# Patient Record
Sex: MALE | Race: WHITE | NOT HISPANIC OR LATINO | Employment: FULL TIME | URBAN - METROPOLITAN AREA
[De-identification: names, ages, dates, MRNs, and addresses within clinical notes are randomized per-mention and may not be internally consistent; named-entity substitution may affect disease eponyms.]

---

## 2017-01-03 ENCOUNTER — TELEPHONE (OUTPATIENT)
Dept: OPHTHALMOLOGY | Facility: CLINIC | Age: 62
End: 2017-01-03

## 2017-01-03 NOTE — TELEPHONE ENCOUNTER
----- Message from Debora Garcia sent at 1/3/2017 12:24 PM CST -----  Contact: self/[phone]  Pt called in regards to getting a Rx refill for doxazosin (CARDURA) 4 MG tablet. He has sent in several request. Pt is out of Rx.      [facility]  Please advise

## 2017-01-06 ENCOUNTER — PATIENT MESSAGE (OUTPATIENT)
Dept: INTERNAL MEDICINE | Facility: CLINIC | Age: 62
End: 2017-01-06

## 2017-01-06 RX ORDER — DOXAZOSIN 4 MG/1
TABLET ORAL
Qty: 30 TABLET | Refills: 6 | Status: SHIPPED | OUTPATIENT
Start: 2017-01-06 | End: 2017-08-04 | Stop reason: SDUPTHER

## 2017-01-26 RX ORDER — DIPHENHYDRAMINE HCL 1 %
CREAM (GRAM) TOPICAL
Qty: 360 TABLET | Refills: 0 | Status: SHIPPED | OUTPATIENT
Start: 2017-01-26 | End: 2017-07-24 | Stop reason: SDUPTHER

## 2017-04-03 DIAGNOSIS — G47.00 PERSISTENT INSOMNIA: ICD-10-CM

## 2017-04-03 RX ORDER — TRAZODONE HYDROCHLORIDE 50 MG/1
TABLET ORAL
Qty: 90 TABLET | Refills: 3 | Status: SHIPPED | OUTPATIENT
Start: 2017-04-03 | End: 2017-09-13 | Stop reason: SDUPTHER

## 2017-04-03 RX ORDER — OMEPRAZOLE 40 MG/1
40 CAPSULE, DELAYED RELEASE ORAL DAILY
Qty: 90 CAPSULE | Refills: 2 | Status: SHIPPED | OUTPATIENT
Start: 2017-04-03 | End: 2018-01-12 | Stop reason: SDUPTHER

## 2017-04-19 RX ORDER — HYDROXYCHLOROQUINE SULFATE 200 MG/1
TABLET, FILM COATED ORAL
Qty: 90 TABLET | Refills: 3 | Status: SHIPPED | OUTPATIENT
Start: 2017-04-19 | End: 2018-01-17 | Stop reason: SDUPTHER

## 2017-04-20 ENCOUNTER — OFFICE VISIT (OUTPATIENT)
Dept: OPTOMETRY | Facility: CLINIC | Age: 62
End: 2017-04-20
Payer: COMMERCIAL

## 2017-04-20 ENCOUNTER — CLINICAL SUPPORT (OUTPATIENT)
Dept: OPHTHALMOLOGY | Facility: CLINIC | Age: 62
End: 2017-04-20
Payer: COMMERCIAL

## 2017-04-20 DIAGNOSIS — M05.79 RHEUMATOID ARTHRITIS INVOLVING MULTIPLE SITES WITH POSITIVE RHEUMATOID FACTOR: Primary | ICD-10-CM

## 2017-04-20 DIAGNOSIS — H25.13 NUCLEAR SCLEROSIS, BILATERAL: ICD-10-CM

## 2017-04-20 DIAGNOSIS — Z79.899 HISTORY OF LONG-TERM TREATMENT WITH HIGH-RISK MEDICATION: ICD-10-CM

## 2017-04-20 PROCEDURE — 92134 CPTRZ OPH DX IMG PST SGM RTA: CPT | Mod: S$GLB,,, | Performed by: OPTOMETRIST

## 2017-04-20 PROCEDURE — 92083 EXTENDED VISUAL FIELD XM: CPT | Mod: S$GLB,,, | Performed by: OPTOMETRIST

## 2017-04-20 PROCEDURE — 92004 COMPRE OPH EXAM NEW PT 1/>: CPT | Mod: S$GLB,,, | Performed by: OPTOMETRIST

## 2017-04-20 PROCEDURE — 99999 PR PBB SHADOW E&M-EST. PATIENT-LVL II: CPT | Mod: PBBFAC,,, | Performed by: OPTOMETRIST

## 2017-04-20 NOTE — MR AVS SNAPSHOT
[facility]  [address]  [facility] [address]  Phone: [phone]  Fax: [phone]                  Andrea Gant   2017 1:30 PM   Office Visit    Description:  Male : 1955   Provider:  Milad Johnson OD   Department:  [facility]           Reason for Visit     Concerns About Ocular Health           Diagnoses this Visit        Comments    Rheumatoid arthritis involving multiple sites with positive rheumatoid factor    -  Primary     History of long-term treatment with high-risk medication         Nuclear sclerosis, bilateral                To Do List           Goals (5 Years of Data)     None      Follow-Up and Disposition     Return in about 6 months (around 10/20/2017).      [facility]     [facility] On Call Nurse Care Line -  Assistance  Unless otherwise directed by your provider, please contact [facility], our nurse care line that is available for  assistance.     Registered nurses in the [facility] On Call Center provide: appointment scheduling, clinical advisement, health education, and other advisory services.  Call: [phone] (toll free)               Medications           Message regarding Medications     Verify the changes and/or additions to your medication regime listed below are the same as discussed with your clinician today.  If any of these changes or additions are incorrect, please notify your healthcare provider.             Verify that the below list of medications is an accurate representation of the medications you are currently taking.  If none reported, the list may be blank. If incorrect, please contact your healthcare provider. Carry this list with you in case of emergency.           Current Medications     adalimumab (HUMIRA PEN) PnKt injection INJECT 1 PEN SUBCUTANEOUSLY EVERY OTHER WEEK -REFRIGERATE DO NOT FREEZE    aspirin 81 MG Chew TAKE 1 TABLET BY MOUTH EVERY DAY    CHILDREN'S ASPIRIN 81 mg Chew TAKE 1 TABLET BY  MOUTH EVERY DAY    cilostazol (PLETAL) 50 MG Tab TAKE 1 TABLET BY MOUTH TWICE A DAY    cilostazol (PLETAL) 50 MG Tab TAKE 1 TABLET BY MOUTH TWICE A DAY    doxazosin (CARDURA) 4 MG tablet TAKE 1 TABLET (4 MG TOTAL) BY MOUTH EVERY EVENING.    hydroxychloroquine (PLAQUENIL) 200 mg tablet TAKE 3 TABLETS (600 MG TOTAL) BY MOUTH ONCE DAILY. FURTHER REFILLS FROM DR. CROWLEY    omeprazole (PRILOSEC) 40 MG capsule Take 1 capsule (40 mg total) by mouth once daily.    predniSONE (DELTASONE) 2.5 MG tablet Take 3 tablets (7.5 mg total) by mouth once daily.    simvastatin (ZOCOR) 10 MG tablet TAKE 1 TABLET BY MOUTH EVERY EVENING    trazodone (DESYREL) 50 MG tablet TAKE 1 TO 3 TABLETS BY MOUTH EVERY NIGHT AT BEDTIME           Clinical Reference Information           Allergies as of 4/20/2017     Enbrel [Etanercept]      Immunizations Administered on Date of Encounter - 4/20/2017     None      Orders Placed During Today's Visit     Future Labs/Procedures Expected by Expires    Bess Visual Field - OU - Extended - Both Eyes  As directed 4/20/2018    OCT- Retina  As directed 4/20/2018      Language Assistance Services     ATTENTION: Language assistance services are available, free of charge. Please call [phone].      ATENCIÓN: Si ray narciso, tiene a lazaro disposición servicios gratuitos de asistencia lingüística. Llame al [phone].     ANJALI Ý: N?u b?n nói Ti?ng Vi?t, có các d?ch v? h? tr? ngôn ng? mi?n phí dành cho b?n. G?i s? [phone].         [facility] complies with applicable Federal civil rights laws and does not discriminate on the basis of race, color, national origin, age, disability, or sex.

## 2017-04-20 NOTE — PROGRESS NOTES
"HPI     Concerns About Ocular Health    Additional comments: Overdue Plaquenil chk/hvf rev           Comments   Patient states his va seems to be doing well for the most part for   distance and near in OU(no glasses).   He does experience, from time to time, a "sticking" sensation in OS   lately.    Red Out eye gtts PRN OU       Last edited by Milad Johnson, OD on 4/20/2017  1:54 PM. (History)            Assessment /Plan     For exam results, see Encounter Report.    Rheumatoid arthritis involving multiple sites with positive rheumatoid factor  -     OCT- Retina; Future  -     Guerrero Visual Field - OU - Extended - Both Eyes; Future  History of long-term treatment with high-risk medication  -No retinopathy noted today. RTC 6mos for dilated fundus exam, color vision screening, guerrero visual field 10-2, OCT.    Nuclear sclerosis, bilateral  -Educated patient on presence of cataracts at today's exam, monitor at annual dilated fundus exam. 5+ years surgical estimate.      RTC 6 mo                 "

## 2017-05-22 RX ORDER — SIMVASTATIN 10 MG/1
TABLET, FILM COATED ORAL
Qty: 30 TABLET | Refills: 6 | Status: SHIPPED | OUTPATIENT
Start: 2017-05-22 | End: 2017-11-29 | Stop reason: SDUPTHER

## 2017-05-31 ENCOUNTER — TELEPHONE (OUTPATIENT)
Dept: VASCULAR SURGERY | Facility: CLINIC | Age: 62
End: 2017-05-31

## 2017-05-31 NOTE — TELEPHONE ENCOUNTER
----- Message from Maikel Aguila sent at 5/31/2017  1:14 PM CDT -----  Pt would like to schedule appt. Pt said he has been having some pain in his legs and losing hair on his legs. Pt would just like to get it checked out. Please call pt regarding this at [phone]

## 2017-07-24 ENCOUNTER — OFFICE VISIT (OUTPATIENT)
Dept: RHEUMATOLOGY | Facility: CLINIC | Age: 62
End: 2017-07-24
Payer: COMMERCIAL

## 2017-07-24 VITALS
HEIGHT: 69 IN | DIASTOLIC BLOOD PRESSURE: 82 MMHG | HEART RATE: 81 BPM | BODY MASS INDEX: 24.96 KG/M2 | WEIGHT: 168.5 LBS | SYSTOLIC BLOOD PRESSURE: 133 MMHG

## 2017-07-24 DIAGNOSIS — Z79.60 LONG-TERM USE OF IMMUNOSUPPRESSANT MEDICATION: ICD-10-CM

## 2017-07-24 DIAGNOSIS — M06.9 RHEUMATOID ARTHRITIS OF HAND, UNSPECIFIED LATERALITY, UNSPECIFIED RHEUMATOID FACTOR PRESENCE: ICD-10-CM

## 2017-07-24 DIAGNOSIS — M05.79 RHEUMATOID ARTHRITIS INVOLVING MULTIPLE SITES WITH POSITIVE RHEUMATOID FACTOR: Primary | ICD-10-CM

## 2017-07-24 DIAGNOSIS — Z79.899 LONG-TERM USE OF HYDROXYCHLOROQUINE: ICD-10-CM

## 2017-07-24 PROCEDURE — 99214 OFFICE O/P EST MOD 30 MIN: CPT | Mod: S$GLB,,, | Performed by: INTERNAL MEDICINE

## 2017-07-24 PROCEDURE — 99999 PR PBB SHADOW E&M-EST. PATIENT-LVL III: CPT | Mod: PBBFAC,,, | Performed by: INTERNAL MEDICINE

## 2017-07-24 ASSESSMENT — ROUTINE ASSESSMENT OF PATIENT INDEX DATA (RAPID3)
FATIGUE SCORE: 5
PAIN SCORE: 3.5
MDHAQ FUNCTION SCORE: .3
PATIENT GLOBAL ASSESSMENT SCORE: 7
AM STIFFNESS SCORE: 0, NO
PSYCHOLOGICAL DISTRESS SCORE: 3.3
TOTAL RAPID3 SCORE: 3.83

## 2017-07-24 NOTE — PROGRESS NOTES
Subjective:       Patient ID: Andrea Gant is a 62 y.o. male with sero+ccp+ RA, OA and Hepatitis C (cured with Harvoni)    Chief Complaint: No chief complaint on file.    Returns for follow-up. Last seen 12/13/16. Is off prednisone and has lost a few lbs as well & is now in the normal range. On humira 40 mg qow since 6/15 &  mg/d. Doing very well RA wise. No joint pain or swelling.  Denies AM stiffness, Raynaud's, dysphagia, tight skin, oral ulcers, pleurisy, pericarditis, dry eyes, photosensitivity, thromboses. Still with some dryness in his mouth. Not exercising.  Some fatigue and sleep disturbance. Helped a bit by 100 mg trazodone. 150 mg has him hung over in the AM and tires him out more.   Wife (our patient as well) was hospitalized this past weekend with hypercalcemia and renal insufficiency and the [facility] experience was not pleasant to him as her room had to be changed 3 x and there were other non medical problems as well. He is not happy about this.     Has some R leg and calf pain, unrelated to walking or exertion and at rest and is concerned about PAD worsening, however, dopplers have not worsened.             Associated symptoms include fatigue.         Current Outpatient Prescriptions   Medication Sig Dispense Refill    adalimumab (HUMIRA PEN) PnKt injection INJECT 1 PEN SUBCUTANEOUSLY EVERY OTHER WEEK -REFRIGERATE DO NOT FREEZE 6 each 2    aspirin 81 MG Chew TAKE 1 TABLET BY MOUTH EVERY  tablet 0    cilostazol (PLETAL) 50 MG Tab TAKE 1 TABLET BY MOUTH TWICE A DAY 60 tablet 10    doxazosin (CARDURA) 4 MG tablet TAKE 1 TABLET (4 MG TOTAL) BY MOUTH EVERY EVENING. 30 tablet 6    hydroxychloroquine (PLAQUENIL) 200 mg tablet TAKE 3 TABLETS (600 MG TOTAL) BY MOUTH ONCE DAILY. FURTHER REFILLS FROM DR. CROWLEY 90 tablet 3    omeprazole (PRILOSEC) 40 MG capsule Take 1 capsule (40 mg total) by mouth once daily. 90 capsule 2    predniSONE (DELTASONE) 2.5 MG tablet Take 3 tablets (7.5  mg total) by mouth once daily. 270 tablet 0    simvastatin (ZOCOR) 10 MG tablet TAKE 1 TABLET BY MOUTH EVERY EVENING 30 tablet 6    trazodone (DESYREL) 50 MG tablet TAKE 1 TO 3 TABLETS BY MOUTH EVERY NIGHT AT BEDTIME 90 tablet 3     No current facility-administered medications for this visit.          Probable Allergic to Enbrel (rash);     Past Medical History:   Diagnosis Date    History of hepatitis C, s/p successful treatment w/ SVR12 - 7/2015 10/14/2012    Kelsey 1a,  Liver biopsy 6/2014 - Stage 1 fibrosis;  Completed 8 weeks Harvoni w/ SVR12 - 7/2015      Hyperlipidemia     Lipoma of arm     Lipoma of lower extremity     Nuclear sclerosis - Both Eyes 10/22/2012    Other and unspecified hyperlipidemia 10/22/2013    PAD (peripheral artery disease)     Peripheral vascular disease, unspecified     Rheumatoid arthritis 10/14/2012       Past Surgical History:   Procedure Laterality Date    KNEE ARTHROPLASTY      TONSILLECTOMY         Review of Systems   Constitutional: Positive for fatigue.   HENT: Negative.  Negative for hearing loss, mouth sores, sore throat and tinnitus.         Dry mouth   Eyes: Negative.  Negative for redness and visual disturbance.   Respiratory: Positive for cough. Negative for choking, chest tightness and shortness of breath.    Cardiovascular: Negative.  Negative for chest pain, palpitations and leg swelling.   Gastrointestinal: Negative.  Negative for abdominal pain, blood in stool, constipation, diarrhea, nausea and vomiting.        Heartburn   Endocrine: Negative.    Genitourinary: Positive for frequency. Negative for hematuria and urgency.   Musculoskeletal: Negative.  Negative for back pain, neck pain and neck stiffness.   Skin: Negative.  Negative for rash.   Allergic/Immunologic: Negative.    Neurological: Negative.  Negative for dizziness, syncope and numbness.   Hematological: Negative.  Does not bruise/bleed easily.   Psychiatric/Behavioral: Positive for dysphoric mood  "and sleep disturbance. The patient is nervous/anxious.        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    Objective:   /82   Pulse 81   Ht 5' 9" (1.753 m)   Wt 76.4 kg (168 lb 8 oz)   BMI 24.88 kg/m²   Was 172 lbs 4.8 oz on 12/13/16;  Physical Exam   Vitals reviewed.  Constitutional: He is oriented to person, place, and time and well-developed, well-nourished, and in no distress. No distress.   HENT:   Head: Normocephalic and atraumatic.   Mouth/Throat: Oropharynx is clear and moist. No oropharyngeal exudate.   No facial rashes  Parotids not enlarged  No oral ulcers  Temporal aa ok;    Eyes: Conjunctivae and EOM are normal. Pupils are equal, round, and reactive to light. Right eye exhibits no discharge. Left eye exhibits no discharge. No scleral icterus.   Neck: Neck supple. No JVD present. No tracheal deviation present. No thyromegaly present.   Cardiovascular: Normal rate, regular rhythm, normal heart sounds and intact distal pulses.  Exam reveals no gallop and no friction rub.    No murmur heard.  Pulmonary/Chest: Effort normal and breath sounds normal. No respiratory distress. He has no wheezes. He has no rales. He exhibits no tenderness.   Abdominal: Soft. Bowel sounds are normal. He exhibits no distension and no mass. There is no splenomegaly or hepatomegaly. There is no tenderness. There is no rebound and no guarding.   Lymphadenopathy:     He has no cervical adenopathy.        Right: No inguinal adenopathy present.        Left: No inguinal adenopathy present.   Neurological: He is alert and oriented to person, place, and time. He has normal reflexes. No cranial nerve deficit. Gait normal.   Proximal and distal muscle strength 5/5.   Skin: Skin is warm and dry. No rash noted. He is not diaphoretic.     Bilateral superficial varicosities LE;   Psychiatric: Memory, affect and judgment normal.   Musculoskeletal: Normal range of motion. He exhibits no edema or tenderness. "   Cspine FROM no tenderness  Tspine FROM no tenderness  Lspine FROM no tenderness.  TMJ: unremarkable  Shoulders: FROM; no synovitis; non tender nodule distal to left olecranon;  Elbows: FROM;left elbow nodule, firm & non tender; right elbow with mild flexion contracture.  Wrists: FROM; no synovitis  MCPs: FROM; no synovitis; no metacarpalgia;  ok;  PIPs:FROM; +Bouchards; non tender; no synovitis;  Good fists.  DIPs: FROM; + Heberden's; no synovitis  HIPS: FROM  Knees: FROM; no synovitis; no instability; minimal PF crepitus;  Ankles: FROM: no synovitis   Toes: ok; no metatarsalgia;                LABS:   3/26/16: CBC ok; cnne 1.3  3/10/16: ESR 9; CRP 1.7; CBC, CMP ok;  9/15/15: ESR 20; CRP 0.9; CBC ok; CMP ok;  3/2/15: CMP BUN 28; cnne 1.1  1/12/15: Hg 13.9  10/21/14: ESR 8;   14: ESR 19; CRP 2.9; Hg 12.8; cnne 1.3; Vit D 18; ; CCP 44.3   Hepatitis B & HIV negative; HCV+;     IMAGIN16: US Dopplers: R:minimal peripheral arterial occlusive disease: L: moderate peripheral arterial occlusive disease; unchanged  4/2/15: Bilateral wrists: personal review: cystic lesion left lunate, possibly left ulnar notch. (not too different from previous)  4/2/15: R ankle personally reviewed: ok  14: Arthritis survey personally reviewed again: OA lower CS; min OA hands & feet;      Assessment:   Seropositive, CCP positive nodular (left elbow) rheumatoid arthritis with no erosions--doing well,    Enbrel effective but resulted in rash. MTX and Leflunomide contraindicated.   SSZ given ok by Dr. Moore, but never begun   On hydroxychloroquine & humira 40 mg qow since 6/15    Mild renal insufficiency on meloxicam (cnne up to 1.3 from 1.0)   Off it now, but last cnne 1.3.    Hepatitis C with normal liver function tests--genotype 1a   Completed Harvoni; cured    Osteoarthritis of hands.     Recurrent hypovitaminosis D -treated in past    Peripheral Arterial Disease.    Compression deformity of the lower lumbar  and thoracic vertebral bodies by x-ray.     Multiple lipomas of the arms and lumbar spine area.     Adhesive capsulitis of left shoulder--resolved.     Occupational injuries in the past.     Persistent insomnia.   Helped partly by trazodone.       Plan:   Stay on Humira 40 mg every other week--re-ordered.  Continue HCQ with eye exams  Continue Vitamin D3 1,000 IU daily.   Labs today.  Daily exercise.   Contact us for problems.   RTC 6 months as per patient's request.

## 2017-08-06 RX ORDER — DOXAZOSIN 4 MG/1
TABLET ORAL
Qty: 30 TABLET | Refills: 6 | Status: SHIPPED | OUTPATIENT
Start: 2017-08-06 | End: 2018-03-11 | Stop reason: SDUPTHER

## 2017-08-06 NOTE — TELEPHONE ENCOUNTER
According to our records this patient has not had a Primary Care appointment with me in over 1 year. I will refill the medication but patient will be contacted about making an appropriate appointment.

## 2017-08-15 DIAGNOSIS — I73.9 PVD (PERIPHERAL VASCULAR DISEASE): Primary | ICD-10-CM

## 2017-09-13 DIAGNOSIS — G47.00 PERSISTENT INSOMNIA: ICD-10-CM

## 2017-09-13 RX ORDER — TRAZODONE HYDROCHLORIDE 50 MG/1
TABLET ORAL
Qty: 90 TABLET | Refills: 3 | Status: SHIPPED | OUTPATIENT
Start: 2017-09-13 | End: 2018-04-14 | Stop reason: SDUPTHER

## 2017-09-28 ENCOUNTER — OFFICE VISIT (OUTPATIENT)
Dept: VASCULAR SURGERY | Facility: CLINIC | Age: 62
End: 2017-09-28
Payer: COMMERCIAL

## 2017-09-28 ENCOUNTER — HOSPITAL ENCOUNTER (OUTPATIENT)
Dept: VASCULAR SURGERY | Facility: CLINIC | Age: 62
Discharge: HOME | End: 2017-09-28
Attending: SURGERY
Payer: COMMERCIAL

## 2017-09-28 VITALS
TEMPERATURE: 98 F | HEIGHT: 69 IN | SYSTOLIC BLOOD PRESSURE: 151 MMHG | WEIGHT: 171.5 LBS | HEART RATE: 74 BPM | BODY MASS INDEX: 25.4 KG/M2 | DIASTOLIC BLOOD PRESSURE: 85 MMHG

## 2017-09-28 DIAGNOSIS — I73.9 PVD (PERIPHERAL VASCULAR DISEASE): Primary | ICD-10-CM

## 2017-09-28 DIAGNOSIS — I73.9 PVD (PERIPHERAL VASCULAR DISEASE): ICD-10-CM

## 2017-09-28 PROCEDURE — 3079F DIAST BP 80-89 MM HG: CPT | Mod: S$GLB,,, | Performed by: SURGERY

## 2017-09-28 PROCEDURE — 3008F BODY MASS INDEX DOCD: CPT | Mod: S$GLB,,, | Performed by: SURGERY

## 2017-09-28 PROCEDURE — 93923 UPR/LXTR ART STDY 3+ LVLS: CPT | Mod: S$GLB,,, | Performed by: SURGERY

## 2017-09-28 PROCEDURE — 93978 VASCULAR STUDY: CPT | Mod: S$GLB,,, | Performed by: SURGERY

## 2017-09-28 PROCEDURE — 99999 PR PBB SHADOW E&M-EST. PATIENT-LVL II: CPT | Mod: PBBFAC,,, | Performed by: SURGERY

## 2017-09-28 PROCEDURE — 3077F SYST BP >= 140 MM HG: CPT | Mod: S$GLB,,, | Performed by: SURGERY

## 2017-09-28 PROCEDURE — 99214 OFFICE O/P EST MOD 30 MIN: CPT | Mod: S$GLB,,, | Performed by: SURGERY

## 2017-09-28 RX ORDER — ASPIRIN 81 MG/1
81 TABLET ORAL DAILY
Refills: 3 | COMMUNITY
Start: 2017-09-28 | End: 2021-01-25

## 2017-09-28 NOTE — PROGRESS NOTES
Andrea Gant  09/28/2017    HPI:  Patient is a 61 y.o. male with ho former heavy tobacco use, HLD for f/u of L lower extremity claudication. The patient reports sustained marked improvement in cramping pain in L calf - now > 1.5 miles (previously 1 block).     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)    Significant family history of claudication/heart disease    PSxHx  None    PMD is Dr LUCINDA Wang    Additionally, he has a history of:  Past Medical History:   Diagnosis Date    History of hepatitis C, s/p successful treatment w/ SVR12 - 7/2015 10/14/2012    Kelsey 1a,  Liver biopsy 6/2014 - Stage 1 fibrosis;  Completed 8 weeks Harvoni w/ SVR12 - 7/2015      Hyperlipidemia     Lipoma of arm     Lipoma of lower extremity     Nuclear sclerosis - Both Eyes 10/22/2012    Other and unspecified hyperlipidemia 10/22/2013    PAD (peripheral artery disease)     Peripheral vascular disease, unspecified     Rheumatoid arthritis(714.0) 10/14/2012     Past Surgical History:   Procedure Laterality Date    KNEE ARTHROPLASTY      TONSILLECTOMY       Family History   Problem Relation Age of Onset    Liver disease Neg Hx     Amblyopia Neg Hx     Blindness Neg Hx     Cancer Neg Hx     Cataracts Neg Hx     Diabetes Neg Hx     Glaucoma Neg Hx     Hypertension Neg Hx     Macular degeneration Neg Hx     Retinal detachment Neg Hx     Strabismus Neg Hx     Stroke Neg Hx     Thyroid disease Neg Hx     Heart disease Paternal Uncle      Social History     Social History    Marital status:      Spouse name: N/A    Number of children: N/A    Years of education: N/A     Occupational History    Not on file.     Social History Main Topics    Smoking status: Former Smoker     Types: Cigarettes    Smokeless tobacco: Never Used    Alcohol use Yes      Comment: 3-4 drinks per week    Drug use:       Comment: marajosephine     Sexual activity: Not on file     Other Topics Concern    Not on file     Social History Narrative    Resides locally    [place] at [employer]     w/ 2 adult children     Current Outpatient Prescriptions   Medication Sig Dispense Refill    adalimumab (HUMIRA PEN) PnKt injection INJECT 1 PEN SUBCUTANEOUSLY EVERY OTHER WEEK -REFRIGERATE DO NOT FREEZE 6 each 2    aspirin 81 MG Chew TAKE 1 TABLET BY MOUTH EVERY  tablet 0    cilostazol (PLETAL) 50 MG Tab TAKE 1 TABLET BY MOUTH TWICE A DAY 60 tablet 10    doxazosin (CARDURA) 4 MG tablet TAKE 1 TABLET BY MOUTH ONCE EVERY EVENING 30 tablet 6    hydroxychloroquine (PLAQUENIL) 200 mg tablet TAKE 3 TABLETS (600 MG TOTAL) BY MOUTH ONCE DAILY. FURTHER REFILLS FROM DR. CROWLEY 90 tablet 3    omeprazole (PRILOSEC) 40 MG capsule Take 1 capsule (40 mg total) by mouth once daily. 90 capsule 2    predniSONE (DELTASONE) 2.5 MG tablet Take 3 tablets (7.5 mg total) by mouth once daily. 270 tablet 0    simvastatin (ZOCOR) 10 MG tablet TAKE 1 TABLET BY MOUTH EVERY EVENING 30 tablet 6    trazodone (DESYREL) 50 MG tablet TAKE 1 TO 3 TABLETS BY MOUTH EVERY NIGHT AT BEDTIME 90 tablet 3    aspirin (ECOTRIN) 81 MG EC tablet Take 1 tablet (81 mg total) by mouth once daily.  3     No current facility-administered medications for this visit.        REVIEW OF SYSTEMS:  General: negative  ENT: negative  Allergy and Immunology: negative  Hematological and Lymphatic: negative  Endocrine: negative  Respiratory: no cough, shortness of breath, or wheezing  Cardiovascular: no chest pain or dyspnea on exertion  Gastrointestinal: no abdominal pain, change in bowel habits, or bloody stools  Genito-Urinary: no dysuria, trouble voiding, or hematuria  Musculoskeletal: negative  Neurological: no TIA or stroke symptoms    PHYSICAL EXAM:  Vitals:    17 1325   BP: (!) 151/85   Pulse: 74   Temp: 98.2 °F (36.8 °C)     Left Arm BP - Sittin/90  General  appearance: Alert, well appearing, and in no distress  Neurological: Alert, oriented, normal speech, no focal findings noted  Mental status:  Oriented to person, place, and time  Neck: Supple, no significant adenopathy, carotids upstroke normal bilaterally  No carotid bruits can be auscultated  Chest: Clear to auscultation, no wheezes, rales or rhonchi, symmetric air entry  Cardiac: Normal rate and regular rhythm, S1 and S2 normal; PMI non-displaced  Abdomen: Soft, nontender, nondistended, no masses or organomegaly  no rebound tenderness noted; bowel sounds normal  + Palpable aortic  pulsatile mass  Extremities:  2+ femoral pulses bilaterally; Non-palpable pedal pulses on L leg  2+ R PT pulse  No tissue loss  No pedal edema    Lab Results   Component Value Date    K 4.0 07/24/2017    K 4.3 03/26/2016    K 4.4 03/10/2016    CREATININE 1.3 07/24/2017    CREATININE 1.3 03/26/2016    CREATININE 1.1 03/10/2016     Lab Results   Component Value Date    WBC 11.73 07/24/2017    WBC 11.88 03/26/2016    WBC 9.97 03/10/2016    HCT 43.7 07/24/2017    HCT 44.6 03/26/2016    HCT 45.6 03/10/2016     07/24/2017     03/26/2016     03/10/2016       IMAGING  ABIs  R 1.08 (previous 1.12)  L 0.68 (previous 0.61, 0.58)    Triphasic waveforms at R ankle  Biphasic waveforms at L ankle    Previous:  CTA runoff  +atheroma in Aorta  Ectatic R distal CHAD   Long segment L SFA  30 cm  Reconstitutes in mid-L popliteal  3 tibals runoff    No R leg occlusive disease  + calcium throughout    Carotid u/s:  No ICA disease  Normal antegrade vert flow    Previous:  ABIs  R 0.61  L 0.45    IMP/PLAN:  62 y.o. male with ho former heavy tobacco use, HLD with [place] chronic limb ischemia class II : L calf claudication but well managed  Still ambulating now > 1.5 miles (previously 1 block)  Cont ASA, statin; cilostazol  Continues to Improve walking distance with this regimen    Off tobacco since 2013    We discussed the possibility  of a L JWW-rl-pa-popliteal bypass / using GSV;  As he is stable with symptoms, would not advocate an enoluminal treatment for this long-segment SFA chronic occlusion in such a young patient.  Would only offer a L fem/popliteal (bk-pop) if he develops a tissue loss / rest pain or symptoms worsen  F/u 1 yr w PVRs.  When 63 yo, check AAA us    Feroz Jensen MD FACS  Vascular/Endovascular Surgery

## 2017-11-29 RX ORDER — SIMVASTATIN 10 MG/1
TABLET, FILM COATED ORAL
Qty: 30 TABLET | Refills: 4 | Status: SHIPPED | OUTPATIENT
Start: 2017-11-29 | End: 2018-05-04 | Stop reason: SDUPTHER

## 2017-12-21 RX ORDER — CILOSTAZOL 50 MG/1
TABLET ORAL
Qty: 60 TABLET | Refills: 10 | Status: SHIPPED | OUTPATIENT
Start: 2017-12-21 | End: 2018-01-17 | Stop reason: SDUPTHER

## 2018-01-12 RX ORDER — OMEPRAZOLE 40 MG/1
40 CAPSULE, DELAYED RELEASE ORAL DAILY
Qty: 90 CAPSULE | Refills: 2 | Status: SHIPPED | OUTPATIENT
Start: 2018-01-12 | End: 2018-09-05

## 2018-01-17 ENCOUNTER — OFFICE VISIT (OUTPATIENT)
Dept: RHEUMATOLOGY | Facility: CLINIC | Age: 63
End: 2018-01-17
Payer: COMMERCIAL

## 2018-01-17 VITALS
HEIGHT: 70 IN | WEIGHT: 178.63 LBS | BODY MASS INDEX: 25.57 KG/M2 | DIASTOLIC BLOOD PRESSURE: 85 MMHG | SYSTOLIC BLOOD PRESSURE: 152 MMHG | HEART RATE: 73 BPM

## 2018-01-17 DIAGNOSIS — Z79.60 LONG-TERM USE OF IMMUNOSUPPRESSANT MEDICATION: ICD-10-CM

## 2018-01-17 DIAGNOSIS — E66.3 OVERWEIGHT (BMI 25.0-29.9): ICD-10-CM

## 2018-01-17 DIAGNOSIS — R03.0 ELEVATED BLOOD PRESSURE READING: ICD-10-CM

## 2018-01-17 DIAGNOSIS — M05.79 RHEUMATOID ARTHRITIS INVOLVING MULTIPLE SITES WITH POSITIVE RHEUMATOID FACTOR: Primary | ICD-10-CM

## 2018-01-17 DIAGNOSIS — Z79.52 LONG TERM (CURRENT) USE OF SYSTEMIC STEROIDS: ICD-10-CM

## 2018-01-17 DIAGNOSIS — Z79.899 LONG-TERM USE OF HYDROXYCHLOROQUINE: ICD-10-CM

## 2018-01-17 PROCEDURE — 99999 PR PBB SHADOW E&M-EST. PATIENT-LVL III: CPT | Mod: PBBFAC,,, | Performed by: INTERNAL MEDICINE

## 2018-01-17 PROCEDURE — 99214 OFFICE O/P EST MOD 30 MIN: CPT | Mod: S$GLB,,, | Performed by: INTERNAL MEDICINE

## 2018-01-17 RX ORDER — HYDROXYCHLOROQUINE SULFATE 200 MG/1
TABLET, FILM COATED ORAL
Qty: 90 TABLET | Refills: 0 | Status: SHIPPED | OUTPATIENT
Start: 2018-01-17 | End: 2018-02-15 | Stop reason: SDUPTHER

## 2018-01-17 ASSESSMENT — ROUTINE ASSESSMENT OF PATIENT INDEX DATA (RAPID3)
AM STIFFNESS SCORE: 0, NO
MDHAQ FUNCTION SCORE: .6
FATIGUE SCORE: 5
PSYCHOLOGICAL DISTRESS SCORE: 3.3
PAIN SCORE: 4
PATIENT GLOBAL ASSESSMENT SCORE: 8
TOTAL RAPID3 SCORE: 4.67

## 2018-01-17 NOTE — PROGRESS NOTES
Subjective:       Patient ID: Andrea Gant is a 62 y.o. male with sero+ccp+ RA, OA and Hepatitis C (cured with Harvoni)    Chief Complaint: No chief complaint on file.    Returns for follow-up. Last seen 7/24/17. Her returns for f/u. He informs me that he is still taking prednisone 2.5 mg/d in addition to  mg/d and Humira 40 mg every other week (started 6/15/17).  He is pleased with his rx for his joints. Denies Joint pain/swelling except for his R shoulder which is painful with certain movements. No swelling.  Has very little AM stiffness. Denies Raynaud's, dysphagia, tight skin, oral ulcers, pleurisy, pericarditis, dry eyes, photosensitivity, thromboses. Still with some dryness in his mouth. Still with some fatigue and sleep disturbance helped by 100 mg trazodone.              Associated symptoms include fatigue.         Current Outpatient Prescriptions   Medication Sig Dispense Refill    adalimumab (HUMIRA PEN) PnKt injection INJECT 1 PEN SUBCUTANEOUSLY EVERY OTHER WEEK -REFRIGERATE DO NOT FREEZE 6 each 2    aspirin (ECOTRIN) 81 MG EC tablet Take 1 tablet (81 mg total) by mouth once daily.  3    cilostazol (PLETAL) 50 MG Tab TAKE 1 TABLET BY MOUTH TWICE A DAY 60 tablet 10    doxazosin (CARDURA) 4 MG tablet TAKE 1 TABLET BY MOUTH ONCE EVERY EVENING 30 tablet 6    hydroxychloroquine (PLAQUENIL) 200 mg tablet TAKE 3 TABLETS (600 MG TOTAL) BY MOUTH ONCE DAILY. FURTHER REFILLS FROM DR. CROWLEY 90 tablet 0    omeprazole (PRILOSEC) 40 MG capsule TAKE 1 CAPSULE (40 MG TOTAL) BY MOUTH ONCE DAILY. 90 capsule 2    predniSONE (DELTASONE) 2.5 MG tablet Take 3 tablets (7.5 mg total) by mouth once daily. 270 tablet 0    simvastatin (ZOCOR) 10 MG tablet TAKE 1 TABLET BY MOUTH EVERY EVENING 30 tablet 4    trazodone (DESYREL) 50 MG tablet TAKE 1 TO 3 TABLETS BY MOUTH EVERY NIGHT AT BEDTIME 90 tablet 3     No current facility-administered medications for this visit.          Probable Allergic to Enbrel  "(rash);     Past Medical History:   Diagnosis Date    History of hepatitis C, s/p successful treatment w/ SVR12 - 7/2015 10/14/2012    Kelsey 1a,  Liver biopsy 6/2014 - Stage 1 fibrosis;  Completed 8 weeks Harvoni w/ SVR12 - 7/2015      Hyperlipidemia     Lipoma of arm     Lipoma of lower extremity     Nuclear sclerosis - Both Eyes 10/22/2012    Other and unspecified hyperlipidemia 10/22/2013    PAD (peripheral artery disease)     Peripheral vascular disease, unspecified     Rheumatoid arthritis(714.0) 10/14/2012       Past Surgical History:   Procedure Laterality Date    KNEE ARTHROPLASTY      TONSILLECTOMY         Review of Systems   Constitutional: Positive for fatigue.   HENT: Negative.  Negative for hearing loss, mouth sores, sore throat and tinnitus.         Dry mouth   Eyes: Negative.  Negative for redness and visual disturbance.   Respiratory: Negative for cough, choking, chest tightness and shortness of breath.    Cardiovascular: Negative.  Negative for chest pain, palpitations and leg swelling.   Gastrointestinal: Negative.  Negative for abdominal pain, blood in stool, constipation, diarrhea, nausea and vomiting.        Heartburn   Endocrine: Negative.    Genitourinary: Positive for frequency. Negative for hematuria and urgency.   Musculoskeletal: Negative.  Negative for back pain, neck pain and neck stiffness.   Skin: Negative.  Negative for rash.   Allergic/Immunologic: Negative.    Neurological: Negative.  Negative for dizziness, syncope and numbness.   Hematological: Negative.  Does not bruise/bleed easily.   Psychiatric/Behavioral: Positive for dysphoric mood and sleep disturbance. The patient is nervous/anxious.          SH: still not smoking was former smoker.  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    Objective:   BP (!) 152/85   Pulse 73   Ht 5' 9.6" (1.768 m)   Wt 81 kg (178 lb 9.6 oz)   BMI 25.92 kg/m²   Was 172 lbs 4.8 oz on 12/13/16;  Physical Exam   Vitals reviewed.  Constitutional: He is " oriented to person, place, and time and well-developed, well-nourished, and in no distress. No distress.   HENT:   Head: Normocephalic and atraumatic.   Mouth/Throat: Oropharynx is clear and moist. No oropharyngeal exudate.   No facial rashes  Parotids not enlarged  No oral ulcers  Temporal aa ok;    Eyes: Conjunctivae and EOM are normal. Pupils are equal, round, and reactive to light. Right eye exhibits no discharge. Left eye exhibits no discharge. No scleral icterus.   Neck: Neck supple. No JVD present. No tracheal deviation present. No thyromegaly present.   Cardiovascular: Normal rate, regular rhythm, normal heart sounds and intact distal pulses.  Exam reveals no gallop and no friction rub.    No murmur heard.  Pulmonary/Chest: Effort normal and breath sounds normal. No respiratory distress. He has no wheezes. He has no rales. He exhibits no tenderness.   Abdominal: Soft. Bowel sounds are normal. He exhibits no distension and no mass. There is no splenomegaly or hepatomegaly. There is no tenderness. There is no rebound and no guarding.   Lymphadenopathy:     He has no cervical adenopathy.        Right: No inguinal adenopathy present.        Left: No inguinal adenopathy present.   Neurological: He is alert and oriented to person, place, and time. He has normal reflexes. No cranial nerve deficit. Gait normal.   Proximal and distal muscle strength 5/5.   Skin: Skin is warm and dry. No rash noted. He is not diaphoretic.     Has small mildly excoriated small pruritic skin lesion surrounded by mild erythema R upper back. Non tender. Bilateral superficial varicosities LE;   Psychiatric: Memory, affect and judgment normal.   Musculoskeletal: Normal range of motion. He exhibits no edema or tenderness.   Cspine FROM no tenderness  Tspine FROM no tenderness  Lspine FROM no tenderness.  TMJ: unremarkable  Shoulders: missing a few degrees of abduction on the L a few more on the R; no synovitis; no tenderness  Elbows:  FROM;left elbow nodule, firm & non tender; right elbow with mild flexion contracture.  Wrists: FROM; no synovitis  MCPs: FROM; no synovitis; no metacarpalgia;  ok;  PIPs:FROM; +Bouchards; non tender; no synovitis;  Good fists.  DIPs: FROM; + Heberden's; no synovitis  HIPS: FROM  Knees: FROM; no synovitis; no instability; minimal PF crepitus;  Ankles: FROM: no synovitis   Toes: ok; no metatarsalgia;                LABS:   17: ESR 23; CRP 4.2; CBC ok; CMP cnne 1.3; GFR 58.1;   3/26/16: CBC ok; cnne 1.3  3/10/16: ESR 9; CRP 1.7; CBC, CMP ok;  9/15/15: ESR 20; CRP 0.9; CBC ok; CMP ok;  3/2/15: CMP BUN 28; cnne 1.1  1/12/15: Hg 13.9  10/21/14: ESR 8;   14: ESR 19; CRP 2.9; Hg 12.8; cnne 1.3; Vit D 18; ; CCP 44.3   Hepatitis B & HIV negative; HCV+;     IMAGIN16: US Dopplers: R:minimal peripheral arterial occlusive disease: L: moderate peripheral arterial occlusive disease; unchanged  4/2/15: Bilateral wrists: personal review: cystic lesion left lunate, possibly left ulnar notch. (not too different from previous)  4/2/15: R ankle personally reviewed: ok  14: Arthritis survey personally reviewed again: OA lower CS; min OA hands & feet;      Assessment:   Seropositive, CCP positive nodular (left elbow) rheumatoid arthritis with no erosions--doing well,    Enbrel effective but resulted in rash. MTX and Leflunomide contraindicated.   SSZ given ok by Dr. Moore, but never begun   On hydroxychloroquine 400 mg/d   Prednisone 2.5 mg/d & humira 40 mg qow since 6/15    Bilateral adhesive capsulitis R>L    Mild renal insufficiency on meloxicam (cnne up to 1.3 from 1.0)   Off it now, but last cnne 1.3.    Hepatitis C with normal liver function tests--genotype 1a   Completed Harvoni; cured    Osteoarthritis of hands.     Recurrent hypovitaminosis D -treated in past    Peripheral Arterial Disease.    Compression deformity of the lower lumbar and thoracic vertebral bodies by x-ray.     Multiple lipomas  of the arms and lumbar spine area.     Adhesive capsulitis of left shoulder--resolved.     Occupational injuries in the past.     Persistent insomnia.   Helped partly by trazodone.       Plan:   Stay on Humira 40 mg every other week  Continue HCQ with eye exams  ROM exercises 3 types shown.  Labs today.  Dermatology if skin lesion expands or does not resolve  Daily exercise.   Contact us for problems.   RTC 6 months as per patient's request.

## 2018-01-17 NOTE — PATIENT INSTRUCTIONS
Try taking prednisone 2.5 mg every other day for 2-4 weeks and then if you feel the same on the day you are off it, stop it altogether.    Try using metatarsal inserts in your shoes.     Get eye exam for HCQ.

## 2018-01-18 ENCOUNTER — PATIENT MESSAGE (OUTPATIENT)
Dept: RHEUMATOLOGY | Facility: CLINIC | Age: 63
End: 2018-01-18

## 2018-02-15 RX ORDER — HYDROXYCHLOROQUINE SULFATE 200 MG/1
TABLET, FILM COATED ORAL
Qty: 90 TABLET | Refills: 0 | Status: SHIPPED | OUTPATIENT
Start: 2018-02-15 | End: 2018-04-19 | Stop reason: SDUPTHER

## 2018-02-22 DIAGNOSIS — M06.9 RHEUMATOID ARTHRITIS OF HAND, UNSPECIFIED LATERALITY, UNSPECIFIED RHEUMATOID FACTOR PRESENCE: ICD-10-CM

## 2018-02-23 ENCOUNTER — TELEPHONE (OUTPATIENT)
Dept: PHARMACY | Facility: HOSPITAL | Age: 63
End: 2018-02-23

## 2018-02-23 NOTE — TELEPHONE ENCOUNTER
Informed patient [facility] received a prescription for Humira and we will contact their insurance company to find out if the medication is covered. We will update patient of status as more information is received. feel free to give us a call with  any questions at [phone].

## 2018-03-05 ENCOUNTER — TELEPHONE (OUTPATIENT)
Dept: RHEUMATOLOGY | Facility: CLINIC | Age: 63
End: 2018-03-05

## 2018-03-09 ENCOUNTER — TELEPHONE (OUTPATIENT)
Dept: PHARMACY | Facility: CLINIC | Age: 63
End: 2018-03-09

## 2018-03-09 NOTE — TELEPHONE ENCOUNTER
Pt declined consultation, as he has been on Humira. Explained [facility] services and refill process.  Humira will be shipped on 3/12 for pt to receive 3/13.  Pt's next injection is 3/21.  Pt did not have any additional questions or concerns.    Zuly Guzmán, PharmD  [facility]  [phone]

## 2018-03-12 RX ORDER — DOXAZOSIN 4 MG/1
TABLET ORAL
Qty: 30 TABLET | Refills: 6 | Status: SHIPPED | OUTPATIENT
Start: 2018-03-12 | End: 2018-10-04 | Stop reason: SDUPTHER

## 2018-04-04 NOTE — TELEPHONE ENCOUNTER
Patient called to refill Humira.  Patient confirmed he has 1 dose left.  Ship 4/4/18 for 4/5/18 delivery.  Copay $5.00 in 004.  Patient confirmed no new meds, allergies, or health conditions.  Verified address.    Declined [facility] .

## 2018-04-14 DIAGNOSIS — G47.00 PERSISTENT INSOMNIA: ICD-10-CM

## 2018-04-14 RX ORDER — TRAZODONE HYDROCHLORIDE 50 MG/1
TABLET ORAL
Qty: 90 TABLET | Refills: 3 | Status: SHIPPED | OUTPATIENT
Start: 2018-04-14 | End: 2018-08-28 | Stop reason: SDUPTHER

## 2018-04-18 ENCOUNTER — PATIENT OUTREACH (OUTPATIENT)
Dept: ADMINISTRATIVE | Facility: HOSPITAL | Age: 63
End: 2018-04-18

## 2018-04-19 RX ORDER — HYDROXYCHLOROQUINE SULFATE 200 MG/1
TABLET, FILM COATED ORAL
Qty: 90 TABLET | Refills: 2 | Status: SHIPPED | OUTPATIENT
Start: 2018-04-19 | End: 2018-05-21 | Stop reason: SDUPTHER

## 2018-04-23 ENCOUNTER — OFFICE VISIT (OUTPATIENT)
Dept: OPTOMETRY | Facility: CLINIC | Age: 63
End: 2018-04-23
Payer: COMMERCIAL

## 2018-04-23 DIAGNOSIS — M05.79 RHEUMATOID ARTHRITIS INVOLVING MULTIPLE SITES WITH POSITIVE RHEUMATOID FACTOR: Primary | ICD-10-CM

## 2018-04-23 DIAGNOSIS — Z79.899 HISTORY OF LONG-TERM TREATMENT WITH HIGH-RISK MEDICATION: ICD-10-CM

## 2018-04-23 DIAGNOSIS — H25.13 NUCLEAR SCLEROSIS, BILATERAL: ICD-10-CM

## 2018-04-23 PROCEDURE — 92012 INTRM OPH EXAM EST PATIENT: CPT | Mod: S$GLB,,, | Performed by: OPTOMETRIST

## 2018-04-23 PROCEDURE — 99999 PR PBB SHADOW E&M-EST. PATIENT-LVL II: CPT | Mod: PBBFAC,,, | Performed by: OPTOMETRIST

## 2018-04-23 PROCEDURE — 92134 CPTRZ OPH DX IMG PST SGM RTA: CPT | Mod: S$GLB,,, | Performed by: OPTOMETRIST

## 2018-04-23 NOTE — PROGRESS NOTES
HPI     Here for annual exam. Distance vision seems a little fuzzy  Doesn't wear glasses  No flashes or floaters  Eyes itch no burning or tearing  Last month poked in right eye with a branch  Red reliever Gtts 2-3x/wk    Last edited by Milad Johnson, OD on 4/23/2018  1:32 PM. (History)            Assessment /Plan     For exam results, see Encounter Report.    Rheumatoid arthritis involving multiple sites with positive rheumatoid factor  History of long-term treatment with high-risk medication  -     OCT, Retina - OU - Both Eyes  -No retinopathy noted today. RTC 12 mos for dilated fundus exam, color vision screening, guerrero visual field 10-2, OCT.    Nuclear sclerosis, bilateral  -Educated patient on presence of cataracts at today's exam, monitor at annual dilated fundus exam. 5+ years surgical estimate.      RTC 1yr

## 2018-04-26 ENCOUNTER — TELEPHONE (OUTPATIENT)
Dept: PHARMACY | Facility: CLINIC | Age: 63
End: 2018-04-26

## 2018-05-05 RX ORDER — SIMVASTATIN 10 MG/1
TABLET, FILM COATED ORAL
Qty: 30 TABLET | Refills: 4 | Status: SHIPPED | OUTPATIENT
Start: 2018-05-05 | End: 2018-10-14 | Stop reason: SDUPTHER

## 2018-05-21 RX ORDER — HYDROXYCHLOROQUINE SULFATE 200 MG/1
TABLET, FILM COATED ORAL
Qty: 90 TABLET | Refills: 0 | Status: SHIPPED | OUTPATIENT
Start: 2018-05-21 | End: 2018-06-28 | Stop reason: SDUPTHER

## 2018-06-11 NOTE — TELEPHONE ENCOUNTER
Refill readiness for Humira confirmed with patient; name/ confirmed; no missed doses; no new medications; no side effects noted; address confirmed for  shipment and  delivery.

## 2018-06-20 RX ORDER — HYDROXYCHLOROQUINE SULFATE 200 MG/1
TABLET, FILM COATED ORAL
Qty: 90 TABLET | Refills: 0 | OUTPATIENT
Start: 2018-06-20

## 2018-06-28 RX ORDER — HYDROXYCHLOROQUINE SULFATE 200 MG/1
200 TABLET, FILM COATED ORAL 2 TIMES DAILY
Qty: 60 TABLET | Refills: 3 | Status: SHIPPED | OUTPATIENT
Start: 2018-06-28 | End: 2018-11-21 | Stop reason: SDUPTHER

## 2018-07-30 ENCOUNTER — TELEPHONE (OUTPATIENT)
Dept: PHARMACY | Facility: CLINIC | Age: 63
End: 2018-07-30

## 2018-08-08 ENCOUNTER — OFFICE VISIT (OUTPATIENT)
Dept: INTERNAL MEDICINE | Facility: CLINIC | Age: 63
End: 2018-08-08
Payer: COMMERCIAL

## 2018-08-08 VITALS
OXYGEN SATURATION: 97 % | HEART RATE: 86 BPM | SYSTOLIC BLOOD PRESSURE: 116 MMHG | WEIGHT: 168.19 LBS | BODY MASS INDEX: 24.91 KG/M2 | HEIGHT: 69 IN | DIASTOLIC BLOOD PRESSURE: 66 MMHG

## 2018-08-08 DIAGNOSIS — E78.49 OTHER HYPERLIPIDEMIA: ICD-10-CM

## 2018-08-08 DIAGNOSIS — Z00.00 ROUTINE PHYSICAL EXAMINATION: Primary | ICD-10-CM

## 2018-08-08 DIAGNOSIS — Z12.5 SCREENING FOR PROSTATE CANCER: ICD-10-CM

## 2018-08-08 DIAGNOSIS — M05.79 RHEUMATOID ARTHRITIS INVOLVING MULTIPLE SITES WITH POSITIVE RHEUMATOID FACTOR: ICD-10-CM

## 2018-08-08 DIAGNOSIS — K40.90 RIGHT INGUINAL HERNIA: ICD-10-CM

## 2018-08-08 DIAGNOSIS — Z86.19 HISTORY OF HEPATITIS C: ICD-10-CM

## 2018-08-08 DIAGNOSIS — I73.9 PAD (PERIPHERAL ARTERY DISEASE): ICD-10-CM

## 2018-08-08 DIAGNOSIS — G47.00 PERSISTENT INSOMNIA: ICD-10-CM

## 2018-08-08 DIAGNOSIS — I73.9 PVD (PERIPHERAL VASCULAR DISEASE): ICD-10-CM

## 2018-08-08 PROCEDURE — 99396 PREV VISIT EST AGE 40-64: CPT | Mod: S$GLB,,, | Performed by: INTERNAL MEDICINE

## 2018-08-08 PROCEDURE — 82270 OCCULT BLOOD FECES: CPT | Mod: S$GLB,,, | Performed by: INTERNAL MEDICINE

## 2018-08-08 PROCEDURE — 99999 PR PBB SHADOW E&M-EST. PATIENT-LVL IV: CPT | Mod: PBBFAC,,, | Performed by: INTERNAL MEDICINE

## 2018-08-08 PROCEDURE — 3078F DIAST BP <80 MM HG: CPT | Mod: CPTII,S$GLB,, | Performed by: INTERNAL MEDICINE

## 2018-08-08 PROCEDURE — 3074F SYST BP LT 130 MM HG: CPT | Mod: CPTII,S$GLB,, | Performed by: INTERNAL MEDICINE

## 2018-08-08 NOTE — PROGRESS NOTES
Subjective:       Patient ID: Andrea Gant is a 63 y.o. male.    Chief Complaint: Annual Exam    HPI:  Patient comes in for annual exam.  Continues to see Rheumatology for rheumatoid arthritis in remains on Humira.  He has been feeling well but noticed intermittent bulge in the right groin and worries it may be a hernia.  If he strains or coughs he feels it.  He denies any problems with constipation or urination.  He does continue to have some PVD symptoms but sees vascular on a regular basis.  He will be due for a colonoscopy next year.  We will check his prostate.  No significant skin changes.  He is not interested in seeing a dermatologist at this time  He does need some updated blood labs      Review of Systems   Constitutional: Negative for chills, fatigue, fever and unexpected weight change.   HENT: Negative for ear pain and sore throat.    Eyes: Negative for pain and visual disturbance.   Respiratory: Negative for cough, shortness of breath and wheezing.    Cardiovascular: Negative for chest pain and leg swelling.   Gastrointestinal: Positive for abdominal distention (right lower groin). Negative for abdominal pain, constipation, diarrhea, nausea and vomiting.   Genitourinary: Negative for difficulty urinating, frequency and hematuria.   Musculoskeletal: Positive for arthralgias. Negative for back pain, myalgias, neck pain and neck stiffness.   Skin: Negative for rash and wound.   Neurological: Negative for dizziness, numbness and headaches.   Hematological: Negative for adenopathy.   Psychiatric/Behavioral: Negative for dysphoric mood. The patient is not nervous/anxious.        Objective:      Physical Exam   Constitutional: He is oriented to person, place, and time. He appears well-developed and well-nourished. No distress.   HENT:   Head: Normocephalic and atraumatic.   Right Ear: External ear normal.   Left Ear: External ear normal.   Mouth/Throat: Oropharynx is clear and moist. No oropharyngeal  exudate.   TM's clear, pharynx clear   Eyes: Conjunctivae and EOM are normal. Pupils are equal, round, and reactive to light. No scleral icterus.   Neck: Normal range of motion. Neck supple. No thyromegaly present.   No supraclavicular nodes palpated   Cardiovascular: Normal rate, regular rhythm and normal heart sounds.    No murmur heard.  Pulmonary/Chest: Effort normal and breath sounds normal. He has no wheezes.   Abdominal: Soft. Bowel sounds are normal. He exhibits no mass. There is no tenderness. A hernia (right groin) is present.   Genitourinary: Rectal exam shows guaiac negative stool. Prostate is enlarged (no nodules). No penile tenderness.   Musculoskeletal: He exhibits no edema.   Lymphadenopathy:     He has no cervical adenopathy.   Neurological: He is alert and oriented to person, place, and time.   Skin: No rash noted. No erythema. No pallor.   Psychiatric: He has a normal mood and affect. His behavior is normal.       Assessment:       1. Routine physical examination    2. Rheumatoid arthritis involving multiple sites with positive rheumatoid factor    3. Persistent insomnia    4. PVD (peripheral vascular disease)    5. History of hepatitis C, s/p successful treatment w/ SVR12 - 7/2015    6. PAD (peripheral artery disease)    7. Other hyperlipidemia    8. Right inguinal hernia    9. Screening for prostate cancer        Plan:       Andrea was seen today for annual exam.    Diagnoses and all orders for this visit:    Routine physical examination    Rheumatoid arthritis involving multiple sites with positive rheumatoid factor    Persistent insomnia    PVD (peripheral vascular disease)    History of hepatitis C, s/p successful treatment w/ SVR12 - 7/2015    PAD (peripheral artery disease)    Other hyperlipidemia  -     Lipid panel; Future    Right inguinal hernia  -     Ambulatory referral to General Surgery    Screening for prostate cancer  -     PSA, Screening; Future        review studies and follow-up  annually

## 2018-08-15 ENCOUNTER — LAB VISIT (OUTPATIENT)
Dept: LAB | Facility: HOSPITAL | Age: 63
End: 2018-08-15
Attending: INTERNAL MEDICINE
Payer: COMMERCIAL

## 2018-08-15 DIAGNOSIS — E78.49 OTHER HYPERLIPIDEMIA: ICD-10-CM

## 2018-08-15 DIAGNOSIS — Z12.5 SCREENING FOR PROSTATE CANCER: ICD-10-CM

## 2018-08-15 LAB
CHOLEST SERPL-MCNC: 169 MG/DL
CHOLEST/HDLC SERPL: 3.4 {RATIO}
COMPLEXED PSA SERPL-MCNC: 0.42 NG/ML
HDLC SERPL-MCNC: 50 MG/DL
HDLC SERPL: 29.6 %
LDLC SERPL CALC-MCNC: 98.2 MG/DL
NONHDLC SERPL-MCNC: 119 MG/DL
TRIGL SERPL-MCNC: 104 MG/DL

## 2018-08-15 PROCEDURE — 80061 LIPID PANEL: CPT

## 2018-08-15 PROCEDURE — 36415 COLL VENOUS BLD VENIPUNCTURE: CPT

## 2018-08-15 PROCEDURE — 84153 ASSAY OF PSA TOTAL: CPT

## 2018-08-20 ENCOUNTER — OFFICE VISIT (OUTPATIENT)
Dept: SURGERY | Facility: CLINIC | Age: 63
End: 2018-08-20
Payer: COMMERCIAL

## 2018-08-20 VITALS
HEART RATE: 95 BPM | WEIGHT: 174 LBS | HEIGHT: 69 IN | TEMPERATURE: 99 F | BODY MASS INDEX: 25.77 KG/M2 | SYSTOLIC BLOOD PRESSURE: 140 MMHG | DIASTOLIC BLOOD PRESSURE: 94 MMHG

## 2018-08-20 DIAGNOSIS — M05.79 RHEUMATOID ARTHRITIS INVOLVING MULTIPLE SITES WITH POSITIVE RHEUMATOID FACTOR: ICD-10-CM

## 2018-08-20 DIAGNOSIS — K40.90 RIGHT INGUINAL HERNIA: Primary | ICD-10-CM

## 2018-08-20 PROCEDURE — 3080F DIAST BP >= 90 MM HG: CPT | Mod: CPTII,S$GLB,, | Performed by: SURGERY

## 2018-08-20 PROCEDURE — 3077F SYST BP >= 140 MM HG: CPT | Mod: CPTII,S$GLB,, | Performed by: SURGERY

## 2018-08-20 PROCEDURE — 3008F BODY MASS INDEX DOCD: CPT | Mod: CPTII,S$GLB,, | Performed by: SURGERY

## 2018-08-20 PROCEDURE — 99999 PR PBB SHADOW E&M-EST. PATIENT-LVL III: CPT | Mod: PBBFAC,,, | Performed by: SURGERY

## 2018-08-20 PROCEDURE — 99204 OFFICE O/P NEW MOD 45 MIN: CPT | Mod: S$GLB,,, | Performed by: SURGERY

## 2018-08-20 RX ORDER — HYDROXYCHLOROQUINE SULFATE 200 MG/1
200 TABLET, FILM COATED ORAL DAILY
COMMUNITY
Start: 2018-08-17 | End: 2019-01-24 | Stop reason: SDUPTHER

## 2018-08-20 RX ORDER — SODIUM CHLORIDE 9 MG/ML
INJECTION, SOLUTION INTRAVENOUS CONTINUOUS
Status: CANCELLED | OUTPATIENT
Start: 2018-08-20

## 2018-08-20 RX ORDER — LIDOCAINE HYDROCHLORIDE 10 MG/ML
1 INJECTION, SOLUTION EPIDURAL; INFILTRATION; INTRACAUDAL; PERINEURAL ONCE
Status: CANCELLED | OUTPATIENT
Start: 2018-08-20 | End: 2018-08-20

## 2018-08-20 NOTE — LETTER
August 20, 2018      Andrea Wang MD  [address]  [place] [address]           [facility] - General Surgery  [address]  [place] [address]  Phone: [phone]          Patient: Andrea Gant   MR Number: [id number]   YOB: 1955   Date of Visit: 8/20/2018       Dear Dr. Andrea Wang:    Thank you for referring Andrea Gant to me for evaluation. Attached you will find relevant portions of my assessment and plan of care.    If you have questions, please do not hesitate to call me. I look forward to following Andrea Gant along with you.    Sincerely,    Mingo De Guzman Jr., MD    Enclosure  CC:  No Recipients    If you would like to receive this communication electronically, please contact [email or web address] or [phone] to request more information on [facility] access.    For providers and/or their staff who would like to refer a patient to [facility], please contact us through our one-stop-shop provider referral line, [facility], at [phone].    If you feel you have received this communication in error or would no longer like to receive these types of communications, please e-mail [email or web address]

## 2018-08-20 NOTE — PROGRESS NOTES
History & Physical    SUBJECTIVE:     History of Present Illness:  Patient is a 63 y.o. male with RA on Humira who presents with right inguinal hernia. Reports noting right groin pain and slightly bulge several weeks ago. Pain is inconsistent and not associated with increased activity or heavy lifting. No nausea, vomiting, diarrhea, blood in stool. Hernia has never been unable to be reduced.  Patient is a current smoker, about 1/2 pack per day.  On aspirin 81mg. No other blood thinners.  Next Humira dose is 8/22.     No chief complaint on file.      Review of patient's allergies indicates:   Allergen Reactions    Enbrel [etanercept] Dermatitis     After 4 injections; possibility of drug reaction exists.       Current Outpatient Medications   Medication Sig Dispense Refill    adalimumab (HUMIRA PEN) PnKt injection INJECT 1 PEN SUBCUTANEOUSLY EVERY OTHER WEEK -REFRIGERATE DO NOT FREEZE 6 each 2    adalimumab (HUMIRA) PnKt injection Inject 0.8 mLs (40 mg total) into the skin every 14 (fourteen) days. 6 each 2    aspirin (ECOTRIN) 81 MG EC tablet Take 1 tablet (81 mg total) by mouth once daily.  3    cilostazol (PLETAL) 50 MG Tab TAKE 1 TABLET BY MOUTH TWICE A DAY 60 tablet 10    doxazosin (CARDURA) 4 MG tablet TAKE 1 TABLET BY MOUTH ONCE EVERY EVENING 30 tablet 6    hydroxychloroquine (PLAQUENIL) 200 mg tablet       omeprazole (PRILOSEC) 40 MG capsule TAKE 1 CAPSULE (40 MG TOTAL) BY MOUTH ONCE DAILY. 90 capsule 2    simvastatin (ZOCOR) 10 MG tablet TAKE 1 TABLET BY MOUTH EVERY EVENING 30 tablet 4    traZODone (DESYREL) 50 MG tablet TAKE 1 TO 3 TABLETS BY MOUTH EVERY NIGHT AT BEDTIME 90 tablet 3     No current facility-administered medications for this visit.        Past Medical History:   Diagnosis Date    Cataract     History of hepatitis C, s/p successful treatment w/ SVR12 - 7/2015 10/14/2012    Kelsey 1a,  Liver biopsy 6/2014 - Stage 1 fibrosis;  Completed 8 weeks Harvoni w/ SVR12 - 7/2015       "Hyperlipidemia     Lipoma of arm     Lipoma of lower extremity     Nuclear sclerosis - Both Eyes 10/22/2012    Other and unspecified hyperlipidemia 10/22/2013    PAD (peripheral artery disease)     Peripheral vascular disease, unspecified     Rheumatoid arthritis(714.0) 10/14/2012     Past Surgical History:   Procedure Laterality Date    KNEE ARTHROPLASTY      TONSILLECTOMY       Family History   Problem Relation Age of Onset    Heart disease Paternal Uncle     Dementia Mother     Heart disease Sister     Liver disease Neg Hx     Amblyopia Neg Hx     Blindness Neg Hx     Cancer Neg Hx     Cataracts Neg Hx     Diabetes Neg Hx     Glaucoma Neg Hx     Hypertension Neg Hx     Macular degeneration Neg Hx     Retinal detachment Neg Hx     Strabismus Neg Hx     Stroke Neg Hx     Thyroid disease Neg Hx      Social History     Tobacco Use    Smoking status: Former Smoker     Types: Cigarettes    Smokeless tobacco: Never Used   Substance Use Topics    Alcohol use: Yes     Comment: 3-4 drinks per week    Drug use: Yes     Comment: ann        Review of Systems:  Review of Systems   Constitutional: Negative for chills and fever.   HENT: Negative for congestion.    Respiratory: Negative for cough and shortness of breath.    Cardiovascular: Negative for chest pain and palpitations.   Gastrointestinal: Negative for abdominal distention.   Genitourinary: Negative for difficulty urinating and dysuria.   Musculoskeletal: Negative for arthralgias and myalgias.   Neurological: Negative for dizziness and headaches.   Psychiatric/Behavioral: Negative for agitation and confusion.       OBJECTIVE:     Vital Signs (Most Recent)  Temp: 98.6 °F (37 °C) (08/20/18 1356)  Pulse: 95 (08/20/18 1356)  BP: (!) 140/94 (08/20/18 1356)  5' 9" (1.753 m)  78.9 kg (174 lb)     Physical Exam:  Physical Exam   Constitutional: He is oriented to person, place, and time. He appears well-developed and well-nourished. No " distress.   Cardiovascular: Normal rate and regular rhythm.   Pulmonary/Chest: Effort normal.   Abdominal: Soft. He exhibits no distension. There is no tenderness. A hernia is present.   Palpable right inguinal hernia, possible left inguinal hernia. Right side is reducible.   Neurological: He is alert and oriented to person, place, and time.   Skin: Skin is warm and dry.   Psychiatric: He has a normal mood and affect. His behavior is normal.         ASSESSMENT/PLAN:     Andrea Gant is a 63 y.o. male with symptomatic right inguinal hernia    PLAN:  To OR for laparoscopic right inguinal hernia repair with mesh as well as left sided exploration.  Discussed risks and alternatives with patient including recurrence and chronic groin pain.    Rosalina Morales MD, PGY-3  General Surgery  [phone]    I have personally taken the history and examined this patient and agree with the resident's note as stated above.         Mingo De Guzman MD

## 2018-08-20 NOTE — H&P (VIEW-ONLY)
History & Physical    SUBJECTIVE:     History of Present Illness:  Patient is a 63 y.o. male with RA on Humira who presents with right inguinal hernia. Reports noting right groin pain and slightly bulge several weeks ago. Pain is inconsistent and not associated with increased activity or heavy lifting. No nausea, vomiting, diarrhea, blood in stool. Hernia has never been unable to be reduced.  Patient is a current smoker, about 1/2 pack per day.  On aspirin 81mg. No other blood thinners.  Next Humira dose is 8/22.     No chief complaint on file.      Review of patient's allergies indicates:   Allergen Reactions    Enbrel [etanercept] Dermatitis     After 4 injections; possibility of drug reaction exists.       Current Outpatient Medications   Medication Sig Dispense Refill    adalimumab (HUMIRA PEN) PnKt injection INJECT 1 PEN SUBCUTANEOUSLY EVERY OTHER WEEK -REFRIGERATE DO NOT FREEZE 6 each 2    adalimumab (HUMIRA) PnKt injection Inject 0.8 mLs (40 mg total) into the skin every 14 (fourteen) days. 6 each 2    aspirin (ECOTRIN) 81 MG EC tablet Take 1 tablet (81 mg total) by mouth once daily.  3    cilostazol (PLETAL) 50 MG Tab TAKE 1 TABLET BY MOUTH TWICE A DAY 60 tablet 10    doxazosin (CARDURA) 4 MG tablet TAKE 1 TABLET BY MOUTH ONCE EVERY EVENING 30 tablet 6    hydroxychloroquine (PLAQUENIL) 200 mg tablet       omeprazole (PRILOSEC) 40 MG capsule TAKE 1 CAPSULE (40 MG TOTAL) BY MOUTH ONCE DAILY. 90 capsule 2    simvastatin (ZOCOR) 10 MG tablet TAKE 1 TABLET BY MOUTH EVERY EVENING 30 tablet 4    traZODone (DESYREL) 50 MG tablet TAKE 1 TO 3 TABLETS BY MOUTH EVERY NIGHT AT BEDTIME 90 tablet 3     No current facility-administered medications for this visit.        Past Medical History:   Diagnosis Date    Cataract     History of hepatitis C, s/p successful treatment w/ SVR12 - 7/2015 10/14/2012    Kelsey 1a,  Liver biopsy 6/2014 - Stage 1 fibrosis;  Completed 8 weeks Harvoni w/ SVR12 - 7/2015       "Hyperlipidemia     Lipoma of arm     Lipoma of lower extremity     Nuclear sclerosis - Both Eyes 10/22/2012    Other and unspecified hyperlipidemia 10/22/2013    PAD (peripheral artery disease)     Peripheral vascular disease, unspecified     Rheumatoid arthritis(714.0) 10/14/2012     Past Surgical History:   Procedure Laterality Date    KNEE ARTHROPLASTY      TONSILLECTOMY       Family History   Problem Relation Age of Onset    Heart disease Paternal Uncle     Dementia Mother     Heart disease Sister     Liver disease Neg Hx     Amblyopia Neg Hx     Blindness Neg Hx     Cancer Neg Hx     Cataracts Neg Hx     Diabetes Neg Hx     Glaucoma Neg Hx     Hypertension Neg Hx     Macular degeneration Neg Hx     Retinal detachment Neg Hx     Strabismus Neg Hx     Stroke Neg Hx     Thyroid disease Neg Hx      Social History     Tobacco Use    Smoking status: Former Smoker     Types: Cigarettes    Smokeless tobacco: Never Used   Substance Use Topics    Alcohol use: Yes     Comment: 3-4 drinks per week    Drug use: Yes     Comment: ann        Review of Systems:  Review of Systems   Constitutional: Negative for chills and fever.   HENT: Negative for congestion.    Respiratory: Negative for cough and shortness of breath.    Cardiovascular: Negative for chest pain and palpitations.   Gastrointestinal: Negative for abdominal distention.   Genitourinary: Negative for difficulty urinating and dysuria.   Musculoskeletal: Negative for arthralgias and myalgias.   Neurological: Negative for dizziness and headaches.   Psychiatric/Behavioral: Negative for agitation and confusion.       OBJECTIVE:     Vital Signs (Most Recent)  Temp: 98.6 °F (37 °C) (08/20/18 1356)  Pulse: 95 (08/20/18 1356)  BP: (!) 140/94 (08/20/18 1356)  5' 9" (1.753 m)  78.9 kg (174 lb)     Physical Exam:  Physical Exam   Constitutional: He is oriented to person, place, and time. He appears well-developed and well-nourished. No " distress.   Cardiovascular: Normal rate and regular rhythm.   Pulmonary/Chest: Effort normal.   Abdominal: Soft. He exhibits no distension. There is no tenderness. A hernia is present.   Palpable right inguinal hernia, possible left inguinal hernia. Right side is reducible.   Neurological: He is alert and oriented to person, place, and time.   Skin: Skin is warm and dry.   Psychiatric: He has a normal mood and affect. His behavior is normal.         ASSESSMENT/PLAN:     Andrea Gant is a 63 y.o. male with symptomatic right inguinal hernia    PLAN:  To OR for laparoscopic right inguinal hernia repair with mesh as well as left sided exploration.  Discussed risks and alternatives with patient including recurrence and chronic groin pain.    Rosalina Morales MD, PGY-3  General Surgery  [phone]    I have personally taken the history and examined this patient and agree with the resident's note as stated above.         Mingo De Guzman MD

## 2018-08-21 ENCOUNTER — TELEPHONE (OUTPATIENT)
Dept: PHARMACY | Facility: CLINIC | Age: 63
End: 2018-08-21

## 2018-08-22 ENCOUNTER — TELEPHONE (OUTPATIENT)
Dept: SURGERY | Facility: CLINIC | Age: 63
End: 2018-08-22

## 2018-08-22 NOTE — TELEPHONE ENCOUNTER
Pt has decided to proceed with surgery on 9/6/18.  States he had his last injection of Humira today.    Message sent to rheumatologist in reference to his RA meds.

## 2018-08-22 NOTE — TELEPHONE ENCOUNTER
----- Message from Maikel Aguila sent at 8/22/2018 12:36 PM CDT -----  Melissa    Pt wants to schedule a surgery date. Please call pt at [phone]

## 2018-08-28 DIAGNOSIS — G47.00 PERSISTENT INSOMNIA: ICD-10-CM

## 2018-08-28 RX ORDER — TRAZODONE HYDROCHLORIDE 50 MG/1
TABLET ORAL
Qty: 90 TABLET | Refills: 3 | Status: SHIPPED | OUTPATIENT
Start: 2018-08-28 | End: 2018-11-20 | Stop reason: SDUPTHER

## 2018-09-05 ENCOUNTER — PATIENT MESSAGE (OUTPATIENT)
Dept: SURGERY | Facility: HOSPITAL | Age: 63
End: 2018-09-05

## 2018-09-05 ENCOUNTER — TELEPHONE (OUTPATIENT)
Dept: SURGERY | Facility: CLINIC | Age: 63
End: 2018-09-05

## 2018-09-05 ENCOUNTER — ANESTHESIA EVENT (OUTPATIENT)
Dept: SURGERY | Facility: HOSPITAL | Age: 63
End: 2018-09-05
Payer: COMMERCIAL

## 2018-09-05 NOTE — TELEPHONE ENCOUNTER
----- Message from Nohemy Vee sent at 9/5/2018 11:28 AM CDT -----  Contact: Pt  Pt states he spoke with the anesthesiology dept and was told his surgery may not be performed on tomorrow, he would like to confirm this information     Contact number [phone]  Thanks

## 2018-09-05 NOTE — TELEPHONE ENCOUNTER
Spoke with pt in reference to his surgery and that we would be proceeding. Informed pt to hold his humira, pt hesitant and would like to confirm with his rheumatologist.  Msg sent to staff to have them contact pt.

## 2018-09-05 NOTE — PRE-PROCEDURE INSTRUCTIONS
Preop instructions: NPO solids/ milk products  after midnight or clears up to 2 hours before arrival (clear liquids are: water, apple juice, Gatorade & Jello, black coffee/no milk, cream or creamer), shower instructions, directions, leave all valuables at home, medication instructions for PM prior & am of procedure explained. Patient stated an understanding.      Patient denies any side effects or issues with anesthesia or sedation.   Patient does not know arrival time. Explained that this information comes from the surgeons office and if they have not heard from them by 3pm,  to call office. Patient stated an understanding.    Patient remains on Pletal- told patient we would check with clinic for further instructions. Melissa with Dr De Guzman notified of patient remaining on Pletal. Melissa talked with Dr De Guzman, proceed with surgery. Patient notified, instructed to hold both PM and AM dose of Pletal.

## 2018-09-05 NOTE — ANESTHESIA PREPROCEDURE EVALUATION
"                                                                                                             09/05/2018  Andrea Gant is a 63 y.o., male.  Pre-operative evaluation for Procedure(s) (LRB):  REPAIR,HERNIA,INGUINAL,LAPAROSCOPIC, RIGHT w/ mesh, eval left w/ possible repair (Right)    Andrea Gant is a 63 y.o. male with PMHx of RA, HLD, PVD, AAA (3.6cm with intramural thrombus US 2017), Hep C s/p SVR12 2015, and right inguinal hernia, scheduled for above procedure.    Prev airway: None documented  Estimated body mass index is 25.7 kg/m² as calculated from the following:    Height as of 8/20/18: 5' 9" (1.753 m).    Weight as of 8/20/18: 78.9 kg (174 lb).      Patient Active Problem List   Diagnosis    Rheumatoid arthritis    History of hepatitis C, s/p successful treatment w/ SVR12 - 7/2015    Persistent insomnia    Nuclear sclerosis - Both Eyes    History of long-term treatment with high-risk medication    Plaquenil adverse reaction in therapeutic use    Other hyperlipidemia    PAD (peripheral artery disease)    Hypercholesteremia    Special screening for malignant neoplasms, colon    Intermittent claudication    PVD (peripheral vascular disease)       Review of patient's allergies indicates:   Allergen Reactions    Enbrel [etanercept] Dermatitis        No current facility-administered medications on file prior to encounter.      Current Outpatient Medications on File Prior to Encounter   Medication Sig Dispense Refill    aspirin (ECOTRIN) 81 MG EC tablet Take 1 tablet (81 mg total) by mouth once daily.  3    cilostazol (PLETAL) 50 MG Tab TAKE 1 TABLET BY MOUTH TWICE A DAY 60 tablet 10    doxazosin (CARDURA) 4 MG tablet TAKE 1 TABLET BY MOUTH ONCE EVERY EVENING 30 tablet 6    hydroxychloroquine (PLAQUENIL) 200 mg tablet Take 200 mg by mouth once daily.       simvastatin (ZOCOR) 10 MG tablet TAKE 1 TABLET BY MOUTH EVERY EVENING 30 tablet 4    adalimumab (HUMIRA) PnKt injection " Inject 0.8 mLs (40 mg total) into the skin every 14 (fourteen) days. 6 each 2       Past Surgical History:   Procedure Laterality Date    KNEE ARTHROPLASTY      TONSILLECTOMY         Social History     Socioeconomic History    Marital status:      Spouse name: Not on file    Number of children: Not on file    Years of education: Not on file    Highest education level: Not on file   Social Needs    Financial resource strain: Not on file    Food insecurity - worry: Not on file    Food insecurity - inability: Not on file    Transportation needs - medical: Not on file    Transportation needs - non-medical: Not on file   Occupational History    Not on file   Tobacco Use    Smoking status: Former Smoker     Types: Cigarettes    Smokeless tobacco: Never Used   Substance and Sexual Activity    Alcohol use: Yes     Comment: 3-4 drinks per week    Drug use: Yes     Comment: marajuana    Sexual activity: Not on file   Other Topics Concern    Not on file   Social History Narrative    Resides locally    [place] [employer] at [employer]     w/ 2 adult children         Vital Signs Range (Last 24H):         Lab Results   Component Value Date    WBC 9.80 01/17/2018    HGB 14.2 01/17/2018    HCT 43.0 01/17/2018    MCV 87 01/17/2018     01/17/2018     CMP  Sodium   Date Value Ref Range Status   01/17/2018 141 136 - 145 mmol/L Final     Potassium   Date Value Ref Range Status   01/17/2018 4.0 3.5 - 5.1 mmol/L Final     Chloride   Date Value Ref Range Status   01/17/2018 106 95 - 110 mmol/L Final     CO2   Date Value Ref Range Status   01/17/2018 28 23 - 29 mmol/L Final     Glucose   Date Value Ref Range Status   01/17/2018 83 70 - 110 mg/dL Final     BUN, Bld   Date Value Ref Range Status   01/17/2018 22 8 - 23 mg/dL Final     Creatinine   Date Value Ref Range Status   01/17/2018 1.1 0.5 - 1.4 mg/dL Final     Calcium   Date Value Ref Range Status   01/17/2018 9.5 8.7 - 10.5 mg/dL Final      Total Protein   Date Value Ref Range Status   01/17/2018 7.8 6.0 - 8.4 g/dL Final     Albumin   Date Value Ref Range Status   01/17/2018 3.6 3.5 - 5.2 g/dL Final     Total Bilirubin   Date Value Ref Range Status   01/17/2018 0.5 0.1 - 1.0 mg/dL Final     Comment:     For infants and newborns, interpretation of results should be based  on gestational age, weight and in agreement with clinical  observations.  Premature Infant recommended reference ranges:  Up to 24 hours.............<8.0 mg/dL  Up to 48 hours............<12.0 mg/dL  3-5 days..................<15.0 mg/dL  6-29 days.................<15.0 mg/dL       Alkaline Phosphatase   Date Value Ref Range Status   01/17/2018 94 55 - 135 U/L Final     AST   Date Value Ref Range Status   01/17/2018 21 10 - 40 U/L Final     ALT   Date Value Ref Range Status   01/17/2018 15 10 - 44 U/L Final     Anion Gap   Date Value Ref Range Status   01/17/2018 7 (L) 8 - 16 mmol/L Final     eGFR if    Date Value Ref Range Status   01/17/2018 >60.0 >60 mL/min/1.73 m^2 Final     eGFR if non    Date Value Ref Range Status   01/17/2018 >60.0 >60 mL/min/1.73 m^2 Final     Comment:     Calculation used to obtain the estimated glomerular filtration  rate (eGFR) is the CKD-EPI equation.        Lab Results   Component Value Date    INR 0.9 03/26/2016    INR 1.0 01/12/2015    INR 1.0 06/09/2014             Diagnostic Studies:  None relevant    EKG:  Vent. Rate : 091 BPM     Atrial Rate : 091 BPM     P-R Int : 132 ms          QRS Dur : 084 ms      QT Int : 374 ms       P-R-T Axes : 066 047 055 degrees     QTc Int : 460 ms    Normal sinus rhythm  Normal ECG  When compared with ECG of 02-MAY-2006 13:23,  No significant change was found  Confirmed by GINA FARFAN MD ([id number]) on 3/28/2016 12:17:57 PM    2D Echo:  CONCLUSIONS     1 - Normal left ventricular systolic function (EF 60-65%).     2 - Normal right ventricular systolic function .     3 - Grade I left  ventricular diastolic dysfunction.         This document has been electronically    SIGNED BY: Kem Herman MD On: 04/07/2016 14:36      Anesthesia Evaluation    I have reviewed the Patient Summary Reports.    I have reviewed the Nursing Notes.      Review of Systems  Anesthesia Hx:  No problems with previous Anesthesia  History of prior surgery of interest to airway management or planning: Previous anesthesia: General Denies Family Hx of Anesthesia complications.   Denies Personal Hx of Anesthesia complications.   Social:  Former Smoker Quit smoking 1 yr ago   Cardiovascular:   Exercise tolerance: good Denies CAD.    PVD AAA   Pulmonary:   Denies COPD.    Hepatic/GI:   Hepatitis, C R inguinal hernia  HCV- treated   Musculoskeletal:   Arthritis (RA)  RA   Neurological:   Denies CVA. Denies Seizures.    Endocrine:  Endocrine Normal        Physical Exam  General:  Well nourished    Airway/Jaw/Neck:  Airway Findings: Mouth Opening: Normal Tongue: Normal  General Airway Assessment: Adult  Mallampati: I  TM Distance: 4 - 6 cm  Jaw/Neck Findings:  Neck ROM: Normal ROM      Dental:  Dental Findings: In tact   Chest/Lungs:  Chest/Lungs Findings: Clear to auscultation, Normal Respiratory Rate     Heart/Vascular:  Heart Findings: Rate: Normal  Rhythm: Regular Rhythm        Mental Status:  Mental Status Findings:  Cooperative, Alert and Oriented         Anesthesia Plan  Type of Anesthesia, risks & benefits discussed:  Anesthesia Type:  general  Patient's Preference:   Intra-op Monitoring Plan: standard ASA monitors  Intra-op Monitoring Plan Comments:   Post Op Pain Control Plan: multimodal analgesia, IV/PO Opioids PRN and per primary service following discharge from PACU  Post Op Pain Control Plan Comments:   Induction:   IV  Beta Blocker:  Patient is not currently on a Beta-Blocker (No further documentation required).       Informed Consent: Patient understands risks and agrees with Anesthesia plan.  Questions answered.  Anesthesia consent signed with patient.  ASA Score: 2     Day of Surgery Review of History & Physical:    H&P update referred to the surgeon.         Ready For Surgery From Anesthesia Perspective.

## 2018-09-05 NOTE — TELEPHONE ENCOUNTER
----- Message from Davina Cottrell sent at 9/5/2018 10:06 AM CDT -----  Contact: Pt.Self   Pt. States he would like to speak with nurse  in reference to questions about surgery tomorrow please call Pt. Back @ [phone] Thank You

## 2018-09-06 ENCOUNTER — HOSPITAL ENCOUNTER (OUTPATIENT)
Facility: HOSPITAL | Age: 63
Discharge: HOME | End: 2018-09-06
Attending: SURGERY | Admitting: SURGERY
Payer: COMMERCIAL

## 2018-09-06 ENCOUNTER — ANESTHESIA (OUTPATIENT)
Dept: SURGERY | Facility: HOSPITAL | Age: 63
End: 2018-09-06
Payer: COMMERCIAL

## 2018-09-06 VITALS
DIASTOLIC BLOOD PRESSURE: 72 MMHG | BODY MASS INDEX: 25.92 KG/M2 | HEART RATE: 78 BPM | OXYGEN SATURATION: 99 % | TEMPERATURE: 98 F | SYSTOLIC BLOOD PRESSURE: 158 MMHG | WEIGHT: 175 LBS | RESPIRATION RATE: 16 BRPM | HEIGHT: 69 IN

## 2018-09-06 DIAGNOSIS — K40.90 RIGHT INGUINAL HERNIA: ICD-10-CM

## 2018-09-06 PROCEDURE — 25000003 PHARM REV CODE 250: Performed by: STUDENT IN AN ORGANIZED HEALTH CARE EDUCATION/TRAINING PROGRAM

## 2018-09-06 PROCEDURE — 63600175 PHARM REV CODE 636 W HCPCS: Performed by: STUDENT IN AN ORGANIZED HEALTH CARE EDUCATION/TRAINING PROGRAM

## 2018-09-06 PROCEDURE — C1727 CATH, BAL TIS DIS, NON-VAS: HCPCS | Performed by: SURGERY

## 2018-09-06 PROCEDURE — 71000039 HC RECOVERY, EACH ADD'L HOUR: Performed by: SURGERY

## 2018-09-06 PROCEDURE — 63600175 PHARM REV CODE 636 W HCPCS

## 2018-09-06 PROCEDURE — 49650 LAP ING HERNIA REPAIR INIT: CPT | Mod: RT,,, | Performed by: SURGERY

## 2018-09-06 PROCEDURE — 37000008 HC ANESTHESIA 1ST 15 MINUTES: Performed by: SURGERY

## 2018-09-06 PROCEDURE — 94761 N-INVAS EAR/PLS OXIMETRY MLT: CPT

## 2018-09-06 PROCEDURE — 36000709 HC OR TIME LEV III EA ADD 15 MIN: Performed by: SURGERY

## 2018-09-06 PROCEDURE — D9220A PRA ANESTHESIA: Mod: ,,, | Performed by: ANESTHESIOLOGY

## 2018-09-06 PROCEDURE — 36000708 HC OR TIME LEV III 1ST 15 MIN: Performed by: SURGERY

## 2018-09-06 PROCEDURE — C1781 MESH (IMPLANTABLE): HCPCS | Performed by: SURGERY

## 2018-09-06 PROCEDURE — 71000015 HC POSTOP RECOV 1ST HR: Performed by: SURGERY

## 2018-09-06 PROCEDURE — 71000033 HC RECOVERY, INTIAL HOUR: Performed by: SURGERY

## 2018-09-06 PROCEDURE — 27201423 OPTIME MED/SURG SUP & DEVICES STERILE SUPPLY: Performed by: SURGERY

## 2018-09-06 PROCEDURE — 25000003 PHARM REV CODE 250: Performed by: SURGERY

## 2018-09-06 PROCEDURE — 37000009 HC ANESTHESIA EA ADD 15 MINS: Performed by: SURGERY

## 2018-09-06 DEVICE — MESH 3DMAX LIGHT LARGE RIGHT: Type: IMPLANTABLE DEVICE | Site: INGUINAL | Status: FUNCTIONAL

## 2018-09-06 RX ORDER — FENTANYL CITRATE 50 UG/ML
INJECTION, SOLUTION INTRAMUSCULAR; INTRAVENOUS
Status: DISCONTINUED | OUTPATIENT
Start: 2018-09-06 | End: 2018-09-06

## 2018-09-06 RX ORDER — SODIUM CHLORIDE 9 MG/ML
INJECTION, SOLUTION INTRAVENOUS CONTINUOUS
Status: DISCONTINUED | OUTPATIENT
Start: 2018-09-06 | End: 2018-09-06 | Stop reason: HOSPADM

## 2018-09-06 RX ORDER — GLYCOPYRROLATE 0.2 MG/ML
INJECTION INTRAMUSCULAR; INTRAVENOUS
Status: DISCONTINUED | OUTPATIENT
Start: 2018-09-06 | End: 2018-09-06

## 2018-09-06 RX ORDER — BUPIVACAINE HYDROCHLORIDE 2.5 MG/ML
INJECTION, SOLUTION EPIDURAL; INFILTRATION; INTRACAUDAL
Status: DISCONTINUED | OUTPATIENT
Start: 2018-09-06 | End: 2018-09-06 | Stop reason: HOSPADM

## 2018-09-06 RX ORDER — BACITRACIN 50000 [IU]/1
INJECTION, POWDER, FOR SOLUTION INTRAMUSCULAR
Status: DISCONTINUED | OUTPATIENT
Start: 2018-09-06 | End: 2018-09-06 | Stop reason: HOSPADM

## 2018-09-06 RX ORDER — SODIUM CHLORIDE 0.9 % (FLUSH) 0.9 %
3 SYRINGE (ML) INJECTION
Status: DISCONTINUED | OUTPATIENT
Start: 2018-09-06 | End: 2018-09-06 | Stop reason: HOSPADM

## 2018-09-06 RX ORDER — NEOSTIGMINE METHYLSULFATE 1 MG/ML
INJECTION, SOLUTION INTRAVENOUS
Status: DISCONTINUED | OUTPATIENT
Start: 2018-09-06 | End: 2018-09-06

## 2018-09-06 RX ORDER — PROPOFOL 10 MG/ML
VIAL (ML) INTRAVENOUS
Status: DISCONTINUED | OUTPATIENT
Start: 2018-09-06 | End: 2018-09-06

## 2018-09-06 RX ORDER — ESMOLOL HYDROCHLORIDE 10 MG/ML
INJECTION INTRAVENOUS
Status: DISCONTINUED | OUTPATIENT
Start: 2018-09-06 | End: 2018-09-06

## 2018-09-06 RX ORDER — FENTANYL CITRATE 50 UG/ML
25 INJECTION, SOLUTION INTRAMUSCULAR; INTRAVENOUS EVERY 5 MIN PRN
Status: COMPLETED | OUTPATIENT
Start: 2018-09-06 | End: 2018-09-06

## 2018-09-06 RX ORDER — LIDOCAINE HYDROCHLORIDE AND EPINEPHRINE 10; 10 MG/ML; UG/ML
INJECTION, SOLUTION INFILTRATION; PERINEURAL
Status: DISCONTINUED | OUTPATIENT
Start: 2018-09-06 | End: 2018-09-06 | Stop reason: HOSPADM

## 2018-09-06 RX ORDER — ACETAMINOPHEN 10 MG/ML
INJECTION, SOLUTION INTRAVENOUS
Status: DISCONTINUED | OUTPATIENT
Start: 2018-09-06 | End: 2018-09-06

## 2018-09-06 RX ORDER — ROCURONIUM BROMIDE 10 MG/ML
INJECTION, SOLUTION INTRAVENOUS
Status: DISCONTINUED | OUTPATIENT
Start: 2018-09-06 | End: 2018-09-06

## 2018-09-06 RX ORDER — KETAMINE HCL IN 0.9 % NACL 50 MG/5 ML
SYRINGE (ML) INTRAVENOUS
Status: DISCONTINUED | OUTPATIENT
Start: 2018-09-06 | End: 2018-09-06

## 2018-09-06 RX ORDER — MIDAZOLAM HYDROCHLORIDE 1 MG/ML
INJECTION, SOLUTION INTRAMUSCULAR; INTRAVENOUS
Status: DISCONTINUED | OUTPATIENT
Start: 2018-09-06 | End: 2018-09-06

## 2018-09-06 RX ORDER — ONDANSETRON 2 MG/ML
INJECTION INTRAMUSCULAR; INTRAVENOUS
Status: DISCONTINUED | OUTPATIENT
Start: 2018-09-06 | End: 2018-09-06

## 2018-09-06 RX ORDER — CEFAZOLIN SODIUM 1 G/3ML
2 INJECTION, POWDER, FOR SOLUTION INTRAMUSCULAR; INTRAVENOUS
Status: COMPLETED | OUTPATIENT
Start: 2018-09-06 | End: 2018-09-06

## 2018-09-06 RX ORDER — FENTANYL CITRATE 50 UG/ML
INJECTION, SOLUTION INTRAMUSCULAR; INTRAVENOUS
Status: COMPLETED
Start: 2018-09-06 | End: 2018-09-06

## 2018-09-06 RX ORDER — OXYCODONE HYDROCHLORIDE 5 MG/1
5 TABLET ORAL ONCE
Status: COMPLETED | OUTPATIENT
Start: 2018-09-06 | End: 2018-09-06

## 2018-09-06 RX ORDER — LIDOCAINE HCL/PF 100 MG/5ML
SYRINGE (ML) INTRAVENOUS
Status: DISCONTINUED | OUTPATIENT
Start: 2018-09-06 | End: 2018-09-06

## 2018-09-06 RX ORDER — DEXAMETHASONE SODIUM PHOSPHATE 4 MG/ML
INJECTION, SOLUTION INTRA-ARTICULAR; INTRALESIONAL; INTRAMUSCULAR; INTRAVENOUS; SOFT TISSUE
Status: DISCONTINUED | OUTPATIENT
Start: 2018-09-06 | End: 2018-09-06

## 2018-09-06 RX ORDER — LIDOCAINE HYDROCHLORIDE 10 MG/ML
1 INJECTION, SOLUTION EPIDURAL; INFILTRATION; INTRACAUDAL; PERINEURAL ONCE
Status: COMPLETED | OUTPATIENT
Start: 2018-09-06 | End: 2018-09-06

## 2018-09-06 RX ORDER — ONDANSETRON 2 MG/ML
4 INJECTION INTRAMUSCULAR; INTRAVENOUS DAILY PRN
Status: DISCONTINUED | OUTPATIENT
Start: 2018-09-06 | End: 2018-09-06 | Stop reason: HOSPADM

## 2018-09-06 RX ORDER — OXYCODONE HYDROCHLORIDE 5 MG/1
5 TABLET ORAL EVERY 4 HOURS PRN
Qty: 30 TABLET | Refills: 0 | Status: SHIPPED | OUTPATIENT
Start: 2018-09-06 | End: 2020-06-26

## 2018-09-06 RX ADMIN — LIDOCAINE HYDROCHLORIDE 80 MG: 20 INJECTION, SOLUTION INTRAVENOUS at 10:09

## 2018-09-06 RX ADMIN — LIDOCAINE HYDROCHLORIDE 0.2 MG: 10 INJECTION, SOLUTION EPIDURAL; INFILTRATION; INTRACAUDAL; PERINEURAL at 08:09

## 2018-09-06 RX ADMIN — DEXAMETHASONE SODIUM PHOSPHATE 4 MG: 4 INJECTION, SOLUTION INTRAMUSCULAR; INTRAVENOUS at 10:09

## 2018-09-06 RX ADMIN — OXYCODONE HYDROCHLORIDE 5 MG: 5 TABLET ORAL at 01:09

## 2018-09-06 RX ADMIN — FENTANYL CITRATE 25 MCG: 50 INJECTION INTRAMUSCULAR; INTRAVENOUS at 12:09

## 2018-09-06 RX ADMIN — ONDANSETRON 4 MG: 2 INJECTION INTRAMUSCULAR; INTRAVENOUS at 11:09

## 2018-09-06 RX ADMIN — CEFAZOLIN 2 G: 330 INJECTION, POWDER, FOR SOLUTION INTRAMUSCULAR; INTRAVENOUS at 10:09

## 2018-09-06 RX ADMIN — Medication 20 MG: at 10:09

## 2018-09-06 RX ADMIN — Medication 10 MG: at 11:09

## 2018-09-06 RX ADMIN — ACETAMINOPHEN 1000 MG: 10 INJECTION, SOLUTION INTRAVENOUS at 10:09

## 2018-09-06 RX ADMIN — PROPOFOL 50 MG: 10 INJECTION, EMULSION INTRAVENOUS at 10:09

## 2018-09-06 RX ADMIN — ROCURONIUM BROMIDE 30 MG: 10 INJECTION, SOLUTION INTRAVENOUS at 10:09

## 2018-09-06 RX ADMIN — FENTANYL CITRATE 100 MCG: 50 INJECTION, SOLUTION INTRAMUSCULAR; INTRAVENOUS at 10:09

## 2018-09-06 RX ADMIN — GLYCOPYRROLATE 0.6 MG: 0.2 INJECTION, SOLUTION INTRAMUSCULAR; INTRAVENOUS at 11:09

## 2018-09-06 RX ADMIN — SODIUM CHLORIDE: 0.9 INJECTION, SOLUTION INTRAVENOUS at 08:09

## 2018-09-06 RX ADMIN — ROCURONIUM BROMIDE 20 MG: 10 INJECTION, SOLUTION INTRAVENOUS at 10:09

## 2018-09-06 RX ADMIN — ESMOLOL HYDROCHLORIDE 5 MG: 10 INJECTION INTRAVENOUS at 11:09

## 2018-09-06 RX ADMIN — FENTANYL CITRATE 50 MCG: 50 INJECTION, SOLUTION INTRAMUSCULAR; INTRAVENOUS at 10:09

## 2018-09-06 RX ADMIN — NEOSTIGMINE METHYLSULFATE 5 MG: 1 INJECTION INTRAVENOUS at 11:09

## 2018-09-06 RX ADMIN — PROPOFOL 150 MG: 10 INJECTION, EMULSION INTRAVENOUS at 10:09

## 2018-09-06 RX ADMIN — MIDAZOLAM HYDROCHLORIDE 2 MG: 1 INJECTION, SOLUTION INTRAMUSCULAR; INTRAVENOUS at 10:09

## 2018-09-06 NOTE — TRANSFER OF CARE
"Anesthesia Transfer of Care Note    Patient: Andrea Gant    Procedure(s) Performed: Procedure(s) (LRB):  REPAIR,HERNIA,INGUINAL,LAPAROSCOPIC, RIGHT w/ mesh, eval left w/ possible repair (Right)  EXPLORATION, INGUINAL REGION, LAPAROSCOPIC (Left)    Patient location: PACU    Anesthesia Type: general    Transport from OR: Transported from OR on 6-10 L/min O2 by face mask with adequate spontaneous ventilation    Post pain: adequate analgesia    Post assessment: tolerated procedure well and no apparent anesthetic complications    Post vital signs: stable    Level of consciousness: awake and alert    Nausea/Vomiting: no nausea/vomiting    Complications: none          Last vitals:   Visit Vitals  BP (!) 173/83 (BP Location: Right arm, Patient Position: Lying)   Pulse 75   Temp 36.7 °C (98.1 °F) (Temporal)   Resp (!) 22   Ht 5' 9" (1.753 m)   Wt 79.4 kg (175 lb)   SpO2 100%   BMI 25.84 kg/m²     "

## 2018-09-06 NOTE — BRIEF OP NOTE
[facility]  Brief Operative Note     SUMMARY     Surgery Date: 9/6/2018     Surgeon(s) and Role:     * Mingo De Guzman Jr., MD - Primary     * Elle Burgess MD - Resident - Assisting    Pre-op Diagnosis:  Right inguinal hernia [K40.90]    Post-op Diagnosis:  Post-Op Diagnosis Codes:     * Right inguinal hernia [K40.90]    Procedure(s) (LRB):  REPAIR,HERNIA,INGUINAL,LAPAROSCOPIC, RIGHT w/ mesh, eval left w/ possible repair (Right)  EXPLORATION, INGUINAL REGION, LAPAROSCOPIC (Left)    Anesthesia: General    Description of the findings of the procedure: right indirect inguinal hernia, left explored with no hernia found.    Findings/Key Components: See op note    Estimated Blood Loss: * No values recorded between 9/6/2018 10:48 AM and 9/6/2018 11:43 AM *         Specimens:   Specimen (12h ago, onward)    None          Discharge Note    SUMMARY     Admit Date: 9/6/2018    Discharge Date and Time:  09/06/2018 11:47 AM    Hospital Course (synopsis of major diagnoses, care, treatment, and services provided during the course of the hospital stay):     Patient underwent surgery and was discharged home in good condition     Final Diagnosis: Post-Op Diagnosis Codes:     * Right inguinal hernia [K40.90]    Disposition: Home or Self Care    Follow Up/Patient Instructions:     Medications:  Reconciled Home Medications:      Medication List      START taking these medications    oxyCODONE 5 MG immediate release tablet  Commonly known as:  ROXICODONE  Take 1 tablet (5 mg total) by mouth every 4 (four) hours as needed for Pain.        CONTINUE taking these medications    aspirin 81 MG EC tablet  Commonly known as:  ECOTRIN  Take 1 tablet (81 mg total) by mouth once daily.     cilostazol 50 MG Tab  Commonly known as:  PLETAL  TAKE 1 TABLET BY MOUTH TWICE A DAY     doxazosin 4 MG tablet  Commonly known as:  CARDURA  TAKE 1 TABLET BY MOUTH ONCE EVERY EVENING     MARIA DOLORES PEN Pnkt injection  Generic drug:   adalimumab  Inject 0.8 mLs (40 mg total) into the skin every 14 (fourteen) days.     hydroxychloroquine 200 mg tablet  Commonly known as:  PLAQUENIL  Take 200 mg by mouth once daily.     simvastatin 10 MG tablet  Commonly known as:  ZOCOR  TAKE 1 TABLET BY MOUTH EVERY EVENING     traZODone 50 MG tablet  Commonly known as:  DESYREL  TAKE 1 TO 3 TABLETS BY MOUTH EVERY NIGHT AT BEDTIME          Discharge Procedure Orders   Ice to affected area     Lifting restrictions   Order Comments: NO lifting more than 10 lb     Notify your health care provider if you experience any of the following:  temperature >100.4     Notify your health care provider if you experience any of the following:  persistent nausea and vomiting or diarrhea     Notify your health care provider if you experience any of the following:  severe uncontrolled pain     Notify your health care provider if you experience any of the following:  redness, tenderness, or signs of infection (pain, swelling, redness, odor or green/yellow discharge around incision site)     Notify your health care provider if you experience any of the following:  difficulty breathing or increased cough     Notify your health care provider if you experience any of the following:  severe persistent headache     Notify your health care provider if you experience any of the following:  worsening rash     Leave dressing on - Keep it clean, dry, and intact until clinic visit     Follow-up Information     Mingo De Guzman Jr, MD.    Specialties:  General Surgery, Bariatrics  Contact information:  [address]  [facility] [address]  [phone]

## 2018-09-06 NOTE — DISCHARGE INSTRUCTIONS
"DO NOT DRINK ALCOHOL, DRIVE A CAR, OPERATE HEAVY MACHINERY OR MAKE LEGAL/FINANCIAL DECISIONS FOR THE NEXT 24 HOURS    Instructions    Please take pain medication as needed, if taking narcotics please avoid driving.   Take Ibuprofen/Advil 600 mg three times a day as needed as well as Tylenol 625 mg four times a day as needed. Use Oxycodone as needed in between.   We recommend a high fiber diet and use of stool softeners while on narcotics as they might cause constipation.   You will find small pieces of tape over your incision called "steri strips" which will fall on their own, or you can remove them in 10 days. Do not shower until Friday morning.   Do not lift anything heavier than 10 lb for at least 6 weeks.   Avoid swimming pools, baths or hot tubs for at least 2 weeks.   Please call if fevers, chills, sob, increase drainage from your wound or bleeding or you may go to the ED.     Please call as well to make an appointment in 2 weeks with Dr. De Guzman for wound recheck.       When to call the healthcare provider   Call the healthcare provider right away if your child has any of the following:  · Signs of infection in the incision such as redness, fluid, warmth, pain  · Trouble urinating  · Fever of 100.4°F (38°C) or higher, or as directed by your healthcare provider  · Vomiting or nausea that doesnt stop  · In a boy, swelling of the scrotum that gets worse  · No bowel movement in 3 days  · Belly (abdominal) pain that doesnt get better, or gets worse   Date Last Reviewed: 10/1/2016  © 5668-4132 [email or web address]. [address] [place] [address], [place] [address]. All rights reserved. This information is not intended as a substitute for professional medical care. Always follow your healthcare professional's instructions.         "

## 2018-09-06 NOTE — PROGRESS NOTES
Paged Dr. Stern to obtain order for patient to receive pain pill. Patient requesting pain medication as pain level is starting to increase.     Dr. Stern gave VORB for patient to receive Oxycodone 5 mg, PO, once, now

## 2018-09-06 NOTE — INTERVAL H&P NOTE
The patient has been examined and the H&P has been reviewed:    I concur with the findings and no changes have occurred since H&P was written.    Anesthesia/Surgery risks, benefits and alternative options discussed and understood by patient/family.          Active Hospital Problems    Diagnosis  POA    Right inguinal hernia [K40.90]  Yes      Resolved Hospital Problems   No resolved problems to display.

## 2018-09-06 NOTE — OP NOTE
DATE OF PROCEDURE: 09/06/2018  SERVICE: General Surgery.   Surgeon(s) and Role:     * Mingo De Guzman Jr., MD - Primary     * Elle Burgess MD - Resident - Assisting  PREOPERATIVE DIAGNOSIS: Right inguinal hernia.   POSTOPERATIVE DIAGNOSIS: Right inguinal hernia.   PROCEDURE: Laparoscopic repair of right inguinal hernia with mesh and   evaluation of the left.   ANESTHESIA: General endotracheal and local.   DESCRIPTION OF PROCEDURE: The patient was taken to the Operating Room, placed   under general anesthesia, and prepped and draped in sterile fashion. At this   time, an infraumbilical incision was made after infiltrating with local   anesthetic. This was carried down to the linea alba with electrocautery and   blunt dissection. An incision was made in the anterior fascia, just to the left  of the linea alba. At this time, the rectus muscle was then swept laterally   and elevated, and a dissecting balloon trocar was placed in the retrorectus   space. At this time, under direct visualization, the balloon was insufflated,   creating the retrorectus space without difficulty. The trocar was then inserted   into this retrorectus space, and the balloon was insufflated and the dissecting   balloon was removed. Once this was complete, further ports were placed in the   midline, 5 mm in nature, one 1 fingerbreadth above the pubic symphysis and one   between the suprapubic port and the infraumbilical port. These were placed   under direct visualization, after infiltrating with local anesthetic. Once the   ports were placed, we turned our attention to the right side.  We swept the peritoneum   down laterally, beginning at the anterior superior iliac spine and continuing this dissection medially   towards the cord. The cord was elevated. There was noted to be a small sized   indirect hernia present. The sac was  from the cord structures and   reduced back to the level of the umbilicus. The cord itself was  skeletonized   and inspected, no signs of any other abnormalities.  At this time, we continued dissection   medially towards the direct space. There were no signs of an direct hernia   present, and we cleared Victor M's ligament medially as well. Once we had completed   dissection on the right side, we turned our attention to the left side,   evaluated this, and again we swept the peritoneum down laterally and inspected   the cord. The peritoneal reflection was at the base of the cord. There were no   signs of an indirect hernia. We inspected the direct space. Again, no signs   of a direct hernia. Once we had determined there was no hernia on the left side, we   proceeded with placement of mesh on the right. A piece of large 3DMAX mesh was   placed into the retrorectus space on the right side. Medially, it was brought   to midline and anteriorly to the first port. It was then tacked with two tacks   to Victor M's ligament medially, one to the rectus muscle anteriorly and then two   tacks laterally above the ASIS. At this time, the mesh was inspected, and it   was in very good position covering all defects. The sac had been dissected back   to the umbilicus and was well out of the way of the mesh repair. At this time   under direct visualization, the air was evacuated from the retrorectus space.   The ports were then removed. The retrorectus space was then infiltrated with   further 20 mL of local anesthetic and at this time, the infraumbilical port was   removed. The anterior fascia was closed with 0 Vicryl suture in figure-of-eight   fashion, closing the fascia of this incision; and the port sites were closed   with 4-0 Monocryl in subcuticular fashion. Mastisol and Steri-Strips were then   placed. The patient was allowed to awake from general anesthesia and   transferred to bed for transport to Recovery.   COMPLICATIONS: None.   SPONGE COUNT: Correct.   BLOOD LOSS: 15 mL.   FLUIDS: Per Anesthesia.   BLOOD GIVEN: None.    DRAINS: None.   SPECIMENS: None.   CONDITION OF PATIENT: Good.   I was present for the entire procedure.

## 2018-09-06 NOTE — PLAN OF CARE
Patient and spouse given discharge and follow up instructions. No questions or concerns expressed at this time. Patient reports pain is tolerable and denies nausea. Patient tolerating PO liquids well

## 2018-09-06 NOTE — PROGRESS NOTES
Patient urinated some in urinal and some on sheets. Large amount of urine on sheets noted. Dr. Stern notified and per Dr. Stern patient can be discharged. Patient educated on discharge instructions to call MD if he cannot urinate for several hours once he gets home

## 2018-09-11 NOTE — ANESTHESIA POSTPROCEDURE EVALUATION
"Anesthesia Post Evaluation    Patient: Andrea Gant    Procedure(s) Performed: Procedure(s) (LRB):  REPAIR,HERNIA,INGUINAL,LAPAROSCOPIC, RIGHT w/ mesh, eval left w/ possible repair (Right)  EXPLORATION, INGUINAL REGION, LAPAROSCOPIC (Left)    Final Anesthesia Type: general  Patient location during evaluation: PACU  Patient participation: Yes- Able to Participate  Level of consciousness: awake and alert  Post-procedure vital signs: reviewed and stable  Pain management: adequate  Airway patency: patent  PONV status at discharge: No PONV  Anesthetic complications: no      Cardiovascular status: hemodynamically stable  Respiratory status: unassisted, spontaneous ventilation and room air  Hydration status: euvolemic  Follow-up not needed.        Visit Vitals  BP (!) 158/72 (BP Location: Right arm, Patient Position: Lying)   Pulse 78   Temp 36.8 °C (98.3 °F) (Skin)   Resp 16   Ht 5' 9" (1.753 m)   Wt 79.4 kg (175 lb)   SpO2 99%   BMI 25.84 kg/m²       Pain/Nick Score: No Data Recorded      "

## 2018-09-11 NOTE — ANESTHESIA RELEASE NOTE
"Anesthesia Release from PACU Note    Patient: Andrea Gant    Procedure(s) Performed: Procedure(s) (LRB):  REPAIR,HERNIA,INGUINAL,LAPAROSCOPIC, RIGHT w/ mesh, eval left w/ possible repair (Right)  EXPLORATION, INGUINAL REGION, LAPAROSCOPIC (Left)    Anesthesia type: general    Post pain: Adequate analgesia    Post assessment: no apparent anesthetic complications and tolerated procedure well    Last Vitals:   Visit Vitals  BP (!) 158/72 (BP Location: Right arm, Patient Position: Lying)   Pulse 78   Temp 36.8 °C (98.3 °F) (Skin)   Resp 16   Ht 5' 9" (1.753 m)   Wt 79.4 kg (175 lb)   SpO2 99%   BMI 25.84 kg/m²       Post vital signs: stable    Level of consciousness: awake and alert     Nausea/Vomiting: no nausea/no vomiting    Complications: none    Airway Patency: patent    Respiratory: unassisted, spontaneous ventilation, room air    Cardiovascular: stable and blood pressure at baseline    Hydration: euvolemic  "

## 2018-09-17 ENCOUNTER — PATIENT MESSAGE (OUTPATIENT)
Dept: SURGERY | Facility: CLINIC | Age: 63
End: 2018-09-17

## 2018-09-24 ENCOUNTER — OFFICE VISIT (OUTPATIENT)
Dept: SURGERY | Facility: CLINIC | Age: 63
End: 2018-09-24
Payer: COMMERCIAL

## 2018-09-24 VITALS
DIASTOLIC BLOOD PRESSURE: 79 MMHG | HEART RATE: 78 BPM | BODY MASS INDEX: 25.27 KG/M2 | TEMPERATURE: 98 F | WEIGHT: 170.63 LBS | SYSTOLIC BLOOD PRESSURE: 147 MMHG | HEIGHT: 69 IN

## 2018-09-24 DIAGNOSIS — Z09 POSTOP CHECK: Primary | ICD-10-CM

## 2018-09-24 PROCEDURE — 99999 PR PBB SHADOW E&M-EST. PATIENT-LVL III: CPT | Mod: PBBFAC,,, | Performed by: SURGERY

## 2018-09-24 PROCEDURE — 99024 POSTOP FOLLOW-UP VISIT: CPT | Mod: S$GLB,,, | Performed by: SURGERY

## 2018-09-24 NOTE — PROGRESS NOTES
HPI:    The patient is status post-Laparoscopic Right inguinal hernia repair with mesh on 9/6/2018. He has 2/10 pain. He is eating well. The patient denies nausea, vomiting, problems with the bowels or difficulty urinating. The patient complains of  Mild soarness around the right inguinal area but otherwise feeling well.    Physical Exam     Abd: Port incisions well healed. No sings of recurrence.    ASSESSMENT/PLAN:       The patient is doing well after surgery.       Follow up PRN.  Activity: light duty for 4 weeks      Elle Mosley MD  General Surgery PGY V  Beeper: [phone]      I have personally taken the history and examined this patient and agree with the resident's note as stated above.         Mingo De Guzman MD

## 2018-09-27 ENCOUNTER — TELEPHONE (OUTPATIENT)
Dept: PHARMACY | Facility: CLINIC | Age: 63
End: 2018-09-27

## 2018-10-01 ENCOUNTER — TELEPHONE (OUTPATIENT)
Dept: RHEUMATOLOGY | Facility: CLINIC | Age: 63
End: 2018-10-01

## 2018-10-01 NOTE — TELEPHONE ENCOUNTER
Called to inform patient of the need to reschedule 10/11/18 appointment, due to a change in the provider's schedule.  Patient verbalizes understanding, appointment rescheduled to 10/10/18.

## 2018-10-04 RX ORDER — DOXAZOSIN 4 MG/1
TABLET ORAL
Qty: 30 TABLET | Refills: 6 | Status: SHIPPED | OUTPATIENT
Start: 2018-10-04 | End: 2019-03-06 | Stop reason: SDUPTHER

## 2018-10-09 NOTE — PROGRESS NOTES
Subjective:       Patient ID: Andrea Gant is a 63 y.o. male with sero+ccp+ RA, OA and Hepatitis C (cured with Harvoni)    Chief Complaint: No chief complaint on file.    Returns for follow-up. Last seen 1/17/18. Doing well joint wise. He is no longer on prednisone. Only on  mg/d and Humira 40 mg every other week (since 6/15/17). Was off it for nearly a month during his hernia surgery and he began to ache. Nevertheless, is concerned about turning 64 & Medicare. Is not certain how he will be able to afford it. He is worried that he will not be eligible for special programs as he may make too much money. Asking if there is a cheaper alternative.    He denies any joint pains/swelling. Still with minimal AM stiffness. Denies Raynaud's, dysphagia, tight skin, oral ulcers, pleurisy, pericarditis, dry eyes, photosensitivity, thromboses. Still with some dryness in his mouth. Still with some fatigue and sleep disturbance helped by 100 mg trazodone.                Associated symptoms include fatigue.         Current Outpatient Medications   Medication Sig Dispense Refill    adalimumab (HUMIRA) PnKt injection Inject 0.8 mLs (40 mg total) into the skin every 14 (fourteen) days. 6 each 2    aspirin (ECOTRIN) 81 MG EC tablet Take 1 tablet (81 mg total) by mouth once daily.  3    cilostazol (PLETAL) 50 MG Tab TAKE 1 TABLET BY MOUTH TWICE A DAY 60 tablet 10    doxazosin (CARDURA) 4 MG tablet TAKE 1 TABLET BY MOUTH ONCE EVERY EVENING 30 tablet 6    hydroxychloroquine (PLAQUENIL) 200 mg tablet Take 200 mg by mouth once daily.       oxyCODONE (ROXICODONE) 5 MG immediate release tablet Take 1 tablet (5 mg total) by mouth every 4 (four) hours as needed for Pain. 30 tablet 0    simvastatin (ZOCOR) 10 MG tablet TAKE 1 TABLET BY MOUTH EVERY EVENING 30 tablet 4    traZODone (DESYREL) 50 MG tablet TAKE 1 TO 3 TABLETS BY MOUTH EVERY NIGHT AT BEDTIME 90 tablet 3     No current facility-administered medications for this  visit.          Probable Allergic to Enbrel (rash);     Past Medical History:   Diagnosis Date    Cataract     History of hepatitis C, s/p successful treatment w/ SVR12 - 7/2015 10/14/2012    Kelsey 1a,  Liver biopsy 6/2014 - Stage 1 fibrosis;  Completed 8 weeks Harvoni w/ SVR12 - 7/2015      Hyperlipidemia     Lipoma of arm     Lipoma of lower extremity     Nuclear sclerosis - Both Eyes 10/22/2012    Other and unspecified hyperlipidemia 10/22/2013    PAD (peripheral artery disease)     Peripheral vascular disease, unspecified     Rheumatoid arthritis(714.0) 10/14/2012       Past Surgical History:   Procedure Laterality Date    BIOPSY LIVER AND ULTRASOUND N/A 6/9/2014    Performed by [facility] Provider at [facility] OR [address] [place] [address]    COLONOSCOPY N/A 2/21/2014    Performed by Cesar Cardoza MD at [facility] ENDO (4TH FLR)    EXPLORATION, INGUINAL REGION, LAPAROSCOPIC Left 9/6/2018    Performed by Mingo De Guzman Jr., MD at [facility] OR [address] [place] [address]    KNEE ARTHROPLASTY      LAPAROSCOPIC EXPLORATION OF GROIN Left 9/6/2018    Procedure: EXPLORATION, INGUINAL REGION, LAPAROSCOPIC;  Surgeon: Mingo De Guzman Jr., MD;  Location: [facility] OR [address] [place] [address];  Service: General;  Laterality: Left;    REPAIR,HERNIA,INGUINAL,LAPAROSCOPIC, RIGHT w/ mesh, eval left w/ possible repair Right 9/6/2018    Performed by Mingo De Guzman Jr., MD at [facility] OR [address] [place] [address]    TONSILLECTOMY         Review of Systems   Constitutional: Positive for fatigue.   HENT: Negative.  Negative for hearing loss, mouth sores, sore throat and tinnitus.         Dry mouth   Eyes: Negative.  Negative for redness and visual disturbance.   Respiratory: Negative for cough, choking, chest tightness and shortness of breath.    Cardiovascular: Negative.  Negative for chest pain, palpitations and leg swelling.   Gastrointestinal: Negative.  Negative for abdominal pain, blood in stool, constipation, diarrhea, nausea and vomiting.        Heartburn   Endocrine: Negative.   "  Genitourinary: Positive for frequency. Negative for hematuria and urgency.   Musculoskeletal: Negative.  Negative for back pain, neck pain and neck stiffness.   Skin: Negative.  Negative for rash.   Allergic/Immunologic: Negative.    Neurological: Negative.  Negative for dizziness, syncope and numbness.   Hematological: Negative.  Does not bruise/bleed easily.   Psychiatric/Behavioral: Positive for dysphoric mood and sleep disturbance. The patient is nervous/anxious.          SH: still not smoking was former smoker.  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    Objective:       BP (!) 165/92   Pulse 82   Ht 5' 9" (1.753 m)   Wt 75.8 kg (167 lb 1.7 oz)   BMI 24.68 kg/m²     Was 172 lbs 4.8 oz on 12/13/16;  Physical Exam   Vitals reviewed.  Constitutional: He is oriented to person, place, and time and well-developed, well-nourished, and in no distress. No distress.   HENT:   Head: Normocephalic and atraumatic.   Mouth/Throat: Oropharynx is clear and moist. No oropharyngeal exudate.   No facial rashes  Parotids not enlarged  No oral ulcers  Temporal aa ok;    Eyes: Conjunctivae and EOM are normal. Pupils are equal, round, and reactive to light. Right eye exhibits no discharge. Left eye exhibits no discharge. No scleral icterus.   Neck: Neck supple. No JVD present. No tracheal deviation present. No thyromegaly present.   Cardiovascular: Normal rate, regular rhythm, normal heart sounds and intact distal pulses.  Exam reveals no gallop and no friction rub.    No murmur heard.  Pulmonary/Chest: Effort normal and breath sounds normal. No respiratory distress. He has no wheezes. He has no rales. He exhibits no tenderness.   Abdominal: Soft. Bowel sounds are normal. He exhibits no distension and no mass. There is no splenomegaly or hepatomegaly. There is no tenderness. There is no rebound and no guarding.   Lymphadenopathy:     He has no cervical adenopathy.        Right: No inguinal adenopathy present.        Left: No inguinal " adenopathy present.   Neurological: He is alert and oriented to person, place, and time. He has normal reflexes. No cranial nerve deficit. Gait normal.   Proximal and distal muscle strength 5/5.   Skin: Skin is warm and dry. No rash noted. He is not diaphoretic.     Has small mildly excoriated small pruritic skin lesion surrounded by mild erythema R upper back. Non tender. Bilateral superficial varicosities LE;   Psychiatric: Memory, affect and judgment normal.   Musculoskeletal: Normal range of motion. He exhibits no edema or tenderness.   Cspine FROM no tenderness  Tspine FROM no tenderness  Lspine FROM no tenderness.  TMJ: unremarkable  Shoulders: missing a few degrees of abduction on the L a few more on the R; no synovitis; no tenderness  Elbows: FROM;left elbow nodule, firm & non tender; right elbow with mild flexion contracture.  Wrists: FROM; no synovitis  MCPs: FROM; no synovitis; no metacarpalgia;  ok;  PIPs:FROM; +Bouchards; non tender; no synovitis;  Good fists.  DIPs: FROM; + Heberden's; no synovitis  HIPS: FROM  Knees: FROM; no synovitis; no instability; minimal PF crepitus;  Ankles: FROM: no synovitis   Toes: ok; no metatarsalgia;                LABS:     18: ESR 29; CRP; CBC ok; CMP ok; ; CCP 44;   17: ESR 23; CRP 4.2; CBC ok; CMP cnne 1.3; GFR 58.1;   3/26/16: CBC ok; cnne 1.3  3/10/16: ESR 9; CRP 1.7; CBC, CMP ok;  9/15/15: ESR 20; CRP 0.9; CBC ok; CMP ok;  3/2/15: CMP BUN 28; cnne 1.1  1/12/15: Hg 13.9  10/21/14: ESR 8;   14: ESR 19; CRP 2.9; Hg 12.8; cnne 1.3; Vit D 18; ; CCP 44.3   Hepatitis B & HIV negative; HCV+;     IMAGIN16: US Dopplers: R:minimal peripheral arterial occlusive disease: L: moderate peripheral arterial occlusive disease; unchanged  4/2/15: Bilateral wrists: personal review: cystic lesion left lunate, possibly left ulnar notch. (not too different from previous)  4/2/15: R ankle personally reviewed: ok  14: Arthritis survey personally  reviewed again: OA lower CS; min OA hands & feet;      Assessment:   Seropositive (), CCP (44) positive nodular (left elbow) rheumatoid arthritis with no erosions--doing well,    Enbrel effective but resulted in rash. MTX and Leflunomide contraindicated.   SSZ given ok by Dr. Moore, but never begun   On hydroxychloroquine 400 mg/d   Humira 40 mg qow since 6/15    Bilateral adhesive capsulitis R>L--improbed.    Mild renal insufficiency on meloxicam (cnne up to 1.3 from 1.0)   Off it now, but last cnne 1.1.    Hepatitis C with normal liver function tests--genotype 1a   Completed Harvoni; cured    Osteoarthritis of hands.     Recurrent hypovitaminosis D -treated in past    Peripheral Arterial Disease.    Compression deformity of the lower lumbar and thoracic vertebral bodies by x-ray.     Multiple lipomas of the arms and lumbar spine area.     Adhesive capsulitis of left shoulder--resolved.     Occupational injuries in the past.     Persistent insomnia.   Helped partly by trazodone.       Plan:   Stay on Humira 40 mg every other week  Continue HCQ with eye exams  We will check with Hepatology (Dr. Moore) to see if we can use potentially use hepatotoxic meds like methotrexate or leflunomide.  May add MTX and withdraw humira if ok to take these other meds.  Labs today.  Needs yearly skin exams.   Needs PCP for elevated BP.  Contact us for problems.   Still needs imaging.  RTC 6 months as per patient's request.    Addendum:   MD Marline Jacobs MD             Yes. That should be fine.   ThanksJuan    Previous Messages     ----- Message -----   From: Marline Jimenez MD   Sent: 10/10/2018   4:29 PM   To: MD Juan Jacobs:   He's had HCV in past which you guys cured with Harvoni.   Can I now use potentially hepatotoxic meds like methotrexate or leflunomide?   If you need to see him again I will send him to you.     Xuan

## 2018-10-10 ENCOUNTER — OFFICE VISIT (OUTPATIENT)
Dept: RHEUMATOLOGY | Facility: CLINIC | Age: 63
End: 2018-10-10
Payer: COMMERCIAL

## 2018-10-10 ENCOUNTER — LAB VISIT (OUTPATIENT)
Dept: LAB | Facility: HOSPITAL | Age: 63
End: 2018-10-10
Attending: INTERNAL MEDICINE
Payer: COMMERCIAL

## 2018-10-10 VITALS
SYSTOLIC BLOOD PRESSURE: 165 MMHG | BODY MASS INDEX: 24.75 KG/M2 | WEIGHT: 167.13 LBS | HEART RATE: 82 BPM | HEIGHT: 69 IN | DIASTOLIC BLOOD PRESSURE: 92 MMHG

## 2018-10-10 DIAGNOSIS — Z79.899 LONG-TERM USE OF HYDROXYCHLOROQUINE: ICD-10-CM

## 2018-10-10 DIAGNOSIS — M05.79 RHEUMATOID ARTHRITIS INVOLVING MULTIPLE SITES WITH POSITIVE RHEUMATOID FACTOR: Primary | ICD-10-CM

## 2018-10-10 DIAGNOSIS — R03.0 ELEVATED BLOOD PRESSURE READING: ICD-10-CM

## 2018-10-10 DIAGNOSIS — Z79.60 LONG-TERM USE OF IMMUNOSUPPRESSANT MEDICATION: ICD-10-CM

## 2018-10-10 DIAGNOSIS — M05.79 RHEUMATOID ARTHRITIS INVOLVING MULTIPLE SITES WITH POSITIVE RHEUMATOID FACTOR: ICD-10-CM

## 2018-10-10 LAB
ALBUMIN SERPL BCP-MCNC: 3.9 G/DL
ALP SERPL-CCNC: 96 U/L
ALT SERPL W/O P-5'-P-CCNC: 15 U/L
ANION GAP SERPL CALC-SCNC: 9 MMOL/L
AST SERPL-CCNC: 24 U/L
BASOPHILS # BLD AUTO: 0.07 K/UL
BASOPHILS NFR BLD: 0.7 %
BILIRUB SERPL-MCNC: 0.6 MG/DL
BUN SERPL-MCNC: 21 MG/DL
CALCIUM SERPL-MCNC: 9.6 MG/DL
CHLORIDE SERPL-SCNC: 105 MMOL/L
CO2 SERPL-SCNC: 26 MMOL/L
CREAT SERPL-MCNC: 1.3 MG/DL
CRP SERPL-MCNC: 4.1 MG/L
DIFFERENTIAL METHOD: NORMAL
EOSINOPHIL # BLD AUTO: 0.2 K/UL
EOSINOPHIL NFR BLD: 2.1 %
ERYTHROCYTE [DISTWIDTH] IN BLOOD BY AUTOMATED COUNT: 13.1 %
ERYTHROCYTE [SEDIMENTATION RATE] IN BLOOD BY WESTERGREN METHOD: 7 MM/HR
EST. GFR  (AFRICAN AMERICAN): >60 ML/MIN/1.73 M^2
EST. GFR  (NON AFRICAN AMERICAN): 57.7 ML/MIN/1.73 M^2
GLUCOSE SERPL-MCNC: 81 MG/DL
HCT VFR BLD AUTO: 44.8 %
HGB BLD-MCNC: 14.6 G/DL
IMM GRANULOCYTES # BLD AUTO: 0.04 K/UL
IMM GRANULOCYTES NFR BLD AUTO: 0.4 %
LYMPHOCYTES # BLD AUTO: 1.9 K/UL
LYMPHOCYTES NFR BLD: 18.5 %
MCH RBC QN AUTO: 29.5 PG
MCHC RBC AUTO-ENTMCNC: 32.6 G/DL
MCV RBC AUTO: 91 FL
MONOCYTES # BLD AUTO: 0.7 K/UL
MONOCYTES NFR BLD: 6.8 %
NEUTROPHILS # BLD AUTO: 7.3 K/UL
NEUTROPHILS NFR BLD: 71.5 %
NRBC BLD-RTO: 0 /100 WBC
PLATELET # BLD AUTO: 214 K/UL
PMV BLD AUTO: 10.4 FL
POTASSIUM SERPL-SCNC: 4 MMOL/L
PROT SERPL-MCNC: 8 G/DL
RBC # BLD AUTO: 4.95 M/UL
SODIUM SERPL-SCNC: 140 MMOL/L
WBC # BLD AUTO: 10.23 K/UL

## 2018-10-10 PROCEDURE — 36415 COLL VENOUS BLD VENIPUNCTURE: CPT

## 2018-10-10 PROCEDURE — 86140 C-REACTIVE PROTEIN: CPT

## 2018-10-10 PROCEDURE — 80053 COMPREHEN METABOLIC PANEL: CPT

## 2018-10-10 PROCEDURE — 85025 COMPLETE CBC W/AUTO DIFF WBC: CPT

## 2018-10-10 PROCEDURE — 99999 PR PBB SHADOW E&M-EST. PATIENT-LVL IV: CPT | Mod: PBBFAC,,, | Performed by: INTERNAL MEDICINE

## 2018-10-10 PROCEDURE — 3077F SYST BP >= 140 MM HG: CPT | Mod: CPTII,S$GLB,, | Performed by: INTERNAL MEDICINE

## 2018-10-10 PROCEDURE — 3080F DIAST BP >= 90 MM HG: CPT | Mod: CPTII,S$GLB,, | Performed by: INTERNAL MEDICINE

## 2018-10-10 PROCEDURE — 99214 OFFICE O/P EST MOD 30 MIN: CPT | Mod: S$GLB,,, | Performed by: INTERNAL MEDICINE

## 2018-10-10 PROCEDURE — 3008F BODY MASS INDEX DOCD: CPT | Mod: CPTII,S$GLB,, | Performed by: INTERNAL MEDICINE

## 2018-10-10 PROCEDURE — 85652 RBC SED RATE AUTOMATED: CPT

## 2018-10-10 RX ORDER — OMEPRAZOLE 40 MG/1
40 CAPSULE, DELAYED RELEASE ORAL EVERY MORNING
COMMUNITY
End: 2021-01-25

## 2018-10-10 NOTE — Clinical Note
Juan:He's had HCV in past which you guys cured with Harvoni.Can I now use potentially hepatotoxic meds like methotrexate or leflunomide?If you need to see him again I will send him to you.Xuan

## 2018-10-14 RX ORDER — SIMVASTATIN 10 MG/1
TABLET, FILM COATED ORAL
Qty: 30 TABLET | Refills: 4 | Status: SHIPPED | OUTPATIENT
Start: 2018-10-14 | End: 2018-10-15 | Stop reason: SDUPTHER

## 2018-10-16 RX ORDER — SIMVASTATIN 10 MG/1
10 TABLET, FILM COATED ORAL NIGHTLY
Qty: 90 TABLET | Refills: 6 | Status: SHIPPED | OUTPATIENT
Start: 2018-10-16 | End: 2018-10-17 | Stop reason: SDUPTHER

## 2018-10-17 RX ORDER — SIMVASTATIN 10 MG/1
10 TABLET, FILM COATED ORAL NIGHTLY
Qty: 90 TABLET | Refills: 6 | Status: SHIPPED | OUTPATIENT
Start: 2018-10-17 | End: 2019-04-26 | Stop reason: SDUPTHER

## 2018-10-22 ENCOUNTER — PATIENT MESSAGE (OUTPATIENT)
Dept: RHEUMATOLOGY | Facility: CLINIC | Age: 63
End: 2018-10-22

## 2018-11-05 ENCOUNTER — TELEPHONE (OUTPATIENT)
Dept: PHARMACY | Facility: CLINIC | Age: 63
End: 2018-11-05

## 2018-11-20 DIAGNOSIS — G47.00 PERSISTENT INSOMNIA: ICD-10-CM

## 2018-11-20 RX ORDER — TRAZODONE HYDROCHLORIDE 50 MG/1
150 TABLET ORAL NIGHTLY PRN
Qty: 90 TABLET | Refills: 3 | Status: SHIPPED | OUTPATIENT
Start: 2018-11-20 | End: 2019-04-06 | Stop reason: SDUPTHER

## 2018-11-21 RX ORDER — HYDROXYCHLOROQUINE SULFATE 200 MG/1
TABLET, FILM COATED ORAL
Qty: 60 TABLET | Refills: 0 | Status: SHIPPED | OUTPATIENT
Start: 2018-11-21 | End: 2018-12-25 | Stop reason: SDUPTHER

## 2018-12-03 RX ORDER — CILOSTAZOL 50 MG/1
TABLET ORAL
Qty: 60 TABLET | Refills: 10 | Status: SHIPPED | OUTPATIENT
Start: 2018-12-03 | End: 2018-12-11 | Stop reason: SDUPTHER

## 2018-12-06 ENCOUNTER — TELEPHONE (OUTPATIENT)
Dept: PHARMACY | Facility: CLINIC | Age: 63
End: 2018-12-06

## 2018-12-11 RX ORDER — CILOSTAZOL 50 MG/1
50 TABLET ORAL 2 TIMES DAILY
Qty: 60 TABLET | Refills: 10 | Status: SHIPPED | OUTPATIENT
Start: 2018-12-11 | End: 2019-02-15 | Stop reason: SDUPTHER

## 2018-12-12 RX ORDER — CILOSTAZOL 50 MG/1
50 TABLET ORAL 2 TIMES DAILY
Qty: 60 TABLET | Refills: 10 | Status: CANCELLED | OUTPATIENT
Start: 2018-12-12

## 2018-12-17 RX ORDER — CILOSTAZOL 50 MG/1
50 TABLET ORAL 2 TIMES DAILY
Qty: 60 TABLET | Refills: 11 | Status: SHIPPED | OUTPATIENT
Start: 2018-12-17 | End: 2019-10-04 | Stop reason: SDUPTHER

## 2018-12-25 RX ORDER — HYDROXYCHLOROQUINE SULFATE 200 MG/1
TABLET, FILM COATED ORAL
Qty: 60 TABLET | Refills: 0 | Status: SHIPPED | OUTPATIENT
Start: 2018-12-25 | End: 2019-01-24 | Stop reason: SDUPTHER

## 2018-12-31 ENCOUNTER — TELEPHONE (OUTPATIENT)
Dept: PHARMACY | Facility: CLINIC | Age: 63
End: 2018-12-31

## 2019-01-09 ENCOUNTER — PATIENT MESSAGE (OUTPATIENT)
Dept: RHEUMATOLOGY | Facility: CLINIC | Age: 64
End: 2019-01-09

## 2019-01-14 ENCOUNTER — PATIENT MESSAGE (OUTPATIENT)
Dept: INTERNAL MEDICINE | Facility: CLINIC | Age: 64
End: 2019-01-14

## 2019-01-21 ENCOUNTER — CLINICAL SUPPORT (OUTPATIENT)
Dept: SMOKING CESSATION | Facility: CLINIC | Age: 64
End: 2019-01-21
Payer: COMMERCIAL

## 2019-01-21 DIAGNOSIS — F17.210 LIGHT CIGARETTE SMOKER (1-9 CIGS/DAY): Primary | ICD-10-CM

## 2019-01-21 PROCEDURE — 99404 PR PREVENT COUNSEL,INDIV,60 MIN: ICD-10-PCS | Mod: S$GLB,,,

## 2019-01-21 PROCEDURE — 99999 PR PBB SHADOW E&M-EST. PATIENT-LVL II: ICD-10-PCS | Mod: PBBFAC,,,

## 2019-01-21 PROCEDURE — 99999 PR PBB SHADOW E&M-EST. PATIENT-LVL II: CPT | Mod: PBBFAC,,,

## 2019-01-21 PROCEDURE — 99404 PREV MED CNSL INDIV APPRX 60: CPT | Mod: S$GLB,,,

## 2019-01-21 RX ORDER — IBUPROFEN 200 MG
1 TABLET ORAL DAILY
Qty: 14 PATCH | Refills: 0 | Status: SHIPPED | OUTPATIENT
Start: 2019-01-21 | End: 2019-02-06 | Stop reason: DRUGHIGH

## 2019-01-21 NOTE — Clinical Note
Patient will be participating in weekly tobacco cessation meetings and will begin the prescribed tobacco cessation medication regimen of 21 mg patches.FTND of 4 indicates a moderate dependence to tobacco. Patient used the vape when he considered his self tobacco free. KYLE-D of 0 is perceived as no mental distress or depression at this time.

## 2019-01-24 RX ORDER — HYDROXYCHLOROQUINE SULFATE 200 MG/1
TABLET, FILM COATED ORAL
Qty: 60 TABLET | Refills: 0 | Status: SHIPPED | OUTPATIENT
Start: 2019-01-24 | End: 2019-01-28 | Stop reason: SDUPTHER

## 2019-01-28 RX ORDER — HYDROXYCHLOROQUINE SULFATE 200 MG/1
TABLET, FILM COATED ORAL
Qty: 60 TABLET | Refills: 0 | Status: SHIPPED | OUTPATIENT
Start: 2019-01-28 | End: 2019-04-01 | Stop reason: SDUPTHER

## 2019-01-30 ENCOUNTER — CLINICAL SUPPORT (OUTPATIENT)
Dept: SMOKING CESSATION | Facility: CLINIC | Age: 64
End: 2019-01-30
Payer: COMMERCIAL

## 2019-01-30 DIAGNOSIS — F17.210 CIGARETTE NICOTINE DEPENDENCE, UNCOMPLICATED: Primary | ICD-10-CM

## 2019-01-30 PROCEDURE — 99999 PR PBB SHADOW E&M-EST. PATIENT-LVL II: CPT | Mod: PBBFAC,,,

## 2019-01-30 PROCEDURE — 99402 PREV MED CNSL INDIV APPRX 30: CPT | Mod: S$GLB,,,

## 2019-01-30 PROCEDURE — 99402 PR PREVENT COUNSEL,INDIV,30 MIN: ICD-10-PCS | Mod: S$GLB,,,

## 2019-01-30 PROCEDURE — 99999 PR PBB SHADOW E&M-EST. PATIENT-LVL II: ICD-10-PCS | Mod: PBBFAC,,,

## 2019-01-30 NOTE — Clinical Note
Just a note to advise how the patient is progressing in the tobacco cessation program. The patient is tobacco free as of 1/23/19 and the patient is not experiencing any negative side effects from the quit. Patient remains on prescribed tobacco cessation medication regimen of 21 mg patch without any negative side effects at this time.

## 2019-01-30 NOTE — PROGRESS NOTES
Individual Follow-Up Form    1/30/2019    Quit Date: 1/23/19    Clinical Status of Patient: Outpatient    Length of Service: 30 minutes    Continuing Medication: yes  Patches    Other Medications:      Target Symptoms: Withdrawal and medication side effects. The following were  rated moderate (3) to severe (4) on TCRS:  · Moderate (3): none  · Severe (4): none    Comments: completion of TCRS (Tobacco Cessation Rating Scale) reviewed strategies, cues, and triggers. Introduced the negative impact of tobacco on health, the health advantages of discontinuing the use of tobacco, time line improved health changes after a quit, withdrawal issues to expect from nicotine and habit, and ways to remain tobacco free. The patient is tobacco free as of 1/23/19 and the patient is not experiencing any negative side effects from the quit. Patient remains on prescribed tobacco cessation medication regimen of 21 mg patch without any negative side effects at this time. The patient denies any abnormal behavioral or mental changes at this time. The patient will continue with individual therapy sessions and medication monitoring by CTTS. Prescribed medication management will be by physician.     Diagnosis: F17.210    Next Visit: 1 week

## 2019-02-06 ENCOUNTER — CLINICAL SUPPORT (OUTPATIENT)
Dept: SMOKING CESSATION | Facility: CLINIC | Age: 64
End: 2019-02-06
Payer: COMMERCIAL

## 2019-02-06 DIAGNOSIS — F17.210 CIGARETTE NICOTINE DEPENDENCE, UNCOMPLICATED: Primary | ICD-10-CM

## 2019-02-06 PROCEDURE — 99402 PR PREVENT COUNSEL,INDIV,30 MIN: ICD-10-PCS | Mod: S$GLB,,,

## 2019-02-06 PROCEDURE — 99999 PR PBB SHADOW E&M-EST. PATIENT-LVL I: ICD-10-PCS | Mod: PBBFAC,,,

## 2019-02-06 PROCEDURE — 99402 PREV MED CNSL INDIV APPRX 30: CPT | Mod: S$GLB,,,

## 2019-02-06 PROCEDURE — 99999 PR PBB SHADOW E&M-EST. PATIENT-LVL I: CPT | Mod: PBBFAC,,,

## 2019-02-06 RX ORDER — IBUPROFEN 200 MG
1 TABLET ORAL DAILY
Qty: 14 PATCH | Refills: 0 | Status: SHIPPED | OUTPATIENT
Start: 2019-02-06 | End: 2019-02-13 | Stop reason: ALTCHOICE

## 2019-02-06 NOTE — PROGRESS NOTES
Individual Follow-Up Form    2/6/2019    Quit Date: 1/23/19    Clinical Status of Patient: Outpatient    Length of Service: 30 minutes    Continuing Medication: yes  Patches    Other Medications:      Target Symptoms: Withdrawal and medication side effects. The following were  rated moderate (3) to severe (4) on TCRS:  · Moderate (3): itching; discussed changing from the patch to another aid due to the itching but patient wants to continue with the patch and stated it is tolerable just annoying.   · Severe (4): none    Comments: completion of TCRS (Tobacco Cessation Rating Scale) reviewed strategies, controlling environment, cues, triggers, new goals set. Introduced high risk situations with preparation interventions, caffeine similarities with withdrawal issues of habit and nicotine, Alcohol, Understanding urges, cravings, stress and relaxation. The patient is tobacco free as of 1/23/19 and is experiencing the following negative side effect: itching; discussed changing from the patch to another aid due to the itching but patient wants to continue with the patch and stated it is tolerable just annoying. Patient also discussed feeling a little listless since he is tobacco free. Discussed with the patient that patients often morn the loss of their tobacco and introduced interventions to help the patient. Reducing the dosage of the nicotine patch from 21 mg to 14 mg and will reassess next week. The patient denies any abnormal behavioral or mental changes at this time. The patient will continue with group therapy sessions and medication monitoring by CTTS. Prescribed medication management will be by physician.     Diagnosis: F17.210    Next Visit: 1 week

## 2019-02-06 NOTE — Clinical Note
Just a note to advise how the patient is progressing in the tobacco cessation program. The patient is tobacco free as of 1/23/19 and is experiencing the following negative side effect: itching; discussed changing from the patch to another aid due to the itching but patient wants to continue with the patch and stated it is tolerable just annoying. Patient also discussed feeling a little listless since he is tobacco free. Discussed with the patient that patients often morn the loss of their tobacco and introduced interventions to help the patient. Reducing the dosage of the nicotine patch from 21 mg to 14 mg and will reassess next week.

## 2019-02-13 ENCOUNTER — CLINICAL SUPPORT (OUTPATIENT)
Dept: SMOKING CESSATION | Facility: CLINIC | Age: 64
End: 2019-02-13
Payer: COMMERCIAL

## 2019-02-13 DIAGNOSIS — F17.210 CIGARETTE NICOTINE DEPENDENCE, UNCOMPLICATED: Primary | ICD-10-CM

## 2019-02-13 PROCEDURE — 99402 PR PREVENT COUNSEL,INDIV,30 MIN: ICD-10-PCS | Mod: S$GLB,,,

## 2019-02-13 PROCEDURE — 99999 PR PBB SHADOW E&M-EST. PATIENT-LVL I: ICD-10-PCS | Mod: PBBFAC,,,

## 2019-02-13 PROCEDURE — 99402 PREV MED CNSL INDIV APPRX 30: CPT | Mod: S$GLB,,,

## 2019-02-13 PROCEDURE — 99999 PR PBB SHADOW E&M-EST. PATIENT-LVL I: CPT | Mod: PBBFAC,,,

## 2019-02-13 NOTE — Clinical Note
Just a note to advise how the patient is progressing in the tobacco cessation program. The patient remains tobacco free as of 1/23/19 and is no longer on any prescribed tobacco cessation medication regimens at this time. Patient stopped the patches due to intolerable itching and is not experiencing any negative side effects at this time.

## 2019-02-13 NOTE — PROGRESS NOTES
Individual Follow-Up Form    2/13/2019    Quit Date: 1/23/19    Clinical Status of Patient: Outpatient    Length of Service: 30 minutes    Continuing Medication: no    Other Medications:      Target Symptoms: Withdrawal and medication side effects. The following were  rated moderate (3) to severe (4) on TCRS:  · Moderate (3): none  · Severe (4): none    Comments: completion of TCRS (Tobacco Cessation Rating Scale) reviewed strategies, habitual behavior, high risks situations, understanding urges and cravings, stress and relaxation with open discussion and additional interventions, Introduced lapses, relapses, understanding them and analyzing the situation of a lapse, conflict issues that may be linked to a lapse. The patient remains tobacco free as of 1/23/19 and is no longer on any prescribed tobacco cessation medication regimens at this time. Patient stopped the patches due to intolerable itching and is not experiencing any negative side effects at this time. The patient denies any abnormal behavioral or mental changes at this time.     Diagnosis: F17.210    Next Visit: Finished program

## 2019-02-15 RX ORDER — CILOSTAZOL 50 MG/1
50 TABLET ORAL 2 TIMES DAILY
Qty: 60 TABLET | Refills: 10 | Status: SHIPPED | OUTPATIENT
Start: 2019-02-15 | End: 2020-11-13 | Stop reason: SDUPTHER

## 2019-02-28 ENCOUNTER — OFFICE VISIT (OUTPATIENT)
Dept: RHEUMATOLOGY | Facility: CLINIC | Age: 64
End: 2019-02-28
Payer: COMMERCIAL

## 2019-02-28 ENCOUNTER — LAB VISIT (OUTPATIENT)
Dept: LAB | Facility: HOSPITAL | Age: 64
End: 2019-02-28
Attending: INTERNAL MEDICINE
Payer: COMMERCIAL

## 2019-02-28 VITALS
HEART RATE: 87 BPM | HEIGHT: 69 IN | SYSTOLIC BLOOD PRESSURE: 159 MMHG | BODY MASS INDEX: 26.02 KG/M2 | DIASTOLIC BLOOD PRESSURE: 98 MMHG | WEIGHT: 175.69 LBS

## 2019-02-28 DIAGNOSIS — Z79.60 LONG-TERM USE OF IMMUNOSUPPRESSANT MEDICATION: ICD-10-CM

## 2019-02-28 DIAGNOSIS — M05.79 RHEUMATOID ARTHRITIS INVOLVING MULTIPLE SITES WITH POSITIVE RHEUMATOID FACTOR: Primary | ICD-10-CM

## 2019-02-28 DIAGNOSIS — Z79.899 LONG-TERM USE OF HYDROXYCHLOROQUINE: ICD-10-CM

## 2019-02-28 DIAGNOSIS — R03.0 ELEVATED BLOOD PRESSURE READING: ICD-10-CM

## 2019-02-28 DIAGNOSIS — M05.79 RHEUMATOID ARTHRITIS INVOLVING MULTIPLE SITES WITH POSITIVE RHEUMATOID FACTOR: ICD-10-CM

## 2019-02-28 LAB
ALBUMIN SERPL BCP-MCNC: 3.6 G/DL
ALP SERPL-CCNC: 95 U/L
ALT SERPL W/O P-5'-P-CCNC: 24 U/L
ANION GAP SERPL CALC-SCNC: 7 MMOL/L
AST SERPL-CCNC: 26 U/L
BASOPHILS # BLD AUTO: 0.05 K/UL
BASOPHILS NFR BLD: 0.4 %
BILIRUB SERPL-MCNC: 0.5 MG/DL
BUN SERPL-MCNC: 18 MG/DL
CALCIUM SERPL-MCNC: 9.7 MG/DL
CHLORIDE SERPL-SCNC: 103 MMOL/L
CO2 SERPL-SCNC: 28 MMOL/L
CREAT SERPL-MCNC: 1.2 MG/DL
CRP SERPL-MCNC: 4.4 MG/L
DIFFERENTIAL METHOD: ABNORMAL
EOSINOPHIL # BLD AUTO: 0.2 K/UL
EOSINOPHIL NFR BLD: 1.7 %
ERYTHROCYTE [DISTWIDTH] IN BLOOD BY AUTOMATED COUNT: 13.5 %
ERYTHROCYTE [SEDIMENTATION RATE] IN BLOOD BY WESTERGREN METHOD: 14 MM/HR
EST. GFR  (AFRICAN AMERICAN): >60 ML/MIN/1.73 M^2
EST. GFR  (NON AFRICAN AMERICAN): >60 ML/MIN/1.73 M^2
GLUCOSE SERPL-MCNC: 96 MG/DL
HCT VFR BLD AUTO: 44.3 %
HGB BLD-MCNC: 14.3 G/DL
IMM GRANULOCYTES # BLD AUTO: 0.04 K/UL
IMM GRANULOCYTES NFR BLD AUTO: 0.3 %
LYMPHOCYTES # BLD AUTO: 1.8 K/UL
LYMPHOCYTES NFR BLD: 15.3 %
MCH RBC QN AUTO: 28 PG
MCHC RBC AUTO-ENTMCNC: 32.3 G/DL
MCV RBC AUTO: 87 FL
MONOCYTES # BLD AUTO: 0.9 K/UL
MONOCYTES NFR BLD: 7.3 %
NEUTROPHILS # BLD AUTO: 9 K/UL
NEUTROPHILS NFR BLD: 75 %
NRBC BLD-RTO: 0 /100 WBC
PLATELET # BLD AUTO: 234 K/UL
PMV BLD AUTO: 9.5 FL
POTASSIUM SERPL-SCNC: 3.7 MMOL/L
PROT SERPL-MCNC: 8.2 G/DL
RBC # BLD AUTO: 5.1 M/UL
SODIUM SERPL-SCNC: 138 MMOL/L
WBC # BLD AUTO: 12 K/UL

## 2019-02-28 PROCEDURE — 1101F PR PT FALLS ASSESS DOC 0-1 FALLS W/OUT INJ PAST YR: ICD-10-PCS | Mod: CPTII,S$GLB,, | Performed by: INTERNAL MEDICINE

## 2019-02-28 PROCEDURE — 85652 RBC SED RATE AUTOMATED: CPT

## 2019-02-28 PROCEDURE — 99214 PR OFFICE/OUTPT VISIT, EST, LEVL IV, 30-39 MIN: ICD-10-PCS | Mod: S$GLB,,, | Performed by: INTERNAL MEDICINE

## 2019-02-28 PROCEDURE — 36415 COLL VENOUS BLD VENIPUNCTURE: CPT

## 2019-02-28 PROCEDURE — 80053 COMPREHEN METABOLIC PANEL: CPT

## 2019-02-28 PROCEDURE — 3008F PR BODY MASS INDEX (BMI) DOCUMENTED: ICD-10-PCS | Mod: CPTII,S$GLB,, | Performed by: INTERNAL MEDICINE

## 2019-02-28 PROCEDURE — 85025 COMPLETE CBC W/AUTO DIFF WBC: CPT

## 2019-02-28 PROCEDURE — 99214 OFFICE O/P EST MOD 30 MIN: CPT | Mod: S$GLB,,, | Performed by: INTERNAL MEDICINE

## 2019-02-28 PROCEDURE — 3080F DIAST BP >= 90 MM HG: CPT | Mod: CPTII,S$GLB,, | Performed by: INTERNAL MEDICINE

## 2019-02-28 PROCEDURE — 3077F SYST BP >= 140 MM HG: CPT | Mod: CPTII,S$GLB,, | Performed by: INTERNAL MEDICINE

## 2019-02-28 PROCEDURE — 3077F PR MOST RECENT SYSTOLIC BLOOD PRESSURE >= 140 MM HG: ICD-10-PCS | Mod: CPTII,S$GLB,, | Performed by: INTERNAL MEDICINE

## 2019-02-28 PROCEDURE — 3008F BODY MASS INDEX DOCD: CPT | Mod: CPTII,S$GLB,, | Performed by: INTERNAL MEDICINE

## 2019-02-28 PROCEDURE — 86140 C-REACTIVE PROTEIN: CPT

## 2019-02-28 PROCEDURE — 99999 PR PBB SHADOW E&M-EST. PATIENT-LVL III: CPT | Mod: PBBFAC,,, | Performed by: INTERNAL MEDICINE

## 2019-02-28 PROCEDURE — 99999 PR PBB SHADOW E&M-EST. PATIENT-LVL III: ICD-10-PCS | Mod: PBBFAC,,, | Performed by: INTERNAL MEDICINE

## 2019-02-28 PROCEDURE — 1101F PT FALLS ASSESS-DOCD LE1/YR: CPT | Mod: CPTII,S$GLB,, | Performed by: INTERNAL MEDICINE

## 2019-02-28 PROCEDURE — 3080F PR MOST RECENT DIASTOLIC BLOOD PRESSURE >= 90 MM HG: ICD-10-PCS | Mod: CPTII,S$GLB,, | Performed by: INTERNAL MEDICINE

## 2019-02-28 ASSESSMENT — ROUTINE ASSESSMENT OF PATIENT INDEX DATA (RAPID3)
TOTAL RAPID3 SCORE: 3.67
AM STIFFNESS SCORE: 1, YES
FATIGUE SCORE: 4
PAIN SCORE: 4
PATIENT GLOBAL ASSESSMENT SCORE: 5
MDHAQ FUNCTION SCORE: .6
PSYCHOLOGICAL DISTRESS SCORE: 0

## 2019-02-28 NOTE — PROGRESS NOTES
Subjective:       Patient ID: Andrea Gant is a 64 y.o. male with nodular sero+ccp+ RA, OA and Hepatitis C (cured with Harvoni)    Chief Complaint: No chief complaint on file.    Returns for follow-up. Last seen 10/10/18. Continues doing very well with joints. Is taking  mg/d and Humira 40 mg/every 2 weeks. He denies any joint pains/swelling. Still with AM stiffness x 30 minutes. Denies dysphagia, tight skin, oral ulcers, pleurisy, pericarditis, sicca symptoms, photosensitivity, thromboses. Gives a hx of discoloration of several fingers of R hand in cold. Sleeping well on trazodone.    Has elevated BP again today. Did not f/u with his PCP as discussed at last visit.                    Pertinent negatives include no fatigue.         Current Outpatient Medications   Medication Sig Dispense Refill    adalimumab (HUMIRA) PnKt injection Inject 0.8 mLs (40 mg total) into the skin every 14 (fourteen) days. 4 pen 12    cilostazol (PLETAL) 50 MG Tab TAKE 1 TABLET BY MOUTH TWICE A DAY 60 tablet 10    cilostazol (PLETAL) 50 MG Tab Take 1 tablet (50 mg total) by mouth 2 (two) times daily. 60 tablet 11    cilostazol (PLETAL) 50 MG Tab Take 1 tablet (50 mg total) by mouth 2 (two) times daily. 60 tablet 10    doxazosin (CARDURA) 4 MG tablet TAKE 1 TABLET BY MOUTH ONCE EVERY EVENING 30 tablet 6    hydroxychloroquine (PLAQUENIL) 200 mg tablet TAKE 1 TABLET BY MOUTH TWICE A DAY 60 tablet 0    omeprazole (PRILOSEC) 40 MG capsule Take 40 mg by mouth every morning.      simvastatin (ZOCOR) 10 MG tablet Take 1 tablet (10 mg total) by mouth every evening. 90 tablet 6    traZODone (DESYREL) 50 MG tablet Take 3 tablets (150 mg total) by mouth nightly as needed for Insomnia. 90 tablet 3    aspirin (ECOTRIN) 81 MG EC tablet Take 1 tablet (81 mg total) by mouth once daily.  3    oxyCODONE (ROXICODONE) 5 MG immediate release tablet Take 1 tablet (5 mg total) by mouth every 4 (four) hours as needed for Pain. 30 tablet 0      No current facility-administered medications for this visit.    No longer on oxycodone      Probable Allergic to Enbrel (rash);     Past Medical History:   Diagnosis Date    Cataract     History of hepatitis C, s/p successful treatment w/ SVR12 - 7/2015 10/14/2012    Kelsey 1a,  Liver biopsy 6/2014 - Stage 1 fibrosis;  Completed 8 weeks Harvoni w/ SVR12 - 7/2015      Hyperlipidemia     Lipoma of arm     Lipoma of lower extremity     Nuclear sclerosis - Both Eyes 10/22/2012    Other and unspecified hyperlipidemia 10/22/2013    PAD (peripheral artery disease)     Peripheral vascular disease, unspecified     Rheumatoid arthritis(714.0) 10/14/2012       Past Surgical History:   Procedure Laterality Date    BIOPSY LIVER AND ULTRASOUND N/A 6/9/2014    Performed by [facility] Diagnostic Provider at [facility] OR 2ND FLR    COLONOSCOPY N/A 2/21/2014    Performed by Cesar Cardoza MD at [facility] ENDO (4TH FLR)    EXPLORATION, INGUINAL REGION, LAPAROSCOPIC Left 9/6/2018    Performed by Mingo De Guzman Jr., MD at [facility] OR 2ND FLR    KNEE ARTHROPLASTY      REPAIR,HERNIA,INGUINAL,LAPAROSCOPIC, RIGHT w/ mesh, eval left w/ possible repair Right 9/6/2018    Performed by Mingo De Guzman Jr., MD at [facility] OR 2ND FLR    TONSILLECTOMY         Review of Systems   Constitutional: Negative.  Negative for fatigue.   HENT: Negative.  Negative for hearing loss, mouth sores, sore throat and tinnitus.    Eyes: Negative.  Negative for redness and visual disturbance.   Respiratory: Negative for cough, choking, chest tightness and shortness of breath.    Cardiovascular: Negative.  Negative for chest pain, palpitations and leg swelling.   Gastrointestinal: Negative.  Negative for abdominal pain, blood in stool, constipation, diarrhea, nausea and vomiting.        Heartburn   Endocrine: Negative.    Genitourinary: Positive for frequency. Negative for hematuria and urgency.   Musculoskeletal: Negative.  Negative for back pain, neck pain  "and neck stiffness.   Skin: Negative.  Negative for rash.   Allergic/Immunologic: Negative.    Neurological: Negative.  Negative for dizziness, syncope and numbness.   Hematological: Negative.  Does not bruise/bleed easily.   Psychiatric/Behavioral: Negative.  Negative for dysphoric mood and sleep disturbance. The patient is not nervous/anxious.          SH: still not smoking was former smoker.  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    Objective:       BP (!) 159/98   Pulse 87   Ht 5' 9" (1.753 m)   Wt 79.7 kg (175 lb 11.3 oz)   BMI 25.95 kg/m²     Was 172 lbs 4.8 oz on 12/13/16;  Physical Exam   Vitals reviewed.  Constitutional: He is oriented to person, place, and time and well-developed, well-nourished, and in no distress. No distress.   HENT:   Head: Normocephalic and atraumatic.   Mouth/Throat: Oropharynx is clear and moist. No oropharyngeal exudate.   No facial rashes  Parotids not enlarged  No oral ulcers  Temporal aa ok;    Eyes: Conjunctivae and EOM are normal. Pupils are equal, round, and reactive to light. Right eye exhibits no discharge. Left eye exhibits no discharge. No scleral icterus.   Neck: Neck supple. No JVD present. No tracheal deviation present. No thyromegaly present.   Cardiovascular: Normal rate, regular rhythm, normal heart sounds and intact distal pulses.  Exam reveals no gallop and no friction rub.    No murmur heard.  Pulmonary/Chest: Effort normal and breath sounds normal. No respiratory distress. He has no wheezes. He has no rales. He exhibits no tenderness.   Abdominal: Soft. Bowel sounds are normal. He exhibits no distension and no mass. There is no splenomegaly or hepatomegaly. There is no tenderness. There is no rebound and no guarding.   Lymphadenopathy:     He has no cervical adenopathy.        Right: No inguinal adenopathy present.        Left: No inguinal adenopathy present.   Neurological: He is alert and oriented to person, place, and time. He has normal reflexes. No cranial nerve " deficit. Gait normal.   Proximal and distal muscle strength 5/5.   Skin: Skin is warm and dry. No rash noted. He is not diaphoretic.     Bilateral superficial varicosities LE;   Psychiatric: Mood, memory, affect and judgment normal.   Musculoskeletal: Normal range of motion. He exhibits no edema or tenderness.   Cspine FROM no tenderness  Tspine FROM no tenderness  Lspine FROM no tenderness.  TMJ: unremarkable  Shoulders: missing a few degrees of abduction on the L a few more on the R; no synovitis; no tenderness  Elbows: FROM;left elbow nodule, firm & non tender; right elbow with mild flexion contracture & elbow nodule.  Wrists: FROM; no synovitis  MCPs: FROM; no synovitis; no metacarpalgia;  ok;  PIPs:FROM; +Bouchards--servando 4th R PIP; non tender; no synovitis;  Good fists.  DIPs: FROM; + Heberden's; no synovitis  HIPS: FROM  Knees: FROM; no synovitis; no instability; minimal PF crepitus;  Ankles: FROM: no synovitis   Toes: ok; no metatarsalgia;                LABS:   10/10/18; ESR 7; CRP 4.1; CBC ; CMP cnne 1.3; GFR 57.7;   18: ESR 29; CRP; CBC ok; CMP ok; ; CCP 44;   17: ESR 23; CRP 4.2; CBC ok; CMP cnne 1.3; GFR 58.1;   3/26/16: CBC ok; cnne 1.3  3/10/16: ESR 9; CRP 1.7; CBC, CMP ok;  9/15/15: ESR 20; CRP 0.9; CBC ok; CMP ok;  3/2/15: CMP BUN 28; cnne 1.1  1/12/15: Hg 13.9  10/21/14: ESR 8;   14: ESR 19; CRP 2.9; Hg 12.8; cnne 1.3; Vit D 18; ; CCP 44.3   Hepatitis B & HIV negative; HCV+;     IMAGIN16: US Dopplers: R:minimal peripheral arterial occlusive disease: L: moderate peripheral arterial occlusive disease; unchanged  4/2/15: Bilateral wrists: personal review: cystic lesion left lunate, possibly left ulnar notch. (not too different from previous)  4/2/15: R ankle personally reviewed: ok  14: Arthritis survey personally reviewed again: OA lower CS; min OA hands & feet;      Assessment:   Seropositive (), CCP (44) positive nodular (bilateral elbows) rheumatoid  arthritis with no erosions--doing well,    Enbrel effective but resulted in rash. MTX and Leflunomide were contraindicated at the time due to HCV.   SSZ given ok by Dr. Moore, but never begun   On hydroxychloroquine 400 mg/d   Humira 40 mg qow since 6/15   Ok for use of MTX or Leflunomide now as per Dr. Moore    Possible Raynaud's    Bilateral adhesive capsulitis R>L--resolved    Elevated blood pressure    Mild renal insufficiency on meloxicam (cnne up to 1.3 from 1.0)   Off it now, but last cnne 1.3.    Hepatitis C with normal liver function tests--genotype 1a   Completed Harvoni; cured    Osteoarthritis of hands.     Recurrent hypovitaminosis D -treated in past    Peripheral Arterial Disease.    Compression deformity of the lower lumbar and thoracic vertebral bodies by x-ray.     Multiple lipomas of the arms and lumbar spine area.     Occupational injuries in the past.     Persistent insomnia.   Helped partly by trazodone.       Plan:   Discussed switching to MTX instead of humira but patient doing ok now and would like to continue.  Nevertheless, info provided on MTX (concerned about cost mostly --no paying $5 for humira; MTX would be more for him).  Stay on Humira 40 mg but try every 3rd week  Continue HCQ with eye exams  Needs yearly skin exams.  Stressed again.   Needs to see Dr. Wang for elevated BP. Stressed again.   Still needs imaging. Ordered an arthritis survey.   Keep body temperature warm & send us photo next time it occurs.   Labs today including EMI pro  RTC 6 months as per patient's request.

## 2019-03-01 ENCOUNTER — TELEPHONE (OUTPATIENT)
Dept: PHARMACY | Facility: CLINIC | Age: 64
End: 2019-03-01

## 2019-03-04 DIAGNOSIS — Z13.6 SCREENING FOR AAA (ABDOMINAL AORTIC ANEURYSM): ICD-10-CM

## 2019-03-04 DIAGNOSIS — I73.9 PAD (PERIPHERAL ARTERY DISEASE): Primary | ICD-10-CM

## 2019-03-06 ENCOUNTER — PATIENT MESSAGE (OUTPATIENT)
Dept: VASCULAR SURGERY | Facility: CLINIC | Age: 64
End: 2019-03-06

## 2019-03-06 RX ORDER — DOXAZOSIN 4 MG/1
TABLET ORAL
Qty: 30 TABLET | Refills: 4 | Status: SHIPPED | OUTPATIENT
Start: 2019-03-06 | End: 2019-06-09 | Stop reason: SDUPTHER

## 2019-03-13 ENCOUNTER — PATIENT MESSAGE (OUTPATIENT)
Dept: INTERNAL MEDICINE | Facility: CLINIC | Age: 64
End: 2019-03-13

## 2019-03-13 ENCOUNTER — OFFICE VISIT (OUTPATIENT)
Dept: INTERNAL MEDICINE | Facility: CLINIC | Age: 64
End: 2019-03-13
Payer: COMMERCIAL

## 2019-03-13 ENCOUNTER — TELEPHONE (OUTPATIENT)
Dept: VASCULAR SURGERY | Facility: CLINIC | Age: 64
End: 2019-03-13

## 2019-03-13 VITALS
SYSTOLIC BLOOD PRESSURE: 142 MMHG | HEART RATE: 80 BPM | BODY MASS INDEX: 24.84 KG/M2 | DIASTOLIC BLOOD PRESSURE: 82 MMHG | HEIGHT: 70 IN | OXYGEN SATURATION: 97 % | WEIGHT: 173.5 LBS

## 2019-03-13 DIAGNOSIS — E78.49 OTHER HYPERLIPIDEMIA: ICD-10-CM

## 2019-03-13 DIAGNOSIS — I10 HYPERTENSION, UNSPECIFIED TYPE: Primary | ICD-10-CM

## 2019-03-13 DIAGNOSIS — I73.9 PVD (PERIPHERAL VASCULAR DISEASE): ICD-10-CM

## 2019-03-13 DIAGNOSIS — Z98.890 HISTORY OF HERNIA SURGERY: ICD-10-CM

## 2019-03-13 DIAGNOSIS — M05.79 RHEUMATOID ARTHRITIS INVOLVING MULTIPLE SITES WITH POSITIVE RHEUMATOID FACTOR: ICD-10-CM

## 2019-03-13 DIAGNOSIS — I73.9 PAD (PERIPHERAL ARTERY DISEASE): ICD-10-CM

## 2019-03-13 DIAGNOSIS — R10.31 RIGHT INGUINAL PAIN: ICD-10-CM

## 2019-03-13 DIAGNOSIS — Z87.19 HISTORY OF HERNIA SURGERY: ICD-10-CM

## 2019-03-13 PROCEDURE — 1101F PT FALLS ASSESS-DOCD LE1/YR: CPT | Mod: CPTII,S$GLB,, | Performed by: INTERNAL MEDICINE

## 2019-03-13 PROCEDURE — 99999 PR PBB SHADOW E&M-EST. PATIENT-LVL III: CPT | Mod: PBBFAC,,, | Performed by: INTERNAL MEDICINE

## 2019-03-13 PROCEDURE — 99999 PR PBB SHADOW E&M-EST. PATIENT-LVL III: ICD-10-PCS | Mod: PBBFAC,,, | Performed by: INTERNAL MEDICINE

## 2019-03-13 PROCEDURE — 3008F PR BODY MASS INDEX (BMI) DOCUMENTED: ICD-10-PCS | Mod: CPTII,S$GLB,, | Performed by: INTERNAL MEDICINE

## 2019-03-13 PROCEDURE — 3077F PR MOST RECENT SYSTOLIC BLOOD PRESSURE >= 140 MM HG: ICD-10-PCS | Mod: CPTII,S$GLB,, | Performed by: INTERNAL MEDICINE

## 2019-03-13 PROCEDURE — 99214 PR OFFICE/OUTPT VISIT, EST, LEVL IV, 30-39 MIN: ICD-10-PCS | Mod: S$GLB,,, | Performed by: INTERNAL MEDICINE

## 2019-03-13 PROCEDURE — 3079F PR MOST RECENT DIASTOLIC BLOOD PRESSURE 80-89 MM HG: ICD-10-PCS | Mod: CPTII,S$GLB,, | Performed by: INTERNAL MEDICINE

## 2019-03-13 PROCEDURE — 99214 OFFICE O/P EST MOD 30 MIN: CPT | Mod: S$GLB,,, | Performed by: INTERNAL MEDICINE

## 2019-03-13 PROCEDURE — 3077F SYST BP >= 140 MM HG: CPT | Mod: CPTII,S$GLB,, | Performed by: INTERNAL MEDICINE

## 2019-03-13 PROCEDURE — 1101F PR PT FALLS ASSESS DOC 0-1 FALLS W/OUT INJ PAST YR: ICD-10-PCS | Mod: CPTII,S$GLB,, | Performed by: INTERNAL MEDICINE

## 2019-03-13 PROCEDURE — 3008F BODY MASS INDEX DOCD: CPT | Mod: CPTII,S$GLB,, | Performed by: INTERNAL MEDICINE

## 2019-03-13 PROCEDURE — 3079F DIAST BP 80-89 MM HG: CPT | Mod: CPTII,S$GLB,, | Performed by: INTERNAL MEDICINE

## 2019-03-13 RX ORDER — AMLODIPINE BESYLATE 5 MG/1
5 TABLET ORAL DAILY
Qty: 30 TABLET | Refills: 12 | Status: SHIPPED | OUTPATIENT
Start: 2019-03-13 | End: 2020-03-25 | Stop reason: SDUPTHER

## 2019-03-13 NOTE — PROGRESS NOTES
Subjective:       Patient ID: Andrea Gant is a 64 y.o. male.    Chief Complaint: Blood Pressure Check (1 week)    HPI:  Patient with rheumatoid arthritis found to have persistent elevated blood pressure and given dyslipidemia and PA Christofer rheumatologist recommended he see me medicine.  We discussed this at length including benefits risks and side effects we decided to start a medicine.  I have asked him to check his readings periodically so we can document improvement.      Review of Systems   Constitutional: Negative for chills, fatigue, fever and unexpected weight change.   HENT: Negative for nosebleeds and trouble swallowing.    Eyes: Negative for pain and visual disturbance.   Respiratory: Negative for cough, shortness of breath and wheezing.    Cardiovascular: Negative for chest pain and palpitations.        Elevated pressure   Gastrointestinal: Negative for abdominal pain, constipation, diarrhea, nausea and vomiting.   Genitourinary: Negative for difficulty urinating and hematuria.   Musculoskeletal: Positive for arthralgias and joint swelling. Negative for neck pain.   Skin: Negative for rash.   Neurological: Negative for dizziness and headaches.   Hematological: Does not bruise/bleed easily.   Psychiatric/Behavioral: Negative for dysphoric mood, sleep disturbance and suicidal ideas.       Objective:      Physical Exam   Constitutional: He is oriented to person, place, and time. He appears well-developed and well-nourished. No distress.   HENT:   Head: Normocephalic and atraumatic.   Mouth/Throat: Oropharynx is clear and moist. No oropharyngeal exudate.   TM's clear, pharynx clear   Eyes: Conjunctivae and EOM are normal. Pupils are equal, round, and reactive to light. No scleral icterus.   Neck: Normal range of motion. Neck supple. No thyromegaly present.   No supraclavicular nodes palpated   Cardiovascular: Normal rate, regular rhythm and normal heart sounds.   No murmur heard.  Pulmonary/Chest: Effort  normal and breath sounds normal. He has no wheezes.   Abdominal: Soft. Bowel sounds are normal. He exhibits no mass. There is no tenderness.   Musculoskeletal: He exhibits deformity (arthritis). He exhibits no edema.   Lymphadenopathy:     He has no cervical adenopathy.   Neurological: He is alert and oriented to person, place, and time.   Skin: No rash noted. No erythema. No pallor.   Psychiatric: He has a normal mood and affect. His behavior is normal.       Assessment:       1. Hypertension, unspecified type    2. PAD (peripheral artery disease)    3. PVD (peripheral vascular disease)    4. Rheumatoid arthritis involving multiple sites with positive rheumatoid factor    5. Other hyperlipidemia    6. Right inguinal pain    7. History of hernia surgery        Plan:       Andrea was seen today for blood pressure check.    Diagnoses and all orders for this visit:    Hypertension, unspecified type    PAD (peripheral artery disease)    PVD (peripheral vascular disease)    Rheumatoid arthritis involving multiple sites with positive rheumatoid factor    Other hyperlipidemia    Right inguinal pain  -     Ambulatory referral to General Surgery    History of hernia surgery  -     Ambulatory referral to General Surgery    Other orders  -     amLODIPine (NORVASC) 5 MG tablet; Take 1 tablet (5 mg total) by mouth once daily.          Follow-up 3-6 weeks

## 2019-03-18 ENCOUNTER — OFFICE VISIT (OUTPATIENT)
Dept: SURGERY | Facility: CLINIC | Age: 64
End: 2019-03-18
Payer: COMMERCIAL

## 2019-03-18 VITALS
HEART RATE: 77 BPM | HEIGHT: 69 IN | TEMPERATURE: 98 F | WEIGHT: 174.94 LBS | BODY MASS INDEX: 25.91 KG/M2 | DIASTOLIC BLOOD PRESSURE: 70 MMHG | SYSTOLIC BLOOD PRESSURE: 146 MMHG

## 2019-03-18 DIAGNOSIS — R10.31 RIGHT GROIN PAIN: Primary | ICD-10-CM

## 2019-03-18 PROCEDURE — 1101F PT FALLS ASSESS-DOCD LE1/YR: CPT | Mod: CPTII,S$GLB,, | Performed by: SURGERY

## 2019-03-18 PROCEDURE — 3078F DIAST BP <80 MM HG: CPT | Mod: CPTII,S$GLB,, | Performed by: SURGERY

## 2019-03-18 PROCEDURE — 3077F SYST BP >= 140 MM HG: CPT | Mod: CPTII,S$GLB,, | Performed by: SURGERY

## 2019-03-18 PROCEDURE — 3008F PR BODY MASS INDEX (BMI) DOCUMENTED: ICD-10-PCS | Mod: CPTII,S$GLB,, | Performed by: SURGERY

## 2019-03-18 PROCEDURE — 99212 PR OFFICE/OUTPT VISIT, EST, LEVL II, 10-19 MIN: ICD-10-PCS | Mod: S$GLB,,, | Performed by: SURGERY

## 2019-03-18 PROCEDURE — 99999 PR PBB SHADOW E&M-EST. PATIENT-LVL III: ICD-10-PCS | Mod: PBBFAC,,, | Performed by: SURGERY

## 2019-03-18 PROCEDURE — 3078F PR MOST RECENT DIASTOLIC BLOOD PRESSURE < 80 MM HG: ICD-10-PCS | Mod: CPTII,S$GLB,, | Performed by: SURGERY

## 2019-03-18 PROCEDURE — 99999 PR PBB SHADOW E&M-EST. PATIENT-LVL III: CPT | Mod: PBBFAC,,, | Performed by: SURGERY

## 2019-03-18 PROCEDURE — 3077F PR MOST RECENT SYSTOLIC BLOOD PRESSURE >= 140 MM HG: ICD-10-PCS | Mod: CPTII,S$GLB,, | Performed by: SURGERY

## 2019-03-18 PROCEDURE — 1101F PR PT FALLS ASSESS DOC 0-1 FALLS W/OUT INJ PAST YR: ICD-10-PCS | Mod: CPTII,S$GLB,, | Performed by: SURGERY

## 2019-03-18 PROCEDURE — 3008F BODY MASS INDEX DOCD: CPT | Mod: CPTII,S$GLB,, | Performed by: SURGERY

## 2019-03-18 PROCEDURE — 99212 OFFICE O/P EST SF 10 MIN: CPT | Mod: S$GLB,,, | Performed by: SURGERY

## 2019-03-18 NOTE — LETTER
March 18, 2019      Andrea Wang MD  [address]  [place] [address]           [facility] - General Surgery  [address]  [facility] [address]  Phone: [phone]          Patient: Andrea Gant   MR Number: [id number]   YOB: 1955   Date of Visit: 3/18/2019       Dear Dr. Andrea Wang:    Thank you for referring Andrea Gant to me for evaluation. Attached you will find relevant portions of my assessment and plan of care.    If you have questions, please do not hesitate to call me. I look forward to following Andrea Gant along with you.    Sincerely,    Mingo De Guzman Jr., MD    Enclosure  CC:  No Recipients    If you would like to receive this communication electronically, please contact [email or web address] or [phone] to request more information on [employer] Link access.    For providers and/or their staff who would like to refer a patient to [facility], please contact us through our one-stop-shop provider referral line, [facility], at [phone].    If you feel you have received this communication in error or would no longer like to receive these types of communications, please e-mail [email or web address]

## 2019-03-18 NOTE — PROGRESS NOTES
"History & Physical  Surgery      SUBJECTIVE:     Chief Complaint/Reason for Admission: Discomfort in right groin    History of Present Illness: Andrea Gant is a 64 y.o. male with HLD, PAD s/p lap right inguinal hernia repair with mesh 09/06/18 who presents to clinic today after having undergone his yearly physical with his PCP. He noted that in his right groin and testicle he exerpiences pressure and discomfort with palpation. He has noted any recurrent bulge like prior to surgery. Yesterday, he felt a "twinge" in his right groin/testicle upon lifting a 30-40 lbs object. Denies shooting pains, constipation, diarrhea, abdominal pain, decreased urinary frequency, nausea, vomiting, and skin changes. He mentioned this sensation to his PCP who referred him for evaluation.      Current Outpatient Medications on File Prior to Visit   Medication Sig    adalimumab (HUMIRA) PnKt injection Inject 0.8 mLs (40 mg total) into the skin every 14 (fourteen) days.    amLODIPine (NORVASC) 5 MG tablet Take 1 tablet (5 mg total) by mouth once daily.    cilostazol (PLETAL) 50 MG Tab TAKE 1 TABLET BY MOUTH TWICE A DAY    cilostazol (PLETAL) 50 MG Tab Take 1 tablet (50 mg total) by mouth 2 (two) times daily.    cilostazol (PLETAL) 50 MG Tab Take 1 tablet (50 mg total) by mouth 2 (two) times daily.    doxazosin (CARDURA) 4 MG tablet TAKE 1 TABLET BY MOUTH ONCE EVERY EVENING    hydroxychloroquine (PLAQUENIL) 200 mg tablet TAKE 1 TABLET BY MOUTH TWICE A DAY    omeprazole (PRILOSEC) 40 MG capsule Take 40 mg by mouth every morning.    simvastatin (ZOCOR) 10 MG tablet Take 1 tablet (10 mg total) by mouth every evening.    traZODone (DESYREL) 50 MG tablet Take 3 tablets (150 mg total) by mouth nightly as needed for Insomnia.    aspirin (ECOTRIN) 81 MG EC tablet Take 1 tablet (81 mg total) by mouth once daily.    oxyCODONE (ROXICODONE) 5 MG immediate release tablet Take 1 tablet (5 mg total) by mouth every 4 (four) hours as " needed for Pain.     No current facility-administered medications on file prior to visit.        Review of patient's allergies indicates:  No Known Allergies    Past Medical History:   Diagnosis Date    Cataract     History of hepatitis C, s/p successful treatment w/ SVR - 2015 10/14/2012    Kelsey 1a,  Liver biopsy 2014 - Stage 1 fibrosis;  Completed 8 weeks Harvoni w/ SVR - 2015      Hyperlipidemia     Lipoma of arm     Lipoma of lower extremity     Nuclear sclerosis - Both Eyes 10/22/2012    Other and unspecified hyperlipidemia 10/22/2013    PAD (peripheral artery disease)     Peripheral vascular disease, unspecified     Rheumatoid arthritis(714.0) 10/14/2012     Past Surgical History:   Procedure Laterality Date    BIOPSY LIVER AND ULTRASOUND N/A 2014    Performed by [facility] Provider at [facility] OR 2ND FLR    COLONOSCOPY N/A 2014    Performed by Cesar Cardoza MD at [facility] ENDO (4TH FLR)    EXPLORATION, INGUINAL REGION, LAPAROSCOPIC Left 2018    Performed by Mingo De Guzman Jr., MD at [facility] OR 2ND FLR    KNEE ARTHROPLASTY      REPAIR,HERNIA,INGUINAL,LAPAROSCOPIC, RIGHT w/ mesh, eval left w/ possible repair Right 2018    Performed by Mingo De Guzman Jr., MD at [facility] OR 2ND FLR    TONSILLECTOMY       Family History   Problem Relation Age of Onset    Heart disease Paternal Uncle     Dementia Mother     Heart disease Sister     Liver disease Neg Hx     Amblyopia Neg Hx     Blindness Neg Hx     Cancer Neg Hx     Cataracts Neg Hx     Diabetes Neg Hx     Glaucoma Neg Hx     Hypertension Neg Hx     Macular degeneration Neg Hx     Retinal detachment Neg Hx     Strabismus Neg Hx     Stroke Neg Hx     Thyroid disease Neg Hx      Social History     Tobacco Use    Smoking status: Former Smoker     Types: Cigarettes     Last attempt to quit: 2019     Years since quittin.1    Smokeless tobacco: Never Used   Substance Use Topics    Alcohol use:  Yes     Comment: 3-4 drinks per week    Drug use: Yes     Types: Marijuana     Comment: ann        Review of Systems   Constitutional: Negative for chills, diaphoresis and fever.   HENT: Negative for congestion and sore throat.    Respiratory: Negative for shortness of breath and wheezing.    Cardiovascular: Negative for chest pain and palpitations.   Gastrointestinal: Negative for abdominal distention, abdominal pain, constipation, nausea and vomiting.   Genitourinary: Negative for difficulty urinating and frequency.   Musculoskeletal: Negative for arthralgias and myalgias.   Skin: Negative for color change and pallor.   Neurological: Negative for dizziness and light-headedness.   Psychiatric/Behavioral: Negative for confusion and hallucinations.     OBJECTIVE:     Vital Signs (Most Recent)  Temp: 98.1 °F (36.7 °C) (03/18/19 1415)  Pulse: 77 (03/18/19 1415)  BP: (!) 146/70 (03/18/19 1415)    Physical Exam   Constitutional: He is oriented to person, place, and time. He appears well-developed and well-nourished. No distress.   HENT:   Head: Normocephalic and atraumatic.   Eyes: Conjunctivae and EOM are normal.   Neck: Normal range of motion. Neck supple.   Cardiovascular: Normal rate and intact distal pulses.   Pulmonary/Chest: Effort normal. No respiratory distress.   Abdominal: Soft. He exhibits no distension. There is no tenderness.   Well healed laparoscopic incisions   Genitourinary: Penis normal.   Genitourinary Comments: No bulge palpated on R inguinal exam supine or standing   Musculoskeletal: Normal range of motion. He exhibits no edema.   Neurological: He is alert and oriented to person, place, and time.   Skin: Skin is warm and dry.   No right inguinal skin changes   Psychiatric: He has a normal mood and affect. Thought content normal.   Vitals reviewed.    Laboratory  none    Diagnostic Results:  none    ASSESSMENT/PLAN:     A/P:  64 y.o. male with HLD, PAD s/p lap right inguinal hernia repair with  mesh 09/06/18 who presents to clinic for evaluation of a pressure like discomfort in his right groin.    - no intervention at this time  - symptoms likely secondary to expected post operative changes  - call with questions or concerns

## 2019-03-28 ENCOUNTER — OFFICE VISIT (OUTPATIENT)
Dept: VASCULAR SURGERY | Facility: CLINIC | Age: 64
End: 2019-03-28
Payer: COMMERCIAL

## 2019-03-28 ENCOUNTER — HOSPITAL ENCOUNTER (OUTPATIENT)
Dept: VASCULAR SURGERY | Facility: CLINIC | Age: 64
Discharge: HOME | End: 2019-03-28
Attending: SURGERY
Payer: COMMERCIAL

## 2019-03-28 VITALS
HEART RATE: 74 BPM | BODY MASS INDEX: 25.14 KG/M2 | HEIGHT: 69 IN | SYSTOLIC BLOOD PRESSURE: 145 MMHG | DIASTOLIC BLOOD PRESSURE: 95 MMHG | TEMPERATURE: 99 F | WEIGHT: 169.75 LBS

## 2019-03-28 DIAGNOSIS — I73.9 PAD (PERIPHERAL ARTERY DISEASE): ICD-10-CM

## 2019-03-28 DIAGNOSIS — I73.9 PERIPHERAL VASCULAR DISEASE: Primary | ICD-10-CM

## 2019-03-28 DIAGNOSIS — Z13.6 SCREENING FOR AAA (ABDOMINAL AORTIC ANEURYSM): ICD-10-CM

## 2019-03-28 DIAGNOSIS — I71.40 ABDOMINAL AORTIC ANEURYSM WITHOUT RUPTURE: ICD-10-CM

## 2019-03-28 PROCEDURE — 1101F PT FALLS ASSESS-DOCD LE1/YR: CPT | Mod: CPTII,S$GLB,, | Performed by: SURGERY

## 2019-03-28 PROCEDURE — 93978 PR DUPLEX LARGE VESSEL(S),COMPLETE: ICD-10-PCS | Mod: S$GLB,,, | Performed by: SURGERY

## 2019-03-28 PROCEDURE — 3080F PR MOST RECENT DIASTOLIC BLOOD PRESSURE >= 90 MM HG: ICD-10-PCS | Mod: CPTII,S$GLB,, | Performed by: SURGERY

## 2019-03-28 PROCEDURE — 3077F SYST BP >= 140 MM HG: CPT | Mod: CPTII,S$GLB,, | Performed by: SURGERY

## 2019-03-28 PROCEDURE — 99999 PR PBB SHADOW E&M-EST. PATIENT-LVL III: CPT | Mod: PBBFAC,,, | Performed by: SURGERY

## 2019-03-28 PROCEDURE — 93923 PR NON-INVASIVE PHYSIOLOGIC STUDY EXTREMITY 3 LEVELS: ICD-10-PCS | Mod: S$GLB,,, | Performed by: SURGERY

## 2019-03-28 PROCEDURE — 1101F PR PT FALLS ASSESS DOC 0-1 FALLS W/OUT INJ PAST YR: ICD-10-PCS | Mod: CPTII,S$GLB,, | Performed by: SURGERY

## 2019-03-28 PROCEDURE — 93978 VASCULAR STUDY: CPT | Mod: S$GLB,,, | Performed by: SURGERY

## 2019-03-28 PROCEDURE — 3080F DIAST BP >= 90 MM HG: CPT | Mod: CPTII,S$GLB,, | Performed by: SURGERY

## 2019-03-28 PROCEDURE — 93923 UPR/LXTR ART STDY 3+ LVLS: CPT | Mod: S$GLB,,, | Performed by: SURGERY

## 2019-03-28 PROCEDURE — 99999 PR PBB SHADOW E&M-EST. PATIENT-LVL III: ICD-10-PCS | Mod: PBBFAC,,, | Performed by: SURGERY

## 2019-03-28 PROCEDURE — 3077F PR MOST RECENT SYSTOLIC BLOOD PRESSURE >= 140 MM HG: ICD-10-PCS | Mod: CPTII,S$GLB,, | Performed by: SURGERY

## 2019-03-28 PROCEDURE — 3008F BODY MASS INDEX DOCD: CPT | Mod: CPTII,S$GLB,, | Performed by: SURGERY

## 2019-03-28 PROCEDURE — 99214 OFFICE O/P EST MOD 30 MIN: CPT | Mod: S$GLB,,, | Performed by: SURGERY

## 2019-03-28 PROCEDURE — 99214 PR OFFICE/OUTPT VISIT, EST, LEVL IV, 30-39 MIN: ICD-10-PCS | Mod: S$GLB,,, | Performed by: SURGERY

## 2019-03-28 PROCEDURE — 3008F PR BODY MASS INDEX (BMI) DOCUMENTED: ICD-10-PCS | Mod: CPTII,S$GLB,, | Performed by: SURGERY

## 2019-03-28 NOTE — PATIENT INSTRUCTIONS
Abdominal Aortic Aneurysm (Stable)    The aorta is the bodys main artery that carries oxygen-rich blood from the heart. It travels from the heart down to the lower abdomen, where it divides into smaller blood vessels. An aneurysm is a bulge or dilatation in the wall of an artery, in this example, the aorta. This happens because there is a weakened spot in the artery wall causing that area to begin to deteriorate. This allows the artery to bulge or balloon out creating the aneurysm. It may remain stable and cause no problems, or it can expand and lengthen. If this happens, it can affect the blood flow to different organs. It may also leak or rupture (break open) and cause internal bleeding and even death.    A number of things can cause aortic aneurysms including:  · Atherosclerosis  · Hypertension  · Injury  · Infection  · Marfan syndrome (An inherited condition that most commonly affects the heart, eyes, blood vessels, and skeleton.)  Risk factors that have been associated with aortic aneurysms include:  · Atherosclerosis  · Hypertension  · Older age  · Family history  · Elevated cholesterol  · Obesity  · Tobacco use  · Men more than women  Most aortic aneurysms do not cause any symptoms until they begin to expand rapidly or rupture. Therefore, most aneurysms are discovered on exams or tests done for other reasons (like an X-ray, ultrasound or CT scan). When there are symptoms, they may be vague, or they can include:  · Deep, steady pain in the abdomen and back  · Pulsating feeling in your abdomen  · Weakness  · Dizziness, fainting  · Low blood pressure  Once there are symptoms, it is important that it be taken care of. An expanding aneurysm causes symptoms of abdomen, back, flank or groin pain, which may come and go at first, or become constant. When an aneurysm ruptures, there can be sudden abdominal, back or groin pain, followed by weakness, dizziness and loss of consciousness as blood pressure drops and a  shock state occurs. This is a fatal condition unless immediate surgery is performed.  Small aneurysms rarely rupture and can often be treated with medicines to lower blood pressure and reduce stress on the aortic wall. Routine ultrasound or CT scans can determine if the aneurysm is growing. Larger or expanding aneurysms will require surgery. Surgical treatment involves removing the section of aorta where the aneurysm is and replacing it with an aortic graft (artificial blood vessel). A newer alternative to surgery, which can be used in certain cases, involves the placement of a stent (tubular wire mesh) inside the aorta to support the wall and reduce stress on the aneurysm. Rarely, a blood clot can form inside of an aortic aneurysm with no symptoms. A piece of the clot can break off and pass to smaller blood vessels in the intestines or legs and cause pain and loss of blood flow to that part.  If a small aneurysm has been identified, which does not require surgery, you should still change any lifestyle factors that may improve your overall cardiovascular health. This includes such things as following a healthy diet, losing weight, stopping smoking, and lowering your cholesterol.  Home care  · Your aneurysm is small and does not require surgery; you will be followed along as an outpatient with routine ultrasound screening exams to measure the size of the aneurysm every 6 months.  · You may return to your usual level of activity.  · Follow these guidelines to improve your cardiac health:  ¨ If you are overweight, begin a weight loss program.  ¨ If you have hypertension, reduce your salt intake. Avoid high salt foods and dont add salt when cooking.  ¨ Begin an exercise program. Discuss with your doctor what type of exercise program would be best for you. It doesn't have to be difficult. Even brisk walking for 20 minutes three times a week is a good form of exercise.  ¨ Avoid medicines containing stimulants. This  includes many cold and sinus decongestant pills and sprays as well as diet pills. Check the warnings about hypertension on the label. Stimulants such as amphetamine or cocaine could be lethal for someone with hypertension. Never take these.  ¨ Limit your caffeine intake or switch to caffeine-free products.  ¨ Stop smoking. No matter how long you've smoked, quitting can be hard. Enroll in a stop-smoking program to improve your chance of success.  · Learning how to handle stress better is an important part of any program to lower blood pressure. Learn about relaxation methods such as meditation, yoga, or biofeedback.  · If medicines were prescribed for hypertension, take them exactly as directed. Missing doses may cause your blood pressure get out of control.  · Consider buying an automatic blood pressure machine (available at most pharmacies). Use this to monitor your blood pressure at home and report the results to your doctor.  Follow up care  Regular visits to your healthcare provider for blood pressure checks and periodic ultrasounds of the aorta are an important part of your care. Make a follow-up appointment with your doctor, or as directed by our staff.  When to seek medical advice   Call your healthcare provider if any of the following occur:  · Sudden severe abdominal, back, flank or groin pain  · Blood in your stools  · Weakness or dizziness  · Weakness, numbness, pain or coolness of one leg  Call 911  The symptoms of a heart attack or stroke can be life threatening. If you see or have any of the following symptoms, call 911 right away:   · Trouble breathing  · Confused or difficulty arousing  · Fainting or loss of consciousness  · Rapid heart rate  · New chest, arm, shoulder, neck or upper back pain  · Difficulty with speech or vision, weakness of an arm or leg  · Difficulty walking or talking, loss of balance, numbness or weakness in one side of you body, facial droop  Date Last Reviewed: 9/25/2015  ©  1938-5990 [employer]. [address] [place] [address], [place] [address]. All rights reserved. This information is not intended as a substitute for professional medical care. Always follow your healthcare professional's instructions.          After Endovascular Abdominal Aortic Aneurysm Repair  You have had a procedure to repair an abdominal aortic aneurysm (AAA), which happened when a weakened part of a blood vessel in your abdominal area expanded like a balloon. During an endovascular repair, your healthcare provider created two small incisions near your groin. A thin, flexible tube (catheter) was threaded into the artery at the incision. A graft was placed inside the catheter and guided toward the damaged part of your aorta to prevent more problems.  Home care  Recommendations for taking care of yourself at home include:   · Avoid strenuous activity for 7 to 10 days after your surgery.  · Ask your healthcare provider when you can expect to return to work.  · Gradually increase your activity. It may take some time for you to return to your normal activities.  · Dont drive for 2 weeks after surgery, especially if you are still taking opioid pain medicines. Ask someone to take you to any appointments.  · Check your incision every day for signs of infection. These include swelling, redness, drainage, or warmth.  · Keep your incision clean. Wash it gently with soap and water while you shower.  · Dont swim or use a hot tub until your healthcare provider says it is OK.  · Dont lift anything heavier than 5 pounds for 4 weeks after surgery.  · Avoid sitting or standing for long periods without moving your legs and feet.  · Keep your feet up when you sit in a chair.  · Take your medicines exactly as directed. Dont skip doses.  When to call your healthcare provider  Call your healthcare provider right away if you have any of the following:  · Redness, pain, swelling, or drainage from your incision  · Fever of  100.4°F (38°C) or higher, or as directed by your healthcare provider  · Sudden coldness, pain, or paleness in your leg  · Loss of feeling in your legs  · Severe or sudden stomach pain  · Nausea or vomiting  · Trouble breathing  · Pain or heaviness in your chest or arms  · Any unusual bleeding  · Unable to urinate  · Bloody bowel movements or bloody diarrhea  Follow-up  · Make a follow-up appointment to have your incisions checked and staples removed within 7 to 10 days.  · Make follow-up appointments as directed.  Date Last Reviewed: 5/1/2016  © 8254-6705 [employer]. [address] [place] [address], [place] [address]. All rights reserved. This information is not intended as a substitute for professional medical care. Always follow your healthcare professional's instructions.          Taking Aspirin for Atherosclerosis       Aspirin is a medicine often prescribed to treat atherosclerosis. This condition affects your arteries. These are the blood vessels that carry blood away from your heart. Having atherosclerosis means youre at greater risk of a heart attack or stroke. Aspirin can help prevent these from happening.  How atherosclerosis affects your arteries   A fatty material (plaque) can build up in your arteries. This makes it harder for blood to flow through them. A blood clot can then form on the plaque. This may block the artery, cutting off blood flow. This can cause conditions such as coronary artery disease (CAD) and peripheral arterial disease (PAD):  · CAD occurs when plaque builds up in the coronary artery. This artery supplies the heart with oxygen-rich blood.  · PAD occurs when plaque forms in leg arteries.  The same things that cause CAD and PAD can also cause plaque to form in other arteries in your body, such as those in the brain. When plaque occurs in any of these arteries, it raises your risk of heart attack or stroke.  What aspirin does  Aspirin is a blood-thinner (antiplatelet medicine).  It helps keep blood clots from forming. This reduces the risk of blockage. Aspirin can be taken daily if you are at high risk of or have already had a heart attack or stroke. It is also used after a procedure called a stent placement. This is when a tiny wire mesh tube, or stent, is placed in an artery to keep it open. Aspirin helps prevent blood clots from forming on the stent.  Taking aspirin safely  Tell your healthcare provider about any medicines you are taking. This includes:  · All prescription medicines  · Over-the-counter medicines  · Herbs, vitamins, and other supplements  Also mention if you have a history of ulcers or bleeding problems. Ask whether you will need to stop taking aspirin before having surgery or dental work. Always take medicines as directed.  Tips for taking aspirin  · Have a routine. For example, take aspirin with the same meal each day.  · Dont take more than prescribed. A low dose gives the same benefit as a higher one, with a lower risk of side effects.  · Dont skip doses. Aspirin needs to be taken each day to work well.  · Keep track of what you take. A pillbox with days of the week can help, especially if you take several medicines. Or use a list or chart to keep track.  When to call your healthcare provider  Side effects of aspirin are not usually serious. If you do have problems, changing your dose may help. Call your healthcare provider if you have any of the following:  · Excessive bruising (some bruising is normal)  · Nosebleeds, bleeding gums, or other excessive bleeding  · An upset stomach or stomach pain   Date Last Reviewed: 6/1/2016  © 6245-1641 [employer]. [address] [place] [address], [place] [address]. All rights reserved. This information is not intended as a substitute for professional medical care. Always follow your healthcare professional's instructions.          A Walking Program for Peripheral Arterial Disease (PAD)  Peripheral arterial disease (PAD) is  a condition where the arteries in the legs are severely damaged. When you have PAD, walking can be painful. So you may start to walk less. Walking less makes your leg muscles weaker. It then becomes harder and more painful to walk. A walking program can break this cycle. This sheet gives you tips on how to get started.     Walking farther without pain  Exercise strengthens leg muscles that are out of shape. It also trains these muscles to work with less oxygen. This helps your leg muscles work better even with reduced blood flow to your legs. When you have PAD, a walking program can be helpful. Your program can:  · Let you walk longer and farther without claudication. This is an ache in your legs during exercise that goes away with rest.  · Let you do more and be more active  · Add to your overall health and well-being  · Help you control your blood sugar and blood pressure  · Help you become healthier with no risk and at little or no cost  Getting started  Your local hospital, vascular center, or cardiac rehab center may have a special walking program for people with PAD. If so, this is your best option. But if you cant find a program, or its not covered by insurance, you can still walk on your own. Follow these steps at each session:  · Step 1. Start at a pace that lets you walk for 5 to 10 minutes before you start to feel claudication. This feeling is unpleasant, but it doesnt hurt you. Keep going until the pain makes you feel that you need to stop.  · Step 2. Stop and rest for 3 to 5 minutes, just long enough for the pain to go away. You can rest standing or sitting. (Some people like to bring along a cane or a lightweight folding chair.)  · Step 3. Again walk at a pace that lets you walk for 5 to 10 minutes more before you feel pain. This may be slower than your starting pace in step 1. Then rest again.  · Step 4. Repeat this process until youve walked for 45 minutes. This should be about 60 to 80 minutes  total, including resting time. You may not be able to do a full 45 minutes at first. Do as much as you can, and increase your time as you improve.  Making the most of your program  · Walk at least 3 times a week. Take no more than 2 days off between sessions. If you stop walking, even for a week or two, you can lose the health benefits of your program.  · Find a good place to walk. A treadmill or a track may be better at first. That way, you wont run the risk of going too far and finding that you cant walk back. Be sure to have a place to walk indoors in bad weather, such as a gym or a mall.  · Wear shoes with sturdy, flexible soles. The heel should fit without slipping. You should have room to wiggle your toes.  · Keep track of how long you walk. A pedometer will show your daily progress. It can also show how much farther you can walk as time goes by.  · Ask a friend to keep you company. You may enjoy walking with someone else. Or you may want to make your walking sessions a time to relax by yourself.  · Make it fun. Listen to music while you walk and rest. Walk in a favorite park. Get to know the people at the gym, or the folks that you pass on your route. While you rest, you can window-shop, read, or feed the birds. Do whatever will help you enjoy your exercise sessions.  Date Last Reviewed: 6/1/2016  © 8330-4769 [employer]. [address] [place] [address], [place] [address]. All rights reserved. This information is not intended as a substitute for professional medical care. Always follow your healthcare professional's instructions.

## 2019-03-28 NOTE — PROGRESS NOTES
Andrea CHAN Alfreda  03/28/2019    HPI:  Patient is a 64 y.o. male with ho former heavy tobacco use, HLD for f/u of L lower extremity claudication. The patient reports sustained marked improvement in cramping pain in L calf - now > 1.5 miles (as in 2018); when we met Oct 2013 it was 1 block).     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)    Significant family history of claudication/heart disease    PMD is Dr LUCINDA Wang    Additionally, he has a history of:  Past Medical History:   Diagnosis Date    Cataract     History of hepatitis C, s/p successful treatment w/ SVR12 - 7/2015 10/14/2012    Kelsey 1a,  Liver biopsy 6/2014 - Stage 1 fibrosis;  Completed 8 weeks Harvoni w/ SVR12 - 7/2015      Hyperlipidemia     Lipoma of arm     Lipoma of lower extremity     Nuclear sclerosis - Both Eyes 10/22/2012    Other and unspecified hyperlipidemia 10/22/2013    PAD (peripheral artery disease)     Peripheral vascular disease, unspecified     Rheumatoid arthritis(714.0) 10/14/2012     Past Surgical History:   Procedure Laterality Date    BIOPSY LIVER AND ULTRASOUND N/A 6/9/2014    Performed by [facility] Provider at [facility] OR 2ND FLR    COLONOSCOPY N/A 2/21/2014    Performed by Cesar Cardoza MD at [facility] ENDO (4TH FLR)    EXPLORATION, INGUINAL REGION, LAPAROSCOPIC Left 9/6/2018    Performed by Mingo De Guzman Jr., MD at [facility] OR 2ND FLR    KNEE ARTHROPLASTY      REPAIR,HERNIA,INGUINAL,LAPAROSCOPIC, RIGHT w/ mesh, eval left w/ possible repair Right 9/6/2018    Performed by Mingo De Guzman Jr., MD at [facility] OR 2ND FLR    TONSILLECTOMY       Family History   Problem Relation Age of Onset    Heart disease Paternal Uncle     Dementia Mother     Heart disease Sister     Liver disease Neg Hx     Amblyopia Neg Hx     Blindness Neg Hx     Cancer Neg Hx     Cataracts Neg Hx     Diabetes Neg Hx      Glaucoma Neg Hx     Hypertension Neg Hx     Macular degeneration Neg Hx     Retinal detachment Neg Hx     Strabismus Neg Hx     Stroke Neg Hx     Thyroid disease Neg Hx      Social History     Socioeconomic History    Marital status:      Spouse name: Not on file    Number of children: Not on file    Years of education: Not on file    Highest education level: Not on file   Occupational History    Not on file   Social Needs    Financial resource strain: Not on file    Food insecurity:     Worry: Not on file     Inability: Not on file    Transportation needs:     Medical: Not on file     Non-medical: Not on file   Tobacco Use    Smoking status: Former Smoker     Types: Cigarettes     Last attempt to quit: 2019     Years since quittin.1    Smokeless tobacco: Never Used   Substance and Sexual Activity    Alcohol use: Yes     Comment: 3-4 drinks per week    Drug use: Yes     Types: Marijuana     Comment: marajuana    Sexual activity: Not on file   Lifestyle    Physical activity:     Days per week: Not on file     Minutes per session: Not on file    Stress: Not on file   Relationships    Social connections:     Talks on phone: Not on file     Gets together: Not on file     Attends Alevism service: Not on file     Active member of club or organization: Not on file     Attends meetings of clubs or organizations: Not on file     Relationship status: Not on file    Intimate partner violence:     Fear of current or ex partner: Not on file     Emotionally abused: Not on file     Physically abused: Not on file     Forced sexual activity: Not on file   Other Topics Concern    Not on file   Social History Narrative    Resides locally    [place] [employer]     w/ 2 adult children     Current Outpatient Medications   Medication Sig Dispense Refill    adalimumab (HUMIRA) PnKt injection Inject 0.8 mLs (40 mg total) into the skin every 14 (fourteen) days. 4 pen  12    amLODIPine (NORVASC) 5 MG tablet Take 1 tablet (5 mg total) by mouth once daily. 30 tablet 12    aspirin (ECOTRIN) 81 MG EC tablet Take 1 tablet (81 mg total) by mouth once daily.  3    cilostazol (PLETAL) 50 MG Tab TAKE 1 TABLET BY MOUTH TWICE A DAY 60 tablet 10    cilostazol (PLETAL) 50 MG Tab Take 1 tablet (50 mg total) by mouth 2 (two) times daily. 60 tablet 11    cilostazol (PLETAL) 50 MG Tab Take 1 tablet (50 mg total) by mouth 2 (two) times daily. 60 tablet 10    doxazosin (CARDURA) 4 MG tablet TAKE 1 TABLET BY MOUTH ONCE EVERY EVENING 30 tablet 4    hydroxychloroquine (PLAQUENIL) 200 mg tablet TAKE 1 TABLET BY MOUTH TWICE A DAY 60 tablet 0    omeprazole (PRILOSEC) 40 MG capsule Take 40 mg by mouth every morning.      oxyCODONE (ROXICODONE) 5 MG immediate release tablet Take 1 tablet (5 mg total) by mouth every 4 (four) hours as needed for Pain. 30 tablet 0    simvastatin (ZOCOR) 10 MG tablet Take 1 tablet (10 mg total) by mouth every evening. 90 tablet 6    traZODone (DESYREL) 50 MG tablet Take 3 tablets (150 mg total) by mouth nightly as needed for Insomnia. 90 tablet 3     No current facility-administered medications for this visit.        REVIEW OF SYSTEMS:  General: negative  ENT: negative  Allergy and Immunology: negative  Hematological and Lymphatic: negative  Endocrine: negative  Respiratory: no cough, shortness of breath, or wheezing  Cardiovascular: no chest pain or dyspnea on exertion  Gastrointestinal: no abdominal pain, change in bowel habits, or bloody stools  Genito-Urinary: no dysuria, trouble voiding, or hematuria  Musculoskeletal: negative  Neurological: no TIA or stroke symptoms    PHYSICAL EXAM:  Vitals:    19 1330   BP: (!) 145/95   Pulse: 74   Temp: 98.6 °F (37 °C)     Right Arm BP - Sittin/89 (19 1331)  Left Arm BP - Sittin/95 (19 1331)      General appearance: Alert, well appearing, and in no distress  Neurological: Alert, oriented, normal  speech, no focal findings noted  Mental status:  Oriented to person, place, and time  Neck: Supple, no significant adenopathy, carotids upstroke normal bilaterally  No carotid bruits can be auscultated  Chest: Clear to auscultation, no wheezes, rales or rhonchi, symmetric air entry  Cardiac: Normal rate and regular rhythm, S1 and S2 normal; PMI non-displaced  Abdomen: Soft, nontender, nondistended, no masses or organomegaly  no rebound tenderness noted; bowel sounds normal  + Palpable aortic  pulsatile mass  Extremities:  2+ femoral pulses bilaterally; Non-palpable pedal pulses on L leg  2+ R PT pulse  No tissue loss  No pedal edema    Lab Results   Component Value Date    K 3.7 02/28/2019    K 4.0 10/10/2018    K 4.0 01/17/2018    CREATININE 1.2 02/28/2019    CREATININE 1.3 10/10/2018    CREATININE 1.1 01/17/2018     Lab Results   Component Value Date    WBC 12.00 02/28/2019    WBC 10.23 10/10/2018    WBC 9.80 01/17/2018    HCT 44.3 02/28/2019    HCT 44.8 10/10/2018    HCT 43.0 01/17/2018     02/28/2019     10/10/2018     01/17/2018       IMAGING  ABIs  R 1.02 (previous 1.08, 1.12)  L 0.76 (previous 0.68, 0.61, 0.58)    Triphasic waveforms at R ankle  Biphasic waveforms at L ankle    Aortic u/s: 3.6cm  R CHAD 1.6cm  L CHAD 1.4cm    Previous:  CTA runoff  +atheroma in Aorta  Ectatic R distal CHAD   Long segment L SFA  30 cm  Reconstitutes in mid-L popliteal  3 tibals runoff    No R leg occlusive disease  + calcium throughout    Carotid u/s:  No ICA disease  Normal antegrade vert flow    Previous:  ABIs  R 0.61  L 0.45    IMP/PLAN:  64 y.o. male with ho former heavy tobacco use, HLD with Sirisha chronic limb ischemia class II : L calf claudication but well managed  Still ambulating now > 1.5 miles (previously 1 block)  Cont ASA, statin; cilostazol  Continues to Improve walking distance with this regimen    Off tobacco since 2013    We discussed the possibility of a L GIU-ma-mo-popliteal bypass  / using GSV;  As he is stable with symptoms, would not advocate an enoluminal treatment for this long-segment SFA chronic occlusion in such a young patient.  Would only offer a L fem/popliteal (bk-pop) if he develops a tissue loss / rest pain or symptoms worsen    F/u 1 yr w PVRs, AAA u/s, popliteal u/s and R leg evlt u/s    Feroz Jensen MD FACS  Vascular/Endovascular Surgery

## 2019-04-01 RX ORDER — HYDROXYCHLOROQUINE SULFATE 200 MG/1
TABLET, FILM COATED ORAL
Qty: 60 TABLET | Refills: 0 | Status: SHIPPED | OUTPATIENT
Start: 2019-04-01 | End: 2019-04-23 | Stop reason: SDUPTHER

## 2019-04-06 DIAGNOSIS — G47.00 PERSISTENT INSOMNIA: ICD-10-CM

## 2019-04-06 RX ORDER — TRAZODONE HYDROCHLORIDE 50 MG/1
150 TABLET ORAL NIGHTLY PRN
Qty: 90 TABLET | Refills: 3 | Status: SHIPPED | OUTPATIENT
Start: 2019-04-06 | End: 2019-04-23 | Stop reason: SDUPTHER

## 2019-04-23 DIAGNOSIS — G47.00 PERSISTENT INSOMNIA: ICD-10-CM

## 2019-04-23 RX ORDER — TRAZODONE HYDROCHLORIDE 50 MG/1
150 TABLET ORAL NIGHTLY PRN
Qty: 90 TABLET | Refills: 3 | Status: SHIPPED | OUTPATIENT
Start: 2019-04-23 | End: 2019-07-18 | Stop reason: SDUPTHER

## 2019-04-23 RX ORDER — HYDROXYCHLOROQUINE SULFATE 200 MG/1
200 TABLET, FILM COATED ORAL 2 TIMES DAILY
Qty: 180 TABLET | Refills: 2 | Status: SHIPPED | OUTPATIENT
Start: 2019-04-23 | End: 2019-07-18 | Stop reason: SDUPTHER

## 2019-04-26 RX ORDER — SIMVASTATIN 10 MG/1
10 TABLET, FILM COATED ORAL NIGHTLY
Qty: 90 TABLET | Refills: 6 | Status: SHIPPED | OUTPATIENT
Start: 2019-04-26 | End: 2020-11-10 | Stop reason: SDUPTHER

## 2019-05-22 ENCOUNTER — TELEPHONE (OUTPATIENT)
Dept: PHARMACY | Facility: CLINIC | Age: 64
End: 2019-05-22

## 2019-06-09 RX ORDER — DOXAZOSIN 4 MG/1
4 TABLET ORAL NIGHTLY
Qty: 30 TABLET | Refills: 4 | Status: SHIPPED | OUTPATIENT
Start: 2019-06-09 | End: 2019-10-04 | Stop reason: SDUPTHER

## 2019-06-19 ENCOUNTER — TELEPHONE (OUTPATIENT)
Dept: PHARMACY | Facility: CLINIC | Age: 64
End: 2019-06-19

## 2019-07-16 ENCOUNTER — TELEPHONE (OUTPATIENT)
Dept: PHARMACY | Facility: CLINIC | Age: 64
End: 2019-07-16

## 2019-07-17 ENCOUNTER — OFFICE VISIT (OUTPATIENT)
Dept: INTERNAL MEDICINE | Facility: CLINIC | Age: 64
End: 2019-07-17
Payer: COMMERCIAL

## 2019-07-17 ENCOUNTER — PATIENT MESSAGE (OUTPATIENT)
Dept: RHEUMATOLOGY | Facility: CLINIC | Age: 64
End: 2019-07-17

## 2019-07-17 VITALS
HEART RATE: 71 BPM | BODY MASS INDEX: 24.74 KG/M2 | SYSTOLIC BLOOD PRESSURE: 122 MMHG | DIASTOLIC BLOOD PRESSURE: 78 MMHG | WEIGHT: 167.56 LBS | OXYGEN SATURATION: 98 %

## 2019-07-17 DIAGNOSIS — Z12.11 COLON CANCER SCREENING: ICD-10-CM

## 2019-07-17 DIAGNOSIS — Z00.00 ROUTINE PHYSICAL EXAMINATION: Primary | ICD-10-CM

## 2019-07-17 DIAGNOSIS — E78.49 OTHER HYPERLIPIDEMIA: ICD-10-CM

## 2019-07-17 DIAGNOSIS — Z79.899 HISTORY OF LONG-TERM TREATMENT WITH HIGH-RISK MEDICATION: ICD-10-CM

## 2019-07-17 DIAGNOSIS — I71.40 ABDOMINAL AORTIC ANEURYSM WITHOUT RUPTURE: ICD-10-CM

## 2019-07-17 DIAGNOSIS — Z12.5 SCREENING FOR PROSTATE CANCER: ICD-10-CM

## 2019-07-17 DIAGNOSIS — G47.00 PERSISTENT INSOMNIA: ICD-10-CM

## 2019-07-17 DIAGNOSIS — I73.9 PVD (PERIPHERAL VASCULAR DISEASE): ICD-10-CM

## 2019-07-17 DIAGNOSIS — M05.79 RHEUMATOID ARTHRITIS INVOLVING MULTIPLE SITES WITH POSITIVE RHEUMATOID FACTOR: ICD-10-CM

## 2019-07-17 DIAGNOSIS — Z86.19 HISTORY OF HEPATITIS C: ICD-10-CM

## 2019-07-17 PROCEDURE — 99999 PR PBB SHADOW E&M-EST. PATIENT-LVL III: ICD-10-PCS | Mod: PBBFAC,,, | Performed by: INTERNAL MEDICINE

## 2019-07-17 PROCEDURE — 3078F DIAST BP <80 MM HG: CPT | Mod: CPTII,S$GLB,, | Performed by: INTERNAL MEDICINE

## 2019-07-17 PROCEDURE — 99999 PR PBB SHADOW E&M-EST. PATIENT-LVL III: CPT | Mod: PBBFAC,,, | Performed by: INTERNAL MEDICINE

## 2019-07-17 PROCEDURE — 99397 PR PREVENTIVE VISIT,EST,65 & OVER: ICD-10-PCS | Mod: S$GLB,,, | Performed by: INTERNAL MEDICINE

## 2019-07-17 PROCEDURE — 3078F PR MOST RECENT DIASTOLIC BLOOD PRESSURE < 80 MM HG: ICD-10-PCS | Mod: CPTII,S$GLB,, | Performed by: INTERNAL MEDICINE

## 2019-07-17 PROCEDURE — 3074F PR MOST RECENT SYSTOLIC BLOOD PRESSURE < 130 MM HG: ICD-10-PCS | Mod: CPTII,S$GLB,, | Performed by: INTERNAL MEDICINE

## 2019-07-17 PROCEDURE — 99397 PER PM REEVAL EST PAT 65+ YR: CPT | Mod: S$GLB,,, | Performed by: INTERNAL MEDICINE

## 2019-07-17 PROCEDURE — 3074F SYST BP LT 130 MM HG: CPT | Mod: CPTII,S$GLB,, | Performed by: INTERNAL MEDICINE

## 2019-07-17 NOTE — PROGRESS NOTES
Subjective:       Patient ID: Andrea Gant is a 64 y.o. male.    Chief Complaint: Annual Exam    HPI:  Patient comes in for annual follow-up of medical problems and interval follow-up of hypertension.  We started amlodipine earlier this year and his blood pressure has improved significantly he sees vascular surgery for PA D and dyslipidemia.  He is due for labs.  He is doing well on Humira for rheumatoid arthritis.  Labs have been stable but we will repeat those.  He is due for his colonoscopy as well    Review of Systems   Constitutional: Negative for chills, fatigue, fever and unexpected weight change.   HENT: Negative for nosebleeds and trouble swallowing.    Eyes: Negative for pain and visual disturbance.   Respiratory: Negative for cough, shortness of breath and wheezing.    Cardiovascular: Negative for chest pain and palpitations.   Gastrointestinal: Negative for abdominal pain, constipation, diarrhea, nausea and vomiting.   Genitourinary: Negative for difficulty urinating and hematuria.   Musculoskeletal: Positive for arthralgias. Negative for neck pain.   Skin: Negative for rash.   Neurological: Negative for dizziness and headaches.   Hematological: Does not bruise/bleed easily.   Psychiatric/Behavioral: Negative for dysphoric mood, sleep disturbance and suicidal ideas.       Objective:      Physical Exam   Constitutional: He is oriented to person, place, and time. He appears well-developed and well-nourished. No distress.   HENT:   Head: Normocephalic and atraumatic.   Right Ear: External ear normal.   Left Ear: External ear normal.   Mouth/Throat: Oropharynx is clear and moist. No oropharyngeal exudate.   TM's clear, pharynx clear   Eyes: Pupils are equal, round, and reactive to light. Conjunctivae and EOM are normal. No scleral icterus.   Neck: Normal range of motion. Neck supple. No thyromegaly present.   No supraclavicular nodes palpated   Cardiovascular: Normal rate, regular rhythm, normal heart  sounds and intact distal pulses.   No murmur heard.  Pulmonary/Chest: Effort normal and breath sounds normal. He has no wheezes.   Abdominal: Soft. Bowel sounds are normal. He exhibits no mass. There is no tenderness.   Musculoskeletal: He exhibits no edema.   Lymphadenopathy:     He has no cervical adenopathy.   Neurological: He is alert and oriented to person, place, and time.   Skin: No rash noted. No erythema. No pallor.   Psychiatric: He has a normal mood and affect. His behavior is normal.       Assessment:       1. Routine physical examination    2. Rheumatoid arthritis involving multiple sites with positive rheumatoid factor    3. Persistent insomnia    4. History of long-term treatment with high-risk medication    5. History of hepatitis C, s/p successful treatment w/ SVR12 - 7/2015    6. PVD (peripheral vascular disease)    7. Other hyperlipidemia    8. Abdominal aortic aneurysm without rupture    9. Screening for prostate cancer    10. Colon cancer screening        Plan:       Andrea was seen today for annual exam.    Diagnoses and all orders for this visit:    Routine physical examination    Rheumatoid arthritis involving multiple sites with positive rheumatoid factor    Persistent insomnia    History of long-term treatment with high-risk medication    History of hepatitis C, s/p successful treatment w/ SVR12 - 7/2015    PVD (peripheral vascular disease)  -     Lipid panel; Future    Other hyperlipidemia  -     Lipid panel; Future    Abdominal aortic aneurysm without rupture  Comments:  Followed by Vascular Surgery    Screening for prostate cancer  -     PSA, Screening; Future    Colon cancer screening  -     Case request GI: COLONOSCOPY

## 2019-07-18 ENCOUNTER — TELEPHONE (OUTPATIENT)
Dept: RHEUMATOLOGY | Facility: CLINIC | Age: 64
End: 2019-07-18

## 2019-07-18 DIAGNOSIS — G47.00 PERSISTENT INSOMNIA: ICD-10-CM

## 2019-07-18 RX ORDER — TRAZODONE HYDROCHLORIDE 50 MG/1
150 TABLET ORAL NIGHTLY PRN
Qty: 90 TABLET | Refills: 1 | Status: SHIPPED | OUTPATIENT
Start: 2019-07-18 | End: 2020-03-12

## 2019-07-18 RX ORDER — HYDROXYCHLOROQUINE SULFATE 200 MG/1
200 TABLET, FILM COATED ORAL 2 TIMES DAILY
Qty: 180 TABLET | Refills: 0 | Status: SHIPPED | OUTPATIENT
Start: 2019-07-18 | End: 2020-10-20

## 2019-07-18 NOTE — TELEPHONE ENCOUNTER
Pt overdue for annual Plaquenil Optometry check with Dr. Johnson. Please schedule so can be refilled.Thanks CRISTINO

## 2019-07-31 ENCOUNTER — CLINICAL SUPPORT (OUTPATIENT)
Dept: SMOKING CESSATION | Facility: CLINIC | Age: 64
End: 2019-07-31
Payer: COMMERCIAL

## 2019-07-31 DIAGNOSIS — F17.200 NICOTINE DEPENDENCE: Primary | ICD-10-CM

## 2019-07-31 PROCEDURE — 99407 BEHAV CHNG SMOKING > 10 MIN: CPT | Mod: S$GLB,,, | Performed by: INTERNAL MEDICINE

## 2019-07-31 PROCEDURE — 99407 PR TOBACCO USE CESSATION INTENSIVE >10 MINUTES: ICD-10-PCS | Mod: S$GLB,,, | Performed by: INTERNAL MEDICINE

## 2019-07-31 NOTE — PROGRESS NOTES
Spoke with patient today in regard to smoking cessation progress for 3/6 month telephone follow up, he states tobacco free since 2/1/2019. Commended patient on the accomplishment. Informed patient of benefit period, future follow up, and contact information if any further help or support is needed. Will complete smart form for 3 and 6 month follow up on Quit attempt #1.

## 2019-08-01 ENCOUNTER — LAB VISIT (OUTPATIENT)
Dept: LAB | Facility: HOSPITAL | Age: 64
End: 2019-08-01
Attending: INTERNAL MEDICINE
Payer: COMMERCIAL

## 2019-08-01 DIAGNOSIS — Z79.60 LONG-TERM USE OF IMMUNOSUPPRESSANT MEDICATION: ICD-10-CM

## 2019-08-01 DIAGNOSIS — M05.79 RHEUMATOID ARTHRITIS INVOLVING MULTIPLE SITES WITH POSITIVE RHEUMATOID FACTOR: ICD-10-CM

## 2019-08-01 LAB
ALBUMIN SERPL BCP-MCNC: 4.1 G/DL (ref 3.5–5.2)
ALP SERPL-CCNC: 87 U/L (ref 55–135)
ALT SERPL W/O P-5'-P-CCNC: 20 U/L (ref 10–44)
ANION GAP SERPL CALC-SCNC: 9 MMOL/L (ref 8–16)
AST SERPL-CCNC: 24 U/L (ref 10–40)
BASOPHILS # BLD AUTO: 0.04 K/UL (ref 0–0.2)
BASOPHILS NFR BLD: 0.4 % (ref 0–1.9)
BILIRUB SERPL-MCNC: 0.6 MG/DL (ref 0.1–1)
BUN SERPL-MCNC: 27 MG/DL (ref 8–23)
CALCIUM SERPL-MCNC: 9.6 MG/DL (ref 8.7–10.5)
CHLORIDE SERPL-SCNC: 105 MMOL/L (ref 95–110)
CO2 SERPL-SCNC: 25 MMOL/L (ref 23–29)
CREAT SERPL-MCNC: 1.3 MG/DL (ref 0.5–1.4)
CRP SERPL-MCNC: 5.4 MG/L (ref 0–8.2)
DIFFERENTIAL METHOD: ABNORMAL
EOSINOPHIL # BLD AUTO: 0.1 K/UL (ref 0–0.5)
EOSINOPHIL NFR BLD: 1.4 % (ref 0–8)
ERYTHROCYTE [DISTWIDTH] IN BLOOD BY AUTOMATED COUNT: 13 % (ref 11.5–14.5)
ERYTHROCYTE [SEDIMENTATION RATE] IN BLOOD BY WESTERGREN METHOD: 24 MM/HR (ref 0–23)
EST. GFR  (AFRICAN AMERICAN): >60 ML/MIN/1.73 M^2
EST. GFR  (NON AFRICAN AMERICAN): 57.3 ML/MIN/1.73 M^2
GLUCOSE SERPL-MCNC: 93 MG/DL (ref 70–110)
HCT VFR BLD AUTO: 45.2 % (ref 40–54)
HGB BLD-MCNC: 14.3 G/DL (ref 14–18)
IMM GRANULOCYTES # BLD AUTO: 0.03 K/UL (ref 0–0.04)
IMM GRANULOCYTES NFR BLD AUTO: 0.3 % (ref 0–0.5)
LYMPHOCYTES # BLD AUTO: 1.8 K/UL (ref 1–4.8)
LYMPHOCYTES NFR BLD: 17.3 % (ref 18–48)
MCH RBC QN AUTO: 28.7 PG (ref 27–31)
MCHC RBC AUTO-ENTMCNC: 31.6 G/DL (ref 32–36)
MCV RBC AUTO: 91 FL (ref 82–98)
MONOCYTES # BLD AUTO: 0.8 K/UL (ref 0.3–1)
MONOCYTES NFR BLD: 7.4 % (ref 4–15)
NEUTROPHILS # BLD AUTO: 7.4 K/UL (ref 1.8–7.7)
NEUTROPHILS NFR BLD: 73.2 % (ref 38–73)
NRBC BLD-RTO: 0 /100 WBC
PLATELET # BLD AUTO: 216 K/UL (ref 150–350)
PMV BLD AUTO: 9.5 FL (ref 9.2–12.9)
POTASSIUM SERPL-SCNC: 4.1 MMOL/L (ref 3.5–5.1)
PROT SERPL-MCNC: 8.1 G/DL (ref 6–8.4)
RBC # BLD AUTO: 4.99 M/UL (ref 4.6–6.2)
SODIUM SERPL-SCNC: 139 MMOL/L (ref 136–145)
WBC # BLD AUTO: 10.16 K/UL (ref 3.9–12.7)

## 2019-08-01 PROCEDURE — 85652 RBC SED RATE AUTOMATED: CPT

## 2019-08-01 PROCEDURE — 86140 C-REACTIVE PROTEIN: CPT

## 2019-08-01 PROCEDURE — 80053 COMPREHEN METABOLIC PANEL: CPT

## 2019-08-01 PROCEDURE — 85025 COMPLETE CBC W/AUTO DIFF WBC: CPT

## 2019-08-04 NOTE — PROGRESS NOTES
Subjective:       Patient ID: Andrea Gant is a 64 y.o. male with nodular sero+ccp+ RA, OA and Hepatitis C (cured with Harvoni)    Chief Complaint: No chief complaint on file.    Returns for follow-up. Last seen 2/28/19.  Continues doing very well with joints. Is taking  mg/d and Humira 40 mg/every 2 weeks. He was to space it out further but forgot. He still has no joint pains/swelling. Still with AM stiffness around 30 minutes. Denies dysphagia, tight skin, oral ulcers, pleurisy, pericarditis, sicca symptoms, photosensitivity, thromboses. Had previously given a hx of discoloration of several fingers of R hand in cold. But denies now.     Has mildly elevated BP again today. States he is taking his BP meds.          Pertinent negatives include no fatigue, fever, trouble swallowing or headaches.         Current Outpatient Medications   Medication Sig Dispense Refill    adalimumab (HUMIRA) PnKt injection Inject 0.8 mLs (40 mg total) into the skin every 14 (fourteen) days. 4 pen 12    amLODIPine (NORVASC) 5 MG tablet Take 1 tablet (5 mg total) by mouth once daily. 30 tablet 12    aspirin (ECOTRIN) 81 MG EC tablet Take 1 tablet (81 mg total) by mouth once daily.  3    cilostazol (PLETAL) 50 MG Tab Take 1 tablet (50 mg total) by mouth 2 (two) times daily. 60 tablet 11    cilostazol (PLETAL) 50 MG Tab Take 1 tablet (50 mg total) by mouth 2 (two) times daily. 60 tablet 10    doxazosin (CARDURA) 4 MG tablet Take 1 tablet (4 mg total) by mouth every evening. 30 tablet 4    hydroxychloroquine (PLAQUENIL) 200 mg tablet Take 1 tablet (200 mg total) by mouth 2 (two) times daily. 180 tablet 0    omeprazole (PRILOSEC) 40 MG capsule Take 40 mg by mouth every morning.      oxyCODONE (ROXICODONE) 5 MG immediate release tablet Take 1 tablet (5 mg total) by mouth every 4 (four) hours as needed for Pain. 30 tablet 0    simvastatin (ZOCOR) 10 MG tablet Take 1 tablet (10 mg total) by mouth every evening. 90 tablet 6     traZODone (DESYREL) 50 MG tablet Take 3 tablets (150 mg total) by mouth nightly as needed for Insomnia. 90 tablet 1     No current facility-administered medications for this visit.    No longer on oxycodone      Probable Allergic to Enbrel (rash);     Past Medical History:   Diagnosis Date    Cataract     History of hepatitis C, s/p successful treatment w/ SVR12 - 7/2015 10/14/2012    Kelsey 1a,  Liver biopsy 6/2014 - Stage 1 fibrosis;  Completed 8 weeks Harvoni w/ SVR12 - 7/2015      Hyperlipidemia     Lipoma of arm     Lipoma of lower extremity     Nuclear sclerosis - Both Eyes 10/22/2012    Other and unspecified hyperlipidemia 10/22/2013    PAD (peripheral artery disease)     Peripheral vascular disease, unspecified     Rheumatoid arthritis(714.0) 10/14/2012       Past Surgical History:   Procedure Laterality Date    BIOPSY LIVER AND ULTRASOUND N/A 6/9/2014    Performed by [facility] at [facility] OR 2ND FLR    COLONOSCOPY N/A 2/21/2014    Performed by Cesar Cardoza MD at [facility] ENDO (4TH FLR)    EXPLORATION, INGUINAL REGION, LAPAROSCOPIC Left 9/6/2018    Performed by Mingo De Guzman Jr., MD at [facility] OR 2ND FLR    KNEE ARTHROPLASTY      REPAIR,HERNIA,INGUINAL,LAPAROSCOPIC, RIGHT w/ mesh, eval left w/ possible repair Right 9/6/2018    Performed by Mingo De Guzman Jr., MD at [facility] OR 2ND FLR    TONSILLECTOMY         Review of Systems   Constitutional: Negative.  Negative for fatigue, fever and unexpected weight change.   HENT: Negative.  Negative for hearing loss, mouth sores, sore throat, tinnitus and trouble swallowing.    Eyes: Negative.  Negative for redness and visual disturbance.   Respiratory: Negative for cough, choking, chest tightness and shortness of breath.    Cardiovascular: Negative.  Negative for chest pain, palpitations and leg swelling.   Gastrointestinal: Negative.  Negative for abdominal pain, blood in stool, constipation, diarrhea, nausea and vomiting.         "Heartburn   Endocrine: Negative.    Genitourinary: Positive for frequency. Negative for genital sores, hematuria and urgency.   Musculoskeletal: Negative.  Negative for back pain, neck pain and neck stiffness.   Skin: Negative.  Negative for rash.   Allergic/Immunologic: Negative.    Neurological: Negative.  Negative for dizziness, syncope, numbness and headaches.   Hematological: Negative.  Does not bruise/bleed easily.   Psychiatric/Behavioral: Negative.  Negative for dysphoric mood and sleep disturbance. The patient is not nervous/anxious.          SH: still not smoking was former smoker.  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     Objective:   BP (!) 156/86   Pulse 77   Ht 5' 9" (1.753 m)   Wt 78.8 kg (173 lb 11.6 oz)   BMI 25.65 kg/m²     Was 172 lbs 4.8 oz on 12/13/16;  Physical Exam   Vitals reviewed.  Constitutional: He is oriented to person, place, and time and well-developed, well-nourished, and in no distress. No distress.   HENT:   Head: Normocephalic and atraumatic.   Mouth/Throat: Oropharynx is clear and moist. No oropharyngeal exudate.   No facial rashes  Parotids not enlarged  No oral ulcers  Temporal aa ok;    Eyes: Conjunctivae and EOM are normal. Pupils are equal, round, and reactive to light. Right eye exhibits no discharge. Left eye exhibits no discharge. No scleral icterus.   Neck: Neck supple. No JVD present. No tracheal deviation present. No thyromegaly present.   Cardiovascular: Normal rate, regular rhythm, normal heart sounds and intact distal pulses.  Exam reveals no gallop and no friction rub.    No murmur heard.  Pulmonary/Chest: Effort normal and breath sounds normal. No respiratory distress. He has no wheezes. He has no rales. He exhibits no tenderness.   Abdominal: Soft. Bowel sounds are normal. He exhibits no distension and no mass. There is no splenomegaly or hepatomegaly. There is no tenderness. There is no rebound and no guarding.   Lymphadenopathy:     He has no cervical adenopathy. "   Neurological: He is alert and oriented to person, place, and time. He has normal reflexes. No cranial nerve deficit. Gait normal.   Proximal and distal muscle strength 5/5.   Skin: Skin is warm and dry. No rash noted. He is not diaphoretic.     Lipomas, forearms and other areas;  Rheumatoid nodules vs lipomas of elbows.    Bilateral superficial varicosities LE;   Psychiatric: Mood, memory, affect and judgment normal.   Musculoskeletal: Normal range of motion. He exhibits no edema or tenderness.   Cspine FROM no tenderness  Tspine FROM no tenderness  Lspine FROM no tenderness.  TMJ: unremarkable  Shoulders: missing a few degrees of abduction on the R; no synovitis; no tenderness  Elbows: FROM; left elbow nodule, firm & non tender vs lipoma as he has them all over; right elbow with mild flexion contracture & elbow nodule vs lipoma.  Wrists: FROM; no synovitis  MCPs: FROM; no synovitis; no metacarpalgia;  ok;  PIPs:FROM; +Bouchards--servando 4th R PIP; non tender; no synovitis;  Good fists.  DIPs: FROM; + Heberden's; no synovitis  HIPS: FROM  Knees: FROM; no synovitis; no instability; minimal PF crepitus;  Ankles: FROM: no synovitis   Toes: ok; no metatarsalgia;                LABS:     19: ESR 24 (23); CRP 5.4; CBC ok; CMP cnne 1.3; GFR 57.3; BUN 27;   10/10/18; ESR 7; CRP 4.1; CBC ; CMP cnne 1.3; GFR 57.7;   18: ESR 29; CRP; CBC ok; CMP ok; ; CCP 44;   17: ESR 23; CRP 4.2; CBC ok; CMP cnne 1.3; GFR 58.1;   3/26/16: CBC ok; cnne 1.3  3/10/16: ESR 9; CRP 1.7; CBC, CMP ok;  9/15/15: ESR 20; CRP 0.9; CBC ok; CMP ok;  3/2/15: CMP BUN 28; cnne 1.1  1/12/15: Hg 13.9  10/21/14: ESR 8;   14: ESR 19; CRP 2.9; Hg 12.8; cnne 1.3; Vit D 18; ; CCP 44.3   Hepatitis B & HIV negative; HCV+;     IMAGIN16: US Dopplers: R:minimal peripheral arterial occlusive disease: L: moderate peripheral arterial occlusive disease; unchanged  4/2/15: Bilateral wrists: personal review: cystic lesion left  lunate, possibly left ulnar notch. (not too different from previous)  4/2/15: R ankle personally reviewed: ok  1/20/14: Arthritis survey personally reviewed again: OA lower CS; min OA hands & feet;      Assessment:   Seropositive (), CCP (44) positive nodular (bilateral elbows vs lipomas) rheumatoid arthritis with no erosions--doing well,    Enbrel effective but resulted in rash. MTX and Leflunomide were contraindicated at the time due to HCV.   SSZ given ok by Dr. Moore, but never begun   On hydroxychloroquine 400 mg/d   Humira 40 mg qow since 6/15   Ok for use of MTX or Leflunomide now as per Dr. Moore    Possible Raynaud's    Bilateral adhesive capsulitis R>L--resolved    Elevated blood pressure    Mild renal insufficiency on meloxicam (cnne up to 1.3 from 1.0)   Off it now, but last cnne 1.3.    Hepatitis C with normal liver function tests--genotype 1a   Completed Harvoni; cured    Osteoarthritis of hands.     Recurrent hypovitaminosis D -treated in past    Peripheral Arterial Disease.    Compression deformity of the lower lumbar and thoracic vertebral bodies by x-ray.     Multiple lipomas of the arms and lumbar spine area.     Occupational injuries in the past.     Persistent insomnia.   Helped partly by trazodone.       Plan:   Stay on Humira 40 mg but try every 3rd week  Continue HCQ with eye exams  Again discussed need for yearly skin exams.   May be candidate for digital hypertension monitoring   Bilateral hand imaging today  Keep us posted.   RTC 6 months as per patient's request.

## 2019-08-08 ENCOUNTER — HOSPITAL ENCOUNTER (OUTPATIENT)
Dept: RADIOLOGY | Facility: HOSPITAL | Age: 64
Discharge: HOME | End: 2019-08-08
Attending: INTERNAL MEDICINE
Payer: COMMERCIAL

## 2019-08-08 ENCOUNTER — OFFICE VISIT (OUTPATIENT)
Dept: RHEUMATOLOGY | Facility: CLINIC | Age: 64
End: 2019-08-08
Payer: COMMERCIAL

## 2019-08-08 VITALS
SYSTOLIC BLOOD PRESSURE: 156 MMHG | WEIGHT: 173.75 LBS | DIASTOLIC BLOOD PRESSURE: 86 MMHG | BODY MASS INDEX: 25.73 KG/M2 | HEIGHT: 69 IN | HEART RATE: 77 BPM

## 2019-08-08 DIAGNOSIS — M05.79 RHEUMATOID ARTHRITIS INVOLVING MULTIPLE SITES WITH POSITIVE RHEUMATOID FACTOR: Primary | ICD-10-CM

## 2019-08-08 DIAGNOSIS — R03.0 ELEVATED BLOOD PRESSURE READING: ICD-10-CM

## 2019-08-08 DIAGNOSIS — Z79.60 LONG-TERM USE OF IMMUNOSUPPRESSANT MEDICATION: ICD-10-CM

## 2019-08-08 DIAGNOSIS — Z79.899 LONG-TERM USE OF HYDROXYCHLOROQUINE: ICD-10-CM

## 2019-08-08 DIAGNOSIS — M05.79 RHEUMATOID ARTHRITIS INVOLVING MULTIPLE SITES WITH POSITIVE RHEUMATOID FACTOR: ICD-10-CM

## 2019-08-08 PROCEDURE — 1101F PR PT FALLS ASSESS DOC 0-1 FALLS W/OUT INJ PAST YR: ICD-10-PCS | Mod: CPTII,S$GLB,, | Performed by: INTERNAL MEDICINE

## 2019-08-08 PROCEDURE — 73130 X-RAY EXAM OF HAND: CPT | Mod: 26,,, | Performed by: RADIOLOGY

## 2019-08-08 PROCEDURE — 3077F PR MOST RECENT SYSTOLIC BLOOD PRESSURE >= 140 MM HG: ICD-10-PCS | Mod: CPTII,S$GLB,, | Performed by: INTERNAL MEDICINE

## 2019-08-08 PROCEDURE — 3008F BODY MASS INDEX DOCD: CPT | Mod: CPTII,S$GLB,, | Performed by: INTERNAL MEDICINE

## 2019-08-08 PROCEDURE — 99999 PR PBB SHADOW E&M-EST. PATIENT-LVL III: CPT | Mod: PBBFAC,,, | Performed by: INTERNAL MEDICINE

## 2019-08-08 PROCEDURE — 99214 PR OFFICE/OUTPT VISIT, EST, LEVL IV, 30-39 MIN: ICD-10-PCS | Mod: S$GLB,,, | Performed by: INTERNAL MEDICINE

## 2019-08-08 PROCEDURE — 3079F PR MOST RECENT DIASTOLIC BLOOD PRESSURE 80-89 MM HG: ICD-10-PCS | Mod: CPTII,S$GLB,, | Performed by: INTERNAL MEDICINE

## 2019-08-08 PROCEDURE — 3077F SYST BP >= 140 MM HG: CPT | Mod: CPTII,S$GLB,, | Performed by: INTERNAL MEDICINE

## 2019-08-08 PROCEDURE — 3008F PR BODY MASS INDEX (BMI) DOCUMENTED: ICD-10-PCS | Mod: CPTII,S$GLB,, | Performed by: INTERNAL MEDICINE

## 2019-08-08 PROCEDURE — 3079F DIAST BP 80-89 MM HG: CPT | Mod: CPTII,S$GLB,, | Performed by: INTERNAL MEDICINE

## 2019-08-08 PROCEDURE — 99999 PR PBB SHADOW E&M-EST. PATIENT-LVL III: ICD-10-PCS | Mod: PBBFAC,,, | Performed by: INTERNAL MEDICINE

## 2019-08-08 PROCEDURE — 99214 OFFICE O/P EST MOD 30 MIN: CPT | Mod: S$GLB,,, | Performed by: INTERNAL MEDICINE

## 2019-08-08 PROCEDURE — 73130 XR HAND COMPLETE 3 VIEWS BILATERAL: ICD-10-PCS | Mod: 26,,, | Performed by: RADIOLOGY

## 2019-08-08 PROCEDURE — 1101F PT FALLS ASSESS-DOCD LE1/YR: CPT | Mod: CPTII,S$GLB,, | Performed by: INTERNAL MEDICINE

## 2019-08-08 PROCEDURE — 73130 X-RAY EXAM OF HAND: CPT | Mod: 50,TC

## 2019-08-08 ASSESSMENT — ROUTINE ASSESSMENT OF PATIENT INDEX DATA (RAPID3)
FATIGUE SCORE: 4
AM STIFFNESS SCORE: 0, NO
PAIN SCORE: 4
TOTAL RAPID3 SCORE: 4.22
MDHAQ FUNCTION SCORE: .5
PSYCHOLOGICAL DISTRESS SCORE: 3.3
PATIENT GLOBAL ASSESSMENT SCORE: 7

## 2019-08-08 NOTE — PATIENT INSTRUCTIONS
Keep up with eye exams.    Try spacing Humira out to every 3 weeks.    Consider digital hypertension monitoring & discuss with Dr. Wang.

## 2019-08-08 NOTE — PROGRESS NOTES
Rapid3 Question Responses and Scores 8/7/2019   MDHAQ Score 0.5   Psychologic Score 3.3   Pain Score 4   When you awakened in the morning OVER THE LAST WEEK, did you feel stiff? No   Fatigue Score 4   Global Health Score 7   RAPID3 Score 4.22

## 2019-08-14 ENCOUNTER — TELEPHONE (OUTPATIENT)
Dept: RHEUMATOLOGY | Facility: CLINIC | Age: 64
End: 2019-08-14

## 2019-08-14 DIAGNOSIS — Z79.60 LONG-TERM USE OF IMMUNOSUPPRESSANT MEDICATION: Primary | ICD-10-CM

## 2019-08-14 RX ORDER — SIMVASTATIN 10 MG/1
TABLET, FILM COATED ORAL
Qty: 90 TABLET | Refills: 1 | Status: SHIPPED | OUTPATIENT
Start: 2019-08-14 | End: 2020-02-10 | Stop reason: SDUPTHER

## 2019-08-15 ENCOUNTER — LAB VISIT (OUTPATIENT)
Dept: LAB | Facility: HOSPITAL | Age: 64
End: 2019-08-15
Attending: INTERNAL MEDICINE
Payer: COMMERCIAL

## 2019-08-15 ENCOUNTER — TELEPHONE (OUTPATIENT)
Dept: RHEUMATOLOGY | Facility: CLINIC | Age: 64
End: 2019-08-15

## 2019-08-15 DIAGNOSIS — Z79.60 LONG-TERM USE OF IMMUNOSUPPRESSANT MEDICATION: ICD-10-CM

## 2019-08-15 PROCEDURE — 36415 COLL VENOUS BLD VENIPUNCTURE: CPT

## 2019-08-15 PROCEDURE — 86480 TB TEST CELL IMMUN MEASURE: CPT

## 2019-08-16 LAB
M TB IFN-G CD4+ BCKGRND COR BLD-ACNC: 0.01 IU/ML
MITOGEN IGNF BCKGRD COR BLD-ACNC: >10 IU/ML
MITOGEN IGNF BCKGRD COR BLD-ACNC: NEGATIVE [IU]/ML
NIL: 0.02 IU/ML
TB2 - NIL: 0.01 IU/ML

## 2019-09-12 ENCOUNTER — TELEPHONE (OUTPATIENT)
Dept: PHARMACY | Facility: CLINIC | Age: 64
End: 2019-09-12

## 2019-09-12 NOTE — TELEPHONE ENCOUNTER
confirmed the need for refill of humira, shipped to confirmed address on 9/18 to arrive on 9/19. pt. explained that he had discussed with his MD at last appointment about changing from bi-weekly injections to every three weeks. I inform pt that he needs to speak to a pharmacist on this matter I will transfer you now. Pt. stated that they can call him back anytime and hung up. will schedule consult for pt with [facility] for consult.

## 2019-10-04 RX ORDER — DOXAZOSIN 4 MG/1
4 TABLET ORAL NIGHTLY
Qty: 90 TABLET | Refills: 1 | Status: SHIPPED | OUTPATIENT
Start: 2019-10-04 | End: 2020-01-02

## 2019-10-07 RX ORDER — CILOSTAZOL 50 MG/1
50 TABLET ORAL 2 TIMES DAILY
Qty: 60 TABLET | Refills: 11 | Status: SHIPPED | OUTPATIENT
Start: 2019-10-07 | End: 2020-10-06

## 2019-10-10 ENCOUNTER — TELEPHONE (OUTPATIENT)
Dept: PHARMACY | Facility: CLINIC | Age: 64
End: 2019-10-10

## 2019-10-10 NOTE — TELEPHONE ENCOUNTER
Pt confirmed shipping of Humira on 10/16 to arrive on 10/17. Address and  verified. $5 copay in [facility]. Pt is not in need of a new sharps container. Pt has 1 dose on hand for 10/23. Pt did not want a callback for shipment, he wanted to schedule early to arrive on 10/17. Pt reported no missed doses. Pt did not start any new medications. Pt had no further questions or concerns.

## 2019-12-28 ENCOUNTER — TELEPHONE (OUTPATIENT)
Dept: RHEUMATOLOGY | Facility: CLINIC | Age: 64
End: 2019-12-28

## 2019-12-28 DIAGNOSIS — Z79.60 LONG-TERM USE OF IMMUNOSUPPRESSANT MEDICATION: Primary | ICD-10-CM

## 2019-12-28 DIAGNOSIS — M05.79 RHEUMATOID ARTHRITIS INVOLVING MULTIPLE SITES WITH POSITIVE RHEUMATOID FACTOR: ICD-10-CM

## 2019-12-30 ENCOUNTER — LAB VISIT (OUTPATIENT)
Dept: LAB | Facility: HOSPITAL | Age: 64
End: 2019-12-30
Attending: INTERNAL MEDICINE
Payer: COMMERCIAL

## 2019-12-30 ENCOUNTER — PATIENT MESSAGE (OUTPATIENT)
Dept: RHEUMATOLOGY | Facility: CLINIC | Age: 64
End: 2019-12-30

## 2019-12-30 DIAGNOSIS — M05.79 RHEUMATOID ARTHRITIS INVOLVING MULTIPLE SITES WITH POSITIVE RHEUMATOID FACTOR: ICD-10-CM

## 2019-12-30 DIAGNOSIS — Z79.60 LONG-TERM USE OF IMMUNOSUPPRESSANT MEDICATION: ICD-10-CM

## 2019-12-30 LAB
ALBUMIN SERPL BCP-MCNC: 4 G/DL (ref 3.5–5.2)
ALP SERPL-CCNC: 83 U/L (ref 55–135)
ALT SERPL W/O P-5'-P-CCNC: 19 U/L (ref 10–44)
ANION GAP SERPL CALC-SCNC: 5 MMOL/L (ref 8–16)
AST SERPL-CCNC: 22 U/L (ref 10–40)
BASOPHILS # BLD AUTO: 0.07 K/UL (ref 0–0.2)
BASOPHILS NFR BLD: 0.9 % (ref 0–1.9)
BILIRUB SERPL-MCNC: 0.4 MG/DL (ref 0.1–1)
BUN SERPL-MCNC: 24 MG/DL (ref 8–23)
CALCIUM SERPL-MCNC: 9.8 MG/DL (ref 8.7–10.5)
CHLORIDE SERPL-SCNC: 106 MMOL/L (ref 95–110)
CO2 SERPL-SCNC: 28 MMOL/L (ref 23–29)
CREAT SERPL-MCNC: 1.3 MG/DL (ref 0.5–1.4)
CRP SERPL-MCNC: 2.5 MG/L (ref 0–8.2)
DIFFERENTIAL METHOD: ABNORMAL
EOSINOPHIL # BLD AUTO: 0.1 K/UL (ref 0–0.5)
EOSINOPHIL NFR BLD: 1.7 % (ref 0–8)
ERYTHROCYTE [DISTWIDTH] IN BLOOD BY AUTOMATED COUNT: 12.9 % (ref 11.5–14.5)
ERYTHROCYTE [SEDIMENTATION RATE] IN BLOOD BY WESTERGREN METHOD: 9 MM/HR (ref 0–23)
EST. GFR  (AFRICAN AMERICAN): >60 ML/MIN/1.73 M^2
EST. GFR  (NON AFRICAN AMERICAN): 57.3 ML/MIN/1.73 M^2
GLUCOSE SERPL-MCNC: 77 MG/DL (ref 70–110)
HCT VFR BLD AUTO: 45.7 % (ref 40–54)
HGB BLD-MCNC: 14.2 G/DL (ref 14–18)
IMM GRANULOCYTES # BLD AUTO: 0.01 K/UL (ref 0–0.04)
IMM GRANULOCYTES NFR BLD AUTO: 0.1 % (ref 0–0.5)
LYMPHOCYTES # BLD AUTO: 1.4 K/UL (ref 1–4.8)
LYMPHOCYTES NFR BLD: 16.6 % (ref 18–48)
MCH RBC QN AUTO: 29.3 PG (ref 27–31)
MCHC RBC AUTO-ENTMCNC: 31.1 G/DL (ref 32–36)
MCV RBC AUTO: 94 FL (ref 82–98)
MONOCYTES # BLD AUTO: 0.8 K/UL (ref 0.3–1)
MONOCYTES NFR BLD: 9.4 % (ref 4–15)
NEUTROPHILS # BLD AUTO: 5.8 K/UL (ref 1.8–7.7)
NEUTROPHILS NFR BLD: 71.3 % (ref 38–73)
NRBC BLD-RTO: 0 /100 WBC
PLATELET # BLD AUTO: 207 K/UL (ref 150–350)
PMV BLD AUTO: 10.7 FL (ref 9.2–12.9)
POTASSIUM SERPL-SCNC: 4.3 MMOL/L (ref 3.5–5.1)
PROT SERPL-MCNC: 8.1 G/DL (ref 6–8.4)
RBC # BLD AUTO: 4.85 M/UL (ref 4.6–6.2)
SODIUM SERPL-SCNC: 139 MMOL/L (ref 136–145)
WBC # BLD AUTO: 8.19 K/UL (ref 3.9–12.7)

## 2019-12-30 PROCEDURE — 85025 COMPLETE CBC W/AUTO DIFF WBC: CPT

## 2019-12-30 PROCEDURE — 87340 HEPATITIS B SURFACE AG IA: CPT

## 2019-12-30 PROCEDURE — 36415 COLL VENOUS BLD VENIPUNCTURE: CPT | Mod: PO

## 2019-12-30 PROCEDURE — 86706 HEP B SURFACE ANTIBODY: CPT

## 2019-12-30 PROCEDURE — 86140 C-REACTIVE PROTEIN: CPT

## 2019-12-30 PROCEDURE — 86704 HEP B CORE ANTIBODY TOTAL: CPT

## 2019-12-30 PROCEDURE — 80053 COMPREHEN METABOLIC PANEL: CPT

## 2019-12-30 PROCEDURE — 85652 RBC SED RATE AUTOMATED: CPT

## 2019-12-31 ENCOUNTER — TELEPHONE (OUTPATIENT)
Dept: RHEUMATOLOGY | Facility: CLINIC | Age: 64
End: 2019-12-31

## 2019-12-31 DIAGNOSIS — Z79.60 LONG-TERM USE OF IMMUNOSUPPRESSANT MEDICATION: Primary | ICD-10-CM

## 2019-12-31 DIAGNOSIS — M05.79 RHEUMATOID ARTHRITIS INVOLVING MULTIPLE SITES WITH POSITIVE RHEUMATOID FACTOR: ICD-10-CM

## 2019-12-31 LAB
HBV CORE AB SERPL QL IA: NEGATIVE
HBV SURFACE AB SER-ACNC: NEGATIVE M[IU]/ML
HBV SURFACE AG SERPL QL IA: NEGATIVE

## 2019-12-31 NOTE — TELEPHONE ENCOUNTER
Patient returned call regarding Humira refill. Verified 2 patient identifiers. Patient approved $5.00 copay and will pay upon . No new medications, allergies, or health conditions. Confirmed that dosage has not changed. No missed doses. 0 doses remaining. Next dose due on Wed 1/1. Patient confirmed that no supplies are needed. Patient will  medication 12/31 @ 4 pm. Patient has no questions or concerns at this time. LEONARD

## 2020-01-02 RX ORDER — DOXAZOSIN 4 MG/1
4 TABLET ORAL NIGHTLY
Qty: 90 TABLET | Refills: 1 | Status: SHIPPED | OUTPATIENT
Start: 2020-01-02 | End: 2020-10-01 | Stop reason: SDUPTHER

## 2020-01-22 ENCOUNTER — TELEPHONE (OUTPATIENT)
Dept: SMOKING CESSATION | Facility: CLINIC | Age: 65
End: 2020-01-22

## 2020-01-27 ENCOUNTER — TELEPHONE (OUTPATIENT)
Dept: PHARMACY | Facility: CLINIC | Age: 65
End: 2020-01-27

## 2020-01-27 NOTE — TELEPHONE ENCOUNTER
RX callback attempt 1 regarding Humira refill from [facility]. Patient reached-- shipping out  for  arrival with patients consent. Copay of $5.00 charging [employer] (5142) @ [employer]. Address and  confirmed. Patient has 0 doses on hand at this time. Patient has not started any new medications, has had no missed doses and no side effects present. Patient is currently taking the medication as directed by doctors instruction, Inject 0.8 mLs (40 mg total) into the skin every 14 (fourteen) days. Patient does have a safe place in their residence to keep medication at desired temperature away from small children and pets. Patient also does have the capability of contacting 911 in the event of an emergency. Patient states they do not have any questions or concerns at this time. Patients next injection is scheduled for Wednesday, .

## 2020-02-01 NOTE — PROGRESS NOTES
Subjective:       Patient ID: Andrea Gant is a 64 y.o. male with nodular sero+ccp+ RA, OA and Hepatitis C (cured with Harvoni)    Chief Complaint: No chief complaint on file.    Returns for follow-up. Last seen 8/8/19.  Is taking humira 40 mg every 2 weeks. Cannot space it further as he gets achey. Would like to stay on it and try the CF.  Is down to  mg/d and has seen no difference. He still has no joint pains/swelling. Still with AM stiffness around 30 minutes. Denies dysphagia, tight skin, oral ulcers, pleurisy, pericarditis, photosensitivity, thromboses. Has dry eyes. Sometimes red. No dry mouth. No true Raynaud's.     BP tends to be high in office but ok at home.    Has some skin lesions. One on his R upper back which itches. He has not yet gone to dermatologist. He was asked to do this as part of being on humira.              Pertinent negatives include no fatigue, fever, trouble swallowing or headaches.         Current Outpatient Medications   Medication Sig Dispense Refill    amLODIPine (NORVASC) 5 MG tablet Take 1 tablet (5 mg total) by mouth once daily. 30 tablet 12    cilostazol (PLETAL) 50 MG Tab Take 1 tablet (50 mg total) by mouth 2 (two) times daily. 60 tablet 10    cilostazol (PLETAL) 50 MG Tab Take 1 tablet (50 mg total) by mouth 2 (two) times daily. 60 tablet 11    doxazosin (CARDURA) 4 MG tablet TAKE 1 TABLET (4 MG TOTAL) BY MOUTH EVERY EVENING. 90 tablet 1    hydroxychloroquine (PLAQUENIL) 200 mg tablet Take 1 tablet (200 mg total) by mouth 2 (two) times daily. 180 tablet 0    omeprazole (PRILOSEC) 40 MG capsule Take 40 mg by mouth every morning.      oxyCODONE (ROXICODONE) 5 MG immediate release tablet Take 1 tablet (5 mg total) by mouth every 4 (four) hours as needed for Pain. 30 tablet 0    simvastatin (ZOCOR) 10 MG tablet Take 1 tablet (10 mg total) by mouth every evening. 90 tablet 6    simvastatin (ZOCOR) 10 MG tablet TAKE 1 TABLET BY MOUTH EVERY EVENING 90 tablet 1     traZODone (DESYREL) 50 MG tablet Take 3 tablets (150 mg total) by mouth nightly as needed for Insomnia. 90 tablet 1    adalimumab (HUMIRA,CF, PEN) 40 mg/0.4 mL PnKt Inject 0.4 mLs (40 mg total) into the skin every 14 (fourteen) days. 6 pen 2    aspirin (ECOTRIN) 81 MG EC tablet Take 1 tablet (81 mg total) by mouth once daily.  3     No current facility-administered medications for this visit.      Probable Allergic to Enbrel (rash);     Past Medical History:   Diagnosis Date    Cataract     History of hepatitis C, s/p successful treatment w/ SVR12 - 7/2015 10/14/2012    Kelsey 1a,  Liver biopsy 6/2014 - Stage 1 fibrosis;  Completed 8 weeks Harvoni w/ SVR12 - 7/2015      Hyperlipidemia     Lipoma of arm     Lipoma of lower extremity     Nuclear sclerosis - Both Eyes 10/22/2012    Other and unspecified hyperlipidemia 10/22/2013    PAD (peripheral artery disease)     Peripheral vascular disease, unspecified     Rheumatoid arthritis(714.0) 10/14/2012       Past Surgical History:   Procedure Laterality Date    KNEE ARTHROPLASTY      LAPAROSCOPIC EXPLORATION OF GROIN Left 9/6/2018    Procedure: EXPLORATION, INGUINAL REGION, LAPAROSCOPIC;  Surgeon: Mingo De Guzman Jr., MD;  Location: [facility] OR [address] [place] [address];  Service: General;  Laterality: Left;    TONSILLECTOMY         Review of Systems   Constitutional: Negative.  Negative for fatigue, fever and unexpected weight change.   HENT: Negative.  Negative for hearing loss, mouth sores, sore throat, tinnitus and trouble swallowing.    Eyes: Positive for redness. Negative for visual disturbance.        Dry eyes.   Respiratory: Negative for cough, choking, chest tightness and shortness of breath.    Cardiovascular: Negative.  Negative for chest pain, palpitations and leg swelling.   Gastrointestinal: Negative.  Negative for abdominal pain, blood in stool, constipation, diarrhea, nausea and vomiting.        Heartburn   Endocrine: Negative.    Genitourinary: Positive  "for frequency. Negative for genital sores, hematuria and urgency.   Musculoskeletal: Negative.  Negative for back pain, neck pain and neck stiffness.   Skin: Negative.  Negative for rash.   Allergic/Immunologic: Negative.    Neurological: Negative.  Negative for dizziness, syncope, numbness and headaches.   Hematological: Negative.  Does not bruise/bleed easily.   Psychiatric/Behavioral: Positive for dysphoric mood and sleep disturbance. The patient is nervous/anxious.          SH: still not smoking was former smoker.  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     Objective:   BP (!) 152/78   Pulse 79   Temp (!) 73 °F (22.8 °C)   Ht 5' 6" (1.676 m)   Wt 81.5 kg (179 lb 10.8 oz)   BMI 29.00 kg/m²     Was 173 lb 11.6 oz on 8/8/19  Was 172 lbs 4.8 oz on 12/13/16;  Physical Exam   Vitals reviewed.  Constitutional: He is oriented to person, place, and time and well-developed, well-nourished, and in no distress. No distress.   HENT:   Head: Normocephalic and atraumatic.   Mouth/Throat: Oropharynx is clear and moist. No oropharyngeal exudate.   No facial rashes  Parotids not enlarged  No oral ulcers  Temporal aa ok;    Eyes: Conjunctivae and EOM are normal. Pupils are equal, round, and reactive to light. Right eye exhibits no discharge. Left eye exhibits no discharge. No scleral icterus.   Neck: Neck supple. No JVD present. No tracheal deviation present. No thyromegaly present.   Cardiovascular: Normal rate, regular rhythm, normal heart sounds and intact distal pulses.  Exam reveals no gallop and no friction rub.    No murmur heard.  Pulmonary/Chest: Effort normal and breath sounds normal. No respiratory distress. He has no wheezes. He has no rales. He exhibits no tenderness.   Abdominal: Soft. Bowel sounds are normal. He exhibits no distension and no mass. There is no splenomegaly or hepatomegaly. There is no tenderness. There is no rebound and no guarding.   Lymphadenopathy:     He has no cervical adenopathy.   Neurological: He is " alert and oriented to person, place, and time. He has normal reflexes. No cranial nerve deficit. Gait normal.   Proximal and distal muscle strength 5/5.   Skin: Skin is warm and dry. No rash noted. He is not diaphoretic.     Skin lesion c/w seborrheic keratosis on R posterior upper chest.   Lipomas, forearms and other areas;  Rheumatoid nodules vs lipomas of elbows.    Bilateral superficial varicosities LE;   Psychiatric: Mood, memory, affect and judgment normal.   Musculoskeletal: Normal range of motion. He exhibits no edema or tenderness.   Cspine FROM no tenderness  Tspine FROM no tenderness  Lspine FROM no tenderness.  TMJ: unremarkable  Shoulders: missing a few degrees of abduction on the R; no synovitis; no tenderness  Elbows: FROM; left elbow nodule, firm & non tender vs lipoma as he has them all over; right elbow with mild flexion contracture & elbow nodule vs lipoma.  Wrists: FROM; no synovitis  MCPs: FROM; no synovitis; no metacarpalgia;  ok;  PIPs:FROM; +Bouchards--servando 4th R PIP; non tender; no synovitis;  Good fists.  DIPs: FROM; + Heberden's; no synovitis  HIPS: FROM  Knees: FROM; no synovitis; no instability; minimal PF crepitus;  Ankles: FROM: no synovitis   Toes: ok; no metatarsalgia;                R posterior upper chest wall.        LABS:   19: ESR 9; CRP 2.5; CBC ok; CMP BUN 24; cnne 1.3; GFR 57.3;   19: ESR 24 (23); CRP 5.4; CBC ok; CMP cnne 1.3; GFR 57.3; BUN 27;   10/10/18; ESR 7; CRP 4.1; CBC ; CMP cnne 1.3; GFR 57.7;   18: ESR 29; CRP; CBC ok; CMP ok; ; CCP 44;   17: ESR 23; CRP 4.2; CBC ok; CMP cnne 1.3; GFR 58.1;   3/26/16: CBC ok; cnne 1.3  3/10/16: ESR 9; CRP 1.7; CBC, CMP ok;  9/15/15: ESR 20; CRP 0.9; CBC ok; CMP ok;  3/2/15: CMP BUN 28; cnne 1.1  1/12/15: Hg 13.9  10/21/14: ESR 8;   14: ESR 19; CRP 2.9; Hg 12.8; cnne 1.3; Vit D 18; ; CCP 44.3   Hepatitis B & HIV negative; HCV+;     IMAGIN19: Bilateral hands: personally viewed: L: cystic  changes at the distal aspect of the left ulna, also involving some of the carpal bones, as well as the distal aspects of the 1st and 3rd metacarpal bones.  Mild degenerative changes at some of the DIP joints; R: Cystic changes involving some of the carpal bones as well as the distal aspect of the 1st and 3rd metacarpals and proximal aspect of the proximal phalanx of the thumb.  Degenerative changes involving some of the DIP joints and also the interphalangeal joint of the right thumb.    9/29/16: US Dopplers: R:minimal peripheral arterial occlusive disease: L: moderate peripheral arterial occlusive disease; unchanged  4/2/15: Bilateral wrists: personal review: cystic lesion left lunate, possibly left ulnar notch. (not too different from previous)  4/2/15: R ankle personally reviewed: ok  1/20/14: Arthritis survey personally reviewed again: OA lower CS; min OA hands & feet;      Assessment:   Seropositive (), CCP (44) positive nodular (bilateral elbows vs lipomas) rheumatoid arthritis with no erosions--doing well,    Enbrel effective but resulted in rash.    MTX and Leflunomide were contraindicated at the time due to HCV.   SSZ given ok by Dr. Moore, but never begun   On hydroxychloroquine 400 mg/d   Humira 40 mg qow since 6/15   Ok for use of MTX or Leflunomide if needed as per Dr. Moore    Possible Raynaud's    Seborrheic keratoses & other skin lesions    Bilateral adhesive capsulitis R>L--resolved    Elevated blood pressure    Mild renal insufficiency on meloxicam (cnne up to 1.3 from 1.0)   Off it now, but last cnne 1.3.    Hepatitis C with normal liver function tests--genotype 1a   Completed Harvoni; cured    Osteoarthritis of hands.     Recurrent hypovitaminosis D -treated in past    Peripheral Arterial Disease.    Compression deformity of the lower lumbar and thoracic vertebral bodies by x-ray.     Multiple lipomas of the arms and lumbar spine area.     Occupational injuries in the past.      Persistent insomnia.   Helped partly by trazodone.       Plan:   Stay on Humira 40 mg qow  Ok to stop HCQ  Again discussed need for yearly skin exams.   Ambulatory referral put in   No NSAIDs  Keep us posted.   Labs ordered for 6 months  RTC 6 months as per patient's request.

## 2020-02-04 ENCOUNTER — PATIENT OUTREACH (OUTPATIENT)
Dept: ADMINISTRATIVE | Facility: OTHER | Age: 65
End: 2020-02-04

## 2020-02-06 ENCOUNTER — OFFICE VISIT (OUTPATIENT)
Dept: RHEUMATOLOGY | Facility: CLINIC | Age: 65
End: 2020-02-06
Payer: COMMERCIAL

## 2020-02-06 VITALS
HEIGHT: 66 IN | BODY MASS INDEX: 28.88 KG/M2 | HEART RATE: 79 BPM | WEIGHT: 179.69 LBS | SYSTOLIC BLOOD PRESSURE: 152 MMHG | TEMPERATURE: 73 F | DIASTOLIC BLOOD PRESSURE: 78 MMHG

## 2020-02-06 DIAGNOSIS — Z79.60 LONG-TERM USE OF IMMUNOSUPPRESSANT MEDICATION: ICD-10-CM

## 2020-02-06 DIAGNOSIS — Z79.899 LONG-TERM USE OF HYDROXYCHLOROQUINE: ICD-10-CM

## 2020-02-06 DIAGNOSIS — M54.50 CHRONIC MIDLINE LOW BACK PAIN WITHOUT SCIATICA: ICD-10-CM

## 2020-02-06 DIAGNOSIS — L82.1 SEBORRHEIC KERATOSES: ICD-10-CM

## 2020-02-06 DIAGNOSIS — G89.29 CHRONIC MIDLINE LOW BACK PAIN WITHOUT SCIATICA: ICD-10-CM

## 2020-02-06 DIAGNOSIS — N18.30 CKD (CHRONIC KIDNEY DISEASE) STAGE 3, GFR 30-59 ML/MIN: ICD-10-CM

## 2020-02-06 DIAGNOSIS — M05.79 RHEUMATOID ARTHRITIS INVOLVING MULTIPLE SITES WITH POSITIVE RHEUMATOID FACTOR: Primary | ICD-10-CM

## 2020-02-06 DIAGNOSIS — Z55.9 EDUCATIONAL CIRCUMSTANCE: ICD-10-CM

## 2020-02-06 PROCEDURE — 99214 OFFICE O/P EST MOD 30 MIN: CPT | Mod: S$GLB,,, | Performed by: INTERNAL MEDICINE

## 2020-02-06 PROCEDURE — 3077F PR MOST RECENT SYSTOLIC BLOOD PRESSURE >= 140 MM HG: ICD-10-PCS | Mod: CPTII,S$GLB,, | Performed by: INTERNAL MEDICINE

## 2020-02-06 PROCEDURE — 1101F PR PT FALLS ASSESS DOC 0-1 FALLS W/OUT INJ PAST YR: ICD-10-PCS | Mod: CPTII,S$GLB,, | Performed by: INTERNAL MEDICINE

## 2020-02-06 PROCEDURE — 99999 PR PBB SHADOW E&M-EST. PATIENT-LVL IV: CPT | Mod: PBBFAC,,, | Performed by: INTERNAL MEDICINE

## 2020-02-06 PROCEDURE — 1101F PT FALLS ASSESS-DOCD LE1/YR: CPT | Mod: CPTII,S$GLB,, | Performed by: INTERNAL MEDICINE

## 2020-02-06 PROCEDURE — 3078F DIAST BP <80 MM HG: CPT | Mod: CPTII,S$GLB,, | Performed by: INTERNAL MEDICINE

## 2020-02-06 PROCEDURE — 3008F PR BODY MASS INDEX (BMI) DOCUMENTED: ICD-10-PCS | Mod: CPTII,S$GLB,, | Performed by: INTERNAL MEDICINE

## 2020-02-06 PROCEDURE — 99999 PR PBB SHADOW E&M-EST. PATIENT-LVL IV: ICD-10-PCS | Mod: PBBFAC,,, | Performed by: INTERNAL MEDICINE

## 2020-02-06 PROCEDURE — 3077F SYST BP >= 140 MM HG: CPT | Mod: CPTII,S$GLB,, | Performed by: INTERNAL MEDICINE

## 2020-02-06 PROCEDURE — 99214 PR OFFICE/OUTPT VISIT, EST, LEVL IV, 30-39 MIN: ICD-10-PCS | Mod: S$GLB,,, | Performed by: INTERNAL MEDICINE

## 2020-02-06 PROCEDURE — 3008F BODY MASS INDEX DOCD: CPT | Mod: CPTII,S$GLB,, | Performed by: INTERNAL MEDICINE

## 2020-02-06 PROCEDURE — 3078F PR MOST RECENT DIASTOLIC BLOOD PRESSURE < 80 MM HG: ICD-10-PCS | Mod: CPTII,S$GLB,, | Performed by: INTERNAL MEDICINE

## 2020-02-06 SDOH — SOCIAL DETERMINANTS OF HEALTH (SDOH): PROBLEMS RELATED TO EDUCATION AND LITERACY, UNSPECIFIED: Z55.9

## 2020-02-06 ASSESSMENT — ROUTINE ASSESSMENT OF PATIENT INDEX DATA (RAPID3)
MDHAQ FUNCTION SCORE: 1.3
PATIENT GLOBAL ASSESSMENT SCORE: 5.5
AM STIFFNESS SCORE: 1, YES
TOTAL RAPID3 SCORE: 4.94
WHEN YOU AWAKENED IN THE MORNING OVER THE LAST WEEK, PLEASE INDICATE THE AMOUNT OF TIME IT TAKES UNTIL YOU ARE AS LIMBER AS YOU WILL BE FOR THE DAY: 31 MINUTES
PSYCHOLOGICAL DISTRESS SCORE: 3.3
PAIN SCORE: 5
FATIGUE SCORE: 4

## 2020-02-07 ENCOUNTER — TELEPHONE (OUTPATIENT)
Dept: ADMINISTRATIVE | Facility: OTHER | Age: 65
End: 2020-02-07

## 2020-02-07 NOTE — TELEPHONE ENCOUNTER
Left voice message for patient to return call to schedule appointment from referral to Dermatology department..  Shonna PLEITEZ [phone]

## 2020-02-10 RX ORDER — SIMVASTATIN 10 MG/1
10 TABLET, FILM COATED ORAL NIGHTLY
Qty: 90 TABLET | Refills: 6 | Status: SHIPPED | OUTPATIENT
Start: 2020-02-10 | End: 2020-03-25 | Stop reason: SDUPTHER

## 2020-02-28 ENCOUNTER — PATIENT MESSAGE (OUTPATIENT)
Dept: RHEUMATOLOGY | Facility: CLINIC | Age: 65
End: 2020-02-28

## 2020-03-12 DIAGNOSIS — G47.00 PERSISTENT INSOMNIA: ICD-10-CM

## 2020-03-12 RX ORDER — TRAZODONE HYDROCHLORIDE 50 MG/1
150 TABLET ORAL NIGHTLY PRN
Qty: 90 TABLET | Refills: 3 | Status: SHIPPED | OUTPATIENT
Start: 2020-03-12 | End: 2020-05-17 | Stop reason: SDUPTHER

## 2020-03-18 ENCOUNTER — TELEPHONE (OUTPATIENT)
Dept: PHARMACY | Facility: CLINIC | Age: 65
End: 2020-03-18

## 2020-03-19 ENCOUNTER — PATIENT MESSAGE (OUTPATIENT)
Dept: INTERNAL MEDICINE | Facility: CLINIC | Age: 65
End: 2020-03-19

## 2020-03-24 ENCOUNTER — PATIENT MESSAGE (OUTPATIENT)
Dept: INTERNAL MEDICINE | Facility: CLINIC | Age: 65
End: 2020-03-24

## 2020-03-25 RX ORDER — AMLODIPINE BESYLATE 5 MG/1
5 TABLET ORAL DAILY
Qty: 30 TABLET | Refills: 12 | Status: SHIPPED | OUTPATIENT
Start: 2020-03-25 | End: 2020-08-24 | Stop reason: SDUPTHER

## 2020-03-25 RX ORDER — SIMVASTATIN 10 MG/1
10 TABLET, FILM COATED ORAL NIGHTLY
Qty: 90 TABLET | Refills: 6 | Status: SHIPPED | OUTPATIENT
Start: 2020-03-25 | End: 2020-05-17 | Stop reason: SDUPTHER

## 2020-04-13 ENCOUNTER — TELEPHONE (OUTPATIENT)
Dept: VASCULAR SURGERY | Facility: CLINIC | Age: 65
End: 2020-04-13

## 2020-04-13 NOTE — TELEPHONE ENCOUNTER
Contacted patient in response to refill request for Pletal from Dr. Jensen. Explained to patient that enough refills were prescribed to last patient until October of 2020. Pt states he sent in a refill request through the [facility], but thought this would instruct his pharmacy to refill medication. Explained to patient that Rx refill with [facility] sends a request to prescribing provider to have new Rx sent to pharmacy, and patient should contact his pharmacy for refill. Pt verbalized understanding and states he will contact his pharmacy.

## 2020-04-30 ENCOUNTER — TELEPHONE (OUTPATIENT)
Dept: PHARMACY | Facility: CLINIC | Age: 65
End: 2020-04-30

## 2020-05-17 DIAGNOSIS — G47.00 PERSISTENT INSOMNIA: ICD-10-CM

## 2020-05-18 RX ORDER — TRAZODONE HYDROCHLORIDE 50 MG/1
150 TABLET ORAL NIGHTLY PRN
Qty: 90 TABLET | Refills: 3 | Status: SHIPPED | OUTPATIENT
Start: 2020-05-18 | End: 2020-10-20 | Stop reason: SDUPTHER

## 2020-05-18 RX ORDER — SIMVASTATIN 10 MG/1
10 TABLET, FILM COATED ORAL NIGHTLY
Qty: 90 TABLET | Refills: 6 | Status: SHIPPED | OUTPATIENT
Start: 2020-05-18 | End: 2020-10-01

## 2020-05-19 ENCOUNTER — CLINICAL SUPPORT (OUTPATIENT)
Dept: SMOKING CESSATION | Facility: CLINIC | Age: 65
End: 2020-05-19
Payer: COMMERCIAL

## 2020-05-19 DIAGNOSIS — F17.210 LIGHT CIGARETTE SMOKER (1-9 CIGS/DAY): Primary | ICD-10-CM

## 2020-05-19 PROCEDURE — 99404 PREV MED CNSL INDIV APPRX 60: CPT | Mod: S$GLB,,,

## 2020-05-19 PROCEDURE — 99999 PR PBB SHADOW E&M-EST. PATIENT-LVL II: ICD-10-PCS | Mod: PBBFAC,,,

## 2020-05-19 PROCEDURE — 99999 PR PBB SHADOW E&M-EST. PATIENT-LVL II: CPT | Mod: PBBFAC,,,

## 2020-05-19 PROCEDURE — 99404 PR PREVENT COUNSEL,INDIV,60 MIN: ICD-10-PCS | Mod: S$GLB,,,

## 2020-05-19 RX ORDER — VARENICLINE TARTRATE 0.5 (11)-1
KIT ORAL
Qty: 53 TABLET | Refills: 0 | Status: SHIPPED | OUTPATIENT
Start: 2020-05-19 | End: 2020-06-16 | Stop reason: ALTCHOICE

## 2020-05-19 NOTE — Clinical Note
Patient will be participating in weekly tobacco cessation meetings and will begin the prescribed tobacco cessation medication regime of the Chantix starter package. FTND of 3 indicates a low dependence to tobacco but patient smokes each cigarette more than once; this increases the habit component. KYLE-D of 5 is perceived as no mental distress or depression at this time.

## 2020-05-22 ENCOUNTER — PATIENT MESSAGE (OUTPATIENT)
Dept: RHEUMATOLOGY | Facility: CLINIC | Age: 65
End: 2020-05-22

## 2020-05-26 ENCOUNTER — CLINICAL SUPPORT (OUTPATIENT)
Dept: SMOKING CESSATION | Facility: CLINIC | Age: 65
End: 2020-05-26
Payer: COMMERCIAL

## 2020-05-26 DIAGNOSIS — F17.210 LIGHT CIGARETTE SMOKER (1-9 CIGS/DAY): Primary | ICD-10-CM

## 2020-05-26 PROCEDURE — 99999 PR PBB SHADOW E&M-EST. PATIENT-LVL II: ICD-10-PCS | Mod: PBBFAC,,,

## 2020-05-26 PROCEDURE — 99402 PREV MED CNSL INDIV APPRX 30: CPT | Mod: S$GLB,,,

## 2020-05-26 PROCEDURE — 99402 PR PREVENT COUNSEL,INDIV,30 MIN: ICD-10-PCS | Mod: S$GLB,,,

## 2020-05-26 PROCEDURE — 99999 PR PBB SHADOW E&M-EST. PATIENT-LVL II: CPT | Mod: PBBFAC,,,

## 2020-05-26 NOTE — Clinical Note
Just a note to advise how the patient is progressing in the tobacco cessation program. The patient is smoking about 5 cigarettes per day and 2 today. This is down from the weekend when he smoked 14 cigarettes per day. The patient remains on the prescribed tobacco cessation medication regimen of 1 mg Chantix BID without any negative side effects at this time.

## 2020-05-26 NOTE — PROGRESS NOTES
Individual Follow-Up Form    5/26/2020    Quit Date:     Clinical Status of Patient: Outpatient    Length of Service: 30 minutes    Continuing Medication: yes  Chantix    Other Medications:      Target Symptoms: Withdrawal and medication side effects. The following were  rated moderate (3) to severe (4) on TCRS:  · Moderate (3): none  · Severe (4): none    Comments: completion of TCRS (Tobacco Cessation Rating Scale) reviewed strategies, controlling environment, cues, triggers, new goals set. Introduced high risk situations with preparation interventions, caffeine similarities with withdrawal issues of habit and nicotine, Alcohol, Understanding urges, cravings, stress and relaxation. The patient is smoking about 5 cigarettes per day and 2 today. This is down from the weekend when he smoked 14 cigarettes per day. The patient remains on the prescribed tobacco cessation medication regimen of 1 mg Chantix BID without any negative side effects at this time. The patient denies any abnormal behavioral or mental changes at this time. The patient will continue with therapy sessions and medication monitoring by CTTS. Prescribed medication management will be by physician.     Diagnosis: F17.210    Next Visit: 1 week

## 2020-05-27 ENCOUNTER — CLINICAL SUPPORT (OUTPATIENT)
Dept: SMOKING CESSATION | Facility: CLINIC | Age: 65
End: 2020-05-27
Payer: COMMERCIAL

## 2020-05-27 ENCOUNTER — TELEPHONE (OUTPATIENT)
Dept: PHARMACY | Facility: CLINIC | Age: 65
End: 2020-05-27

## 2020-05-27 DIAGNOSIS — F17.200 NICOTINE DEPENDENCE: Primary | ICD-10-CM

## 2020-05-27 PROCEDURE — 99407 BEHAV CHNG SMOKING > 10 MIN: CPT | Mod: S$GLB,,,

## 2020-05-27 PROCEDURE — 99407 PR TOBACCO USE CESSATION INTENSIVE >10 MINUTES: ICD-10-PCS | Mod: S$GLB,,,

## 2020-05-27 NOTE — PROGRESS NOTES
Called pt to f/u on his 12 month smoking cessation quit status. Pt stated he was tobacco free for almost 2 months, but then relapsed. Patient came back for quit #2 on 5/19/2020. He is back on Chantix and has cut back on his smoking since last week. Informed him of benefit period, phone follow ups, and contact information. Will complete smart form and resolve quit #1 episode. Will continue to follow up on quit #2 episode.

## 2020-06-01 ENCOUNTER — TELEPHONE (OUTPATIENT)
Dept: RHEUMATOLOGY | Facility: CLINIC | Age: 65
End: 2020-06-01

## 2020-06-01 DIAGNOSIS — M05.79 RHEUMATOID ARTHRITIS INVOLVING MULTIPLE SITES WITH POSITIVE RHEUMATOID FACTOR: Primary | ICD-10-CM

## 2020-06-02 ENCOUNTER — LAB VISIT (OUTPATIENT)
Dept: LAB | Facility: HOSPITAL | Age: 65
End: 2020-06-02
Attending: INTERNAL MEDICINE
Payer: COMMERCIAL

## 2020-06-02 ENCOUNTER — CLINICAL SUPPORT (OUTPATIENT)
Dept: SMOKING CESSATION | Facility: CLINIC | Age: 65
End: 2020-06-02
Payer: COMMERCIAL

## 2020-06-02 DIAGNOSIS — F17.210 LIGHT CIGARETTE SMOKER (1-9 CIGS/DAY): Primary | ICD-10-CM

## 2020-06-02 DIAGNOSIS — M05.79 RHEUMATOID ARTHRITIS INVOLVING MULTIPLE SITES WITH POSITIVE RHEUMATOID FACTOR: ICD-10-CM

## 2020-06-02 LAB
ALBUMIN SERPL BCP-MCNC: 4.2 G/DL (ref 3.5–5.2)
ALP SERPL-CCNC: 86 U/L (ref 55–135)
ALT SERPL W/O P-5'-P-CCNC: 19 U/L (ref 10–44)
ANION GAP SERPL CALC-SCNC: 7 MMOL/L (ref 8–16)
AST SERPL-CCNC: 23 U/L (ref 10–40)
BASOPHILS # BLD AUTO: 0.05 K/UL (ref 0–0.2)
BASOPHILS NFR BLD: 0.5 % (ref 0–1.9)
BILIRUB SERPL-MCNC: 0.6 MG/DL (ref 0.1–1)
BUN SERPL-MCNC: 30 MG/DL (ref 8–23)
CALCIUM SERPL-MCNC: 9.6 MG/DL (ref 8.7–10.5)
CCP AB SER IA-ACNC: 30.4 U/ML
CHLORIDE SERPL-SCNC: 104 MMOL/L (ref 95–110)
CO2 SERPL-SCNC: 27 MMOL/L (ref 23–29)
CREAT SERPL-MCNC: 1.4 MG/DL (ref 0.5–1.4)
CRP SERPL-MCNC: 2.5 MG/L (ref 0–8.2)
DIFFERENTIAL METHOD: ABNORMAL
EOSINOPHIL # BLD AUTO: 0.1 K/UL (ref 0–0.5)
EOSINOPHIL NFR BLD: 1.5 % (ref 0–8)
ERYTHROCYTE [DISTWIDTH] IN BLOOD BY AUTOMATED COUNT: 13 % (ref 11.5–14.5)
ERYTHROCYTE [SEDIMENTATION RATE] IN BLOOD BY WESTERGREN METHOD: 13 MM/HR (ref 0–23)
EST. GFR  (AFRICAN AMERICAN): >60 ML/MIN/1.73 M^2
EST. GFR  (NON AFRICAN AMERICAN): 52 ML/MIN/1.73 M^2
GLUCOSE SERPL-MCNC: 83 MG/DL (ref 70–110)
HCT VFR BLD AUTO: 49 % (ref 40–54)
HGB BLD-MCNC: 15.4 G/DL (ref 14–18)
IMM GRANULOCYTES # BLD AUTO: 0.02 K/UL (ref 0–0.04)
IMM GRANULOCYTES NFR BLD AUTO: 0.2 % (ref 0–0.5)
LYMPHOCYTES # BLD AUTO: 1.8 K/UL (ref 1–4.8)
LYMPHOCYTES NFR BLD: 18.8 % (ref 18–48)
MCH RBC QN AUTO: 28.9 PG (ref 27–31)
MCHC RBC AUTO-ENTMCNC: 31.4 G/DL (ref 32–36)
MCV RBC AUTO: 92 FL (ref 82–98)
MONOCYTES # BLD AUTO: 0.8 K/UL (ref 0.3–1)
MONOCYTES NFR BLD: 8.5 % (ref 4–15)
NEUTROPHILS # BLD AUTO: 6.7 K/UL (ref 1.8–7.7)
NEUTROPHILS NFR BLD: 70.5 % (ref 38–73)
NRBC BLD-RTO: 0 /100 WBC
PLATELET # BLD AUTO: 198 K/UL (ref 150–350)
PMV BLD AUTO: 9.6 FL (ref 9.2–12.9)
POTASSIUM SERPL-SCNC: 4 MMOL/L (ref 3.5–5.1)
PROT SERPL-MCNC: 8.7 G/DL (ref 6–8.4)
RBC # BLD AUTO: 5.32 M/UL (ref 4.6–6.2)
RHEUMATOID FACT SERPL-ACNC: 191 IU/ML (ref 0–15)
SODIUM SERPL-SCNC: 138 MMOL/L (ref 136–145)
WBC # BLD AUTO: 9.45 K/UL (ref 3.9–12.7)

## 2020-06-02 PROCEDURE — 85652 RBC SED RATE AUTOMATED: CPT

## 2020-06-02 PROCEDURE — 86431 RHEUMATOID FACTOR QUANT: CPT

## 2020-06-02 PROCEDURE — 36415 COLL VENOUS BLD VENIPUNCTURE: CPT

## 2020-06-02 PROCEDURE — 99402 PREV MED CNSL INDIV APPRX 30: CPT | Mod: S$GLB,,,

## 2020-06-02 PROCEDURE — 99999 PR PBB SHADOW E&M-EST. PATIENT-LVL I: ICD-10-PCS | Mod: PBBFAC,,,

## 2020-06-02 PROCEDURE — 86140 C-REACTIVE PROTEIN: CPT

## 2020-06-02 PROCEDURE — 85025 COMPLETE CBC W/AUTO DIFF WBC: CPT

## 2020-06-02 PROCEDURE — 99999 PR PBB SHADOW E&M-EST. PATIENT-LVL I: CPT | Mod: PBBFAC,,,

## 2020-06-02 PROCEDURE — 86200 CCP ANTIBODY: CPT

## 2020-06-02 PROCEDURE — 80053 COMPREHEN METABOLIC PANEL: CPT

## 2020-06-02 PROCEDURE — 99402 PR PREVENT COUNSEL,INDIV,30 MIN: ICD-10-PCS | Mod: S$GLB,,,

## 2020-06-02 NOTE — Clinical Note
Just a note to advise how the patient is progressing in the tobacco cessation program. The patient is down to smoking 3-5 cigarettes per day and a quit was discussed but not set at this time. The patient remains on the prescribed tobacco cessation medication regimen of 1 mg Chantix BID without any negative side effects at this time.

## 2020-06-03 NOTE — PROGRESS NOTES
Individual Follow-Up Form    6/3/2020    Quit Date:     Clinical Status of Patient: Outpatient    Length of Service: 30 minutes    Continuing Medication: yes  Chantix    Other Medications:      Target Symptoms: Withdrawal and medication side effects. The following were  rated moderate (3) to severe (4) on TCRS:  · Moderate (3): none  · Severe (4): none    Comments: completion of TCRS (Tobacco Cessation Rating Scale) reviewed strategies, habitual behavior, high risks situations, understanding urges and cravings, stress and relaxation with open discussion and additional interventions, Introduced lapses, relapses, understanding them and analyzing the situation of a lapse, conflict issues that may be linked to a lapse. The patient is down to smoking 3-5 cigarettes per day and a quit was discussed but not set at this time. The patient remains on the prescribed tobacco cessation medication regimen of 1 mg Chantix BID without any negative side effects at this time. The patient denies any abnormal behavioral or mental changes at this time. The patient will continue with therapy sessions and medication monitoring by CTTS. Prescribed medication management will be by physician.     Diagnosis: F17.210    Next Visit: 1 week

## 2020-06-09 ENCOUNTER — CLINICAL SUPPORT (OUTPATIENT)
Dept: SMOKING CESSATION | Facility: CLINIC | Age: 65
End: 2020-06-09
Payer: COMMERCIAL

## 2020-06-09 DIAGNOSIS — F17.210 LIGHT CIGARETTE SMOKER (1-9 CIGS/DAY): Primary | ICD-10-CM

## 2020-06-09 PROCEDURE — 99999 PR PBB SHADOW E&M-EST. PATIENT-LVL I: CPT | Mod: PBBFAC,,,

## 2020-06-09 PROCEDURE — 99402 PREV MED CNSL INDIV APPRX 30: CPT | Mod: S$GLB,,,

## 2020-06-09 PROCEDURE — 99999 PR PBB SHADOW E&M-EST. PATIENT-LVL I: ICD-10-PCS | Mod: PBBFAC,,,

## 2020-06-09 PROCEDURE — 99402 PR PREVENT COUNSEL,INDIV,30 MIN: ICD-10-PCS | Mod: S$GLB,,,

## 2020-06-09 NOTE — Clinical Note
Just a note to advise how the patient is progressing in the tobacco cessation program. The patient is down to smoking only 2-3 cigarettes per day and a quit was discussed but not set at this time. The patient is experiencing the following negative side effect:  frequent waking at night: advised the patient to reduce dosage to 1 mg Chantix once a day at lunch. The patient denies any abnormal behavioral or mental changes at this time.

## 2020-06-09 NOTE — PROGRESS NOTES
Individual Follow-Up Form    6/9/2020    Quit Date:     Clinical Status of Patient: Outpatient    Length of Service: 30 minutes    Continuing Medication: yes  Chantix    Other Medications:      Target Symptoms: Withdrawal and medication side effects. The following were  rated moderate (3) to severe (4) on TCRS:  · Moderate (3): none  · Severe (4): frequent waking at night: advised the patient to reduce dosage to 1 mg Chantix once a day at lunch.     Comments: completion of TCRS (Tobacco Cessation Rating Scale) reviewed strategies, habitual behavior, stress, and high risk situations. Introduced stress with addition interventions, SOLVE, relaxation with interventions, nutrition, exercise, weight gain, and the importance of rewarding oneself for accomplishments toward becoming tobacco free. The patient is down to smoking only 2-3 cigarettes per day and a quit was discussed but not set at this time. The patient is experiencing the following negative side effect:  frequent waking at night: advised the patient to reduce dosage to 1 mg Chantix once a day at lunch. The patient denies any abnormal behavioral or mental changes at this time. The patient will continue with group therapy sessions and medication monitoring by CTTS. Prescribed medication management will be by physician.     Diagnosis: F17.210    Next Visit: 1 week

## 2020-06-16 ENCOUNTER — CLINICAL SUPPORT (OUTPATIENT)
Dept: SMOKING CESSATION | Facility: CLINIC | Age: 65
End: 2020-06-16
Payer: COMMERCIAL

## 2020-06-16 DIAGNOSIS — F17.210 LIGHT CIGARETTE SMOKER (1-9 CIGS/DAY): Primary | ICD-10-CM

## 2020-06-16 PROCEDURE — 99999 PR PBB SHADOW E&M-EST. PATIENT-LVL I: CPT | Mod: PBBFAC,,,

## 2020-06-16 PROCEDURE — 99999 PR PBB SHADOW E&M-EST. PATIENT-LVL I: ICD-10-PCS | Mod: PBBFAC,,,

## 2020-06-16 PROCEDURE — 99407 BEHAV CHNG SMOKING > 10 MIN: CPT | Mod: S$GLB,,,

## 2020-06-16 PROCEDURE — 99407 PR TOBACCO USE CESSATION INTENSIVE >10 MINUTES: ICD-10-PCS | Mod: S$GLB,,,

## 2020-06-16 RX ORDER — VARENICLINE TARTRATE 1 MG/1
1 TABLET, FILM COATED ORAL 2 TIMES DAILY
Qty: 58 TABLET | Refills: 0 | Status: SHIPPED | OUTPATIENT
Start: 2020-06-16 | End: 2020-10-01 | Stop reason: ALTCHOICE

## 2020-06-23 ENCOUNTER — CLINICAL SUPPORT (OUTPATIENT)
Dept: SMOKING CESSATION | Facility: CLINIC | Age: 65
End: 2020-06-23
Payer: COMMERCIAL

## 2020-06-23 DIAGNOSIS — F17.210 LIGHT CIGARETTE SMOKER (1-9 CIGS/DAY): Primary | ICD-10-CM

## 2020-06-23 PROCEDURE — 99999 PR PBB SHADOW E&M-EST. PATIENT-LVL I: CPT | Mod: PBBFAC,,,

## 2020-06-23 PROCEDURE — 99402 PR PREVENT COUNSEL,INDIV,30 MIN: ICD-10-PCS | Mod: S$GLB,,,

## 2020-06-23 PROCEDURE — 99999 PR PBB SHADOW E&M-EST. PATIENT-LVL I: ICD-10-PCS | Mod: PBBFAC,,,

## 2020-06-23 PROCEDURE — 99402 PREV MED CNSL INDIV APPRX 30: CPT | Mod: S$GLB,,,

## 2020-06-23 NOTE — Clinical Note
Just a note to advise how the patient is progressing in the tobacco cessation program.  The patient is down to smoking 3 - 4 times a day but only a puff or two with each cigarette. The patient targeted his habit issues as the morning drive to work and after meals. Additional interventions were discussed with the patient and a quit was discussed but not set. Patient did state he was going to try and be tobacco free by next visit. The patient remains on the prescribed tobacco cessation medication regimen of 1 mg Chantix BID without any negative side effects at this time.

## 2020-06-23 NOTE — PROGRESS NOTES
Individual Follow-Up Form    6/23/2020    Quit Date:     Clinical Status of Patient: Outpatient    Length of Service: 30 minutes    Continuing Medication: yes  Chantix    Other Medications:      Target Symptoms: Withdrawal and medication side effects. The following were  rated moderate (3) to severe (4) on TCRS:  · Moderate (3): none  · Severe (4): none    Comments: completion of TCRS (Tobacco Cessation Rating Scale) reviewed strategies, habitual behavior, stress, and high risk situations. Introduced stress with addition interventions, SOLVE, relaxation with interventions, nutrition, exercise, weight gain, and the importance of rewarding oneself for accomplishments toward becoming tobacco free. The patient is down to smoking 3 - 4 times a day but only a puff or two with each cigarette. The patient targeted his habit issues as the morning drive to work and after meals. Additional interventions were discussed with the patient and a quit was discussed but not set. Patient did state he was going to try and be tobacco free by next visit. The patient remains on the prescribed tobacco cessation medication regimen of 1 mg Chantix BID without any negative side effects at this time. The patient denies any abnormal behavioral or mental changes at this time. The patient will continue with group therapy sessions and medication monitoring by CTTS. Prescribed medication management will be by physician.     Diagnosis: F17.210    Next Visit: 1 week

## 2020-06-25 ENCOUNTER — TELEPHONE (OUTPATIENT)
Dept: PHARMACY | Facility: CLINIC | Age: 65
End: 2020-06-25

## 2020-06-25 NOTE — TELEPHONE ENCOUNTER
Refill call regarding Humira at [facility].  Will prepare for shipment with patient consent on  to arrive .  Escalated to PA team to contact [facility] at   [phone] and obtain PA to get coupon to pay.  Patient consented to paying Copay $5.00 [facility] (0968).  Patient next injection due .  No sharps added. Patient has not started any new medications including OTC drugs. Patient has not had any medication/ dose or instruction changes. No new allergies or side effects reported with this shipment. Medication is being taken as prescribed by physician and properly stored. Two patient identifiers:  and Address verified. Patient has no questions or concerns for [facility].

## 2020-06-26 ENCOUNTER — OFFICE VISIT (OUTPATIENT)
Dept: INTERNAL MEDICINE | Facility: CLINIC | Age: 65
End: 2020-06-26
Payer: COMMERCIAL

## 2020-06-26 VITALS
HEART RATE: 104 BPM | BODY MASS INDEX: 25.21 KG/M2 | DIASTOLIC BLOOD PRESSURE: 76 MMHG | SYSTOLIC BLOOD PRESSURE: 118 MMHG | HEIGHT: 69 IN | WEIGHT: 170.19 LBS | RESPIRATION RATE: 16 BRPM | TEMPERATURE: 97 F

## 2020-06-26 DIAGNOSIS — R10.9 ACUTE RIGHT FLANK PAIN: Primary | ICD-10-CM

## 2020-06-26 DIAGNOSIS — R10.31 RIGHT LOWER QUADRANT ABDOMINAL PAIN: ICD-10-CM

## 2020-06-26 PROCEDURE — 3008F PR BODY MASS INDEX (BMI) DOCUMENTED: ICD-10-PCS | Mod: CPTII,S$GLB,, | Performed by: INTERNAL MEDICINE

## 2020-06-26 PROCEDURE — 3074F SYST BP LT 130 MM HG: CPT | Mod: CPTII,S$GLB,, | Performed by: INTERNAL MEDICINE

## 2020-06-26 PROCEDURE — 99999 PR PBB SHADOW E&M-EST. PATIENT-LVL IV: ICD-10-PCS | Mod: PBBFAC,,, | Performed by: INTERNAL MEDICINE

## 2020-06-26 PROCEDURE — 3078F DIAST BP <80 MM HG: CPT | Mod: CPTII,S$GLB,, | Performed by: INTERNAL MEDICINE

## 2020-06-26 PROCEDURE — 99999 PR PBB SHADOW E&M-EST. PATIENT-LVL IV: CPT | Mod: PBBFAC,,, | Performed by: INTERNAL MEDICINE

## 2020-06-26 PROCEDURE — 1159F MED LIST DOCD IN RCRD: CPT | Mod: S$GLB,,, | Performed by: INTERNAL MEDICINE

## 2020-06-26 PROCEDURE — 99214 OFFICE O/P EST MOD 30 MIN: CPT | Mod: S$GLB,,, | Performed by: INTERNAL MEDICINE

## 2020-06-26 PROCEDURE — 1101F PR PT FALLS ASSESS DOC 0-1 FALLS W/OUT INJ PAST YR: ICD-10-PCS | Mod: CPTII,S$GLB,, | Performed by: INTERNAL MEDICINE

## 2020-06-26 PROCEDURE — 3008F BODY MASS INDEX DOCD: CPT | Mod: CPTII,S$GLB,, | Performed by: INTERNAL MEDICINE

## 2020-06-26 PROCEDURE — 3078F PR MOST RECENT DIASTOLIC BLOOD PRESSURE < 80 MM HG: ICD-10-PCS | Mod: CPTII,S$GLB,, | Performed by: INTERNAL MEDICINE

## 2020-06-26 PROCEDURE — 1125F AMNT PAIN NOTED PAIN PRSNT: CPT | Mod: S$GLB,,, | Performed by: INTERNAL MEDICINE

## 2020-06-26 PROCEDURE — 1101F PT FALLS ASSESS-DOCD LE1/YR: CPT | Mod: CPTII,S$GLB,, | Performed by: INTERNAL MEDICINE

## 2020-06-26 PROCEDURE — 1125F PR PAIN SEVERITY QUANTIFIED, PAIN PRESENT: ICD-10-PCS | Mod: S$GLB,,, | Performed by: INTERNAL MEDICINE

## 2020-06-26 PROCEDURE — 99214 PR OFFICE/OUTPT VISIT, EST, LEVL IV, 30-39 MIN: ICD-10-PCS | Mod: S$GLB,,, | Performed by: INTERNAL MEDICINE

## 2020-06-26 PROCEDURE — 3074F PR MOST RECENT SYSTOLIC BLOOD PRESSURE < 130 MM HG: ICD-10-PCS | Mod: CPTII,S$GLB,, | Performed by: INTERNAL MEDICINE

## 2020-06-26 PROCEDURE — 1159F PR MEDICATION LIST DOCUMENTED IN MEDICAL RECORD: ICD-10-PCS | Mod: S$GLB,,, | Performed by: INTERNAL MEDICINE

## 2020-06-26 RX ORDER — NABUMETONE 750 MG/1
750 TABLET, FILM COATED ORAL 2 TIMES DAILY
Qty: 60 TABLET | Refills: 0 | Status: SHIPPED | OUTPATIENT
Start: 2020-06-26 | End: 2021-01-25

## 2020-06-26 NOTE — PROGRESS NOTES
Subjective:       Patient ID: Andrea Gant is a 65 y.o. male.    Chief Complaint: Abdominal Pain    HPI   Pt with hx of nephrolithiasis is here for evaluation of acute onset of right sided flank pain which is radiating to her LLQ abd going area described as sharp/stabbing in nature. A/s of dysuria but no bleeding, fevers/chills.   Review of Systems   Constitutional: Negative for activity change, appetite change, chills, fatigue and fever.   HENT: Negative for nasal congestion, mouth sores, postnasal drip, rhinorrhea, sinus pressure/congestion, sneezing, sore throat, trouble swallowing and voice change.    Eyes: Negative for discharge, itching and visual disturbance.   Respiratory: Negative for cough, chest tightness, shortness of breath and wheezing.    Cardiovascular: Negative for chest pain, palpitations and leg swelling.   Gastrointestinal: Positive for abdominal pain. Negative for blood in stool, constipation, diarrhea, nausea and vomiting.   Endocrine: Negative for cold intolerance and heat intolerance.   Genitourinary: Positive for dysuria. Negative for difficulty urinating, enuresis, flank pain, frequency, hematuria and urgency.   Musculoskeletal: Negative for arthralgias, back pain, myalgias and neck pain.   Integumentary:  Negative for rash and wound.   Allergic/Immunologic: Negative for environmental allergies and food allergies.   Neurological: Negative for dizziness, tremors, seizures, syncope, weakness and headaches.   Hematological: Does not bruise/bleed easily.   Psychiatric/Behavioral: Negative for confusion, sleep disturbance and suicidal ideas. The patient is not nervous/anxious.          Objective:      Physical Exam  Constitutional:       General: He is not in acute distress.     Appearance: He is well-developed. He is not diaphoretic.   HENT:      Head: Normocephalic and atraumatic.      Right Ear: External ear normal.      Left Ear: External ear normal.      Nose: Nose normal.       Mouth/Throat:      Pharynx: No oropharyngeal exudate.   Eyes:      General: No scleral icterus.        Right eye: No discharge.         Left eye: No discharge.      Conjunctiva/sclera: Conjunctivae normal.      Pupils: Pupils are equal, round, and reactive to light.   Neck:      Musculoskeletal: Normal range of motion and neck supple.      Vascular: No JVD.   Cardiovascular:      Rate and Rhythm: Normal rate and regular rhythm.      Heart sounds: Normal heart sounds. No murmur.   Pulmonary:      Effort: Pulmonary effort is normal. No respiratory distress.      Breath sounds: Normal breath sounds. No wheezing or rales.   Abdominal:      General: Bowel sounds are normal.      Palpations: Abdomen is soft.      Tenderness: There is abdominal tenderness (trace in RLQ, no rebound/guarding).   Lymphadenopathy:      Cervical: No cervical adenopathy.   Skin:     General: Skin is warm and dry.      Coloration: Skin is not pale.      Findings: No rash.   Neurological:      Mental Status: He is alert and oriented to person, place, and time.         Assessment:       1. Acute right flank pain    2. Right lower quadrant abdominal pain        Plan:    1/2. STAT CT renal stone study          Check UA/Urine Cx          Rx Relafen 750 mg BID PRN

## 2020-06-30 ENCOUNTER — CLINICAL SUPPORT (OUTPATIENT)
Dept: SMOKING CESSATION | Facility: CLINIC | Age: 65
End: 2020-06-30
Payer: COMMERCIAL

## 2020-06-30 DIAGNOSIS — F17.210 CIGARETTE NICOTINE DEPENDENCE, UNCOMPLICATED: Primary | ICD-10-CM

## 2020-06-30 PROCEDURE — 99999 PR PBB SHADOW E&M-EST. PATIENT-LVL II: ICD-10-PCS | Mod: PBBFAC,,,

## 2020-06-30 PROCEDURE — 99402 PREV MED CNSL INDIV APPRX 30: CPT | Mod: S$GLB,,,

## 2020-06-30 PROCEDURE — 99402 PR PREVENT COUNSEL,INDIV,30 MIN: ICD-10-PCS | Mod: S$GLB,,,

## 2020-06-30 PROCEDURE — 99999 PR PBB SHADOW E&M-EST. PATIENT-LVL II: CPT | Mod: PBBFAC,,,

## 2020-06-30 NOTE — PROGRESS NOTES
Individual Follow-Up Form    6/30/2020    Quit Date: 6/27/2020    Clinical Status of Patient: Outpatient    Length of Service: 30 minutes    Continuing Medication: yes  Chantix    Other Medications:      Target Symptoms: Withdrawal and medication side effects. The following were  rated moderate (3) to severe (4) on TCRS:  · Moderate (3): none  · Severe (4): none    Comments: completion of TCRS (Tobacco Cessation Rating Scale) reviewed strategies, habitual behavior, high risks situations, understanding urges and cravings, stress and relaxation with open discussion and additional interventions, Introduced lapses, relapses, understanding them and analyzing the situation of a lapse, conflict issues that may be linked to a lapse. The patient is tobacco free as of 6/27/2020 and is not experiencing any negative side effects at this time from the quit. The patient remains on the prescribed tobacco cessation medication regimen of 1 mg Chantix BID without any negative side effects at this time. The patient denies any abnormal behavioral or mental changes at this time. The patient will continue with therapy sessions and medication monitoring by CTTS. Prescribed medication management will be by physician.     Diagnosis: F17.210    Next Visit: 1 week

## 2020-06-30 NOTE — Clinical Note
Just a note to advise how the patient is progressing in the tobacco cessation program. The patient is tobacco free as of 6/27/2020 and is not experiencing any negative side effects at this time from the quit. The patient remains on the prescribed tobacco cessation medication regimen of 1 mg Chantix BID without any negative side effects at this time.

## 2020-07-07 ENCOUNTER — CLINICAL SUPPORT (OUTPATIENT)
Dept: SMOKING CESSATION | Facility: CLINIC | Age: 65
End: 2020-07-07
Payer: COMMERCIAL

## 2020-07-07 DIAGNOSIS — F17.210 CIGARETTE NICOTINE DEPENDENCE, UNCOMPLICATED: Primary | ICD-10-CM

## 2020-07-07 PROCEDURE — 99999 PR PBB SHADOW E&M-EST. PATIENT-LVL I: CPT | Mod: PBBFAC,,,

## 2020-07-07 PROCEDURE — 99999 PR PBB SHADOW E&M-EST. PATIENT-LVL I: ICD-10-PCS | Mod: PBBFAC,,,

## 2020-07-07 PROCEDURE — 99402 PR PREVENT COUNSEL,INDIV,30 MIN: ICD-10-PCS | Mod: S$GLB,,,

## 2020-07-07 PROCEDURE — 99402 PREV MED CNSL INDIV APPRX 30: CPT | Mod: S$GLB,,,

## 2020-07-07 NOTE — PROGRESS NOTES
Individual Follow-Up Form    7/7/2020    Quit Date: 6/27/2020     Clinical Status of Patient: Outpatient    Length of Service: 30 minutes    Continuing Medication: yes  Chantix    Other Medications:      Target Symptoms: Withdrawal and medication side effects. The following were  rated moderate (3) to severe (4) on TCRS:  · Moderate (3): None  · Severe (4): None    Comments: completion of TCRS (Tobacco Cessation Rating Scale) reviewed strategies, cues, triggers, high risk situations, lapses, relapses, diet, exercise, stress, relaxation, sleep, habitual behavior, and life style changes. The patient remains tobacco free as of 6/27/2020 and will be discontinuing 1 mg CHANTIX on 7/8/2020. Patient does not feel he needs the CHANTIX to remain tobacco free.  The patient denies any abnormal behavioral or mental changes at this time.     Diagnosis: F17.210    Next Visit: FINISHED

## 2020-08-10 ENCOUNTER — TELEPHONE (OUTPATIENT)
Dept: PHARMACY | Facility: CLINIC | Age: 65
End: 2020-08-10

## 2020-08-10 NOTE — TELEPHONE ENCOUNTER
Pt confirmed shipping of Humira on 8/10 to arrive on . Address and  verified. $5 copay in 004. Pt declined new sharps container. Pt has 1 dose on hand, next dose due 20. Pt reported 1 missed dose- counseling provided. Pt did not start any new medications. Pt had no further questions or concerns.

## 2020-08-24 RX ORDER — AMLODIPINE BESYLATE 5 MG/1
5 TABLET ORAL DAILY
Qty: 30 TABLET | Refills: 12 | Status: SHIPPED | OUTPATIENT
Start: 2020-08-24 | End: 2021-06-29 | Stop reason: SDUPTHER

## 2020-09-22 ENCOUNTER — PATIENT MESSAGE (OUTPATIENT)
Dept: INTERNAL MEDICINE | Facility: CLINIC | Age: 65
End: 2020-09-22

## 2020-09-22 DIAGNOSIS — L98.9 SKIN LESION OF HAND: Primary | ICD-10-CM

## 2020-09-23 ENCOUNTER — TELEPHONE (OUTPATIENT)
Dept: PHARMACY | Facility: CLINIC | Age: 65
End: 2020-09-23

## 2020-10-01 ENCOUNTER — OFFICE VISIT (OUTPATIENT)
Dept: RHEUMATOLOGY | Facility: CLINIC | Age: 65
End: 2020-10-01
Payer: COMMERCIAL

## 2020-10-01 ENCOUNTER — PATIENT MESSAGE (OUTPATIENT)
Dept: RHEUMATOLOGY | Facility: CLINIC | Age: 65
End: 2020-10-01

## 2020-10-01 ENCOUNTER — PATIENT OUTREACH (OUTPATIENT)
Dept: ADMINISTRATIVE | Facility: OTHER | Age: 65
End: 2020-10-01

## 2020-10-01 VITALS
DIASTOLIC BLOOD PRESSURE: 87 MMHG | HEART RATE: 81 BPM | HEIGHT: 67 IN | WEIGHT: 183.19 LBS | SYSTOLIC BLOOD PRESSURE: 137 MMHG | BODY MASS INDEX: 28.75 KG/M2

## 2020-10-01 DIAGNOSIS — M05.79 RHEUMATOID ARTHRITIS INVOLVING MULTIPLE SITES WITH POSITIVE RHEUMATOID FACTOR: Primary | ICD-10-CM

## 2020-10-01 DIAGNOSIS — Z79.60 LONG-TERM USE OF IMMUNOSUPPRESSANT MEDICATION: ICD-10-CM

## 2020-10-01 DIAGNOSIS — Z55.9 EDUCATIONAL CIRCUMSTANCE: ICD-10-CM

## 2020-10-01 DIAGNOSIS — N18.31 STAGE 3A CHRONIC KIDNEY DISEASE: ICD-10-CM

## 2020-10-01 PROCEDURE — 1159F MED LIST DOCD IN RCRD: CPT | Mod: S$GLB,,, | Performed by: INTERNAL MEDICINE

## 2020-10-01 PROCEDURE — 99214 OFFICE O/P EST MOD 30 MIN: CPT | Mod: S$GLB,,, | Performed by: INTERNAL MEDICINE

## 2020-10-01 PROCEDURE — 1101F PT FALLS ASSESS-DOCD LE1/YR: CPT | Mod: CPTII,S$GLB,, | Performed by: INTERNAL MEDICINE

## 2020-10-01 PROCEDURE — 1101F PR PT FALLS ASSESS DOC 0-1 FALLS W/OUT INJ PAST YR: ICD-10-PCS | Mod: CPTII,S$GLB,, | Performed by: INTERNAL MEDICINE

## 2020-10-01 PROCEDURE — 1125F AMNT PAIN NOTED PAIN PRSNT: CPT | Mod: S$GLB,,, | Performed by: INTERNAL MEDICINE

## 2020-10-01 PROCEDURE — 1159F PR MEDICATION LIST DOCUMENTED IN MEDICAL RECORD: ICD-10-PCS | Mod: S$GLB,,, | Performed by: INTERNAL MEDICINE

## 2020-10-01 PROCEDURE — 3075F PR MOST RECENT SYSTOLIC BLOOD PRESS GE 130-139MM HG: ICD-10-PCS | Mod: CPTII,S$GLB,, | Performed by: INTERNAL MEDICINE

## 2020-10-01 PROCEDURE — 99999 PR PBB SHADOW E&M-EST. PATIENT-LVL IV: CPT | Mod: PBBFAC,,, | Performed by: INTERNAL MEDICINE

## 2020-10-01 PROCEDURE — 3075F SYST BP GE 130 - 139MM HG: CPT | Mod: CPTII,S$GLB,, | Performed by: INTERNAL MEDICINE

## 2020-10-01 PROCEDURE — 99999 PR PBB SHADOW E&M-EST. PATIENT-LVL IV: ICD-10-PCS | Mod: PBBFAC,,, | Performed by: INTERNAL MEDICINE

## 2020-10-01 PROCEDURE — 3079F PR MOST RECENT DIASTOLIC BLOOD PRESSURE 80-89 MM HG: ICD-10-PCS | Mod: CPTII,S$GLB,, | Performed by: INTERNAL MEDICINE

## 2020-10-01 PROCEDURE — 3008F PR BODY MASS INDEX (BMI) DOCUMENTED: ICD-10-PCS | Mod: CPTII,S$GLB,, | Performed by: INTERNAL MEDICINE

## 2020-10-01 PROCEDURE — 3079F DIAST BP 80-89 MM HG: CPT | Mod: CPTII,S$GLB,, | Performed by: INTERNAL MEDICINE

## 2020-10-01 PROCEDURE — 99214 PR OFFICE/OUTPT VISIT, EST, LEVL IV, 30-39 MIN: ICD-10-PCS | Mod: S$GLB,,, | Performed by: INTERNAL MEDICINE

## 2020-10-01 PROCEDURE — 3008F BODY MASS INDEX DOCD: CPT | Mod: CPTII,S$GLB,, | Performed by: INTERNAL MEDICINE

## 2020-10-01 PROCEDURE — 1125F PR PAIN SEVERITY QUANTIFIED, PAIN PRESENT: ICD-10-PCS | Mod: S$GLB,,, | Performed by: INTERNAL MEDICINE

## 2020-10-01 SDOH — SOCIAL DETERMINANTS OF HEALTH (SDOH): PROBLEMS RELATED TO EDUCATION AND LITERACY, UNSPECIFIED: Z55.9

## 2020-10-01 NOTE — PROGRESS NOTES
Subjective:       Patient ID: Andrea Gant is a 65 y.o. male with nodular sero+ccp+ RA, OA and Hepatitis C (cured with Harvoni)    Chief Complaint: No chief complaint on file.    Returns for follow-up. Last seen 2/6/20.  Off HCQ without issues, but lately has been c/o humira running out before his next dose.  So for 5 days or so before next dose begins to get stiffness and swelling.  Stiffness may last day and swelling is of his PIPs and MCPs but also feels pain all over.  Took humira yesterday and he's already seeing a response.   Checked w hepatology in the past and they are ok with adding MTX.  Denies dysphagia, tight skin, oral ulcers, pleurisy, pericarditis, photosensitivity, thromboses. Has dry eyes. No dry mouth. No true Raynaud's.   Still has a skin lesion that was noted before and has not seen a dermatologist.         Current Outpatient Medications   Medication Sig Dispense Refill    adalimumab (HUMIRA,CF, PEN) 40 mg/0.4 mL PnKt Inject 0.4 mLs (40 mg total) into the skin every 14 (fourteen) days. 6 pen 2    amLODIPine (NORVASC) 5 MG tablet Take 1 tablet (5 mg total) by mouth once daily. 30 tablet 12    cilostazol (PLETAL) 50 MG Tab Take 1 tablet (50 mg total) by mouth 2 (two) times daily. 60 tablet 10    cilostazol (PLETAL) 50 MG Tab Take 1 tablet (50 mg total) by mouth 2 (two) times daily. 60 tablet 11    doxazosin (CARDURA) 4 MG tablet TAKE 1 TABLET (4 MG TOTAL) BY MOUTH EVERY EVENING. 90 tablet 1    hydroxychloroquine (PLAQUENIL) 200 mg tablet Take 1 tablet (200 mg total) by mouth 2 (two) times daily. 180 tablet 0    nabumetone (RELAFEN) 750 MG tablet Take 1 tablet (750 mg total) by mouth 2 (two) times daily. 60 tablet 0    omeprazole (PRILOSEC) 40 MG capsule Take 40 mg by mouth every morning.      simvastatin (ZOCOR) 10 MG tablet Take 1 tablet (10 mg total) by mouth every evening. 90 tablet 6    simvastatin (ZOCOR) 10 MG tablet Take 1 tablet (10 mg total) by mouth every evening. 90 tablet  6    traZODone (DESYREL) 50 MG tablet Take 3 tablets (150 mg total) by mouth nightly as needed for Insomnia. 90 tablet 3    aspirin (ECOTRIN) 81 MG EC tablet Take 1 tablet (81 mg total) by mouth once daily.  3    varenicline (CHANTIX) 1 mg Tab Take 1 tablet (1 mg total) by mouth 2 (two) times daily. CX (Patient not taking: Reported on 10/1/2020) 58 tablet 0     No current facility-administered medications for this visit.        Probable Allergic to Enbrel (rash);     Past Medical History:   Diagnosis Date    Cataract     Colon polyp 2014    History of hepatitis C, s/p successful treatment w/ SVR12 - 7/2015 10/14/2012    Kelsey 1a,  Liver biopsy 6/2014 - Stage 1 fibrosis;  Completed 8 weeks Harvoni w/ SVR12 - 7/2015      Hyperlipidemia     Lipoma of arm     Lipoma of lower extremity     Nuclear sclerosis - Both Eyes 10/22/2012    Other and unspecified hyperlipidemia 10/22/2013    PAD (peripheral artery disease)     Peripheral vascular disease, unspecified     Rheumatoid arthritis(714.0) 10/14/2012       Past Surgical History:   Procedure Laterality Date    KNEE ARTHROPLASTY      LAPAROSCOPIC EXPLORATION OF GROIN Left 9/6/2018    Procedure: EXPLORATION, INGUINAL REGION, LAPAROSCOPIC;  Surgeon: Mingo De Guzman Jr., MD;  Location: [facility] OR [address] [place] [address];  Service: General;  Laterality: Left;    TONSILLECTOMY         Review of Systems   Constitutional: Negative.  Negative for fatigue, fever and unexpected weight change.   HENT: Negative.  Negative for hearing loss, mouth sores, sore throat, tinnitus and trouble swallowing.    Eyes: Positive for redness. Negative for visual disturbance.        Dry eyes.   Respiratory: Negative for cough, choking, chest tightness and shortness of breath.    Cardiovascular: Negative.  Negative for chest pain, palpitations and leg swelling.   Gastrointestinal: Negative.  Negative for abdominal pain, blood in stool, constipation, diarrhea, nausea and vomiting.        Heartburn  "  Endocrine: Negative.    Genitourinary: Positive for frequency. Negative for genital sores, hematuria and urgency.   Musculoskeletal: Negative.  Negative for back pain, neck pain and neck stiffness.   Skin: Negative.  Negative for rash.   Allergic/Immunologic: Negative.    Neurological: Negative.  Negative for dizziness, syncope, numbness and headaches.   Hematological: Negative.  Does not bruise/bleed easily.   Psychiatric/Behavioral: Positive for dysphoric mood and sleep disturbance. The patient is nervous/anxious.          SH:Gave up smoking was former smoker.  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   Got his Flu vaccine    Objective:   /87   Pulse 81   Ht 5' 7" (1.702 m)   Wt 83.1 kg (183 lb 3.2 oz)   BMI 28.69 kg/m²   Was 173 lb 11.6 oz on 8/8/19  Was 172 lbs 4.8 oz on 12/13/16;  Physical Exam   Vitals reviewed.  Constitutional: He is oriented to person, place, and time and well-developed, well-nourished, and in no distress. No distress.   HENT:   Head: Normocephalic and atraumatic.   Mouth/Throat: Oropharynx is clear and moist. No oropharyngeal exudate.   No facial rashes  Parotids not enlarged  No oral ulcers  Temporal aa ok;    Eyes: Conjunctivae and EOM are normal. Pupils are equal, round, and reactive to light. Right eye exhibits no discharge. Left eye exhibits no discharge. No scleral icterus.   Neck: Neck supple. No JVD present. No tracheal deviation present. No thyromegaly present.   Cardiovascular: Normal rate, regular rhythm, normal heart sounds and intact distal pulses.  Exam reveals no gallop and no friction rub.    No murmur heard.  Pulmonary/Chest: Effort normal and breath sounds normal. No respiratory distress. He has no wheezes. He has no rales. He exhibits no tenderness.   Abdominal: Soft. Bowel sounds are normal. He exhibits no distension and no mass. There is no splenomegaly or hepatomegaly. There is no abdominal tenderness. There is no rebound and no guarding.   Lymphadenopathy:     He has no " cervical adenopathy.   Neurological: He is alert and oriented to person, place, and time. He has normal reflexes. No cranial nerve deficit. Gait normal.   Proximal and distal muscle strength 5/5.   Skin: Skin is warm and dry. No rash noted. He is not diaphoretic.     Skin lesion c/w seborrheic keratosis on R posterior upper chest.   Lipomas, forearms and other areas;  Rheumatoid nodules vs lipomas of elbows.    Bilateral superficial varicosities LE;   Psychiatric: Mood, memory, affect and judgment normal.   Musculoskeletal: Normal range of motion. No tenderness or edema.      Comments: Cspine FROM no tenderness  Tspine FROM no tenderness  Lspine FROM no tenderness.  TMJ: unremarkable  Shoulders: missing a few degrees of abduction on the R; no synovitis; no tenderness  Elbows: FROM; left elbow nodule, firm & non tender vs lipoma as he has them all over; right elbow with mild flexion contracture & elbow nodule vs lipoma.  Wrists: FROM; no synovitis; no TTP  MCPs: FROM; no visible synovitis; mild metacarpalgia;  ok;  PIPs:FROM; +Bouchards--servando 4th R PIP; but SHT of 2-4 PCP mildly tender; no synovitis;  Good fists.  DIPs: FROM; + Heberden's; no synovitis  HIPS: FROM  Knees: FROM; no synovitis; no instability; minimal PF crepitus;  Ankles: FROM: no synovitis   Toes: ok; no metatarsalgia;                R posterior upper chest wall---1/14/20.       10/1/20          LABS:     6/2/20: ESR 13 (23); CRP 2.5; CBC ok; CMP cnne 1.4; GFR 52; BUN 30; TP 8.7  12/30/19: ESR 9; CRP 2.5; CBC ok; CMP BUN 24; cnne 1.3; GFR 57.3;   8/1/19: ESR 24 (23); CRP 5.4; CBC ok; CMP cnne 1.3; GFR 57.3; BUN 27;   10/10/18; ESR 7; CRP 4.1; CBC ; CMP cnne 1.3; GFR 57.7;   1/17/18: ESR 29; CRP; CBC ok; CMP ok; ; CCP 44;   7/24/17: ESR 23; CRP 4.2; CBC ok; CMP cnne 1.3; GFR 58.1;   3/26/16: CBC ok; cnne 1.3  3/10/16: ESR 9; CRP 1.7; CBC, CMP ok;  9/15/15: ESR 20; CRP 0.9; CBC ok; CMP ok;  3/2/15: CMP BUN 28; cnne 1.1  1/12/15: Hg  13.9  10/21/14: ESR 8;   14: ESR 19; CRP 2.9; Hg 12.8; cnne 1.3; Vit D 18; ; CCP 44.3   Hepatitis B & HIV negative; HCV+;     IMAGIN19: Bilateral hands: personally viewed: L: cystic changes at the distal aspect of the left ulna, also involving some of the carpal bones, as well as the distal aspects of the 1st and 3rd metacarpal bones.  Mild degenerative changes at some of the DIP joints; R: Cystic changes involving some of the carpal bones as well as the distal aspect of the 1st and 3rd metacarpals and proximal aspect of the proximal phalanx of the thumb.  Degenerative changes involving some of the DIP joints and also the interphalangeal joint of the right thumb.    16: US Dopplers: R:minimal peripheral arterial occlusive disease: L: moderate peripheral arterial occlusive disease; unchanged  4/2/15: Bilateral wrists: personal review: cystic lesion left lunate, possibly left ulnar notch. (not too different from previous)  4/2/15: R ankle personally reviewed: ok  14: Arthritis survey personally reviewed again: OA lower CS; min OA hands & feet;      Assessment:   Seropositive (), CCP (44) positive nodular (bilateral elbows vs lipomas) rheumatoid arthritis with no erosions--doing well,    Enbrel effective but resulted in rash.    MTX and Leflunomide were contraindicated at the time due to HCV.   SSZ given ok by Dr. Moore, but never begun   On hydroxychloroquine 400 mg/d   Humira 40 mg qow since 6/15   Ok for use of MTX or Leflunomide if needed as per Dr. Moore    Possible Raynaud's in past.     Dermatitis c/w seborrheic keratoses & other skin lesions    Bilateral adhesive capsulitis R>L--resolved    Elevated blood pressure    Mild renal insufficiency on meloxicam (cnne up to 1.3 from 1.0)   Off it now, but last cnne 1.3.    Hx of nephrolithiasis    Hepatitis C with normal liver function tests--genotype 1a   Completed Harvoni; cured    Osteoarthritis of hands.     Recurrent  hypovitaminosis D -treated in past    Peripheral Arterial Disease.    Compression deformity of the lower lumbar and thoracic vertebral bodies by x-ray.     Multiple lipomas of the arms and lumbar spine area.     Occupational injuries in the past.     Persistent insomnia.   Helped partly by trazodone.     Overweight w weight gain (but gave up smoking!)  Plan:   Stay on Humira 40 mg qow  (worried about cost of Humira if he goes on Medicare)  Discussed beginning MTX 10 mg/wk + folic acid 1 mg/d  Side effects and toxicities reviewed.  Handout provided.   Needs labs & pre-DMARD labs but c/o cost.  He will check w his insurance to see where it is less costly for labs.  Still needs yearly skin exams.   Discussed again as he is on an TNFi  No NSAIDS    He will let us know where to send labs.  If labs ok will begin MTX & check in 1 month & then q 3 months thereafter.  RTC 4 months or prn

## 2020-10-01 NOTE — PATIENT INSTRUCTIONS
Plan is to begin methotrexate after labs result.  Possibly, can get on a dose that may allow us to drop humira, but not guaranteed.    Begin a program of dedicated, low impact aerobic exercise.    Let us know where to send your labs.          Methotrexate tablets  What is this medicine?  METHOTREXATE (METH oh TREX ate) is a chemotherapy drug used to treat cancer including breast cancer, leukemia, and lymphoma. This medicine can also be used to treat psoriasis and certain kinds of arthritis.  How should I use this medicine?  Take this medicine by mouth with a glass of water. Follow the directions on the prescription label. Take your medicine at regular intervals. Do not take it more often than directed. Do not stop taking except on your doctor's advice.  Make sure you know why you are taking this medicine and how often you should take it. If this medicine is used for a condition that is not cancer, like arthritis or psoriasis, it should be taken weekly, NOT daily. Taking this medicine more often than directed can cause serious side effects, even death.  Talk to your healthcare provider about safe handling and disposal of this medicine. You may need to take special precautions.  Talk to your pediatrician regarding the use of this medicine in children. While this drug may be prescribed for selected conditions, precautions do apply.  What side effects may I notice from receiving this medicine?  Side effects that you should report to your doctor or health care professional as soon as possible:  · allergic reactions like skin rash, itching or hives, swelling of the face, lips, or tongue  · breathing problems or shortness of breath  · diarrhea  · dry, nonproductive cough  · low blood counts - this medicine may decrease the number of white blood cells, red blood cells and platelets. You may be at increased risk for infections and bleeding.  · mouth sores  · redness, blistering, peeling or loosening of the skin, including  inside the mouth  · signs of infection - fever or chills, cough, sore throat, pain or trouble passing urine  · signs and symptoms of bleeding such as bloody or black, tarry stools; red or dark-brown urine; spitting up blood or brown material that looks like coffee grounds; red spots on the skin; unusual bruising or bleeding from the eye, gums, or nose  · signs and symptoms of kidney injury like trouble passing urine or change in the amount of urine  · signs and symptoms of liver injury like dark yellow or brown urine; general ill feeling or flu-like symptoms; light-colored stools; loss of appetite; nausea; right upper belly pain; unusually weak or tired; yellowing of the eyes or skin  Side effects that usually do not require medical attention (report to your doctor or health care professional if they continue or are bothersome):  · dizziness  · hair loss  · tiredness  · upset stomach  · vomiting  What may interact with this medicine?  This medicine may interact with the following medication:  · acitretin  · aspirin and aspirin-like medicines including salicylates  · azathioprine  · certain antibiotics like penicillins, tetracycline, and chloramphenicol  · cyclosporine  · gold  · hydroxychloroquine  · live virus vaccines  · NSAIDs, medicines for pain and inflammation, like ibuprofen or naproxen  · other cytotoxic agents  · penicillamine  · phenylbutazone  · phenytoin  · probenecid  · retinoids such as isotretinoin and tretinoin  · steroid medicines like prednisone or cortisone  · sulfonamides like sulfasalazine and trimethoprim/sulfamethoxazole  · theophylline  What if I miss a dose?  If you miss a dose, talk with your doctor or health care professional. Do not take double or extra doses.  Where should I keep my medicine?  Keep out of the reach of children.  Store at room temperature between 20 and 25 degrees C (68 and 77 degrees F). Protect from light. Throw away any unused medicine after the expiration date.  What  should I tell my health care provider before I take this medicine?  They need to know if you have any of these conditions:  · fluid in the stomach area or lungs  · if you often drink alcohol  · infection or immune system problems  · kidney disease or on hemodialysis  · liver disease  · low blood counts, like low white cell, platelet, or red cell counts  · lung disease  · radiation therapy  · stomach ulcers  · ulcerative colitis  · an unusual or allergic reaction to methotrexate, other medicines, foods, dyes, or preservatives  · pregnant or trying to get pregnant  · breast-feeding  What should I watch for while using this medicine?  Avoid alcoholic drinks.  This medicine can make you more sensitive to the sun. Keep out of the sun. If you cannot avoid being in the sun, wear protective clothing and use sunscreen. Do not use sun lamps or tanning beds/booths.  You may need blood work done while you are taking this medicine.  Call your doctor or health care professional for advice if you get a fever, chills or sore throat, or other symptoms of a cold or flu. Do not treat yourself. This drug decreases your body's ability to fight infections. Try to avoid being around people who are sick.  This medicine may increase your risk to bruise or bleed. Call your doctor or health care professional if you notice any unusual bleeding.  Check with your doctor or health care professional if you get an attack of severe diarrhea, nausea and vomiting, or if you sweat a lot. The loss of too much body fluid can make it dangerous for you to take this medicine.  Talk to your doctor about your risk of cancer. You may be more at risk for certain types of cancers if you take this medicine.  Both men and women must use effective birth control with this medicine. Do not become pregnant while taking this medicine or until at least 1 normal menstrual cycle has occurred after stopping it. Women should inform their doctor if they wish to become  pregnant or think they might be pregnant. Men should not father a child while taking this medicine and for 3 months after stopping it. There is a potential for serious side effects to an unborn child. Talk to your health care professional or pharmacist for more information. Do not breast-feed an infant while taking this medicine.  NOTE:This sheet is a summary. It may not cover all possible information. If you have questions about this medicine, talk to your doctor, pharmacist, or health care provider. Copyright© 2017 [employer]

## 2020-10-02 ENCOUNTER — PATIENT MESSAGE (OUTPATIENT)
Dept: RHEUMATOLOGY | Facility: CLINIC | Age: 65
End: 2020-10-02

## 2020-10-02 ENCOUNTER — OFFICE VISIT (OUTPATIENT)
Dept: INTERNAL MEDICINE | Facility: CLINIC | Age: 65
End: 2020-10-02
Payer: COMMERCIAL

## 2020-10-02 VITALS
DIASTOLIC BLOOD PRESSURE: 88 MMHG | WEIGHT: 178.81 LBS | SYSTOLIC BLOOD PRESSURE: 134 MMHG | OXYGEN SATURATION: 98 % | BODY MASS INDEX: 28.07 KG/M2 | HEART RATE: 81 BPM | HEIGHT: 67 IN

## 2020-10-02 DIAGNOSIS — M05.79 RHEUMATOID ARTHRITIS INVOLVING MULTIPLE SITES WITH POSITIVE RHEUMATOID FACTOR: ICD-10-CM

## 2020-10-02 DIAGNOSIS — I73.9 PVD (PERIPHERAL VASCULAR DISEASE): ICD-10-CM

## 2020-10-02 DIAGNOSIS — Z12.5 SCREENING FOR PROSTATE CANCER: ICD-10-CM

## 2020-10-02 DIAGNOSIS — G47.00 PERSISTENT INSOMNIA: ICD-10-CM

## 2020-10-02 DIAGNOSIS — E78.49 OTHER HYPERLIPIDEMIA: ICD-10-CM

## 2020-10-02 DIAGNOSIS — L98.9 SKIN LESION OF BACK: ICD-10-CM

## 2020-10-02 DIAGNOSIS — Z00.00 ROUTINE PHYSICAL EXAMINATION: Primary | ICD-10-CM

## 2020-10-02 DIAGNOSIS — I73.9 PAD (PERIPHERAL ARTERY DISEASE): ICD-10-CM

## 2020-10-02 PROCEDURE — 3079F DIAST BP 80-89 MM HG: CPT | Mod: CPTII,S$GLB,, | Performed by: INTERNAL MEDICINE

## 2020-10-02 PROCEDURE — 99999 PR PBB SHADOW E&M-EST. PATIENT-LVL V: CPT | Mod: PBBFAC,,, | Performed by: INTERNAL MEDICINE

## 2020-10-02 PROCEDURE — 3079F PR MOST RECENT DIASTOLIC BLOOD PRESSURE 80-89 MM HG: ICD-10-PCS | Mod: CPTII,S$GLB,, | Performed by: INTERNAL MEDICINE

## 2020-10-02 PROCEDURE — 99397 PR PREVENTIVE VISIT,EST,65 & OVER: ICD-10-PCS | Mod: S$GLB,,, | Performed by: INTERNAL MEDICINE

## 2020-10-02 PROCEDURE — 99999 PR PBB SHADOW E&M-EST. PATIENT-LVL V: ICD-10-PCS | Mod: PBBFAC,,, | Performed by: INTERNAL MEDICINE

## 2020-10-02 PROCEDURE — 3075F SYST BP GE 130 - 139MM HG: CPT | Mod: CPTII,S$GLB,, | Performed by: INTERNAL MEDICINE

## 2020-10-02 PROCEDURE — 3075F PR MOST RECENT SYSTOLIC BLOOD PRESS GE 130-139MM HG: ICD-10-PCS | Mod: CPTII,S$GLB,, | Performed by: INTERNAL MEDICINE

## 2020-10-02 PROCEDURE — 99397 PER PM REEVAL EST PAT 65+ YR: CPT | Mod: S$GLB,,, | Performed by: INTERNAL MEDICINE

## 2020-10-02 NOTE — PROGRESS NOTES
Subjective:       Patient ID: Andrea Gant is a 65 y.o. male.    Chief Complaint: Annual Exam    Patient here for annual exam.  He sees his rheumatologist regularly and they may be switching from Humira to methotrexate.  There is quite a few labs that are ordered and he will get them today but he wanted his lipid in prostate blood test as well.    He wants to hold off on a colonoscopy as he may be changing insurance and Medicare would be a lot cheaper.  He is due for the pneumococcal 13 vaccine.    Review of Systems   Constitutional: Negative for chills, fatigue, fever and unexpected weight change.   HENT: Negative for nosebleeds and trouble swallowing.    Eyes: Negative for pain and visual disturbance.   Respiratory: Negative for cough, shortness of breath and wheezing.    Cardiovascular: Negative for chest pain and palpitations.   Gastrointestinal: Negative for abdominal pain, constipation, diarrhea, nausea and vomiting.   Genitourinary: Negative for difficulty urinating and hematuria.   Musculoskeletal: Positive for joint swelling and joint deformity. Negative for neck pain.   Integumentary:  Positive for mole/lesion (right mid back). Negative for rash.   Neurological: Negative for dizziness and headaches.   Hematological: Does not bruise/bleed easily.   Psychiatric/Behavioral: Negative for dysphoric mood, sleep disturbance and suicidal ideas.         Objective:      Physical Exam  Constitutional:       General: He is not in acute distress.     Appearance: Normal appearance. He is well-developed.   HENT:      Head: Normocephalic and atraumatic.      Right Ear: Tympanic membrane, ear canal and external ear normal. There is no impacted cerumen.      Left Ear: Tympanic membrane, ear canal and external ear normal. There is no impacted cerumen.      Mouth/Throat:      Pharynx: No oropharyngeal exudate.   Eyes:      General: No scleral icterus.     Conjunctiva/sclera: Conjunctivae normal.      Pupils: Pupils are  equal, round, and reactive to light.   Neck:      Musculoskeletal: Normal range of motion and neck supple.      Thyroid: No thyromegaly.      Comments: No supraclavicular nodes palpated  Cardiovascular:      Rate and Rhythm: Normal rate and regular rhythm.      Heart sounds: Normal heart sounds. No murmur.   Pulmonary:      Effort: Pulmonary effort is normal.      Breath sounds: Normal breath sounds. No wheezing.   Abdominal:      General: Bowel sounds are normal.      Palpations: Abdomen is soft. There is no mass.      Tenderness: There is no abdominal tenderness.   Musculoskeletal:         General: Deformity (fingers) present. No swelling or tenderness.      Right lower leg: No edema.      Left lower leg: No edema.   Lymphadenopathy:      Cervical: No cervical adenopathy.   Skin:     Capillary Refill: Capillary refill takes less than 2 seconds.      Coloration: Skin is not pale.      Findings: Lesion present. No rash.   Neurological:      General: No focal deficit present.      Mental Status: He is alert and oriented to person, place, and time.   Psychiatric:         Mood and Affect: Mood normal.         Behavior: Behavior normal.         Thought Content: Thought content normal.                 Assessment:       1. Routine physical examination    2. Screening for prostate cancer    3. Other hyperlipidemia    4. Rheumatoid arthritis involving multiple sites with positive rheumatoid factor    5. Persistent insomnia    6. PVD (peripheral vascular disease)    7. PAD (peripheral artery disease)    8. Skin lesion of back        Plan:       Andrea was seen today for annual exam.    Diagnoses and all orders for this visit:    Routine physical examination    Screening for prostate cancer  -     Cancel: PSA, Screening; Future  -     PSA, Screening; Future  -     PSA, Screening    Other hyperlipidemia  -     Cancel: Lipid Panel; Future  -     Lipid Panel; Future  -     Lipid Panel    Rheumatoid arthritis involving multiple  sites with positive rheumatoid factor    Persistent insomnia    PVD (peripheral vascular disease)    PAD (peripheral artery disease)    Skin lesion of back  -     Ambulatory referral/consult to Dermatology; Future        C-scope when insurance changes in coming months.

## 2020-10-03 ENCOUNTER — PATIENT MESSAGE (OUTPATIENT)
Dept: RHEUMATOLOGY | Facility: CLINIC | Age: 65
End: 2020-10-03

## 2020-10-06 LAB
CHOLEST SERPL-MCNC: 164 MG/DL (ref 100–199)
HDLC SERPL-MCNC: 42 MG/DL
LDLC SERPL CALC-MCNC: 91 MG/DL (ref 0–99)
PSA SERPL-MCNC: 0.4 NG/ML (ref 0–4)
TRIGL SERPL-MCNC: 180 MG/DL (ref 0–149)
VLDLC SERPL CALC-MCNC: 31 MG/DL (ref 5–40)

## 2020-10-07 ENCOUNTER — PATIENT MESSAGE (OUTPATIENT)
Dept: INTERNAL MEDICINE | Facility: CLINIC | Age: 65
End: 2020-10-07

## 2020-10-07 ENCOUNTER — PATIENT MESSAGE (OUTPATIENT)
Dept: RHEUMATOLOGY | Facility: CLINIC | Age: 65
End: 2020-10-07

## 2020-10-07 DIAGNOSIS — Z79.60 LONG-TERM USE OF IMMUNOSUPPRESSANT MEDICATION: Primary | ICD-10-CM

## 2020-10-09 LAB
ALBUMIN SERPL-MCNC: 4.5 G/DL (ref 3.8–4.8)
ALBUMIN/GLOB SERPL: 1.3 {RATIO} (ref 1.2–2.2)
ALP SERPL-CCNC: 75 IU/L (ref 39–117)
ALT SERPL-CCNC: 22 IU/L (ref 0–44)
AST SERPL-CCNC: 27 IU/L (ref 0–40)
BASOPHILS # BLD AUTO: 0 X10E3/UL (ref 0–0.2)
BASOPHILS NFR BLD AUTO: 0 %
BILIRUB SERPL-MCNC: 0.4 MG/DL (ref 0–1.2)
BUN SERPL-MCNC: 30 MG/DL (ref 8–27)
BUN/CREAT SERPL: 21 (ref 10–24)
CALCIUM SERPL-MCNC: 9.6 MG/DL (ref 8.6–10.2)
CHLORIDE SERPL-SCNC: 104 MMOL/L (ref 96–106)
CO2 SERPL-SCNC: 22 MMOL/L (ref 20–29)
CREAT SERPL-MCNC: 1.43 MG/DL (ref 0.76–1.27)
CRP SERPL-MCNC: 2 MG/L (ref 0–10)
EOSINOPHIL # BLD AUTO: 0.1 X10E3/UL (ref 0–0.4)
EOSINOPHIL NFR BLD AUTO: 1 %
ERYTHROCYTE [DISTWIDTH] IN BLOOD BY AUTOMATED COUNT: 12.3 % (ref 11.6–15.4)
ERYTHROCYTE [SEDIMENTATION RATE] IN BLOOD BY WESTERGREN METHOD: 48 MM/HR (ref 0–30)
GAMMA INTERFERON BACKGROUND BLD IA-ACNC: 0.04 IU/ML
GLOBULIN SER CALC-MCNC: 3.4 G/DL (ref 1.5–4.5)
GLUCOSE SERPL-MCNC: 143 MG/DL (ref 65–99)
HBV CORE AB SERPL QL IA: NEGATIVE
HBV SURFACE AB SER QL: NON REACTIVE
HBV SURFACE AG SERPL QL IA: NEGATIVE
HCT VFR BLD AUTO: 41.9 % (ref 37.5–51)
HCV AB S/CO SERPL IA: >11 S/CO RATIO (ref 0–0.9)
HGB BLD-MCNC: 14 G/DL (ref 13–17.7)
HIV 1+2 AB+HIV1 P24 AG SERPL QL IA: NON REACTIVE
IMM GRANULOCYTES # BLD AUTO: 0 X10E3/UL (ref 0–0.1)
IMM GRANULOCYTES NFR BLD AUTO: 0 %
LYMPHOCYTES # BLD AUTO: 1.5 X10E3/UL (ref 0.7–3.1)
LYMPHOCYTES NFR BLD AUTO: 19 %
M TB IFN-G BLD-IMP: NEGATIVE
M TB IFN-G CD4+ BCKGRND COR BLD-ACNC: 0.04 IU/ML
MCH RBC QN AUTO: 29.9 PG (ref 26.6–33)
MCHC RBC AUTO-ENTMCNC: 33.4 G/DL (ref 31.5–35.7)
MCV RBC AUTO: 90 FL (ref 79–97)
MITOGEN IGNF BLD-ACNC: 4.39 IU/ML
MONOCYTES # BLD AUTO: 0.6 X10E3/UL (ref 0.1–0.9)
MONOCYTES NFR BLD AUTO: 7 %
NEUTROPHILS # BLD AUTO: 5.7 X10E3/UL (ref 1.4–7)
NEUTROPHILS NFR BLD AUTO: 73 %
PLATELET # BLD AUTO: 207 X10E3/UL (ref 150–450)
POTASSIUM SERPL-SCNC: 4.4 MMOL/L (ref 3.5–5.2)
PROT SERPL-MCNC: 7.9 G/DL (ref 6–8.5)
QUANTIFERON TB GOLD (INCUBATED): NORMAL
QUANTIFERON TB2 AG VALUE: 0.03 IU/ML
RBC # BLD AUTO: 4.68 X10E6/UL (ref 4.14–5.8)
RPR SER QL: NON REACTIVE
SERVICE CMNT-IMP: NORMAL
SODIUM SERPL-SCNC: 140 MMOL/L (ref 134–144)
STRONGYLOIDES IGG SER QL IA: NEGATIVE
VZV IGG SER IA-ACNC: 2760 INDEX
WBC # BLD AUTO: 8 X10E3/UL (ref 3.4–10.8)

## 2020-10-12 ENCOUNTER — PATIENT MESSAGE (OUTPATIENT)
Dept: INTERNAL MEDICINE | Facility: CLINIC | Age: 65
End: 2020-10-12

## 2020-10-12 ENCOUNTER — PATIENT MESSAGE (OUTPATIENT)
Dept: INFECTIOUS DISEASES | Facility: CLINIC | Age: 65
End: 2020-10-12

## 2020-10-12 NOTE — TELEPHONE ENCOUNTER
I have no idea?   Who can we contact or put him in contact with to discuss. I will not be able to be any help?

## 2020-10-12 NOTE — PROGRESS NOTES
Pre Biologic Response Modifier Therapy Consult  BMR Recipient Evaluation    Requesting Physician: Dr. Jimenez    Reason for Visit:  No chief complaint on file.    History of Present Illness  Andrea Gant is a 65 y.o. year old White male with advanced Rheumatoid Arthritis currently being evaluated for prior to starting biologic response modifer (BRM) therapy.  Patient denies any recent fever, chills, or infective infective illnesses.      1) Do you have a history of:    YES NO   Diabetes                 []       [x]   Diabetic Foot Infection/Bone Infection  []  [x]   Surgical Removal of Spleen    []  [x]       2) Have you had recurrent infections involving:             YES NO  Sinus infections  []  [x]  Sore Throat   []  [x]                             Prostate Infections  []  [x]  .                         Bladder Infections  []  [x]                                 Kidney Infections  []  [x]        Intestinal Infections  []  [x]   Skin Infections   []  [x]                   Reproductive Infections           []  [x]     Periodontal Disease                   3)Have you ever had: YES     NO UNKNOWN      Chicken Pox   [x]  []  []                 Shingles   []  [x]  []                Orolabial Herpes             []  [x]  []       Genital Herpes  []  [x]  []           Cytomegalovirus  []  [x]  []               Ale-Barr Virus  []  [x]  []              Genital Warts   []  [x]  []                Hepatitis A   []  [x]  []             Hepatitis B   []  [x]  []                Hepatitis C   [x]  []  []  treated               Syphilis   []  [x]  []                Gonorrhea   []  [x]  []                 Pelvic Inflammatory  []  [x]  []   Disease   []  [x]  []                    Chlamydia Infection  []  [x]  []                Intestinal parasites        or worms   []  [x]  []                 Fungal Infections  []  [x]  []                Blood Infections  []  [x]  []     Comment:       4) Have you ever been exposed    YES NO  to someone with tuberculosis?  []  [x]   If yes, what treatment did you receive:     5) What states have you lived in?  [place], [place], [place],     6) What countries have you visited for more than 2 weeks?    No                      YES NO  7) Did you have any associated infections?     []  [x]     8) Are you planning to travel outside the           [place] after starting BRMs?    [x]   []           Household                  YES NO  9 Do you have pets living in your house?   []   [x]             If yes, describe:     Do you spend time or live on a farm or                 have livestock or other farm animals?   []  [x]   If yes, which ones:    Do you have a fish tank?     []  [x]                       Do you have a litter box?     []  [x]                        Do you fish or hunt?     []  [x]                       Do you clean or skin fish or animals?   []  [x]                      Do you consume raw or undercooked                meat, fish, or shellfish?     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    [x]  []   13)Do you hike, camp, or spend   time in wooded areas?   []  [x]                           14) The patient's immunization history was reviewed.   Have you ever received:  YES NO UNKNOWN DATES  Routine Childhood vaccines  [x]  []  []                Influenza vaccine   [x]  []  []         Pneumonia    [x]  []  []      Tetanus-diptheria   []  [x]  []   Hepatitis A vaccine series       []  [x]  []       Hepatitis B vaccine series       []  []  []        Meningitis vaccine   []  [x]  []     Varicella vaccine   []  [x]  []                  Based on the patients immunization history and serologies, immunizations were ordered:         Ordered  Not Ordered  Influenza Vaccine     []    [x]   Hepatitis A series at 0, 6 months   []    [x]    Hepatitis B sries at 0, 1, and 6 months  []    [x]   Hepatitis B High Dose series 0,1, and 6 months []    [x]   Prevnar      []    [x]   Pneumovax      []    [x]   TDap       []    [x]   Zoster       []    [x]   Menactra      []    [x]   HiB       []    [x]               The patient was encouraged to contact us about any problems that may develop after immunization and possible side effects were reviewed.       Allergies: Patient has no known allergies.  Immunization History   Administered Date(s) Administered    Influenza 01/27/2014, 10/10/2018, 09/26/2019    Influenza - High Dose - PF (65 years and older) 09/25/2019    Influenza - Quadrivalent - MDCK - PF 10/16/2017, 10/22/2018    Influenza - Quadrivalent - PF *Preferred* (6 months and older) 11/18/2015, 10/15/2016    Influenza Split 01/27/2014    Pneumococcal Polysaccharide - 23 Valent 01/27/2014    Tdap 01/27/2014    Zoster 03/12/2014     Past Medical History:   Diagnosis Date    Cataract     Colon polyp 2014    History of hepatitis C, s/p successful treatment w/ SVR12 - 7/2015 10/14/2012    Kelsey 1a,  Liver biopsy 6/2014 - Stage 1 fibrosis;  Completed 8 weeks Harvoni w/ SVR12 - 7/2015      Hyperlipidemia     Lipoma of arm     Lipoma of lower extremity     Nuclear sclerosis - Both Eyes 10/22/2012    Other and unspecified hyperlipidemia 10/22/2013    PAD (peripheral artery disease)     Peripheral vascular disease, unspecified     Rheumatoid arthritis(714.0) 10/14/2012     Past Surgical History:   Procedure Laterality Date    KNEE ARTHROPLASTY      LAPAROSCOPIC EXPLORATION OF GROIN Left 9/6/2018    Procedure: EXPLORATION, INGUINAL REGION, LAPAROSCOPIC;  Surgeon: Mingo De Guzman Jr., MD;  Location: [facility] OR [address] [place] [address];  Service: General;  Laterality: Left;    TONSILLECTOMY        Social History     Socioeconomic History    Marital status:      Spouse name: Not on file    Number of children: Not on file    Years of education: Not on  file    Highest education level: Not on file   Occupational History    Not on file   Social Needs    Financial resource strain: Not on file    Food insecurity     Worry: Not on file     Inability: Not on file    Transportation needs     Medical: Not on file     Non-medical: Not on file   Tobacco Use    Smoking status: Former Smoker     Types: Cigarettes     Quit date: 2020     Years since quittin.2    Smokeless tobacco: Never Used   Substance and Sexual Activity    Alcohol use: Yes     Comment: 3-4 drinks per week    Drug use: Yes     Types: Marijuana     Comment: ann    Sexual activity: Not on file   Lifestyle    Physical activity     Days per week: Not on file     Minutes per session: Not on file    Stress: Not on file   Relationships    Social connections     Talks on phone: Not on file     Gets together: Not on file     Attends Hinduism service: Not on file     Active member of club or organization: Not on file     Attends meetings of clubs or organizations: Not on file     Relationship status: Not on file   Other Topics Concern    Not on file   Social History Narrative    Resides locally    [place] at [employer]     w/ 2 adult children       Review of Systems   Constitution: Positive for weight gain. Negative for chills, decreased appetite, diaphoresis, fever, malaise/fatigue, night sweats and weight loss.   HENT: Negative for congestion, ear pain, hearing loss, sore throat, stridor and tinnitus.    Eyes: Negative for blurred vision, double vision and redness.   Cardiovascular: Negative for chest pain, leg swelling and palpitations.   Respiratory: Negative for cough, hemoptysis, shortness of breath, sputum production and wheezing.    Hematologic/Lymphatic: Negative for adenopathy. Does not bruise/bleed easily.   Skin: Negative for dry skin, itching, rash and suspicious lesions.   Musculoskeletal: Positive for joint pain. Negative for arthritis, back  pain, joint swelling, myalgias and neck pain.   Gastrointestinal: Positive for heartburn. Negative for abdominal pain, constipation, diarrhea, nausea and vomiting.   Genitourinary: Negative for bladder incontinence, dysuria, flank pain, frequency, hesitancy, incomplete emptying and urgency.   Neurological: Negative for dizziness, focal weakness, headaches, loss of balance, numbness and weakness.   Psychiatric/Behavioral: Positive for depression. Negative for memory loss. The patient does not have insomnia and is not nervous/anxious.      Physical Exam  Constitutional:       Appearance: Normal appearance. He is well-developed. He is not diaphoretic.   HENT:      Head: Normocephalic and atraumatic.      Mouth/Throat:      Mouth: No lacerations or oral lesions.      Dentition: Normal dentition. Does not have dentures. No dental caries or dental abscesses.      Pharynx: Uvula midline.   Eyes:      General: Lids are normal. No scleral icterus.     Conjunctiva/sclera: Conjunctivae normal.      Pupils: Pupils are equal, round, and reactive to light.   Neck:      Musculoskeletal: Neck supple.   Cardiovascular:      Rate and Rhythm: Normal rate and regular rhythm.      Heart sounds: No murmur. No friction rub. No gallop.    Pulmonary:      Effort: Pulmonary effort is normal. No respiratory distress.      Breath sounds: Normal breath sounds. No decreased breath sounds, wheezing, rhonchi or rales.   Abdominal:      General: Bowel sounds are normal. There is no distension.      Palpations: Abdomen is soft. There is no mass.      Tenderness: There is no abdominal tenderness. There is no guarding or rebound.   Musculoskeletal:         General: No swelling or tenderness.   Lymphadenopathy:      Head:      Right side of head: No submental, submandibular, tonsillar, preauricular, posterior auricular or occipital adenopathy.      Left side of head: No submental, submandibular, tonsillar, preauricular, posterior auricular or occipital  adenopathy.      Cervical: No cervical adenopathy.      Upper Body:      Right upper body: No supraclavicular or epitrochlear adenopathy.      Left upper body: No supraclavicular or epitrochlear adenopathy.   Skin:     General: Skin is warm and dry.      Coloration: Skin is not pale.      Findings: No erythema, lesion or rash.      Comments: Stable macular lesion on back-pt reports itchy   Neurological:      Mental Status: He is alert and oriented to person, place, and time.      Cranial Nerves: No cranial nerve deficit.   Psychiatric:         Mood and Affect: Mood normal.         Behavior: Behavior normal.       Diagnostics:   RPR   Date Value Ref Range Status   10/05/2020 Non Reactive Non Reactive Final     No results found for: CMVANTIBODIE  No results found for: HIV1X2  No results found for: HTLVIIIANTIB  Hepatitis B Surface Ag   Date Value Ref Range Status   12/30/2019 Negative Negative Final     HBsAg Screen   Date Value Ref Range Status   10/05/2020 Negative Negative Final     Hep B Core Total Ab   Date Value Ref Range Status   12/30/2019 Negative  Final     Hep B Core Ab, Tot   Date Value Ref Range Status   10/05/2020 Negative Negative Final     Hepatitis C Ab   Date Value Ref Range Status   01/22/2014 Positive (A)  Final     No results found for: TOXOIGG  No components found for: TOXOIGGINTER  No results found for: JZV2OQY  No results found for: PCE5XEU  No results found for: VARICELLAZOS  No results found for: VARICELLAINT  No results found for: STRONGANTIGG  No results found for: EPSTEINBARRV  Hep B S Ab   Date Value Ref Range Status   12/30/2019 Negative  Final     Hep B Surface Ab, Qual   Date Value Ref Range Status   10/05/2020 Non Reactive  Final     Comment:                   Non Reactive: Inconsistent with immunity,                              less than 10 mIU/mL                Reactive:     Consistent with immunity,                              greater than 9.9 mIU/mL       Quantiferon Gold TB    Date Value Ref Range Status   10/21/2014 Negative Negative Final     Comment:     Negative test result. M. tuberculosis complex infection   unlikely.       No results found for: HEPAIGM  No results found for: PPD  No results found for this or any previous visit.        BRM - Candidacy    Biologic Response Modifier Candidacy: Based on available information, there are no identified significant barriers to BRMs from an infectious disease standpoint pending acceptable serologies.  Final determination of BRM candidacy will be made once evaluation is complete and reviewed.    Pt prefers to get immunization as outpt  Rx given for below.  - Prevnar 13 today followed by Pneumovax in 8 weeks  - Hepatitis A vaccine today and in 6 months  - Hepatitis B vaccine (Heplisav) today and in one month  - Shingrix (two doses at 0 and 2-6 months)      José Ugalde PA-C        Counseling:I discussed with Andrea the risk for increased susceptibility to infections following BRM therapy including increased risk for infection.  The patients has been counseled on the importance of vaccinations including but not limited to a yearly flu vaccine.Specific guidance has been provided to the patient regarding the patients occupation, hobbies and activities to avoid future infectious complications including but not limited to avoiding undercooked meats and seafood, proper hygiene, and contact with animals.

## 2020-10-13 ENCOUNTER — OFFICE VISIT (OUTPATIENT)
Dept: INFECTIOUS DISEASES | Facility: CLINIC | Age: 65
End: 2020-10-13
Payer: COMMERCIAL

## 2020-10-13 VITALS
DIASTOLIC BLOOD PRESSURE: 87 MMHG | HEIGHT: 69 IN | SYSTOLIC BLOOD PRESSURE: 143 MMHG | HEART RATE: 76 BPM | WEIGHT: 179.44 LBS | BODY MASS INDEX: 26.58 KG/M2 | TEMPERATURE: 99 F

## 2020-10-13 DIAGNOSIS — M05.79 RHEUMATOID ARTHRITIS INVOLVING MULTIPLE SITES WITH POSITIVE RHEUMATOID FACTOR: Primary | ICD-10-CM

## 2020-10-13 DIAGNOSIS — Z79.60 LONG-TERM USE OF IMMUNOSUPPRESSANT MEDICATION: ICD-10-CM

## 2020-10-13 PROCEDURE — 3079F PR MOST RECENT DIASTOLIC BLOOD PRESSURE 80-89 MM HG: ICD-10-PCS | Mod: CPTII,S$GLB,, | Performed by: PHYSICIAN ASSISTANT

## 2020-10-13 PROCEDURE — 99204 OFFICE O/P NEW MOD 45 MIN: CPT | Mod: S$GLB,,, | Performed by: PHYSICIAN ASSISTANT

## 2020-10-13 PROCEDURE — 1126F PR PAIN SEVERITY QUANTIFIED, NO PAIN PRESENT: ICD-10-PCS | Mod: S$GLB,,, | Performed by: PHYSICIAN ASSISTANT

## 2020-10-13 PROCEDURE — 3008F BODY MASS INDEX DOCD: CPT | Mod: CPTII,S$GLB,, | Performed by: PHYSICIAN ASSISTANT

## 2020-10-13 PROCEDURE — 1159F MED LIST DOCD IN RCRD: CPT | Mod: S$GLB,,, | Performed by: PHYSICIAN ASSISTANT

## 2020-10-13 PROCEDURE — 3077F PR MOST RECENT SYSTOLIC BLOOD PRESSURE >= 140 MM HG: ICD-10-PCS | Mod: CPTII,S$GLB,, | Performed by: PHYSICIAN ASSISTANT

## 2020-10-13 PROCEDURE — 1126F AMNT PAIN NOTED NONE PRSNT: CPT | Mod: S$GLB,,, | Performed by: PHYSICIAN ASSISTANT

## 2020-10-13 PROCEDURE — 1101F PT FALLS ASSESS-DOCD LE1/YR: CPT | Mod: CPTII,S$GLB,, | Performed by: PHYSICIAN ASSISTANT

## 2020-10-13 PROCEDURE — 3008F PR BODY MASS INDEX (BMI) DOCUMENTED: ICD-10-PCS | Mod: CPTII,S$GLB,, | Performed by: PHYSICIAN ASSISTANT

## 2020-10-13 PROCEDURE — 99999 PR PBB SHADOW E&M-EST. PATIENT-LVL IV: CPT | Mod: PBBFAC,,, | Performed by: PHYSICIAN ASSISTANT

## 2020-10-13 PROCEDURE — 3077F SYST BP >= 140 MM HG: CPT | Mod: CPTII,S$GLB,, | Performed by: PHYSICIAN ASSISTANT

## 2020-10-13 PROCEDURE — 99999 PR PBB SHADOW E&M-EST. PATIENT-LVL IV: ICD-10-PCS | Mod: PBBFAC,,, | Performed by: PHYSICIAN ASSISTANT

## 2020-10-13 PROCEDURE — 99204 PR OFFICE/OUTPT VISIT, NEW, LEVL IV, 45-59 MIN: ICD-10-PCS | Mod: S$GLB,,, | Performed by: PHYSICIAN ASSISTANT

## 2020-10-13 PROCEDURE — 1159F PR MEDICATION LIST DOCUMENTED IN MEDICAL RECORD: ICD-10-PCS | Mod: S$GLB,,, | Performed by: PHYSICIAN ASSISTANT

## 2020-10-13 PROCEDURE — 1101F PR PT FALLS ASSESS DOC 0-1 FALLS W/OUT INJ PAST YR: ICD-10-PCS | Mod: CPTII,S$GLB,, | Performed by: PHYSICIAN ASSISTANT

## 2020-10-13 PROCEDURE — 3079F DIAST BP 80-89 MM HG: CPT | Mod: CPTII,S$GLB,, | Performed by: PHYSICIAN ASSISTANT

## 2020-10-13 RX ORDER — FOLIC ACID 1 MG/1
1 TABLET ORAL DAILY
Qty: 90 TABLET | Refills: 3 | Status: SHIPPED | OUTPATIENT
Start: 2020-10-13 | End: 2021-04-07 | Stop reason: SDUPTHER

## 2020-10-13 RX ORDER — METHOTREXATE 2.5 MG/1
10 TABLET ORAL
Qty: 60 TABLET | Refills: 1 | Status: SHIPPED | OUTPATIENT
Start: 2020-10-13 | End: 2020-11-10 | Stop reason: SDUPTHER

## 2020-10-13 NOTE — PROGRESS NOTES
Subjective:      Patient ID: Andrea Gant is a 65 y.o. male.    Chief Complaint:fast track      History of Present Illness    {ID HPI BLOCKS:39210}    Review of Systems   Constitution: Positive for weight gain. Negative for chills, decreased appetite, fever, malaise/fatigue, night sweats and weight loss.   HENT: Negative for congestion, ear pain, hearing loss, hoarse voice, sore throat and tinnitus.    Eyes: Negative for blurred vision, redness and visual disturbance.   Cardiovascular: Negative for chest pain, leg swelling and palpitations.   Respiratory: Negative for cough, hemoptysis, shortness of breath and sputum production.    Hematologic/Lymphatic: Negative for adenopathy. Does not bruise/bleed easily.   Skin: Negative for dry skin, itching, rash and suspicious lesions.   Musculoskeletal: Positive for joint pain. Negative for back pain, myalgias and neck pain.   Gastrointestinal: Positive for heartburn. Negative for abdominal pain, constipation, diarrhea, nausea and vomiting.   Genitourinary: Negative for dysuria, flank pain, frequency, hematuria, hesitancy and urgency.   Neurological: Negative for dizziness, headaches, numbness, paresthesias and weakness.   Psychiatric/Behavioral: Positive for depression. Negative for memory loss. The patient does not have insomnia and is not nervous/anxious.      Objective:   Physical Exam  Assessment:       1. Long-term use of immunosuppressant medication          Plan:       ***

## 2020-10-13 NOTE — PATIENT INSTRUCTIONS
Codey Calderon         NAME:Madelyn Yap  WHT:[id number]   :1966                    Patient is not on dialysis yet  She has ckd 5  She is followed by DR. Naty ACOSTA  She will go home tomorrow. Ron Mtz, [facility]  [facility]  [facility], [place]  [place], [address] [place]  Phone - [phone]         Fax - [phone] [facility] Office  [address] [place] [address]  Phone - [phone]        Fax - [phone]     [email or web address] Labs ok to begin methotrexate 4 tablets all at once on one day a week.  Also take folic acid 1 mg every day.  Labs in 1 month and every 3 months thereafter.

## 2020-10-13 NOTE — Clinical Note
Please inform patient that labs were ok to begin methotrexate 4 tablets one day/wk   + folic acid 1 mg/d. Rx sent to his pharmacy.  Labs repeated in ~ 5 weeks and q 3 months thereafter.  This is in addition to humira.  MTX takes 6-8 weeks to kick in.

## 2020-10-13 NOTE — LETTER
October 13, 2020      Marline Jimenez MD  [address]  [facility] [address]           [facility] - Infectious Disease 1st Fl  [address]  [facility] [address]  Phone: [phone]  Fax: [phone]          Patient: Andrea Gant   MR Number: [id number]   YOB: 1955   Date of Visit: 10/13/2020       Dear Dr. Marline Jimenez:    Thank you for referring Andrea Gant to me for evaluation. Attached you will find relevant portions of my assessment and plan of care.    If you have questions, please do not hesitate to call me. I look forward to following Andrea Gant along with you.    Sincerely,    José Ugalde PA-C    Enclosure  CC:  No Recipients    If you would like to receive this communication electronically, please contact [email or web address] or [phone] to request more information on [facility] access.    For providers and/or their staff who would like to refer a patient to [facility], please contact us through our one-stop-shop provider referral line, [facility], at [phone].    If you feel you have received this communication in error or would no longer like to receive these types of communications, please e-mail [email or web address]

## 2020-10-14 ENCOUNTER — TELEPHONE (OUTPATIENT)
Dept: RHEUMATOLOGY | Facility: CLINIC | Age: 65
End: 2020-10-14

## 2020-10-14 DIAGNOSIS — Z79.631 LONG TERM METHOTREXATE USER: Primary | ICD-10-CM

## 2020-10-14 DIAGNOSIS — G47.00 PERSISTENT INSOMNIA: ICD-10-CM

## 2020-10-14 NOTE — TELEPHONE ENCOUNTER
As per Dr. Jimenez--         Please inform patient that labs were ok to begin methotrexate 4 tablets one day/wk   + folic acid 1 mg/d. Rx sent to his pharmacy.   Labs repeated in ~ 5 weeks and q 3 months thereafter.   This is in addition to humira.   MTX takes 6-8 weeks to kick in       Patient called and informed.  Verbalizes understanding.

## 2020-10-14 NOTE — TELEPHONE ENCOUNTER
----- Message from Yasmine Brooks RN sent at 10/14/2020 11:01 AM CDT -----  Please put labs in for [facility].  Patient will have labs in 5 weeks

## 2020-10-16 ENCOUNTER — PATIENT MESSAGE (OUTPATIENT)
Dept: RHEUMATOLOGY | Facility: CLINIC | Age: 65
End: 2020-10-16

## 2020-10-16 RX ORDER — TRAZODONE HYDROCHLORIDE 50 MG/1
150 TABLET ORAL NIGHTLY PRN
Qty: 90 TABLET | Refills: 3 | OUTPATIENT
Start: 2020-10-16

## 2020-10-19 ENCOUNTER — PATIENT MESSAGE (OUTPATIENT)
Dept: RHEUMATOLOGY | Facility: CLINIC | Age: 65
End: 2020-10-19

## 2020-10-20 ENCOUNTER — PATIENT MESSAGE (OUTPATIENT)
Dept: RHEUMATOLOGY | Facility: CLINIC | Age: 65
End: 2020-10-20

## 2020-10-20 ENCOUNTER — PATIENT MESSAGE (OUTPATIENT)
Dept: INTERNAL MEDICINE | Facility: CLINIC | Age: 65
End: 2020-10-20

## 2020-10-20 DIAGNOSIS — G47.00 PERSISTENT INSOMNIA: Primary | ICD-10-CM

## 2020-10-20 DIAGNOSIS — G47.00 PERSISTENT INSOMNIA: ICD-10-CM

## 2020-10-20 RX ORDER — TRAZODONE HYDROCHLORIDE 50 MG/1
150 TABLET ORAL NIGHTLY PRN
Qty: 90 TABLET | Refills: 3 | Status: SHIPPED | OUTPATIENT
Start: 2020-10-20 | End: 2020-10-20 | Stop reason: SDUPTHER

## 2020-10-20 RX ORDER — TRAZODONE HYDROCHLORIDE 50 MG/1
150 TABLET ORAL NIGHTLY PRN
Qty: 90 TABLET | Refills: 3 | Status: CANCELLED | OUTPATIENT
Start: 2020-10-20

## 2020-10-20 RX ORDER — TRAZODONE HYDROCHLORIDE 50 MG/1
150 TABLET ORAL NIGHTLY PRN
Qty: 90 TABLET | Refills: 3 | Status: SHIPPED | OUTPATIENT
Start: 2020-10-20 | End: 2020-10-21 | Stop reason: SDUPTHER

## 2020-10-22 ENCOUNTER — SPECIALTY PHARMACY (OUTPATIENT)
Dept: PHARMACY | Facility: CLINIC | Age: 65
End: 2020-10-22

## 2020-10-22 NOTE — TELEPHONE ENCOUNTER
Specialty Pharmacy - Refill Coordination    Specialty Medication Orders Linked to Encounter      Most Recent Value   Medication #1  adalimumab (HUMIRA,CF, PEN) 40 mg/0.4 mL PnKt (Order#[id number], Rx#[id number])          Refill Questions - Documented Responses      Most Recent Value   Relationship to patient of person spoken to?  Self   HIPAA/medical authority confirmed?  Yes   Any changes in contact preferences or allowed representatives?  No   Has the patient had any insurance changes?  No   Has the patient had any changes to specialty medication, dose, or instructions?  No   Has the patient started taking any new medications, herbals, or supplements?  (!) Yes   Has the patient been diagnosed with any new medical conditions?  No   Does the patient have any new allergies to medications or foods?  No   Does the patient have any concerns about side effects?  No   Can the patient store medication/sharps container properly (at the correct temperature, away from children/pets, etc.)?  Yes   Can the patient call emergency services (911) in the event of an emergency?  Yes   Does the patient have any concerns or questions about taking or administering this medication as prescribed?  No   How many doses did the patient miss in the past 4 weeks or since the last fill?  0   How many doses does the patient have on hand?  0   How many days does the patient report on hand quantity will last?  0   Does the number of doses/days supply remaining match pharmacy expected amounts?  Yes   How will the patient receive the medication?  Mail   When does the patient need to receive the medication?  10/28/20   Shipping Address  Home   Address in [facility] confirmed and updated if neccessary?  Yes   Expected Copay ($)  5   Is the patient able to afford the medication copay?  Yes   Payment Method  CC on file   Days supply of Refill  28   Would patient like to speak to a pharmacist?  No   Do you want to trigger an intervention?  No    Do you want to trigger an additional referral task?  No   Refill activity completed?  Yes   Refill activity plan  Refill scheduled   Shipment/Pickup Date:  10/26/20          Current Outpatient Medications   Medication Sig    adalimumab (HUMIRA,CF, PEN) 40 mg/0.4 mL PnKt Inject 0.4 mLs (40 mg total) into the skin every 14 (fourteen) days.    amLODIPine (NORVASC) 5 MG tablet Take 1 tablet (5 mg total) by mouth once daily.    aspirin (ECOTRIN) 81 MG EC tablet Take 1 tablet (81 mg total) by mouth once daily.    cilostazol (PLETAL) 50 MG Tab Take 1 tablet (50 mg total) by mouth 2 (two) times daily.    doxazosin (CARDURA) 4 MG tablet Take 1 tablet (4 mg total) by mouth every evening.    folic acid (FOLVITE) 1 MG tablet Take 1 tablet (1 mg total) by mouth once daily.    methotrexate 2.5 MG Tab Take 4 tablets (10 mg total) by mouth every 7 days. This medication requires periodic lab monitoring.    nabumetone (RELAFEN) 750 MG tablet Take 1 tablet (750 mg total) by mouth 2 (two) times daily.    omeprazole (PRILOSEC) 40 MG capsule Take 40 mg by mouth every morning.    simvastatin (ZOCOR) 10 MG tablet Take 1 tablet (10 mg total) by mouth every evening.    traZODone (DESYREL) 50 MG tablet Take 3 tablets (150 mg total) by mouth nightly as needed for Insomnia.   Last reviewed on 10/22/2020 12:11 PM by Luciano Reeder    Review of patient's allergies indicates:  No Known Allergies Last reviewed on  10/22/2020 12:11 PM by Luciano Reeder    Interventions added this encounter   Open: [facility] Patient Intervention     Tasks added this encounter   No tasks added.   Tasks due within next 3 months   No tasks due.     Luciano Reeder  [facility] [place] - Specialty Pharmacy  [address] [place] [id number]  Phone: [phone]  Fax: [phone]

## 2020-11-04 ENCOUNTER — OFFICE VISIT (OUTPATIENT)
Dept: DERMATOLOGY | Facility: CLINIC | Age: 65
End: 2020-11-04
Payer: COMMERCIAL

## 2020-11-04 VITALS — WEIGHT: 179 LBS | BODY MASS INDEX: 26.43 KG/M2

## 2020-11-04 DIAGNOSIS — L98.9 SKIN LESION OF BACK: ICD-10-CM

## 2020-11-04 DIAGNOSIS — D48.5 NEOPLASM OF UNCERTAIN BEHAVIOR OF SKIN: Primary | ICD-10-CM

## 2020-11-04 PROCEDURE — 99202 PR OFFICE/OUTPT VISIT, NEW, LEVL II, 15-29 MIN: ICD-10-PCS | Mod: 25,S$GLB,, | Performed by: DERMATOLOGY

## 2020-11-04 PROCEDURE — 99999 PR PBB SHADOW E&M-EST. PATIENT-LVL IV: CPT | Mod: PBBFAC,,, | Performed by: DERMATOLOGY

## 2020-11-04 PROCEDURE — 1126F AMNT PAIN NOTED NONE PRSNT: CPT | Mod: S$GLB,,, | Performed by: DERMATOLOGY

## 2020-11-04 PROCEDURE — 11301 SHAVE SKIN LESION 0.6-1.0 CM: CPT | Mod: S$GLB,,, | Performed by: DERMATOLOGY

## 2020-11-04 PROCEDURE — 88305 TISSUE EXAM BY PATHOLOGIST: CPT | Mod: 26,,, | Performed by: PATHOLOGY

## 2020-11-04 PROCEDURE — 3008F BODY MASS INDEX DOCD: CPT | Mod: CPTII,S$GLB,, | Performed by: DERMATOLOGY

## 2020-11-04 PROCEDURE — 1101F PT FALLS ASSESS-DOCD LE1/YR: CPT | Mod: CPTII,S$GLB,, | Performed by: DERMATOLOGY

## 2020-11-04 PROCEDURE — 1126F PR PAIN SEVERITY QUANTIFIED, NO PAIN PRESENT: ICD-10-PCS | Mod: S$GLB,,, | Performed by: DERMATOLOGY

## 2020-11-04 PROCEDURE — 1101F PR PT FALLS ASSESS DOC 0-1 FALLS W/OUT INJ PAST YR: ICD-10-PCS | Mod: CPTII,S$GLB,, | Performed by: DERMATOLOGY

## 2020-11-04 PROCEDURE — 99202 OFFICE O/P NEW SF 15 MIN: CPT | Mod: 25,S$GLB,, | Performed by: DERMATOLOGY

## 2020-11-04 PROCEDURE — 88305 TISSUE EXAM BY PATHOLOGIST: CPT | Performed by: PATHOLOGY

## 2020-11-04 PROCEDURE — 88305 TISSUE EXAM BY PATHOLOGIST: ICD-10-PCS | Mod: 26,,, | Performed by: PATHOLOGY

## 2020-11-04 PROCEDURE — 99999 PR PBB SHADOW E&M-EST. PATIENT-LVL IV: ICD-10-PCS | Mod: PBBFAC,,, | Performed by: DERMATOLOGY

## 2020-11-04 PROCEDURE — 3008F PR BODY MASS INDEX (BMI) DOCUMENTED: ICD-10-PCS | Mod: CPTII,S$GLB,, | Performed by: DERMATOLOGY

## 2020-11-04 PROCEDURE — 11301 PR SHAV SKIN LES 0.6-1.0 CM TRUNK,ARM,LEG: ICD-10-PCS | Mod: S$GLB,,, | Performed by: DERMATOLOGY

## 2020-11-04 PROCEDURE — 1159F PR MEDICATION LIST DOCUMENTED IN MEDICAL RECORD: ICD-10-PCS | Mod: S$GLB,,, | Performed by: DERMATOLOGY

## 2020-11-04 PROCEDURE — 1159F MED LIST DOCD IN RCRD: CPT | Mod: S$GLB,,, | Performed by: DERMATOLOGY

## 2020-11-04 NOTE — PROGRESS NOTES
Subjective:       Patient ID:  Andrea Gant is a 65 y.o. male who presents for   Chief Complaint   Patient presents with    Lesion     back, was itching,     Skin Check     UBSE     Spot on back for several years has been irritated and oozing at times now is better for past 3 weeks since started mtx.       Review of Systems   Constitutional: Negative for fever, chills, weight loss, weight gain, fatigue, night sweats and malaise.   Skin: Positive for wears hat. Negative for daily sunscreen use and activity-related sunscreen use.   Hematologic/Lymphatic: Does not bruise/bleed easily.        Objective:    Physical Exam   Constitutional: He appears well-developed and well-nourished. No distress.   Neurological: He is alert and oriented to person, place, and time. He is not disoriented.   Psychiatric: He has a normal mood and affect.   Skin:   Areas Examined (abnormalities noted in diagram):   Scalp / Hair Palpated and Inspected  Head / Face Inspection Performed  Neck Inspection Performed  Chest / Axilla Inspection Performed  Back Inspection Performed  RUE Inspected  LUE Inspection Performed                   Diagram Legend     Erythematous scaling macule/papule c/w actinic keratosis       Vascular papule c/w angioma      Pigmented verrucoid papule/plaque c/w seborrheic keratosis      Yellow umbilicated papule c/w sebaceous hyperplasia      Irregularly shaped tan macule c/w lentigo     1-2 mm smooth white papules consistent with Milia      Movable subcutaneous cyst with punctum c/w epidermal inclusion cyst      Subcutaneous movable cyst c/w pilar cyst      Firm pink to brown papule c/w dermatofibroma      Pedunculated fleshy papule(s) c/w skin tag(s)      Evenly pigmented macule c/w junctional nevus     Mildly variegated pigmented, slightly irregular-bordered macule c/w mildly atypical nevus      Flesh colored to evenly pigmented papule c/w intradermal nevus       Pink pearly papule/plaque c/w basal cell  carcinoma      Erythematous hyperkeratotic cursted plaque c/w SCC      Surgical scar with no sign of skin cancer recurrence      Open and closed comedones      Inflammatory papules and pustules      Verrucoid papule consistent consistent with wart     Erythematous eczematous patches and plaques     Dystrophic onycholytic nail with subungual debris c/w onychomycosis     Umbilicated papule    Erythematous-base heme-crusted tan verrucoid plaque consistent with inflamed seborrheic keratosis     Erythematous Silvery Scaling Plaque c/w Psoriasis     See annotation      Assessment / Plan:      Pathology Orders:     Normal Orders This Visit    Specimen to Pathology, Dermatology     Comments:    Number of Specimens:->1  ------------------------->-------------------------  Spec 1 Procedure:->Biopsy  Spec 1 Clinical Impression:->irritated sk  r/o pigmented  basal cell  Spec 1 Source:->right back    Questions:    Procedure Type: Dermatology and skin neoplasms    Number of Specimens: 1    ------------------------: -------------------------    Spec 1 Procedure: Biopsy    Spec 1 Clinical Impression: irritated sk  r/o pigmented basal cell    Spec 1 Source: right back        Neoplasm of uncertain behavior of skin  -     Specimen to Pathology, Dermatology  Shave removal procedure note:    Shave removal performed after verbal consent including risk of infection, scar, recurrence, need for additional treatment of site. Area prepped with alcohol, anesthetized 1% lidocaine with epinephrine. Lesional tissue shaved  followed by cautery and hyfrecation x 3. Lesion defect size 6mm No complications. Dressing applied. Wound care explained.                Skin lesion of back  -     Ambulatory referral/consult to Dermatology      Seborrheic keratoses  reassurance  Brochure provided           Follow up in about 1 year (around 11/4/2021).

## 2020-11-04 NOTE — LETTER
November 4, 2020      Andrea Wang MD  [address]  [facility] [address]           [facility] - Derm 5th Fl  [address]  [place] [address]  Phone: [phone]  Fax: [phone]          Patient: Andrea Gant   MR Number: [id number]   YOB: 1955   Date of Visit: 11/4/2020       Dear Dr. Andrea Wang:    Thank you for referring Andrea Gant to me for evaluation. Attached you will find relevant portions of my assessment and plan of care.    If you have questions, please do not hesitate to call me. I look forward to following Andrea Gant along with you.    Sincerely,    Paula Osborn MD    Enclosure  CC:  No Recipients    If you would like to receive this communication electronically, please contact [email or web address] or [phone] to request more information on [employer] Link access.    For providers and/or their staff who would like to refer a patient to [facility], please contact us through our one-stop-shop provider referral line, [facility], at [phone].    If you feel you have received this communication in error or would no longer like to receive these types of communications, please e-mail [email or web address]

## 2020-11-09 ENCOUNTER — PATIENT MESSAGE (OUTPATIENT)
Dept: VASCULAR SURGERY | Facility: CLINIC | Age: 65
End: 2020-11-09

## 2020-11-09 DIAGNOSIS — I73.9 PVD (PERIPHERAL VASCULAR DISEASE): Primary | ICD-10-CM

## 2020-11-09 DIAGNOSIS — I71.40 ABDOMINAL AORTIC ANEURYSM WITHOUT RUPTURE: ICD-10-CM

## 2020-11-09 DIAGNOSIS — I73.9 PERIPHERAL VASCULAR DISEASE: Primary | ICD-10-CM

## 2020-11-09 LAB
FINAL PATHOLOGIC DIAGNOSIS: NORMAL
GROSS: NORMAL

## 2020-11-13 ENCOUNTER — PATIENT MESSAGE (OUTPATIENT)
Dept: VASCULAR SURGERY | Facility: CLINIC | Age: 65
End: 2020-11-13

## 2020-11-13 RX ORDER — CILOSTAZOL 50 MG/1
50 TABLET ORAL 2 TIMES DAILY
Qty: 60 TABLET | Refills: 11 | Status: SHIPPED | OUTPATIENT
Start: 2020-11-13 | End: 2021-06-08

## 2020-11-15 ENCOUNTER — PATIENT MESSAGE (OUTPATIENT)
Dept: RHEUMATOLOGY | Facility: CLINIC | Age: 65
End: 2020-11-15

## 2020-11-16 ENCOUNTER — HOSPITAL ENCOUNTER (OUTPATIENT)
Dept: VASCULAR SURGERY | Facility: CLINIC | Age: 65
Discharge: HOME | End: 2020-11-16
Attending: SURGERY
Payer: COMMERCIAL

## 2020-11-16 DIAGNOSIS — I73.9 PAD (PERIPHERAL ARTERY DISEASE): ICD-10-CM

## 2020-11-16 DIAGNOSIS — I73.9 PVD (PERIPHERAL VASCULAR DISEASE): ICD-10-CM

## 2020-11-16 DIAGNOSIS — I73.9 PERIPHERAL VASCULAR DISEASE: ICD-10-CM

## 2020-11-16 DIAGNOSIS — I73.9 PAD (PERIPHERAL ARTERY DISEASE): Primary | ICD-10-CM

## 2020-11-16 DIAGNOSIS — I71.40 ABDOMINAL AORTIC ANEURYSM WITHOUT RUPTURE: ICD-10-CM

## 2020-11-16 DIAGNOSIS — I87.2 VENOUS INSUFFICIENCY: ICD-10-CM

## 2020-11-16 PROCEDURE — 93925 PR DUPLEX LO EXTREM ART BILAT: ICD-10-PCS | Mod: 52,S$GLB,, | Performed by: SURGERY

## 2020-11-16 PROCEDURE — 93971 EXTREMITY STUDY: CPT | Mod: S$GLB,,, | Performed by: SURGERY

## 2020-11-16 PROCEDURE — 93923 UPR/LXTR ART STDY 3+ LVLS: CPT | Mod: S$GLB,,, | Performed by: SURGERY

## 2020-11-16 PROCEDURE — 93978 PR DUPLEX LARGE VESSEL(S),COMPLETE: ICD-10-PCS | Mod: S$GLB,,, | Performed by: SURGERY

## 2020-11-16 PROCEDURE — 93923 PR NON-INVASIVE PHYSIOLOGIC STUDY EXTREMITY 3 LEVELS: ICD-10-PCS | Mod: S$GLB,,, | Performed by: SURGERY

## 2020-11-16 PROCEDURE — 93978 VASCULAR STUDY: CPT | Mod: S$GLB,,, | Performed by: SURGERY

## 2020-11-16 PROCEDURE — 93971 PR US DUPLEX, UPPER OR LOWER EXT VENOUS,UNILAT OR LTD: ICD-10-PCS | Mod: S$GLB,,, | Performed by: SURGERY

## 2020-11-16 PROCEDURE — 93925 LOWER EXTREMITY STUDY: CPT | Mod: 52,S$GLB,, | Performed by: SURGERY

## 2020-11-17 ENCOUNTER — SPECIALTY PHARMACY (OUTPATIENT)
Dept: PHARMACY | Facility: CLINIC | Age: 65
End: 2020-11-17

## 2020-11-17 DIAGNOSIS — M05.79 RHEUMATOID ARTHRITIS INVOLVING MULTIPLE SITES WITH POSITIVE RHEUMATOID FACTOR: ICD-10-CM

## 2020-11-17 RX ORDER — ADALIMUMAB 40MG/0.4ML
40 KIT SUBCUTANEOUS
Qty: 6 PEN | Refills: 2 | Status: SHIPPED | OUTPATIENT
Start: 2020-11-17 | End: 2020-11-17 | Stop reason: SDUPTHER

## 2020-11-17 RX ORDER — ADALIMUMAB 40MG/0.4ML
40 KIT SUBCUTANEOUS
Qty: 6 PEN | Refills: 2 | Status: SHIPPED | OUTPATIENT
Start: 2020-11-17 | End: 2021-04-07 | Stop reason: ALTCHOICE

## 2020-11-18 ENCOUNTER — LAB VISIT (OUTPATIENT)
Dept: LAB | Facility: HOSPITAL | Age: 65
End: 2020-11-18
Attending: INTERNAL MEDICINE
Payer: COMMERCIAL

## 2020-11-18 DIAGNOSIS — M05.79 RHEUMATOID ARTHRITIS INVOLVING MULTIPLE SITES WITH POSITIVE RHEUMATOID FACTOR: ICD-10-CM

## 2020-11-18 DIAGNOSIS — Z79.60 LONG-TERM USE OF IMMUNOSUPPRESSANT MEDICATION: ICD-10-CM

## 2020-11-18 LAB
ALBUMIN SERPL BCP-MCNC: 3.9 G/DL (ref 3.5–5.2)
ALP SERPL-CCNC: 76 U/L (ref 55–135)
ALT SERPL W/O P-5'-P-CCNC: 24 U/L (ref 10–44)
ANION GAP SERPL CALC-SCNC: 6 MMOL/L (ref 8–16)
AST SERPL-CCNC: 23 U/L (ref 10–40)
BASOPHILS # BLD AUTO: 0.03 K/UL (ref 0–0.2)
BASOPHILS NFR BLD: 0.4 % (ref 0–1.9)
BILIRUB SERPL-MCNC: 0.6 MG/DL (ref 0.1–1)
BUN SERPL-MCNC: 24 MG/DL (ref 8–23)
CALCIUM SERPL-MCNC: 9.4 MG/DL (ref 8.7–10.5)
CHLORIDE SERPL-SCNC: 105 MMOL/L (ref 95–110)
CO2 SERPL-SCNC: 27 MMOL/L (ref 23–29)
CREAT SERPL-MCNC: 1.3 MG/DL (ref 0.5–1.4)
CRP SERPL-MCNC: 1.1 MG/L (ref 0–8.2)
DIFFERENTIAL METHOD: ABNORMAL
EOSINOPHIL # BLD AUTO: 0.1 K/UL (ref 0–0.5)
EOSINOPHIL NFR BLD: 1.2 % (ref 0–8)
ERYTHROCYTE [DISTWIDTH] IN BLOOD BY AUTOMATED COUNT: 13.1 % (ref 11.5–14.5)
ERYTHROCYTE [SEDIMENTATION RATE] IN BLOOD BY WESTERGREN METHOD: 14 MM/HR (ref 0–23)
EST. GFR  (AFRICAN AMERICAN): >60 ML/MIN/1.73 M^2
EST. GFR  (NON AFRICAN AMERICAN): 56.9 ML/MIN/1.73 M^2
GLUCOSE SERPL-MCNC: 120 MG/DL (ref 70–110)
HCT VFR BLD AUTO: 45.5 % (ref 40–54)
HGB BLD-MCNC: 14.4 G/DL (ref 14–18)
IMM GRANULOCYTES # BLD AUTO: 0.02 K/UL (ref 0–0.04)
IMM GRANULOCYTES NFR BLD AUTO: 0.3 % (ref 0–0.5)
LYMPHOCYTES # BLD AUTO: 1.3 K/UL (ref 1–4.8)
LYMPHOCYTES NFR BLD: 18 % (ref 18–48)
MCH RBC QN AUTO: 29.4 PG (ref 27–31)
MCHC RBC AUTO-ENTMCNC: 31.6 G/DL (ref 32–36)
MCV RBC AUTO: 93 FL (ref 82–98)
MONOCYTES # BLD AUTO: 0.6 K/UL (ref 0.3–1)
MONOCYTES NFR BLD: 7.8 % (ref 4–15)
NEUTROPHILS # BLD AUTO: 5.4 K/UL (ref 1.8–7.7)
NEUTROPHILS NFR BLD: 72.3 % (ref 38–73)
NRBC BLD-RTO: 0 /100 WBC
PLATELET # BLD AUTO: 201 K/UL (ref 150–350)
PMV BLD AUTO: 9.7 FL (ref 9.2–12.9)
POTASSIUM SERPL-SCNC: 4 MMOL/L (ref 3.5–5.1)
PROT SERPL-MCNC: 8.3 G/DL (ref 6–8.4)
RBC # BLD AUTO: 4.9 M/UL (ref 4.6–6.2)
SODIUM SERPL-SCNC: 138 MMOL/L (ref 136–145)
WBC # BLD AUTO: 7.44 K/UL (ref 3.9–12.7)

## 2020-11-18 PROCEDURE — 85025 COMPLETE CBC W/AUTO DIFF WBC: CPT

## 2020-11-18 PROCEDURE — 36415 COLL VENOUS BLD VENIPUNCTURE: CPT

## 2020-11-18 PROCEDURE — 85652 RBC SED RATE AUTOMATED: CPT

## 2020-11-18 PROCEDURE — 80053 COMPREHEN METABOLIC PANEL: CPT

## 2020-11-18 PROCEDURE — 86140 C-REACTIVE PROTEIN: CPT

## 2020-11-19 NOTE — TELEPHONE ENCOUNTER
Specialty Pharmacy - Refill Coordination    Specialty Medication Orders Linked to Encounter      Most Recent Value   Medication #1  adalimumab (HUMIRA,CF, PEN) 40 mg/0.4 mL PnKt (Order#[id number], Rx#[id number])          Refill Questions - Documented Responses      Most Recent Value   Relationship to patient of person spoken to?  Self   HIPAA/medical authority confirmed?  Yes   Any changes in contact preferences or allowed representatives?  No   Has the patient had any insurance changes?  No   Has the patient had any changes to specialty medication, dose, or instructions?  No   Has the patient started taking any new medications, herbals, or supplements?  No   Has the patient been diagnosed with any new medical conditions?  No   Does the patient have any new allergies to medications or foods?  No   Does the patient have any concerns about side effects?  No   Can the patient store medication/sharps container properly (at the correct temperature, away from children/pets, etc.)?  Yes   Can the patient call emergency services (911) in the event of an emergency?  Yes   Does the patient have any concerns or questions about taking or administering this medication as prescribed?  No   How many doses did the patient miss in the past 4 weeks or since the last fill?  0   How many doses does the patient have on hand?  0   How many days does the patient report on hand quantity will last?  0   Does the number of doses/days supply remaining match pharmacy expected amounts?  Yes   Does the patient feel that this medication is effective?  Yes   During the past 4 weeks, has patient missed any activities due to condition or medication?  No   During the past 4 weeks, did patient have any of the following urgent care visits?  None   How will the patient receive the medication?  Mail   When does the patient need to receive the medication?  11/25/20   Shipping Address  Home   Address in [facility] confirmed and updated if  neccessary?  Yes   Expected Copay ($)  5   Is the patient able to afford the medication copay?  Yes   Payment Method  CC on file   Days supply of Refill  28   Would patient like to speak to a pharmacist?  No   Do you want to trigger an intervention?  No   Do you want to trigger an additional referral task?  No   Refill activity completed?  Yes   Refill activity plan  Refill scheduled   Shipment/Pickup Date:  11/23/20          Current Outpatient Medications   Medication Sig    adalimumab (HUMIRA,CF, PEN) 40 mg/0.4 mL PnKt Inject 0.4 mLs (40 mg total) into the skin every 14 (fourteen) days.    amLODIPine (NORVASC) 5 MG tablet Take 1 tablet (5 mg total) by mouth once daily.    aspirin (ECOTRIN) 81 MG EC tablet Take 1 tablet (81 mg total) by mouth once daily.    cilostazoL (PLETAL) 50 MG Tab TAKE 1 TABLET BY MOUTH TWICE A DAY    cilostazoL (PLETAL) 50 MG Tab Take 1 tablet (50 mg total) by mouth 2 (two) times daily.    doxazosin (CARDURA) 4 MG tablet Take 1 tablet (4 mg total) by mouth every evening.    folic acid (FOLVITE) 1 MG tablet Take 1 tablet (1 mg total) by mouth once daily.    methotrexate 2.5 MG Tab Take 4 tablets (10 mg total) by mouth every 7 days. This medication requires periodic lab monitoring.    nabumetone (RELAFEN) 750 MG tablet Take 1 tablet (750 mg total) by mouth 2 (two) times daily.    omeprazole (PRILOSEC) 40 MG capsule Take 40 mg by mouth every morning.    simvastatin (ZOCOR) 10 MG tablet Take 1 tablet (10 mg total) by mouth every evening.    traZODone (DESYREL) 50 MG tablet Take 3 tablets (150 mg total) by mouth nightly as needed for Insomnia.   Last reviewed on 11/4/2020  3:11 PM by Paula Osborn MD    Review of patient's allergies indicates:  No Known Allergies Last reviewed on  11/13/2020 2:28 PM by Patricio Hernández      Tasks added this encounter   No tasks added.   Tasks due within next 3 months   11/17/2020 - Refill Call (Auto Added)     Marybel Mela  [facility] - Specialty  Pharmacy  [address] [address]  Phone: [phone]  Fax: [phone]

## 2020-11-25 ENCOUNTER — OFFICE VISIT (OUTPATIENT)
Dept: VASCULAR SURGERY | Facility: CLINIC | Age: 65
End: 2020-11-25
Payer: COMMERCIAL

## 2020-11-25 VITALS
DIASTOLIC BLOOD PRESSURE: 69 MMHG | HEART RATE: 72 BPM | BODY MASS INDEX: 26.12 KG/M2 | HEIGHT: 69 IN | WEIGHT: 176.38 LBS | SYSTOLIC BLOOD PRESSURE: 127 MMHG | TEMPERATURE: 99 F

## 2020-11-25 DIAGNOSIS — I73.9 PAD (PERIPHERAL ARTERY DISEASE): Primary | ICD-10-CM

## 2020-11-25 PROCEDURE — 3078F DIAST BP <80 MM HG: CPT | Mod: CPTII,S$GLB,, | Performed by: SURGERY

## 2020-11-25 PROCEDURE — 99214 OFFICE O/P EST MOD 30 MIN: CPT | Mod: S$GLB,,, | Performed by: SURGERY

## 2020-11-25 PROCEDURE — 3288F FALL RISK ASSESSMENT DOCD: CPT | Mod: CPTII,S$GLB,, | Performed by: SURGERY

## 2020-11-25 PROCEDURE — 1159F PR MEDICATION LIST DOCUMENTED IN MEDICAL RECORD: ICD-10-PCS | Mod: S$GLB,,, | Performed by: SURGERY

## 2020-11-25 PROCEDURE — 99999 PR PBB SHADOW E&M-EST. PATIENT-LVL III: ICD-10-PCS | Mod: PBBFAC,,, | Performed by: SURGERY

## 2020-11-25 PROCEDURE — 1126F PR PAIN SEVERITY QUANTIFIED, NO PAIN PRESENT: ICD-10-PCS | Mod: S$GLB,,, | Performed by: SURGERY

## 2020-11-25 PROCEDURE — 1159F MED LIST DOCD IN RCRD: CPT | Mod: S$GLB,,, | Performed by: SURGERY

## 2020-11-25 PROCEDURE — 3078F PR MOST RECENT DIASTOLIC BLOOD PRESSURE < 80 MM HG: ICD-10-PCS | Mod: CPTII,S$GLB,, | Performed by: SURGERY

## 2020-11-25 PROCEDURE — 1101F PR PT FALLS ASSESS DOC 0-1 FALLS W/OUT INJ PAST YR: ICD-10-PCS | Mod: CPTII,S$GLB,, | Performed by: SURGERY

## 2020-11-25 PROCEDURE — 1101F PT FALLS ASSESS-DOCD LE1/YR: CPT | Mod: CPTII,S$GLB,, | Performed by: SURGERY

## 2020-11-25 PROCEDURE — 3008F PR BODY MASS INDEX (BMI) DOCUMENTED: ICD-10-PCS | Mod: CPTII,S$GLB,, | Performed by: SURGERY

## 2020-11-25 PROCEDURE — 3074F SYST BP LT 130 MM HG: CPT | Mod: CPTII,S$GLB,, | Performed by: SURGERY

## 2020-11-25 PROCEDURE — 3008F BODY MASS INDEX DOCD: CPT | Mod: CPTII,S$GLB,, | Performed by: SURGERY

## 2020-11-25 PROCEDURE — 1126F AMNT PAIN NOTED NONE PRSNT: CPT | Mod: S$GLB,,, | Performed by: SURGERY

## 2020-11-25 PROCEDURE — 99214 PR OFFICE/OUTPT VISIT, EST, LEVL IV, 30-39 MIN: ICD-10-PCS | Mod: S$GLB,,, | Performed by: SURGERY

## 2020-11-25 PROCEDURE — 99999 PR PBB SHADOW E&M-EST. PATIENT-LVL III: CPT | Mod: PBBFAC,,, | Performed by: SURGERY

## 2020-11-25 PROCEDURE — 3074F PR MOST RECENT SYSTOLIC BLOOD PRESSURE < 130 MM HG: ICD-10-PCS | Mod: CPTII,S$GLB,, | Performed by: SURGERY

## 2020-11-25 PROCEDURE — 3288F PR FALLS RISK ASSESSMENT DOCUMENTED: ICD-10-PCS | Mod: CPTII,S$GLB,, | Performed by: SURGERY

## 2020-11-25 NOTE — PROGRESS NOTES
Andrea Martinoruy  11/25/2020    HPI:  Patient is a 65 y.o. male with ho former heavy tobacco use, HLD for f/u of L lower extremity claudication. The patient reports sustained marked improvement in cramping pain in L calf - now > 1.5 miles (as in 2018); when we met Oct 2013 it was 1 block). Since his last visit he reports no new symptoms. He is active and walks daily with intermittent claudication symptoms relieved by brief episodes of rest.    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)    Significant family history of claudication/heart disease    PMD is Dr LUCINDA Wang    Additionally, he has a history of:  Past Medical History:   Diagnosis Date    Cataract     Colon polyp 2014    History of hepatitis C, s/p successful treatment w/ SVR12 - 7/2015 10/14/2012    Kelsey 1a,  Liver biopsy 6/2014 - Stage 1 fibrosis;  Completed 8 weeks Harvoni w/ SVR12 - 7/2015      Hyperlipidemia     Lipoma of arm     Lipoma of lower extremity     Nuclear sclerosis - Both Eyes 10/22/2012    Other and unspecified hyperlipidemia 10/22/2013    PAD (peripheral artery disease)     Peripheral vascular disease, unspecified     Rheumatoid arthritis(714.0) 10/14/2012     Past Surgical History:   Procedure Laterality Date    KNEE ARTHROPLASTY      LAPAROSCOPIC EXPLORATION OF GROIN Left 9/6/2018    Procedure: EXPLORATION, INGUINAL REGION, LAPAROSCOPIC;  Surgeon: Mingo De Guzman Jr., MD;  Location: [facility] OR [address] [place] [address];  Service: General;  Laterality: Left;    TONSILLECTOMY       Family History   Problem Relation Age of Onset    Heart disease Paternal Uncle     Dementia Mother     Heart disease Sister     Liver disease Neg Hx     Amblyopia Neg Hx     Blindness Neg Hx     Cancer Neg Hx     Cataracts Neg Hx     Diabetes Neg Hx     Glaucoma Neg Hx     Hypertension Neg Hx     Macular degeneration Neg Hx     Retinal detachment Neg  Hx     Strabismus Neg Hx     Stroke Neg Hx     Thyroid disease Neg Hx      Social History     Socioeconomic History    Marital status:      Spouse name: Not on file    Number of children: Not on file    Years of education: Not on file    Highest education level: Not on file   Occupational History    Not on file   Social Needs    Financial resource strain: Not on file    Food insecurity     Worry: Not on file     Inability: Not on file    Transportation needs     Medical: Not on file     Non-medical: Not on file   Tobacco Use    Smoking status: Former Smoker     Types: Cigarettes     Quit date: 2020     Years since quittin.4    Smokeless tobacco: Never Used   Substance and Sexual Activity    Alcohol use: Yes     Comment: 3-4 drinks per week    Drug use: Yes     Types: Marijuana     Comment: marajuana    Sexual activity: Not on file   Lifestyle    Physical activity     Days per week: Not on file     Minutes per session: Not on file    Stress: Not on file   Relationships    Social connections     Talks on phone: Not on file     Gets together: Not on file     Attends Sabianist service: Not on file     Active member of club or organization: Not on file     Attends meetings of clubs or organizations: Not on file     Relationship status: Not on file   Other Topics Concern    Not on file   Social History Narrative    Resides locally    [employer]     w/ 2 adult children     Current Outpatient Medications   Medication Sig Dispense Refill    adalimumab (HUMIRA,CF, PEN) 40 mg/0.4 mL PnKt Inject 0.4 mLs (40 mg total) into the skin every 14 (fourteen) days. 6 pen 2    amLODIPine (NORVASC) 5 MG tablet Take 1 tablet (5 mg total) by mouth once daily. 30 tablet 12    aspirin (ECOTRIN) 81 MG EC tablet Take 1 tablet (81 mg total) by mouth once daily.  3    cilostazoL (PLETAL) 50 MG Tab TAKE 1 TABLET BY MOUTH TWICE A  tablet 3    cilostazoL (PLETAL)  50 MG Tab Take 1 tablet (50 mg total) by mouth 2 (two) times daily. 60 tablet 11    doxazosin (CARDURA) 4 MG tablet Take 1 tablet (4 mg total) by mouth every evening. 90 tablet 1    folic acid (FOLVITE) 1 MG tablet Take 1 tablet (1 mg total) by mouth once daily. 90 tablet 3    methotrexate 2.5 MG Tab Take 4 tablets (10 mg total) by mouth every 7 days. This medication requires periodic lab monitoring. 60 tablet 1    nabumetone (RELAFEN) 750 MG tablet Take 1 tablet (750 mg total) by mouth 2 (two) times daily. 60 tablet 0    omeprazole (PRILOSEC) 40 MG capsule Take 40 mg by mouth every morning.      simvastatin (ZOCOR) 10 MG tablet Take 1 tablet (10 mg total) by mouth every evening. 90 tablet 6    traZODone (DESYREL) 50 MG tablet Take 3 tablets (150 mg total) by mouth nightly as needed for Insomnia. 90 tablet 3     No current facility-administered medications for this visit.        REVIEW OF SYSTEMS:  General: negative  ENT: negative  Allergy and Immunology: negative  Hematological and Lymphatic: negative  Endocrine: negative  Respiratory: no cough, shortness of breath, or wheezing  Cardiovascular: no chest pain or dyspnea on exertion  Gastrointestinal: no abdominal pain, change in bowel habits, or bloody stools  Genito-Urinary: no dysuria, trouble voiding, or hematuria  Musculoskeletal: negative  Neurological: no TIA or stroke symptoms    PHYSICAL EXAM:  Vitals:    11/25/20 1328   BP: 127/69   Pulse: 72   Temp: 99 °F (37.2 °C)         General appearance: Alert, well appearing, and in no distress  Neurological: Alert, oriented, normal speech, no focal findings noted  Mental status:  Oriented to person, place, and time  Neck: Supple, no significant adenopathy, carotids upstroke normal bilaterally  No carotid bruits can be auscultated  Chest: Clear to auscultation, no wheezes, rales or rhonchi, symmetric air entry  Cardiac: Normal rate and regular rhythm, S1 and S2 normal; PMI non-displaced  Abdomen: Soft,  nontender, nondistended, no masses or organomegaly  no rebound tenderness noted; bowel sounds normal  + Palpable aortic  pulsatile mass  Extremities:  2+ femoral pulses bilaterally; Non-palpable pedal pulses on L leg  2+ R PT pulse  No tissue loss  No pedal edema    Lab Results   Component Value Date    K 4.0 11/18/2020    K 4.4 10/05/2020    K 4.0 06/02/2020    CREATININE 1.3 11/18/2020    CREATININE 1.43 (H) 10/05/2020    CREATININE 1.4 06/02/2020     Lab Results   Component Value Date    WBC 7.44 11/18/2020    WBC 8.0 10/05/2020    WBC 9.45 06/02/2020    HCT 45.5 11/18/2020    HCT 41.9 10/05/2020    HCT 49.0 06/02/2020     11/18/2020     10/05/2020     06/02/2020       IMAGING  Aortic u/s (11/16/2020)  aneurysm with a maximum diameter of 3.6cm transversely. The right CHAD is1.6cm and left CHAD is 1.4cm  No popliteal aneurysms    ABIs (11/16/2020)  Right Leg: FAHEEM (1.06) and PVR waveforms indicate minimal peripheral arterial occlusive disease.   Left Leg: FAHEEM (.73) and PVR waveforms indicate moderate peripheral arterial occlusive disease.     Venous Ultrasound 11/16/2020  Right Leg: Duplex imaging of the right lower extremity veins demonstrates patent and compressible veins.   There is no evidence of a venous thrombosis in the deep or superficial veins.   Significant reflux noted in the right GSV including the Saphenofemoral Junction (SFJ). No significant reflux noted in the SSV or   accessory vein.      Prior:  ABIs  R 1.02 (previous 1.08, 1.12)  L 0.76 (previous 0.68, 0.61, 0.58)    Triphasic waveforms at R ankle  Biphasic waveforms at L ankle    Aortic u/s: 3.6cm  R CHAD 1.6cm  L CHAD 1.4cm    Previous:  CTA runoff  +atheroma in Aorta  Ectatic R distal CHAD   Long segment L SFA  30 cm  Reconstitutes in mid-L popliteal  3 tibals runoff    No R leg occlusive disease  + calcium throughout    Carotid u/s:  No ICA disease  Normal antegrade vert flow      IMP/PLAN:  65 y.o. male with ho former heavy  tobacco use, HLD with Sirisha chronic limb ischemia class II : L calf claudication but well managed- has 3.6cm AAA  Re-assured - small and stable; only needs surveillance  Still ambulating now > 1.5 miles (previously 1 block)    Cont ASA, statin; cilostazol  F.u with 2 yrs with AAA u/s    Feroz Jensen MD DFSVS FACS   Vascular/Endovascular Surgery

## 2020-12-01 ENCOUNTER — PATIENT MESSAGE (OUTPATIENT)
Dept: RHEUMATOLOGY | Facility: CLINIC | Age: 65
End: 2020-12-01

## 2020-12-02 ENCOUNTER — PATIENT MESSAGE (OUTPATIENT)
Dept: PHARMACY | Facility: CLINIC | Age: 65
End: 2020-12-02

## 2020-12-03 ENCOUNTER — IMMUNIZATION (OUTPATIENT)
Dept: PHARMACY | Facility: CLINIC | Age: 65
End: 2020-12-03
Payer: COMMERCIAL

## 2020-12-04 ENCOUNTER — CLINICAL SUPPORT (OUTPATIENT)
Dept: URGENT CARE | Facility: CLINIC | Age: 65
End: 2020-12-04
Payer: COMMERCIAL

## 2020-12-04 DIAGNOSIS — Z11.59 ENCOUNTER FOR SCREENING FOR OTHER VIRAL DISEASES: Primary | ICD-10-CM

## 2020-12-04 LAB
CTP QC/QA: YES
SARS-COV-2 RDRP RESP QL NAA+PROBE: NEGATIVE

## 2020-12-04 PROCEDURE — U0002: ICD-10-PCS | Mod: QW,S$GLB,, | Performed by: NURSE PRACTITIONER

## 2020-12-04 PROCEDURE — U0002 COVID-19 LAB TEST NON-CDC: HCPCS | Mod: QW,S$GLB,, | Performed by: NURSE PRACTITIONER

## 2020-12-15 ENCOUNTER — SPECIALTY PHARMACY (OUTPATIENT)
Dept: PHARMACY | Facility: CLINIC | Age: 65
End: 2020-12-15

## 2020-12-15 NOTE — TELEPHONE ENCOUNTER
Specialty Pharmacy - Refill Coordination    Specialty Medication Orders Linked to Encounter      Most Recent Value   Medication #1  adalimumab (HUMIRA,CF, PEN) 40 mg/0.4 mL PnKt (Order#[id number], Rx#[id number])          Refill Questions - Documented Responses      Most Recent Value   Relationship to patient of person spoken to?  Self   HIPAA/medical authority confirmed?  Yes   Any changes in contact preferences or allowed representatives?  No   Has the patient had any insurance changes?  No   Has the patient had any changes to specialty medication, dose, or instructions?  No   Has the patient started taking any new medications, herbals, or supplements?  No   Has the patient been diagnosed with any new medical conditions?  No   Does the patient have any new allergies to medications or foods?  No   Does the patient have any concerns about side effects?  No   Can the patient store medication/sharps container properly (at the correct temperature, away from children/pets, etc.)?  Yes   Can the patient call emergency services (911) in the event of an emergency?  Yes   Does the patient have any concerns or questions about taking or administering this medication as prescribed?  No   How many doses did the patient miss in the past 4 weeks or since the last fill?  0   How many doses does the patient have on hand?  0   How many days does the patient report on hand quantity will last?  8   Does the number of doses/days supply remaining match pharmacy expected amounts?  Yes   How will the patient receive the medication?  Mail   When does the patient need to receive the medication?  12/23/20   Shipping Address  Home   Address in [facility] confirmed and updated if neccessary?  Yes   Expected Copay ($)  5   Is the patient able to afford the medication copay?  Yes   Payment Method  CC on file   Days supply of Refill  28   Would patient like to speak to a pharmacist?  No   Do you want to trigger an intervention?  No   Do you  want to trigger an additional referral task?  No   Refill activity completed?  Yes   Refill activity plan  Refill scheduled   Shipment/Pickup Date:  12/17/20          Current Outpatient Medications   Medication Sig    adalimumab (HUMIRA,CF, PEN) 40 mg/0.4 mL PnKt Inject 0.4 mLs (40 mg total) into the skin every 14 (fourteen) days.    amLODIPine (NORVASC) 5 MG tablet Take 1 tablet (5 mg total) by mouth once daily.    aspirin (ECOTRIN) 81 MG EC tablet Take 1 tablet (81 mg total) by mouth once daily.    cilostazoL (PLETAL) 50 MG Tab Take 1 tablet (50 mg total) by mouth 2 (two) times daily.    cilostazoL (PLETAL) 50 MG Tab TAKE 1 TABLET BY MOUTH TWICE A DAY    cilostazoL (PLETAL) 50 MG Tab Take 1 tablet (50 mg total) by mouth 2 (two) times daily.    doxazosin (CARDURA) 4 MG tablet Take 1 tablet (4 mg total) by mouth every evening.    folic acid (FOLVITE) 1 MG tablet Take 1 tablet (1 mg total) by mouth once daily.    methotrexate 2.5 MG Tab Take 4 tablets (10 mg total) by mouth every 7 days. This medication requires periodic lab monitoring.    nabumetone (RELAFEN) 750 MG tablet Take 1 tablet (750 mg total) by mouth 2 (two) times daily.    omeprazole (PRILOSEC) 40 MG capsule Take 40 mg by mouth every morning.    simvastatin (ZOCOR) 10 MG tablet Take 1 tablet (10 mg total) by mouth every evening.    traZODone (DESYREL) 50 MG tablet Take 3 tablets (150 mg total) by mouth nightly as needed for Insomnia.   Last reviewed on 11/25/2020  1:29 PM by Manisha Brooks MA    Review of patient's allergies indicates:  No Known Allergies Last reviewed on  11/25/2020 1:29 PM by Manisha Brooks      Tasks added this encounter   1/12/2021 - Refill Call (Auto Added)   Tasks due within next 3 months   No tasks due.     [facility]  [address] [facility] [address]  Phone: [phone]  Fax: [phone]

## 2020-12-17 ENCOUNTER — PATIENT MESSAGE (OUTPATIENT)
Dept: RHEUMATOLOGY | Facility: CLINIC | Age: 65
End: 2020-12-17

## 2021-01-25 ENCOUNTER — TELEPHONE (OUTPATIENT)
Dept: PHARMACY | Facility: CLINIC | Age: 66
End: 2021-01-25

## 2021-01-25 ENCOUNTER — PATIENT MESSAGE (OUTPATIENT)
Dept: PHARMACY | Facility: CLINIC | Age: 66
End: 2021-01-25

## 2021-01-27 ENCOUNTER — CLINICAL SUPPORT (OUTPATIENT)
Dept: SMOKING CESSATION | Facility: CLINIC | Age: 66
End: 2021-01-27
Payer: COMMERCIAL

## 2021-01-27 DIAGNOSIS — F17.200 NICOTINE DEPENDENCE: Primary | ICD-10-CM

## 2021-01-27 PROCEDURE — 99407 BEHAV CHNG SMOKING > 10 MIN: CPT | Mod: S$GLB,,,

## 2021-01-27 PROCEDURE — 99407 PR TOBACCO USE CESSATION INTENSIVE >10 MINUTES: ICD-10-PCS | Mod: S$GLB,,,

## 2021-02-14 DIAGNOSIS — G47.00 PERSISTENT INSOMNIA: ICD-10-CM

## 2021-02-14 RX ORDER — TRAZODONE HYDROCHLORIDE 50 MG/1
150 TABLET ORAL NIGHTLY PRN
Qty: 270 TABLET | Refills: 1 | Status: SHIPPED | OUTPATIENT
Start: 2021-02-14 | End: 2021-06-28 | Stop reason: SDUPTHER

## 2021-02-23 ENCOUNTER — PATIENT MESSAGE (OUTPATIENT)
Dept: RHEUMATOLOGY | Facility: CLINIC | Age: 66
End: 2021-02-23

## 2021-03-02 RX ORDER — DOXAZOSIN 4 MG/1
4 TABLET ORAL NIGHTLY
Qty: 90 TABLET | Refills: 2 | Status: SHIPPED | OUTPATIENT
Start: 2021-03-02 | End: 2021-09-28

## 2021-03-03 ENCOUNTER — PATIENT OUTREACH (OUTPATIENT)
Dept: ADMINISTRATIVE | Facility: HOSPITAL | Age: 66
End: 2021-03-03

## 2021-03-23 ENCOUNTER — PATIENT MESSAGE (OUTPATIENT)
Dept: RHEUMATOLOGY | Facility: CLINIC | Age: 66
End: 2021-03-23

## 2021-03-24 ENCOUNTER — LAB VISIT (OUTPATIENT)
Dept: LAB | Facility: HOSPITAL | Age: 66
End: 2021-03-24
Attending: INTERNAL MEDICINE
Payer: MEDICARE

## 2021-03-24 DIAGNOSIS — M05.79 RHEUMATOID ARTHRITIS INVOLVING MULTIPLE SITES WITH POSITIVE RHEUMATOID FACTOR: ICD-10-CM

## 2021-03-24 DIAGNOSIS — Z79.60 LONG-TERM USE OF IMMUNOSUPPRESSANT MEDICATION: ICD-10-CM

## 2021-03-24 LAB
ALBUMIN SERPL BCP-MCNC: 4 G/DL (ref 3.5–5.2)
ALP SERPL-CCNC: 88 U/L (ref 55–135)
ALT SERPL W/O P-5'-P-CCNC: 15 U/L (ref 10–44)
ANION GAP SERPL CALC-SCNC: 7 MMOL/L (ref 8–16)
AST SERPL-CCNC: 19 U/L (ref 10–40)
BASOPHILS # BLD AUTO: 0.04 K/UL (ref 0–0.2)
BASOPHILS NFR BLD: 0.6 % (ref 0–1.9)
BILIRUB SERPL-MCNC: 0.6 MG/DL (ref 0.1–1)
BUN SERPL-MCNC: 20 MG/DL (ref 8–23)
CALCIUM SERPL-MCNC: 9.7 MG/DL (ref 8.7–10.5)
CHLORIDE SERPL-SCNC: 106 MMOL/L (ref 95–110)
CO2 SERPL-SCNC: 28 MMOL/L (ref 23–29)
CREAT SERPL-MCNC: 1.3 MG/DL (ref 0.5–1.4)
CRP SERPL-MCNC: 3.5 MG/L (ref 0–8.2)
DIFFERENTIAL METHOD: NORMAL
EOSINOPHIL # BLD AUTO: 0.1 K/UL (ref 0–0.5)
EOSINOPHIL NFR BLD: 1.3 % (ref 0–8)
ERYTHROCYTE [DISTWIDTH] IN BLOOD BY AUTOMATED COUNT: 13.4 % (ref 11.5–14.5)
ERYTHROCYTE [SEDIMENTATION RATE] IN BLOOD BY WESTERGREN METHOD: 19 MM/HR (ref 0–23)
EST. GFR  (AFRICAN AMERICAN): >60 ML/MIN/1.73 M^2
EST. GFR  (NON AFRICAN AMERICAN): 56.5 ML/MIN/1.73 M^2
GLUCOSE SERPL-MCNC: 97 MG/DL (ref 70–110)
HCT VFR BLD AUTO: 43.2 % (ref 40–54)
HGB BLD-MCNC: 14.5 G/DL (ref 14–18)
IMM GRANULOCYTES # BLD AUTO: 0.02 K/UL (ref 0–0.04)
IMM GRANULOCYTES NFR BLD AUTO: 0.3 % (ref 0–0.5)
LYMPHOCYTES # BLD AUTO: 1.6 K/UL (ref 1–4.8)
LYMPHOCYTES NFR BLD: 21.8 % (ref 18–48)
MCH RBC QN AUTO: 30.7 PG (ref 27–31)
MCHC RBC AUTO-ENTMCNC: 33.6 G/DL (ref 32–36)
MCV RBC AUTO: 91 FL (ref 82–98)
MONOCYTES # BLD AUTO: 0.6 K/UL (ref 0.3–1)
MONOCYTES NFR BLD: 8.8 % (ref 4–15)
NEUTROPHILS # BLD AUTO: 4.8 K/UL (ref 1.8–7.7)
NEUTROPHILS NFR BLD: 67.2 % (ref 38–73)
NRBC BLD-RTO: 0 /100 WBC
PLATELET # BLD AUTO: 208 K/UL (ref 150–350)
PMV BLD AUTO: 9.4 FL (ref 9.2–12.9)
POTASSIUM SERPL-SCNC: 4.6 MMOL/L (ref 3.5–5.1)
PROT SERPL-MCNC: 8.3 G/DL (ref 6–8.4)
RBC # BLD AUTO: 4.73 M/UL (ref 4.6–6.2)
SODIUM SERPL-SCNC: 141 MMOL/L (ref 136–145)
WBC # BLD AUTO: 7.19 K/UL (ref 3.9–12.7)

## 2021-03-24 PROCEDURE — 80053 COMPREHEN METABOLIC PANEL: CPT | Performed by: INTERNAL MEDICINE

## 2021-03-24 PROCEDURE — 86140 C-REACTIVE PROTEIN: CPT | Performed by: INTERNAL MEDICINE

## 2021-03-24 PROCEDURE — 36415 COLL VENOUS BLD VENIPUNCTURE: CPT | Performed by: INTERNAL MEDICINE

## 2021-03-24 PROCEDURE — 85025 COMPLETE CBC W/AUTO DIFF WBC: CPT | Performed by: INTERNAL MEDICINE

## 2021-03-24 PROCEDURE — 85652 RBC SED RATE AUTOMATED: CPT | Performed by: INTERNAL MEDICINE

## 2021-04-07 ENCOUNTER — PATIENT MESSAGE (OUTPATIENT)
Dept: PHARMACY | Facility: CLINIC | Age: 66
End: 2021-04-07

## 2021-04-07 ENCOUNTER — OFFICE VISIT (OUTPATIENT)
Dept: RHEUMATOLOGY | Facility: CLINIC | Age: 66
End: 2021-04-07
Payer: MEDICARE

## 2021-04-07 VITALS
DIASTOLIC BLOOD PRESSURE: 84 MMHG | WEIGHT: 174 LBS | HEIGHT: 70 IN | HEART RATE: 66 BPM | BODY MASS INDEX: 24.91 KG/M2 | SYSTOLIC BLOOD PRESSURE: 136 MMHG

## 2021-04-07 DIAGNOSIS — G47.00 PERSISTENT INSOMNIA: ICD-10-CM

## 2021-04-07 DIAGNOSIS — Z79.60 LONG-TERM USE OF IMMUNOSUPPRESSANT MEDICATION: ICD-10-CM

## 2021-04-07 DIAGNOSIS — M05.79 RHEUMATOID ARTHRITIS INVOLVING MULTIPLE SITES WITH POSITIVE RHEUMATOID FACTOR: Primary | ICD-10-CM

## 2021-04-07 DIAGNOSIS — Z79.631 LONG TERM METHOTREXATE USER: ICD-10-CM

## 2021-04-07 DIAGNOSIS — N18.31 STAGE 3A CHRONIC KIDNEY DISEASE: ICD-10-CM

## 2021-04-07 PROCEDURE — 1125F AMNT PAIN NOTED PAIN PRSNT: CPT | Mod: S$GLB,,, | Performed by: INTERNAL MEDICINE

## 2021-04-07 PROCEDURE — 1101F PT FALLS ASSESS-DOCD LE1/YR: CPT | Mod: CPTII,S$GLB,, | Performed by: INTERNAL MEDICINE

## 2021-04-07 PROCEDURE — 1101F PR PT FALLS ASSESS DOC 0-1 FALLS W/OUT INJ PAST YR: ICD-10-PCS | Mod: CPTII,S$GLB,, | Performed by: INTERNAL MEDICINE

## 2021-04-07 PROCEDURE — 1159F PR MEDICATION LIST DOCUMENTED IN MEDICAL RECORD: ICD-10-PCS | Mod: S$GLB,,, | Performed by: INTERNAL MEDICINE

## 2021-04-07 PROCEDURE — 3008F BODY MASS INDEX DOCD: CPT | Mod: CPTII,S$GLB,, | Performed by: INTERNAL MEDICINE

## 2021-04-07 PROCEDURE — 1159F MED LIST DOCD IN RCRD: CPT | Mod: S$GLB,,, | Performed by: INTERNAL MEDICINE

## 2021-04-07 PROCEDURE — 99499 RISK ADDL DX/OHS AUDIT: ICD-10-PCS | Mod: S$GLB,,, | Performed by: INTERNAL MEDICINE

## 2021-04-07 PROCEDURE — 99214 PR OFFICE/OUTPT VISIT, EST, LEVL IV, 30-39 MIN: ICD-10-PCS | Mod: S$GLB,,, | Performed by: INTERNAL MEDICINE

## 2021-04-07 PROCEDURE — 99999 PR PBB SHADOW E&M-EST. PATIENT-LVL III: ICD-10-PCS | Mod: PBBFAC,,, | Performed by: INTERNAL MEDICINE

## 2021-04-07 PROCEDURE — 99499 UNLISTED E&M SERVICE: CPT | Mod: S$GLB,,, | Performed by: INTERNAL MEDICINE

## 2021-04-07 PROCEDURE — 99999 PR PBB SHADOW E&M-EST. PATIENT-LVL III: CPT | Mod: PBBFAC,,, | Performed by: INTERNAL MEDICINE

## 2021-04-07 PROCEDURE — 3008F PR BODY MASS INDEX (BMI) DOCUMENTED: ICD-10-PCS | Mod: CPTII,S$GLB,, | Performed by: INTERNAL MEDICINE

## 2021-04-07 PROCEDURE — 1125F PR PAIN SEVERITY QUANTIFIED, PAIN PRESENT: ICD-10-PCS | Mod: S$GLB,,, | Performed by: INTERNAL MEDICINE

## 2021-04-07 PROCEDURE — 99214 OFFICE O/P EST MOD 30 MIN: CPT | Mod: S$GLB,,, | Performed by: INTERNAL MEDICINE

## 2021-04-07 PROCEDURE — 3288F PR FALLS RISK ASSESSMENT DOCUMENTED: ICD-10-PCS | Mod: CPTII,S$GLB,, | Performed by: INTERNAL MEDICINE

## 2021-04-07 PROCEDURE — 3288F FALL RISK ASSESSMENT DOCD: CPT | Mod: CPTII,S$GLB,, | Performed by: INTERNAL MEDICINE

## 2021-04-07 RX ORDER — METHOTREXATE 2.5 MG/1
10 TABLET ORAL
Qty: 60 TABLET | Refills: 2 | Status: SHIPPED | OUTPATIENT
Start: 2021-04-07 | End: 2021-12-27

## 2021-04-07 RX ORDER — FOLIC ACID 1 MG/1
1 TABLET ORAL DAILY
Qty: 90 TABLET | Refills: 3 | Status: SHIPPED | OUTPATIENT
Start: 2021-04-07 | End: 2022-01-10

## 2021-04-08 ENCOUNTER — IMMUNIZATION (OUTPATIENT)
Dept: PHARMACY | Facility: CLINIC | Age: 66
End: 2021-04-08
Payer: MEDICARE

## 2021-05-03 ENCOUNTER — TELEPHONE (OUTPATIENT)
Dept: VASCULAR SURGERY | Facility: HOSPITAL | Age: 66
End: 2021-05-03

## 2021-05-03 RX ORDER — CILOSTAZOL 50 MG/1
50 TABLET ORAL 2 TIMES DAILY
Qty: 60 TABLET | Refills: 11 | Status: SHIPPED | OUTPATIENT
Start: 2021-05-03 | End: 2021-06-08

## 2021-05-18 ENCOUNTER — PATIENT OUTREACH (OUTPATIENT)
Dept: ADMINISTRATIVE | Facility: OTHER | Age: 66
End: 2021-05-18

## 2021-05-19 ENCOUNTER — TELEPHONE (OUTPATIENT)
Dept: OPHTHALMOLOGY | Facility: CLINIC | Age: 66
End: 2021-05-19

## 2021-05-19 ENCOUNTER — OFFICE VISIT (OUTPATIENT)
Dept: OPTOMETRY | Facility: CLINIC | Age: 66
End: 2021-05-19
Payer: MEDICARE

## 2021-05-19 DIAGNOSIS — H16.002 CORNEA ULCER, LEFT: Primary | ICD-10-CM

## 2021-05-19 PROCEDURE — 1101F PT FALLS ASSESS-DOCD LE1/YR: CPT | Mod: CPTII,S$GLB,, | Performed by: OPTOMETRIST

## 2021-05-19 PROCEDURE — 99999 PR PBB SHADOW E&M-EST. PATIENT-LVL III: CPT | Mod: PBBFAC,,, | Performed by: OPTOMETRIST

## 2021-05-19 PROCEDURE — 3288F PR FALLS RISK ASSESSMENT DOCUMENTED: ICD-10-PCS | Mod: CPTII,S$GLB,, | Performed by: OPTOMETRIST

## 2021-05-19 PROCEDURE — 92004 PR EYE EXAM, NEW PATIENT,COMPREHESV: ICD-10-PCS | Mod: S$GLB,,, | Performed by: OPTOMETRIST

## 2021-05-19 PROCEDURE — 1101F PR PT FALLS ASSESS DOC 0-1 FALLS W/OUT INJ PAST YR: ICD-10-PCS | Mod: CPTII,S$GLB,, | Performed by: OPTOMETRIST

## 2021-05-19 PROCEDURE — 99999 PR PBB SHADOW E&M-EST. PATIENT-LVL III: ICD-10-PCS | Mod: PBBFAC,,, | Performed by: OPTOMETRIST

## 2021-05-19 PROCEDURE — 1125F AMNT PAIN NOTED PAIN PRSNT: CPT | Mod: S$GLB,,, | Performed by: OPTOMETRIST

## 2021-05-19 PROCEDURE — 1125F PR PAIN SEVERITY QUANTIFIED, PAIN PRESENT: ICD-10-PCS | Mod: S$GLB,,, | Performed by: OPTOMETRIST

## 2021-05-19 PROCEDURE — 3288F FALL RISK ASSESSMENT DOCD: CPT | Mod: CPTII,S$GLB,, | Performed by: OPTOMETRIST

## 2021-05-19 PROCEDURE — 92004 COMPRE OPH EXAM NEW PT 1/>: CPT | Mod: S$GLB,,, | Performed by: OPTOMETRIST

## 2021-05-19 RX ORDER — TOBRAMYCIN AND DEXAMETHASONE 3; 1 MG/ML; MG/ML
1 SUSPENSION/ DROPS OPHTHALMIC 4 TIMES DAILY
Qty: 1 BOTTLE | Refills: 1 | Status: SHIPPED | OUTPATIENT
Start: 2021-05-19 | End: 2021-05-26

## 2021-05-21 ENCOUNTER — OFFICE VISIT (OUTPATIENT)
Dept: OPHTHALMOLOGY | Facility: CLINIC | Age: 66
End: 2021-05-21
Payer: MEDICARE

## 2021-05-21 DIAGNOSIS — K28.9 MARGINAL ULCER: Primary | ICD-10-CM

## 2021-05-21 PROCEDURE — 1126F PR PAIN SEVERITY QUANTIFIED, NO PAIN PRESENT: ICD-10-PCS | Mod: S$GLB,,, | Performed by: OPHTHALMOLOGY

## 2021-05-21 PROCEDURE — 1159F PR MEDICATION LIST DOCUMENTED IN MEDICAL RECORD: ICD-10-PCS | Mod: S$GLB,,, | Performed by: OPHTHALMOLOGY

## 2021-05-21 PROCEDURE — 3288F PR FALLS RISK ASSESSMENT DOCUMENTED: ICD-10-PCS | Mod: CPTII,S$GLB,, | Performed by: OPHTHALMOLOGY

## 2021-05-21 PROCEDURE — 99499 RISK ADDL DX/OHS AUDIT: ICD-10-PCS | Mod: S$GLB,,, | Performed by: OPHTHALMOLOGY

## 2021-05-21 PROCEDURE — 1159F MED LIST DOCD IN RCRD: CPT | Mod: S$GLB,,, | Performed by: OPHTHALMOLOGY

## 2021-05-21 PROCEDURE — 1101F PR PT FALLS ASSESS DOC 0-1 FALLS W/OUT INJ PAST YR: ICD-10-PCS | Mod: CPTII,S$GLB,, | Performed by: OPHTHALMOLOGY

## 2021-05-21 PROCEDURE — 3288F FALL RISK ASSESSMENT DOCD: CPT | Mod: CPTII,S$GLB,, | Performed by: OPHTHALMOLOGY

## 2021-05-21 PROCEDURE — 99999 PR PBB SHADOW E&M-EST. PATIENT-LVL III: ICD-10-PCS | Mod: PBBFAC,,, | Performed by: OPHTHALMOLOGY

## 2021-05-21 PROCEDURE — 1101F PT FALLS ASSESS-DOCD LE1/YR: CPT | Mod: CPTII,S$GLB,, | Performed by: OPHTHALMOLOGY

## 2021-05-21 PROCEDURE — 99999 PR PBB SHADOW E&M-EST. PATIENT-LVL III: CPT | Mod: PBBFAC,,, | Performed by: OPHTHALMOLOGY

## 2021-05-21 PROCEDURE — 99204 OFFICE O/P NEW MOD 45 MIN: CPT | Mod: S$GLB,,, | Performed by: OPHTHALMOLOGY

## 2021-05-21 PROCEDURE — 99204 PR OFFICE/OUTPT VISIT, NEW, LEVL IV, 45-59 MIN: ICD-10-PCS | Mod: S$GLB,,, | Performed by: OPHTHALMOLOGY

## 2021-05-21 PROCEDURE — 1126F AMNT PAIN NOTED NONE PRSNT: CPT | Mod: S$GLB,,, | Performed by: OPHTHALMOLOGY

## 2021-05-21 PROCEDURE — 99499 UNLISTED E&M SERVICE: CPT | Mod: S$GLB,,, | Performed by: OPHTHALMOLOGY

## 2021-05-21 RX ORDER — PREDNISOLONE ACETATE 10 MG/ML
1 SUSPENSION/ DROPS OPHTHALMIC 3 TIMES DAILY
Qty: 5 ML | Refills: 1 | Status: SHIPPED | OUTPATIENT
Start: 2021-05-21 | End: 2021-06-20

## 2021-06-04 ENCOUNTER — OFFICE VISIT (OUTPATIENT)
Dept: OPHTHALMOLOGY | Facility: CLINIC | Age: 66
End: 2021-06-04
Payer: MEDICARE

## 2021-06-04 DIAGNOSIS — K28.9 MARGINAL ULCER: Primary | ICD-10-CM

## 2021-06-04 PROCEDURE — 1101F PT FALLS ASSESS-DOCD LE1/YR: CPT | Mod: CPTII,S$GLB,, | Performed by: OPHTHALMOLOGY

## 2021-06-04 PROCEDURE — 99499 RISK ADDL DX/OHS AUDIT: ICD-10-PCS | Mod: S$GLB,,, | Performed by: OPHTHALMOLOGY

## 2021-06-04 PROCEDURE — 1101F PR PT FALLS ASSESS DOC 0-1 FALLS W/OUT INJ PAST YR: ICD-10-PCS | Mod: CPTII,S$GLB,, | Performed by: OPHTHALMOLOGY

## 2021-06-04 PROCEDURE — 1159F MED LIST DOCD IN RCRD: CPT | Mod: S$GLB,,, | Performed by: OPHTHALMOLOGY

## 2021-06-04 PROCEDURE — 99999 PR PBB SHADOW E&M-EST. PATIENT-LVL II: ICD-10-PCS | Mod: PBBFAC,,, | Performed by: OPHTHALMOLOGY

## 2021-06-04 PROCEDURE — 1159F PR MEDICATION LIST DOCUMENTED IN MEDICAL RECORD: ICD-10-PCS | Mod: S$GLB,,, | Performed by: OPHTHALMOLOGY

## 2021-06-04 PROCEDURE — 99999 PR PBB SHADOW E&M-EST. PATIENT-LVL II: CPT | Mod: PBBFAC,,, | Performed by: OPHTHALMOLOGY

## 2021-06-04 PROCEDURE — 3288F PR FALLS RISK ASSESSMENT DOCUMENTED: ICD-10-PCS | Mod: CPTII,S$GLB,, | Performed by: OPHTHALMOLOGY

## 2021-06-04 PROCEDURE — 1126F PR PAIN SEVERITY QUANTIFIED, NO PAIN PRESENT: ICD-10-PCS | Mod: S$GLB,,, | Performed by: OPHTHALMOLOGY

## 2021-06-04 PROCEDURE — 3288F FALL RISK ASSESSMENT DOCD: CPT | Mod: CPTII,S$GLB,, | Performed by: OPHTHALMOLOGY

## 2021-06-04 PROCEDURE — 99214 PR OFFICE/OUTPT VISIT, EST, LEVL IV, 30-39 MIN: ICD-10-PCS | Mod: S$GLB,,, | Performed by: OPHTHALMOLOGY

## 2021-06-04 PROCEDURE — 1126F AMNT PAIN NOTED NONE PRSNT: CPT | Mod: S$GLB,,, | Performed by: OPHTHALMOLOGY

## 2021-06-04 PROCEDURE — 99214 OFFICE O/P EST MOD 30 MIN: CPT | Mod: S$GLB,,, | Performed by: OPHTHALMOLOGY

## 2021-06-04 PROCEDURE — 99499 UNLISTED E&M SERVICE: CPT | Mod: S$GLB,,, | Performed by: OPHTHALMOLOGY

## 2021-06-09 RX ORDER — CILOSTAZOL 50 MG/1
50 TABLET ORAL 2 TIMES DAILY
Qty: 60 TABLET | Refills: 11 | Status: SHIPPED | OUTPATIENT
Start: 2021-06-09 | End: 2021-06-14 | Stop reason: SDUPTHER

## 2021-06-10 ENCOUNTER — TELEPHONE (OUTPATIENT)
Dept: VASCULAR SURGERY | Facility: CLINIC | Age: 66
End: 2021-06-10

## 2021-06-14 RX ORDER — CILOSTAZOL 50 MG/1
50 TABLET ORAL 2 TIMES DAILY
Qty: 180 TABLET | Refills: 3 | Status: SHIPPED | OUTPATIENT
Start: 2021-06-14 | End: 2021-07-20

## 2021-06-25 ENCOUNTER — PATIENT OUTREACH (OUTPATIENT)
Dept: ADMINISTRATIVE | Facility: HOSPITAL | Age: 66
End: 2021-06-25

## 2021-06-28 ENCOUNTER — PATIENT MESSAGE (OUTPATIENT)
Dept: INTERNAL MEDICINE | Facility: CLINIC | Age: 66
End: 2021-06-28

## 2021-06-28 DIAGNOSIS — G47.00 PERSISTENT INSOMNIA: ICD-10-CM

## 2021-06-28 RX ORDER — TRAZODONE HYDROCHLORIDE 50 MG/1
150 TABLET ORAL NIGHTLY PRN
Qty: 270 TABLET | Refills: 1 | Status: SHIPPED | OUTPATIENT
Start: 2021-06-28 | End: 2021-12-13

## 2021-06-29 RX ORDER — AMLODIPINE BESYLATE 5 MG/1
5 TABLET ORAL DAILY
Qty: 30 TABLET | Refills: 12 | Status: SHIPPED | OUTPATIENT
Start: 2021-06-29 | End: 2022-01-03 | Stop reason: SDUPTHER

## 2021-07-07 ENCOUNTER — CLINICAL SUPPORT (OUTPATIENT)
Dept: SMOKING CESSATION | Facility: CLINIC | Age: 66
End: 2021-07-07
Payer: COMMERCIAL

## 2021-07-07 DIAGNOSIS — F17.200 NICOTINE DEPENDENCE: Primary | ICD-10-CM

## 2021-07-07 PROCEDURE — 99407 BEHAV CHNG SMOKING > 10 MIN: CPT | Mod: S$GLB,,,

## 2021-07-07 PROCEDURE — 99407 PR TOBACCO USE CESSATION INTENSIVE >10 MINUTES: ICD-10-PCS | Mod: S$GLB,,,

## 2021-07-20 ENCOUNTER — TELEPHONE (OUTPATIENT)
Dept: VASCULAR SURGERY | Facility: CLINIC | Age: 66
End: 2021-07-20

## 2021-07-20 RX ORDER — CILOSTAZOL 50 MG/1
50 TABLET ORAL 2 TIMES DAILY
Qty: 180 TABLET | Refills: 3 | Status: SHIPPED | OUTPATIENT
Start: 2021-07-20 | End: 2022-08-03

## 2021-08-05 ENCOUNTER — PATIENT MESSAGE (OUTPATIENT)
Dept: PHARMACY | Facility: CLINIC | Age: 66
End: 2021-08-05

## 2021-09-28 RX ORDER — DOXAZOSIN 4 MG/1
4 TABLET ORAL NIGHTLY
Qty: 90 TABLET | Refills: 0 | Status: SHIPPED | OUTPATIENT
Start: 2021-09-28 | End: 2022-01-03 | Stop reason: SDUPTHER

## 2021-10-11 ENCOUNTER — OFFICE VISIT (OUTPATIENT)
Dept: INTERNAL MEDICINE | Facility: CLINIC | Age: 66
End: 2021-10-11
Payer: MEDICARE

## 2021-10-11 VITALS
HEART RATE: 73 BPM | OXYGEN SATURATION: 98 % | HEIGHT: 69 IN | WEIGHT: 155.19 LBS | DIASTOLIC BLOOD PRESSURE: 80 MMHG | SYSTOLIC BLOOD PRESSURE: 142 MMHG | BODY MASS INDEX: 22.98 KG/M2

## 2021-10-11 DIAGNOSIS — R63.4 WEIGHT LOSS: ICD-10-CM

## 2021-10-11 DIAGNOSIS — R73.09 ELEVATED GLUCOSE LEVEL: ICD-10-CM

## 2021-10-11 DIAGNOSIS — E78.49 OTHER HYPERLIPIDEMIA: ICD-10-CM

## 2021-10-11 DIAGNOSIS — Z12.11 COLON CANCER SCREENING: ICD-10-CM

## 2021-10-11 DIAGNOSIS — M05.79 RHEUMATOID ARTHRITIS INVOLVING MULTIPLE SITES WITH POSITIVE RHEUMATOID FACTOR: ICD-10-CM

## 2021-10-11 DIAGNOSIS — Z00.00 ROUTINE PHYSICAL EXAMINATION: Primary | ICD-10-CM

## 2021-10-11 DIAGNOSIS — Z12.5 SCREENING FOR PROSTATE CANCER: ICD-10-CM

## 2021-10-11 PROCEDURE — 3288F FALL RISK ASSESSMENT DOCD: CPT | Mod: CPTII,S$GLB,, | Performed by: INTERNAL MEDICINE

## 2021-10-11 PROCEDURE — 1159F MED LIST DOCD IN RCRD: CPT | Mod: CPTII,S$GLB,, | Performed by: INTERNAL MEDICINE

## 2021-10-11 PROCEDURE — 1101F PR PT FALLS ASSESS DOC 0-1 FALLS W/OUT INJ PAST YR: ICD-10-PCS | Mod: CPTII,S$GLB,, | Performed by: INTERNAL MEDICINE

## 2021-10-11 PROCEDURE — 1159F PR MEDICATION LIST DOCUMENTED IN MEDICAL RECORD: ICD-10-PCS | Mod: CPTII,S$GLB,, | Performed by: INTERNAL MEDICINE

## 2021-10-11 PROCEDURE — 1126F PR PAIN SEVERITY QUANTIFIED, NO PAIN PRESENT: ICD-10-PCS | Mod: CPTII,S$GLB,, | Performed by: INTERNAL MEDICINE

## 2021-10-11 PROCEDURE — 3288F PR FALLS RISK ASSESSMENT DOCUMENTED: ICD-10-PCS | Mod: CPTII,S$GLB,, | Performed by: INTERNAL MEDICINE

## 2021-10-11 PROCEDURE — 3008F BODY MASS INDEX DOCD: CPT | Mod: CPTII,S$GLB,, | Performed by: INTERNAL MEDICINE

## 2021-10-11 PROCEDURE — 3077F SYST BP >= 140 MM HG: CPT | Mod: CPTII,S$GLB,, | Performed by: INTERNAL MEDICINE

## 2021-10-11 PROCEDURE — 99214 OFFICE O/P EST MOD 30 MIN: CPT | Mod: S$GLB,,, | Performed by: INTERNAL MEDICINE

## 2021-10-11 PROCEDURE — 3077F PR MOST RECENT SYSTOLIC BLOOD PRESSURE >= 140 MM HG: ICD-10-PCS | Mod: CPTII,S$GLB,, | Performed by: INTERNAL MEDICINE

## 2021-10-11 PROCEDURE — 99999 PR PBB SHADOW E&M-EST. PATIENT-LVL IV: ICD-10-PCS | Mod: PBBFAC,,, | Performed by: INTERNAL MEDICINE

## 2021-10-11 PROCEDURE — 1160F RVW MEDS BY RX/DR IN RCRD: CPT | Mod: CPTII,S$GLB,, | Performed by: INTERNAL MEDICINE

## 2021-10-11 PROCEDURE — 3008F PR BODY MASS INDEX (BMI) DOCUMENTED: ICD-10-PCS | Mod: CPTII,S$GLB,, | Performed by: INTERNAL MEDICINE

## 2021-10-11 PROCEDURE — 1126F AMNT PAIN NOTED NONE PRSNT: CPT | Mod: CPTII,S$GLB,, | Performed by: INTERNAL MEDICINE

## 2021-10-11 PROCEDURE — 99999 PR PBB SHADOW E&M-EST. PATIENT-LVL IV: CPT | Mod: PBBFAC,,, | Performed by: INTERNAL MEDICINE

## 2021-10-11 PROCEDURE — 3079F PR MOST RECENT DIASTOLIC BLOOD PRESSURE 80-89 MM HG: ICD-10-PCS | Mod: CPTII,S$GLB,, | Performed by: INTERNAL MEDICINE

## 2021-10-11 PROCEDURE — 1101F PT FALLS ASSESS-DOCD LE1/YR: CPT | Mod: CPTII,S$GLB,, | Performed by: INTERNAL MEDICINE

## 2021-10-11 PROCEDURE — 99214 PR OFFICE/OUTPT VISIT, EST, LEVL IV, 30-39 MIN: ICD-10-PCS | Mod: S$GLB,,, | Performed by: INTERNAL MEDICINE

## 2021-10-11 PROCEDURE — 1160F PR REVIEW ALL MEDS BY PRESCRIBER/CLIN PHARMACIST DOCUMENTED: ICD-10-PCS | Mod: CPTII,S$GLB,, | Performed by: INTERNAL MEDICINE

## 2021-10-11 PROCEDURE — 3079F DIAST BP 80-89 MM HG: CPT | Mod: CPTII,S$GLB,, | Performed by: INTERNAL MEDICINE

## 2021-10-19 ENCOUNTER — TELEPHONE (OUTPATIENT)
Dept: ENDOSCOPY | Facility: HOSPITAL | Age: 66
End: 2021-10-19

## 2021-10-25 ENCOUNTER — PATIENT MESSAGE (OUTPATIENT)
Dept: INTERNAL MEDICINE | Facility: CLINIC | Age: 66
End: 2021-10-25
Payer: MEDICARE

## 2021-10-29 ENCOUNTER — TELEPHONE (OUTPATIENT)
Dept: ENDOSCOPY | Facility: HOSPITAL | Age: 66
End: 2021-10-29
Payer: MEDICARE

## 2021-11-01 ENCOUNTER — PATIENT OUTREACH (OUTPATIENT)
Dept: ADMINISTRATIVE | Facility: OTHER | Age: 66
End: 2021-11-01
Payer: MEDICARE

## 2021-11-02 ENCOUNTER — OFFICE VISIT (OUTPATIENT)
Dept: RHEUMATOLOGY | Facility: CLINIC | Age: 66
End: 2021-11-02
Payer: MEDICARE

## 2021-11-02 VITALS
HEIGHT: 69 IN | WEIGHT: 158.75 LBS | BODY MASS INDEX: 23.51 KG/M2 | HEART RATE: 64 BPM | SYSTOLIC BLOOD PRESSURE: 146 MMHG | DIASTOLIC BLOOD PRESSURE: 76 MMHG

## 2021-11-02 DIAGNOSIS — R22.9 SUBCUTANEOUS NODULES: ICD-10-CM

## 2021-11-02 DIAGNOSIS — M05.79 RHEUMATOID ARTHRITIS INVOLVING MULTIPLE SITES WITH POSITIVE RHEUMATOID FACTOR: Primary | ICD-10-CM

## 2021-11-02 DIAGNOSIS — Z79.631 LONG TERM METHOTREXATE USER: ICD-10-CM

## 2021-11-02 DIAGNOSIS — Z12.11 SCREENING FOR COLON CANCER: Primary | ICD-10-CM

## 2021-11-02 PROCEDURE — 1125F AMNT PAIN NOTED PAIN PRSNT: CPT | Mod: CPTII,S$GLB,, | Performed by: INTERNAL MEDICINE

## 2021-11-02 PROCEDURE — 1160F RVW MEDS BY RX/DR IN RCRD: CPT | Mod: CPTII,S$GLB,, | Performed by: INTERNAL MEDICINE

## 2021-11-02 PROCEDURE — 3077F PR MOST RECENT SYSTOLIC BLOOD PRESSURE >= 140 MM HG: ICD-10-PCS | Mod: CPTII,S$GLB,, | Performed by: INTERNAL MEDICINE

## 2021-11-02 PROCEDURE — 3008F PR BODY MASS INDEX (BMI) DOCUMENTED: ICD-10-PCS | Mod: CPTII,S$GLB,, | Performed by: INTERNAL MEDICINE

## 2021-11-02 PROCEDURE — 3008F BODY MASS INDEX DOCD: CPT | Mod: CPTII,S$GLB,, | Performed by: INTERNAL MEDICINE

## 2021-11-02 PROCEDURE — 99999 PR PBB SHADOW E&M-EST. PATIENT-LVL IV: ICD-10-PCS | Mod: PBBFAC,,, | Performed by: INTERNAL MEDICINE

## 2021-11-02 PROCEDURE — 1101F PT FALLS ASSESS-DOCD LE1/YR: CPT | Mod: CPTII,S$GLB,, | Performed by: INTERNAL MEDICINE

## 2021-11-02 PROCEDURE — 3044F PR MOST RECENT HEMOGLOBIN A1C LEVEL <7.0%: ICD-10-PCS | Mod: CPTII,S$GLB,, | Performed by: INTERNAL MEDICINE

## 2021-11-02 PROCEDURE — 3077F SYST BP >= 140 MM HG: CPT | Mod: CPTII,S$GLB,, | Performed by: INTERNAL MEDICINE

## 2021-11-02 PROCEDURE — 99999 PR PBB SHADOW E&M-EST. PATIENT-LVL IV: CPT | Mod: PBBFAC,,, | Performed by: INTERNAL MEDICINE

## 2021-11-02 PROCEDURE — 99214 PR OFFICE/OUTPT VISIT, EST, LEVL IV, 30-39 MIN: ICD-10-PCS | Mod: S$GLB,,, | Performed by: INTERNAL MEDICINE

## 2021-11-02 PROCEDURE — 3288F PR FALLS RISK ASSESSMENT DOCUMENTED: ICD-10-PCS | Mod: CPTII,S$GLB,, | Performed by: INTERNAL MEDICINE

## 2021-11-02 PROCEDURE — 1160F PR REVIEW ALL MEDS BY PRESCRIBER/CLIN PHARMACIST DOCUMENTED: ICD-10-PCS | Mod: CPTII,S$GLB,, | Performed by: INTERNAL MEDICINE

## 2021-11-02 PROCEDURE — 3078F DIAST BP <80 MM HG: CPT | Mod: CPTII,S$GLB,, | Performed by: INTERNAL MEDICINE

## 2021-11-02 PROCEDURE — 1125F PR PAIN SEVERITY QUANTIFIED, PAIN PRESENT: ICD-10-PCS | Mod: CPTII,S$GLB,, | Performed by: INTERNAL MEDICINE

## 2021-11-02 PROCEDURE — 1159F MED LIST DOCD IN RCRD: CPT | Mod: CPTII,S$GLB,, | Performed by: INTERNAL MEDICINE

## 2021-11-02 PROCEDURE — 3078F PR MOST RECENT DIASTOLIC BLOOD PRESSURE < 80 MM HG: ICD-10-PCS | Mod: CPTII,S$GLB,, | Performed by: INTERNAL MEDICINE

## 2021-11-02 PROCEDURE — 3288F FALL RISK ASSESSMENT DOCD: CPT | Mod: CPTII,S$GLB,, | Performed by: INTERNAL MEDICINE

## 2021-11-02 PROCEDURE — 3044F HG A1C LEVEL LT 7.0%: CPT | Mod: CPTII,S$GLB,, | Performed by: INTERNAL MEDICINE

## 2021-11-02 PROCEDURE — 1159F PR MEDICATION LIST DOCUMENTED IN MEDICAL RECORD: ICD-10-PCS | Mod: CPTII,S$GLB,, | Performed by: INTERNAL MEDICINE

## 2021-11-02 PROCEDURE — 99214 OFFICE O/P EST MOD 30 MIN: CPT | Mod: S$GLB,,, | Performed by: INTERNAL MEDICINE

## 2021-11-02 PROCEDURE — 1101F PR PT FALLS ASSESS DOC 0-1 FALLS W/OUT INJ PAST YR: ICD-10-PCS | Mod: CPTII,S$GLB,, | Performed by: INTERNAL MEDICINE

## 2021-11-02 RX ORDER — HYDROCODONE BITARTRATE AND ACETAMINOPHEN 7.5; 325 MG/1; MG/1
TABLET ORAL
COMMUNITY
Start: 2021-10-21 | End: 2022-07-07

## 2021-11-02 RX ORDER — ONDANSETRON 4 MG/1
TABLET, ORALLY DISINTEGRATING ORAL
COMMUNITY
Start: 2021-10-21 | End: 2022-02-15

## 2021-11-02 RX ORDER — SODIUM, POTASSIUM,MAG SULFATES 17.5-3.13G
1 SOLUTION, RECONSTITUTED, ORAL ORAL DAILY
Qty: 1 KIT | Refills: 0 | Status: SHIPPED | OUTPATIENT
Start: 2021-11-02 | End: 2021-11-04

## 2021-11-02 RX ORDER — AMOXICILLIN 500 MG/1
500 CAPSULE ORAL 3 TIMES DAILY
COMMUNITY
Start: 2021-10-21 | End: 2022-02-15

## 2021-11-03 ENCOUNTER — TELEPHONE (OUTPATIENT)
Dept: VASCULAR SURGERY | Facility: CLINIC | Age: 66
End: 2021-11-03
Payer: MEDICARE

## 2021-11-05 ENCOUNTER — PATIENT MESSAGE (OUTPATIENT)
Dept: INTERNAL MEDICINE | Facility: CLINIC | Age: 66
End: 2021-11-05
Payer: MEDICARE

## 2021-11-10 ENCOUNTER — OFFICE VISIT (OUTPATIENT)
Dept: SURGERY | Facility: CLINIC | Age: 66
End: 2021-11-10
Payer: MEDICARE

## 2021-11-10 VITALS
TEMPERATURE: 99 F | SYSTOLIC BLOOD PRESSURE: 135 MMHG | HEIGHT: 69 IN | DIASTOLIC BLOOD PRESSURE: 75 MMHG | WEIGHT: 143.31 LBS | OXYGEN SATURATION: 97 % | HEART RATE: 88 BPM | BODY MASS INDEX: 21.22 KG/M2

## 2021-11-10 DIAGNOSIS — R22.9 SUBCUTANEOUS NODULES: ICD-10-CM

## 2021-11-10 DIAGNOSIS — M06.322 RHEUMATOID NODULE OF ELBOW, LEFT: Primary | ICD-10-CM

## 2021-11-10 DIAGNOSIS — M06.321 RHEUMATOID NODULE OF ELBOW, RIGHT: ICD-10-CM

## 2021-11-10 PROCEDURE — 3288F FALL RISK ASSESSMENT DOCD: CPT | Mod: CPTII,S$GLB,, | Performed by: STUDENT IN AN ORGANIZED HEALTH CARE EDUCATION/TRAINING PROGRAM

## 2021-11-10 PROCEDURE — 99213 OFFICE O/P EST LOW 20 MIN: CPT | Mod: S$GLB,,, | Performed by: STUDENT IN AN ORGANIZED HEALTH CARE EDUCATION/TRAINING PROGRAM

## 2021-11-10 PROCEDURE — 3078F DIAST BP <80 MM HG: CPT | Mod: CPTII,S$GLB,, | Performed by: STUDENT IN AN ORGANIZED HEALTH CARE EDUCATION/TRAINING PROGRAM

## 2021-11-10 PROCEDURE — 3044F PR MOST RECENT HEMOGLOBIN A1C LEVEL <7.0%: ICD-10-PCS | Mod: CPTII,S$GLB,, | Performed by: STUDENT IN AN ORGANIZED HEALTH CARE EDUCATION/TRAINING PROGRAM

## 2021-11-10 PROCEDURE — 3075F SYST BP GE 130 - 139MM HG: CPT | Mod: CPTII,S$GLB,, | Performed by: STUDENT IN AN ORGANIZED HEALTH CARE EDUCATION/TRAINING PROGRAM

## 2021-11-10 PROCEDURE — 3288F PR FALLS RISK ASSESSMENT DOCUMENTED: ICD-10-PCS | Mod: CPTII,S$GLB,, | Performed by: STUDENT IN AN ORGANIZED HEALTH CARE EDUCATION/TRAINING PROGRAM

## 2021-11-10 PROCEDURE — 3044F HG A1C LEVEL LT 7.0%: CPT | Mod: CPTII,S$GLB,, | Performed by: STUDENT IN AN ORGANIZED HEALTH CARE EDUCATION/TRAINING PROGRAM

## 2021-11-10 PROCEDURE — 1160F RVW MEDS BY RX/DR IN RCRD: CPT | Mod: CPTII,S$GLB,, | Performed by: STUDENT IN AN ORGANIZED HEALTH CARE EDUCATION/TRAINING PROGRAM

## 2021-11-10 PROCEDURE — 1126F PR PAIN SEVERITY QUANTIFIED, NO PAIN PRESENT: ICD-10-PCS | Mod: CPTII,S$GLB,, | Performed by: STUDENT IN AN ORGANIZED HEALTH CARE EDUCATION/TRAINING PROGRAM

## 2021-11-10 PROCEDURE — 1159F MED LIST DOCD IN RCRD: CPT | Mod: CPTII,S$GLB,, | Performed by: STUDENT IN AN ORGANIZED HEALTH CARE EDUCATION/TRAINING PROGRAM

## 2021-11-10 PROCEDURE — 99999 PR PBB SHADOW E&M-EST. PATIENT-LVL IV: ICD-10-PCS | Mod: PBBFAC,,, | Performed by: STUDENT IN AN ORGANIZED HEALTH CARE EDUCATION/TRAINING PROGRAM

## 2021-11-10 PROCEDURE — 1101F PT FALLS ASSESS-DOCD LE1/YR: CPT | Mod: CPTII,S$GLB,, | Performed by: STUDENT IN AN ORGANIZED HEALTH CARE EDUCATION/TRAINING PROGRAM

## 2021-11-10 PROCEDURE — 1160F PR REVIEW ALL MEDS BY PRESCRIBER/CLIN PHARMACIST DOCUMENTED: ICD-10-PCS | Mod: CPTII,S$GLB,, | Performed by: STUDENT IN AN ORGANIZED HEALTH CARE EDUCATION/TRAINING PROGRAM

## 2021-11-10 PROCEDURE — 3008F PR BODY MASS INDEX (BMI) DOCUMENTED: ICD-10-PCS | Mod: CPTII,S$GLB,, | Performed by: STUDENT IN AN ORGANIZED HEALTH CARE EDUCATION/TRAINING PROGRAM

## 2021-11-10 PROCEDURE — 3075F PR MOST RECENT SYSTOLIC BLOOD PRESS GE 130-139MM HG: ICD-10-PCS | Mod: CPTII,S$GLB,, | Performed by: STUDENT IN AN ORGANIZED HEALTH CARE EDUCATION/TRAINING PROGRAM

## 2021-11-10 PROCEDURE — 3008F BODY MASS INDEX DOCD: CPT | Mod: CPTII,S$GLB,, | Performed by: STUDENT IN AN ORGANIZED HEALTH CARE EDUCATION/TRAINING PROGRAM

## 2021-11-10 PROCEDURE — 1101F PR PT FALLS ASSESS DOC 0-1 FALLS W/OUT INJ PAST YR: ICD-10-PCS | Mod: CPTII,S$GLB,, | Performed by: STUDENT IN AN ORGANIZED HEALTH CARE EDUCATION/TRAINING PROGRAM

## 2021-11-10 PROCEDURE — 99213 PR OFFICE/OUTPT VISIT, EST, LEVL III, 20-29 MIN: ICD-10-PCS | Mod: S$GLB,,, | Performed by: STUDENT IN AN ORGANIZED HEALTH CARE EDUCATION/TRAINING PROGRAM

## 2021-11-10 PROCEDURE — 1126F AMNT PAIN NOTED NONE PRSNT: CPT | Mod: CPTII,S$GLB,, | Performed by: STUDENT IN AN ORGANIZED HEALTH CARE EDUCATION/TRAINING PROGRAM

## 2021-11-10 PROCEDURE — 3078F PR MOST RECENT DIASTOLIC BLOOD PRESSURE < 80 MM HG: ICD-10-PCS | Mod: CPTII,S$GLB,, | Performed by: STUDENT IN AN ORGANIZED HEALTH CARE EDUCATION/TRAINING PROGRAM

## 2021-11-10 PROCEDURE — 99999 PR PBB SHADOW E&M-EST. PATIENT-LVL IV: CPT | Mod: PBBFAC,,, | Performed by: STUDENT IN AN ORGANIZED HEALTH CARE EDUCATION/TRAINING PROGRAM

## 2021-11-10 PROCEDURE — 1159F PR MEDICATION LIST DOCUMENTED IN MEDICAL RECORD: ICD-10-PCS | Mod: CPTII,S$GLB,, | Performed by: STUDENT IN AN ORGANIZED HEALTH CARE EDUCATION/TRAINING PROGRAM

## 2021-11-17 DIAGNOSIS — R22.9 SUBCUTANEOUS NODULES: Primary | ICD-10-CM

## 2021-11-22 ENCOUNTER — OFFICE VISIT (OUTPATIENT)
Dept: INTERNAL MEDICINE | Facility: CLINIC | Age: 66
End: 2021-11-22
Payer: MEDICARE

## 2021-11-22 ENCOUNTER — PATIENT MESSAGE (OUTPATIENT)
Dept: INTERNAL MEDICINE | Facility: CLINIC | Age: 66
End: 2021-11-22

## 2021-11-22 VITALS
DIASTOLIC BLOOD PRESSURE: 70 MMHG | OXYGEN SATURATION: 98 % | HEART RATE: 78 BPM | HEIGHT: 69 IN | BODY MASS INDEX: 22.56 KG/M2 | SYSTOLIC BLOOD PRESSURE: 124 MMHG | WEIGHT: 152.31 LBS

## 2021-11-22 DIAGNOSIS — I73.9 PVD (PERIPHERAL VASCULAR DISEASE): ICD-10-CM

## 2021-11-22 DIAGNOSIS — M05.79 RHEUMATOID ARTHRITIS INVOLVING MULTIPLE SITES WITH POSITIVE RHEUMATOID FACTOR: ICD-10-CM

## 2021-11-22 DIAGNOSIS — R63.4 WEIGHT LOSS: Primary | ICD-10-CM

## 2021-11-22 PROCEDURE — 99999 PR PBB SHADOW E&M-EST. PATIENT-LVL III: CPT | Mod: PBBFAC,,, | Performed by: INTERNAL MEDICINE

## 2021-11-22 PROCEDURE — 99213 PR OFFICE/OUTPT VISIT, EST, LEVL III, 20-29 MIN: ICD-10-PCS | Mod: S$GLB,,, | Performed by: INTERNAL MEDICINE

## 2021-11-22 PROCEDURE — 99213 OFFICE O/P EST LOW 20 MIN: CPT | Mod: S$GLB,,, | Performed by: INTERNAL MEDICINE

## 2021-11-22 PROCEDURE — 99999 PR PBB SHADOW E&M-EST. PATIENT-LVL III: ICD-10-PCS | Mod: PBBFAC,,, | Performed by: INTERNAL MEDICINE

## 2021-11-29 ENCOUNTER — ANESTHESIA EVENT (OUTPATIENT)
Dept: ENDOSCOPY | Facility: HOSPITAL | Age: 66
End: 2021-11-29

## 2021-11-29 ENCOUNTER — ANESTHESIA (OUTPATIENT)
Dept: ENDOSCOPY | Facility: HOSPITAL | Age: 66
End: 2021-11-29
Payer: MEDICARE

## 2021-11-29 ENCOUNTER — HOSPITAL ENCOUNTER (OUTPATIENT)
Facility: HOSPITAL | Age: 66
Discharge: HOME | End: 2021-11-29
Attending: INTERNAL MEDICINE | Admitting: INTERNAL MEDICINE
Payer: MEDICARE

## 2021-11-29 VITALS
BODY MASS INDEX: 22.51 KG/M2 | WEIGHT: 152 LBS | OXYGEN SATURATION: 97 % | HEIGHT: 69 IN | SYSTOLIC BLOOD PRESSURE: 130 MMHG | DIASTOLIC BLOOD PRESSURE: 73 MMHG | RESPIRATION RATE: 16 BRPM | HEART RATE: 57 BPM | TEMPERATURE: 98 F

## 2021-11-29 DIAGNOSIS — Z86.010 HISTORY OF COLON POLYPS: Primary | ICD-10-CM

## 2021-11-29 PROCEDURE — 88305 TISSUE EXAM BY PATHOLOGIST: ICD-10-PCS | Mod: 26,,, | Performed by: PATHOLOGY

## 2021-11-29 PROCEDURE — 37000008 HC ANESTHESIA 1ST 15 MINUTES: Performed by: INTERNAL MEDICINE

## 2021-11-29 PROCEDURE — 25000003 PHARM REV CODE 250: Performed by: INTERNAL MEDICINE

## 2021-11-29 PROCEDURE — 88305 TISSUE EXAM BY PATHOLOGIST: CPT | Mod: 26,,, | Performed by: PATHOLOGY

## 2021-11-29 PROCEDURE — 45385 COLONOSCOPY W/LESION REMOVAL: CPT | Performed by: INTERNAL MEDICINE

## 2021-11-29 PROCEDURE — 88305 TISSUE EXAM BY PATHOLOGIST: CPT | Performed by: PATHOLOGY

## 2021-11-29 PROCEDURE — 27201089 HC SNARE, DISP (ANY): Performed by: INTERNAL MEDICINE

## 2021-11-29 PROCEDURE — 45385 PR COLONOSCOPY,REMV LESN,SNARE: ICD-10-PCS | Mod: PT,GC,, | Performed by: INTERNAL MEDICINE

## 2021-11-29 PROCEDURE — 25000003 PHARM REV CODE 250: Performed by: NURSE ANESTHETIST, CERTIFIED REGISTERED

## 2021-11-29 PROCEDURE — 37000009 HC ANESTHESIA EA ADD 15 MINS: Performed by: INTERNAL MEDICINE

## 2021-11-29 PROCEDURE — E9220 PRA ENDO ANESTHESIA: ICD-10-PCS | Mod: PT,,, | Performed by: NURSE ANESTHETIST, CERTIFIED REGISTERED

## 2021-11-29 PROCEDURE — 63600175 PHARM REV CODE 636 W HCPCS: Performed by: NURSE ANESTHETIST, CERTIFIED REGISTERED

## 2021-11-29 PROCEDURE — 45385 COLONOSCOPY W/LESION REMOVAL: CPT | Mod: PT,GC,, | Performed by: INTERNAL MEDICINE

## 2021-11-29 PROCEDURE — E9220 PRA ENDO ANESTHESIA: HCPCS | Mod: PT,,, | Performed by: NURSE ANESTHETIST, CERTIFIED REGISTERED

## 2021-11-29 RX ORDER — PROPOFOL 10 MG/ML
VIAL (ML) INTRAVENOUS CONTINUOUS PRN
Status: DISCONTINUED | OUTPATIENT
Start: 2021-11-29 | End: 2021-11-29

## 2021-11-29 RX ORDER — SODIUM CHLORIDE 9 MG/ML
INJECTION, SOLUTION INTRAVENOUS CONTINUOUS
Status: DISCONTINUED | OUTPATIENT
Start: 2021-11-29 | End: 2021-11-29 | Stop reason: HOSPADM

## 2021-11-29 RX ORDER — PROPOFOL 10 MG/ML
VIAL (ML) INTRAVENOUS
Status: DISCONTINUED | OUTPATIENT
Start: 2021-11-29 | End: 2021-11-29

## 2021-11-29 RX ORDER — LIDOCAINE HYDROCHLORIDE 20 MG/ML
INJECTION INTRAVENOUS
Status: DISCONTINUED | OUTPATIENT
Start: 2021-11-29 | End: 2021-11-29

## 2021-11-29 RX ADMIN — SODIUM CHLORIDE: 0.9 INJECTION, SOLUTION INTRAVENOUS at 01:11

## 2021-11-29 RX ADMIN — PROPOFOL 100 MG: 10 INJECTION, EMULSION INTRAVENOUS at 01:11

## 2021-11-29 RX ADMIN — LIDOCAINE HYDROCHLORIDE 100 MG: 20 INJECTION, SOLUTION INTRAVENOUS at 01:11

## 2021-11-29 RX ADMIN — Medication 175 MCG/KG/MIN: at 01:11

## 2021-11-29 NOTE — H&P
Short Stay Endoscopy History and Physical    PCP - Andrea Wang MD    Procedure - Colonoscopy  ASA - per anesthesia  Mallampati - per anesthesia  History of Anesthesia problems - no  Family history Anesthesia problems - no   Plan of anesthesia - General    HPI:  This is a 66 y.o. male here for evaluation of : personal history of colon polyps  Last colonoscopy 2014 with 5 colon polyps. On Pletal.    ROS:  Constitutional: No fevers, chills, No weight loss  CV: No chest pain  Pulm: No cough, No shortness of breath  GI: see HPI  Derm: No rash    Medical History:  has a past medical history of Cataract, Colon polyp (2014), History of hepatitis C, s/p successful treatment w/ SVR12 - 7/2015 (10/14/2012), Hyperlipidemia, Lipoma of arm, Lipoma of lower extremity, Nuclear sclerosis - Both Eyes (10/22/2012), Other and unspecified hyperlipidemia (10/22/2013), PAD (peripheral artery disease), Peripheral vascular disease, unspecified, and Rheumatoid arthritis(714.0) (10/14/2012).    Surgical History:  has a past surgical history that includes Tonsillectomy; Knee Arthroplasty; and Laparoscopic exploration of groin (Left, 9/6/2018).    Family History: family history includes Dementia in his mother; Heart disease in his paternal uncle and sister.. Otherwise no colon cancer, inflammatory bowel disease, or GI malignancies.    Social History:  reports that he quit smoking about 17 months ago. His smoking use included cigarettes. He has never used smokeless tobacco. He reports current alcohol use. He reports current drug use. Drug: Marijuana.    Review of patient's allergies indicates:  No Known Allergies    Medications:   Medications Prior to Admission   Medication Sig Dispense Refill Last Dose    amLODIPine (NORVASC) 5 MG tablet Take 1 tablet (5 mg total) by mouth once daily. 30 tablet 12 11/29/2021 at Unknown time    amoxicillin (AMOXIL) 500 MG capsule Take 500 mg by mouth 3 (three) times daily.   11/29/2021 at Unknown  time    cilostazoL (PLETAL) 50 MG Tab Take 1 tablet (50 mg total) by mouth 2 (two) times daily. 180 tablet 3 Past Week at Unknown time    doxazosin (CARDURA) 4 MG tablet TAKE 1 TABLET (4 MG TOTAL) BY MOUTH EVERY EVENING. 90 tablet 0 2021 at Unknown time    folic acid (FOLVITE) 1 MG tablet Take 1 tablet (1 mg total) by mouth once daily. 90 tablet 3 2021 at Unknown time    methotrexate 2.5 MG Tab Take 4 tablets (10 mg total) by mouth every 7 days. This medication requires periodic lab monitoring. 60 tablet 2 Past Week at Unknown time    simvastatin (ZOCOR) 10 MG tablet Take 1 tablet (10 mg total) by mouth every evening. 90 tablet 6 2021 at Unknown time    traZODone (DESYREL) 50 MG tablet Take 3 tablets (150 mg total) by mouth nightly as needed for Insomnia. 270 tablet 1 2021 at Unknown time    HYDROcodone-acetaminophen (NORCO) 7.5-325 mg per tablet SMARTSI By Mouth 4-5 Times Daily       ondansetron (ZOFRAN-ODT) 4 MG TbDL             Physical Exam:    Vital Signs:   Vitals:    21 1305   BP: 132/74   Pulse: 70   Resp: 18   Temp: 98.1 °F (36.7 °C)       General Appearance: Well appearing in no acute distress  Eyes:    No scleral icterus  ENT: Neck supple, Lips, mucosa, and tongue normal; teeth and gums normal  Lungs: CTA bilaterally  Heart:  S1, S2 normal, no murmurs heard  Abdomen: Soft, non tender, non distended with positive bowel sounds. No hepatosplenomegaly, ascites, or mass.  Extremities: 2+ pulses, no clubbing, cyanosis or edema  Skin: No rash      Labs:  Lab Results   Component Value Date    WBC 7.97 10/11/2021    HGB 14.1 10/11/2021    HCT 43.7 10/11/2021     10/11/2021    CHOL 155 10/11/2021    TRIG 129 10/11/2021    HDL 54 10/11/2021    ALT 10 10/11/2021    AST 16 10/11/2021     10/11/2021    K 4.0 10/11/2021     10/11/2021    CREATININE 1.2 10/11/2021    BUN 18 10/11/2021    CO2 21 (L) 10/11/2021    TSH 1.141 10/11/2021    PSA 0.76 10/11/2021    INR  0.9 03/26/2016    HGBA1C 5.2 10/11/2021       Plan:  Colonoscopy for history of colon polyp    I have explained the risks and benefits of endoscopy procedures to the patient including but not limited to bleeding, perforation, infection, and death.  The patient was asked if they understand and allowed to ask any further questions to their satisfaction.    Kevyn Bnaegas MD

## 2021-11-29 NOTE — DISCHARGE INSTRUCTIONS
Colonoscopy     A camera attached to a flexible tube with a viewing lens is used to take video pictures.     Colonoscopy is a test to view the inside of your lower digestive tract (colon and rectum). Sometimes it can show the last part of the small intestine (ileum). During the test, small pieces of tissue may be removed for testing. This is called a biopsy. Small growths, such as polyps, may also be removed.   Why is colonoscopy done?  The test is done to help look for colon cancer. And it can help find the source of abdominal pain, bleeding, and changes in bowel habits. It may be needed once a year, depending on factors such as your:  · Age  · Health history  · Family health history  · Symptoms  · Results from any prior colonoscopy  Risks and possible complications  These include:  · Bleeding               · A puncture or tear in the colon   · Risks of anesthesia  · A cancer lesion not being seen  Getting ready   To prepare for the test:  · Talk with your healthcare provider about the risks of the test (see below). Also ask your healthcare provider about alternatives to the test.  · Tell your healthcare provider about any medicines you take. Also tell him or her about any health conditions you may have.  · Make sure your rectum and colon are empty for the test. Follow the diet and bowel prep instructions exactly. If you dont, the test may need to be rescheduled.  · Plan for a friend or family member to drive you home after the test.     Colonoscopy provides an inside view of the entire colon.     You may discuss the results with your doctor right away or at a future visit.  During the test   The test is usually done in the hospital on an outpatient basis. This means you go home the same day. The procedure takes about 30 minutes. During that time:  · You are given relaxing (sedating) medicine through an IV line. You may be drowsy, or fully asleep.  · The healthcare provider will first give you a physical exam to  check for anal and rectal problems.  · Then the anus is lubricated and the scope inserted.  · If you are awake, you may have a feeling similar to needing to have a bowel movement. You may also feel pressure as air is pumped into the colon. Its OK to pass gas during the procedure.  · Biopsy, polyp removal, or other treatments may be done during the test.  After the test   You may have gas right after the test. It can help to try to pass it to help prevent later bloating. Your healthcare provider may discuss the results with you right away. Or you may need to schedule a follow-up visit to talk about the results. After the test, you can go back to your normal eating and other activities. You may be tired from the sedation and need to rest for a few hours.  Date Last Reviewed: 11/1/2016 © 2000-2017 [employer]. [address] [place] [address], [place] [address]. All rights reserved. This information is not intended as a substitute for professional medical care. Always follow your healthcare professional's instructions.

## 2021-11-29 NOTE — PROVATION PATIENT INSTRUCTIONS
Discharge Summary/Instructions after an Endoscopic Procedure  Patient Name: Andrea Gant  Patient MRN: [id number]  Patient YOB: 1955 Monday, November 29, 2021  Kenrick Zimmer MD  Dear patient,  As a result of recent federal legislation (The Federal Cures Act), you may   receive lab or pathology results from your procedure in your [facility]   account before your physician is able to contact you. Your physician or   their representative will relay the results to you with their   recommendations at their soonest availability.  Thank you,  RESTRICTIONS:  During your procedure today, you received medications for sedation.  These   medications may affect your judgment, balance and coordination.  Therefore,   for 24 hours, you have the following restrictions:   - DO NOT drive a car, operate machinery, make legal/financial decisions,   sign important papers or drink alcohol.    ACTIVITY:  Today: no heavy lifting, straining or running due to procedural   sedation/anesthesia.  The following day: return to full activity including work.  DIET:  Eat and drink normally unless instructed otherwise.     TREATMENT FOR COMMON SIDE EFFECTS:  - Mild abdominal pain, nausea, belching, bloating or excessive gas:  rest,   eat lightly and use a heating pad.  - Sore Throat: treat with throat lozenges and/or gargle with warm salt   water.  - Because air was used during the procedure, expelling large amounts of air   from your rectum or belching is normal.  - If a bowel prep was taken, you may not have a bowel movement for 1-3 days.    This is normal.  SYMPTOMS TO WATCH FOR AND REPORT TO YOUR PHYSICIAN:  1. Abdominal pain or bloating, other than gas cramps.  2. Chest pain.  3. Back pain.  4. Signs of infection such as: chills or fever occurring within 24 hours   after the procedure.  5. Rectal bleeding, which would show as bright red, maroon, or black stools.   (A tablespoon of blood from the rectum is not serious, especially  if   hemorrhoids are present.)  6. Vomiting.  7. Weakness or dizziness.  GO DIRECTLY TO THE NEAREST EMERGENCY ROOM IF YOU HAVE ANY OF THE FOLLOWING:      Difficulty breathing              Chills and/or fever over 101 F   Persistent vomiting and/or vomiting blood   Severe abdominal pain   Severe chest pain   Black, tarry stools   Bleeding- more than one tablespoon   Any other symptom or condition that you feel may need urgent attention  Your doctor recommends these additional instructions:  If any biopsies were taken, your doctors clinic will contact you in 1 to 2   weeks with any results.  - Discharge patient to home.   - Resume previous diet.   - Continue present medications.   - Await pathology results.   - Repeat colonoscopy in 5 years for surveillance.   - The findings and recommendations were discussed with the patient.   - Patient has a contact number available for emergencies.  The signs and   symptoms of potential delayed complications were discussed with the   patient.  Return to normal activities tomorrow.  Written discharge   instructions were provided to the patient.  For questions, problems or results please call your physician - Kenrick Zimmer MD at Work:  [phone].  [facility], EMERGENCY ROOM PHONE NUMBER: [phone]  IF A COMPLICATION OR EMERGENCY SITUATION ARISES AND YOU ARE UNABLE TO REACH   YOUR PHYSICIAN - GO DIRECTLY TO THE EMERGENCY ROOM.  Kenrick Zimmer MD  11/29/2021 2:15:03 PM  This report has been verified and signed electronically.  Dear patient,  As a result of recent federal legislation (The Federal Cures Act), you may   receive lab or pathology results from your procedure in your [facility]   account before your physician is able to contact you. Your physician or   their representative will relay the results to you with their   recommendations at their soonest availability.  Thank you,  [employer]

## 2021-12-02 LAB
FINAL PATHOLOGIC DIAGNOSIS: NORMAL
GROSS: NORMAL
Lab: NORMAL

## 2021-12-12 DIAGNOSIS — G47.00 PERSISTENT INSOMNIA: ICD-10-CM

## 2021-12-13 RX ORDER — TRAZODONE HYDROCHLORIDE 50 MG/1
TABLET ORAL
Qty: 270 TABLET | Refills: 3 | Status: SHIPPED | OUTPATIENT
Start: 2021-12-13 | End: 2023-03-27

## 2021-12-21 ENCOUNTER — PROCEDURE VISIT (OUTPATIENT)
Dept: SURGERY | Facility: CLINIC | Age: 66
End: 2021-12-21
Payer: MEDICARE

## 2021-12-21 VITALS
RESPIRATION RATE: 18 BRPM | OXYGEN SATURATION: 100 % | SYSTOLIC BLOOD PRESSURE: 149 MMHG | TEMPERATURE: 99 F | DIASTOLIC BLOOD PRESSURE: 76 MMHG | HEART RATE: 81 BPM

## 2021-12-21 DIAGNOSIS — R22.9 SUBCUTANEOUS NODULES: Primary | ICD-10-CM

## 2021-12-21 PROCEDURE — 88305 TISSUE EXAM BY PATHOLOGIST: CPT | Performed by: PATHOLOGY

## 2021-12-21 PROCEDURE — 24120 EXC/CRTG B1 CST/B9 TUM RDS: CPT | Mod: LT,,, | Performed by: STUDENT IN AN ORGANIZED HEALTH CARE EDUCATION/TRAINING PROGRAM

## 2021-12-21 PROCEDURE — 88305 TISSUE EXAM BY PATHOLOGIST: ICD-10-PCS | Mod: 26,,, | Performed by: PATHOLOGY

## 2021-12-21 PROCEDURE — 24120 PR EXCIS/CURET BENIGN ELBOW LESN: ICD-10-PCS | Mod: LT,,, | Performed by: STUDENT IN AN ORGANIZED HEALTH CARE EDUCATION/TRAINING PROGRAM

## 2021-12-21 PROCEDURE — 88305 TISSUE EXAM BY PATHOLOGIST: CPT | Mod: 26,,, | Performed by: PATHOLOGY

## 2022-01-03 ENCOUNTER — PATIENT MESSAGE (OUTPATIENT)
Dept: INTERNAL MEDICINE | Facility: CLINIC | Age: 67
End: 2022-01-03
Payer: MEDICARE

## 2022-01-03 ENCOUNTER — PATIENT MESSAGE (OUTPATIENT)
Dept: VASCULAR SURGERY | Facility: CLINIC | Age: 67
End: 2022-01-03
Payer: MEDICARE

## 2022-01-03 LAB
FINAL PATHOLOGIC DIAGNOSIS: NORMAL
GROSS: NORMAL
Lab: NORMAL

## 2022-01-03 RX ORDER — CILOSTAZOL 50 MG/1
50 TABLET ORAL 2 TIMES DAILY
Qty: 180 TABLET | Refills: 3 | Status: CANCELLED | OUTPATIENT
Start: 2022-01-03 | End: 2023-01-03

## 2022-01-03 RX ORDER — AMLODIPINE BESYLATE 5 MG/1
5 TABLET ORAL DAILY
Qty: 90 TABLET | Refills: 3 | Status: SHIPPED | OUTPATIENT
Start: 2022-01-03 | End: 2023-03-09

## 2022-01-03 RX ORDER — DOXAZOSIN 4 MG/1
4 TABLET ORAL NIGHTLY
Qty: 90 TABLET | Refills: 3 | Status: SHIPPED | OUTPATIENT
Start: 2022-01-03 | End: 2022-10-26

## 2022-01-03 NOTE — TELEPHONE ENCOUNTER
No new care gaps identified.  Powered by [employer]. Reference number: [id number].   1/03/2022 2:24:22 PM CST

## 2022-01-05 ENCOUNTER — OFFICE VISIT (OUTPATIENT)
Dept: SURGERY | Facility: CLINIC | Age: 67
End: 2022-01-05
Payer: MEDICARE

## 2022-01-05 VITALS
WEIGHT: 153.44 LBS | HEIGHT: 69 IN | TEMPERATURE: 98 F | SYSTOLIC BLOOD PRESSURE: 119 MMHG | BODY MASS INDEX: 22.73 KG/M2 | HEART RATE: 80 BPM | DIASTOLIC BLOOD PRESSURE: 55 MMHG

## 2022-01-05 DIAGNOSIS — M06.322 RHEUMATOID NODULE OF ELBOW, LEFT: Primary | ICD-10-CM

## 2022-01-05 DIAGNOSIS — Z48.89 ENCOUNTER FOR POST SURGICAL WOUND CHECK: ICD-10-CM

## 2022-01-05 PROCEDURE — 1159F MED LIST DOCD IN RCRD: CPT | Mod: CPTII,S$GLB,, | Performed by: STUDENT IN AN ORGANIZED HEALTH CARE EDUCATION/TRAINING PROGRAM

## 2022-01-05 PROCEDURE — 99999 PR PBB SHADOW E&M-EST. PATIENT-LVL III: ICD-10-PCS | Mod: PBBFAC,,, | Performed by: STUDENT IN AN ORGANIZED HEALTH CARE EDUCATION/TRAINING PROGRAM

## 2022-01-05 PROCEDURE — 1126F AMNT PAIN NOTED NONE PRSNT: CPT | Mod: CPTII,S$GLB,, | Performed by: STUDENT IN AN ORGANIZED HEALTH CARE EDUCATION/TRAINING PROGRAM

## 2022-01-05 PROCEDURE — 1159F PR MEDICATION LIST DOCUMENTED IN MEDICAL RECORD: ICD-10-PCS | Mod: CPTII,S$GLB,, | Performed by: STUDENT IN AN ORGANIZED HEALTH CARE EDUCATION/TRAINING PROGRAM

## 2022-01-05 PROCEDURE — 3074F PR MOST RECENT SYSTOLIC BLOOD PRESSURE < 130 MM HG: ICD-10-PCS | Mod: CPTII,S$GLB,, | Performed by: STUDENT IN AN ORGANIZED HEALTH CARE EDUCATION/TRAINING PROGRAM

## 2022-01-05 PROCEDURE — 1101F PR PT FALLS ASSESS DOC 0-1 FALLS W/OUT INJ PAST YR: ICD-10-PCS | Mod: CPTII,S$GLB,, | Performed by: STUDENT IN AN ORGANIZED HEALTH CARE EDUCATION/TRAINING PROGRAM

## 2022-01-05 PROCEDURE — 3074F SYST BP LT 130 MM HG: CPT | Mod: CPTII,S$GLB,, | Performed by: STUDENT IN AN ORGANIZED HEALTH CARE EDUCATION/TRAINING PROGRAM

## 2022-01-05 PROCEDURE — 3008F PR BODY MASS INDEX (BMI) DOCUMENTED: ICD-10-PCS | Mod: CPTII,S$GLB,, | Performed by: STUDENT IN AN ORGANIZED HEALTH CARE EDUCATION/TRAINING PROGRAM

## 2022-01-05 PROCEDURE — 3078F DIAST BP <80 MM HG: CPT | Mod: CPTII,S$GLB,, | Performed by: STUDENT IN AN ORGANIZED HEALTH CARE EDUCATION/TRAINING PROGRAM

## 2022-01-05 PROCEDURE — 1126F PR PAIN SEVERITY QUANTIFIED, NO PAIN PRESENT: ICD-10-PCS | Mod: CPTII,S$GLB,, | Performed by: STUDENT IN AN ORGANIZED HEALTH CARE EDUCATION/TRAINING PROGRAM

## 2022-01-05 PROCEDURE — 99024 PR POST-OP FOLLOW-UP VISIT: ICD-10-PCS | Mod: S$GLB,,, | Performed by: STUDENT IN AN ORGANIZED HEALTH CARE EDUCATION/TRAINING PROGRAM

## 2022-01-05 PROCEDURE — 3008F BODY MASS INDEX DOCD: CPT | Mod: CPTII,S$GLB,, | Performed by: STUDENT IN AN ORGANIZED HEALTH CARE EDUCATION/TRAINING PROGRAM

## 2022-01-05 PROCEDURE — 99024 POSTOP FOLLOW-UP VISIT: CPT | Mod: S$GLB,,, | Performed by: STUDENT IN AN ORGANIZED HEALTH CARE EDUCATION/TRAINING PROGRAM

## 2022-01-05 PROCEDURE — 3078F PR MOST RECENT DIASTOLIC BLOOD PRESSURE < 80 MM HG: ICD-10-PCS | Mod: CPTII,S$GLB,, | Performed by: STUDENT IN AN ORGANIZED HEALTH CARE EDUCATION/TRAINING PROGRAM

## 2022-01-05 PROCEDURE — 1101F PT FALLS ASSESS-DOCD LE1/YR: CPT | Mod: CPTII,S$GLB,, | Performed by: STUDENT IN AN ORGANIZED HEALTH CARE EDUCATION/TRAINING PROGRAM

## 2022-01-05 PROCEDURE — 3288F FALL RISK ASSESSMENT DOCD: CPT | Mod: CPTII,S$GLB,, | Performed by: STUDENT IN AN ORGANIZED HEALTH CARE EDUCATION/TRAINING PROGRAM

## 2022-01-05 PROCEDURE — 99999 PR PBB SHADOW E&M-EST. PATIENT-LVL III: CPT | Mod: PBBFAC,,, | Performed by: STUDENT IN AN ORGANIZED HEALTH CARE EDUCATION/TRAINING PROGRAM

## 2022-01-05 PROCEDURE — 3288F PR FALLS RISK ASSESSMENT DOCUMENTED: ICD-10-PCS | Mod: CPTII,S$GLB,, | Performed by: STUDENT IN AN ORGANIZED HEALTH CARE EDUCATION/TRAINING PROGRAM

## 2022-01-05 NOTE — PROGRESS NOTES
[facility]  Post Op Visit    SUBJECTIVE:  Andrea Gant is a 66 y.o. y/o male who presents to clinic today for post op follow up after L elbow nodule removal on 12/21/21. He denies pain, fevers, chills, nausea, vomiting, diarrhea, constipation, or hematochezia. Returning to usual activity level. Denies redness around or drainage from incisions.    OBJECTIVE:  Vitals:    01/05/22 1425   BP: (!) 119/55   Pulse: 80   Temp: 98.1 °F (36.7 °C)       General: male in NAD. Not toxic appearing.   HENT: EOM intact.   Pulmonary: No respiratory distress. Effort normal.  Cardiovascular: Regular rate.   Abdomen: soft, non-tender, non-distended  Skin: Incisions are healing well without signs of infection. No erythema, drainage, or increased warmth noted.       PATHOLOGY:   Final Pathologic Diagnosis 1.  Skin, left elbow, excision:   - RHEUMATOID NODULES.   This lesion is benign.            ASSESSMENT/PLAN:    Andrea Gant is a 66 y.o. y/o male who presents to clinic today for f/u s/p L elbow nodule removal on 12/21/21. Clinically improving and meeting all post op milestones     Sutures removed in clinic  Path reviewed  Follow-up PRN.   Continue any current medications   Call clinic with any questions or concerns  ED precautions given.   All questions answered; patient is comfortable with the above follow-up plan and verbalized understanding.    Case discussed with Dr. Jasmine. Patient seen and examined by Dr. Mitali Kiran PA-C  General Surgery   [facility]

## 2022-01-12 ENCOUNTER — PATIENT MESSAGE (OUTPATIENT)
Dept: INTERNAL MEDICINE | Facility: CLINIC | Age: 67
End: 2022-01-12
Payer: MEDICARE

## 2022-01-27 ENCOUNTER — PES CALL (OUTPATIENT)
Dept: ADMINISTRATIVE | Facility: CLINIC | Age: 67
End: 2022-01-27
Payer: MEDICARE

## 2022-02-05 NOTE — PROGRESS NOTES
Subjective:       Patient ID: Andrea Gant is a 66 y.o. male with nodular sero+ccp+ RA, OA and Hepatitis C (cured with Harvoni)    Chief Complaint: No chief complaint on file.    Returns for follow-up. Last seen 11/2/21. Last labs 2/8/22--ok RA wise.    Had begun MTX in an effort to avoid TNFi due to costs. He has been off humira since January 2021 and is doing very well without it.  He is taking only 10 mg of MTX weekly    He denies AM stiffness, joint pain or swelling, dysphagia, tight skin, oral ulcers, pleurisy, pericarditis, photosensitivity, thromboses. Has dry eyes. No dry mouth. No true Raynaud's but gets some discoloration of his fingertips in the cold, but does not appear to be more than 1 color change..   Having mild pain in the dorsa of hands wo swelling.  Topicals help.    Had subcutaneous nodules over R elbow removed. Considering same for L elbow.  Pathology: rheumatoid nodules.    He is taking 100 mg trazodone, prescribed by us for insomnia and it is helping him.    He has lost weight as he was begun on dental implants and has had some problems.  Cannot eat hard food. Dental work not finished.     Current Outpatient Medications   Medication Sig Dispense Refill    amLODIPine (NORVASC) 5 MG tablet Take 1 tablet (5 mg total) by mouth once daily. 90 tablet 3    chlorhexidine (PERIDEX) 0.12 % solution       cilostazoL (PLETAL) 50 MG Tab Take 1 tablet (50 mg total) by mouth 2 (two) times daily. 180 tablet 3    doxazosin (CARDURA) 4 MG tablet Take 1 tablet (4 mg total) by mouth every evening. 90 tablet 3    folic acid (FOLVITE) 1 MG tablet TAKE 1 TABLET BY MOUTH ONCE DAILY 90 tablet 3    methotrexate 2.5 MG Tab TAKE 4 TABLETS BY MOUTH EVERY 7 DAYS. THIS MEDICATION REQUIRES PERIODIC LAB MONITORING. 16 tablet 11    simvastatin (ZOCOR) 10 MG tablet TAKE 1 TABLET BY MOUTH EVERY DAY IN THE EVENING 90 tablet 6    traZODone (DESYREL) 50 MG tablet TAKE 3 TABLETS BY MOUTH  NIGHTLY AS NEEDED FOR  INSOMNIA  270 tablet 3    HYDROcodone-acetaminophen (NORCO) 7.5-325 mg per tablet SMARTSI By Mouth 4-5 Times Daily       No current facility-administered medications for this visit.             Probable Allergic to Enbrel (rash);     Past Medical History:   Diagnosis Date    Cataract     Colon polyp     History of hepatitis C, s/p successful treatment w/ SVR - 2015 10/14/2012    Kelsey 1a,  Liver biopsy 2014 - Stage 1 fibrosis;  Completed 8 weeks Harvoni w/ SVR - 2015      Hyperlipidemia     Lipoma of arm     Lipoma of lower extremity     Nuclear sclerosis - Both Eyes 10/22/2012    Other and unspecified hyperlipidemia 10/22/2013    PAD (peripheral artery disease)     Peripheral vascular disease, unspecified     Rheumatoid arthritis(714.0) 10/14/2012       Past Surgical History:   Procedure Laterality Date    COLONOSCOPY N/A 2021    Procedure: COLONOSCOPY;  Surgeon: Kenrick Zimmer MD;  Location: [facility] (4TH FLR);  Service: Endoscopy;  Laterality: N/A;  blood thinner approval received, see telephone encounter 10/19/21-BB  fully vacc-inst email-tb    KNEE ARTHROPLASTY      LAPAROSCOPIC EXPLORATION OF GROIN Left 2018    Procedure: EXPLORATION, INGUINAL REGION, LAPAROSCOPIC;  Surgeon: Mingo De Guzman Jr., MD;  Location: [facility] OR 2ND FLR;  Service: General;  Laterality: Left;    TONSILLECTOMY         Review of Systems   Constitutional: Positive for unexpected weight change. Negative for fatigue and fever.        Weight loss attributed to dental issues.   HENT: Negative.  Negative for hearing loss, mouth sores, sore throat, tinnitus and trouble swallowing.    Eyes: Negative.  Negative for redness and visual disturbance.        Dry eyes.   Respiratory: Negative for cough, choking, chest tightness and shortness of breath.    Cardiovascular: Negative.  Negative for chest pain, palpitations and leg swelling.   Gastrointestinal: Negative.  Negative for abdominal pain, blood in stool,  "constipation, diarrhea, nausea and vomiting.        Heartburn   Endocrine: Negative.    Genitourinary: Positive for frequency. Negative for genital sores, hematuria and urgency.   Musculoskeletal: Negative.  Negative for back pain, neck pain and neck stiffness.   Skin: Negative.  Negative for rash.   Allergic/Immunologic: Negative.    Neurological: Negative.  Negative for dizziness, syncope, numbness and headaches.   Hematological: Negative.  Does not bruise/bleed easily.   Psychiatric/Behavioral: Negative.  Negative for dysphoric mood and sleep disturbance. The patient is not nervous/anxious.         Sleep helped by trazodone.          SH:Gave up smoking was former smoker.  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   Got both his Covid vaccines.    Objective:   /73   Pulse 75   Ht 5' 9" (1.753 m)   Wt 70.3 kg (154 lb 15.7 oz)   BMI 22.89 kg/m²   Was 158 lb 11.7 oz on 11/2/21  Was 173 lb 11.6 oz on 8/8/19  Was 172 lbs 4.8 oz on 12/13/16;  Physical Exam   Constitutional: He is oriented to person, place, and time. No distress.   HENT:   Head: Normocephalic and atraumatic.   Mouth/Throat: Oropharynx is clear and moist. No oropharyngeal exudate.   No facial rashes  Parotids not enlarged  No oral ulcers  Temporal aa ok;    Eyes: Pupils are equal, round, and reactive to light. Conjunctivae are normal. Right eye exhibits no discharge. Left eye exhibits no discharge. No scleral icterus.   Neck: No JVD present. No tracheal deviation present. No thyromegaly present.   Cardiovascular: Normal rate and regular rhythm. Exam reveals no gallop and no friction rub.   Murmur heard.  Pulmonary/Chest: Effort normal and breath sounds normal. No respiratory distress. He has no wheezes. He has no rales. He exhibits no tenderness.   Abdominal: Soft. Bowel sounds are normal. He exhibits no distension and no mass. There is no splenomegaly or hepatomegaly. There is no abdominal tenderness. There is no rebound and no guarding.   Musculoskeletal:    "      General: No tenderness. Normal range of motion.      Cervical back: Neck supple.      Comments: Cspine FROM no tenderness  Tspine FROM no tenderness  Lspine FROM no tenderness.  TMJ: unremarkable  Shoulders: FROM now; no synovitis; no tenderness  Elbows: FROM; left w surgical scar; right elbow with mild flexion contracture & elbow nodule vs lipoma.  Wrists: FROM; no synovitis; no TTP  Dorsa of hands wo TTP  MCPs: FROM; no visible synovitis; no metacarpalgia;  ok;  PIPs: FROM; +Bouchards--servando 4th R PIP; but min SHT of 2-4 PCP no TTP; makes good fists.  DIPs: FROM; + Heberden's; no synovitis  HIPS: FROM  Knees: FROM; no synovitis; no instability; minimal PF crepitus;  Ankles: FROM: no synovitis   Toes: ok; no metatarsalgia;        Lymphadenopathy:     He has no cervical adenopathy.   Neurological: He is alert and oriented to person, place, and time. He has normal reflexes. No cranial nerve deficit. Gait normal.   Proximal and distal muscle strength 5/5.   Skin: Skin is warm and dry. No rash noted. He is not diaphoretic.   Lipomas, forearms and other areas;  Rheumatoid nodules vs lipomas of elbows.    Bilateral superficial varicosities LE;   Psychiatric: Mood, memory, affect and judgment normal.   Vitals reviewed.                  LABS:     2/8/22: ESR 9; CRP 5.4; CBC Hg 13.6; Na 134;   10/11/21: CBC ok; CMP ok; TSH ok; HgA1C 5.2;   3/24/21: ESR 19; CRP 3.5; CBC ok; CMP CMP cnne 1.3; GFR 56.5;   6/2/20: ESR 13 (23); CRP 2.5; CBC ok; CMP cnne 1.4; GFR 52; BUN 30; TP 8.7  12/30/19: ESR 9; CRP 2.5; CBC ok; CMP BUN 24; cnne 1.3; GFR 57.3;   8/1/19: ESR 24 (23); CRP 5.4; CBC ok; CMP cnne 1.3; GFR 57.3; BUN 27;   10/10/18; ESR 7; CRP 4.1; CBC ; CMP cnne 1.3; GFR 57.7;   1/17/18: ESR 29; CRP; CBC ok; CMP ok; ; CCP 44;   7/24/17: ESR 23; CRP 4.2; CBC ok; CMP cnne 1.3; GFR 58.1;   3/26/16: CBC ok; cnne 1.3  3/10/16: ESR 9; CRP 1.7; CBC, CMP ok;  9/15/15: ESR 20; CRP 0.9; CBC ok; CMP ok;  3/2/15: CMP BUN 28; cnne  1.1  1/12/15: Hg 13.9  10/21/14: ESR 8;   14: ESR 19; CRP 2.9; Hg 12.8; cnne 1.3; Vit D 18; ; CCP 44.3   Hepatitis B & HIV negative; HCV+;     IMAGIN19: Bilateral hands: personally viewed: L: cystic changes at the distal aspect of the left ulna, also involving some of the carpal bones, as well as the distal aspects of the 1st and 3rd metacarpal bones.  Mild degenerative changes at some of the DIP joints; R: Cystic changes involving some of the carpal bones as well as the distal aspect of the 1st and 3rd metacarpals and proximal aspect of the proximal phalanx of the thumb.  Degenerative changes involving some of the DIP joints and also the interphalangeal joint of the right thumb.    16: US Dopplers: R:minimal peripheral arterial occlusive disease: L: moderate peripheral arterial occlusive disease; unchanged  4/2/15: Bilateral wrists: personal review: cystic lesion left lunate, possibly left ulnar notch. (not too different from previous)  4/2/15: R ankle personally reviewed: ok  14: Arthritis survey personally reviewed again: OA lower CS; min OA hands & feet;      Assessment:   Seropositive (), CCP (44) positive nodular (bilateral elbows vs lipomas) rheumatoid arthritis with no erosions--doing well,    Enbrel effective but resulted in rash.    MTX and Leflunomide were contraindicated at the time due to HCV.   SSZ given ok by Dr. Moore, but never begun   Was on hydroxychloroquine 400 mg/d for a while   Humira 40 mg qow since 6/15~ 2021   On MTX 10 mg/wk doing well (ok for use of MTX or Leflunomide if needed as per Dr. Moore) since 2020    Possible Raynaud's in past.     Dermatitis c/w seborrheic keratoses & other skin lesions   S/P basal cell carcinoma    Bilateral adhesive capsulitis R>L--resolved    HTN    Mild renal insufficiency on meloxicam & ibuprofen--most recent ok    (cnne up to 1.3 from 1.0)     Hx of nephrolithiasis    Hepatitis C with normal liver function  tests--genotype 1a   Completed Harvoni; cured    Osteoarthritis of hands.     Recurrent hypovitaminosis D -treated in past    Peripheral Arterial Disease.    Compression deformity of the lower lumbar and thoracic vertebral bodies by x-ray.     Multiple lipomas of the arms and lumbar spine area.     Occupational injuries in the past.     Persistent insomnia.   Helped by trazodone.     Normal weight from overweight  Plan:   Continue MTX 10 mg/wk + folic acid 1 mg/d.  Labs q 3 months.  Encourage fluids.   Ok to continue trazodone 100 mg/hs prn  RTC 3 months or prn after labs.

## 2022-02-08 ENCOUNTER — LAB VISIT (OUTPATIENT)
Dept: LAB | Facility: HOSPITAL | Age: 67
End: 2022-02-08
Attending: INTERNAL MEDICINE
Payer: MEDICARE

## 2022-02-08 DIAGNOSIS — M05.79 RHEUMATOID ARTHRITIS INVOLVING MULTIPLE SITES WITH POSITIVE RHEUMATOID FACTOR: ICD-10-CM

## 2022-02-08 LAB
ALBUMIN SERPL BCP-MCNC: 3.9 G/DL (ref 3.5–5.2)
ALP SERPL-CCNC: 89 U/L (ref 55–135)
ALT SERPL W/O P-5'-P-CCNC: 10 U/L (ref 10–44)
ANION GAP SERPL CALC-SCNC: 9 MMOL/L (ref 8–16)
AST SERPL-CCNC: 19 U/L (ref 10–40)
BASOPHILS # BLD AUTO: 0.03 K/UL (ref 0–0.2)
BASOPHILS NFR BLD: 0.3 % (ref 0–1.9)
BILIRUB SERPL-MCNC: 0.5 MG/DL (ref 0.1–1)
BUN SERPL-MCNC: 19 MG/DL (ref 8–23)
CALCIUM SERPL-MCNC: 9.8 MG/DL (ref 8.7–10.5)
CHLORIDE SERPL-SCNC: 101 MMOL/L (ref 95–110)
CO2 SERPL-SCNC: 24 MMOL/L (ref 23–29)
CREAT SERPL-MCNC: 1.2 MG/DL (ref 0.5–1.4)
CRP SERPL-MCNC: 5.4 MG/L (ref 0–8.2)
DIFFERENTIAL METHOD: ABNORMAL
EOSINOPHIL # BLD AUTO: 0.1 K/UL (ref 0–0.5)
EOSINOPHIL NFR BLD: 0.8 % (ref 0–8)
ERYTHROCYTE [DISTWIDTH] IN BLOOD BY AUTOMATED COUNT: 13.4 % (ref 11.5–14.5)
ERYTHROCYTE [SEDIMENTATION RATE] IN BLOOD BY WESTERGREN METHOD: 9 MM/HR (ref 0–23)
EST. GFR  (AFRICAN AMERICAN): >60 ML/MIN/1.73 M^2
EST. GFR  (NON AFRICAN AMERICAN): >60 ML/MIN/1.73 M^2
GLUCOSE SERPL-MCNC: 102 MG/DL (ref 70–110)
HCT VFR BLD AUTO: 42.2 % (ref 40–54)
HGB BLD-MCNC: 13.6 G/DL (ref 14–18)
IMM GRANULOCYTES # BLD AUTO: 0.04 K/UL (ref 0–0.04)
IMM GRANULOCYTES NFR BLD AUTO: 0.4 % (ref 0–0.5)
LYMPHOCYTES # BLD AUTO: 1.1 K/UL (ref 1–4.8)
LYMPHOCYTES NFR BLD: 12.4 % (ref 18–48)
MCH RBC QN AUTO: 30.4 PG (ref 27–31)
MCHC RBC AUTO-ENTMCNC: 32.2 G/DL (ref 32–36)
MCV RBC AUTO: 94 FL (ref 82–98)
MONOCYTES # BLD AUTO: 0.6 K/UL (ref 0.3–1)
MONOCYTES NFR BLD: 6.3 % (ref 4–15)
NEUTROPHILS # BLD AUTO: 7.4 K/UL (ref 1.8–7.7)
NEUTROPHILS NFR BLD: 79.8 % (ref 38–73)
NRBC BLD-RTO: 0 /100 WBC
PLATELET # BLD AUTO: 230 K/UL (ref 150–450)
PMV BLD AUTO: 10.2 FL (ref 9.2–12.9)
POTASSIUM SERPL-SCNC: 4.3 MMOL/L (ref 3.5–5.1)
PROT SERPL-MCNC: 7.9 G/DL (ref 6–8.4)
RBC # BLD AUTO: 4.47 M/UL (ref 4.6–6.2)
SODIUM SERPL-SCNC: 134 MMOL/L (ref 136–145)
WBC # BLD AUTO: 9.22 K/UL (ref 3.9–12.7)

## 2022-02-08 PROCEDURE — 85652 RBC SED RATE AUTOMATED: CPT | Performed by: INTERNAL MEDICINE

## 2022-02-08 PROCEDURE — 80053 COMPREHEN METABOLIC PANEL: CPT | Performed by: INTERNAL MEDICINE

## 2022-02-08 PROCEDURE — 85025 COMPLETE CBC W/AUTO DIFF WBC: CPT | Performed by: INTERNAL MEDICINE

## 2022-02-08 PROCEDURE — 86140 C-REACTIVE PROTEIN: CPT | Performed by: INTERNAL MEDICINE

## 2022-02-08 PROCEDURE — 36415 COLL VENOUS BLD VENIPUNCTURE: CPT | Performed by: INTERNAL MEDICINE

## 2022-02-15 ENCOUNTER — OFFICE VISIT (OUTPATIENT)
Dept: RHEUMATOLOGY | Facility: CLINIC | Age: 67
End: 2022-02-15
Payer: MEDICARE

## 2022-02-15 VITALS
SYSTOLIC BLOOD PRESSURE: 118 MMHG | DIASTOLIC BLOOD PRESSURE: 73 MMHG | BODY MASS INDEX: 22.96 KG/M2 | HEART RATE: 75 BPM | WEIGHT: 155 LBS | HEIGHT: 69 IN

## 2022-02-15 DIAGNOSIS — Z79.631 LONG TERM METHOTREXATE USER: ICD-10-CM

## 2022-02-15 DIAGNOSIS — M05.79 RHEUMATOID ARTHRITIS INVOLVING MULTIPLE SITES WITH POSITIVE RHEUMATOID FACTOR: Primary | ICD-10-CM

## 2022-02-15 PROCEDURE — 99999 PR PBB SHADOW E&M-EST. PATIENT-LVL III: ICD-10-PCS | Mod: PBBFAC,,, | Performed by: INTERNAL MEDICINE

## 2022-02-15 PROCEDURE — 3078F PR MOST RECENT DIASTOLIC BLOOD PRESSURE < 80 MM HG: ICD-10-PCS | Mod: CPTII,S$GLB,, | Performed by: INTERNAL MEDICINE

## 2022-02-15 PROCEDURE — 99999 PR PBB SHADOW E&M-EST. PATIENT-LVL III: CPT | Mod: PBBFAC,,, | Performed by: INTERNAL MEDICINE

## 2022-02-15 PROCEDURE — 1125F AMNT PAIN NOTED PAIN PRSNT: CPT | Mod: CPTII,S$GLB,, | Performed by: INTERNAL MEDICINE

## 2022-02-15 PROCEDURE — 3008F BODY MASS INDEX DOCD: CPT | Mod: CPTII,S$GLB,, | Performed by: INTERNAL MEDICINE

## 2022-02-15 PROCEDURE — 1159F MED LIST DOCD IN RCRD: CPT | Mod: CPTII,S$GLB,, | Performed by: INTERNAL MEDICINE

## 2022-02-15 PROCEDURE — 1125F PR PAIN SEVERITY QUANTIFIED, PAIN PRESENT: ICD-10-PCS | Mod: CPTII,S$GLB,, | Performed by: INTERNAL MEDICINE

## 2022-02-15 PROCEDURE — 3074F PR MOST RECENT SYSTOLIC BLOOD PRESSURE < 130 MM HG: ICD-10-PCS | Mod: CPTII,S$GLB,, | Performed by: INTERNAL MEDICINE

## 2022-02-15 PROCEDURE — 99214 PR OFFICE/OUTPT VISIT, EST, LEVL IV, 30-39 MIN: ICD-10-PCS | Mod: S$GLB,,, | Performed by: INTERNAL MEDICINE

## 2022-02-15 PROCEDURE — 1159F PR MEDICATION LIST DOCUMENTED IN MEDICAL RECORD: ICD-10-PCS | Mod: CPTII,S$GLB,, | Performed by: INTERNAL MEDICINE

## 2022-02-15 PROCEDURE — 99214 OFFICE O/P EST MOD 30 MIN: CPT | Mod: S$GLB,,, | Performed by: INTERNAL MEDICINE

## 2022-02-15 PROCEDURE — 99499 RISK ADDL DX/OHS AUDIT: ICD-10-PCS | Mod: S$GLB,,, | Performed by: INTERNAL MEDICINE

## 2022-02-15 PROCEDURE — 1160F RVW MEDS BY RX/DR IN RCRD: CPT | Mod: CPTII,S$GLB,, | Performed by: INTERNAL MEDICINE

## 2022-02-15 PROCEDURE — 3074F SYST BP LT 130 MM HG: CPT | Mod: CPTII,S$GLB,, | Performed by: INTERNAL MEDICINE

## 2022-02-15 PROCEDURE — 3078F DIAST BP <80 MM HG: CPT | Mod: CPTII,S$GLB,, | Performed by: INTERNAL MEDICINE

## 2022-02-15 PROCEDURE — 1160F PR REVIEW ALL MEDS BY PRESCRIBER/CLIN PHARMACIST DOCUMENTED: ICD-10-PCS | Mod: CPTII,S$GLB,, | Performed by: INTERNAL MEDICINE

## 2022-02-15 PROCEDURE — 99499 UNLISTED E&M SERVICE: CPT | Mod: S$GLB,,, | Performed by: INTERNAL MEDICINE

## 2022-02-15 PROCEDURE — 3008F PR BODY MASS INDEX (BMI) DOCUMENTED: ICD-10-PCS | Mod: CPTII,S$GLB,, | Performed by: INTERNAL MEDICINE

## 2022-02-15 RX ORDER — CHLORHEXIDINE GLUCONATE ORAL RINSE 1.2 MG/ML
SOLUTION DENTAL
COMMUNITY
Start: 2021-10-21 | End: 2022-07-07

## 2022-02-23 DIAGNOSIS — D84.9 IMMUNOSUPPRESSED STATUS: ICD-10-CM

## 2022-03-30 ENCOUNTER — PES CALL (OUTPATIENT)
Dept: ADMINISTRATIVE | Facility: CLINIC | Age: 67
End: 2022-03-30
Payer: MEDICARE

## 2022-05-12 ENCOUNTER — LAB VISIT (OUTPATIENT)
Dept: LAB | Facility: HOSPITAL | Age: 67
End: 2022-05-12
Attending: INTERNAL MEDICINE
Payer: MEDICARE

## 2022-05-12 DIAGNOSIS — Z79.631 LONG TERM METHOTREXATE USER: ICD-10-CM

## 2022-05-12 LAB
ALBUMIN SERPL BCP-MCNC: 4 G/DL (ref 3.5–5.2)
ALP SERPL-CCNC: 81 U/L (ref 55–135)
ALT SERPL W/O P-5'-P-CCNC: 14 U/L (ref 10–44)
ANION GAP SERPL CALC-SCNC: 7 MMOL/L (ref 8–16)
AST SERPL-CCNC: 23 U/L (ref 10–40)
BASOPHILS # BLD AUTO: 0.03 K/UL (ref 0–0.2)
BASOPHILS NFR BLD: 0.4 % (ref 0–1.9)
BILIRUB SERPL-MCNC: 0.6 MG/DL (ref 0.1–1)
BUN SERPL-MCNC: 20 MG/DL (ref 8–23)
CALCIUM SERPL-MCNC: 9.4 MG/DL (ref 8.7–10.5)
CHLORIDE SERPL-SCNC: 103 MMOL/L (ref 95–110)
CO2 SERPL-SCNC: 26 MMOL/L (ref 23–29)
CREAT SERPL-MCNC: 1.2 MG/DL (ref 0.5–1.4)
CRP SERPL-MCNC: 2 MG/L (ref 0–8.2)
DIFFERENTIAL METHOD: ABNORMAL
EOSINOPHIL # BLD AUTO: 0.1 K/UL (ref 0–0.5)
EOSINOPHIL NFR BLD: 0.7 % (ref 0–8)
ERYTHROCYTE [DISTWIDTH] IN BLOOD BY AUTOMATED COUNT: 13.8 % (ref 11.5–14.5)
ERYTHROCYTE [SEDIMENTATION RATE] IN BLOOD BY WESTERGREN METHOD: 20 MM/HR (ref 0–23)
EST. GFR  (AFRICAN AMERICAN): >60 ML/MIN/1.73 M^2
EST. GFR  (NON AFRICAN AMERICAN): >60 ML/MIN/1.73 M^2
GLUCOSE SERPL-MCNC: 164 MG/DL (ref 70–110)
HCT VFR BLD AUTO: 38.1 % (ref 40–54)
HGB BLD-MCNC: 12.8 G/DL (ref 14–18)
IMM GRANULOCYTES # BLD AUTO: 0.02 K/UL (ref 0–0.04)
IMM GRANULOCYTES NFR BLD AUTO: 0.2 % (ref 0–0.5)
LYMPHOCYTES # BLD AUTO: 1.3 K/UL (ref 1–4.8)
LYMPHOCYTES NFR BLD: 15.5 % (ref 18–48)
MCH RBC QN AUTO: 30.8 PG (ref 27–31)
MCHC RBC AUTO-ENTMCNC: 33.6 G/DL (ref 32–36)
MCV RBC AUTO: 92 FL (ref 82–98)
MONOCYTES # BLD AUTO: 0.5 K/UL (ref 0.3–1)
MONOCYTES NFR BLD: 6.3 % (ref 4–15)
NEUTROPHILS # BLD AUTO: 6.3 K/UL (ref 1.8–7.7)
NEUTROPHILS NFR BLD: 76.9 % (ref 38–73)
NRBC BLD-RTO: 0 /100 WBC
PLATELET # BLD AUTO: 211 K/UL (ref 150–450)
PMV BLD AUTO: 10.1 FL (ref 9.2–12.9)
POTASSIUM SERPL-SCNC: 3.9 MMOL/L (ref 3.5–5.1)
PROT SERPL-MCNC: 7.5 G/DL (ref 6–8.4)
RBC # BLD AUTO: 4.16 M/UL (ref 4.6–6.2)
SODIUM SERPL-SCNC: 136 MMOL/L (ref 136–145)
WBC # BLD AUTO: 8.15 K/UL (ref 3.9–12.7)

## 2022-05-12 PROCEDURE — 85652 RBC SED RATE AUTOMATED: CPT | Performed by: INTERNAL MEDICINE

## 2022-05-12 PROCEDURE — 36415 COLL VENOUS BLD VENIPUNCTURE: CPT | Mod: PO | Performed by: INTERNAL MEDICINE

## 2022-05-12 PROCEDURE — 85025 COMPLETE CBC W/AUTO DIFF WBC: CPT | Performed by: INTERNAL MEDICINE

## 2022-05-12 PROCEDURE — 86140 C-REACTIVE PROTEIN: CPT | Performed by: INTERNAL MEDICINE

## 2022-05-12 PROCEDURE — 80053 COMPREHEN METABOLIC PANEL: CPT | Performed by: INTERNAL MEDICINE

## 2022-05-15 NOTE — PROGRESS NOTES
Subjective:       Patient ID: Andrea Gant is a 67 y.o. male with nodular sero+ccp+ RA, OA and Hepatitis C (cured with Harvoni)    Chief Complaint: No chief complaint on file.    Returns for follow-up. Last seen 2/15/22.  Had begun MTX in an effort to avoid TNFi due to costs. He has been off humira since 2021 and is still doing very well without it.  He is taking only 10 mg of MTX weekly & really does not want to change as he has no RA sxs.    He denies AM stiffness, joint pain or swelling, dysphagia, tight skin, oral ulcers, pleurisy, pericarditis, photosensitivity, thromboses. Has dry eyes. No dry mouth. No true Raynaud's.    Had subcutaneous nodules over one elbow removed: Pathology: rheumatoid nodules.    He is taking 100 mg trazodone, prescribed by us for insomnia and it is helping him.    He has lost weight as he was begun on dental implants and has had some problems.  Cannot eat hard food. Continues to lose weight and now is worried. Also feels he is losing muscle mass.     Last labs w glucose elevated. No DM in family hx, but does report some urinary frequency & ? Polyuria.     Current Outpatient Medications   Medication Sig Dispense Refill    amLODIPine (NORVASC) 5 MG tablet Take 1 tablet (5 mg total) by mouth once daily. 90 tablet 3    chlorhexidine (PERIDEX) 0.12 % solution       cilostazoL (PLETAL) 50 MG Tab Take 1 tablet (50 mg total) by mouth 2 (two) times daily. 180 tablet 3    doxazosin (CARDURA) 4 MG tablet Take 1 tablet (4 mg total) by mouth every evening. 90 tablet 3    folic acid (FOLVITE) 1 MG tablet TAKE 1 TABLET BY MOUTH ONCE DAILY 90 tablet 3    HYDROcodone-acetaminophen (NORCO) 7.5-325 mg per tablet SMARTSI By Mouth 4-5 Times Daily      methotrexate 2.5 MG Tab TAKE 4 TABLETS BY MOUTH  EVERY 7 DAYS ; THIS  MEDICATION REQUIRES  PERIODIC LAB MONITORING 60 tablet 1    simvastatin (ZOCOR) 10 MG tablet TAKE 1 TABLET BY MOUTH EVERY DAY IN THE EVENING 90 tablet 6     traZODone (DESYREL) 50 MG tablet TAKE 3 TABLETS BY MOUTH  NIGHTLY AS NEEDED FOR  INSOMNIA 270 tablet 3     No current facility-administered medications for this visit.     Probable Allergic to Enbrel (rash);     Past Medical History:   Diagnosis Date    Cataract     Colon polyp 2014    History of hepatitis C, s/p successful treatment w/ SVR12 - 7/2015 10/14/2012    Kelsey 1a,  Liver biopsy 6/2014 - Stage 1 fibrosis;  Completed 8 weeks Harvoni w/ SVR12 - 7/2015      Hyperlipidemia     Lipoma of arm     Lipoma of lower extremity     Nuclear sclerosis - Both Eyes 10/22/2012    Other and unspecified hyperlipidemia 10/22/2013    PAD (peripheral artery disease)     Peripheral vascular disease, unspecified     Rheumatoid arthritis(714.0) 10/14/2012       Past Surgical History:   Procedure Laterality Date    COLONOSCOPY N/A 11/29/2021    Procedure: COLONOSCOPY;  Surgeon: Kenrick Zimmer MD;  Location: [facility] (4TH FLR);  Service: Endoscopy;  Laterality: N/A;  blood thinner approval received, see telephone encounter 10/19/21-BB  fully vacc-inst email-tb    KNEE ARTHROPLASTY      LAPAROSCOPIC EXPLORATION OF GROIN Left 9/6/2018    Procedure: EXPLORATION, INGUINAL REGION, LAPAROSCOPIC;  Surgeon: Mingo De Guzman Jr., MD;  Location: [facility] OR 2ND FLR;  Service: General;  Laterality: Left;    TONSILLECTOMY         Review of Systems   Constitutional: Positive for unexpected weight change. Negative for fatigue and fever.        Weight loss attributed to dental issues.   HENT: Negative.  Negative for hearing loss, mouth sores, sore throat, tinnitus and trouble swallowing.    Eyes: Negative.  Negative for redness and visual disturbance.        Dry eyes.   Respiratory: Negative for cough, choking, chest tightness and shortness of breath.    Cardiovascular: Negative.  Negative for chest pain, palpitations and leg swelling.   Gastrointestinal: Negative.  Negative for abdominal pain, blood in stool, constipation,  "diarrhea, nausea and vomiting.        Heartburn   Endocrine: Negative.    Genitourinary: Positive for frequency. Negative for genital sores, hematuria and urgency.   Musculoskeletal: Negative.  Negative for back pain, neck pain and neck stiffness.   Skin: Negative.  Negative for rash.   Allergic/Immunologic: Negative.    Neurological: Negative.  Negative for dizziness, syncope, numbness and headaches.   Hematological: Negative.  Does not bruise/bleed easily.   Psychiatric/Behavioral: Positive for dysphoric mood and sleep disturbance. The patient is nervous/anxious.         Sleep helped by trazodone.          SH:Gave up smoking was former smoker.  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       Objective:     ,/80   Pulse 80   Ht 5' 9" (1.753 m)   Wt 70.3 kg (154 lb 15.7 oz)   BMI 22.89 kg/m²     Was 158 lb 11.7 oz on 11/2/21  Was 173 lb 11.6 oz on 8/8/19  Was 172 lbs 4.8 oz on 12/13/16;  Physical Exam   Constitutional: He is oriented to person, place, and time. No distress.   HENT:   Head: Normocephalic and atraumatic.   Mouth/Throat: Oropharynx is clear and moist. No oropharyngeal exudate.   No facial rashes  Parotids not enlarged     Eyes: Pupils are equal, round, and reactive to light. Conjunctivae are normal. Right eye exhibits no discharge. Left eye exhibits no discharge. No scleral icterus.   Neck: No JVD present. No tracheal deviation present. No thyromegaly present.   Cardiovascular: Normal rate and regular rhythm. Exam reveals no gallop and no friction rub.   Murmur heard.  Pulmonary/Chest: Effort normal and breath sounds normal. No respiratory distress. He has no wheezes. He has no rales. He exhibits no tenderness.   Abdominal: Soft. Bowel sounds are normal. He exhibits no distension and no mass. There is no splenomegaly or hepatomegaly. There is no abdominal tenderness. There is no rebound and no guarding.   Musculoskeletal:         General: No tenderness. Normal range of motion.      Cervical back: Neck " supple.      Comments: Cspine FROM no tenderness  Tspine FROM no tenderness; mild kyposis  Lspine FROM no tenderness.  TMJ: unremarkable  Shoulders: FROM now; no synovitis; no tenderness  Elbows: FROM; left w surgical scar; right elbow with mild flexion contracture & elbow nodule .  Wrists: FROM; no synovitis; no TTP  Dorsa of hands wo TTP  MCPs: FROM; no visible synovitis; no metacarpalgia;  ok;  PIPs: FROM; +Bouchards--servando 4th R PIP; no SHT; no TTP; makes good fists.  DIPs: FROM; + Heberden's; no synovitis  HIPS: FROM  Knees: FROM; no synovitis; no instability; minimal PF crepitus;  Ankles: FROM: no synovitis   Toes: ok; no metatarsalgia;        Lymphadenopathy:     He has no cervical adenopathy.   Neurological: He is alert and oriented to person, place, and time. He has normal reflexes. No cranial nerve deficit. Gait normal.   Proximal and distal muscle strength 5/5.   Skin: Skin is warm and dry. No rash noted. He is not diaphoretic.   Lipomas, forearms and other areas;  Rheumatoid nodules vs lipomas of elbows.    Bilateral superficial varicosities LE;   Psychiatric: Mood, memory, affect and judgment normal.   Vitals reviewed.      LABS:   5/12/22: ESR 20 (23); CRP 2.0; CBC Hg 12.8; Ht 38.1; CMP glu 164;  2/8/22: ESR 9; CRP 5.4; CBC Hg 13.6; Na 134;   10/11/21: CBC ok; CMP ok; TSH ok; HgA1C 5.2;   3/24/21: ESR 19; CRP 3.5; CBC ok; CMP CMP cnne 1.3; GFR 56.5;   6/2/20: ESR 13 (23); CRP 2.5; CBC ok; CMP cnne 1.4; GFR 52; BUN 30; TP 8.7; ; CCP 30.4  12/30/19: ESR 9; CRP 2.5; CBC ok; CMP BUN 24; cnne 1.3; GFR 57.3;   8/1/19: ESR 24 (23); CRP 5.4; CBC ok; CMP cnne 1.3; GFR 57.3; BUN 27;   10/10/18; ESR 7; CRP 4.1; CBC ; CMP cnne 1.3; GFR 57.7;   1/17/18: ESR 29; CRP; CBC ok; CMP ok; ; CCP 44;   7/24/17: ESR 23; CRP 4.2; CBC ok; CMP cnne 1.3; GFR 58.1;   3/26/16: CBC ok; cnne 1.3  3/10/16: ESR 9; CRP 1.7; CBC, CMP ok;  9/15/15: ESR 20; CRP 0.9; CBC ok; CMP ok;  3/2/15: CMP BUN 28; cnne 1.1  1/12/15: Hg  13.9  10/21/14: ESR 8;   14: ESR 19; CRP 2.9; Hg 12.8; cnne 1.3; Vit D 18; ; CCP 44.3   Hepatitis B & HIV negative; HCV+;     IMAGIN19: Bilateral hands: personally viewed: L: cystic changes at the distal aspect of the left ulna, also involving some of the carpal bones, as well as the distal aspects of the 1st and 3rd metacarpal bones.  Mild degenerative changes at some of the DIP joints; R: Cystic changes involving some of the carpal bones as well as the distal aspect of the 1st and 3rd metacarpals and proximal aspect of the proximal phalanx of the thumb.  Degenerative changes involving some of the DIP joints and also the interphalangeal joint of the right thumb.    16: US Dopplers: R:minimal peripheral arterial occlusive disease: L: moderate peripheral arterial occlusive disease; unchanged  4/2/15: Bilateral wrists: personal review: cystic lesion left lunate, possibly left ulnar notch. (not too different from previous)  4/2/15: R ankle personally reviewed: ok  14: Arthritis survey personally reviewed again: OA lower CS; min OA hands & feet;      Assessment:   Seropositive (), CCP (44) positive nodular (bilateral elbows vs lipomas) rheumatoid arthritis with no erosions--doing well,    Enbrel effective but resulted in rash.    MTX and Leflunomide were contraindicated at the time due to HCV.   SSZ given ok by Dr. Moore, but never begun   Was on hydroxychloroquine 400 mg/d for a while   Humira 40 mg qow since 6/15~ 2021   On MTX 10 mg/wk doing well (ok for use of MTX or Leflunomide if needed as per Dr. Moore) since 2020    Possible Raynaud's in past.     Dermatitis c/w seborrheic keratoses & other skin lesions   S/P basal cell carcinoma    Bilateral adhesive capsulitis R>L--resolved    Hyperglycemia    HTN    Mild renal insufficiency on meloxicam & ibuprofen--most recent ok    (cnne up to 1.3 from 1.0)     Hx of nephrolithiasis    Hepatitis C with normal liver function  tests--genotype 1a   Completed Harvoni; cured    Osteoarthritis of hands.     Recurrent hypovitaminosis D -treated in past    Peripheral Arterial Disease.    Compression deformity of the lower lumbar and thoracic vertebral bodies by x-ray.     Multiple lipomas of the arms and lumbar spine area.     Occupational injuries in the past.     Persistent insomnia.   Helped by trazodone.     Normal weight from overweight  Plan:   Discussed hyperglycemia.  Patient would like HgA1C tested today if possible.  Will also add UA.  Continue MTX 10 mg/wk + folic acid 1 mg/d.  Labs q 3 months.  Encourage joining a gym and taking classes for strength; also aerobics for well being..   Ok to continue trazodone 100 mg/hs prn  RTC 3 months or prn after labs.        Addendum: 5/17/22: HgA1C 5.0;

## 2022-05-16 ENCOUNTER — PATIENT OUTREACH (OUTPATIENT)
Dept: ADMINISTRATIVE | Facility: OTHER | Age: 67
End: 2022-05-16
Payer: MEDICARE

## 2022-05-16 NOTE — PROGRESS NOTES
Health Maintenance Due   Topic Date Due    Pneumococcal Vaccines (Age 65+) (2 - PPSV23 or PCV20) 10/14/2021    COVID-19 Vaccine (4 - Booster for Moderna series) 02/26/2022     Updates were requested from care everywhere.  Chart was reviewed for overdue Proactive [facility] Encounters (ERIN) topics (CRS, Breast Cancer Screening, Eye exam)  Health Maintenance has been updated.  LINKS immunization registry triggered.  Immunizations were reconciled.

## 2022-05-17 ENCOUNTER — OFFICE VISIT (OUTPATIENT)
Dept: RHEUMATOLOGY | Facility: CLINIC | Age: 67
End: 2022-05-17
Payer: MEDICARE

## 2022-05-17 VITALS
BODY MASS INDEX: 22.96 KG/M2 | HEART RATE: 80 BPM | SYSTOLIC BLOOD PRESSURE: 113 MMHG | HEIGHT: 69 IN | WEIGHT: 155 LBS | DIASTOLIC BLOOD PRESSURE: 80 MMHG

## 2022-05-17 DIAGNOSIS — M05.79 RHEUMATOID ARTHRITIS INVOLVING MULTIPLE SITES WITH POSITIVE RHEUMATOID FACTOR: Primary | ICD-10-CM

## 2022-05-17 DIAGNOSIS — R73.9 HYPERGLYCEMIA: ICD-10-CM

## 2022-05-17 DIAGNOSIS — Z79.631 LONG TERM METHOTREXATE USER: ICD-10-CM

## 2022-05-17 PROCEDURE — 3079F PR MOST RECENT DIASTOLIC BLOOD PRESSURE 80-89 MM HG: ICD-10-PCS | Mod: CPTII,S$GLB,, | Performed by: INTERNAL MEDICINE

## 2022-05-17 PROCEDURE — 1160F RVW MEDS BY RX/DR IN RCRD: CPT | Mod: CPTII,S$GLB,, | Performed by: INTERNAL MEDICINE

## 2022-05-17 PROCEDURE — 1159F PR MEDICATION LIST DOCUMENTED IN MEDICAL RECORD: ICD-10-PCS | Mod: CPTII,S$GLB,, | Performed by: INTERNAL MEDICINE

## 2022-05-17 PROCEDURE — 99214 OFFICE O/P EST MOD 30 MIN: CPT | Mod: S$GLB,,, | Performed by: INTERNAL MEDICINE

## 2022-05-17 PROCEDURE — 3079F DIAST BP 80-89 MM HG: CPT | Mod: CPTII,S$GLB,, | Performed by: INTERNAL MEDICINE

## 2022-05-17 PROCEDURE — 3044F HG A1C LEVEL LT 7.0%: CPT | Mod: CPTII,S$GLB,, | Performed by: INTERNAL MEDICINE

## 2022-05-17 PROCEDURE — 3074F SYST BP LT 130 MM HG: CPT | Mod: CPTII,S$GLB,, | Performed by: INTERNAL MEDICINE

## 2022-05-17 PROCEDURE — 99499 UNLISTED E&M SERVICE: CPT | Mod: S$GLB,,, | Performed by: INTERNAL MEDICINE

## 2022-05-17 PROCEDURE — 1125F PR PAIN SEVERITY QUANTIFIED, PAIN PRESENT: ICD-10-PCS | Mod: CPTII,S$GLB,, | Performed by: INTERNAL MEDICINE

## 2022-05-17 PROCEDURE — 3044F PR MOST RECENT HEMOGLOBIN A1C LEVEL <7.0%: ICD-10-PCS | Mod: CPTII,S$GLB,, | Performed by: INTERNAL MEDICINE

## 2022-05-17 PROCEDURE — 1160F PR REVIEW ALL MEDS BY PRESCRIBER/CLIN PHARMACIST DOCUMENTED: ICD-10-PCS | Mod: CPTII,S$GLB,, | Performed by: INTERNAL MEDICINE

## 2022-05-17 PROCEDURE — 99999 PR PBB SHADOW E&M-EST. PATIENT-LVL IV: ICD-10-PCS | Mod: PBBFAC,,, | Performed by: INTERNAL MEDICINE

## 2022-05-17 PROCEDURE — 3008F PR BODY MASS INDEX (BMI) DOCUMENTED: ICD-10-PCS | Mod: CPTII,S$GLB,, | Performed by: INTERNAL MEDICINE

## 2022-05-17 PROCEDURE — 99999 PR PBB SHADOW E&M-EST. PATIENT-LVL IV: CPT | Mod: PBBFAC,,, | Performed by: INTERNAL MEDICINE

## 2022-05-17 PROCEDURE — 1125F AMNT PAIN NOTED PAIN PRSNT: CPT | Mod: CPTII,S$GLB,, | Performed by: INTERNAL MEDICINE

## 2022-05-17 PROCEDURE — 99214 PR OFFICE/OUTPT VISIT, EST, LEVL IV, 30-39 MIN: ICD-10-PCS | Mod: S$GLB,,, | Performed by: INTERNAL MEDICINE

## 2022-05-17 PROCEDURE — 99499 RISK ADDL DX/OHS AUDIT: ICD-10-PCS | Mod: S$GLB,,, | Performed by: INTERNAL MEDICINE

## 2022-05-17 PROCEDURE — 3008F BODY MASS INDEX DOCD: CPT | Mod: CPTII,S$GLB,, | Performed by: INTERNAL MEDICINE

## 2022-05-17 PROCEDURE — 1159F MED LIST DOCD IN RCRD: CPT | Mod: CPTII,S$GLB,, | Performed by: INTERNAL MEDICINE

## 2022-05-17 PROCEDURE — 3074F PR MOST RECENT SYSTOLIC BLOOD PRESSURE < 130 MM HG: ICD-10-PCS | Mod: CPTII,S$GLB,, | Performed by: INTERNAL MEDICINE

## 2022-05-17 ASSESSMENT — ROUTINE ASSESSMENT OF PATIENT INDEX DATA (RAPID3)
PATIENT GLOBAL ASSESSMENT SCORE: 6
FATIGUE SCORE: 3
MDHAQ FUNCTION SCORE: 0.6
TOTAL RAPID3 SCORE: 3.67
PSYCHOLOGICAL DISTRESS SCORE: 5.5
AM STIFFNESS SCORE: 0, NO
PAIN SCORE: 3

## 2022-05-17 NOTE — PROGRESS NOTES
Rapid3 Question Responses and Scores 5/16/2022   MDHAQ Score 0.6   Psychologic Score 5.5   Pain Score 3   When you awakened in the morning OVER THE LAST WEEK, did you feel stiff? No   Fatigue Score 3   Global Health Score 6   RAPID3 Score 3.67     \

## 2022-07-07 ENCOUNTER — OFFICE VISIT (OUTPATIENT)
Dept: OPTOMETRY | Facility: CLINIC | Age: 67
End: 2022-07-07
Payer: MEDICARE

## 2022-07-07 DIAGNOSIS — H52.203 ASTIGMATISM OF BOTH EYES WITH PRESBYOPIA: ICD-10-CM

## 2022-07-07 DIAGNOSIS — H25.13 NUCLEAR SCLEROSIS, BILATERAL: Primary | ICD-10-CM

## 2022-07-07 DIAGNOSIS — H04.123 DRY EYES, BILATERAL: ICD-10-CM

## 2022-07-07 DIAGNOSIS — Z13.5 GLAUCOMA SCREENING: ICD-10-CM

## 2022-07-07 DIAGNOSIS — H52.4 ASTIGMATISM OF BOTH EYES WITH PRESBYOPIA: ICD-10-CM

## 2022-07-07 PROCEDURE — 3288F FALL RISK ASSESSMENT DOCD: CPT | Mod: CPTII,S$GLB,, | Performed by: OPTOMETRIST

## 2022-07-07 PROCEDURE — 1159F MED LIST DOCD IN RCRD: CPT | Mod: CPTII,S$GLB,, | Performed by: OPTOMETRIST

## 2022-07-07 PROCEDURE — 3044F PR MOST RECENT HEMOGLOBIN A1C LEVEL <7.0%: ICD-10-PCS | Mod: CPTII,S$GLB,, | Performed by: OPTOMETRIST

## 2022-07-07 PROCEDURE — 92014 PR EYE EXAM, EST PATIENT,COMPREHESV: ICD-10-PCS | Mod: S$GLB,,, | Performed by: OPTOMETRIST

## 2022-07-07 PROCEDURE — 99999 PR PBB SHADOW E&M-EST. PATIENT-LVL III: ICD-10-PCS | Mod: PBBFAC,,, | Performed by: OPTOMETRIST

## 2022-07-07 PROCEDURE — 1160F PR REVIEW ALL MEDS BY PRESCRIBER/CLIN PHARMACIST DOCUMENTED: ICD-10-PCS | Mod: CPTII,S$GLB,, | Performed by: OPTOMETRIST

## 2022-07-07 PROCEDURE — 1160F RVW MEDS BY RX/DR IN RCRD: CPT | Mod: CPTII,S$GLB,, | Performed by: OPTOMETRIST

## 2022-07-07 PROCEDURE — 1159F PR MEDICATION LIST DOCUMENTED IN MEDICAL RECORD: ICD-10-PCS | Mod: CPTII,S$GLB,, | Performed by: OPTOMETRIST

## 2022-07-07 PROCEDURE — 92015 PR REFRACTION: ICD-10-PCS | Mod: S$GLB,,, | Performed by: OPTOMETRIST

## 2022-07-07 PROCEDURE — 3288F PR FALLS RISK ASSESSMENT DOCUMENTED: ICD-10-PCS | Mod: CPTII,S$GLB,, | Performed by: OPTOMETRIST

## 2022-07-07 PROCEDURE — 1126F PR PAIN SEVERITY QUANTIFIED, NO PAIN PRESENT: ICD-10-PCS | Mod: CPTII,S$GLB,, | Performed by: OPTOMETRIST

## 2022-07-07 PROCEDURE — 92014 COMPRE OPH EXAM EST PT 1/>: CPT | Mod: S$GLB,,, | Performed by: OPTOMETRIST

## 2022-07-07 PROCEDURE — 92015 DETERMINE REFRACTIVE STATE: CPT | Mod: S$GLB,,, | Performed by: OPTOMETRIST

## 2022-07-07 PROCEDURE — 1126F AMNT PAIN NOTED NONE PRSNT: CPT | Mod: CPTII,S$GLB,, | Performed by: OPTOMETRIST

## 2022-07-07 PROCEDURE — 3044F HG A1C LEVEL LT 7.0%: CPT | Mod: CPTII,S$GLB,, | Performed by: OPTOMETRIST

## 2022-07-07 PROCEDURE — 1101F PT FALLS ASSESS-DOCD LE1/YR: CPT | Mod: CPTII,S$GLB,, | Performed by: OPTOMETRIST

## 2022-07-07 PROCEDURE — 1101F PR PT FALLS ASSESS DOC 0-1 FALLS W/OUT INJ PAST YR: ICD-10-PCS | Mod: CPTII,S$GLB,, | Performed by: OPTOMETRIST

## 2022-07-07 PROCEDURE — 99999 PR PBB SHADOW E&M-EST. PATIENT-LVL III: CPT | Mod: PBBFAC,,, | Performed by: OPTOMETRIST

## 2022-07-07 NOTE — PROGRESS NOTES
HPI     DLS: 5/19/2021    Presents today for ocular health check.  Pt reports noticeable vision changes at both ranges--has prescription spex   that he wears occasionally.   Pt reports eye pressure OS.  Pt reports itching OS x several months and pt reports 2 night ago right   eye became very irritated--itching, tearing, burning and swelling.   Tried AT's for relief.    Hx RA    Last edited by Rocio Reeder MA on 7/7/2022  3:05 PM. (History)        ROS     Negative for: Constitutional, Gastrointestinal, Neurological, Skin,   Genitourinary, Musculoskeletal, HENT, Endocrine, Cardiovascular, Eyes,   Respiratory, Psychiatric, Allergic/Imm, Heme/Lymph    Last edited by Raj Mahan, OD on 7/7/2022  3:09 PM. (History)        Assessment /Plan     For exam results, see Encounter Report.    Nuclear sclerosis, bilateral    Glaucoma screening    Astigmatism of both eyes with presbyopia    Dry eyes, bilateral      1. Small cats OU--pt happy w spex  2. AUGUSTO--pt to DC LUMIFY, and use SOOTHE XP ATs TID+    PLAN:    rtc 1 yr

## 2022-07-18 DIAGNOSIS — R22.9 SUBCUTANEOUS NODULES: ICD-10-CM

## 2022-07-18 DIAGNOSIS — M06.322 RHEUMATOID NODULE OF ELBOW, LEFT: Primary | ICD-10-CM

## 2022-07-26 ENCOUNTER — PATIENT MESSAGE (OUTPATIENT)
Dept: SURGERY | Facility: CLINIC | Age: 67
End: 2022-07-26
Payer: MEDICARE

## 2022-08-03 ENCOUNTER — PATIENT MESSAGE (OUTPATIENT)
Dept: VASCULAR SURGERY | Facility: CLINIC | Age: 67
End: 2022-08-03
Payer: MEDICARE

## 2022-08-03 ENCOUNTER — LAB VISIT (OUTPATIENT)
Dept: LAB | Facility: HOSPITAL | Age: 67
End: 2022-08-03
Attending: INTERNAL MEDICINE
Payer: MEDICARE

## 2022-08-03 DIAGNOSIS — I73.9 PAD (PERIPHERAL ARTERY DISEASE): Primary | ICD-10-CM

## 2022-08-03 DIAGNOSIS — Z79.631 LONG TERM METHOTREXATE USER: ICD-10-CM

## 2022-08-03 PROCEDURE — 86140 C-REACTIVE PROTEIN: CPT | Performed by: INTERNAL MEDICINE

## 2022-08-03 PROCEDURE — 85652 RBC SED RATE AUTOMATED: CPT | Performed by: INTERNAL MEDICINE

## 2022-08-03 PROCEDURE — 85025 COMPLETE CBC W/AUTO DIFF WBC: CPT | Performed by: INTERNAL MEDICINE

## 2022-08-03 PROCEDURE — 36415 COLL VENOUS BLD VENIPUNCTURE: CPT | Mod: PO | Performed by: INTERNAL MEDICINE

## 2022-08-03 PROCEDURE — 80053 COMPREHEN METABOLIC PANEL: CPT | Performed by: INTERNAL MEDICINE

## 2022-08-03 RX ORDER — CILOSTAZOL 50 MG/1
50 TABLET ORAL 2 TIMES DAILY
Qty: 180 TABLET | Refills: 0 | Status: SHIPPED | OUTPATIENT
Start: 2022-08-03 | End: 2022-08-03

## 2022-08-03 RX ORDER — CILOSTAZOL 50 MG/1
50 TABLET ORAL 2 TIMES DAILY
Qty: 180 TABLET | Refills: 0 | Status: SHIPPED | OUTPATIENT
Start: 2022-11-03 | End: 2022-08-03

## 2022-08-03 RX ORDER — CILOSTAZOL 50 MG/1
50 TABLET ORAL 2 TIMES DAILY
Qty: 180 TABLET | Refills: 3 | Status: SHIPPED | OUTPATIENT
Start: 2022-08-03 | End: 2023-09-18 | Stop reason: SDUPTHER

## 2022-08-04 LAB
ALBUMIN SERPL BCP-MCNC: 3.9 G/DL (ref 3.5–5.2)
ALP SERPL-CCNC: 73 U/L (ref 55–135)
ALT SERPL W/O P-5'-P-CCNC: 12 U/L (ref 10–44)
ANION GAP SERPL CALC-SCNC: 10 MMOL/L (ref 8–16)
AST SERPL-CCNC: 21 U/L (ref 10–40)
BASOPHILS # BLD AUTO: 0.03 K/UL (ref 0–0.2)
BASOPHILS NFR BLD: 0.4 % (ref 0–1.9)
BILIRUB SERPL-MCNC: 0.4 MG/DL (ref 0.1–1)
BUN SERPL-MCNC: 16 MG/DL (ref 8–23)
CALCIUM SERPL-MCNC: 9.4 MG/DL (ref 8.7–10.5)
CHLORIDE SERPL-SCNC: 103 MMOL/L (ref 95–110)
CO2 SERPL-SCNC: 25 MMOL/L (ref 23–29)
CREAT SERPL-MCNC: 1.3 MG/DL (ref 0.5–1.4)
CRP SERPL-MCNC: 2.8 MG/L (ref 0–8.2)
DIFFERENTIAL METHOD: ABNORMAL
EOSINOPHIL # BLD AUTO: 0 K/UL (ref 0–0.5)
EOSINOPHIL NFR BLD: 0.4 % (ref 0–8)
ERYTHROCYTE [DISTWIDTH] IN BLOOD BY AUTOMATED COUNT: 13.9 % (ref 11.5–14.5)
ERYTHROCYTE [SEDIMENTATION RATE] IN BLOOD BY PHOTOMETRIC METHOD: 8 MM/HR (ref 0–23)
EST. GFR  (NO RACE VARIABLE): 59.8 ML/MIN/1.73 M^2
GLUCOSE SERPL-MCNC: 154 MG/DL (ref 70–110)
HCT VFR BLD AUTO: 39.5 % (ref 40–54)
HGB BLD-MCNC: 13.1 G/DL (ref 14–18)
IMM GRANULOCYTES # BLD AUTO: 0.02 K/UL (ref 0–0.04)
IMM GRANULOCYTES NFR BLD AUTO: 0.3 % (ref 0–0.5)
LYMPHOCYTES # BLD AUTO: 0.8 K/UL (ref 1–4.8)
LYMPHOCYTES NFR BLD: 9.8 % (ref 18–48)
MCH RBC QN AUTO: 31.7 PG (ref 27–31)
MCHC RBC AUTO-ENTMCNC: 33.2 G/DL (ref 32–36)
MCV RBC AUTO: 96 FL (ref 82–98)
MONOCYTES # BLD AUTO: 0.5 K/UL (ref 0.3–1)
MONOCYTES NFR BLD: 6.6 % (ref 4–15)
NEUTROPHILS # BLD AUTO: 6.6 K/UL (ref 1.8–7.7)
NEUTROPHILS NFR BLD: 82.5 % (ref 38–73)
NRBC BLD-RTO: 0 /100 WBC
PLATELET # BLD AUTO: 198 K/UL (ref 150–450)
PMV BLD AUTO: 9.5 FL (ref 9.2–12.9)
POTASSIUM SERPL-SCNC: 3.8 MMOL/L (ref 3.5–5.1)
PROT SERPL-MCNC: 7.4 G/DL (ref 6–8.4)
RBC # BLD AUTO: 4.13 M/UL (ref 4.6–6.2)
SODIUM SERPL-SCNC: 138 MMOL/L (ref 136–145)
WBC # BLD AUTO: 7.99 K/UL (ref 3.9–12.7)

## 2022-08-06 NOTE — PROGRESS NOTES
Answers for HPI/ROS submitted by the patient on 8/4/2022  fever: No  eye redness: No  mouth sores: No  headaches: No  shortness of breath: No  chest pain: No  trouble swallowing: No  diarrhea: No  constipation: No  unexpected weight change: No  genital sore: No  During the last 3 days, have you had a skin rash?: No  Bruises or bleeds easily: Yes  cough: No    Subjective:       Patient ID: Andrea Gant is a 67 y.o. male with nodular sero+ccp+ RA, OA and Hepatitis C (cured with Harvoni)    Chief Complaint: No chief complaint on file.    Returns for follow-up. Last seen 5/17/22.  He is unchanged and doing well on only 10 mg of MTX/wk.  Had begun MTX in an effort to avoid TNFi due to costs. He has been off humira since January 2021. He denies AM stiffness, joint pain or swelling, dysphagia, tight skin, oral ulcers, pleurisy, pericarditis, photosensitivity, thromboses. Has dry eyes. No dry mouth. No true Raynaud's. Is scheduled to remove SC nodules over R elbow; had some removed from L: Pathology: rheumatoid nodules.    He is still  taking 100 mg trazodone, prescribed by us for insomnia and it is helping him.  He continues losing weight, but is exercising and feeling well and his BMI now is 22..   Labs with normal HgA1C (had reported urinary frequency & polyuria).       Current Outpatient Medications   Medication Sig Dispense Refill    amLODIPine (NORVASC) 5 MG tablet Take 1 tablet (5 mg total) by mouth once daily. 90 tablet 3    cilostazoL (PLETAL) 50 MG Tab Take 1 tablet (50 mg total) by mouth 2 (two) times daily. 180 tablet 3    doxazosin (CARDURA) 4 MG tablet Take 1 tablet (4 mg total) by mouth every evening. 90 tablet 3    folic acid (FOLVITE) 1 MG tablet TAKE 1 TABLET BY MOUTH ONCE DAILY 90 tablet 3    methotrexate 2.5 MG Tab TAKE 4 TABLETS BY MOUTH  EVERY 7 DAYS ; THIS  MEDICATION REQUIRES  PERIODIC LAB MONITORING 60 tablet 1    simvastatin (ZOCOR) 10 MG tablet TAKE 1 TABLET BY MOUTH EVERY DAY IN THE  EVENING 90 tablet 6    traZODone (DESYREL) 50 MG tablet TAKE 3 TABLETS BY MOUTH  NIGHTLY AS NEEDED FOR  INSOMNIA 270 tablet 3     No current facility-administered medications for this visit.     Probable Allergic to Enbrel (rash);     Past Medical History:   Diagnosis Date    Cataract     Colon polyp 2014    History of hepatitis C, s/p successful treatment w/ SVR12 - 7/2015 10/14/2012    Kelsey 1a,  Liver biopsy 6/2014 - Stage 1 fibrosis;  Completed 8 weeks Harvoni w/ SVR12 - 7/2015      Hyperlipidemia     Lipoma of arm     Lipoma of lower extremity     Nuclear sclerosis - Both Eyes 10/22/2012    Other and unspecified hyperlipidemia 10/22/2013    PAD (peripheral artery disease)     Peripheral vascular disease, unspecified     Rheumatoid arthritis(714.0) 10/14/2012       Past Surgical History:   Procedure Laterality Date    COLONOSCOPY N/A 11/29/2021    Procedure: COLONOSCOPY;  Surgeon: Kenrick Zimmer MD;  Location: [facility] (4TH FLR);  Service: Endoscopy;  Laterality: N/A;  blood thinner approval received, see telephone encounter 10/19/21-BB  fully vacc-inst email-tb    KNEE ARTHROPLASTY      LAPAROSCOPIC EXPLORATION OF GROIN Left 9/6/2018    Procedure: EXPLORATION, INGUINAL REGION, LAPAROSCOPIC;  Surgeon: Mingo De Guzman Jr., MD;  Location: [facility] OR 2ND FLR;  Service: General;  Laterality: Left;    TONSILLECTOMY         Review of Systems   Constitutional: Positive for unexpected weight change. Negative for fatigue and fever.        Weight loss attributed to dental issues.   HENT: Negative.  Negative for hearing loss, mouth sores, sore throat, tinnitus and trouble swallowing.    Eyes: Negative.  Negative for redness and visual disturbance.        Dry eyes.   Respiratory: Negative for cough, choking, chest tightness and shortness of breath.    Cardiovascular: Negative.  Negative for chest pain, palpitations and leg swelling.   Gastrointestinal: Negative.  Negative for abdominal pain, blood in  "stool, constipation, diarrhea, nausea and vomiting.        Heartburn   Endocrine: Negative.    Genitourinary: Positive for frequency. Negative for genital sores, hematuria and urgency.   Musculoskeletal: Negative.  Negative for back pain, neck pain and neck stiffness.   Skin: Negative.  Negative for rash.   Allergic/Immunologic: Negative.    Neurological: Negative.  Negative for dizziness, syncope, numbness and headaches.   Hematological: Negative.  Does not bruise/bleed easily.   Psychiatric/Behavioral: Positive for dysphoric mood and sleep disturbance. The patient is nervous/anxious.         Sleep helped by trazodone.          SH:Gave up smoking was former smoker.  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       Objective:     ,/81   Pulse 66   Ht 5' 9" (1.753 m)   Wt 68 kg (149 lb 14.6 oz)   BMI 22.14 kg/m²   Was 154 lb 15.7 lbs on 5/17/22  Was 158 lb 11.7 oz on 11/2/21  Was 173 lb 11.6 oz on 8/8/19  Was 172 lbs 4.8 oz on 12/13/16;  Physical Exam   Constitutional: He is oriented to person, place, and time. No distress.   HENT:   Head: Normocephalic and atraumatic.   Mouth/Throat: Oropharynx is clear and moist. No oropharyngeal exudate.   No facial rashes  Parotids not enlarged     Eyes: Pupils are equal, round, and reactive to light. Conjunctivae are normal. Right eye exhibits no discharge. Left eye exhibits no discharge. No scleral icterus.   Neck: No JVD present. No tracheal deviation present. No thyromegaly present.   Cardiovascular: Normal rate and regular rhythm. Exam reveals no gallop and no friction rub.   Murmur heard.  Pulmonary/Chest: Effort normal and breath sounds normal. No respiratory distress. He has no wheezes. He has no rales. He exhibits no tenderness.   Abdominal: Soft. Bowel sounds are normal. He exhibits no distension and no mass. There is no splenomegaly or hepatomegaly. There is no abdominal tenderness. There is no rebound and no guarding.   Musculoskeletal:         General: No tenderness. Normal " range of motion.      Cervical back: Neck supple.      Comments: Cspine FROM no tenderness  Tspine FROM no tenderness; mild kyposis  Lspine FROM no tenderness.  TMJ: unremarkable  Shoulders: FROM now; no synovitis; no tenderness  Elbows: FROM; left w surgical scar; right elbow with mild flexion contracture & elbow nodule .  Wrists: FROM; no synovitis; no TTP  Dorsa of hands wo TTP  MCPs: FROM; no visible synovitis; no metacarpalgia;  ok;  PIPs: FROM; +Bouchards--servando 4th R PIP; no SHT; no TTP; makes good fists.  DIPs: FROM; + Heberden's; no synovitis  HIPS: FROM  Knees: FROM; no synovitis; no instability; minimal PF crepitus;  Ankles: FROM: no synovitis   Toes: ok; no metatarsalgia;        Lymphadenopathy:     He has no cervical adenopathy.   Neurological: He is alert and oriented to person, place, and time. He has normal reflexes. No cranial nerve deficit. Gait normal.   Proximal and distal muscle strength 5/5.   Skin: Skin is warm and dry. No rash noted. He is not diaphoretic.   Lipomas, forearms and other areas;  Rheumatoid nodules vs lipomas of elbows.    Bilateral superficial varicosities LE;   Psychiatric: Mood, memory, affect and judgment normal.   Vitals reviewed.      LABS:   8/3/22: ESR 8; CRP 2.8; CBC Hg 13.1; Ht 39.5; CMP cnne 1.3; GFR 59.8; glu 154  5/17/22: Hg A1C 5.0  5/12/22: ESR 20 (23); CRP 2.0; CBC Hg 12.8; Ht 38.1; CMP glu 164;   2/8/22: ESR 9; CRP 5.4; CBC Hg 13.6; Na 134;   10/11/21: CBC ok; CMP ok; TSH ok; HgA1C 5.2;   3/24/21: ESR 19; CRP 3.5; CBC ok; CMP CMP cnne 1.3; GFR 56.5;   6/2/20: ESR 13 (23); CRP 2.5; CBC ok; CMP cnne 1.4; GFR 52; BUN 30; TP 8.7; ; CCP 30.4  12/30/19: ESR 9; CRP 2.5; CBC ok; CMP BUN 24; cnne 1.3; GFR 57.3;   8/1/19: ESR 24 (23); CRP 5.4; CBC ok; CMP cnne 1.3; GFR 57.3; BUN 27;   10/10/18; ESR 7; CRP 4.1; CBC ; CMP cnne 1.3; GFR 57.7;   1/17/18: ESR 29; CRP; CBC ok; CMP ok; ; CCP 44;   7/24/17: ESR 23; CRP 4.2; CBC ok; CMP cnne 1.3; GFR 58.1;   3/26/16:  CBC ok; cnne 1.3  3/10/16: ESR 9; CRP 1.7; CBC, CMP ok;  9/15/15: ESR 20; CRP 0.9; CBC ok; CMP ok;  3/2/15: CMP BUN 28; cnne 1.1  1/12/15: Hg 13.9  10/21/14: ESR 8;   14: ESR 19; CRP 2.9; Hg 12.8; cnne 1.3; Vit D 18; ; CCP 44.3   Hepatitis B & HIV negative; HCV+;     IMAGIN19: Bilateral hands: personally viewed: L: cystic changes at the distal aspect of the left ulna, also involving some of the carpal bones, as well as the distal aspects of the 1st and 3rd metacarpal bones.  Mild degenerative changes at some of the DIP joints; R: Cystic changes involving some of the carpal bones as well as the distal aspect of the 1st and 3rd metacarpals and proximal aspect of the proximal phalanx of the thumb.  Degenerative changes involving some of the DIP joints and also the interphalangeal joint of the right thumb.    16: US Dopplers: R:minimal peripheral arterial occlusive disease: L: moderate peripheral arterial occlusive disease; unchanged  4/2/15: Bilateral wrists: personal review: cystic lesion left lunate, possibly left ulnar notch. (not too different from previous)  4/2/15: R ankle personally reviewed: ok  14: Arthritis survey personally reviewed again: OA lower CS; min OA hands & feet;      Assessment:   Seropositive (), CCP (44) positive nodular (bilateral elbows vs lipomas) rheumatoid arthritis with no erosions--doing well,    Enbrel effective but resulted in rash.    MTX and Leflunomide were contraindicated at the time due to HCV.   SSZ given ok by Dr. Moore, but never begun   Was on hydroxychloroquine 400 mg/d for a while   Humira 40 mg qow since 6/15~ 2021   On MTX 10 mg/wk doing well (ok for use of MTX or Leflunomide if needed as per Dr. Moore) since 2020    Possible Raynaud's in past.     Dermatitis c/w seborrheic keratoses & other skin lesions   S/P basal cell carcinoma    Bilateral adhesive capsulitis R>L--resolved    Hyperglycemia    HTN    Mild renal insufficiency on  meloxicam & ibuprofen most recent elevated wo NSAIDs    (cnne up to 1.3 from 1.0)     Hx of nephrolithiasis    Hepatitis C with normal liver function tests--genotype 1a   Completed Harvoni; cured    Osteoarthritis of hands.     Recurrent hypovitaminosis D -treated in past    Peripheral Arterial Disease.    Compression deformity of the lower lumbar and thoracic vertebral bodies by x-ray.     Multiple lipomas of the arms and lumbar spine area.     Occupational injuries in the past.     Persistent insomnia.   Helped by trazodone.     Normal weight from overweight  Plan:   Continue MTX 10 mg/wk + folic acid 1 mg/d.  Labs q 3 months.  Add UA as he has not had one done in over 10 yrs  Ok to continue trazodone 100 mg/hs prn  RTC 3 months or prn after labs.

## 2022-08-10 ENCOUNTER — OFFICE VISIT (OUTPATIENT)
Dept: RHEUMATOLOGY | Facility: CLINIC | Age: 67
End: 2022-08-10
Payer: MEDICARE

## 2022-08-10 VITALS
HEART RATE: 66 BPM | WEIGHT: 149.94 LBS | SYSTOLIC BLOOD PRESSURE: 132 MMHG | BODY MASS INDEX: 22.21 KG/M2 | DIASTOLIC BLOOD PRESSURE: 81 MMHG | HEIGHT: 69 IN

## 2022-08-10 DIAGNOSIS — G47.00 PERSISTENT INSOMNIA: ICD-10-CM

## 2022-08-10 DIAGNOSIS — M05.79 RHEUMATOID ARTHRITIS INVOLVING MULTIPLE SITES WITH POSITIVE RHEUMATOID FACTOR: Primary | ICD-10-CM

## 2022-08-10 DIAGNOSIS — Z79.631 LONG TERM METHOTREXATE USER: ICD-10-CM

## 2022-08-10 DIAGNOSIS — R22.9 SUBCUTANEOUS NODULES: ICD-10-CM

## 2022-08-10 PROCEDURE — 1160F RVW MEDS BY RX/DR IN RCRD: CPT | Mod: CPTII,S$GLB,, | Performed by: INTERNAL MEDICINE

## 2022-08-10 PROCEDURE — 3079F DIAST BP 80-89 MM HG: CPT | Mod: CPTII,S$GLB,, | Performed by: INTERNAL MEDICINE

## 2022-08-10 PROCEDURE — 3079F PR MOST RECENT DIASTOLIC BLOOD PRESSURE 80-89 MM HG: ICD-10-PCS | Mod: CPTII,S$GLB,, | Performed by: INTERNAL MEDICINE

## 2022-08-10 PROCEDURE — 1159F MED LIST DOCD IN RCRD: CPT | Mod: CPTII,S$GLB,, | Performed by: INTERNAL MEDICINE

## 2022-08-10 PROCEDURE — 3075F SYST BP GE 130 - 139MM HG: CPT | Mod: CPTII,S$GLB,, | Performed by: INTERNAL MEDICINE

## 2022-08-10 PROCEDURE — 99999 PR PBB SHADOW E&M-EST. PATIENT-LVL III: CPT | Mod: PBBFAC,,, | Performed by: INTERNAL MEDICINE

## 2022-08-10 PROCEDURE — 99999 PR PBB SHADOW E&M-EST. PATIENT-LVL III: ICD-10-PCS | Mod: PBBFAC,,, | Performed by: INTERNAL MEDICINE

## 2022-08-10 PROCEDURE — 3044F HG A1C LEVEL LT 7.0%: CPT | Mod: CPTII,S$GLB,, | Performed by: INTERNAL MEDICINE

## 2022-08-10 PROCEDURE — 3008F BODY MASS INDEX DOCD: CPT | Mod: CPTII,S$GLB,, | Performed by: INTERNAL MEDICINE

## 2022-08-10 PROCEDURE — 3044F PR MOST RECENT HEMOGLOBIN A1C LEVEL <7.0%: ICD-10-PCS | Mod: CPTII,S$GLB,, | Performed by: INTERNAL MEDICINE

## 2022-08-10 PROCEDURE — 99214 PR OFFICE/OUTPT VISIT, EST, LEVL IV, 30-39 MIN: ICD-10-PCS | Mod: S$GLB,,, | Performed by: INTERNAL MEDICINE

## 2022-08-10 PROCEDURE — 1160F PR REVIEW ALL MEDS BY PRESCRIBER/CLIN PHARMACIST DOCUMENTED: ICD-10-PCS | Mod: CPTII,S$GLB,, | Performed by: INTERNAL MEDICINE

## 2022-08-10 PROCEDURE — 99214 OFFICE O/P EST MOD 30 MIN: CPT | Mod: S$GLB,,, | Performed by: INTERNAL MEDICINE

## 2022-08-10 PROCEDURE — 3075F PR MOST RECENT SYSTOLIC BLOOD PRESS GE 130-139MM HG: ICD-10-PCS | Mod: CPTII,S$GLB,, | Performed by: INTERNAL MEDICINE

## 2022-08-10 PROCEDURE — 3008F PR BODY MASS INDEX (BMI) DOCUMENTED: ICD-10-PCS | Mod: CPTII,S$GLB,, | Performed by: INTERNAL MEDICINE

## 2022-08-10 PROCEDURE — 1125F AMNT PAIN NOTED PAIN PRSNT: CPT | Mod: CPTII,S$GLB,, | Performed by: INTERNAL MEDICINE

## 2022-08-10 PROCEDURE — 1125F PR PAIN SEVERITY QUANTIFIED, PAIN PRESENT: ICD-10-PCS | Mod: CPTII,S$GLB,, | Performed by: INTERNAL MEDICINE

## 2022-08-10 PROCEDURE — 1159F PR MEDICATION LIST DOCUMENTED IN MEDICAL RECORD: ICD-10-PCS | Mod: CPTII,S$GLB,, | Performed by: INTERNAL MEDICINE

## 2022-08-10 NOTE — PROGRESS NOTES
Rapid3 Question Responses and Scores 8/4/2022   MDHAQ Score 0.3   Psychologic Score 1.1   Pain Score 7   When you awakened in the morning OVER THE LAST WEEK, did you feel stiff? No   Fatigue Score 5   Global Health Score 7.5   RAPID3 Score 5.17     Answers for HPI/ROS submitted by the patient on 8/4/2022  fever: No  eye redness: No  mouth sores: No  headaches: No  shortness of breath: No  chest pain: No  trouble swallowing: No  diarrhea: No  constipation: No  unexpected weight change: No  genital sore: No  During the last 3 days, have you had a skin rash?: No  Bruises or bleeds easily: Yes  cough: No

## 2022-08-12 ENCOUNTER — PROCEDURE VISIT (OUTPATIENT)
Dept: SURGERY | Facility: CLINIC | Age: 67
End: 2022-08-12
Payer: MEDICARE

## 2022-08-12 VITALS
HEART RATE: 70 BPM | OXYGEN SATURATION: 98 % | DIASTOLIC BLOOD PRESSURE: 60 MMHG | WEIGHT: 145.5 LBS | BODY MASS INDEX: 21.55 KG/M2 | HEIGHT: 69 IN | SYSTOLIC BLOOD PRESSURE: 129 MMHG

## 2022-08-12 DIAGNOSIS — R22.31 MASS OF ELBOW REGION, RIGHT: Primary | ICD-10-CM

## 2022-08-12 PROCEDURE — 88305 TISSUE EXAM BY PATHOLOGIST: CPT | Mod: 26,,, | Performed by: PATHOLOGY

## 2022-08-12 PROCEDURE — 24071 PR EXC TUMOR SOFT TISSUE UPPER ARM/ELBOW SUBQ 3+CM: ICD-10-PCS | Mod: RT,S$GLB,, | Performed by: STUDENT IN AN ORGANIZED HEALTH CARE EDUCATION/TRAINING PROGRAM

## 2022-08-12 PROCEDURE — 88312 PR  SPECIAL STAINS,GROUP I: ICD-10-PCS | Mod: 26,,, | Performed by: PATHOLOGY

## 2022-08-12 PROCEDURE — 24071 EXC ARM/ELBOW LES SC 3 CM/>: CPT | Mod: RT,S$GLB,, | Performed by: STUDENT IN AN ORGANIZED HEALTH CARE EDUCATION/TRAINING PROGRAM

## 2022-08-12 PROCEDURE — 88312 SPECIAL STAINS GROUP 1: CPT | Performed by: PATHOLOGY

## 2022-08-12 PROCEDURE — 88305 TISSUE EXAM BY PATHOLOGIST: CPT | Performed by: PATHOLOGY

## 2022-08-12 PROCEDURE — 88312 SPECIAL STAINS GROUP 1: CPT | Mod: 26,,, | Performed by: PATHOLOGY

## 2022-08-12 PROCEDURE — 88305 TISSUE EXAM BY PATHOLOGIST: ICD-10-PCS | Mod: 26,,, | Performed by: PATHOLOGY

## 2022-08-12 RX ORDER — OXYCODONE HYDROCHLORIDE 5 MG/1
5 TABLET ORAL EVERY 4 HOURS PRN
Qty: 5 TABLET | Refills: 0 | Status: SHIPPED | OUTPATIENT
Start: 2022-08-12 | End: 2022-11-14

## 2022-08-12 NOTE — PROCEDURES
Excision of Lesion    Date/Time: 8/12/2022 8:45 AM  Performed by: Regina Servin MD  Authorized by: Regina Servin MD     Consent Done?:  Yes (Written)  Timeout: prior to procedure the correct patient, procedure, and site was verified    Prep: patient was prepped and draped in usual sterile fashion    Local anesthesia used?: Yes    Anesthesia:  Local infiltration  Local anesthetic:  Lidocaine 1% with epinephrine  Body area:  Upper arm / elbow  Laterality:  Right  Position:  Supine  Anesthesia:  Local infiltration  Local anesthetic:  Lidocaine 1% with epinephrine  Excision type:  Skin  Malignancy:  Benign  Excision size (cm):  6  Scalpel size:  15  Incision type:  Other (comments)  Specimens?: Yes     Specimens submitted to pathology.   Hemostasis was obtained.  Estimated blood loss (cc):  1  Wound closure:  Simple  Wound repair size (cm):  6  Sutures:  3-0 Vicryl and other (comments) (nylon)  Sterile dressings:  Gauze  Post-op diagnosis:  Same as pre-op diagnosis.   Needle, instrument, and sponge counts were correct.   Patient tolerated the procedure well with no immediate complications.   Post-operative instructions were provided for the patient.   Patient was discharged and will follow up for wound check and pathology results.  Comments:      Follow up in 2 weeks for suture removal

## 2022-08-19 ENCOUNTER — TELEPHONE (OUTPATIENT)
Dept: SURGERY | Facility: CLINIC | Age: 67
End: 2022-08-19
Payer: MEDICARE

## 2022-08-19 NOTE — TELEPHONE ENCOUNTER
Patient have nodule removed from elbow 1 week ago. Reports swelling to there area with mild pain. Denies fever or drainage. Patient will send a photo of the area.

## 2022-08-22 LAB
FINAL PATHOLOGIC DIAGNOSIS: NORMAL
Lab: NORMAL

## 2022-08-26 ENCOUNTER — OFFICE VISIT (OUTPATIENT)
Dept: SURGERY | Facility: CLINIC | Age: 67
End: 2022-08-26
Payer: MEDICARE

## 2022-08-26 VITALS
DIASTOLIC BLOOD PRESSURE: 81 MMHG | OXYGEN SATURATION: 98 % | BODY MASS INDEX: 21.55 KG/M2 | WEIGHT: 145.5 LBS | SYSTOLIC BLOOD PRESSURE: 144 MMHG | HEIGHT: 69 IN | HEART RATE: 79 BPM

## 2022-08-26 DIAGNOSIS — M06.321 RHEUMATOID NODULE OF ELBOW, RIGHT: Primary | ICD-10-CM

## 2022-08-26 PROCEDURE — 99024 POSTOP FOLLOW-UP VISIT: CPT | Mod: S$GLB,,, | Performed by: STUDENT IN AN ORGANIZED HEALTH CARE EDUCATION/TRAINING PROGRAM

## 2022-08-26 PROCEDURE — 1101F PR PT FALLS ASSESS DOC 0-1 FALLS W/OUT INJ PAST YR: ICD-10-PCS | Mod: CPTII,S$GLB,, | Performed by: STUDENT IN AN ORGANIZED HEALTH CARE EDUCATION/TRAINING PROGRAM

## 2022-08-26 PROCEDURE — 3079F DIAST BP 80-89 MM HG: CPT | Mod: CPTII,S$GLB,, | Performed by: STUDENT IN AN ORGANIZED HEALTH CARE EDUCATION/TRAINING PROGRAM

## 2022-08-26 PROCEDURE — 3077F SYST BP >= 140 MM HG: CPT | Mod: CPTII,S$GLB,, | Performed by: STUDENT IN AN ORGANIZED HEALTH CARE EDUCATION/TRAINING PROGRAM

## 2022-08-26 PROCEDURE — 3288F FALL RISK ASSESSMENT DOCD: CPT | Mod: CPTII,S$GLB,, | Performed by: STUDENT IN AN ORGANIZED HEALTH CARE EDUCATION/TRAINING PROGRAM

## 2022-08-26 PROCEDURE — 3044F PR MOST RECENT HEMOGLOBIN A1C LEVEL <7.0%: ICD-10-PCS | Mod: CPTII,S$GLB,, | Performed by: STUDENT IN AN ORGANIZED HEALTH CARE EDUCATION/TRAINING PROGRAM

## 2022-08-26 PROCEDURE — 1101F PT FALLS ASSESS-DOCD LE1/YR: CPT | Mod: CPTII,S$GLB,, | Performed by: STUDENT IN AN ORGANIZED HEALTH CARE EDUCATION/TRAINING PROGRAM

## 2022-08-26 PROCEDURE — 1159F PR MEDICATION LIST DOCUMENTED IN MEDICAL RECORD: ICD-10-PCS | Mod: CPTII,S$GLB,, | Performed by: STUDENT IN AN ORGANIZED HEALTH CARE EDUCATION/TRAINING PROGRAM

## 2022-08-26 PROCEDURE — 3079F PR MOST RECENT DIASTOLIC BLOOD PRESSURE 80-89 MM HG: ICD-10-PCS | Mod: CPTII,S$GLB,, | Performed by: STUDENT IN AN ORGANIZED HEALTH CARE EDUCATION/TRAINING PROGRAM

## 2022-08-26 PROCEDURE — 99024 PR POST-OP FOLLOW-UP VISIT: ICD-10-PCS | Mod: S$GLB,,, | Performed by: STUDENT IN AN ORGANIZED HEALTH CARE EDUCATION/TRAINING PROGRAM

## 2022-08-26 PROCEDURE — 99999 PR PBB SHADOW E&M-EST. PATIENT-LVL III: CPT | Mod: PBBFAC,,, | Performed by: STUDENT IN AN ORGANIZED HEALTH CARE EDUCATION/TRAINING PROGRAM

## 2022-08-26 PROCEDURE — 3077F PR MOST RECENT SYSTOLIC BLOOD PRESSURE >= 140 MM HG: ICD-10-PCS | Mod: CPTII,S$GLB,, | Performed by: STUDENT IN AN ORGANIZED HEALTH CARE EDUCATION/TRAINING PROGRAM

## 2022-08-26 PROCEDURE — 1126F PR PAIN SEVERITY QUANTIFIED, NO PAIN PRESENT: ICD-10-PCS | Mod: CPTII,S$GLB,, | Performed by: STUDENT IN AN ORGANIZED HEALTH CARE EDUCATION/TRAINING PROGRAM

## 2022-08-26 PROCEDURE — 3008F PR BODY MASS INDEX (BMI) DOCUMENTED: ICD-10-PCS | Mod: CPTII,S$GLB,, | Performed by: STUDENT IN AN ORGANIZED HEALTH CARE EDUCATION/TRAINING PROGRAM

## 2022-08-26 PROCEDURE — 1126F AMNT PAIN NOTED NONE PRSNT: CPT | Mod: CPTII,S$GLB,, | Performed by: STUDENT IN AN ORGANIZED HEALTH CARE EDUCATION/TRAINING PROGRAM

## 2022-08-26 PROCEDURE — 3044F HG A1C LEVEL LT 7.0%: CPT | Mod: CPTII,S$GLB,, | Performed by: STUDENT IN AN ORGANIZED HEALTH CARE EDUCATION/TRAINING PROGRAM

## 2022-08-26 PROCEDURE — 99999 PR PBB SHADOW E&M-EST. PATIENT-LVL III: ICD-10-PCS | Mod: PBBFAC,,, | Performed by: STUDENT IN AN ORGANIZED HEALTH CARE EDUCATION/TRAINING PROGRAM

## 2022-08-26 PROCEDURE — 1159F MED LIST DOCD IN RCRD: CPT | Mod: CPTII,S$GLB,, | Performed by: STUDENT IN AN ORGANIZED HEALTH CARE EDUCATION/TRAINING PROGRAM

## 2022-08-26 PROCEDURE — 3008F BODY MASS INDEX DOCD: CPT | Mod: CPTII,S$GLB,, | Performed by: STUDENT IN AN ORGANIZED HEALTH CARE EDUCATION/TRAINING PROGRAM

## 2022-08-26 PROCEDURE — 3288F PR FALLS RISK ASSESSMENT DOCUMENTED: ICD-10-PCS | Mod: CPTII,S$GLB,, | Performed by: STUDENT IN AN ORGANIZED HEALTH CARE EDUCATION/TRAINING PROGRAM

## 2022-08-26 NOTE — PROGRESS NOTES
Surgery Clinic Note    Subjective:     Andrea Gant   No diagnosis found.  Review of patient's allergies indicates:  No Known Allergies    Andrea Gant is a 67 y.o. male who presents to clinic for follow up s/p right elbow subcutaneous mass excision. Pathology consistent with rheumatoid nodule. About a week ago, he noted some swelling around the incision site followed by drainage of this yellow fluid. The drainage has continued. It is not painful. He denies fevers/chills.        Objective:     PHYSICAL EXAM:  Vital Signs (Most Recent)  Pulse: 79 (08/26/22 1014)  BP: (!) 144/81 (08/26/22 1014)  SpO2: 98 % (08/26/22 1014)    Physical Exam  Constitutional:       General: He is not in acute distress.     Appearance: Normal appearance. He is not ill-appearing.   HENT:      Head: Normocephalic and atraumatic.   Cardiovascular:      Rate and Rhythm: Normal rate and regular rhythm.      Pulses: Normal pulses.   Pulmonary:      Effort: Pulmonary effort is normal. No respiratory distress.   Abdominal:      General: There is no distension.      Palpations: Abdomen is soft.      Tenderness: There is no abdominal tenderness. There is no guarding.   Skin:     Comments: Right posterior elbow incision with opening at the middle. Draining clear, yellow fluid. Non-tender. Minimal surrounding erythema, does not appear infected. Nylon sutures in place.    Neurological:      General: No focal deficit present.      Mental Status: He is alert and oriented to person, place, and time.   Psychiatric:         Mood and Affect: Mood normal.         Behavior: Behavior normal.         Thought Content: Thought content normal.         Judgment: Judgment normal.         Pathology  RELIAPATH DIAGNOSIS:     SKIN AND SOFT TISSUE, RIGHT ELBOW, EXCISION:   -  Dermal and subcutaneous palisading granulomata with associated fibrinoid   necrosis, consistent with rheumatoid       nodule, see comment.   -  No acid-fast bacilli or fungal organisms  identified by AFB or GMS stains,   respectively.   -  Negative for malignancy.   COMMENT:   The findings are consistent with rheumatoid nodule in the appropriate   clinical setting.  Clinical correlation is   recommended.          Assessment:     67 y.o. male s/p subcutaneous mass excision of right elbow 8/12/22. He has had a week of drainage. Appears to be serous fluid. He likely developed a seroma which is now draining. Does not appear to be acutely infected.     Plan:     - Sutures removed in clinic  - No acute infection  - instructed patient to keep area clean, replace bandage PRN for drainage  - RTC for wound check in 2 weeks to ensure healing  - All questions answered; patient is comfortable with follow-up plan.    Raj Shipley MD   [facility]

## 2022-08-29 ENCOUNTER — OFFICE VISIT (OUTPATIENT)
Dept: SURGERY | Facility: CLINIC | Age: 67
End: 2022-08-29
Payer: MEDICARE

## 2022-08-29 VITALS
OXYGEN SATURATION: 99 % | WEIGHT: 145.5 LBS | HEIGHT: 69 IN | BODY MASS INDEX: 21.55 KG/M2 | SYSTOLIC BLOOD PRESSURE: 144 MMHG | DIASTOLIC BLOOD PRESSURE: 83 MMHG | HEART RATE: 77 BPM

## 2022-08-29 DIAGNOSIS — M06.321 RHEUMATOID NODULE OF ELBOW, RIGHT: Primary | ICD-10-CM

## 2022-08-29 PROCEDURE — 3008F BODY MASS INDEX DOCD: CPT | Mod: CPTII,S$GLB,, | Performed by: STUDENT IN AN ORGANIZED HEALTH CARE EDUCATION/TRAINING PROGRAM

## 2022-08-29 PROCEDURE — 1101F PR PT FALLS ASSESS DOC 0-1 FALLS W/OUT INJ PAST YR: ICD-10-PCS | Mod: CPTII,S$GLB,, | Performed by: STUDENT IN AN ORGANIZED HEALTH CARE EDUCATION/TRAINING PROGRAM

## 2022-08-29 PROCEDURE — 3044F PR MOST RECENT HEMOGLOBIN A1C LEVEL <7.0%: ICD-10-PCS | Mod: CPTII,S$GLB,, | Performed by: STUDENT IN AN ORGANIZED HEALTH CARE EDUCATION/TRAINING PROGRAM

## 2022-08-29 PROCEDURE — 1159F PR MEDICATION LIST DOCUMENTED IN MEDICAL RECORD: ICD-10-PCS | Mod: CPTII,S$GLB,, | Performed by: STUDENT IN AN ORGANIZED HEALTH CARE EDUCATION/TRAINING PROGRAM

## 2022-08-29 PROCEDURE — 3077F SYST BP >= 140 MM HG: CPT | Mod: CPTII,S$GLB,, | Performed by: STUDENT IN AN ORGANIZED HEALTH CARE EDUCATION/TRAINING PROGRAM

## 2022-08-29 PROCEDURE — 3288F FALL RISK ASSESSMENT DOCD: CPT | Mod: CPTII,S$GLB,, | Performed by: STUDENT IN AN ORGANIZED HEALTH CARE EDUCATION/TRAINING PROGRAM

## 2022-08-29 PROCEDURE — 1126F AMNT PAIN NOTED NONE PRSNT: CPT | Mod: CPTII,S$GLB,, | Performed by: STUDENT IN AN ORGANIZED HEALTH CARE EDUCATION/TRAINING PROGRAM

## 2022-08-29 PROCEDURE — 3079F DIAST BP 80-89 MM HG: CPT | Mod: CPTII,S$GLB,, | Performed by: STUDENT IN AN ORGANIZED HEALTH CARE EDUCATION/TRAINING PROGRAM

## 2022-08-29 PROCEDURE — 3008F PR BODY MASS INDEX (BMI) DOCUMENTED: ICD-10-PCS | Mod: CPTII,S$GLB,, | Performed by: STUDENT IN AN ORGANIZED HEALTH CARE EDUCATION/TRAINING PROGRAM

## 2022-08-29 PROCEDURE — 1101F PT FALLS ASSESS-DOCD LE1/YR: CPT | Mod: CPTII,S$GLB,, | Performed by: STUDENT IN AN ORGANIZED HEALTH CARE EDUCATION/TRAINING PROGRAM

## 2022-08-29 PROCEDURE — 99024 POSTOP FOLLOW-UP VISIT: CPT | Mod: S$GLB,,, | Performed by: STUDENT IN AN ORGANIZED HEALTH CARE EDUCATION/TRAINING PROGRAM

## 2022-08-29 PROCEDURE — 1126F PR PAIN SEVERITY QUANTIFIED, NO PAIN PRESENT: ICD-10-PCS | Mod: CPTII,S$GLB,, | Performed by: STUDENT IN AN ORGANIZED HEALTH CARE EDUCATION/TRAINING PROGRAM

## 2022-08-29 PROCEDURE — 3288F PR FALLS RISK ASSESSMENT DOCUMENTED: ICD-10-PCS | Mod: CPTII,S$GLB,, | Performed by: STUDENT IN AN ORGANIZED HEALTH CARE EDUCATION/TRAINING PROGRAM

## 2022-08-29 PROCEDURE — 99999 PR PBB SHADOW E&M-EST. PATIENT-LVL III: CPT | Mod: PBBFAC,,, | Performed by: STUDENT IN AN ORGANIZED HEALTH CARE EDUCATION/TRAINING PROGRAM

## 2022-08-29 PROCEDURE — 1159F MED LIST DOCD IN RCRD: CPT | Mod: CPTII,S$GLB,, | Performed by: STUDENT IN AN ORGANIZED HEALTH CARE EDUCATION/TRAINING PROGRAM

## 2022-08-29 PROCEDURE — 99999 PR PBB SHADOW E&M-EST. PATIENT-LVL III: ICD-10-PCS | Mod: PBBFAC,,, | Performed by: STUDENT IN AN ORGANIZED HEALTH CARE EDUCATION/TRAINING PROGRAM

## 2022-08-29 PROCEDURE — 3079F PR MOST RECENT DIASTOLIC BLOOD PRESSURE 80-89 MM HG: ICD-10-PCS | Mod: CPTII,S$GLB,, | Performed by: STUDENT IN AN ORGANIZED HEALTH CARE EDUCATION/TRAINING PROGRAM

## 2022-08-29 PROCEDURE — 99024 PR POST-OP FOLLOW-UP VISIT: ICD-10-PCS | Mod: S$GLB,,, | Performed by: STUDENT IN AN ORGANIZED HEALTH CARE EDUCATION/TRAINING PROGRAM

## 2022-08-29 PROCEDURE — 3077F PR MOST RECENT SYSTOLIC BLOOD PRESSURE >= 140 MM HG: ICD-10-PCS | Mod: CPTII,S$GLB,, | Performed by: STUDENT IN AN ORGANIZED HEALTH CARE EDUCATION/TRAINING PROGRAM

## 2022-08-29 PROCEDURE — 3044F HG A1C LEVEL LT 7.0%: CPT | Mod: CPTII,S$GLB,, | Performed by: STUDENT IN AN ORGANIZED HEALTH CARE EDUCATION/TRAINING PROGRAM

## 2022-08-29 NOTE — PROGRESS NOTES
Surgery Clinic Note    Subjective:     Andrea Gant   No diagnosis found.  Review of patient's allergies indicates:  No Known Allergies    HPI 8/26/22: Andrea Gant is a 67 y.o. male who presents to clinic for follow up s/p right elbow subcutaneous mass excision. Pathology consistent with rheumatoid nodule. About a week ago, he noted some swelling around the incision site followed by drainage of this yellow fluid. The drainage has continued. It is not painful. He denies fevers/chills.     Interval 8/29/22: Pt arrived to clinic wishing to be seen for continued drainage from Right elbow wound. Continues to be serous in nature. Sutures removed at last clinic visit 3 days ago. Denies purulent drainage, pain in the area, fevers and chills.       Objective:     PHYSICAL EXAM:  Vital Signs (Most Recent)  Pulse: 77 (08/29/22 1151)  BP: (!) 144/83 (08/29/22 1151)  SpO2: 99 % (08/29/22 1151)    Physical Exam  Constitutional:       General: He is not in acute distress.     Appearance: Normal appearance. He is not ill-appearing.   HENT:      Head: Normocephalic and atraumatic.   Cardiovascular:      Rate and Rhythm: Normal rate and regular rhythm.      Pulses: Normal pulses.   Pulmonary:      Effort: Pulmonary effort is normal. No respiratory distress.   Abdominal:      General: There is no distension.      Palpations: Abdomen is soft.      Tenderness: There is no abdominal tenderness. There is no guarding.   Skin:     Comments: Right posterior elbow incision with opening at the middle. Draining clear, yellow fluid. Non-tender. Minimal surrounding erythema, does not appear infected. Opening slightly larger than last seen in clinic.    Neurological:      General: No focal deficit present.      Mental Status: He is alert and oriented to person, place, and time.   Psychiatric:         Mood and Affect: Mood normal.         Behavior: Behavior normal.         Thought Content: Thought content normal.         Judgment:  Judgment normal.       Pathology  RELIAPATH DIAGNOSIS:     SKIN AND SOFT TISSUE, RIGHT ELBOW, EXCISION:   -  Dermal and subcutaneous palisading granulomata with associated fibrinoid   necrosis, consistent with rheumatoid       nodule, see comment.   -  No acid-fast bacilli or fungal organisms identified by AFB or GMS stains,   respectively.   -  Negative for malignancy.   COMMENT:   The findings are consistent with rheumatoid nodule in the appropriate   clinical setting.  Clinical correlation is   recommended.          Assessment:     67 y.o. male s/p subcutaneous mass excision of right elbow 8/12/22. He has had a week of drainage. Appears to be serous fluid. He likely developed a seroma which is now draining. Does not appear to be acutely infected.     Plan:     - No acute infection, reassured patient that the correct treatment is to allow fluid to drain and that it would eventually heal on its own  - instructed patient to keep area clean, replace bandage PRN for drainage  - RTC PRN  - All questions answered; patient is comfortable with follow-up plan.    Raj Shipley MD   [facility]

## 2022-10-25 ENCOUNTER — TELEPHONE (OUTPATIENT)
Dept: VASCULAR SURGERY | Facility: CLINIC | Age: 67
End: 2022-10-25
Payer: MEDICARE

## 2022-10-25 NOTE — TELEPHONE ENCOUNTER
Spoke with the pt and informed him of appt scheduled incorrectly.Informed him of u/s needed prior to the appt and he verbalized understanding of information received.

## 2022-10-31 ENCOUNTER — TELEPHONE (OUTPATIENT)
Dept: RHEUMATOLOGY | Facility: CLINIC | Age: 67
End: 2022-10-31
Payer: MEDICARE

## 2022-11-03 ENCOUNTER — LAB VISIT (OUTPATIENT)
Dept: LAB | Facility: HOSPITAL | Age: 67
End: 2022-11-03
Attending: INTERNAL MEDICINE
Payer: MEDICARE

## 2022-11-03 DIAGNOSIS — M05.79 RHEUMATOID ARTHRITIS INVOLVING MULTIPLE SITES WITH POSITIVE RHEUMATOID FACTOR: ICD-10-CM

## 2022-11-03 DIAGNOSIS — Z79.631 LONG TERM METHOTREXATE USER: ICD-10-CM

## 2022-11-03 LAB
BACTERIA #/AREA URNS AUTO: ABNORMAL /HPF
BILIRUB UR QL STRIP: NEGATIVE
CLARITY UR REFRACT.AUTO: CLEAR
COLOR UR AUTO: YELLOW
CREAT UR-MCNC: 90 MG/DL (ref 23–375)
GLUCOSE UR QL STRIP: NEGATIVE
HGB UR QL STRIP: ABNORMAL
KETONES UR QL STRIP: NEGATIVE
LEUKOCYTE ESTERASE UR QL STRIP: ABNORMAL
MICROSCOPIC COMMENT: ABNORMAL
NITRITE UR QL STRIP: NEGATIVE
PH UR STRIP: 6 [PH] (ref 5–8)
PROT UR QL STRIP: ABNORMAL
PROT UR-MCNC: 21 MG/DL (ref 0–15)
PROT/CREAT UR: 0.23 MG/G{CREAT} (ref 0–0.2)
RBC #/AREA URNS AUTO: 21 /HPF (ref 0–4)
SP GR UR STRIP: 1.01 (ref 1–1.03)
URN SPEC COLLECT METH UR: ABNORMAL
WBC #/AREA URNS AUTO: 30 /HPF (ref 0–5)

## 2022-11-03 PROCEDURE — 84156 ASSAY OF PROTEIN URINE: CPT | Performed by: INTERNAL MEDICINE

## 2022-11-03 PROCEDURE — 81001 URINALYSIS AUTO W/SCOPE: CPT | Performed by: INTERNAL MEDICINE

## 2022-11-04 ENCOUNTER — PATIENT MESSAGE (OUTPATIENT)
Dept: INTERNAL MEDICINE | Facility: CLINIC | Age: 67
End: 2022-11-04
Payer: MEDICARE

## 2022-11-04 ENCOUNTER — TELEPHONE (OUTPATIENT)
Dept: INTERNAL MEDICINE | Facility: CLINIC | Age: 67
End: 2022-11-04
Payer: MEDICARE

## 2022-11-04 DIAGNOSIS — E78.49 OTHER HYPERLIPIDEMIA: ICD-10-CM

## 2022-11-04 DIAGNOSIS — R73.09 ELEVATED GLUCOSE LEVEL: ICD-10-CM

## 2022-11-04 DIAGNOSIS — Z12.5 SCREENING FOR PROSTATE CANCER: Primary | ICD-10-CM

## 2022-11-04 DIAGNOSIS — R82.90 ABNORMAL URINE: Primary | ICD-10-CM

## 2022-11-07 ENCOUNTER — LAB VISIT (OUTPATIENT)
Dept: LAB | Facility: HOSPITAL | Age: 67
End: 2022-11-07
Attending: INTERNAL MEDICINE
Payer: MEDICARE

## 2022-11-07 DIAGNOSIS — R82.90 ABNORMAL URINE: ICD-10-CM

## 2022-11-07 PROCEDURE — 87077 CULTURE AEROBIC IDENTIFY: CPT | Performed by: INTERNAL MEDICINE

## 2022-11-07 PROCEDURE — 87186 SC STD MICRODIL/AGAR DIL: CPT | Performed by: INTERNAL MEDICINE

## 2022-11-07 PROCEDURE — 87088 URINE BACTERIA CULTURE: CPT | Performed by: INTERNAL MEDICINE

## 2022-11-07 PROCEDURE — 87086 URINE CULTURE/COLONY COUNT: CPT | Performed by: INTERNAL MEDICINE

## 2022-11-10 LAB — BACTERIA UR CULT: ABNORMAL

## 2022-11-11 ENCOUNTER — PATIENT MESSAGE (OUTPATIENT)
Dept: INTERNAL MEDICINE | Facility: CLINIC | Age: 67
End: 2022-11-11
Payer: MEDICARE

## 2022-11-11 RX ORDER — NITROFURANTOIN 25; 75 MG/1; MG/1
100 CAPSULE ORAL 2 TIMES DAILY
Qty: 14 CAPSULE | Refills: 0 | Status: SHIPPED | OUTPATIENT
Start: 2022-11-11 | End: 2023-02-09

## 2022-11-13 NOTE — TELEPHONE ENCOUNTER
I received a notification that the patient has not read my last portal message.  Can we please check in with the patient to see if they are able to read it or if not please read the message to them and let me know their response or if they have any questions.    Andrea Wang MD FACP  Internal Medicine

## 2022-11-14 ENCOUNTER — OFFICE VISIT (OUTPATIENT)
Dept: INTERNAL MEDICINE | Facility: CLINIC | Age: 67
End: 2022-11-14
Payer: MEDICARE

## 2022-11-14 VITALS
DIASTOLIC BLOOD PRESSURE: 76 MMHG | OXYGEN SATURATION: 99 % | BODY MASS INDEX: 22.53 KG/M2 | SYSTOLIC BLOOD PRESSURE: 110 MMHG | HEART RATE: 72 BPM | HEIGHT: 69 IN | WEIGHT: 152.13 LBS

## 2022-11-14 DIAGNOSIS — M05.79 RHEUMATOID ARTHRITIS INVOLVING MULTIPLE SITES WITH POSITIVE RHEUMATOID FACTOR: ICD-10-CM

## 2022-11-14 DIAGNOSIS — N18.31 STAGE 3A CHRONIC KIDNEY DISEASE: ICD-10-CM

## 2022-11-14 DIAGNOSIS — N39.0 URINARY TRACT INFECTION WITH HEMATURIA, SITE UNSPECIFIED: Primary | ICD-10-CM

## 2022-11-14 DIAGNOSIS — R31.9 URINARY TRACT INFECTION WITH HEMATURIA, SITE UNSPECIFIED: Primary | ICD-10-CM

## 2022-11-14 DIAGNOSIS — D84.821 DRUG-INDUCED IMMUNODEFICIENCY: ICD-10-CM

## 2022-11-14 DIAGNOSIS — Z79.899 DRUG-INDUCED IMMUNODEFICIENCY: ICD-10-CM

## 2022-11-14 PROCEDURE — 3288F FALL RISK ASSESSMENT DOCD: CPT | Mod: CPTII,S$GLB,, | Performed by: INTERNAL MEDICINE

## 2022-11-14 PROCEDURE — 3078F DIAST BP <80 MM HG: CPT | Mod: CPTII,S$GLB,, | Performed by: INTERNAL MEDICINE

## 2022-11-14 PROCEDURE — 99999 PR PBB SHADOW E&M-EST. PATIENT-LVL III: ICD-10-PCS | Mod: PBBFAC,,, | Performed by: INTERNAL MEDICINE

## 2022-11-14 PROCEDURE — 1160F RVW MEDS BY RX/DR IN RCRD: CPT | Mod: CPTII,S$GLB,, | Performed by: INTERNAL MEDICINE

## 2022-11-14 PROCEDURE — 3288F PR FALLS RISK ASSESSMENT DOCUMENTED: ICD-10-PCS | Mod: CPTII,S$GLB,, | Performed by: INTERNAL MEDICINE

## 2022-11-14 PROCEDURE — 1126F AMNT PAIN NOTED NONE PRSNT: CPT | Mod: CPTII,S$GLB,, | Performed by: INTERNAL MEDICINE

## 2022-11-14 PROCEDURE — 3044F PR MOST RECENT HEMOGLOBIN A1C LEVEL <7.0%: ICD-10-PCS | Mod: CPTII,S$GLB,, | Performed by: INTERNAL MEDICINE

## 2022-11-14 PROCEDURE — 1159F MED LIST DOCD IN RCRD: CPT | Mod: CPTII,S$GLB,, | Performed by: INTERNAL MEDICINE

## 2022-11-14 PROCEDURE — 3074F PR MOST RECENT SYSTOLIC BLOOD PRESSURE < 130 MM HG: ICD-10-PCS | Mod: CPTII,S$GLB,, | Performed by: INTERNAL MEDICINE

## 2022-11-14 PROCEDURE — 3008F BODY MASS INDEX DOCD: CPT | Mod: CPTII,S$GLB,, | Performed by: INTERNAL MEDICINE

## 2022-11-14 PROCEDURE — 1160F PR REVIEW ALL MEDS BY PRESCRIBER/CLIN PHARMACIST DOCUMENTED: ICD-10-PCS | Mod: CPTII,S$GLB,, | Performed by: INTERNAL MEDICINE

## 2022-11-14 PROCEDURE — 99214 PR OFFICE/OUTPT VISIT, EST, LEVL IV, 30-39 MIN: ICD-10-PCS | Mod: S$GLB,,, | Performed by: INTERNAL MEDICINE

## 2022-11-14 PROCEDURE — 99999 PR PBB SHADOW E&M-EST. PATIENT-LVL III: CPT | Mod: PBBFAC,,, | Performed by: INTERNAL MEDICINE

## 2022-11-14 PROCEDURE — 1101F PT FALLS ASSESS-DOCD LE1/YR: CPT | Mod: CPTII,S$GLB,, | Performed by: INTERNAL MEDICINE

## 2022-11-14 PROCEDURE — 3008F PR BODY MASS INDEX (BMI) DOCUMENTED: ICD-10-PCS | Mod: CPTII,S$GLB,, | Performed by: INTERNAL MEDICINE

## 2022-11-14 PROCEDURE — 3078F PR MOST RECENT DIASTOLIC BLOOD PRESSURE < 80 MM HG: ICD-10-PCS | Mod: CPTII,S$GLB,, | Performed by: INTERNAL MEDICINE

## 2022-11-14 PROCEDURE — 1126F PR PAIN SEVERITY QUANTIFIED, NO PAIN PRESENT: ICD-10-PCS | Mod: CPTII,S$GLB,, | Performed by: INTERNAL MEDICINE

## 2022-11-14 PROCEDURE — 99214 OFFICE O/P EST MOD 30 MIN: CPT | Mod: S$GLB,,, | Performed by: INTERNAL MEDICINE

## 2022-11-14 PROCEDURE — 1159F PR MEDICATION LIST DOCUMENTED IN MEDICAL RECORD: ICD-10-PCS | Mod: CPTII,S$GLB,, | Performed by: INTERNAL MEDICINE

## 2022-11-14 PROCEDURE — 1101F PR PT FALLS ASSESS DOC 0-1 FALLS W/OUT INJ PAST YR: ICD-10-PCS | Mod: CPTII,S$GLB,, | Performed by: INTERNAL MEDICINE

## 2022-11-14 PROCEDURE — 3044F HG A1C LEVEL LT 7.0%: CPT | Mod: CPTII,S$GLB,, | Performed by: INTERNAL MEDICINE

## 2022-11-14 PROCEDURE — 3074F SYST BP LT 130 MM HG: CPT | Mod: CPTII,S$GLB,, | Performed by: INTERNAL MEDICINE

## 2022-11-14 NOTE — PROGRESS NOTES
Subjective:       Patient ID: Andrea Gant is a 67 y.o. male.    Chief Complaint: Annual Exam    Patient here for follow-up of UTI-urine culture was positive for UTI but patient did not get my message to start antibiotics until this morning.  We sent in Macrobid.  He is on methotrexate for rheumatoid arthritis which may be contributing.  He was having some frequency, some pink tinge to the urine.  No history of prostate or urinary infections.  No fever back pain or vomiting.  Stable rheumatoid arthritis-stable mild renal insufficiency   I explained that the methotrexate could affect noon system and increased risk of infection.  We will need to monitor  Of note is that his weight has remained around 150-152 over the last few months.  He had lost quite a bit of weight when he had his teeth pulled for some dental work and it took nearly a year to get that completed.  Now he has gained some weight back and has stayed stable.  Colonoscopy is up-to-date and he does not want to do any further scans or testing unless the weight drops again.    Review of Systems   Constitutional:  Positive for unexpected weight change (Clinically stable for a few months). Negative for activity change.   HENT:  Positive for hearing loss. Negative for rhinorrhea and trouble swallowing.    Eyes:  Positive for visual disturbance. Negative for discharge.   Respiratory:  Negative for chest tightness and wheezing.    Cardiovascular:  Negative for chest pain and palpitations.   Gastrointestinal:  Negative for blood in stool, constipation, diarrhea and vomiting.   Endocrine: Positive for polydipsia. Negative for polyuria.   Genitourinary:  Negative for difficulty urinating, hematuria and urgency.   Musculoskeletal:  Positive for arthralgias. Negative for joint swelling and neck pain.   Neurological:  Negative for weakness and headaches.   Psychiatric/Behavioral:  Negative for confusion and dysphoric mood.          Past Medical History:    Diagnosis Date    Cataract     Colon polyp 2014    History of hepatitis C, s/p successful treatment w/ SVR12 - 7/2015 10/14/2012    Kelsey 1a,  Liver biopsy 6/2014 - Stage 1 fibrosis;  Completed 8 weeks Harvoni w/ SVR12 - 7/2015      Hyperlipidemia     Lipoma of arm     Lipoma of lower extremity     Nuclear sclerosis - Both Eyes 10/22/2012    Other and unspecified hyperlipidemia 10/22/2013    PAD (peripheral artery disease)     Peripheral vascular disease, unspecified     Rheumatoid arthritis(714.0) 10/14/2012     Past Surgical History:   Procedure Laterality Date    COLONOSCOPY N/A 11/29/2021    Procedure: COLONOSCOPY;  Surgeon: eKnrick Zimmer MD;  Location: [facility] (4TH FLR);  Service: Endoscopy;  Laterality: N/A;  blood thinner approval received, see telephone encounter 10/19/21-BB  fully vacc-inst email-tb    KNEE ARTHROPLASTY      LAPAROSCOPIC EXPLORATION OF GROIN Left 9/6/2018    Procedure: EXPLORATION, INGUINAL REGION, LAPAROSCOPIC;  Surgeon: Mingo De Guzman Jr., MD;  Location: [facility] OR 2ND FLR;  Service: General;  Laterality: Left;    TONSILLECTOMY        Patient Active Problem List   Diagnosis    Rheumatoid arthritis    History of hepatitis C, s/p successful treatment w/ SVR12 - 7/2015    Persistent insomnia    Nuclear sclerosis - Both Eyes    History of long-term treatment with high-risk medication    Plaquenil adverse reaction in therapeutic use    Other hyperlipidemia    PAD (peripheral artery disease)    Hypercholesteremia    Special screening for malignant neoplasms, colon    Intermittent claudication    PVD (peripheral vascular disease)    Right inguinal hernia    Abdominal aortic aneurysm without rupture    Venous insufficiency    Drug-induced immunodeficiency    Stage 3a chronic kidney disease        Objective:      Physical Exam  Constitutional:       General: He is not in acute distress.     Appearance: He is well-developed.   HENT:      Head: Normocephalic and atraumatic.      Right  Ear: Tympanic membrane, ear canal and external ear normal.      Left Ear: Tympanic membrane, ear canal and external ear normal.      Mouth/Throat:      Pharynx: No oropharyngeal exudate or posterior oropharyngeal erythema.   Eyes:      General: No scleral icterus.     Conjunctiva/sclera: Conjunctivae normal.      Pupils: Pupils are equal, round, and reactive to light.   Neck:      Thyroid: No thyromegaly.      Comments: No supraclavicular nodes palpated  Cardiovascular:      Rate and Rhythm: Normal rate and regular rhythm.      Pulses: Normal pulses.      Heart sounds: Normal heart sounds. No murmur heard.  Pulmonary:      Effort: Pulmonary effort is normal.      Breath sounds: Normal breath sounds. No wheezing.   Abdominal:      General: Bowel sounds are normal.      Palpations: Abdomen is soft. There is no mass.      Tenderness: There is no abdominal tenderness.   Genitourinary:     Comments: Patient declined prostate exam  Musculoskeletal:         General: No tenderness.      Cervical back: Normal range of motion and neck supple.      Right lower leg: No edema.      Left lower leg: No edema.   Lymphadenopathy:      Cervical: No cervical adenopathy.   Skin:     Coloration: Skin is not jaundiced or pale.   Neurological:      General: No focal deficit present.      Mental Status: He is alert and oriented to person, place, and time.   Psychiatric:         Mood and Affect: Mood normal.         Behavior: Behavior normal.       Assessment:       Problem List Items Addressed This Visit          Renal/    Stage 3a chronic kidney disease       Immunology/Multi System    Rheumatoid arthritis    Drug-induced immunodeficiency     Other Visit Diagnoses       Urinary tract infection with hematuria, site unspecified    -  Primary    Culture proven UTI               Plan:         Andrea was seen today for annual exam.    Diagnoses and all orders for this visit:    Urinary tract infection with hematuria, site  "unspecified  Comments:  Culture proven UTI     Drug-induced immunodeficiency  Comments:  Continues on Methotrexate, continue to monitor for infection, labs.     Stage 3a chronic kidney disease  Comments:  Clinically stable. Continue to monitor. Stay hydrated.     Rheumatoid arthritis involving multiple sites with positive rheumatoid factor       Complete all antibiotics and notify me if symptoms fail to improve or worsen and monitor for recurrent infections, weight loss or change in appetite.              Portions of this note may have been created with voice recognition software. Occasional "wrong-word" or "sound-a-like" substitutions may have occurred due to the inherent limitations of voice recognition software. Please, read the note carefully and recognize, using context, where substitutions have occurred.    "

## 2022-11-22 DIAGNOSIS — I71.40 ABDOMINAL AORTIC ANEURYSM (AAA) WITHOUT RUPTURE, UNSPECIFIED PART: Primary | ICD-10-CM

## 2022-11-23 DIAGNOSIS — M05.79 RHEUMATOID ARTHRITIS INVOLVING MULTIPLE SITES WITH POSITIVE RHEUMATOID FACTOR: ICD-10-CM

## 2022-11-23 DIAGNOSIS — Z79.60 LONG-TERM USE OF IMMUNOSUPPRESSANT MEDICATION: ICD-10-CM

## 2022-11-23 RX ORDER — METHOTREXATE 2.5 MG/1
TABLET ORAL
Qty: 60 TABLET | Refills: 1 | Status: SHIPPED | OUTPATIENT
Start: 2022-11-23 | End: 2023-01-03

## 2022-12-06 ENCOUNTER — TELEPHONE (OUTPATIENT)
Dept: VASCULAR SURGERY | Facility: CLINIC | Age: 67
End: 2022-12-06
Payer: MEDICARE

## 2022-12-06 NOTE — TELEPHONE ENCOUNTER
Spoke with the pt and informed him of appt scheduled and confirmed for 1/04/22.Pt verbalized understanding of information received.  ----- Message from Seema Moura sent at 12/6/2022 12:01 PM CST -----  Regarding: Appt / Patient advice  Contact: Pt [phone]  Patient called to verify appt time and date Stated he received a call for a early time and he confirmed early time appt but dont se new time on MyChart please call to discuss further

## 2022-12-20 ENCOUNTER — PES CALL (OUTPATIENT)
Dept: ADMINISTRATIVE | Facility: CLINIC | Age: 67
End: 2022-12-20
Payer: MEDICARE

## 2022-12-21 ENCOUNTER — TELEPHONE (OUTPATIENT)
Dept: INTERNAL MEDICINE | Facility: CLINIC | Age: 67
End: 2022-12-21
Payer: MEDICARE

## 2022-12-21 NOTE — TELEPHONE ENCOUNTER
FW: Appointment Request   Darius Tremayne Brown   Sent:   4:03 PM   To: [facility] Staff   Flags: Same Day Access Requested, Pt Advice, Appointment Access    Andrea Gant   MRN: [id number] : 1955   Pt Home: [phone]     Entered: [phone]        Message    **Patient has requested an appointment using the portal. Please contact them to assist further.       ----- Message -----   From: Andrea Gant   Sent: 2022   3:59 PM CST   To: [facility]   Subject: Appointment Request                                Appointment Request From: Andrea Gant      With Provider: Andrea Wang MD [[facility]]      Preferred Date Range: 2022 - 2022      Preferred Times: Any Time      Reason for visit: Blood in urinary      Comments:   Blood in urine..already have seen Dr Wang about this        Primary Coverage(Effective 2021)    Payer Plan Group Number Group Name Effective From Effective To   [employer] [facility] MANAGED MEDICARE [employer] CHOICES [id number]

## 2022-12-22 ENCOUNTER — LAB VISIT (OUTPATIENT)
Dept: LAB | Facility: HOSPITAL | Age: 67
End: 2022-12-22
Payer: MEDICARE

## 2022-12-22 ENCOUNTER — OFFICE VISIT (OUTPATIENT)
Dept: INTERNAL MEDICINE | Facility: CLINIC | Age: 67
End: 2022-12-22
Payer: MEDICARE

## 2022-12-22 VITALS
HEART RATE: 88 BPM | SYSTOLIC BLOOD PRESSURE: 120 MMHG | OXYGEN SATURATION: 99 % | DIASTOLIC BLOOD PRESSURE: 84 MMHG | WEIGHT: 152.56 LBS | BODY MASS INDEX: 22.6 KG/M2 | HEIGHT: 69 IN

## 2022-12-22 DIAGNOSIS — Z87.440 HISTORY OF UTI: ICD-10-CM

## 2022-12-22 DIAGNOSIS — F17.200 SMOKER: ICD-10-CM

## 2022-12-22 DIAGNOSIS — Z87.442 HISTORY OF KIDNEY STONES: ICD-10-CM

## 2022-12-22 DIAGNOSIS — R31.0 GROSS HEMATURIA: ICD-10-CM

## 2022-12-22 DIAGNOSIS — R31.0 GROSS HEMATURIA: Primary | ICD-10-CM

## 2022-12-22 LAB
ANION GAP SERPL CALC-SCNC: 8 MMOL/L (ref 8–16)
BACTERIA #/AREA URNS AUTO: ABNORMAL /HPF
BASOPHILS # BLD AUTO: 0.02 K/UL (ref 0–0.2)
BASOPHILS NFR BLD: 0.3 % (ref 0–1.9)
BILIRUB UR QL STRIP: NEGATIVE
BUN SERPL-MCNC: 20 MG/DL (ref 8–23)
CALCIUM SERPL-MCNC: 10 MG/DL (ref 8.7–10.5)
CHLORIDE SERPL-SCNC: 106 MMOL/L (ref 95–110)
CLARITY UR REFRACT.AUTO: ABNORMAL
CO2 SERPL-SCNC: 26 MMOL/L (ref 23–29)
COLOR UR AUTO: YELLOW
CREAT SERPL-MCNC: 1.2 MG/DL (ref 0.5–1.4)
DIFFERENTIAL METHOD: ABNORMAL
EOSINOPHIL # BLD AUTO: 0 K/UL (ref 0–0.5)
EOSINOPHIL NFR BLD: 0.6 % (ref 0–8)
ERYTHROCYTE [DISTWIDTH] IN BLOOD BY AUTOMATED COUNT: 13.3 % (ref 11.5–14.5)
EST. GFR  (NO RACE VARIABLE): >60 ML/MIN/1.73 M^2
GLUCOSE SERPL-MCNC: 95 MG/DL (ref 70–110)
GLUCOSE UR QL STRIP: NEGATIVE
HCT VFR BLD AUTO: 42.6 % (ref 40–54)
HGB BLD-MCNC: 13.6 G/DL (ref 14–18)
HGB UR QL STRIP: ABNORMAL
HYALINE CASTS UR QL AUTO: 1 /LPF
IMM GRANULOCYTES # BLD AUTO: 0.03 K/UL (ref 0–0.04)
IMM GRANULOCYTES NFR BLD AUTO: 0.4 % (ref 0–0.5)
KETONES UR QL STRIP: NEGATIVE
LEUKOCYTE ESTERASE UR QL STRIP: NEGATIVE
LYMPHOCYTES # BLD AUTO: 1.3 K/UL (ref 1–4.8)
LYMPHOCYTES NFR BLD: 18.1 % (ref 18–48)
MCH RBC QN AUTO: 29.9 PG (ref 27–31)
MCHC RBC AUTO-ENTMCNC: 31.9 G/DL (ref 32–36)
MCV RBC AUTO: 94 FL (ref 82–98)
MICROSCOPIC COMMENT: ABNORMAL
MONOCYTES # BLD AUTO: 0.6 K/UL (ref 0.3–1)
MONOCYTES NFR BLD: 9 % (ref 4–15)
NEUTROPHILS # BLD AUTO: 5 K/UL (ref 1.8–7.7)
NEUTROPHILS NFR BLD: 71.6 % (ref 38–73)
NITRITE UR QL STRIP: NEGATIVE
NRBC BLD-RTO: 0 /100 WBC
PH UR STRIP: 5 [PH] (ref 5–8)
PLATELET # BLD AUTO: 210 K/UL (ref 150–450)
PMV BLD AUTO: 9.4 FL (ref 9.2–12.9)
POTASSIUM SERPL-SCNC: 3.9 MMOL/L (ref 3.5–5.1)
PROT UR QL STRIP: ABNORMAL
RBC # BLD AUTO: 4.55 M/UL (ref 4.6–6.2)
RBC #/AREA URNS AUTO: >100 /HPF (ref 0–4)
SODIUM SERPL-SCNC: 140 MMOL/L (ref 136–145)
SP GR UR STRIP: 1.02 (ref 1–1.03)
URN SPEC COLLECT METH UR: ABNORMAL
WBC # BLD AUTO: 7.02 K/UL (ref 3.9–12.7)
WBC #/AREA URNS AUTO: >100 /HPF (ref 0–5)

## 2022-12-22 PROCEDURE — 3288F PR FALLS RISK ASSESSMENT DOCUMENTED: ICD-10-PCS | Mod: CPTII,S$GLB,, | Performed by: PHYSICIAN ASSISTANT

## 2022-12-22 PROCEDURE — 1126F PR PAIN SEVERITY QUANTIFIED, NO PAIN PRESENT: ICD-10-PCS | Mod: CPTII,S$GLB,, | Performed by: PHYSICIAN ASSISTANT

## 2022-12-22 PROCEDURE — 1159F PR MEDICATION LIST DOCUMENTED IN MEDICAL RECORD: ICD-10-PCS | Mod: CPTII,S$GLB,, | Performed by: PHYSICIAN ASSISTANT

## 2022-12-22 PROCEDURE — 80048 BASIC METABOLIC PNL TOTAL CA: CPT | Performed by: PHYSICIAN ASSISTANT

## 2022-12-22 PROCEDURE — 3044F PR MOST RECENT HEMOGLOBIN A1C LEVEL <7.0%: ICD-10-PCS | Mod: CPTII,S$GLB,, | Performed by: PHYSICIAN ASSISTANT

## 2022-12-22 PROCEDURE — 81001 URINALYSIS AUTO W/SCOPE: CPT | Performed by: PHYSICIAN ASSISTANT

## 2022-12-22 PROCEDURE — 3079F PR MOST RECENT DIASTOLIC BLOOD PRESSURE 80-89 MM HG: ICD-10-PCS | Mod: CPTII,S$GLB,, | Performed by: PHYSICIAN ASSISTANT

## 2022-12-22 PROCEDURE — 87086 URINE CULTURE/COLONY COUNT: CPT | Performed by: PHYSICIAN ASSISTANT

## 2022-12-22 PROCEDURE — 99999 PR PBB SHADOW E&M-EST. PATIENT-LVL IV: ICD-10-PCS | Mod: PBBFAC,,, | Performed by: PHYSICIAN ASSISTANT

## 2022-12-22 PROCEDURE — 1101F PR PT FALLS ASSESS DOC 0-1 FALLS W/OUT INJ PAST YR: ICD-10-PCS | Mod: CPTII,S$GLB,, | Performed by: PHYSICIAN ASSISTANT

## 2022-12-22 PROCEDURE — 1126F AMNT PAIN NOTED NONE PRSNT: CPT | Mod: CPTII,S$GLB,, | Performed by: PHYSICIAN ASSISTANT

## 2022-12-22 PROCEDURE — 1160F PR REVIEW ALL MEDS BY PRESCRIBER/CLIN PHARMACIST DOCUMENTED: ICD-10-PCS | Mod: CPTII,S$GLB,, | Performed by: PHYSICIAN ASSISTANT

## 2022-12-22 PROCEDURE — 3074F SYST BP LT 130 MM HG: CPT | Mod: CPTII,S$GLB,, | Performed by: PHYSICIAN ASSISTANT

## 2022-12-22 PROCEDURE — 99999 PR PBB SHADOW E&M-EST. PATIENT-LVL IV: CPT | Mod: PBBFAC,,, | Performed by: PHYSICIAN ASSISTANT

## 2022-12-22 PROCEDURE — 1101F PT FALLS ASSESS-DOCD LE1/YR: CPT | Mod: CPTII,S$GLB,, | Performed by: PHYSICIAN ASSISTANT

## 2022-12-22 PROCEDURE — 99214 PR OFFICE/OUTPT VISIT, EST, LEVL IV, 30-39 MIN: ICD-10-PCS | Mod: S$GLB,,, | Performed by: PHYSICIAN ASSISTANT

## 2022-12-22 PROCEDURE — 99214 OFFICE O/P EST MOD 30 MIN: CPT | Mod: S$GLB,,, | Performed by: PHYSICIAN ASSISTANT

## 2022-12-22 PROCEDURE — 85025 COMPLETE CBC W/AUTO DIFF WBC: CPT | Performed by: PHYSICIAN ASSISTANT

## 2022-12-22 PROCEDURE — 1159F MED LIST DOCD IN RCRD: CPT | Mod: CPTII,S$GLB,, | Performed by: PHYSICIAN ASSISTANT

## 2022-12-22 PROCEDURE — 3079F DIAST BP 80-89 MM HG: CPT | Mod: CPTII,S$GLB,, | Performed by: PHYSICIAN ASSISTANT

## 2022-12-22 PROCEDURE — 3044F HG A1C LEVEL LT 7.0%: CPT | Mod: CPTII,S$GLB,, | Performed by: PHYSICIAN ASSISTANT

## 2022-12-22 PROCEDURE — 3288F FALL RISK ASSESSMENT DOCD: CPT | Mod: CPTII,S$GLB,, | Performed by: PHYSICIAN ASSISTANT

## 2022-12-22 PROCEDURE — 36415 COLL VENOUS BLD VENIPUNCTURE: CPT | Performed by: PHYSICIAN ASSISTANT

## 2022-12-22 PROCEDURE — 3074F PR MOST RECENT SYSTOLIC BLOOD PRESSURE < 130 MM HG: ICD-10-PCS | Mod: CPTII,S$GLB,, | Performed by: PHYSICIAN ASSISTANT

## 2022-12-22 PROCEDURE — 1160F RVW MEDS BY RX/DR IN RCRD: CPT | Mod: CPTII,S$GLB,, | Performed by: PHYSICIAN ASSISTANT

## 2022-12-22 PROCEDURE — 3008F BODY MASS INDEX DOCD: CPT | Mod: CPTII,S$GLB,, | Performed by: PHYSICIAN ASSISTANT

## 2022-12-22 PROCEDURE — 3008F PR BODY MASS INDEX (BMI) DOCUMENTED: ICD-10-PCS | Mod: CPTII,S$GLB,, | Performed by: PHYSICIAN ASSISTANT

## 2022-12-22 NOTE — PROGRESS NOTES
"Subjective:       Patient ID: Andrea Gant is a 67 y.o. male.    Chief Complaint: Hematuria    HPI    Established pt of Andrea Wang MD (new to me)    Pt here with concerns of continued gross hematuria, seen for the same on , treated for culture proven UTI with macrobid, he states hematuria improved but never resolved.    No fevers, abdominal pain, flank pain, dysuria, n/v/c.    He mentions hx of kidney stones    He is a current smoker.    Past Medical History:   Diagnosis Date    Cataract     Colon polyp     History of hepatitis C, s/p successful treatment w/ SVR - 2015 10/14/2012    Kelsey 1a,  Liver biopsy 2014 - Stage 1 fibrosis;  Completed 8 weeks Harvoni w/ SVR - 2015      Hyperlipidemia     Lipoma of arm     Lipoma of lower extremity     Nuclear sclerosis - Both Eyes 10/22/2012    Other and unspecified hyperlipidemia 10/22/2013    PAD (peripheral artery disease)     Peripheral vascular disease, unspecified     Rheumatoid arthritis(714.0) 10/14/2012     Social History     Tobacco Use    Smoking status: Former     Types: Cigarettes     Quit date: 2020     Years since quittin.4    Smokeless tobacco: Never   Substance Use Topics    Alcohol use: Yes     Comment: 3-4 drinks per week    Drug use: Yes     Types: Marijuana     Comment: marajuana used on Saturday     Review of patient's allergies indicates:  No Known Allergies      Review of Systems   Constitutional:  Negative for chills, diaphoresis, fever and unexpected weight change.   Respiratory:  Negative for cough and shortness of breath.    Cardiovascular:  Negative for chest pain and leg swelling.   Gastrointestinal:  Negative for nausea and vomiting.   Genitourinary:  Positive for frequency and hematuria. Negative for dysuria and flank pain.   Integumentary:  Negative for rash.       Objective: /84 (BP Location: Left arm, Patient Position: Sitting, BP Method: Medium (Manual))   Pulse 88   Ht 5' 9" (1.753 m)   Wt " 69.2 kg (152 lb 8.9 oz)   SpO2 99%   BMI 22.53 kg/m²         Physical Exam  Vitals reviewed.   Constitutional:       General: He is not in acute distress.     Appearance: He is well-developed.   HENT:      Head: Normocephalic and atraumatic.   Cardiovascular:      Rate and Rhythm: Normal rate and regular rhythm.      Heart sounds: No murmur heard.  Pulmonary:      Effort: Pulmonary effort is normal.      Breath sounds: Normal breath sounds. No wheezing or rales.   Abdominal:      General: Bowel sounds are normal.      Palpations: Abdomen is soft.      Tenderness: There is no abdominal tenderness. There is no right CVA tenderness or left CVA tenderness.   Skin:     General: Skin is warm and dry.      Findings: No rash.   Neurological:      Mental Status: He is alert.       Assessment:       Problem List Items Addressed This Visit    None  Visit Diagnoses       Gross hematuria    -  Primary    Relevant Orders    Urinalysis    Urine culture    Ambulatory referral/consult to Urology    CBC Auto Differential    BASIC METABOLIC PANEL    US Retroperitoneal Complete (Kidney and    Smoker        Relevant Orders    Urinalysis    Urine culture    Ambulatory referral/consult to Urology    History of kidney stones        Relevant Orders    US Retroperitoneal Complete (Kidney and    History of UTI                  Plan:       Andrea was seen today for hematuria.    Diagnoses and all orders for this visit:    Gross hematuria  -     Urinalysis  -     Urine culture  -     Ambulatory referral/consult to Urology; Future  -     CBC Auto Differential; Future  -     BASIC METABOLIC PANEL; Future  -     US Retroperitoneal Complete (Kidney and; Future    Smoker  -     Urinalysis  -     Urine culture  -     Ambulatory referral/consult to Urology; Future    History of kidney stones  -     US Retroperitoneal Complete (Kidney and; Future    History of UTI      Repeat urine studies as above with lab/imaging  Recommend urology f/u, appt  scheduled.   Kelle Barnhart PA-C        Future Appointments   Date Time Provider Department Center   12/29/2022  3:30 PM [facility] OIC-US1 MASTER [facility] ULTR [facility]   1/3/2023  2:30 PM Marline Jimenez MD [facility] RHEUM [facility]   1/4/2023  8:00 AM VASCULAR, LAB [facility] VASCLAB [facility]   1/4/2023  8:20 AM Feroz Jensen MD [facility] VASCSUR [facility]   1/11/2023  1:40 PM [facility]

## 2022-12-24 LAB — BACTERIA UR CULT: NORMAL

## 2022-12-29 ENCOUNTER — HOSPITAL ENCOUNTER (OUTPATIENT)
Dept: RADIOLOGY | Facility: HOSPITAL | Age: 67
Discharge: HOME | End: 2022-12-29
Attending: PHYSICIAN ASSISTANT
Payer: MEDICARE

## 2022-12-29 DIAGNOSIS — R31.0 GROSS HEMATURIA: ICD-10-CM

## 2022-12-29 DIAGNOSIS — Z87.442 HISTORY OF KIDNEY STONES: ICD-10-CM

## 2022-12-29 PROCEDURE — 76770 US EXAM ABDO BACK WALL COMP: CPT | Mod: 26,,, | Performed by: RADIOLOGY

## 2022-12-29 PROCEDURE — 76770 US RETROPERITONEAL COMPLETE: ICD-10-PCS | Mod: 26,,, | Performed by: RADIOLOGY

## 2022-12-29 PROCEDURE — 76770 US EXAM ABDO BACK WALL COMP: CPT | Mod: TC

## 2023-01-02 NOTE — PROGRESS NOTES
Answers submitted by the patient for this visit:  Rheumatology Questionnaire (Submitted on 1/1/2023)  fever: No  eye redness: No  mouth sores: No  headaches: No  shortness of breath: No  chest pain: No  trouble swallowing: No  diarrhea: No  constipation: No  unexpected weight change: No  genital sore: No  During the last 3 days, have you had a skin rash?: No  Bruises or bleeds easily: No  cough: Yes      Subjective:       Patient ID: Andrea Gant is a 67 y.o. male with nodular sero+ccp+ RA, OA and Hepatitis C (cured with Harvoni) on MTX    Chief Complaint: No chief complaint on file.    Returns for follow-up. Last seen on 8/10/22. At his last visit was doing well on only 10 mg of MTX/wk.   Had begun MTX in an effort to avoid TNFi due to costs. He has been off humira since January 2021.   However, over past month or so has begun to have more joint pain, especially in his hands & wrists and noted some MCP & wrist swelling.  Also reports that a cart recently rolled onto his R Achilles tendon area and it has been painful since.   This pretty much followed a UTI w gross hematuria which has been successfully treated.  Currently having AM stiffness lasting 1/2 hr to 45 minutes.Denies dysphagia, tight skin, oral ulcers, pleurisy, pericarditis, photosensitivity, thromboses. Has dry eyes. No dry mouth. No true Raynaud's.   Had SC nodules removed from R elbow; in past had them removed from L elbow. Pathology both times: rheumatoid nodules.    He is still  taking 100 mg trazodone, prescribed by us for insomnia and it is helping him.  However, some depression which he's handling on his own.       Current Outpatient Medications   Medication Sig Dispense Refill    amLODIPine (NORVASC) 5 MG tablet Take 1 tablet (5 mg total) by mouth once daily. 90 tablet 3    cilostazoL (PLETAL) 50 MG Tab Take 1 tablet (50 mg total) by mouth 2 (two) times daily. 180 tablet 3    doxazosin (CARDURA) 4 MG tablet TAKE 1 TABLET BY MOUTH IN  THE  EVENING 90 tablet 3    folic acid (FOLVITE) 1 MG tablet TAKE 1 TABLET BY MOUTH ONCE DAILY 90 tablet 3    methotrexate 2.5 MG Tab TAKE 4 TABLETS BY MOUTH  EVERY 7 DAYS ; THIS  MEDICATION REQUIRES  PERIODIC LAB MONITORING Strength: 2.5 mg 60 tablet 1    nitrofurantoin, macrocrystal-monohydrate, (MACROBID) 100 MG capsule Take 1 capsule (100 mg total) by mouth 2 (two) times daily. 14 capsule 0    simvastatin (ZOCOR) 10 MG tablet TAKE 1 TABLET BY MOUTH EVERY DAY IN THE EVENING 90 tablet 6    traZODone (DESYREL) 50 MG tablet TAKE 3 TABLETS BY MOUTH  NIGHTLY AS NEEDED FOR  INSOMNIA 270 tablet 3     No current facility-administered medications for this visit.     Probable Allergic to Enbrel (rash);     Past Medical History:   Diagnosis Date    Cataract     Colon polyp 2014    History of hepatitis C, s/p successful treatment w/ SVR12 - 7/2015 10/14/2012    Kelsey 1a,  Liver biopsy 6/2014 - Stage 1 fibrosis;  Completed 8 weeks Harvoni w/ SVR12 - 7/2015      Hyperlipidemia     Lipoma of arm     Lipoma of lower extremity     Nuclear sclerosis - Both Eyes 10/22/2012    Other and unspecified hyperlipidemia 10/22/2013    PAD (peripheral artery disease)     Peripheral vascular disease, unspecified     Rheumatoid arthritis(714.0) 10/14/2012       Past Surgical History:   Procedure Laterality Date    COLONOSCOPY N/A 11/29/2021    Procedure: COLONOSCOPY;  Surgeon: Kenrick Zimmer MD;  Location: [facility] (4TH FLR);  Service: Endoscopy;  Laterality: N/A;  blood thinner approval received, see telephone encounter 10/19/21-BB  fully vacc-inst email-tb    KNEE ARTHROPLASTY      LAPAROSCOPIC EXPLORATION OF GROIN Left 9/6/2018    Procedure: EXPLORATION, INGUINAL REGION, LAPAROSCOPIC;  Surgeon: Mingo De Guzman Jr., MD;  Location: [facility] OR 2ND FLR;  Service: General;  Laterality: Left;    TONSILLECTOMY         Review of Systems   Constitutional:  Negative for fatigue, fever and unexpected weight change.        Initial weight loss  "attributed to dental issues.  Now maintaining weight loss;    HENT: Negative.  Negative for hearing loss, mouth sores, sore throat, tinnitus and trouble swallowing.    Eyes: Negative.  Negative for redness and visual disturbance.        Dry eyes.   Respiratory:  Negative for cough, choking, chest tightness and shortness of breath.    Cardiovascular: Negative.  Negative for chest pain, palpitations and leg swelling.   Gastrointestinal: Negative.  Negative for abdominal pain, blood in stool, constipation, diarrhea, nausea and vomiting.        Heartburn improved   Endocrine: Negative.    Genitourinary:  Positive for frequency. Negative for genital sores, hematuria and urgency.   Musculoskeletal: Negative.  Negative for back pain, neck pain and neck stiffness.   Skin: Negative.  Negative for rash.   Allergic/Immunologic: Negative.    Neurological: Negative.  Negative for dizziness, syncope, numbness and headaches.   Hematological: Negative.  Does not bruise/bleed easily.   Psychiatric/Behavioral:  Positive for dysphoric mood. Negative for sleep disturbance. The patient is nervous/anxious.         Sleep helped by trazodone.        SH:Gave up smoking was former smoker.  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       Objective:     BP (!) 145/85   Pulse 84   Ht 5' 9" (1.753 m)   Wt 71.4 kg (157 lb 6.5 oz)   BMI 23.25 kg/m²     Was 154 lb 15.7 lbs on 5/17/22  Was 158 lb 11.7 oz on 11/2/21  Was 173 lb 11.6 oz on 8/8/19  Was 172 lbs 4.8 oz on 12/13/16;  Physical Exam   Constitutional: He is oriented to person, place, and time. No distress.   HENT:   Head: Normocephalic and atraumatic.   Mouth/Throat: Oropharynx is clear and moist. No oropharyngeal exudate.   No facial rashes  Parotids not enlarged     Eyes: Pupils are equal, round, and reactive to light. Conjunctivae are normal. Right eye exhibits no discharge. Left eye exhibits no discharge. No scleral icterus.   Neck: No JVD present. No tracheal deviation present. No thyromegaly " present.   Cardiovascular: Normal rate and regular rhythm. Exam reveals no gallop and no friction rub.   Murmur heard.  Pulmonary/Chest: Effort normal and breath sounds normal. No respiratory distress. He has no wheezes. He has no rales. He exhibits no tenderness.   Abdominal: Soft. Bowel sounds are normal. He exhibits no distension and no mass. There is no splenomegaly or hepatomegaly. There is no abdominal tenderness. There is no rebound and no guarding.   Musculoskeletal:         General: No tenderness. Normal range of motion.      Cervical back: Neck supple.      Comments: Cspine FROM no tenderness  Tspine FROM no tenderness; mild kyposis  Lspine FROM no tenderness.  TMJ: unremarkable  Shoulders: FROM now; no synovitis; no tenderness  Elbows: FROM; bilateral surgical scars; R mild flexion contractre.  Wrists: FROM; mild SHT & sosa TTP  MCPs: FROM; +SHT especially R 3rd MCP; minimal metacarpalgia;  ok;  PIPs: FROM; +Bouchards--servando 4th R PIP; no SHT; no TTP; makes good fists.  DIPs: FROM; + Heberden's; no synovitis  HIPS: FROM  Knees: FROM; no synovitis; no instability; minimal PF crepitus;  Ankles: FROM: no synovitis R w TTP Achilles tendon;   Toes: ok; no metatarsalgia;        Lymphadenopathy:     He has no cervical adenopathy.   Neurological: He is alert and oriented to person, place, and time. He has normal reflexes. No cranial nerve deficit. Gait normal.   Proximal and distal muscle strength 5/5.   Skin: Skin is warm and dry. No rash noted. He is not diaphoretic.   Lipomas, forearms and other areas;  Rheumatoid nodules vs lipomas of elbows.    Bilateral superficial varicosities LE;   Psychiatric: Mood, memory, affect and judgment normal.   Vitals reviewed.    LABS:   12/22/22: CBC Hg 13.6; BMP ok;  11/3/22: ESR 11; CRP 1.9; CMP: BUN 26; cnne 1.5; GFR 50.4; UA 1+ pr; >100 RBC; >100 WBC;   8/3/22: ESR 8; CRP 2.8; CBC Hg 13.1; Ht 39.5; CMP cnne 1.3; GFR 59.8; glu 154  5/17/22: Hg A1C 5.0  5/12/22: ESR 20  (23); CRP 2.0; CBC Hg 12.8; Ht 38.1; CMP glu 164;   22: ESR 9; CRP 5.4; CBC Hg 13.6; Na 134;   10/11/21: CBC ok; CMP ok; TSH ok; HgA1C 5.2;   3/24/21: ESR 19; CRP 3.5; CBC ok; CMP CMP cnne 1.3; GFR 56.5;   20: ESR 13 (23); CRP 2.5; CBC ok; CMP cnne 1.4; GFR 52; BUN 30; TP 8.7; ; CCP 30.4  19: ESR 9; CRP 2.5; CBC ok; CMP BUN 24; cnne 1.3; GFR 57.3;   19: ESR 24 (23); CRP 5.4; CBC ok; CMP cnne 1.3; GFR 57.3; BUN 27;   10/10/18; ESR 7; CRP 4.1; CBC ; CMP cnne 1.3; GFR 57.7;   18: ESR 29; CRP; CBC ok; CMP ok; ; CCP 44;   17: ESR 23; CRP 4.2; CBC ok; CMP cnne 1.3; GFR 58.1;   3/26/16: CBC ok; cnne 1.3  3/10/16: ESR 9; CRP 1.7; CBC, CMP ok;  9/15/15: ESR 20; CRP 0.9; CBC ok; CMP ok;  3/2/15: CMP BUN 28; cnne 1.1  1/12/15: Hg 13.9  10/21/14: ESR 8;   14: ESR 19; CRP 2.9; Hg 12.8; cnne 1.3; Vit D 18; ; CCP 44.3   Hepatitis B & HIV negative; HCV+;     IMAGIN19: Bilateral hands: personally viewed: L: cystic changes at the distal aspect of the left ulna, also involving some of the carpal bones, as well as the distal aspects of the 1st and 3rd metacarpal bones.  Mild degenerative changes at some of the DIP joints; R: Cystic changes involving some of the carpal bones as well as the distal aspect of the 1st and 3rd metacarpals and proximal aspect of the proximal phalanx of the thumb.  Degenerative changes involving some of the DIP joints and also the interphalangeal joint of the right thumb.    16: US Dopplers: R:minimal peripheral arterial occlusive disease: L: moderate peripheral arterial occlusive disease; unchanged  4/2/15: Bilateral wrists: personal review: cystic lesion left lunate, possibly left ulnar notch. (not too different from previous)  4/2/15: R ankle personally reviewed: ok  14: Arthritis survey personally reviewed again: OA lower CS; min OA hands & feet;      Assessment:   Seropositive (), CCP (44) positive nodular (bilateral elbows now  removed) rheumatoid arthritis with no erosions--some activity   Enbrel effective but resulted in rash.    MTX and Leflunomide were contraindicated at the time due to HCV.   SSZ given ok by Dr. Moore, but never begun   Was on hydroxychloroquine 400 mg/d for a while   Humira 40 mg qow since 6/15~ 1/2021   On MTX 10 mg/wk (ok for use of MTX or Leflunomide if needed as per Dr. Moore) since 11/2020    Possible Raynaud's in past.     R Achilles tendinitis    Dermatitis c/w seborrheic keratoses & other skin lesions   S/P basal cell carcinoma    Bilateral adhesive capsulitis R>L--resolved    HTN    CKD 3 a  mild renal insufficiency worsened on meloxicam & ibuprofen     Recent UTI w gross hematuria     Hx of nephrolithiasis    Hepatitis C with normal liver function tests--genotype 1a   Completed Harvoni; cured    Osteoarthritis of hands.     Recurrent hypovitaminosis D -treated in past    Peripheral Arterial Disease.    Compression deformity of the lower lumbar and thoracic vertebral bodies by x-ray.     Multiple lipomas of the arms and lumbar spine area.     Occupational injuries in the past.     Persistent insomnia.   Helped by trazodone.     Normal weight from overweight  Plan:   Continue MTX but increase to 20 mg/wk in 2 divided doses + folic acid 1 mg/d.  Check labs in 1 month & then q 3 months.  Discussed Achilles tendinitis and showed exercises to do; handout provided.   Ok to continue trazodone 100 mg/hs prn  RTC 3 months

## 2023-01-03 ENCOUNTER — OFFICE VISIT (OUTPATIENT)
Dept: RHEUMATOLOGY | Facility: CLINIC | Age: 68
End: 2023-01-03
Payer: MEDICARE

## 2023-01-03 VITALS
WEIGHT: 157.44 LBS | SYSTOLIC BLOOD PRESSURE: 145 MMHG | HEIGHT: 69 IN | BODY MASS INDEX: 23.32 KG/M2 | HEART RATE: 84 BPM | DIASTOLIC BLOOD PRESSURE: 85 MMHG

## 2023-01-03 DIAGNOSIS — M76.61 ACHILLES TENDINITIS OF RIGHT LOWER EXTREMITY: ICD-10-CM

## 2023-01-03 DIAGNOSIS — Z79.631 LONG TERM METHOTREXATE USER: ICD-10-CM

## 2023-01-03 DIAGNOSIS — N18.31 STAGE 3A CHRONIC KIDNEY DISEASE: ICD-10-CM

## 2023-01-03 DIAGNOSIS — M05.79 RHEUMATOID ARTHRITIS INVOLVING MULTIPLE SITES WITH POSITIVE RHEUMATOID FACTOR: Primary | ICD-10-CM

## 2023-01-03 DIAGNOSIS — R22.9 SUBCUTANEOUS NODULES: ICD-10-CM

## 2023-01-03 PROCEDURE — 99214 OFFICE O/P EST MOD 30 MIN: CPT | Mod: S$GLB,,, | Performed by: INTERNAL MEDICINE

## 2023-01-03 PROCEDURE — 3079F PR MOST RECENT DIASTOLIC BLOOD PRESSURE 80-89 MM HG: ICD-10-PCS | Mod: CPTII,S$GLB,, | Performed by: INTERNAL MEDICINE

## 2023-01-03 PROCEDURE — 1159F PR MEDICATION LIST DOCUMENTED IN MEDICAL RECORD: ICD-10-PCS | Mod: CPTII,S$GLB,, | Performed by: INTERNAL MEDICINE

## 2023-01-03 PROCEDURE — 1160F PR REVIEW ALL MEDS BY PRESCRIBER/CLIN PHARMACIST DOCUMENTED: ICD-10-PCS | Mod: CPTII,S$GLB,, | Performed by: INTERNAL MEDICINE

## 2023-01-03 PROCEDURE — 1125F AMNT PAIN NOTED PAIN PRSNT: CPT | Mod: CPTII,S$GLB,, | Performed by: INTERNAL MEDICINE

## 2023-01-03 PROCEDURE — 3008F BODY MASS INDEX DOCD: CPT | Mod: CPTII,S$GLB,, | Performed by: INTERNAL MEDICINE

## 2023-01-03 PROCEDURE — 99999 PR PBB SHADOW E&M-EST. PATIENT-LVL IV: CPT | Mod: PBBFAC,,, | Performed by: INTERNAL MEDICINE

## 2023-01-03 PROCEDURE — 3077F PR MOST RECENT SYSTOLIC BLOOD PRESSURE >= 140 MM HG: ICD-10-PCS | Mod: CPTII,S$GLB,, | Performed by: INTERNAL MEDICINE

## 2023-01-03 PROCEDURE — 3008F PR BODY MASS INDEX (BMI) DOCUMENTED: ICD-10-PCS | Mod: CPTII,S$GLB,, | Performed by: INTERNAL MEDICINE

## 2023-01-03 PROCEDURE — 3077F SYST BP >= 140 MM HG: CPT | Mod: CPTII,S$GLB,, | Performed by: INTERNAL MEDICINE

## 2023-01-03 PROCEDURE — 1160F RVW MEDS BY RX/DR IN RCRD: CPT | Mod: CPTII,S$GLB,, | Performed by: INTERNAL MEDICINE

## 2023-01-03 PROCEDURE — 99999 PR PBB SHADOW E&M-EST. PATIENT-LVL IV: ICD-10-PCS | Mod: PBBFAC,,, | Performed by: INTERNAL MEDICINE

## 2023-01-03 PROCEDURE — 99214 PR OFFICE/OUTPT VISIT, EST, LEVL IV, 30-39 MIN: ICD-10-PCS | Mod: S$GLB,,, | Performed by: INTERNAL MEDICINE

## 2023-01-03 PROCEDURE — 1159F MED LIST DOCD IN RCRD: CPT | Mod: CPTII,S$GLB,, | Performed by: INTERNAL MEDICINE

## 2023-01-03 PROCEDURE — 1125F PR PAIN SEVERITY QUANTIFIED, PAIN PRESENT: ICD-10-PCS | Mod: CPTII,S$GLB,, | Performed by: INTERNAL MEDICINE

## 2023-01-03 PROCEDURE — 3079F DIAST BP 80-89 MM HG: CPT | Mod: CPTII,S$GLB,, | Performed by: INTERNAL MEDICINE

## 2023-01-03 RX ORDER — METHOTREXATE 2.5 MG/1
20 TABLET ORAL
Qty: 120 TABLET | Refills: 1 | Status: SHIPPED | OUTPATIENT
Start: 2023-01-03 | End: 2023-10-13

## 2023-01-03 ASSESSMENT — ROUTINE ASSESSMENT OF PATIENT INDEX DATA (RAPID3)
AM STIFFNESS SCORE: 1, YES
TOTAL RAPID3 SCORE: 6.28
PAIN SCORE: 9
PATIENT GLOBAL ASSESSMENT SCORE: 7.5
MDHAQ FUNCTION SCORE: 0.7
PSYCHOLOGICAL DISTRESS SCORE: 3.3
FATIGUE SCORE: 5

## 2023-01-03 NOTE — PROGRESS NOTES
Rapid3 Question Responses and Scores 1/1/2023   MDHAQ Score 0.7   Psychologic Score 3.3   Pain Score 9   When you awakened in the morning OVER THE LAST WEEK, did you feel stiff? Yes   Fatigue Score 5   Global Health Score 7.5   RAPID3 Score 6.28

## 2023-01-04 ENCOUNTER — HOSPITAL ENCOUNTER (OUTPATIENT)
Dept: VASCULAR SURGERY | Facility: CLINIC | Age: 68
Discharge: HOME | End: 2023-01-04
Attending: SURGERY
Payer: MEDICARE

## 2023-01-04 ENCOUNTER — OFFICE VISIT (OUTPATIENT)
Dept: VASCULAR SURGERY | Facility: CLINIC | Age: 68
End: 2023-01-04
Payer: MEDICARE

## 2023-01-04 VITALS
TEMPERATURE: 98 F | HEART RATE: 70 BPM | WEIGHT: 152.13 LBS | DIASTOLIC BLOOD PRESSURE: 75 MMHG | HEIGHT: 69 IN | BODY MASS INDEX: 22.53 KG/M2 | SYSTOLIC BLOOD PRESSURE: 148 MMHG

## 2023-01-04 DIAGNOSIS — I71.40 AAA (ABDOMINAL AORTIC ANEURYSM): Primary | ICD-10-CM

## 2023-01-04 DIAGNOSIS — I71.43 INFRARENAL ABDOMINAL AORTIC ANEURYSM (AAA) WITHOUT RUPTURE: Primary | ICD-10-CM

## 2023-01-04 DIAGNOSIS — I71.40 ABDOMINAL AORTIC ANEURYSM (AAA) WITHOUT RUPTURE, UNSPECIFIED PART: ICD-10-CM

## 2023-01-04 PROCEDURE — 99214 OFFICE O/P EST MOD 30 MIN: CPT | Mod: S$GLB,,, | Performed by: SURGERY

## 2023-01-04 PROCEDURE — 3288F FALL RISK ASSESSMENT DOCD: CPT | Mod: CPTII,S$GLB,, | Performed by: SURGERY

## 2023-01-04 PROCEDURE — 3288F PR FALLS RISK ASSESSMENT DOCUMENTED: ICD-10-PCS | Mod: CPTII,S$GLB,, | Performed by: SURGERY

## 2023-01-04 PROCEDURE — 3008F PR BODY MASS INDEX (BMI) DOCUMENTED: ICD-10-PCS | Mod: CPTII,S$GLB,, | Performed by: SURGERY

## 2023-01-04 PROCEDURE — 1126F AMNT PAIN NOTED NONE PRSNT: CPT | Mod: CPTII,S$GLB,, | Performed by: SURGERY

## 2023-01-04 PROCEDURE — 93978 VASCULAR STUDY: CPT | Mod: S$GLB,,, | Performed by: SURGERY

## 2023-01-04 PROCEDURE — 1101F PT FALLS ASSESS-DOCD LE1/YR: CPT | Mod: CPTII,S$GLB,, | Performed by: SURGERY

## 2023-01-04 PROCEDURE — 99214 PR OFFICE/OUTPT VISIT, EST, LEVL IV, 30-39 MIN: ICD-10-PCS | Mod: S$GLB,,, | Performed by: SURGERY

## 2023-01-04 PROCEDURE — 99999 PR PBB SHADOW E&M-EST. PATIENT-LVL III: CPT | Mod: PBBFAC,,, | Performed by: SURGERY

## 2023-01-04 PROCEDURE — 1101F PR PT FALLS ASSESS DOC 0-1 FALLS W/OUT INJ PAST YR: ICD-10-PCS | Mod: CPTII,S$GLB,, | Performed by: SURGERY

## 2023-01-04 PROCEDURE — 99999 PR PBB SHADOW E&M-EST. PATIENT-LVL III: ICD-10-PCS | Mod: PBBFAC,,, | Performed by: SURGERY

## 2023-01-04 PROCEDURE — 3077F PR MOST RECENT SYSTOLIC BLOOD PRESSURE >= 140 MM HG: ICD-10-PCS | Mod: CPTII,S$GLB,, | Performed by: SURGERY

## 2023-01-04 PROCEDURE — 3078F DIAST BP <80 MM HG: CPT | Mod: CPTII,S$GLB,, | Performed by: SURGERY

## 2023-01-04 PROCEDURE — 1159F MED LIST DOCD IN RCRD: CPT | Mod: CPTII,S$GLB,, | Performed by: SURGERY

## 2023-01-04 PROCEDURE — 3077F SYST BP >= 140 MM HG: CPT | Mod: CPTII,S$GLB,, | Performed by: SURGERY

## 2023-01-04 PROCEDURE — 93978 PR DUPLEX LARGE VESSEL(S),COMPLETE: ICD-10-PCS | Mod: S$GLB,,, | Performed by: SURGERY

## 2023-01-04 PROCEDURE — 1126F PR PAIN SEVERITY QUANTIFIED, NO PAIN PRESENT: ICD-10-PCS | Mod: CPTII,S$GLB,, | Performed by: SURGERY

## 2023-01-04 PROCEDURE — 3008F BODY MASS INDEX DOCD: CPT | Mod: CPTII,S$GLB,, | Performed by: SURGERY

## 2023-01-04 PROCEDURE — 1159F PR MEDICATION LIST DOCUMENTED IN MEDICAL RECORD: ICD-10-PCS | Mod: CPTII,S$GLB,, | Performed by: SURGERY

## 2023-01-04 PROCEDURE — 3078F PR MOST RECENT DIASTOLIC BLOOD PRESSURE < 80 MM HG: ICD-10-PCS | Mod: CPTII,S$GLB,, | Performed by: SURGERY

## 2023-01-04 NOTE — PROGRESS NOTES
Andrea Martinoaashishdio  01/04/2023    HPI:  Patient is a 67 y.o. male with a h/o former heavy tobacco use, HLD for f/u of AAA and L lower extremity claudication.   Can still walk approx 1 mile or greater before experiencing claudication symptoms (in 2013 was 1 block). Since his last visit he reports no new symptoms. He is active and walks daily with intermittent claudication symptoms relieved by brief episodes of rest. Is otherwise feeling well.     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)     Significant family history of claudication/heart disease     PMD is Dr LUCINDA Wang    FHx for aneurysmal disease: -    Past Medical History:   Diagnosis Date    Cataract     Colon polyp 2014    History of hepatitis C, s/p successful treatment w/ SVR12 - 7/2015 10/14/2012    Kesley 1a,  Liver biopsy 6/2014 - Stage 1 fibrosis;  Completed 8 weeks Harvoni w/ SVR12 - 7/2015      Hyperlipidemia     Lipoma of arm     Lipoma of lower extremity     Nuclear sclerosis - Both Eyes 10/22/2012    Other and unspecified hyperlipidemia 10/22/2013    PAD (peripheral artery disease)     Peripheral vascular disease, unspecified     Rheumatoid arthritis(714.0) 10/14/2012     Past Surgical History:   Procedure Laterality Date    COLONOSCOPY N/A 11/29/2021    Procedure: COLONOSCOPY;  Surgeon: Kenrick Zimmer MD;  Location: [facility] ENDO (4TH FLR);  Service: Endoscopy;  Laterality: N/A;  blood thinner approval received, see telephone encounter 10/19/21-BB  fully vacc-inst email-tb    KNEE ARTHROPLASTY      LAPAROSCOPIC EXPLORATION OF GROIN Left 9/6/2018    Procedure: EXPLORATION, INGUINAL REGION, LAPAROSCOPIC;  Surgeon: Mingo De Guzman Jr., MD;  Location: [facility] OR 2ND FLR;  Service: General;  Laterality: Left;    TONSILLECTOMY       Family History   Problem Relation Age of Onset    Heart disease Paternal Uncle     Dementia Mother     Heart disease  Sister     Liver disease Neg Hx     Amblyopia Neg Hx     Blindness Neg Hx     Cancer Neg Hx     Cataracts Neg Hx     Diabetes Neg Hx     Glaucoma Neg Hx     Hypertension Neg Hx     Macular degeneration Neg Hx     Retinal detachment Neg Hx     Strabismus Neg Hx     Stroke Neg Hx     Thyroid disease Neg Hx      Social History     Socioeconomic History    Marital status:    Tobacco Use    Smoking status: Former     Types: Cigarettes     Quit date: 2020     Years since quittin.5    Smokeless tobacco: Never   Substance and Sexual Activity    Alcohol use: Yes     Comment: 3-4 drinks per week    Drug use: Yes     Types: Marijuana     Comment: marajuana used on Saturday   Social History Narrative    Resides locally    [place] at [employer]     w/ 2 adult children       Current Outpatient Medications:     amLODIPine (NORVASC) 5 MG tablet, Take 1 tablet (5 mg total) by mouth once daily., Disp: 90 tablet, Rfl: 3    cilostazoL (PLETAL) 50 MG Tab, Take 1 tablet (50 mg total) by mouth 2 (two) times daily., Disp: 180 tablet, Rfl: 3    doxazosin (CARDURA) 4 MG tablet, TAKE 1 TABLET BY MOUTH IN  THE EVENING, Disp: 90 tablet, Rfl: 3    folic acid (FOLVITE) 1 MG tablet, TAKE 1 TABLET BY MOUTH ONCE DAILY, Disp: 90 tablet, Rfl: 3    methotrexate 2.5 MG Tab, Take 8 tablets (20 mg total) by mouth every 7 days. This medication requires periodic lab monitoring., Disp: 120 tablet, Rfl: 1    nitrofurantoin, macrocrystal-monohydrate, (MACROBID) 100 MG capsule, Take 1 capsule (100 mg total) by mouth 2 (two) times daily., Disp: 14 capsule, Rfl: 0    simvastatin (ZOCOR) 10 MG tablet, TAKE 1 TABLET BY MOUTH EVERY DAY IN THE EVENING, Disp: 90 tablet, Rfl: 6    traZODone (DESYREL) 50 MG tablet, TAKE 3 TABLETS BY MOUTH  NIGHTLY AS NEEDED FOR  INSOMNIA, Disp: 270 tablet, Rfl: 3    REVIEW OF SYSTEMS:  General: negative; ENT: negative; Allergy and Immunology: negative; Hematological and Lymphatic: Negative;  Endocrine: negative; Respiratory: no cough, shortness of breath, or wheezing; Cardiovascular: no chest pain or dyspnea on exertion; Gastrointestinal: no abdominal pain/back, change in bowel habits, or bloody stools; Genito-Urinary: no dysuria, trouble voiding, or hematuria; Musculoskeletal: negative  Neurological: no TIA or stroke symptoms; Psychiatric: no nervousness, anxiety or depression.    PHYSICAL EXAM:   Right Arm BP - Sittin/79 (23)  Left Arm BP - Sittin/75 (23)  Pulse: 70  Temp: 97.6 °F (36.4 °C)      General appearance:  Alert, well-appearing, and in no distress.  Oriented to person, place, and time   Neurological: Normal speech, no focal findings noted; CN II - XII grossly intact           Musculoskeletal: Digits/nail without cyanosis/clubbing.  Normal muscle strength/tone.                 Neck: Supple, no significant adenopathy; thyroid is not enlarged                  No carotid bruit can be auscultated                Chest:  Clear to auscultation, no wheezes, rales or rhonchi, symmetric air entry     No use of accessory muscles             Cardiac: Normal rate and regular rhythm, S1 and S2 normal; PMI non-displaced          Abdomen: Soft, nontender, nondistended, no masses or organomegaly     No rebound tenderness noted; bowel sounds normal     Pulsatile aortic mass is not palpable.     No groin adenopathy      Extremities:   2+ femoral pulses bilaterally     No L pedal pulse palpable.     No pedal edema     No ulcerations    LAB RESULTS:  Lab Results   Component Value Date    K 3.9 2022    K 4.1 2022    K 3.8 2022    CREATININE 1.2 2022    CREATININE 1.5 (H) 2022    CREATININE 1.3 2022     Lab Results   Component Value Date    WBC 7.02 2022    WBC 9.32 2022    WBC 7.99 2022    HCT 42.6 2022    HCT 43.2 2022    HCT 39.5 (L) 2022     2022     2022     2022      Lab Results   Component Value Date    HGBA1C 5.0 05/17/2022    HGBA1C 5.2 10/11/2021     IMAGING:  AAA U/S: 3.9cm  R CHAD 1.8cm  L CHAD 1.4cm    Prior:  Aortic u/s (11/16/2020)  aneurysm with a maximum diameter of 3.6cm transversely. The right CHAD is1.6cm and left CHAD is 1.4cm  No popliteal aneurysms     ABIs (11/16/2020)  Right Leg: FAHEEM (1.06) and PVR waveforms indicate minimal peripheral arterial occlusive disease.   Left Leg: FAHEEM (.73) and PVR waveforms indicate moderate peripheral arterial occlusive disease.     ABIs  R 1.02 (previous 1.08, 1.12)  L 0.76 (previous 0.68, 0.61, 0.58)    Triphasic waveforms at R ankle  Biphasic waveforms at L ankle     CTA runoff  +atheroma in Aorta  Ectatic R distal CAHD   Long segment L SFA  30 cm  Reconstitutes in mid-L popliteal  3 tibals runoff    No R leg occlusive disease  + calcium throughout     Carotid u/s:  No ICA disease  Normal antegrade vert flow    IMP/PLAN:     67 y.o. male with a  ho former heavy tobacco use, HLD with Sirisha chronic limb ischemia class II : L calf claudication but well managed- has 3.9cm AAA (3.6 cm in Nov 2020).  Has been off tobacco since 2013  Cont statin, cilostazol; responding to pletal as can now ambulate 1 mil prior to L calf claudication  Start ASA 81 po QD  Repeat AAA US in 2.5 years as has been stable       Feroz Jensen MD DFSVS FACS   Vascular/Endovascular Surgery

## 2023-01-10 ENCOUNTER — PATIENT MESSAGE (OUTPATIENT)
Dept: INTERNAL MEDICINE | Facility: CLINIC | Age: 68
End: 2023-01-10
Payer: MEDICARE

## 2023-01-19 ENCOUNTER — OFFICE VISIT (OUTPATIENT)
Dept: UROLOGY | Facility: CLINIC | Age: 68
End: 2023-01-19
Payer: MEDICARE

## 2023-01-19 DIAGNOSIS — R31.0 GROSS HEMATURIA: ICD-10-CM

## 2023-01-19 DIAGNOSIS — F17.200 SMOKER: ICD-10-CM

## 2023-01-19 DIAGNOSIS — R35.1 BPH ASSOCIATED WITH NOCTURIA: Primary | ICD-10-CM

## 2023-01-19 DIAGNOSIS — N40.1 BPH ASSOCIATED WITH NOCTURIA: Primary | ICD-10-CM

## 2023-01-19 LAB
BACTERIA #/AREA URNS AUTO: ABNORMAL /HPF
MICROSCOPIC COMMENT: ABNORMAL
RBC #/AREA URNS AUTO: 65 /HPF (ref 0–4)
SQUAMOUS #/AREA URNS AUTO: 0 /HPF
WBC #/AREA URNS AUTO: 60 /HPF (ref 0–5)

## 2023-01-19 PROCEDURE — 1160F RVW MEDS BY RX/DR IN RCRD: CPT | Mod: CPTII,S$GLB,, | Performed by: NURSE PRACTITIONER

## 2023-01-19 PROCEDURE — 1126F PR PAIN SEVERITY QUANTIFIED, NO PAIN PRESENT: ICD-10-PCS | Mod: CPTII,S$GLB,, | Performed by: NURSE PRACTITIONER

## 2023-01-19 PROCEDURE — 1159F MED LIST DOCD IN RCRD: CPT | Mod: CPTII,S$GLB,, | Performed by: NURSE PRACTITIONER

## 2023-01-19 PROCEDURE — 81001 URINALYSIS AUTO W/SCOPE: CPT | Performed by: NURSE PRACTITIONER

## 2023-01-19 PROCEDURE — 1160F PR REVIEW ALL MEDS BY PRESCRIBER/CLIN PHARMACIST DOCUMENTED: ICD-10-PCS | Mod: CPTII,S$GLB,, | Performed by: NURSE PRACTITIONER

## 2023-01-19 PROCEDURE — 1126F AMNT PAIN NOTED NONE PRSNT: CPT | Mod: CPTII,S$GLB,, | Performed by: NURSE PRACTITIONER

## 2023-01-19 PROCEDURE — 87086 URINE CULTURE/COLONY COUNT: CPT | Performed by: NURSE PRACTITIONER

## 2023-01-19 PROCEDURE — 1159F PR MEDICATION LIST DOCUMENTED IN MEDICAL RECORD: ICD-10-PCS | Mod: CPTII,S$GLB,, | Performed by: NURSE PRACTITIONER

## 2023-01-19 PROCEDURE — 99999 PR PBB SHADOW E&M-EST. PATIENT-LVL III: ICD-10-PCS | Mod: PBBFAC,,, | Performed by: NURSE PRACTITIONER

## 2023-01-19 PROCEDURE — 99999 PR PBB SHADOW E&M-EST. PATIENT-LVL III: CPT | Mod: PBBFAC,,, | Performed by: NURSE PRACTITIONER

## 2023-01-19 PROCEDURE — 99204 OFFICE O/P NEW MOD 45 MIN: CPT | Mod: S$GLB,,, | Performed by: NURSE PRACTITIONER

## 2023-01-19 PROCEDURE — 99204 PR OFFICE/OUTPT VISIT, NEW, LEVL IV, 45-59 MIN: ICD-10-PCS | Mod: S$GLB,,, | Performed by: NURSE PRACTITIONER

## 2023-01-19 RX ORDER — DOXYCYCLINE HYCLATE 100 MG
100 TABLET ORAL ONCE
Status: CANCELLED | OUTPATIENT
Start: 2023-01-19 | End: 2023-01-19

## 2023-01-19 RX ORDER — LIDOCAINE HYDROCHLORIDE 20 MG/ML
JELLY TOPICAL ONCE
Status: CANCELLED | OUTPATIENT
Start: 2023-01-19 | End: 2023-01-19

## 2023-01-19 NOTE — H&P (VIEW-ONLY)
CHIEF COMPLAINT:    Mr. Gant is a 67 y.o. male presenting for   Chief Complaint   Patient presents with    Other       PRESENTING ILLNESS:    Andrea Gant is a 67 y.o. male with a PMH of pvd, pad, hld, ckd 3, hx hep c (svr 2015) who presents for gross hematuria.    New patient to urology department. Presents today due to gross hematuria. Had one episode of gross hematuria about a month ago.      Previous/Current Smoker: yes  Radiation therapy to pelvis: no  Chemotherapy: no  Personal/ family history of bladder/ kidney cancer: no  Exposure to harmful chemicals: no  History of kidney stones: yes    He reports a good urinary stream and complete bladder emptying.  Reported nocturia 1-2 times per night. Does states drinks fluids up until bedtime.  Not bothersome to him, please with how he voids.    No family history of prostate cancer.  KRISTIN completed by PCP, Dr. Wang.    Does report having erectile dysfunction.  Not interested in medications at this time.    REVIEW OF SYSTEMS:    Review of Systems   Constitutional:  Negative for chills and fever.   Respiratory:  Negative for shortness of breath.    Cardiovascular:  Negative for chest pain.   Gastrointestinal:  Negative for constipation and diarrhea.   Genitourinary:  Negative for dysuria, flank pain, frequency, hematuria and urgency.   Neurological:  Negative for dizziness and weakness.     PATIENT HISTORY:    Past Medical History:   Diagnosis Date    Cataract     Colon polyp 2014    History of hepatitis C, s/p successful treatment w/ SVR12 - 7/2015 10/14/2012    Kelsey 1a,  Liver biopsy 6/2014 - Stage 1 fibrosis;  Completed 8 weeks Harvoni w/ SVR12 - 7/2015      Hyperlipidemia     Lipoma of arm     Lipoma of lower extremity     Nuclear sclerosis - Both Eyes 10/22/2012    Other and unspecified hyperlipidemia 10/22/2013    PAD (peripheral artery disease)     Peripheral vascular disease, unspecified     Rheumatoid arthritis(714.0) 10/14/2012       Family History    Problem Relation Age of Onset    Heart disease Paternal Uncle     Dementia Mother     Heart disease Sister     Liver disease Neg Hx     Amblyopia Neg Hx     Blindness Neg Hx     Cancer Neg Hx     Cataracts Neg Hx     Diabetes Neg Hx     Glaucoma Neg Hx     Hypertension Neg Hx     Macular degeneration Neg Hx     Retinal detachment Neg Hx     Strabismus Neg Hx     Stroke Neg Hx     Thyroid disease Neg Hx        Allergies:  Patient has no known allergies.    Medications:    Current Outpatient Medications:     amLODIPine (NORVASC) 5 MG tablet, Take 1 tablet (5 mg total) by mouth once daily., Disp: 90 tablet, Rfl: 3    cilostazoL (PLETAL) 50 MG Tab, Take 1 tablet (50 mg total) by mouth 2 (two) times daily., Disp: 180 tablet, Rfl: 3    doxazosin (CARDURA) 4 MG tablet, TAKE 1 TABLET BY MOUTH IN  THE EVENING, Disp: 90 tablet, Rfl: 3    folic acid (FOLVITE) 1 MG tablet, TAKE 1 TABLET BY MOUTH ONCE DAILY, Disp: 90 tablet, Rfl: 3    methotrexate 2.5 MG Tab, Take 8 tablets (20 mg total) by mouth every 7 days. This medication requires periodic lab monitoring., Disp: 120 tablet, Rfl: 1    nitrofurantoin, macrocrystal-monohydrate, (MACROBID) 100 MG capsule, Take 1 capsule (100 mg total) by mouth 2 (two) times daily., Disp: 14 capsule, Rfl: 0    simvastatin (ZOCOR) 10 MG tablet, TAKE 1 TABLET BY MOUTH EVERY DAY IN THE EVENING, Disp: 90 tablet, Rfl: 6    traZODone (DESYREL) 50 MG tablet, TAKE 3 TABLETS BY MOUTH  NIGHTLY AS NEEDED FOR  INSOMNIA, Disp: 270 tablet, Rfl: 3    PHYSICAL EXAMINATION:    Physical Exam  Vitals and nursing note reviewed.   Constitutional:       Appearance: Normal appearance. He is well-developed.   HENT:      Head: Normocephalic and atraumatic.   Eyes:      Pupils: Pupils are equal, round, and reactive to light.   Pulmonary:      Effort: Pulmonary effort is normal.   Musculoskeletal:         General: Normal range of motion.      Cervical back: Normal range of motion.   Skin:     General: Skin is warm and  dry.   Neurological:      Mental Status: He is alert and oriented to person, place, and time.   Psychiatric:         Behavior: Behavior normal.         LABS:    Lab Results   Component Value Date    PSA 0.76 10/11/2021    PSA 0.49 08/01/2019    PSA 0.42 08/15/2018    PSA 0.43 01/11/2012    PSA 0.35 08/19/2009    PSA 0.2 05/02/2006       IMPRESSION:  Encounter Diagnoses   Name Primary?    BPH associated with nocturia Yes    Gross hematuria     Smoker          PLAN:  Problem List Items Addressed This Visit    None  Visit Diagnoses       BPH associated with nocturia    -  Primary    Relevant Orders    PROSTATE SPECIFIC ANTIGEN, DIAGNOSTIC    Gross hematuria        Relevant Orders    Cystoscopy    Cytology, Urine    Urine culture    Urinalysis Microscopic    CT Urogram Abd Pelvis W WO    Smoker                1. Hematuria, gross   I explained to the patient that the causes of hematuria, whether it be gross hematuria or microhematuria, are many, including but not limited to  infections, kidney stones,  malignancy. Fortunately, for patients with microhematuria, the likelihood of finding an underlying  malignancy as the cause of the hematuria is very low at 1-2%. In patients with gross hematuria, the chances of an underlying  malignancy are higher but still low at 15-20%.    Nevertheless, I explained to the patient that the evaluation in both cases consists of upper tract imaging followed by flexible cystoscopy. I described the rationale and procedure for both and answered all questions.    Hematuria work up discussed in detail.  Will proceed with hematuria work up:  Cysto  CT urogram   Urine cytology  Urine culture    2. Bph with nocturia   - obtain psa with next set of labs   - fernanda performed by PCP    3. Rtc for above appts      Radha Vasquez NP    I spent over 45 minutes with the patient. Over 50% of the visit was spent in counseling.

## 2023-01-20 LAB — BACTERIA UR CULT: NORMAL

## 2023-01-23 ENCOUNTER — HOSPITAL ENCOUNTER (OUTPATIENT)
Dept: RADIOLOGY | Facility: HOSPITAL | Age: 68
Discharge: HOME | End: 2023-01-23
Attending: NURSE PRACTITIONER
Payer: MEDICARE

## 2023-01-23 DIAGNOSIS — R31.0 GROSS HEMATURIA: ICD-10-CM

## 2023-01-23 PROCEDURE — 74178 CT UROGRAM ABD PELVIS W WO: ICD-10-PCS | Mod: 26,,, | Performed by: RADIOLOGY

## 2023-01-23 PROCEDURE — 25500020 PHARM REV CODE 255: Performed by: NURSE PRACTITIONER

## 2023-01-23 PROCEDURE — 74178 CT ABD&PLV WO CNTR FLWD CNTR: CPT | Mod: 26,,, | Performed by: RADIOLOGY

## 2023-01-23 PROCEDURE — 74178 CT ABD&PLV WO CNTR FLWD CNTR: CPT | Mod: TC

## 2023-01-23 RX ADMIN — IOHEXOL 100 ML: 350 INJECTION, SOLUTION INTRAVENOUS at 06:01

## 2023-01-24 ENCOUNTER — TELEPHONE (OUTPATIENT)
Dept: UROLOGY | Facility: CLINIC | Age: 68
End: 2023-01-24
Payer: MEDICARE

## 2023-01-24 DIAGNOSIS — R31.0 GROSS HEMATURIA: Primary | ICD-10-CM

## 2023-01-24 NOTE — TELEPHONE ENCOUNTER
Mr. Gant notified of ct urogram results.  CT urogram with suspicious lesions on kidneys.  Also notified of pulmonary nodule found on left lower lobe.  Recommend follow up with pcp due to risk secondary smoker.  Will have patient drop urine sample to send for cytology.  Patient stated understanding to the above.

## 2023-01-25 ENCOUNTER — LAB VISIT (OUTPATIENT)
Dept: LAB | Facility: HOSPITAL | Age: 68
End: 2023-01-25
Attending: NURSE PRACTITIONER
Payer: MEDICARE

## 2023-01-25 DIAGNOSIS — R31.0 GROSS HEMATURIA: ICD-10-CM

## 2023-01-25 PROCEDURE — 88112 CYTOPATH CELL ENHANCE TECH: CPT | Mod: 26,,, | Performed by: STUDENT IN AN ORGANIZED HEALTH CARE EDUCATION/TRAINING PROGRAM

## 2023-01-25 PROCEDURE — 88112 PR  CYTOPATH, CELL ENHANCE TECH: ICD-10-PCS | Mod: 26,,, | Performed by: STUDENT IN AN ORGANIZED HEALTH CARE EDUCATION/TRAINING PROGRAM

## 2023-01-25 PROCEDURE — 88112 CYTOPATH CELL ENHANCE TECH: CPT | Performed by: STUDENT IN AN ORGANIZED HEALTH CARE EDUCATION/TRAINING PROGRAM

## 2023-01-26 LAB
FINAL PATHOLOGIC DIAGNOSIS: ABNORMAL
Lab: ABNORMAL
MICROSCOPIC EXAM: ABNORMAL

## 2023-02-01 ENCOUNTER — PROCEDURE VISIT (OUTPATIENT)
Dept: UROLOGY | Facility: CLINIC | Age: 68
End: 2023-02-01
Payer: MEDICARE

## 2023-02-01 VITALS
DIASTOLIC BLOOD PRESSURE: 82 MMHG | RESPIRATION RATE: 16 BRPM | WEIGHT: 157.69 LBS | HEART RATE: 72 BPM | HEIGHT: 69 IN | SYSTOLIC BLOOD PRESSURE: 155 MMHG | BODY MASS INDEX: 23.36 KG/M2

## 2023-02-01 DIAGNOSIS — R31.0 GROSS HEMATURIA: ICD-10-CM

## 2023-02-01 PROCEDURE — 88112 PR  CYTOPATH, CELL ENHANCE TECH: ICD-10-PCS | Mod: 26,,, | Performed by: PATHOLOGY

## 2023-02-01 PROCEDURE — 87086 URINE CULTURE/COLONY COUNT: CPT | Performed by: UROLOGY

## 2023-02-01 PROCEDURE — 88112 CYTOPATH CELL ENHANCE TECH: CPT | Mod: 26,,, | Performed by: PATHOLOGY

## 2023-02-01 PROCEDURE — 52000 CYSTOURETHROSCOPY: CPT | Mod: S$GLB,,, | Performed by: UROLOGY

## 2023-02-01 PROCEDURE — 52000 PR CYSTOURETHROSCOPY: ICD-10-PCS | Mod: S$GLB,,, | Performed by: UROLOGY

## 2023-02-01 PROCEDURE — 88112 CYTOPATH CELL ENHANCE TECH: CPT | Performed by: PATHOLOGY

## 2023-02-01 RX ORDER — LIDOCAINE HYDROCHLORIDE 20 MG/ML
JELLY TOPICAL
Status: COMPLETED | OUTPATIENT
Start: 2023-02-01 | End: 2023-02-01

## 2023-02-01 RX ADMIN — LIDOCAINE HYDROCHLORIDE: 20 JELLY TOPICAL at 02:02

## 2023-02-01 NOTE — PROCEDURES
Procedures  Procedures: Flexible cystourethroscopy    Pre Procedure Diagnosis:gross hematuria  Patient Active Problem List    Diagnosis Date Noted    Drug-induced immunodeficiency 11/14/2022    Stage 3a chronic kidney disease 11/14/2022    Venous insufficiency     AAA (abdominal aortic aneurysm)     Right inguinal hernia 09/06/2018    PVD (peripheral vascular disease)     Intermittent claudication 10/22/2015    Special screening for malignant neoplasms, colon 02/21/2014    PAD (peripheral artery disease) 10/28/2013    Hypercholesteremia 10/28/2013    Other hyperlipidemia 10/22/2013    Nuclear sclerosis - Both Eyes 10/22/2012    History of long-term treatment with high-risk medication 10/22/2012    Plaquenil adverse reaction in therapeutic use 10/22/2012    Persistent insomnia 10/15/2012    Rheumatoid arthritis 10/14/2012    History of hepatitis C, s/p successful treatment w/ SVR12 - 7/2015 10/14/2012         Post Procedure Diagnosis:Normal cystoscopy    Surgeon: Kem Jefferson MD    Anesthesia: 2% uro-jet lidocaine jelly for local analgesia    Flexible cysto-urethroscopy was performed after consent was obtained.  The risks and benefits were explained.    2% lidocaine urojet was used for local analgesia.  The genitalia was prepped and draped in the sterile fashion with betasept.    The flexible scope was advanced into the urethra.  No urethral strictures were noted.  The prostate showed Mild hypertrophy.  There was No median lobe present.  Bilateral ureteral orifices were evaluated and noted to be normal with clear efflux.  The bladder was completely surveyed in a systematic fashion.   No bladder tumors or lesions were seen.      The patient tolerated the procedure well without complication.    They will follow up in 1 week(s) for cystoscopy, bilateral retrograde pyelogram, left ureteroscopy, left ureteral/renal pelvis biopsy, possible right ureteroscopy.  Urine culture and cytology today

## 2023-02-01 NOTE — PATIENT INSTRUCTIONS
What to Expect After a Cystoscopy  For the next 24-48 hours, you may feel a mild burning when you urinate. This burning is normal and expected. Drink plenty of water to dilute the urine to help relieve the burning sensation. You may also see a small amount of blood in your urine after the procedure.    Unless you are already taking antibiotics, you may be given an antibiotic after the test to prevent infection.    Signs and Symptoms to Report  Call the [facility] at [phone] if you develop any of the following:  Fever of 101 degrees or higher  Chills or persistent bleeding  Inability to urinate

## 2023-02-02 ENCOUNTER — TELEPHONE (OUTPATIENT)
Dept: UROLOGY | Facility: CLINIC | Age: 68
End: 2023-02-02
Payer: MEDICARE

## 2023-02-02 DIAGNOSIS — D49.59 URETERAL TUMOR: Primary | ICD-10-CM

## 2023-02-02 DIAGNOSIS — R31.0 GROSS HEMATURIA: ICD-10-CM

## 2023-02-02 LAB — BACTERIA UR CULT: NO GROWTH

## 2023-02-06 LAB
FINAL PATHOLOGIC DIAGNOSIS: ABNORMAL
Lab: ABNORMAL

## 2023-02-08 ENCOUNTER — LAB VISIT (OUTPATIENT)
Dept: LAB | Facility: HOSPITAL | Age: 68
End: 2023-02-08
Attending: INTERNAL MEDICINE
Payer: MEDICARE

## 2023-02-08 ENCOUNTER — TELEPHONE (OUTPATIENT)
Dept: UROLOGY | Facility: CLINIC | Age: 68
End: 2023-02-08
Payer: MEDICARE

## 2023-02-08 ENCOUNTER — TELEPHONE (OUTPATIENT)
Dept: INTERNAL MEDICINE | Facility: CLINIC | Age: 68
End: 2023-02-08
Payer: MEDICARE

## 2023-02-08 DIAGNOSIS — Z79.631 LONG TERM METHOTREXATE USER: ICD-10-CM

## 2023-02-08 DIAGNOSIS — R35.1 BPH ASSOCIATED WITH NOCTURIA: ICD-10-CM

## 2023-02-08 DIAGNOSIS — Z01.810 PREOP CARDIOVASCULAR EXAM: Primary | ICD-10-CM

## 2023-02-08 DIAGNOSIS — N40.1 BPH ASSOCIATED WITH NOCTURIA: ICD-10-CM

## 2023-02-08 LAB
ALBUMIN SERPL BCP-MCNC: 3.5 G/DL (ref 3.5–5.2)
ALP SERPL-CCNC: 88 U/L (ref 55–135)
ALT SERPL W/O P-5'-P-CCNC: 12 U/L (ref 10–44)
ANION GAP SERPL CALC-SCNC: 11 MMOL/L (ref 8–16)
AST SERPL-CCNC: 19 U/L (ref 10–40)
BASOPHILS # BLD AUTO: 0.03 K/UL (ref 0–0.2)
BASOPHILS NFR BLD: 0.4 % (ref 0–1.9)
BILIRUB SERPL-MCNC: 0.4 MG/DL (ref 0.1–1)
BUN SERPL-MCNC: 24 MG/DL (ref 8–23)
CALCIUM SERPL-MCNC: 9.4 MG/DL (ref 8.7–10.5)
CHLORIDE SERPL-SCNC: 105 MMOL/L (ref 95–110)
CO2 SERPL-SCNC: 23 MMOL/L (ref 23–29)
COMPLEXED PSA SERPL-MCNC: 0.71 NG/ML (ref 0–4)
CREAT SERPL-MCNC: 1.5 MG/DL (ref 0.5–1.4)
CRP SERPL-MCNC: 3.2 MG/L (ref 0–8.2)
DIFFERENTIAL METHOD: ABNORMAL
EOSINOPHIL # BLD AUTO: 0.1 K/UL (ref 0–0.5)
EOSINOPHIL NFR BLD: 1.6 % (ref 0–8)
ERYTHROCYTE [DISTWIDTH] IN BLOOD BY AUTOMATED COUNT: 13.6 % (ref 11.5–14.5)
ERYTHROCYTE [SEDIMENTATION RATE] IN BLOOD BY PHOTOMETRIC METHOD: 20 MM/HR (ref 0–23)
EST. GFR  (NO RACE VARIABLE): 50.4 ML/MIN/1.73 M^2
GLUCOSE SERPL-MCNC: 106 MG/DL (ref 70–110)
HCT VFR BLD AUTO: 39.9 % (ref 40–54)
HGB BLD-MCNC: 12.2 G/DL (ref 14–18)
IMM GRANULOCYTES # BLD AUTO: 0.02 K/UL (ref 0–0.04)
IMM GRANULOCYTES NFR BLD AUTO: 0.2 % (ref 0–0.5)
LYMPHOCYTES # BLD AUTO: 1.4 K/UL (ref 1–4.8)
LYMPHOCYTES NFR BLD: 17.1 % (ref 18–48)
MCH RBC QN AUTO: 29.5 PG (ref 27–31)
MCHC RBC AUTO-ENTMCNC: 30.6 G/DL (ref 32–36)
MCV RBC AUTO: 97 FL (ref 82–98)
MONOCYTES # BLD AUTO: 0.6 K/UL (ref 0.3–1)
MONOCYTES NFR BLD: 6.8 % (ref 4–15)
NEUTROPHILS # BLD AUTO: 6 K/UL (ref 1.8–7.7)
NEUTROPHILS NFR BLD: 73.9 % (ref 38–73)
NRBC BLD-RTO: 0 /100 WBC
PLATELET # BLD AUTO: 243 K/UL (ref 150–450)
PMV BLD AUTO: 9.8 FL (ref 9.2–12.9)
POTASSIUM SERPL-SCNC: 4.2 MMOL/L (ref 3.5–5.1)
PROT SERPL-MCNC: 7.3 G/DL (ref 6–8.4)
RBC # BLD AUTO: 4.13 M/UL (ref 4.6–6.2)
SODIUM SERPL-SCNC: 139 MMOL/L (ref 136–145)
WBC # BLD AUTO: 8.18 K/UL (ref 3.9–12.7)

## 2023-02-08 PROCEDURE — 36415 COLL VENOUS BLD VENIPUNCTURE: CPT | Mod: PO | Performed by: NURSE PRACTITIONER

## 2023-02-08 PROCEDURE — 85025 COMPLETE CBC W/AUTO DIFF WBC: CPT | Performed by: INTERNAL MEDICINE

## 2023-02-08 PROCEDURE — 85652 RBC SED RATE AUTOMATED: CPT | Performed by: INTERNAL MEDICINE

## 2023-02-08 PROCEDURE — 86140 C-REACTIVE PROTEIN: CPT | Performed by: INTERNAL MEDICINE

## 2023-02-08 PROCEDURE — 84153 ASSAY OF PSA TOTAL: CPT | Performed by: NURSE PRACTITIONER

## 2023-02-08 PROCEDURE — 80053 COMPREHEN METABOLIC PANEL: CPT | Performed by: INTERNAL MEDICINE

## 2023-02-08 RX ORDER — NAPROXEN SODIUM 220 MG/1
81 TABLET, FILM COATED ORAL DAILY
COMMUNITY

## 2023-02-08 NOTE — TELEPHONE ENCOUNTER
Urology wants a clearance. I do not see that he has had an EKG in years so we should have him get one.   Then a virtual or in person Tuesday, or in person if he prefers Saturday with me. Surgery is a week from tomorrow.

## 2023-02-08 NOTE — TELEPHONE ENCOUNTER
----- Message from Aurora Mcgovern RN sent at 2/8/2023 10:28 AM CST -----  Good morning,    This patient is scheduled for cystoscopy with retrograde pyelogram, and ureter biopsy with ureteroscopy on 02/16 with Dr. Jefferson (90 minutes). I am requesting optimization for procedure.    Thanks,  Aurora Mcgovern RN  [facility]

## 2023-02-08 NOTE — TELEPHONE ENCOUNTER
----- Message from Aurora Mcgovern RN sent at 2/8/2023 10:38 AM CST -----  Yes, sorry  ----- Message -----  From: Andrea Wang MD  Sent: 2/8/2023  10:35 AM CST  To: Aurora Mcgovern RN    General anesthesia?     ----- Message -----  From: Aurora Mcgovern RN  Sent: 2/8/2023  10:29 AM CST  To: Andrea Wang MD, Aurora Mcgovern RN, #    Good morning,    This patient is scheduled for cystoscopy with retrograde pyelogram, and ureter biopsy with ureteroscopy on 02/16 with Dr. Jefferson (90 minutes). I am requesting optimization for procedure.    Thanks,  Aurora Mcgovern RN  [facility]

## 2023-02-08 NOTE — ANESTHESIA PAT ROS NOTE
"                                                                                                             02/08/2023  Andrea Gant is a 67 y.o., male.      Pre-op Assessment    I have reviewed the NPO Status.   I have reviewed the Medications.     Review of Systems  Anesthesia Hx:    "Anterior larynx; small mouth" noted 09/06/2018 History of prior surgery of interest to airway management or planning:          Denies Family Hx of Anesthesia complications.    Denies Personal Hx of Anesthesia complications.                    Social:  Social Alcohol Use, Smoker Smoke 0.5 packs per day, drinks 1 time per week       Hematology/Oncology:  Hematology Normal                     Denies Current/Recent Cancer            Oncology Comments: Pre-op diagnosis: ureteral tumor     EENT/Dental:   Has top and bottom dentures; nuclear sclerosis in both eyes noted in chart.          Cardiovascular:      Denies Hypertension.       Denies Angina.    PVD no hyperlipidemia  Denies MANDUJANO.   Peripheral artery disease (PAD), venous insufficiency, intermittent claudication, hyperlipidemia, hypercholesterolemia, and AAA (abdominal aortic aneurysm) noted in chart. Functional Capacity works M-F selling seafood, does housework and yard work  Denies Cardiovascular Symptoms.                              Pulmonary:      Shortness of breath: had CT 2 weeks ago and felt short of breath, had EMT come check on him and did not need to go to hospital, denies and SOB since.  Denies Recent URI.                 Renal/:   Denies Chronic Renal Disease. renal calculi  Stage 3a CKD             Hepatic/GI:      Denies Liver Disease.  Denies Hepatitis. H/o hepatitis C, s/p successful treatment w/ SVR12-7/2015; right inguinal hernia, colon polyp noted in chart.           Musculoskeletal:  Arthritis   Arthritis all over, reports AROM of head and neck; rheumatoid arthritis noted in chart.             Neurological:  Neurology Normal                               " "       Endocrine:  Endocrine Normal            Dermatological:  Skin Normal    Psych:  Psychiatric Normal                       Anesthesia Assessment: Preoperative EQUATION    Planned Procedure: Procedure(s) (LRB):  CYSTOSCOPY, WITH RETROGRADE PYELOGRAM (Bilateral)  BIOPSY, URETER (N/A)  URETEROSCOPY (Left)  Requested Anesthesia Type:General  Surgeon: Kem Jefferson MD  Service: Urology  Known or anticipated Date of Surgery:2/16/2023    Surgeon notes:  seen by Radha Vasquez NP on 01/19/2023 noted in chart.     Electronic QUestionnaire Assessment completed via nurse interview with patient.        Triage considerations:     The patient has no apparent active cardiac condition (No unstable coronary Syndrome such as severe unstable angina or recent [<1 month] myocardial infarction, decompensated CHF, severe valvular   disease or significant arrhythmia)    Previous anesthesia records: "anterior larynx; small mouth"  noted 09/06/2018.    11/29/2021:  Airway/Jaw/Neck:  AIRWAY FINDINGS: Normal         Final Anesthesia Type: general        Patient location during evaluation: GI PACU  Patient participation: Yes- Able to Participate  Level of consciousness: awake  Post-procedure vital signs: reviewed and stable  Pain management: adequate  Airway patency: patent   PONV status at discharge: No PONV  Anesthetic complications: no      Cardiovascular status: blood pressure returned to baseline  Respiratory status: unassisted  Hydration status: euvolemic  Follow-up not needed.    09/06/2018:  Airway/Jaw/Neck:  Airway Findings: Mouth Opening: Normal Tongue: Normal  General Airway Assessment: Adult  Mallampati: I  TM Distance: 4 - 6 cm  Jaw/Neck Findings:  Neck ROM: Normal ROM        Final Anesthesia Type: general  Patient location during evaluation: PACU  Patient participation: Yes- Able to Participate  Level of consciousness: awake and alert  Post-procedure vital signs: reviewed and stable  Pain management: adequate  Airway " patency: patent  PONV status at discharge: No PONV  Anesthetic complications: no      Cardiovascular status: hemodynamically stable  Respiratory status: unassisted, spontaneous ventilation and room air  Hydration status: euvolemic  Follow-up not needed.  Airway Placement Date: 09/06/18 Placement Time: 1026 Method of Intubation: Video Laryngoscopy Inserted by: Anesthesia Resident Airway Device: Endotracheal Tube Mask Ventilation: Very Diff - oral , required 2 hand bag mask  Intubated: Postinduction Blade: Bonnie #3 Airway Device Size: 7.5 Style: Cuffed Cuff Inflation: Minimal occlusive pressure Placement Verified By: Auscultation;Capnometry;ETT Condensation Grade: Grade I Complicating Factors: Anterior larynx;Small mouth Findings Post-Intubation: Positive EtCO2;Bilateral breath sounds;Atraumatic/Condition of teeth unchanged Depth of Insertion (cm): 22 Secured at: Lips Complications: None Breath Sounds: Equal Bilateral Insertion attempts (enter comment if more than 2 attempts): 1 Removal Date: 09/06/18 Removal Time: 1135       Last PCP note: within 3 months , seen by Kelle Barnhart PA-C on 12/22/2022 noted in chart, seen by Andrea Wang MD on 11/14/2022 and noted in chart.     Subspecialty notes: Rheumatology, Urology, Vascular Surgery, Optometry.    Other important co-morbidities: HLD, Smoker, and PAD, PVD, AAA, stage 3 CKD       Tests already available:  Available tests,  within 3 months . (02/01) Urine Culture: no growth; (01/19) UA, CT Urogram Abd Pelvis W WO; (12/22) BMP, CBC, UA              Instructions given. (See in Nurse's note)    Optimization:      Medical Opinion Indicated       Sub-specialist consult indicated:   Vascular Surgery     MD Aurora Rodriguez RN; CAITY CHAN Staff  Caller: Unspecified (Yesterday,  3:01 PM)  May hold pletal x5 days but do NOT hold ASA       Plan:    Testing:  EKG     Consultation:Patient's PCP for a statement of optimization      Patient  has  previously scheduled Medical Appointment   Addendum:     Pt medically optimized for cystoscopy.      Kelle Barnhart PA-C         Electronically signed by Kelle Barnhart PA-C at 2/13/2023  7:53 AM       Navigation: Tests Done.              Consults Complete.             Results will be tracked by Preop Clinic.     02/08/2023 Medications reviewed and instructions given to patient over the phone. Message to be sent to Dr. Jefferson referring to instructions to hold Pletal x5 days and continue ASA. Need EKG and requesting statement of optimization from PCP. Aurora Mcgovern RN.    02/14/2023 Cleared by PCP and EKG done and reviewed in office. Aurora Mcgovern RN.

## 2023-02-09 ENCOUNTER — OFFICE VISIT (OUTPATIENT)
Dept: INTERNAL MEDICINE | Facility: CLINIC | Age: 68
End: 2023-02-09
Payer: MEDICARE

## 2023-02-09 VITALS
WEIGHT: 156.31 LBS | HEIGHT: 69 IN | DIASTOLIC BLOOD PRESSURE: 62 MMHG | OXYGEN SATURATION: 98 % | SYSTOLIC BLOOD PRESSURE: 130 MMHG | BODY MASS INDEX: 23.15 KG/M2

## 2023-02-09 DIAGNOSIS — D84.821 DRUG-INDUCED IMMUNODEFICIENCY: ICD-10-CM

## 2023-02-09 DIAGNOSIS — Z79.899 DRUG-INDUCED IMMUNODEFICIENCY: ICD-10-CM

## 2023-02-09 DIAGNOSIS — R31.0 GROSS HEMATURIA: ICD-10-CM

## 2023-02-09 DIAGNOSIS — Z01.818 PREOP EXAMINATION: Primary | ICD-10-CM

## 2023-02-09 DIAGNOSIS — R91.1 SOLITARY PULMONARY NODULE: ICD-10-CM

## 2023-02-09 PROCEDURE — 1126F PR PAIN SEVERITY QUANTIFIED, NO PAIN PRESENT: ICD-10-PCS | Mod: CPTII,S$GLB,, | Performed by: PHYSICIAN ASSISTANT

## 2023-02-09 PROCEDURE — 93005 EKG 12-LEAD: ICD-10-PCS | Mod: S$GLB,,, | Performed by: PHYSICIAN ASSISTANT

## 2023-02-09 PROCEDURE — 3008F PR BODY MASS INDEX (BMI) DOCUMENTED: ICD-10-PCS | Mod: CPTII,S$GLB,, | Performed by: PHYSICIAN ASSISTANT

## 2023-02-09 PROCEDURE — 3078F PR MOST RECENT DIASTOLIC BLOOD PRESSURE < 80 MM HG: ICD-10-PCS | Mod: CPTII,S$GLB,, | Performed by: PHYSICIAN ASSISTANT

## 2023-02-09 PROCEDURE — 3008F BODY MASS INDEX DOCD: CPT | Mod: CPTII,S$GLB,, | Performed by: PHYSICIAN ASSISTANT

## 2023-02-09 PROCEDURE — 99214 PR OFFICE/OUTPT VISIT, EST, LEVL IV, 30-39 MIN: ICD-10-PCS | Mod: S$GLB,,, | Performed by: PHYSICIAN ASSISTANT

## 2023-02-09 PROCEDURE — 3075F SYST BP GE 130 - 139MM HG: CPT | Mod: CPTII,S$GLB,, | Performed by: PHYSICIAN ASSISTANT

## 2023-02-09 PROCEDURE — 3288F FALL RISK ASSESSMENT DOCD: CPT | Mod: CPTII,S$GLB,, | Performed by: PHYSICIAN ASSISTANT

## 2023-02-09 PROCEDURE — 93005 ELECTROCARDIOGRAM TRACING: CPT | Mod: S$GLB,,, | Performed by: PHYSICIAN ASSISTANT

## 2023-02-09 PROCEDURE — 99999 PR PBB SHADOW E&M-EST. PATIENT-LVL IV: ICD-10-PCS | Mod: PBBFAC,,, | Performed by: PHYSICIAN ASSISTANT

## 2023-02-09 PROCEDURE — 93010 EKG 12-LEAD: ICD-10-PCS | Mod: S$GLB,,, | Performed by: INTERNAL MEDICINE

## 2023-02-09 PROCEDURE — 1160F PR REVIEW ALL MEDS BY PRESCRIBER/CLIN PHARMACIST DOCUMENTED: ICD-10-PCS | Mod: CPTII,S$GLB,, | Performed by: PHYSICIAN ASSISTANT

## 2023-02-09 PROCEDURE — 1159F PR MEDICATION LIST DOCUMENTED IN MEDICAL RECORD: ICD-10-PCS | Mod: CPTII,S$GLB,, | Performed by: PHYSICIAN ASSISTANT

## 2023-02-09 PROCEDURE — 93010 ELECTROCARDIOGRAM REPORT: CPT | Mod: S$GLB,,, | Performed by: INTERNAL MEDICINE

## 2023-02-09 PROCEDURE — 1159F MED LIST DOCD IN RCRD: CPT | Mod: CPTII,S$GLB,, | Performed by: PHYSICIAN ASSISTANT

## 2023-02-09 PROCEDURE — 1126F AMNT PAIN NOTED NONE PRSNT: CPT | Mod: CPTII,S$GLB,, | Performed by: PHYSICIAN ASSISTANT

## 2023-02-09 PROCEDURE — 1101F PR PT FALLS ASSESS DOC 0-1 FALLS W/OUT INJ PAST YR: ICD-10-PCS | Mod: CPTII,S$GLB,, | Performed by: PHYSICIAN ASSISTANT

## 2023-02-09 PROCEDURE — 3288F PR FALLS RISK ASSESSMENT DOCUMENTED: ICD-10-PCS | Mod: CPTII,S$GLB,, | Performed by: PHYSICIAN ASSISTANT

## 2023-02-09 PROCEDURE — 3078F DIAST BP <80 MM HG: CPT | Mod: CPTII,S$GLB,, | Performed by: PHYSICIAN ASSISTANT

## 2023-02-09 PROCEDURE — 1101F PT FALLS ASSESS-DOCD LE1/YR: CPT | Mod: CPTII,S$GLB,, | Performed by: PHYSICIAN ASSISTANT

## 2023-02-09 PROCEDURE — 99999 PR PBB SHADOW E&M-EST. PATIENT-LVL IV: CPT | Mod: PBBFAC,,, | Performed by: PHYSICIAN ASSISTANT

## 2023-02-09 PROCEDURE — 99214 OFFICE O/P EST MOD 30 MIN: CPT | Mod: S$GLB,,, | Performed by: PHYSICIAN ASSISTANT

## 2023-02-09 PROCEDURE — 1160F RVW MEDS BY RX/DR IN RCRD: CPT | Mod: CPTII,S$GLB,, | Performed by: PHYSICIAN ASSISTANT

## 2023-02-09 PROCEDURE — 3075F PR MOST RECENT SYSTOLIC BLOOD PRESS GE 130-139MM HG: ICD-10-PCS | Mod: CPTII,S$GLB,, | Performed by: PHYSICIAN ASSISTANT

## 2023-02-09 NOTE — PROGRESS NOTES
Subjective:       Patient ID: Andrea Gant is a 67 y.o. male.    Chief Complaint: Pre-op Exam    HPI    Established pt of Andrea Wang MD     Here for preop.     Plans for cystoscopy, with retrograde pyelogram (biopsy) for further evaluation of gross hematuria with Dr. Jefferson on . Recent urine cytology suggestive of high-grade urothelial carcinoma.    He is feeling well today no acute complaints    No current cp or sob. He does reports he thinks he had a reaction to the IV contrast (CT urogram) on , felt sob immediately after(for abt 1hr), EMS eval'd him, he reports being told his vitals and heart look fine, he declined transport to the ED. He states he has felt well since. No cp, rash or swelling reports after CT scan.     He is able walk a flight of stairs without CP (walks 2 flights every day at work)    No hx of MI/CAD, CVA, or DM    He current takes aspirin and cilostazol for PAD. Dr. Jensen has advised him to continue ASA, okay to hold cilostazol.    CKD is stable.     Past Medical History:   Diagnosis Date    Cataract     Colon polyp     History of hepatitis C, s/p successful treatment w/ SVR12 - 2015 10/14/2012    Kelsey 1a,  Liver biopsy 2014 - Stage 1 fibrosis;  Completed 8 weeks Harvoni w/ SVR12 - 2015      Hyperlipidemia     Lipoma of arm     Lipoma of lower extremity     Nuclear sclerosis - Both Eyes 10/22/2012    Other and unspecified hyperlipidemia 10/22/2013    PAD (peripheral artery disease)     Peripheral vascular disease, unspecified     Rheumatoid arthritis(714.0) 10/14/2012     Social History     Tobacco Use    Smoking status: Former     Types: Cigarettes     Quit date: 2020     Years since quittin.6    Smokeless tobacco: Never   Substance Use Topics    Alcohol use: Yes     Comment: 3-4 drinks per week    Drug use: Yes     Types: Marijuana     Comment: marajuana used on Saturday     Review of patient's allergies indicates:  No Known Allergies      Review of  "Systems   Constitutional:  Negative for chills, fever and unexpected weight change.   Respiratory:  Negative for cough and shortness of breath.    Cardiovascular:  Negative for chest pain and leg swelling.   Gastrointestinal:  Negative for abdominal pain, nausea and vomiting.   Integumentary:  Negative for rash.   Neurological:  Negative for weakness and headaches.       Objective:  BP (!) 146/66 (BP Location: Right arm, Patient Position: Sitting, BP Method: Medium (Manual))   Ht 5' 9" (1.753 m)   Wt 70.9 kg (156 lb 4.9 oz)   SpO2 98%   BMI 23.08 kg/m²         Physical Exam  Vitals reviewed.   Constitutional:       General: He is not in acute distress.     Appearance: He is well-developed.   HENT:      Head: Normocephalic and atraumatic.   Cardiovascular:      Rate and Rhythm: Normal rate and regular rhythm.      Heart sounds: No murmur heard.  Pulmonary:      Effort: Pulmonary effort is normal.      Breath sounds: Normal breath sounds. No wheezing or rales.   Abdominal:      General: Bowel sounds are normal.      Palpations: Abdomen is soft.      Tenderness: There is no abdominal tenderness.   Skin:     General: Skin is warm and dry.      Findings: No rash.   Neurological:      Mental Status: He is alert and oriented to person, place, and time.                 Assessment:       Problem List Items Addressed This Visit          Pulmonary    Solitary pulmonary nodule (repeat due Aug 2023)    Relevant Orders    CT Chest Without Contrast       Immunology/Multi System    Drug-induced immunodeficiency     Other Visit Diagnoses       Preop examination    -  Primary    Relevant Orders    IN OFFICE EKG 12-LEAD (to [facility])    Gross hematuria                  Plan:       Andrea was seen today for pre-op exam.    Diagnoses and all orders for this visit:    Preop examination  Gross hematuria  -     IN OFFICE EKG 12-LEAD (to [place])  -     recent lab reviewed and stable  -     EKG in clinic, NSR, no acute s/t changes, (await " cardiology final read)    Solitary pulmonary nodule  -incidental finding of CT urogram on 1/23/23  -Plan for 6 month f/u  -     CT Chest Without Contrast; Future    Drug-induced immunodeficiency  Stable  Followed by Rheum, RA on MTX    Kelle Barnhart PA-C        Addendum:    Pt medically optimized for cystoscopy.     Kelle Barnhart PA-C

## 2023-02-15 ENCOUNTER — TELEPHONE (OUTPATIENT)
Dept: UROLOGY | Facility: CLINIC | Age: 68
End: 2023-02-15
Payer: MEDICARE

## 2023-02-15 NOTE — TELEPHONE ENCOUNTER
I SWP now and went over general outpatient surgical expectations for recovery after his cysto/RPG procedure.  Pt will have his wife bring him home.    ----- Message from Ernestina Rodriguez RN sent at 2/15/2023  2:55 PM CST -----  Contact: @[phone]    ----- Message -----  From: Cherie Ramirez  Sent: 2/15/2023   1:49 PM CST  To: Ronny Brownlee Staff    Pt is calling in to get the recovery time after his procedure, please call to discuss further.

## 2023-02-15 NOTE — TELEPHONE ENCOUNTER
Called pt to confirm arrival time of 9am for procedure on 2/16/2023. Gave pt NPO instructions and gave pt opportunity to ask questions. Pt verbalized understanding.

## 2023-02-16 ENCOUNTER — ANESTHESIA EVENT (OUTPATIENT)
Dept: SURGERY | Facility: HOSPITAL | Age: 68
End: 2023-02-16
Payer: MEDICARE

## 2023-02-16 ENCOUNTER — HOSPITAL ENCOUNTER (OUTPATIENT)
Facility: HOSPITAL | Age: 68
Discharge: HOME | End: 2023-02-16
Attending: UROLOGY | Admitting: UROLOGY
Payer: MEDICARE

## 2023-02-16 ENCOUNTER — ANESTHESIA (OUTPATIENT)
Dept: SURGERY | Facility: HOSPITAL | Age: 68
End: 2023-02-16
Payer: MEDICARE

## 2023-02-16 VITALS
WEIGHT: 160.06 LBS | SYSTOLIC BLOOD PRESSURE: 140 MMHG | HEIGHT: 69 IN | OXYGEN SATURATION: 100 % | TEMPERATURE: 98 F | DIASTOLIC BLOOD PRESSURE: 75 MMHG | BODY MASS INDEX: 23.71 KG/M2 | RESPIRATION RATE: 20 BRPM | HEART RATE: 62 BPM

## 2023-02-16 DIAGNOSIS — Z01.818 PREOPERATIVE TESTING: ICD-10-CM

## 2023-02-16 DIAGNOSIS — R31.0 GROSS HEMATURIA: Primary | ICD-10-CM

## 2023-02-16 PROBLEM — D49.59 URETERAL TUMOR: Status: ACTIVE | Noted: 2023-02-16

## 2023-02-16 PROCEDURE — 52332 CYSTOSCOPY AND TREATMENT: CPT | Mod: 51,LT,, | Performed by: UROLOGY

## 2023-02-16 PROCEDURE — 63600175 PHARM REV CODE 636 W HCPCS: Performed by: STUDENT IN AN ORGANIZED HEALTH CARE EDUCATION/TRAINING PROGRAM

## 2023-02-16 PROCEDURE — D9220A PRA ANESTHESIA: ICD-10-PCS | Mod: ANES,,, | Performed by: ANESTHESIOLOGY

## 2023-02-16 PROCEDURE — 88305 TISSUE EXAM BY PATHOLOGIST: CPT | Performed by: PATHOLOGY

## 2023-02-16 PROCEDURE — C1758 CATHETER, URETERAL: HCPCS | Performed by: UROLOGY

## 2023-02-16 PROCEDURE — D9220A PRA ANESTHESIA: Mod: ANES,,, | Performed by: ANESTHESIOLOGY

## 2023-02-16 PROCEDURE — 82365 CALCULUS SPECTROSCOPY: CPT | Performed by: UROLOGY

## 2023-02-16 PROCEDURE — C1769 GUIDE WIRE: HCPCS | Performed by: UROLOGY

## 2023-02-16 PROCEDURE — D9220A PRA ANESTHESIA: Mod: CRNA,,, | Performed by: NURSE ANESTHETIST, CERTIFIED REGISTERED

## 2023-02-16 PROCEDURE — 36000707: Performed by: UROLOGY

## 2023-02-16 PROCEDURE — 25500020 PHARM REV CODE 255: Performed by: UROLOGY

## 2023-02-16 PROCEDURE — 74420 PR  X-RAY RETROGRADE PYELOGRAM: ICD-10-PCS | Mod: 26,,, | Performed by: UROLOGY

## 2023-02-16 PROCEDURE — 37000008 HC ANESTHESIA 1ST 15 MINUTES: Performed by: UROLOGY

## 2023-02-16 PROCEDURE — C2617 STENT, NON-COR, TEM W/O DEL: HCPCS | Performed by: UROLOGY

## 2023-02-16 PROCEDURE — 52332 PR CYSTOSCOPY,INSERT URETERAL STENT: ICD-10-PCS | Mod: 51,LT,, | Performed by: UROLOGY

## 2023-02-16 PROCEDURE — 52354 PR CYSTO/URETERO/PYELOSC,BX &/OR FULG LESN: ICD-10-PCS | Mod: LT,,, | Performed by: UROLOGY

## 2023-02-16 PROCEDURE — 37000009 HC ANESTHESIA EA ADD 15 MINS: Performed by: UROLOGY

## 2023-02-16 PROCEDURE — 52352 PR CYSTO/URETERO/PYELOSCOPY, CALCULUS TX: ICD-10-PCS | Mod: 51,LT,, | Performed by: UROLOGY

## 2023-02-16 PROCEDURE — 27201423 OPTIME MED/SURG SUP & DEVICES STERILE SUPPLY: Performed by: UROLOGY

## 2023-02-16 PROCEDURE — 63600175 PHARM REV CODE 636 W HCPCS: Performed by: NURSE ANESTHETIST, CERTIFIED REGISTERED

## 2023-02-16 PROCEDURE — 74420 UROGRAPHY RTRGR +-KUB: CPT | Mod: 26,,, | Performed by: UROLOGY

## 2023-02-16 PROCEDURE — 25000003 PHARM REV CODE 250: Performed by: NURSE ANESTHETIST, CERTIFIED REGISTERED

## 2023-02-16 PROCEDURE — 25000003 PHARM REV CODE 250: Performed by: STUDENT IN AN ORGANIZED HEALTH CARE EDUCATION/TRAINING PROGRAM

## 2023-02-16 PROCEDURE — 88305 TISSUE EXAM BY PATHOLOGIST: CPT | Mod: 26,,, | Performed by: PATHOLOGY

## 2023-02-16 PROCEDURE — 36000706: Performed by: UROLOGY

## 2023-02-16 PROCEDURE — 88305 TISSUE EXAM BY PATHOLOGIST: ICD-10-PCS | Mod: 26,,, | Performed by: PATHOLOGY

## 2023-02-16 PROCEDURE — D9220A PRA ANESTHESIA: ICD-10-PCS | Mod: CRNA,,, | Performed by: NURSE ANESTHETIST, CERTIFIED REGISTERED

## 2023-02-16 PROCEDURE — 71000015 HC POSTOP RECOV 1ST HR: Performed by: UROLOGY

## 2023-02-16 PROCEDURE — C1894 INTRO/SHEATH, NON-LASER: HCPCS | Performed by: UROLOGY

## 2023-02-16 PROCEDURE — 71000044 HC DOSC ROUTINE RECOVERY FIRST HOUR: Performed by: UROLOGY

## 2023-02-16 PROCEDURE — 52352 CYSTOURETERO W/STONE REMOVE: CPT | Mod: 51,LT,, | Performed by: UROLOGY

## 2023-02-16 PROCEDURE — 52354 CYSTOURETERO W/BIOPSY: CPT | Mod: LT,,, | Performed by: UROLOGY

## 2023-02-16 DEVICE — STENT URETERAL UNIV 6FR 26CM: Type: IMPLANTABLE DEVICE | Site: URETER | Status: FUNCTIONAL

## 2023-02-16 RX ORDER — LIDOCAINE HYDROCHLORIDE 20 MG/ML
INJECTION INTRAVENOUS
Status: DISCONTINUED | OUTPATIENT
Start: 2023-02-16 | End: 2023-02-16

## 2023-02-16 RX ORDER — MIDAZOLAM HYDROCHLORIDE 1 MG/ML
INJECTION, SOLUTION INTRAMUSCULAR; INTRAVENOUS
Status: DISCONTINUED | OUTPATIENT
Start: 2023-02-16 | End: 2023-02-16

## 2023-02-16 RX ORDER — HYDROMORPHONE HYDROCHLORIDE 1 MG/ML
0.2 INJECTION, SOLUTION INTRAMUSCULAR; INTRAVENOUS; SUBCUTANEOUS EVERY 5 MIN PRN
Status: DISCONTINUED | OUTPATIENT
Start: 2023-02-16 | End: 2023-02-16 | Stop reason: HOSPADM

## 2023-02-16 RX ORDER — DEXAMETHASONE SODIUM PHOSPHATE 4 MG/ML
INJECTION, SOLUTION INTRA-ARTICULAR; INTRALESIONAL; INTRAMUSCULAR; INTRAVENOUS; SOFT TISSUE
Status: DISCONTINUED | OUTPATIENT
Start: 2023-02-16 | End: 2023-02-16

## 2023-02-16 RX ORDER — PHENAZOPYRIDINE HYDROCHLORIDE 100 MG/1
100 TABLET, FILM COATED ORAL 3 TIMES DAILY PRN
Qty: 30 TABLET | Refills: 1 | Status: SHIPPED | OUTPATIENT
Start: 2023-02-16 | End: 2023-02-26

## 2023-02-16 RX ORDER — VASOPRESSIN 20 [USP'U]/ML
INJECTION, SOLUTION INTRAMUSCULAR; SUBCUTANEOUS
Status: DISCONTINUED | OUTPATIENT
Start: 2023-02-16 | End: 2023-02-16

## 2023-02-16 RX ORDER — PHENYLEPHRINE HYDROCHLORIDE 10 MG/ML
INJECTION INTRAVENOUS
Status: DISCONTINUED | OUTPATIENT
Start: 2023-02-16 | End: 2023-02-16

## 2023-02-16 RX ORDER — FENTANYL CITRATE 50 UG/ML
INJECTION, SOLUTION INTRAMUSCULAR; INTRAVENOUS
Status: DISCONTINUED | OUTPATIENT
Start: 2023-02-16 | End: 2023-02-16

## 2023-02-16 RX ORDER — ONDANSETRON 2 MG/ML
INJECTION INTRAMUSCULAR; INTRAVENOUS
Status: DISCONTINUED | OUTPATIENT
Start: 2023-02-16 | End: 2023-02-16

## 2023-02-16 RX ORDER — GENTAMICIN SULFATE 100 MG/100ML
240 INJECTION, SOLUTION INTRAVENOUS
Status: DISCONTINUED | OUTPATIENT
Start: 2023-02-16 | End: 2023-02-16 | Stop reason: HOSPADM

## 2023-02-16 RX ORDER — PROPOFOL 10 MG/ML
VIAL (ML) INTRAVENOUS
Status: DISCONTINUED | OUTPATIENT
Start: 2023-02-16 | End: 2023-02-16

## 2023-02-16 RX ORDER — SODIUM CHLORIDE 0.9 % (FLUSH) 0.9 %
3 SYRINGE (ML) INJECTION
Status: DISCONTINUED | OUTPATIENT
Start: 2023-02-16 | End: 2023-02-16 | Stop reason: HOSPADM

## 2023-02-16 RX ORDER — EPHEDRINE SULFATE 50 MG/ML
INJECTION, SOLUTION INTRAVENOUS
Status: DISCONTINUED | OUTPATIENT
Start: 2023-02-16 | End: 2023-02-16

## 2023-02-16 RX ADMIN — VASOPRESSIN 2 UNITS: 20 INJECTION INTRAVENOUS at 11:02

## 2023-02-16 RX ADMIN — AMPICILLIN 1 G: 1 INJECTION, POWDER, FOR SOLUTION INTRAMUSCULAR; INTRAVENOUS at 11:02

## 2023-02-16 RX ADMIN — GENTAMICIN SULFATE 240 MG: 40 INJECTION, SOLUTION INTRAMUSCULAR; INTRAVENOUS at 11:02

## 2023-02-16 RX ADMIN — EPHEDRINE SULFATE 25 MG: 50 INJECTION INTRAVENOUS at 11:02

## 2023-02-16 RX ADMIN — PHENYLEPHRINE HYDROCHLORIDE 200 MCG: 10 INJECTION INTRAVENOUS at 11:02

## 2023-02-16 RX ADMIN — FENTANYL CITRATE 50 MCG: 50 INJECTION, SOLUTION INTRAMUSCULAR; INTRAVENOUS at 11:02

## 2023-02-16 RX ADMIN — LIDOCAINE HYDROCHLORIDE 80 MG: 20 INJECTION INTRAVENOUS at 11:02

## 2023-02-16 RX ADMIN — SODIUM CHLORIDE: 0.9 INJECTION, SOLUTION INTRAVENOUS at 11:02

## 2023-02-16 RX ADMIN — DEXAMETHASONE SODIUM PHOSPHATE 4 MG: 4 INJECTION, SOLUTION INTRAMUSCULAR; INTRAVENOUS at 11:02

## 2023-02-16 RX ADMIN — PROPOFOL 50 MG: 10 INJECTION, EMULSION INTRAVENOUS at 11:02

## 2023-02-16 RX ADMIN — ONDANSETRON 4 MG: 2 INJECTION INTRAMUSCULAR; INTRAVENOUS at 11:02

## 2023-02-16 RX ADMIN — PROPOFOL 150 MG: 10 INJECTION, EMULSION INTRAVENOUS at 11:02

## 2023-02-16 RX ADMIN — MIDAZOLAM HYDROCHLORIDE 2 MG: 1 INJECTION, SOLUTION INTRAMUSCULAR; INTRAVENOUS at 11:02

## 2023-02-16 NOTE — ANESTHESIA PREPROCEDURE EVALUATION
02/16/2023  Andrea Gant is a 67 y.o., male.      Pre-op Assessment    I have reviewed the Patient Summary Reports.          Review of Systems  Anesthesia Hx:  No problems with previous Anesthesia    Social:  Non-Smoker    Hematology/Oncology:  Hematology Normal   Oncology Normal     EENT/Dental:EENT/Dental Normal   Cardiovascular:  Cardiovascular Normal     Pulmonary:  Pulmonary Normal    Renal/:   Chronic Renal Disease, CKD    Hepatic/GI:  Hepatic/GI Normal    Musculoskeletal:   Arthritis     Neurological:  Neurology Normal    Endocrine:  Endocrine Normal    Dermatological:  Skin Normal    Psych:  Psychiatric Normal           Physical Exam  General: Alert and Oriented    Airway:  Mallampati: II / II  Mouth Opening: Normal  TM Distance: Normal  Tongue: Normal  Neck ROM: Normal ROM    Dental:  Intact    Chest/Lungs:  Clear to auscultation, Normal Respiratory Rate    Heart:  Rate: Normal  Rhythm: Regular Rhythm  Sounds: Normal        Anesthesia Plan  Type of Anesthesia, risks & benefits discussed:    Anesthesia Type: Gen ETT, MAC  Intra-op Monitoring Plan: Standard ASA Monitors  Post Op Pain Control Plan: multimodal analgesia  Airway Plan: Direct  Informed Consent: Informed consent signed with the Patient and all parties understand the risks and agree with anesthesia plan.  All questions answered.   ASA Score: 3    Ready For Surgery From Anesthesia Perspective.     .

## 2023-02-16 NOTE — INTERVAL H&P NOTE
The patient has been examined and the H&P has been reviewed:    I concur with the findings and no changes have occurred since H&P was written.    Surgery risks, benefits and alternative options discussed and understood by patient/family.          Active Hospital Problems    Diagnosis  POA    *Ureteral tumor [D49.59]  Yes      Resolved Hospital Problems   No resolved problems to display.

## 2023-02-16 NOTE — BRIEF OP NOTE
[facility] - Surgery ([facility])  Brief Operative Note    Surgery Date: 2/16/2023     Surgeon(s) and Role:     * Kem Talbert MD - Primary     * Gabriele Schilling MD - Resident - Assisting     * Sahil Whitney MD - Resident - Assisting        Pre-op Diagnosis:  Ureteral tumor [D49.59]  Gross hematuria [R31.0]    Post-op Diagnosis:  Post-Op Diagnosis Codes:     * Ureteral tumor [D49.59]     * Gross hematuria [R31.0]    Procedure(s) (LRB):  CYSTOSCOPY  BIOPSY, URETER/BRUSH (Left)  URETEROSCOPY (Left)  PYELOGRAM, RETROGRADE (Bilateral)  REMOVAL, CALCULUS, URETER, URETEROSCOPIC (Left)  PYELOSCOPY (Left)    Anesthesia: General    Operative Findings:   - Cystourethroscopy with no tumors, lesions, masses, or stones  - Right RPG without filling defect or hydronephrosis  - Left RPG with mild hydronephrosis and filling defect ~2 cm in central renal pelvis  - Left ureteroscopy with papillary tumors along the proximal ureter and renal pelvis. Fluoro image saved with ureteroscope marking most distal aspect of ureteral tumors  - Large ~2 cm nodular lesion in the renal pelvis consistent with filling defect  - Biopsy with basket and brush taken and sent for pathology  - Stent with strings placed    Estimated Blood Loss: * No values recorded between 2/16/2023 11:29 AM and 2/16/2023 12:07 PM *         Specimens:   Specimen (24h ago, onward)       Start     Ordered    02/16/23 1159  Specimen to Pathology, Surgery Urology  Once        Comments: Pre-op Diagnosis: Ureteral tumor [D49.59]Gross hematuria [R31.0]Procedure(s):CYSTOSCOPYBIOPSY, URETERURETEROSCOPYPYELOGRAM, RETROGRADEBARBOTAGE, URETER Number of specimens  1Name of specimens: Biopsy left ureter     References:    Click here for ordering Quick Tip   Question Answer Comment   Procedure Type: Urology    Specimen Class: Known or suspected malignancy    Which provider would you like to cc? KEM TALBERT    Which provider would you like to cc? GABRIELE SCHILLING    Which provider  would you like to cc? CURTIS MASON    Release to patient Immediate        02/16/23 1158                      Discharge Note    OUTCOME: Patient tolerated treatment/procedure well without complication and is now ready for discharge.    DISPOSITION: Home or Self Care    FINAL DIAGNOSIS:  Ureteral tumor    FOLLOWUP: In clinic    DISCHARGE INSTRUCTIONS:    Discharge Procedure Orders   Diet Adult Regular     EKG 12-lead   Standing Status: Future Standing Exp. Date: 02/09/24     Activity as tolerated

## 2023-02-16 NOTE — ANESTHESIA POSTPROCEDURE EVALUATION
Anesthesia Post Evaluation    Patient: Andrea Gant    Procedure(s) Performed: Procedure(s) (LRB):  CYSTOSCOPY  BIOPSY, URETER/BRUSH (Left)  URETEROSCOPY (Left)  PYELOGRAM, RETROGRADE (Bilateral)  REMOVAL, CALCULUS, URETER, URETEROSCOPIC (Left)  PYELOSCOPY (Left)    Final Anesthesia Type: general      Patient location during evaluation: PACU  Patient participation: Yes- Able to Participate  Level of consciousness: awake and alert  Post-procedure vital signs: reviewed and stable  Pain control: Pain has been treated.  Airway patency: patent    PONV status: PONV absent or treated.  Anesthetic complications: no      Cardiovascular status: hemodynamically stable  Respiratory status: spontaneous ventilation  Hydration status: euvolemic  Follow-up not needed.          Vitals Value Taken Time   /75 02/16/23 1315   Temp 36.5 °C (97.7 °F) 02/16/23 1215   Pulse 62 02/16/23 1315   Resp 20 02/16/23 1315   SpO2 100 % 02/16/23 1315         No case tracking events are documented in the log.      Pain/Nick Score: Nick Score: 10 (2/16/2023 12:33 PM)

## 2023-02-16 NOTE — TRANSFER OF CARE
"Anesthesia Transfer of Care Note    Patient: Andrea Gant    Procedure(s) Performed: Procedure(s) (LRB):  CYSTOSCOPY  BIOPSY, URETER/BRUSH (Left)  URETEROSCOPY (Left)  PYELOGRAM, RETROGRADE (Bilateral)  REMOVAL, CALCULUS, URETER, URETEROSCOPIC (Left)  PYELOSCOPY (Left)    Patient location: PACU    Anesthesia Type: general    Transport from OR: Transported from OR on 6-10 L/min O2 by face mask with adequate spontaneous ventilation    Post pain: adequate analgesia    Post assessment: no apparent anesthetic complications    Post vital signs: stable    Level of consciousness: awake, alert and oriented    Nausea/Vomiting: no nausea/vomiting    Complications: none    Transfer of care protocol was followed      Last vitals:   Visit Vitals  BP (!) 156/81   Pulse 64   Temp 36.7 °C (98.1 °F) (Temporal)   Resp 16   Ht 5' 9" (1.753 m)   Wt 72.6 kg (160 lb 0.9 oz)   SpO2 98%   BMI 23.64 kg/m²     "

## 2023-02-16 NOTE — ANESTHESIA PROCEDURE NOTES
Intubation    Date/Time: 2/16/2023 11:21 AM  Performed by: Demetrius Stearns CRNA  Authorized by: Raj Ricardo MD     Intubation:     Induction:  Intravenous    Intubated:  Postinduction    Mask Ventilation:  Easy mask    Attempts:  1    Attempted By:  CRNA    Difficult Airway Encountered?: No      Complications:  None    Airway Device:  Supraglottic airway/LMA    Airway Device Size:  4.5    Secured at:  The lips    Placement Verified By:  Capnometry    Complicating Factors:  None    Findings Post-Intubation:  BS equal bilateral and atraumatic/condition of teeth unchanged

## 2023-02-16 NOTE — PATIENT INSTRUCTIONS
Post Cystoscopy Instructions  Do not strain to have a bowel movement  No strenuous exercise x 7 days  No driving while you are on narcotic pain medications or if your rahman  catheter is in place    You may remove your stent on Wednesday 2/22/23    You can expect:  To pass stone fragments if you had a stone procedure  Have pain when you void from your stent if you have a stent in place  See blood in your urine if you have a stent in place    If you have a catheter, please return to the ER if your catheter stops draining or you are having abdominal pain.    Call the doctor if:  Temperature is greater than 101F  Persistent vomiting and inability to keep food down  Inability to void if you do not have a catheter

## 2023-02-16 NOTE — OP NOTE
[facility]  Operative Note    Date: 02/16/2023    Pre-Op Diagnosis:   Left hydronephrosis  Gross hematuria  Left ureteral filling defect   Patient Active Problem List    Diagnosis Date Noted    Ureteral tumor 02/16/2023    Solitary pulmonary nodule (repeat due Aug 2023) 02/09/2023    Drug-induced immunodeficiency 11/14/2022    Stage 3a chronic kidney disease 11/14/2022    Venous insufficiency     AAA (abdominal aortic aneurysm)     Right inguinal hernia 09/06/2018    PVD (peripheral vascular disease)     Intermittent claudication 10/22/2015    Special screening for malignant neoplasms, colon 02/21/2014    PAD (peripheral artery disease) 10/28/2013    Hypercholesteremia 10/28/2013    Other hyperlipidemia 10/22/2013    Nuclear sclerosis - Both Eyes 10/22/2012    History of long-term treatment with high-risk medication 10/22/2012    Plaquenil adverse reaction in therapeutic use 10/22/2012    Persistent insomnia 10/15/2012    Rheumatoid arthritis 10/14/2012    History of hepatitis C, s/p successful treatment w/ SVR12 - 7/2015 10/14/2012       Post-Op Diagnosis: same    Procedure(s) Performed:   1.  Left ureteroscopy  2.  Cystoscopy  3.  Bilateral retrograde pyelograms  4.  Fluoro < 1 hr  5.  Stone basket extraction  6.  Left pyeloscopy  7.  Left ureteral biopsy  8.  Left ureteral stent placement    Specimen(s):   1) Stone for analysis  2) Left ureteral biopsy    Staff Surgeon:  Kem Jefferson MD     Assistant Surgeon: Benjy Schilling MD (TA); Sahil Whitney MD    Anesthesia: General LMA anesthesia    Indications: Andrea Gant is a 67 y.o. male with a left ureteral filling defect and gross hematuria, presenting for diagnostic evaluation. He currently does not have a JJ ureteral stent in place.    Findings:   - Cystourethroscopy with no tumors, lesions, masses, or stones  - Right RPG without filling defect or hydronephrosis  - Left RPG with mild hydronephrosis and filling defect ~2 cm in central  renal pelvis  - Left ureteroscopy with papillary tumors along the proximal ureter and renal pelvis. Fluoro image saved with ureteroscope marking most distal aspect of ureteral tumors  - Small renal stones encountered in upper and lower pole. Basket extracted  - Large ~2 cm nodular lesion in the renal pelvis consistent with filling defect  - Biopsy with basket and brush taken and sent for pathology  - Stent with strings placed    Estimated Blood Loss: min    Drains: 6 Fr x 26 cm JJ ureteral stent with strings    Procedure in detail:  After informed consent was obtained, the patient was brought the the cystoscopy suite and placed in the supine position.  SCDs were applied and working.  Anesthesia was administered.  The patient was then placed in the dorsal lithotomy position and prepped and draped in the usual sterile fashion.      A rigid cystoscope in a 22 Fr sheath was introduced into the patient's urethra.  This passed easily.  The entire urethra was visualized which showed no strictures or masses.  Formal cystoscopy was performed which revealed no masses or lesions suspicious for malignancy, no bladder stones, no bladder diverticuli, no trabeculations.  The ureteral orifices were visualized in the normal anatomic position bilaterally.    A 5 Fr ureteral catheter was used to cannulate the right UO. A right RPG was performed which showed no hydronephrosis or filling defects. This was repeated on the left side which showed mild hydronephrosis and a filling defect in the renal pelvis approximately 2 cm in diameter.    A motion wire was passed up the left ureteral orifice and up into the kidney via the 5 Fr catheter. This passed easily and placement was confirmed using fluoro.  A second stiff glide wire was then passed up the left ureteral orifice again confirmed using fluoro.  The cystoscope was removed keeping the wires in place.    A 10.7/12.7 Fr ureteral access sheath was then passed over the free wire under  fluoroscopic guidance to the mid ureter.  Subsequently, the flexible ureteroscope was passed into the patient's ureter through the access sheath under direct vision. Flexible ureteroscopy and pyeloscopy was performed systematically. The proximal ureter was nearly completely covered in papillary lesions circumferentially. Pyeloscopy revealed a large nodular mass in the renal pelvis.    There were two small stones, one in the upper pole and one in the lower pole. A Nitinol tipless basket was introduced through the ureteroscope and the stones were removed and sent for analysis. The basket was then used to biopsy the papillary tissue in the proximal ureter and was sent as a specimen. A brush biopsy was then performed at the same area and this was included in the ureteral biopsy specimen. The ureteroscope was then removed. The entire course of the ureter was visualized as the ureteroscope and access sheath were simultaneously removed.  There were no distal tumors or ureteral stone fragments left behind.     A 6 Fr x 26 cm JJ ureteral stent with strings was passed over the wire and up into the renal pelvis using fluoro.  When the coil appeared to be in good position in the kidney, the wire was removed under continuous fluoro.  Good coils were seen in the kidney and the bladder using fluoro.      The patient tolerated the procedure well and was transferred to the recovery room in stable condition.      Disposition:  The patient will follow up with Dr. Jefferson in 2 weeks.  He was instructed to remove his stent on Wednesday 2/22/23.    Benjy Schilling MD    I have reviewed the operative note performed by Dr. Schilling, and I concur with her/his documentation of Andrea Gant. I was present for the critical or key portions of the procedure.

## 2023-02-17 ENCOUNTER — TELEPHONE (OUTPATIENT)
Dept: HEMATOLOGY/ONCOLOGY | Facility: CLINIC | Age: 68
End: 2023-02-17
Payer: MEDICARE

## 2023-02-17 NOTE — TELEPHONE ENCOUNTER
Oncology nurse navigator contacted patient for scheduling medical oncology referral and follow up placed by Dr. Jefferson. Date, time, and location discussed with patient. All questions/concerns addressed. Patient verbalized understanding. Contact information reviewed for further assistance.

## 2023-02-17 NOTE — TELEPHONE ENCOUNTER
----- Message from Corinne E Coniglio, RN sent at 2/16/2023  2:07 PM CST -----    ----- Message -----  From: Benjy Schilling MD  Sent: 2/16/2023   1:21 PM CST  To: [facility]    This patient has gross hematuria, high grade cytology and a left ureteral tumor now s/p biopsy. Please schedule him with Dr. Jefferson in 2 weeks. He also needs to see medical oncology for discussion of systemic therapy.

## 2023-02-20 ENCOUNTER — TELEPHONE (OUTPATIENT)
Dept: UROLOGY | Facility: CLINIC | Age: 68
End: 2023-02-20
Payer: MEDICARE

## 2023-02-20 ENCOUNTER — PATIENT MESSAGE (OUTPATIENT)
Dept: UROLOGY | Facility: CLINIC | Age: 68
End: 2023-02-20
Payer: MEDICARE

## 2023-02-20 LAB
COMPN STONE: NORMAL
SPECIMEN SOURCE: NORMAL
STONE ANALYSIS IR-IMP: NORMAL

## 2023-02-20 NOTE — TELEPHONE ENCOUNTER
Called the patient to gather additional information. The patient had a cystoureteroscopy w/ L JJ ureteral stent placement on 2/16/23. He has been having mild pain when urinating but has been taking pyridium. Denies fever. Stated he was urinating normally the first few days post op. However in the last few days he has been having urinary incontinence. He stated he has been wetting his depends without knowing that he has urinated. S/W Dr. Caldwell who stated the patient could remove his JJ stent today (was originally planned for the patient to remove it Wednesday) and see if this alleviates the problem. Returned the call to the patient and advised him of this plan. Patient instructed to contact the office if the problem persists post stent removal. The patient voiced understanding.

## 2023-02-20 NOTE — TELEPHONE ENCOUNTER
Incoming call from patient for rescheduling assistance. Requests appointment with Dr. Jefferson on 3/7 in the afternoon. Appointment rescheduled and confirmed. Patient also reporting complaints of constant urinary urgency post cystoscopy last week. Nurse advised patient to message through patient portal and nurse will also contact urology department for assistance.

## 2023-02-23 RX ORDER — SIMVASTATIN 10 MG/1
10 TABLET, FILM COATED ORAL NIGHTLY
Qty: 90 TABLET | Refills: 3 | Status: SHIPPED | OUTPATIENT
Start: 2023-02-23 | End: 2023-04-06

## 2023-02-27 ENCOUNTER — PATIENT MESSAGE (OUTPATIENT)
Dept: UROLOGY | Facility: CLINIC | Age: 68
End: 2023-02-27
Payer: MEDICARE

## 2023-03-01 DIAGNOSIS — M05.79 RHEUMATOID ARTHRITIS INVOLVING MULTIPLE SITES WITH POSITIVE RHEUMATOID FACTOR: ICD-10-CM

## 2023-03-01 DIAGNOSIS — Z79.60 LONG-TERM USE OF IMMUNOSUPPRESSANT MEDICATION: ICD-10-CM

## 2023-03-01 RX ORDER — FOLIC ACID 1 MG/1
1000 TABLET ORAL DAILY
Qty: 90 TABLET | Refills: 3 | Status: SHIPPED | OUTPATIENT
Start: 2023-03-01 | End: 2024-01-22

## 2023-03-02 ENCOUNTER — PATIENT MESSAGE (OUTPATIENT)
Dept: UROLOGY | Facility: CLINIC | Age: 68
End: 2023-03-02
Payer: MEDICARE

## 2023-03-02 ENCOUNTER — TELEPHONE (OUTPATIENT)
Dept: UROLOGY | Facility: CLINIC | Age: 68
End: 2023-03-02
Payer: MEDICARE

## 2023-03-02 DIAGNOSIS — D49.59 URETERAL TUMOR: Primary | ICD-10-CM

## 2023-03-02 LAB
FINAL PATHOLOGIC DIAGNOSIS: NORMAL
Lab: NORMAL

## 2023-03-02 NOTE — TELEPHONE ENCOUNTER
Already scheduled per Elizabeth COYLE  Thank you.    ----- Message from Kem Jefferson MD sent at 3/2/2023 12:43 PM CST -----  Patient needs an appointment with me and heme/onc to discuss upper tract urothelial carcinoma.

## 2023-03-06 ENCOUNTER — OFFICE VISIT (OUTPATIENT)
Dept: HEMATOLOGY/ONCOLOGY | Facility: CLINIC | Age: 68
End: 2023-03-06
Payer: MEDICARE

## 2023-03-06 ENCOUNTER — TELEPHONE (OUTPATIENT)
Dept: HEMATOLOGY/ONCOLOGY | Facility: CLINIC | Age: 68
End: 2023-03-06

## 2023-03-06 ENCOUNTER — TELEPHONE (OUTPATIENT)
Dept: HEMATOLOGY/ONCOLOGY | Facility: CLINIC | Age: 68
End: 2023-03-06
Payer: MEDICARE

## 2023-03-06 VITALS
BODY MASS INDEX: 22.79 KG/M2 | HEART RATE: 75 BPM | HEIGHT: 69 IN | RESPIRATION RATE: 16 BRPM | SYSTOLIC BLOOD PRESSURE: 141 MMHG | OXYGEN SATURATION: 97 % | WEIGHT: 153.88 LBS | TEMPERATURE: 98 F | DIASTOLIC BLOOD PRESSURE: 83 MMHG

## 2023-03-06 DIAGNOSIS — H91.90 HEARING LOSS, UNSPECIFIED HEARING LOSS TYPE, UNSPECIFIED LATERALITY: ICD-10-CM

## 2023-03-06 DIAGNOSIS — D49.59 URETERAL TUMOR: Primary | ICD-10-CM

## 2023-03-06 DIAGNOSIS — R91.1 PULMONARY NODULE: ICD-10-CM

## 2023-03-06 DIAGNOSIS — R31.0 GROSS HEMATURIA: ICD-10-CM

## 2023-03-06 DIAGNOSIS — E27.8 ADRENAL NODULE: ICD-10-CM

## 2023-03-06 DIAGNOSIS — C68.9 UROTHELIAL CARCINOMA WITH HIGH RISK OF METASTASIS: ICD-10-CM

## 2023-03-06 DIAGNOSIS — N18.2 STAGE 2 CHRONIC KIDNEY DISEASE: ICD-10-CM

## 2023-03-06 PROCEDURE — 99999 PR PBB SHADOW E&M-EST. PATIENT-LVL V: CPT | Mod: PBBFAC,,, | Performed by: INTERNAL MEDICINE

## 2023-03-06 PROCEDURE — 3077F SYST BP >= 140 MM HG: CPT | Mod: CPTII,S$GLB,, | Performed by: INTERNAL MEDICINE

## 2023-03-06 PROCEDURE — 1159F PR MEDICATION LIST DOCUMENTED IN MEDICAL RECORD: ICD-10-PCS | Mod: CPTII,S$GLB,, | Performed by: INTERNAL MEDICINE

## 2023-03-06 PROCEDURE — 3079F PR MOST RECENT DIASTOLIC BLOOD PRESSURE 80-89 MM HG: ICD-10-PCS | Mod: CPTII,S$GLB,, | Performed by: INTERNAL MEDICINE

## 2023-03-06 PROCEDURE — 1126F AMNT PAIN NOTED NONE PRSNT: CPT | Mod: CPTII,S$GLB,, | Performed by: INTERNAL MEDICINE

## 2023-03-06 PROCEDURE — 1159F MED LIST DOCD IN RCRD: CPT | Mod: CPTII,S$GLB,, | Performed by: INTERNAL MEDICINE

## 2023-03-06 PROCEDURE — 1101F PT FALLS ASSESS-DOCD LE1/YR: CPT | Mod: CPTII,S$GLB,, | Performed by: INTERNAL MEDICINE

## 2023-03-06 PROCEDURE — 99999 PR PBB SHADOW E&M-EST. PATIENT-LVL V: ICD-10-PCS | Mod: PBBFAC,,, | Performed by: INTERNAL MEDICINE

## 2023-03-06 PROCEDURE — 3008F BODY MASS INDEX DOCD: CPT | Mod: CPTII,S$GLB,, | Performed by: INTERNAL MEDICINE

## 2023-03-06 PROCEDURE — 3079F DIAST BP 80-89 MM HG: CPT | Mod: CPTII,S$GLB,, | Performed by: INTERNAL MEDICINE

## 2023-03-06 PROCEDURE — 1160F PR REVIEW ALL MEDS BY PRESCRIBER/CLIN PHARMACIST DOCUMENTED: ICD-10-PCS | Mod: CPTII,S$GLB,, | Performed by: INTERNAL MEDICINE

## 2023-03-06 PROCEDURE — 3077F PR MOST RECENT SYSTOLIC BLOOD PRESSURE >= 140 MM HG: ICD-10-PCS | Mod: CPTII,S$GLB,, | Performed by: INTERNAL MEDICINE

## 2023-03-06 PROCEDURE — 1101F PR PT FALLS ASSESS DOC 0-1 FALLS W/OUT INJ PAST YR: ICD-10-PCS | Mod: CPTII,S$GLB,, | Performed by: INTERNAL MEDICINE

## 2023-03-06 PROCEDURE — 1126F PR PAIN SEVERITY QUANTIFIED, NO PAIN PRESENT: ICD-10-PCS | Mod: CPTII,S$GLB,, | Performed by: INTERNAL MEDICINE

## 2023-03-06 PROCEDURE — 1160F RVW MEDS BY RX/DR IN RCRD: CPT | Mod: CPTII,S$GLB,, | Performed by: INTERNAL MEDICINE

## 2023-03-06 PROCEDURE — 3288F FALL RISK ASSESSMENT DOCD: CPT | Mod: CPTII,S$GLB,, | Performed by: INTERNAL MEDICINE

## 2023-03-06 PROCEDURE — 3288F PR FALLS RISK ASSESSMENT DOCUMENTED: ICD-10-PCS | Mod: CPTII,S$GLB,, | Performed by: INTERNAL MEDICINE

## 2023-03-06 PROCEDURE — 99205 OFFICE O/P NEW HI 60 MIN: CPT | Mod: S$GLB,,, | Performed by: INTERNAL MEDICINE

## 2023-03-06 PROCEDURE — 99205 PR OFFICE/OUTPT VISIT, NEW, LEVL V, 60-74 MIN: ICD-10-PCS | Mod: S$GLB,,, | Performed by: INTERNAL MEDICINE

## 2023-03-06 PROCEDURE — 3008F PR BODY MASS INDEX (BMI) DOCUMENTED: ICD-10-PCS | Mod: CPTII,S$GLB,, | Performed by: INTERNAL MEDICINE

## 2023-03-06 NOTE — TELEPHONE ENCOUNTER
Spoke with patient.  He is calling to state he sees labs on his portal which need to be done today.  Nurse reviewed chart--nothing needed today.  He voiced understanding labs would be needed prior to him starting treatment.

## 2023-03-06 NOTE — PROGRESS NOTES
MEDICAL ONCOLOGY - NEW PATIENT VISIT    Reason for visit: Upper tract Urothelial carcinoma     Best Contact Phone Number(s): [phone] (home)      Cancer/Stage/TNM:    Cancer Staging   No matching staging information was found for the patient.     Oncology History   Urothelial carcinoma with high risk of metastasis   3/6/2023 Initial Diagnosis    Urothelial carcinoma with high risk of metastasis     3/20/2023 -  Chemotherapy    Treatment Summary   Plan Name: OP BLADDER GEMCITABINE CISPLATIN (SPLIT DOSE CISPLATIN) Q3W  Treatment Goal: Curative  Status: Active  Start Date: 3/20/2023 (Planned)  End Date: 5/29/2023 (Planned)  Provider: Issa Antony MD  Chemotherapy: CISplatin (Platinol) 35 mg/m2 = 64 mg in sodium chloride 0.9% 564 mL chemo infusion, 35 mg/m2, Intravenous, Clinic/HOD 1 time, 0 of 4 cycles  gemcitabine (GEMZAR) 1,840 mg in sodium chloride 0.9% SolP 250 mL chemo infusion, 1,000 mg/m2, Intravenous, Clinic/HOD 1 time, 0 of 4 cycles            HPI:   68 y.o. male with PVD, PAD, HLD, CKD3, AAA, treated hep C, Rheumatoid arthritis on MTX (previously on humara), smoking (about 1/2 PPD) who presented with gross hematuria. He saw urology and underwent CT urogram which showed Few soft tissue masses within the left renal pelvis, the largest measures 2.5 x 1.3 cm. There was also a L adrenal nodule measuring 1.6 cm. Urine cytology was evident of atypical urothelial cells, and repeat sample showed high grade urothelial carcinoma.   He underwent a flexible cystoscopy with normal bladder findings. L RPG showed Left RPG with mild hydronephrosis and filling defect ~2 cm in central renal pelvis.  Left ureteroscopy with was done and showed papillary tumors along the proximal ureter and renal pelvis. - Large ~2 cm nodular lesion in the renal pelvis consistent with filling defect. Biopsy of the mass came back as - High grade urothelial dysplasia/carcinoma in situ. - No definitive invasive carcinoma identified (outside  report)    Today, he reports he is feeling well. He has no more hematuria episodes. His RA is fairly controlled with MTX. Can have flares weekly. He doesn't take prednisone. He some numbness from PAD in his feet. 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       History has been obtained by chart review and discussion with the patient.    ROS:   Review of Systems   Constitutional:  Positive for weight loss. Negative for chills, fever and malaise/fatigue.   HENT:  Negative for congestion.    Eyes:  Negative for blurred vision.   Respiratory:  Negative for shortness of breath.    Cardiovascular:  Negative for chest pain, palpitations and leg swelling.   Gastrointestinal:  Negative for blood in stool, constipation, diarrhea, nausea and vomiting.   Genitourinary:  Negative for dysuria.   Musculoskeletal:  Negative for back pain and myalgias.   Skin:  Negative for itching and rash.   Neurological:  Negative for dizziness, tingling, tremors, sensory change and focal weakness.   Endo/Heme/Allergies:  Does not bruise/bleed easily.     Past Medical History:   Past Medical History:   Diagnosis Date    Cataract     Colon polyp 2014    History of hepatitis C, s/p successful treatment w/ SVR12 - 7/2015 10/14/2012    Kelsey 1a,  Liver biopsy 6/2014 - Stage 1 fibrosis;  Completed 8 weeks Harvoni w/ SVR12 - 7/2015      Hyperlipidemia     Lipoma of arm     Lipoma of lower extremity     Nuclear sclerosis - Both Eyes 10/22/2012    Other and unspecified hyperlipidemia 10/22/2013    PAD (peripheral artery disease)     Peripheral vascular disease, unspecified     Rheumatoid arthritis(714.0) 10/14/2012        Past Surgical History:   Past Surgical History:   Procedure Laterality Date    BIOPSY OF URETER Left 2/16/2023    Procedure: BIOPSY, URETER/BRUSH;  Surgeon: Kem Jefferson MD;  Location: [facility] OR [address] [place] [address];  Service: Urology;  Laterality: Left;    COLONOSCOPY N/A 11/29/2021    Procedure:  COLONOSCOPY;  Surgeon: Kenrick Zimmer MD;  Location: [facility] ENDO (4TH FLR);  Service: Endoscopy;  Laterality: N/A;  blood thinner approval received, see telephone encounter 10/19/21-BB  fully vacc-inst email-tb    CYSTOSCOPY      CYSTOSCOPY  2023    Procedure: CYSTOSCOPY;  Surgeon: Kem Jefferson MD;  Location: [facility] OR 1ST FLR;  Service: Urology;;    KNEE ARTHROPLASTY      LAPAROSCOPIC EXPLORATION OF GROIN Left 2018    Procedure: EXPLORATION, INGUINAL REGION, LAPAROSCOPIC;  Surgeon: Mingo De Guzman Jr., MD;  Location: [facility] OR 2ND FLR;  Service: General;  Laterality: Left;    PYELOSCOPY Left 2023    Procedure: PYELOSCOPY;  Surgeon: Kem Jefferson MD;  Location: [facility] OR 1ST FLR;  Service: Urology;  Laterality: Left;    RETROGRADE PYELOGRAPHY Bilateral 2023    Procedure: PYELOGRAM, RETROGRADE;  Surgeon: Kem Jefferson MD;  [facility];  Service: Urology;  Laterality: Bilateral;    TONSILLECTOMY      URETEROSCOPIC REMOVAL OF URETERIC CALCULUS Left 2023    Procedure: REMOVAL, CALCULUS, URETER, URETEROSCOPIC;  Surgeon: Kem Jefferson MD;  [facility];  Service: Urology;  Laterality: Left;    URETEROSCOPY Left 2023    Procedure: URETEROSCOPY;  Surgeon: Kem Jefferson MD;  [facility];  Service: Urology;  Laterality: Left;        Family History:   Family History   Problem Relation Age of Onset    Heart disease Paternal Uncle     Dementia Mother     Heart disease Sister     Liver disease Neg Hx     Amblyopia Neg Hx     Blindness Neg Hx     Cancer Neg Hx     Cataracts Neg Hx     Diabetes Neg Hx     Glaucoma Neg Hx     Hypertension Neg Hx     Macular degeneration Neg Hx     Retinal detachment Neg Hx     Strabismus Neg Hx     Stroke Neg Hx     Thyroid disease Neg Hx         Social History:   Social History     Tobacco Use    Smoking status: Former     Types: Cigarettes     Quit date: 2020     Years since quittin.6     "Smokeless tobacco: Never   Substance Use Topics    Alcohol use: Yes     Comment: 3-4 drinks per week        I have reviewed and updated the patient's past medical, surgical, family and social histories.    Allergies:   Review of patient's allergies indicates:  No Known Allergies     Medications:   Current Outpatient Medications   Medication Sig Dispense Refill    amLODIPine (NORVASC) 5 MG tablet Take 1 tablet (5 mg total) by mouth once daily. 90 tablet 3    aspirin 81 MG Chew Take 81 mg by mouth once daily.      cilostazoL (PLETAL) 50 MG Tab Take 1 tablet (50 mg total) by mouth 2 (two) times daily. 180 tablet 3    doxazosin (CARDURA) 4 MG tablet TAKE 1 TABLET BY MOUTH IN  THE EVENING 90 tablet 3    folic acid (FOLVITE) 1 MG tablet Take 1 tablet (1,000 mcg total) by mouth once daily. 90 tablet 3    methotrexate 2.5 MG Tab Take 8 tablets (20 mg total) by mouth every 7 days. This medication requires periodic lab monitoring. 120 tablet 1    simvastatin (ZOCOR) 10 MG tablet TAKE 1 TABLET BY MOUTH EVERY DAY IN THE EVENING (Patient taking differently: 2 (two) times a day.) 90 tablet 6    simvastatin (ZOCOR) 10 MG tablet Take 1 tablet (10 mg total) by mouth every evening. 90 tablet 3    traZODone (DESYREL) 50 MG tablet TAKE 3 TABLETS BY MOUTH  NIGHTLY AS NEEDED FOR  INSOMNIA 270 tablet 3     No current facility-administered medications for this visit.        Physical Exam:   BP (!) 141/83 (BP Location: Left arm, Patient Position: Sitting, BP Method: Medium (Automatic))   Pulse 75   Temp 97.8 °F (36.6 °C) (Oral)   Resp 16   Ht 5' 9" (1.753 m)   Wt 69.8 kg (153 lb 14.1 oz)   SpO2 97%   BMI 22.72 kg/m²      ECOG Performance status: 0            Physical Exam  Constitutional:       General: He is not in acute distress.     Appearance: Normal appearance.   HENT:      Head: Normocephalic and atraumatic.   Eyes:      Pupils: Pupils are equal, round, and reactive to light.   Cardiovascular:      Rate and Rhythm: Normal rate " and regular rhythm.      Heart sounds: No murmur heard.  Pulmonary:      Effort: No respiratory distress.      Breath sounds: Normal breath sounds. No wheezing.   Abdominal:      General: Abdomen is flat. Bowel sounds are normal. There is no distension.      Palpations: Abdomen is soft. There is no mass.      Tenderness: There is no abdominal tenderness.   Musculoskeletal:         General: No swelling or deformity.   Skin:     Coloration: Skin is not jaundiced.   Neurological:      General: No focal deficit present.      Mental Status: He is alert and oriented to person, place, and time. Mental status is at baseline.         Labs:   No results found for this or any previous visit (from the past 48 hour(s)).       Assessment:       1. Ureteral tumor    2. Urothelial carcinoma with high risk of metastasis    3. Adrenal nodule    4. Pulmonary nodule    5. Gross hematuria    6. Stage 2 chronic kidney disease    7. Hearing loss, unspecified hearing loss type, unspecified laterality          Plan:     68 y.o. M patient presented with gross hematuria and was found to have multiple soft tissue masses in L renal pelvis. He is s/p L urethroscopy and biopsy which showed high grade urothelial dysplasia. On CT urogram, there was a 1.6 cm L adrenal nodule, as well as multiple pulmonary nodules, largest was 0.6 cm, and multiple hepatic nodules too small to characterize.     Overall picture is concerning for invasive upper tract urothelial carcinoma.   He is meeting with Dr. Jefferson tomorrow for follow-up      Plan:   - PET/CT to better evaluate the adrenal nodule  - If no metastatic disease is evident, will proceed with neoadjuvant chemotherapy with [place]/Cis (split dose), followed by nephrouretrectomy.   - Discussed with his briefly chemotherapy regimen and side effects expected.   - Split dose Cisplatin giving his borderline kidney functions.   - will get audiogram prior to treatment giving his complain of mild hearing loss.   -  Pgx prior to starting treatment.       Follow up: after PET/CT      The above information has been reviewed with the patient and all questions have been answered to their apparent satisfaction.  They understand that they can call the clinic with any questions.    Anirudh Beckham, PGY VI  Hematology/Oncology  [facility]      I have reviewed the notes, assessments, and/or procedures performed by the housestaff, as above.  I have personally interviewed and examined the patient at the beside, and rounded with the housestaff. I concur with her/his assessment and plan and the documentation of Andrea Gant.  More than 60 mins total time were spent during this encounter.      Issa Antony M.D., M.S., F.A.C.P.  Hematology/Oncology Attending  [facility]              Med Onc Chart Routing  Urgent    Follow up with physician . - need PET/CT asap. - need audiogram scheduled asap. - Gen surgery for port placement -auth for chemotherapy. Return to see Dr. Antony monday moróscargn after PET/CT with labs   Follow up with DESIREE    Infusion scheduling note schedule D1 and D8 chemo every 21 days once authorized. labs before each cycle.   Injection scheduling note    Labs CBC and CMP   Lab interval:  and PGx   Imaging PET scan      Pharmacy appointment    Other referrals No additional referrals needed

## 2023-03-06 NOTE — TELEPHONE ENCOUNTER
----- Message from Lucy Carlos sent at 3/6/2023  4:41 PM CST -----  Regarding: Callback  Patient is requesting a callback regarding a message he received about some test he need to schedule. Please give the patient a callback at [phone].

## 2023-03-06 NOTE — TELEPHONE ENCOUNTER
"----- Message from Toan Cardoso sent at 3/6/2023  2:33 PM CST -----  Consult/Advisory:          Name Of Caller: Self      Contact Preference?: [phone]       Provider Name: Cassia      Does patient feel the need to be seen today? No      What is the nature of the call?: Calling to speak w/ Marylu about if he needed to go to the soft lab today or not          Additional Notes:  "Thank you for all that you do for our patients"      "

## 2023-03-07 ENCOUNTER — OFFICE VISIT (OUTPATIENT)
Dept: UROLOGY | Facility: CLINIC | Age: 68
End: 2023-03-07
Payer: MEDICARE

## 2023-03-07 VITALS
BODY MASS INDEX: 22.68 KG/M2 | HEART RATE: 81 BPM | SYSTOLIC BLOOD PRESSURE: 135 MMHG | DIASTOLIC BLOOD PRESSURE: 87 MMHG | HEIGHT: 69 IN | WEIGHT: 153.13 LBS

## 2023-03-07 DIAGNOSIS — C68.9 UROTHELIAL CARCINOMA WITH HIGH RISK OF METASTASIS: ICD-10-CM

## 2023-03-07 DIAGNOSIS — D49.59 URETERAL TUMOR: Primary | ICD-10-CM

## 2023-03-07 DIAGNOSIS — C68.9 UROTHELIAL CARCINOMA WITH HIGH RISK OF METASTASIS: Primary | ICD-10-CM

## 2023-03-07 PROCEDURE — 1160F PR REVIEW ALL MEDS BY PRESCRIBER/CLIN PHARMACIST DOCUMENTED: ICD-10-PCS | Mod: CPTII,S$GLB,, | Performed by: UROLOGY

## 2023-03-07 PROCEDURE — 1126F PR PAIN SEVERITY QUANTIFIED, NO PAIN PRESENT: ICD-10-PCS | Mod: CPTII,S$GLB,, | Performed by: UROLOGY

## 2023-03-07 PROCEDURE — 3075F PR MOST RECENT SYSTOLIC BLOOD PRESS GE 130-139MM HG: ICD-10-PCS | Mod: CPTII,S$GLB,, | Performed by: UROLOGY

## 2023-03-07 PROCEDURE — 3079F PR MOST RECENT DIASTOLIC BLOOD PRESSURE 80-89 MM HG: ICD-10-PCS | Mod: CPTII,S$GLB,, | Performed by: UROLOGY

## 2023-03-07 PROCEDURE — 99999 PR PBB SHADOW E&M-EST. PATIENT-LVL III: CPT | Mod: PBBFAC,,, | Performed by: UROLOGY

## 2023-03-07 PROCEDURE — 99215 OFFICE O/P EST HI 40 MIN: CPT | Mod: S$GLB,,, | Performed by: UROLOGY

## 2023-03-07 PROCEDURE — 1126F AMNT PAIN NOTED NONE PRSNT: CPT | Mod: CPTII,S$GLB,, | Performed by: UROLOGY

## 2023-03-07 PROCEDURE — 1101F PR PT FALLS ASSESS DOC 0-1 FALLS W/OUT INJ PAST YR: ICD-10-PCS | Mod: CPTII,S$GLB,, | Performed by: UROLOGY

## 2023-03-07 PROCEDURE — 1160F RVW MEDS BY RX/DR IN RCRD: CPT | Mod: CPTII,S$GLB,, | Performed by: UROLOGY

## 2023-03-07 PROCEDURE — 1101F PT FALLS ASSESS-DOCD LE1/YR: CPT | Mod: CPTII,S$GLB,, | Performed by: UROLOGY

## 2023-03-07 PROCEDURE — 1159F MED LIST DOCD IN RCRD: CPT | Mod: CPTII,S$GLB,, | Performed by: UROLOGY

## 2023-03-07 PROCEDURE — 3288F PR FALLS RISK ASSESSMENT DOCUMENTED: ICD-10-PCS | Mod: CPTII,S$GLB,, | Performed by: UROLOGY

## 2023-03-07 PROCEDURE — 3008F BODY MASS INDEX DOCD: CPT | Mod: CPTII,S$GLB,, | Performed by: UROLOGY

## 2023-03-07 PROCEDURE — 3008F PR BODY MASS INDEX (BMI) DOCUMENTED: ICD-10-PCS | Mod: CPTII,S$GLB,, | Performed by: UROLOGY

## 2023-03-07 PROCEDURE — 3079F DIAST BP 80-89 MM HG: CPT | Mod: CPTII,S$GLB,, | Performed by: UROLOGY

## 2023-03-07 PROCEDURE — 99999 PR PBB SHADOW E&M-EST. PATIENT-LVL III: ICD-10-PCS | Mod: PBBFAC,,, | Performed by: UROLOGY

## 2023-03-07 PROCEDURE — 99215 PR OFFICE/OUTPT VISIT, EST, LEVL V, 40-54 MIN: ICD-10-PCS | Mod: S$GLB,,, | Performed by: UROLOGY

## 2023-03-07 PROCEDURE — 3288F FALL RISK ASSESSMENT DOCD: CPT | Mod: CPTII,S$GLB,, | Performed by: UROLOGY

## 2023-03-07 PROCEDURE — 3075F SYST BP GE 130 - 139MM HG: CPT | Mod: CPTII,S$GLB,, | Performed by: UROLOGY

## 2023-03-07 PROCEDURE — 1159F PR MEDICATION LIST DOCUMENTED IN MEDICAL RECORD: ICD-10-PCS | Mod: CPTII,S$GLB,, | Performed by: UROLOGY

## 2023-03-07 NOTE — PROGRESS NOTES
"[facility] - Urology [facility]   Clinic Note    SUBJECTIVE:     Chief Complaint: UTUC    History of Present Illness:  Andrea Gant is a 68 y.o. male who presents to clinic for UTUC. He is established to our clinic. Referral from No ref. provider found.    Patient with a history of CKD, PVD, HLD presenting for UTUC. Patient initially presented to [facility] with gross hematuria, had a normal cystoscopy. Had a CTU with a filling defect in the L renal pelvis. No evidence of RP lymphadenopathy. There is a 1.6 cm L adrenal nodule, patient is scheduled for a PET tomorrow.        Underwent ureteroscopy with biopsy on 2/16/23, pathology with HG urothelial dysplasia, CIS. Cytology positive.       OBJECTIVE:     Estimated body mass index is 22.72 kg/m² as calculated from the following:    Height as of 3/6/23: 5' 9" (1.753 m).    Weight as of 3/6/23: 69.8 kg (153 lb 14.1 oz).    Vital Signs (Most Recent)       Physical Exam  Constitutional:       General: He is not in acute distress.     Appearance: He is well-developed. He is not diaphoretic.   HENT:      Head: Normocephalic and atraumatic.   Eyes:      General: No scleral icterus.  Pulmonary:      Effort: Pulmonary effort is normal. No respiratory distress.      Breath sounds: No stridor.   Abdominal:      General: There is no distension.      Palpations: Abdomen is soft.      Tenderness: There is no abdominal tenderness.       Musculoskeletal:         General: Normal range of motion.      Cervical back: Normal range of motion.   Skin:     General: Skin is warm and dry.   Neurological:      Mental Status: He is alert.   Psychiatric:         Behavior: Behavior normal.       Lab Results   Component Value Date    BUN 24 (H) 02/08/2023    CREATININE 1.5 (H) 02/08/2023    WBC 8.18 02/08/2023    HGB 12.2 (L) 02/08/2023    HCT 39.9 (L) 02/08/2023     02/08/2023    AST 19 02/08/2023    ALT 12 02/08/2023    ALKPHOS 88 02/08/2023    ALBUMIN 3.5 02/08/2023    HGBA1C " 5.0 05/17/2022        Lab Results   Component Value Date    PSA 0.76 10/11/2021    PSA 0.49 08/01/2019    PSA 0.42 08/15/2018    PSA 0.43 01/11/2012    PSA 0.35 08/19/2009    PSA 0.2 05/02/2006    PSADIAG 0.71 02/08/2023       ASSESSMENT     1. Urothelial carcinoma with high risk of metastasis      PLAN:   1. Urothelial carcinoma with high risk of metastasis    Will plan for NAC ([place]/Cis) followed by consolidative robotic radical nephroureterectomy. Patient is scheduled for PET tomorrow to evaluate L adrenal nodule. Will plan to reimage prior to surgery after completion of chemotherapy.     --I have explained the risk, benefits, and alternatives of the procedure in detail.  The risks including but not limited to bleeding, injury to adjacent structures including the spleen, liver, lung, pancreas, colon and intestines, blood vessels in the abdomen including the renal artery or renal vein, or need for conversion to open nephrectomy were explained to the patient in depth. The patient voices understanding and all questions have been answered. The patient agrees to proceed as planned with a left robotic nephrectomy with total ureterectomy.        Milad Jolly MD     Letter to No ref. provider found    The patient was seen and examined with the resident physician.  All questions were answered.  The plan was discussed with the patient and I concur with the resident physician's documentation.

## 2023-03-08 ENCOUNTER — PATIENT MESSAGE (OUTPATIENT)
Dept: HEMATOLOGY/ONCOLOGY | Facility: CLINIC | Age: 68
End: 2023-03-08
Payer: MEDICARE

## 2023-03-08 ENCOUNTER — HOSPITAL ENCOUNTER (OUTPATIENT)
Dept: RADIOLOGY | Facility: HOSPITAL | Age: 68
Discharge: HOME | End: 2023-03-08
Attending: STUDENT IN AN ORGANIZED HEALTH CARE EDUCATION/TRAINING PROGRAM
Payer: MEDICARE

## 2023-03-08 DIAGNOSIS — D49.59 URETERAL TUMOR: ICD-10-CM

## 2023-03-08 DIAGNOSIS — C68.9 UROTHELIAL CARCINOMA WITH HIGH RISK OF METASTASIS: ICD-10-CM

## 2023-03-08 PROCEDURE — A9552 F18 FDG: HCPCS

## 2023-03-08 PROCEDURE — 78815 PET IMAGE W/CT SKULL-THIGH: CPT | Mod: TC,PI

## 2023-03-08 PROCEDURE — 78815 NM PET CT ROUTINE: ICD-10-PCS | Mod: 26,PI,, | Performed by: RADIOLOGY

## 2023-03-08 PROCEDURE — 78815 PET IMAGE W/CT SKULL-THIGH: CPT | Mod: 26,PI,, | Performed by: RADIOLOGY

## 2023-03-09 ENCOUNTER — CLINICAL SUPPORT (OUTPATIENT)
Dept: AUDIOLOGY | Facility: CLINIC | Age: 68
End: 2023-03-09
Payer: MEDICARE

## 2023-03-09 DIAGNOSIS — H91.90 HEARING LOSS, UNSPECIFIED HEARING LOSS TYPE, UNSPECIFIED LATERALITY: ICD-10-CM

## 2023-03-09 DIAGNOSIS — D49.59 URETERAL TUMOR: ICD-10-CM

## 2023-03-09 DIAGNOSIS — C68.9 UROTHELIAL CARCINOMA WITH HIGH RISK OF METASTASIS: ICD-10-CM

## 2023-03-09 DIAGNOSIS — Z79.899 ENCOUNTER FOR MONITORING OTOTOXIC DRUG THERAPY: ICD-10-CM

## 2023-03-09 DIAGNOSIS — H91.93 HIGH FREQUENCY HEARING LOSS OF BOTH EARS: Primary | ICD-10-CM

## 2023-03-09 DIAGNOSIS — Z51.81 ENCOUNTER FOR MONITORING OTOTOXIC DRUG THERAPY: ICD-10-CM

## 2023-03-09 PROCEDURE — 92557 COMPREHENSIVE HEARING TEST: CPT | Mod: S$GLB,,,

## 2023-03-09 PROCEDURE — 92588 EVOKED AUDITORY TST COMPLETE: CPT | Mod: S$GLB,,,

## 2023-03-09 PROCEDURE — 99999 PR PBB SHADOW E&M-EST. PATIENT-LVL I: ICD-10-PCS | Mod: PBBFAC,,,

## 2023-03-09 PROCEDURE — 92550 TYMPANOMETRY & REFLEX THRESH: CPT | Mod: S$GLB,,,

## 2023-03-09 PROCEDURE — 92557 PR COMPREHENSIVE HEARING TEST: ICD-10-PCS | Mod: S$GLB,,,

## 2023-03-09 PROCEDURE — 92588 EVOKED AUDITORY TST COMPLETE: ICD-10-PCS | Mod: S$GLB,,,

## 2023-03-09 PROCEDURE — 92550 PR TYMPANOMETRY AND REFLEX THRESHOLD MEASUREMENTS: ICD-10-PCS | Mod: S$GLB,,,

## 2023-03-09 PROCEDURE — 99999 PR PBB SHADOW E&M-EST. PATIENT-LVL I: CPT | Mod: PBBFAC,,,

## 2023-03-09 NOTE — Clinical Note
Jose Juárez,   Here are the results from Mr. Gant's hearing evaluation. Please let me know if you need any further information or have any questions. Thanks!  Shola Ivan

## 2023-03-10 NOTE — PROGRESS NOTES
Andrea Gant, a 68 y.o. male, was seen today in the clinic for an audiologic evaluation for ototoxic monitoring.  The patient denied aural fullness, tinnitus, otalgia, and dizziness.    Tympanometry revealed Type A in the right ear and Type A in the left ear.  Audiogram results revealed normal hearing sensitivity from 250-2000 Hz sloping to a severe to profound hearing loss by 8000 Hz with a slight air bone gap at 1000 Hz in the right ear and normal hearing sensitivity from 250-2000 Hz sloping to a severe sensorineural hearing loss (SNHL) by 8000 Hz in the left ear.  Speech reception thresholds were noted at 20 dB in the right ear and 20 dB in the left ear.  Speech discrimination scores were 84% in the right ear and 100% in the left ear.  There is an asymmetry between ears in the high frequencies.    Distortion Product Otoacoustic Emissions (DPOAEs) were tested from 1500-19850 Hz and were present from 9784-2352 Hz in both ears.  Present DPOAEs are indicative of normal cochlear function to at least the level of the outer hair cells.  Absent DPOAEs are indicative of abnormal cochlear function to at least the level of the outer hair cells.      Probe referenced acoustic reflexes revealed a response at 90 dB HL at 500 Hz, 95 dB HL at 1000 Hz, 100 dB HL at 2000 Hz, and were absent at 4000 Hz in the right ipsilateral condition and revealed a response at 90 dB HL at 500 Hz, 95 dB HL at 1000 Hz, and were absent at 2000 Hz and 4000 Hz in the left ipsilateral condition.  Responses were present at 100 dB HL at 500 Hz, 100 dB HL at 1000 Hz, and were absent at 2000 Hz and 4000 Hz in the right contralateral condition and present at 95 dB HL at 500 Hz, 100 dB HL at 1000 Hz, and absent at 2000 Hz and 4000 Hz in the left contralateral condition.     Recommendations:  Otologic evaluation  Continue to monitor hearing during ototoxic treatment  Hearing protection when in noise  Hearing aid consultation after medical  clearance

## 2023-03-12 NOTE — PROGRESS NOTES
MEDICAL ONCOLOGY - FOLLOW-UP VISIT    Reason for visit: Upper tract Urothelial carcinoma     Best Contact Phone Number(s): [phone] (home)      Cancer/Stage/TNM:    Cancer Staging   No matching staging information was found for the patient.     Oncology History   Urothelial carcinoma with high risk of metastasis   3/6/2023 Initial Diagnosis    Urothelial carcinoma with high risk of metastasis     3/14/2023 -  Chemotherapy    Treatment Summary   Plan Name: OP BLADDER GEMCITABINE CISPLATIN (SPLIT DOSE CISPLATIN) Q3W  Treatment Goal: Curative  Status: Active  Start Date: 3/14/2023 (Planned)  End Date: 5/23/2023 (Planned)  Provider: Issa Antony MD  Chemotherapy: CISplatin (Platinol) 35 mg/m2 = 64 mg in sodium chloride 0.9% 564 mL chemo infusion, 35 mg/m2, Intravenous, Clinic/HOD 1 time, 0 of 4 cycles  gemcitabine (GEMZAR) 1,840 mg in sodium chloride 0.9% SolP 250 mL chemo infusion, 1,000 mg/m2, Intravenous, Clinic/HOD 1 time, 0 of 4 cycles            HPI:   68 y.o. male with PVD, PAD, HLD, CKD3, AAA, treated hep C, Rheumatoid arthritis on MTX (previously on humara), smoking (about 1/2 PPD) who presented with gross hematuria. He saw urology and underwent CT urogram which showed Few soft tissue masses within the left renal pelvis, the largest measures 2.5 x 1.3 cm. There was also a L adrenal nodule measuring 1.6 cm. Urine cytology was evident of atypical urothelial cells, and repeat sample showed high grade urothelial carcinoma.   He underwent a flexible cystoscopy with normal bladder findings. L RPG showed Left RPG with mild hydronephrosis and filling defect ~2 cm in central renal pelvis.  Left ureteroscopy with was done and showed papillary tumors along the proximal ureter and renal pelvis. - Large ~2 cm nodular lesion in the renal pelvis consistent with filling defect. Biopsy of the mass came back as - High grade urothelial dysplasia/carcinoma in situ. - No definitive invasive carcinoma identified (outside  report)    PET/CT done on 03/08 showed Left proximal ureteral lesion compatible with urothelial cancer.  No FDG PET evidence of metastatic disease.  Benign bilateral adrenal adenomas.    Interval history:   He returns today prior to starting chemotherapy (NAC). He feels well overall. He has no complaints today     ROS:   Review of Systems   Constitutional:  Positive for weight loss. Negative for chills, fever and malaise/fatigue.   HENT:  Negative for congestion.    Eyes:  Negative for blurred vision.   Respiratory:  Negative for shortness of breath.    Cardiovascular:  Negative for chest pain, palpitations and leg swelling.   Gastrointestinal:  Negative for blood in stool, constipation, diarrhea, nausea and vomiting.   Genitourinary:  Negative for dysuria.   Musculoskeletal:  Negative for back pain and myalgias.   Skin:  Negative for itching and rash.   Neurological:  Negative for dizziness, tingling, tremors, sensory change and focal weakness.   Endo/Heme/Allergies:  Does not bruise/bleed easily.     Past Medical History:   Past Medical History:   Diagnosis Date    Cataract     Colon polyp 2014    History of hepatitis C, s/p successful treatment w/ SVR12 - 7/2015 10/14/2012    Kelsey 1a,  Liver biopsy 6/2014 - Stage 1 fibrosis;  Completed 8 weeks Harvoni w/ SVR12 - 7/2015      Hyperlipidemia     Lipoma of arm     Lipoma of lower extremity     Nuclear sclerosis - Both Eyes 10/22/2012    Other and unspecified hyperlipidemia 10/22/2013    PAD (peripheral artery disease)     Peripheral vascular disease, unspecified     Rheumatoid arthritis(714.0) 10/14/2012        Past Surgical History:   Past Surgical History:   Procedure Laterality Date    BIOPSY OF URETER Left 2/16/2023    Procedure: BIOPSY, URETER/BRUSH;  Surgeon: Kem Jefferson MD;  Location: [facility] OR [address] [place] [address];  Service: Urology;  Laterality: Left;    COLONOSCOPY N/A 11/29/2021    Procedure: COLONOSCOPY;  Surgeon: Kenrick Zimmer MD;  Location: [facility] ([facility]  FLR);  Service: Endoscopy;  Laterality: N/A;  blood thinner approval received, see telephone encounter 10/19/21-BB  fully vacc-inst email-tb    CYSTOSCOPY      CYSTOSCOPY  2023    Procedure: CYSTOSCOPY;  Surgeon: Kem Jefferson MD;  Location: [facility] OR 1ST FLR;  Service: Urology;;    KNEE ARTHROPLASTY      LAPAROSCOPIC EXPLORATION OF GROIN Left 2018    Procedure: EXPLORATION, INGUINAL REGION, LAPAROSCOPIC;  Surgeon: Mingo De Guzman Jr., MD;  Location: [facility] OR 2ND FLR;  Service: General;  Laterality: Left;    PYELOSCOPY Left 2023    Procedure: PYELOSCOPY;  Surgeon: Kem Jefferson MD;  Location: [facility] OR 1ST FLR;  Service: Urology;  Laterality: Left;    RETROGRADE PYELOGRAPHY Bilateral 2023    Procedure: PYELOGRAM, RETROGRADE;  Surgeon: Kem Jefferson MD;  [facility];  Service: Urology;  Laterality: Bilateral;    TONSILLECTOMY      URETEROSCOPIC REMOVAL OF URETERIC CALCULUS Left 2023    Procedure: REMOVAL, CALCULUS, URETER, URETEROSCOPIC;  Surgeon: Kem Jefferson MD;  [facility];  Service: Urology;  Laterality: Left;    URETEROSCOPY Left 2023    Procedure: URETEROSCOPY;  Surgeon: Kem Jefferson MD;  [facility];  Service: Urology;  Laterality: Left;        Family History:   Family History   Problem Relation Age of Onset    Heart disease Paternal Uncle     Dementia Mother     Heart disease Sister     Liver disease Neg Hx     Amblyopia Neg Hx     Blindness Neg Hx     Cancer Neg Hx     Cataracts Neg Hx     Diabetes Neg Hx     Glaucoma Neg Hx     Hypertension Neg Hx     Macular degeneration Neg Hx     Retinal detachment Neg Hx     Strabismus Neg Hx     Stroke Neg Hx     Thyroid disease Neg Hx         Social History:   Social History     Tobacco Use    Smoking status: Former     Types: Cigarettes     Quit date: 2020     Years since quittin.7    Smokeless tobacco: Never   Substance Use Topics    Alcohol use: Yes      "Comment: 3-4 drinks per week        I have reviewed and updated the patient's past medical, surgical, family and social histories.    Allergies:   Review of patient's allergies indicates:  No Known Allergies     Medications:   Current Outpatient Medications   Medication Sig Dispense Refill    LIDOcaine-prilocaine (EMLA) cream Apply topically as needed (apply over port 30 minutes before chemotherapy). 30 g 0     No current facility-administered medications for this visit.        Physical Exam:   /73 (BP Location: Right arm, Patient Position: Sitting, BP Method: Medium (Automatic))   Pulse (!) 57   Temp 96.9 °F (36.1 °C) (Oral)   Resp 20   Ht 5' 9" (1.753 m)   Wt 70.7 kg (155 lb 13.8 oz)   SpO2 99%   BMI 23.02 kg/m²      ECOG Performance status: 0            Physical Exam  Constitutional:       General: He is not in acute distress.     Appearance: Normal appearance.   HENT:      Head: Normocephalic and atraumatic.   Eyes:      Pupils: Pupils are equal, round, and reactive to light.   Cardiovascular:      Rate and Rhythm: Normal rate and regular rhythm.      Heart sounds: No murmur heard.  Pulmonary:      Effort: No respiratory distress.      Breath sounds: Normal breath sounds. No wheezing.   Abdominal:      General: Abdomen is flat. Bowel sounds are normal. There is no distension.      Palpations: Abdomen is soft. There is no mass.      Tenderness: There is no abdominal tenderness.   Musculoskeletal:         General: No swelling or deformity.   Skin:     Coloration: Skin is not jaundiced.   Neurological:      General: No focal deficit present.      Mental Status: He is alert and oriented to person, place, and time. Mental status is at baseline.         Labs:   Recent Results (from the past 48 hour(s))   CBC Oncology    Collection Time: 03/13/23  7:15 AM   Result Value Ref Range    WBC 7.64 3.90 - 12.70 K/uL    RBC 4.22 (L) 4.60 - 6.20 M/uL    Hemoglobin 12.8 (L) 14.0 - 18.0 g/dL    Hematocrit 39.8 (L) " 40.0 - 54.0 %    MCV 94 82 - 98 fL    MCH 30.3 27.0 - 31.0 pg    MCHC 32.2 32.0 - 36.0 g/dL    RDW 13.8 11.5 - 14.5 %    Platelets 224 150 - 450 K/uL    MPV 9.3 9.2 - 12.9 fL    Gran # (ANC) 5.6 1.8 - 7.7 K/uL    Immature Grans (Abs) 0.02 0.00 - 0.04 K/uL   Comprehensive Metabolic Panel    Collection Time: 03/13/23  7:15 AM   Result Value Ref Range    Sodium 142 136 - 145 mmol/L    Potassium 4.0 3.5 - 5.1 mmol/L    Chloride 106 95 - 110 mmol/L    CO2 25 23 - 29 mmol/L    Glucose 115 (H) 70 - 110 mg/dL    BUN 19 8 - 23 mg/dL    Creatinine 1.4 0.5 - 1.4 mg/dL    Calcium 9.5 8.7 - 10.5 mg/dL    Total Protein 7.2 6.0 - 8.4 g/dL    Albumin 3.6 3.5 - 5.2 g/dL    Total Bilirubin 0.4 0.1 - 1.0 mg/dL    Alkaline Phosphatase 90 55 - 135 U/L    AST 16 10 - 40 U/L    ALT 10 10 - 44 U/L    Anion Gap 11 8 - 16 mmol/L    eGFR 54.4 (A) >60 mL/min/1.73 m^2          Assessment:       1. Urothelial carcinoma of kidney, left    2. Chemotherapy-induced nausea            Plan:     68 y.o. M patient presented with gross hematuria and was found to have multiple soft tissue masses in L renal pelvis. He is s/p L urethroscopy and biopsy which showed high grade urothelial dysplasia. On CT urogram, there was a 1.6 cm L adrenal nodule, as well as multiple pulmonary nodules, largest was 0.6 cm, and multiple hepatic nodules too small to characterize.   Overall picture is concerning for invasive upper tract urothelial carcinoma.   PET/CT showed no evidence of metastatic disease. There is B/L adrenal adenomas.   Audiogram showed normal hearing sensitivity from 250-2000 Hz sloping to a severe to profound hearing loss by 8000 Hz with a slight air bone gap at 1000 Hz in the right ear and normal hearing sensitivity from 250-2000 Hz sloping to a severe sensorineural hearing loss (SNHL) by 8000 Hz in the left ear.     Plan:   - Neoadjuvant chemotherapy with Gemcitabine and Cisplatin. Split dose Cisplatin for borderline kidney functions  - Patient was  consented for chemotherapy today 3/13/2023 .   An extensive discussion was had which included a thorough discussion of the risk and benefits of treatment and alternatives.  Risks, including but not limited to, possible hair loss, bone marrow damage (anemia, thrombocytopenia, immune suppression, neutropenia), damage to body organs (brain, heart, liver, kidney, lungs, nervous system, skin, and others), allergic reactions, sterility, nausea/vomiting, constipation/diarrhea, sores in the mouth, secondary cancers, local damage at possible injection sites, and rarely death were all discussed.  The patient agrees with the plan, and all questions have been answered to their satisfaction.  Consent was signed the patient, provider, and a third party witness.    - Labs reviewed today and are adequate for treatment.   - Plan to start C1D1 tomorrow.   - Because of his RA (not very controlled), will avoid IO adjuvantely.   - Zofran 8 mg W8 prn is sent to his pharmacy   - EMLA cream is sent to pharmacy       Follow up: prior to D8C1    The above information has been reviewed with the patient and all questions have been answered to their apparent satisfaction.  They understand that they can call the clinic with any questions.    Anirudh Beckham, PGY VI  Hematology/Oncology  [facility]      I have reviewed the notes, assessments, and/or procedures performed by the housestaff, as above.  I have personally interviewed and examined the patient at the beside, and rounded with the housestaff. I concur with her/his assessment and plan and the documentation of Andrea Gant.  More than 40 mins total time were spent during this encounter.      Issa Antony M.D., M.S., F.A.C.P.  Hematology/Oncology Attending  [facility]            Med Onc Chart Routing  Urgent    Follow up with physician 3 weeks. See Marquita or Dr. Antony with labs and chemo chair for treatment on 04/03   Follow up with DESIREE 3  weeks.   Infusion scheduling note chemo D1 and D8 every 3 weeks   Injection scheduling note    Labs CBC and CMP   Scheduling: Labs same day as infusion  Preferred lab:  Lab interval: every 3 weeks     Imaging    Pharmacy appointment    Other referrals

## 2023-03-13 ENCOUNTER — HOSPITAL ENCOUNTER (OUTPATIENT)
Facility: OTHER | Age: 68
Discharge: HOME | End: 2023-03-13
Attending: RADIOLOGY | Admitting: RADIOLOGY
Payer: MEDICARE

## 2023-03-13 ENCOUNTER — OFFICE VISIT (OUTPATIENT)
Dept: HEMATOLOGY/ONCOLOGY | Facility: CLINIC | Age: 68
End: 2023-03-13
Payer: MEDICARE

## 2023-03-13 VITALS
TEMPERATURE: 97 F | HEIGHT: 69 IN | OXYGEN SATURATION: 99 % | HEART RATE: 57 BPM | WEIGHT: 155.88 LBS | BODY MASS INDEX: 23.09 KG/M2 | DIASTOLIC BLOOD PRESSURE: 73 MMHG | SYSTOLIC BLOOD PRESSURE: 138 MMHG | RESPIRATION RATE: 20 BRPM

## 2023-03-13 VITALS
OXYGEN SATURATION: 99 % | TEMPERATURE: 98 F | HEART RATE: 68 BPM | DIASTOLIC BLOOD PRESSURE: 89 MMHG | RESPIRATION RATE: 16 BRPM | SYSTOLIC BLOOD PRESSURE: 154 MMHG

## 2023-03-13 DIAGNOSIS — C64.2 UROTHELIAL CARCINOMA OF KIDNEY, LEFT: Primary | ICD-10-CM

## 2023-03-13 DIAGNOSIS — C68.9 UROTHELIAL CARCINOMA WITH HIGH RISK OF METASTASIS: ICD-10-CM

## 2023-03-13 DIAGNOSIS — T45.1X5A CHEMOTHERAPY-INDUCED NAUSEA: ICD-10-CM

## 2023-03-13 DIAGNOSIS — R11.0 CHEMOTHERAPY-INDUCED NAUSEA: ICD-10-CM

## 2023-03-13 PROCEDURE — 63600175 PHARM REV CODE 636 W HCPCS: Performed by: RADIOLOGY

## 2023-03-13 PROCEDURE — 1126F PR PAIN SEVERITY QUANTIFIED, NO PAIN PRESENT: ICD-10-PCS | Mod: CPTII,S$GLB,, | Performed by: INTERNAL MEDICINE

## 2023-03-13 PROCEDURE — 3078F PR MOST RECENT DIASTOLIC BLOOD PRESSURE < 80 MM HG: ICD-10-PCS | Mod: CPTII,S$GLB,, | Performed by: INTERNAL MEDICINE

## 2023-03-13 PROCEDURE — 3075F PR MOST RECENT SYSTOLIC BLOOD PRESS GE 130-139MM HG: ICD-10-PCS | Mod: CPTII,S$GLB,, | Performed by: INTERNAL MEDICINE

## 2023-03-13 PROCEDURE — C1788 PORT, INDWELLING, IMP: HCPCS | Performed by: RADIOLOGY

## 2023-03-13 PROCEDURE — 1126F AMNT PAIN NOTED NONE PRSNT: CPT | Mod: CPTII,S$GLB,, | Performed by: INTERNAL MEDICINE

## 2023-03-13 PROCEDURE — 3075F SYST BP GE 130 - 139MM HG: CPT | Mod: CPTII,S$GLB,, | Performed by: INTERNAL MEDICINE

## 2023-03-13 PROCEDURE — C1894 INTRO/SHEATH, NON-LASER: HCPCS | Performed by: RADIOLOGY

## 2023-03-13 PROCEDURE — 25000003 PHARM REV CODE 250: Performed by: RADIOLOGY

## 2023-03-13 PROCEDURE — 99499 UNLISTED E&M SERVICE: CPT | Mod: S$GLB,,, | Performed by: INTERNAL MEDICINE

## 2023-03-13 PROCEDURE — 99999 PR PBB SHADOW E&M-EST. PATIENT-LVL III: CPT | Mod: PBBFAC,,, | Performed by: INTERNAL MEDICINE

## 2023-03-13 PROCEDURE — 99215 OFFICE O/P EST HI 40 MIN: CPT | Mod: S$GLB,,, | Performed by: INTERNAL MEDICINE

## 2023-03-13 PROCEDURE — 3288F FALL RISK ASSESSMENT DOCD: CPT | Mod: CPTII,S$GLB,, | Performed by: INTERNAL MEDICINE

## 2023-03-13 PROCEDURE — 3288F PR FALLS RISK ASSESSMENT DOCUMENTED: ICD-10-PCS | Mod: CPTII,S$GLB,, | Performed by: INTERNAL MEDICINE

## 2023-03-13 PROCEDURE — 3078F DIAST BP <80 MM HG: CPT | Mod: CPTII,S$GLB,, | Performed by: INTERNAL MEDICINE

## 2023-03-13 PROCEDURE — 1101F PT FALLS ASSESS-DOCD LE1/YR: CPT | Mod: CPTII,S$GLB,, | Performed by: INTERNAL MEDICINE

## 2023-03-13 PROCEDURE — 3008F PR BODY MASS INDEX (BMI) DOCUMENTED: ICD-10-PCS | Mod: CPTII,S$GLB,, | Performed by: INTERNAL MEDICINE

## 2023-03-13 PROCEDURE — 3008F BODY MASS INDEX DOCD: CPT | Mod: CPTII,S$GLB,, | Performed by: INTERNAL MEDICINE

## 2023-03-13 PROCEDURE — 99215 PR OFFICE/OUTPT VISIT, EST, LEVL V, 40-54 MIN: ICD-10-PCS | Mod: S$GLB,,, | Performed by: INTERNAL MEDICINE

## 2023-03-13 PROCEDURE — 1101F PR PT FALLS ASSESS DOC 0-1 FALLS W/OUT INJ PAST YR: ICD-10-PCS | Mod: CPTII,S$GLB,, | Performed by: INTERNAL MEDICINE

## 2023-03-13 PROCEDURE — 99999 PR PBB SHADOW E&M-EST. PATIENT-LVL III: ICD-10-PCS | Mod: PBBFAC,,, | Performed by: INTERNAL MEDICINE

## 2023-03-13 DEVICE — POWERPORT CLEARVUE IMPLANTABLE PORT WITH ATTACHABLE 8F POLYURETHANE OPEN-ENDED SINGLE-LUMEN VENOUS CATHETER INTERMEDIATE KIT
Type: IMPLANTABLE DEVICE | Site: CHEST | Status: FUNCTIONAL
Brand: POWERPORT CLEARVUE

## 2023-03-13 RX ORDER — LIDOCAINE HYDROCHLORIDE 10 MG/ML
INJECTION INFILTRATION; PERINEURAL
Status: DISCONTINUED | OUTPATIENT
Start: 2023-03-13 | End: 2023-03-13 | Stop reason: HOSPADM

## 2023-03-13 RX ORDER — ONDANSETRON HYDROCHLORIDE 8 MG/1
8 TABLET, FILM COATED ORAL EVERY 8 HOURS PRN
Qty: 90 TABLET | Refills: 2 | Status: SHIPPED | OUTPATIENT
Start: 2023-03-13 | End: 2023-04-06

## 2023-03-13 RX ORDER — SODIUM CHLORIDE 0.9 % (FLUSH) 0.9 %
10 SYRINGE (ML) INJECTION
Status: CANCELLED | OUTPATIENT
Start: 2023-03-14

## 2023-03-13 RX ORDER — HYDROCODONE BITARTRATE AND ACETAMINOPHEN 5; 325 MG/1; MG/1
1 TABLET ORAL EVERY 4 HOURS PRN
Status: DISCONTINUED | OUTPATIENT
Start: 2023-03-13 | End: 2023-03-13 | Stop reason: HOSPADM

## 2023-03-13 RX ORDER — CEFAZOLIN SODIUM 1 G/50ML
SOLUTION INTRAVENOUS
Status: DISCONTINUED | OUTPATIENT
Start: 2023-03-13 | End: 2023-03-13 | Stop reason: HOSPADM

## 2023-03-13 RX ORDER — HEPARIN SODIUM 1000 [USP'U]/ML
INJECTION, SOLUTION INTRAVENOUS; SUBCUTANEOUS
Status: DISCONTINUED | OUTPATIENT
Start: 2023-03-13 | End: 2023-03-13 | Stop reason: HOSPADM

## 2023-03-13 RX ORDER — HEPARIN 100 UNIT/ML
500 SYRINGE INTRAVENOUS
Status: CANCELLED | OUTPATIENT
Start: 2023-03-14

## 2023-03-13 RX ORDER — SODIUM CHLORIDE 0.9 % (FLUSH) 0.9 %
10 SYRINGE (ML) INJECTION
Status: CANCELLED | OUTPATIENT
Start: 2023-03-21

## 2023-03-13 RX ORDER — HEPARIN 100 UNIT/ML
500 SYRINGE INTRAVENOUS
Status: CANCELLED | OUTPATIENT
Start: 2023-03-21

## 2023-03-13 RX ORDER — LIDOCAINE AND PRILOCAINE 25; 25 MG/G; MG/G
CREAM TOPICAL
Qty: 30 G | Refills: 0 | Status: SHIPPED | OUTPATIENT
Start: 2023-03-13 | End: 2023-04-12

## 2023-03-13 RX ORDER — FENTANYL CITRATE 50 UG/ML
INJECTION, SOLUTION INTRAMUSCULAR; INTRAVENOUS
Status: DISCONTINUED | OUTPATIENT
Start: 2023-03-13 | End: 2023-03-13 | Stop reason: HOSPADM

## 2023-03-13 NOTE — PROCEDURES
Radiology Post-Procedure Note    Pre Op Diagnosis: cancer  Post Op Diagnosis: Same    Procedure: port placement    Procedure performed by: Rene Cali MD    Written Informed Consent Obtained: Yes  Specimen Removed: NO  Estimated Blood Loss: Minimal    Findings:   R chest port placement    Patient tolerated procedure well.    @SIG@

## 2023-03-13 NOTE — H&P
Consult/H&P Note  Interventional Radiology    Consult Requested By: oncology    Reason for Consult: port placement    SUBJECTIVE:     Chief Complaint: cancer    History of Present Illness: 67 yo M with urothelial cancer, needs port for access.    Past Medical History:   Diagnosis Date    Cataract     Colon polyp 2014    History of hepatitis C, s/p successful treatment w/ SVR12 - 7/2015 10/14/2012    Kelsey 1a,  Liver biopsy 6/2014 - Stage 1 fibrosis;  Completed 8 weeks Harvoni w/ SVR12 - 7/2015      Hyperlipidemia     Lipoma of arm     Lipoma of lower extremity     Nuclear sclerosis - Both Eyes 10/22/2012    Other and unspecified hyperlipidemia 10/22/2013    PAD (peripheral artery disease)     Peripheral vascular disease, unspecified     Rheumatoid arthritis(714.0) 10/14/2012     Past Surgical History:   Procedure Laterality Date    BIOPSY OF URETER Left 2/16/2023    Procedure: BIOPSY, URETER/BRUSH;  Surgeon: Kem Jefferson MD;  Location: [facility] OR 1ST FLR;  Service: Urology;  Laterality: Left;    COLONOSCOPY N/A 11/29/2021    Procedure: COLONOSCOPY;  Surgeon: Kenrick Zimmer MD;  Location: [facility] (4TH FLR);  Service: Endoscopy;  Laterality: N/A;  blood thinner approval received, see telephone encounter 10/19/21-BB  fully vacc-inst email-tb    CYSTOSCOPY      CYSTOSCOPY  2/16/2023    Procedure: CYSTOSCOPY;  Surgeon: Kem Jefferson MD;  [facility];  Service: Urology;;    KNEE ARTHROPLASTY      LAPAROSCOPIC EXPLORATION OF GROIN Left 09/06/2018    Procedure: EXPLORATION, INGUINAL REGION, LAPAROSCOPIC;  Surgeon: Mingo De Guzman Jr., MD;  Location: [facility] OR 2ND FLR;  Service: General;  Laterality: Left;    PYELOSCOPY Left 2/16/2023    Procedure: PYELOSCOPY;  Surgeon: Kem Jefferson MD;  [facility];  Service: Urology;  Laterality: Left;    RETROGRADE PYELOGRAPHY Bilateral 2/16/2023    Procedure: PYELOGRAM, RETROGRADE;  Surgeon: Kem Jefferson MD;  Location: [facility] OR [facility];  Service: Urology;  Laterality: Bilateral;    TONSILLECTOMY      URETEROSCOPIC REMOVAL OF URETERIC CALCULUS Left 2023    Procedure: REMOVAL, CALCULUS, URETER, URETEROSCOPIC;  Surgeon: Kem Jefferson MD;  Location: [facility] OR [address] [place] [address];  Service: Urology;  Laterality: Left;    URETEROSCOPY Left 2023    Procedure: URETEROSCOPY;  Surgeon: Kem Jefferson MD;  Location: [facility] OR [address] [place] [address];  Service: Urology;  Laterality: Left;     Family History   Problem Relation Age of Onset    Heart disease Paternal Uncle     Dementia Mother     Heart disease Sister     Liver disease Neg Hx     Amblyopia Neg Hx     Blindness Neg Hx     Cancer Neg Hx     Cataracts Neg Hx     Diabetes Neg Hx     Glaucoma Neg Hx     Hypertension Neg Hx     Macular degeneration Neg Hx     Retinal detachment Neg Hx     Strabismus Neg Hx     Stroke Neg Hx     Thyroid disease Neg Hx      Social History     Tobacco Use    Smoking status: Former     Types: Cigarettes     Quit date: 2020     Years since quittin.7    Smokeless tobacco: Never   Substance Use Topics    Alcohol use: Yes     Comment: 3-4 drinks per week    Drug use: Yes     Types: Marijuana     Comment: marajuana used on Saturday       Review of Systems:  Constitutional/General:No fever, chills, change in appetite or weight loss.  Hematological/Immuno: no known coagulopathies  Respiratory: no shortness of breath  Cardiovascular: no chest pain  Gastrointestinal: no abdominal pain  Genito-Urinary: no dysuria  Musculoskeletal: negative  Skin: Negative for rash, itching, pigmentation changes, nail or hair changes.  Neurological: no TIA or stroke symptoms  Psychiatric: normal mood/affect, good insight/judgement      OBJECTIVE:     Vital Signs Range (Last 24H):  Temp:  [96.9 °F (36.1 °C)]   Pulse:  [57]   Resp:  [20]   BP: (138)/(73)   SpO2:  [99 %]     Physical Exam:  General- Patient alert and oriented x3 in NAD  ENT- PERRLA,  Neck- No masses  CV- Regular rate and rhythm  Resp-   No increased WOB  GI- Non tender/non-distended  Extrem- No cyanosis, clubbing, edema.   Derm- No rashes, masses, or lesions noted  Neuro-  No focal deficits noted.     Physical Exam  There is no height or weight on file to calculate BMI.    Scheduled Meds:   Continuous Infusions:   PRN Meds:    Allergies: Review of patient's allergies indicates:  No Known Allergies    Labs:  No results for input(s): INR in the last 168 hours.    Invalid input(s):  PT,  PTT    Recent Labs   Lab 03/13/23  0715   WBC 7.64   HGB 12.8*   HCT 39.8*   MCV 94         Recent Labs   Lab 03/13/23  0715   *      K 4.0      CO2 25   BUN 19   CREATININE 1.4   CALCIUM 9.5   ALT 10   AST 16   ALBUMIN 3.6   BILITOT 0.4       Vitals (Most Recent):       ASA: 2  Mallampati: 2    Consent obtained    ASSESSMENT/PLAN:     R chest port placement, moderate sedation.    There are no hospital problems to display for this patient.          Rene Cali MD

## 2023-03-13 NOTE — PLAN OF CARE
Andrea Gant has met all discharge criteria from Phase II. Vital Signs are stable, ambulating  without difficulty. Discharge instructions given, patient verbalized understanding. Discharged from facility via wheelchair in stable condition.

## 2023-03-14 ENCOUNTER — INFUSION (OUTPATIENT)
Dept: INFUSION THERAPY | Facility: OTHER | Age: 68
End: 2023-03-14
Attending: INTERNAL MEDICINE
Payer: MEDICARE

## 2023-03-14 ENCOUNTER — TELEPHONE (OUTPATIENT)
Dept: HEMATOLOGY/ONCOLOGY | Facility: CLINIC | Age: 68
End: 2023-03-14
Payer: MEDICARE

## 2023-03-14 VITALS
OXYGEN SATURATION: 97 % | DIASTOLIC BLOOD PRESSURE: 70 MMHG | BODY MASS INDEX: 23.05 KG/M2 | HEART RATE: 69 BPM | TEMPERATURE: 98 F | HEIGHT: 69 IN | RESPIRATION RATE: 12 BRPM | SYSTOLIC BLOOD PRESSURE: 155 MMHG | WEIGHT: 155.63 LBS

## 2023-03-14 DIAGNOSIS — C64.2 UROTHELIAL CARCINOMA OF KIDNEY, LEFT: Primary | ICD-10-CM

## 2023-03-14 DIAGNOSIS — C68.9 UROTHELIAL CARCINOMA WITH HIGH RISK OF METASTASIS: Primary | ICD-10-CM

## 2023-03-14 PROCEDURE — 96413 CHEMO IV INFUSION 1 HR: CPT

## 2023-03-14 PROCEDURE — 96417 CHEMO IV INFUS EACH ADDL SEQ: CPT

## 2023-03-14 PROCEDURE — 25000003 PHARM REV CODE 250: Performed by: INTERNAL MEDICINE

## 2023-03-14 PROCEDURE — 63600175 PHARM REV CODE 636 W HCPCS: Performed by: INTERNAL MEDICINE

## 2023-03-14 PROCEDURE — 96366 THER/PROPH/DIAG IV INF ADDON: CPT

## 2023-03-14 PROCEDURE — 96375 TX/PRO/DX INJ NEW DRUG ADDON: CPT

## 2023-03-14 PROCEDURE — 96367 TX/PROPH/DG ADDL SEQ IV INF: CPT

## 2023-03-14 RX ORDER — SODIUM CHLORIDE 0.9 % (FLUSH) 0.9 %
10 SYRINGE (ML) INJECTION
Status: DISCONTINUED | OUTPATIENT
Start: 2023-03-14 | End: 2023-03-14 | Stop reason: HOSPADM

## 2023-03-14 RX ORDER — HEPARIN 100 UNIT/ML
500 SYRINGE INTRAVENOUS
Status: DISCONTINUED | OUTPATIENT
Start: 2023-03-14 | End: 2023-03-14 | Stop reason: HOSPADM

## 2023-03-14 RX ADMIN — APREPITANT 130 MG: 130 INJECTION, EMULSION INTRAVENOUS at 11:03

## 2023-03-14 RX ADMIN — DEXAMETHASONE SODIUM PHOSPHATE 0.25 MG: 4 INJECTION, SOLUTION INTRAMUSCULAR; INTRAVENOUS at 11:03

## 2023-03-14 RX ADMIN — MAGNESIUM SULFATE HEPTAHYDRATE 500 ML/HR: 500 INJECTION, SOLUTION INTRAMUSCULAR; INTRAVENOUS at 09:03

## 2023-03-14 RX ADMIN — CISPLATIN 64 MG: 100 INJECTION, SOLUTION INTRAVENOUS at 12:03

## 2023-03-14 RX ADMIN — GEMCITABINE 1800 MG: 38 INJECTION, SOLUTION INTRAVENOUS at 11:03

## 2023-03-14 RX ADMIN — HEPARIN 500 UNITS: 100 SYRINGE at 01:03

## 2023-03-14 NOTE — PLAN OF CARE
Gemzar chemotherapy infusion and Cisplatin chemotherapy infusion administered, no reaction. Patient tolerated well. Patient accompanied by spouse for discharge home. Port A Cath 20 gauge 3/4 inch needle deaccessed/ removed. No apparent distress noted. Discharge instructions given to patient. Patient understands instructions. Follow up appointment scheduled.

## 2023-03-14 NOTE — TELEPHONE ENCOUNTER
----- Message from Roque Gordon sent at 3/14/2023 11:08 AM CDT -----  Regarding: labs  I schedule patient a month out, can I get standing labs order?

## 2023-03-15 ENCOUNTER — TELEPHONE (OUTPATIENT)
Dept: HEMATOLOGY/ONCOLOGY | Facility: CLINIC | Age: 68
End: 2023-03-15
Payer: MEDICARE

## 2023-03-15 ENCOUNTER — PATIENT MESSAGE (OUTPATIENT)
Dept: HEMATOLOGY/ONCOLOGY | Facility: CLINIC | Age: 68
End: 2023-03-15
Payer: MEDICARE

## 2023-03-15 NOTE — TELEPHONE ENCOUNTER
"----- Message from Toan Cardoso sent at 3/15/2023  2:04 PM CDT -----  Consult/Advisory:          Name Of Caller: Self      Contact Preference?: [phone]       Provider Name: Herbie      Does patient feel the need to be seen today? No      What is the nature of the call?: Inquiring about chemo side effects. Stating his urine is currently bright red after starting chemo yesterday          Additional Notes:  "Thank you for all that you do for our patients"      "

## 2023-03-15 NOTE — TELEPHONE ENCOUNTER
Spoke with patient.  He states someone helped him already.  Had first chemo yesterday. Had some bright red urine today. No clots.  He was told this was normal and to stay in touch with nurse.  He is not certain who he spoke with.

## 2023-03-20 ENCOUNTER — LAB VISIT (OUTPATIENT)
Dept: LAB | Facility: HOSPITAL | Age: 68
End: 2023-03-20
Attending: NURSE PRACTITIONER
Payer: MEDICARE

## 2023-03-20 ENCOUNTER — OFFICE VISIT (OUTPATIENT)
Dept: HEMATOLOGY/ONCOLOGY | Facility: CLINIC | Age: 68
End: 2023-03-20
Payer: MEDICARE

## 2023-03-20 VITALS
SYSTOLIC BLOOD PRESSURE: 144 MMHG | WEIGHT: 152.13 LBS | TEMPERATURE: 98 F | DIASTOLIC BLOOD PRESSURE: 83 MMHG | BODY MASS INDEX: 22.53 KG/M2 | HEIGHT: 69 IN | HEART RATE: 80 BPM

## 2023-03-20 DIAGNOSIS — H91.90 HEARING LOSS, UNSPECIFIED HEARING LOSS TYPE, UNSPECIFIED LATERALITY: ICD-10-CM

## 2023-03-20 DIAGNOSIS — C64.2 UROTHELIAL CARCINOMA OF KIDNEY, LEFT: ICD-10-CM

## 2023-03-20 DIAGNOSIS — I10 PRIMARY HYPERTENSION: ICD-10-CM

## 2023-03-20 DIAGNOSIS — C64.2 UROTHELIAL CARCINOMA OF KIDNEY, LEFT: Primary | ICD-10-CM

## 2023-03-20 DIAGNOSIS — N18.2 STAGE 2 CHRONIC KIDNEY DISEASE: ICD-10-CM

## 2023-03-20 DIAGNOSIS — R11.0 CHEMOTHERAPY-INDUCED NAUSEA: ICD-10-CM

## 2023-03-20 DIAGNOSIS — M05.79 RHEUMATOID ARTHRITIS INVOLVING MULTIPLE SITES WITH POSITIVE RHEUMATOID FACTOR: ICD-10-CM

## 2023-03-20 DIAGNOSIS — C68.9 UROTHELIAL CARCINOMA WITH HIGH RISK OF METASTASIS: ICD-10-CM

## 2023-03-20 DIAGNOSIS — T45.1X5A CHEMOTHERAPY-INDUCED NAUSEA: ICD-10-CM

## 2023-03-20 LAB
ALBUMIN SERPL BCP-MCNC: 3.8 G/DL (ref 3.5–5.2)
ALP SERPL-CCNC: 86 U/L (ref 55–135)
ALT SERPL W/O P-5'-P-CCNC: 24 U/L (ref 10–44)
ANION GAP SERPL CALC-SCNC: 9 MMOL/L (ref 8–16)
AST SERPL-CCNC: 18 U/L (ref 10–40)
BILIRUB SERPL-MCNC: 0.4 MG/DL (ref 0.1–1)
BUN SERPL-MCNC: 31 MG/DL (ref 8–23)
CALCIUM SERPL-MCNC: 10.2 MG/DL (ref 8.7–10.5)
CHLORIDE SERPL-SCNC: 105 MMOL/L (ref 95–110)
CO2 SERPL-SCNC: 24 MMOL/L (ref 23–29)
CREAT SERPL-MCNC: 1.4 MG/DL (ref 0.5–1.4)
ERYTHROCYTE [DISTWIDTH] IN BLOOD BY AUTOMATED COUNT: 14 % (ref 11.5–14.5)
EST. GFR  (NO RACE VARIABLE): 54.4 ML/MIN/1.73 M^2
GLUCOSE SERPL-MCNC: 97 MG/DL (ref 70–110)
HCT VFR BLD AUTO: 38 % (ref 40–54)
HGB BLD-MCNC: 12 G/DL (ref 14–18)
IMM GRANULOCYTES # BLD AUTO: 0.02 K/UL (ref 0–0.04)
MAGNESIUM SERPL-MCNC: 1.9 MG/DL (ref 1.6–2.6)
MCH RBC QN AUTO: 29.9 PG (ref 27–31)
MCHC RBC AUTO-ENTMCNC: 31.6 G/DL (ref 32–36)
MCV RBC AUTO: 95 FL (ref 82–98)
NEUTROPHILS # BLD AUTO: 5 K/UL (ref 1.8–7.7)
PLATELET # BLD AUTO: 182 K/UL (ref 150–450)
PMV BLD AUTO: 9.6 FL (ref 9.2–12.9)
POTASSIUM SERPL-SCNC: 4.5 MMOL/L (ref 3.5–5.1)
PROT SERPL-MCNC: 8.1 G/DL (ref 6–8.4)
RBC # BLD AUTO: 4.02 M/UL (ref 4.6–6.2)
SODIUM SERPL-SCNC: 138 MMOL/L (ref 136–145)
WBC # BLD AUTO: 6.65 K/UL (ref 3.9–12.7)

## 2023-03-20 PROCEDURE — 36415 COLL VENOUS BLD VENIPUNCTURE: CPT | Performed by: INTERNAL MEDICINE

## 2023-03-20 PROCEDURE — 3079F DIAST BP 80-89 MM HG: CPT | Mod: CPTII,S$GLB,, | Performed by: NURSE PRACTITIONER

## 2023-03-20 PROCEDURE — 3077F PR MOST RECENT SYSTOLIC BLOOD PRESSURE >= 140 MM HG: ICD-10-PCS | Mod: CPTII,S$GLB,, | Performed by: NURSE PRACTITIONER

## 2023-03-20 PROCEDURE — 3288F PR FALLS RISK ASSESSMENT DOCUMENTED: ICD-10-PCS | Mod: CPTII,S$GLB,, | Performed by: NURSE PRACTITIONER

## 2023-03-20 PROCEDURE — 99999 PR PBB SHADOW E&M-EST. PATIENT-LVL III: CPT | Mod: PBBFAC,,, | Performed by: NURSE PRACTITIONER

## 2023-03-20 PROCEDURE — 1101F PR PT FALLS ASSESS DOC 0-1 FALLS W/OUT INJ PAST YR: ICD-10-PCS | Mod: CPTII,S$GLB,, | Performed by: NURSE PRACTITIONER

## 2023-03-20 PROCEDURE — 99215 PR OFFICE/OUTPT VISIT, EST, LEVL V, 40-54 MIN: ICD-10-PCS | Mod: S$GLB,,, | Performed by: NURSE PRACTITIONER

## 2023-03-20 PROCEDURE — 3079F PR MOST RECENT DIASTOLIC BLOOD PRESSURE 80-89 MM HG: ICD-10-PCS | Mod: CPTII,S$GLB,, | Performed by: NURSE PRACTITIONER

## 2023-03-20 PROCEDURE — 3008F PR BODY MASS INDEX (BMI) DOCUMENTED: ICD-10-PCS | Mod: CPTII,S$GLB,, | Performed by: NURSE PRACTITIONER

## 2023-03-20 PROCEDURE — 3077F SYST BP >= 140 MM HG: CPT | Mod: CPTII,S$GLB,, | Performed by: NURSE PRACTITIONER

## 2023-03-20 PROCEDURE — 80053 COMPREHEN METABOLIC PANEL: CPT | Performed by: INTERNAL MEDICINE

## 2023-03-20 PROCEDURE — 85027 COMPLETE CBC AUTOMATED: CPT | Performed by: INTERNAL MEDICINE

## 2023-03-20 PROCEDURE — 83735 ASSAY OF MAGNESIUM: CPT | Performed by: NURSE PRACTITIONER

## 2023-03-20 PROCEDURE — 3288F FALL RISK ASSESSMENT DOCD: CPT | Mod: CPTII,S$GLB,, | Performed by: NURSE PRACTITIONER

## 2023-03-20 PROCEDURE — 1101F PT FALLS ASSESS-DOCD LE1/YR: CPT | Mod: CPTII,S$GLB,, | Performed by: NURSE PRACTITIONER

## 2023-03-20 PROCEDURE — 1159F PR MEDICATION LIST DOCUMENTED IN MEDICAL RECORD: ICD-10-PCS | Mod: CPTII,S$GLB,, | Performed by: NURSE PRACTITIONER

## 2023-03-20 PROCEDURE — 99999 PR PBB SHADOW E&M-EST. PATIENT-LVL III: ICD-10-PCS | Mod: PBBFAC,,, | Performed by: NURSE PRACTITIONER

## 2023-03-20 PROCEDURE — 1159F MED LIST DOCD IN RCRD: CPT | Mod: CPTII,S$GLB,, | Performed by: NURSE PRACTITIONER

## 2023-03-20 PROCEDURE — 99215 OFFICE O/P EST HI 40 MIN: CPT | Mod: S$GLB,,, | Performed by: NURSE PRACTITIONER

## 2023-03-20 PROCEDURE — 3008F BODY MASS INDEX DOCD: CPT | Mod: CPTII,S$GLB,, | Performed by: NURSE PRACTITIONER

## 2023-03-20 PROCEDURE — 99499 UNLISTED E&M SERVICE: CPT | Mod: S$GLB,,, | Performed by: NURSE PRACTITIONER

## 2023-03-20 NOTE — Clinical Note
RTC in 2 weeks with labs (CBC,CMP,MAG), to see me and cycle 2, day 1.  RTC in 3 weeks with labs (CBC,CMP,MAG), to see me and cycle 2, day 8.

## 2023-03-20 NOTE — PROGRESS NOTES
MEDICAL ONCOLOGY - FOLLOW-UP VISIT    Reason for visit: Upper tract Urothelial carcinoma     Best Contact Phone Number(s): [phone] (home)      Cancer/Stage/TNM:    Cancer Staging   No matching staging information was found for the patient.     Oncology History   Urothelial carcinoma with high risk of metastasis   3/6/2023 Initial Diagnosis    Urothelial carcinoma with high risk of metastasis     3/14/2023 -  Chemotherapy    Treatment Summary   Plan Name: OP BLADDER GEMCITABINE CISPLATIN (SPLIT DOSE CISPLATIN) Q3W  Treatment Goal: Curative  Status: Active  Start Date: 3/14/2023  End Date: 5/23/2023 (Planned)  Provider: Issa Antony MD  Chemotherapy: CISplatin (Platinol) 35 mg/m2 = 64 mg in sodium chloride 0.9% 314 mL chemo infusion, 35 mg/m2 = 64 mg, Intravenous, Clinic/HOD 1 time, 1 of 4 cycles  Administration: 64 mg (3/14/2023)  gemcitabine 1,800 mg in sodium chloride 0.9% SolP 332.34 mL chemo infusion, 1,840 mg, Intravenous, Clinic/HOD 1 time, 1 of 4 cycles  Administration: 1,800 mg (3/14/2023)            HPI:   68 y.o. male with PVD, PAD, HLD, CKD3, AAA, treated hep C, Rheumatoid arthritis on MTX (previously on humara), smoking (about 1/2 PPD) who presented with gross hematuria. He saw urology and underwent CT urogram which showed Few soft tissue masses within the left renal pelvis, the largest measures 2.5 x 1.3 cm. There was also a L adrenal nodule measuring 1.6 cm. Urine cytology was evident of atypical urothelial cells, and repeat sample showed high grade urothelial carcinoma.   He underwent a flexible cystoscopy with normal bladder findings. L RPG showed Left RPG with mild hydronephrosis and filling defect ~2 cm in central renal pelvis.  Left ureteroscopy with was done and showed papillary tumors along the proximal ureter and renal pelvis. - Large ~2 cm nodular lesion in the renal pelvis consistent with filling defect. Biopsy of the mass came back as - High grade urothelial dysplasia/carcinoma in  situ. - No definitive invasive carcinoma identified (outside report)    PET/CT done on 03/08 showed Left proximal ureteral lesion compatible with urothelial cancer.  No FDG PET evidence of metastatic disease.  Benign bilateral adrenal adenomas.    Interval history:   He returns today prior to cycle 1 day 8 cis/gem.  He feels well overall. He has no complaints today. Eating well. Weight is stable. Denies nausea/vomiting. Drinking 3-4 bottles of water daily. Usually has 2 bowel movements per day but now maybe 1. Denies neuropathy to fingers/toes. +hearing issues, worse in R.     ROS:   Review of Systems   Constitutional:  Negative for chills, fever, malaise/fatigue and weight loss.   HENT:  Positive for hearing loss. Negative for congestion. Ear discharge: r>L.   Eyes:  Negative for blurred vision.   Respiratory:  Negative for shortness of breath.    Cardiovascular:  Negative for chest pain, palpitations and leg swelling.   Gastrointestinal:  Negative for blood in stool, constipation, diarrhea, nausea and vomiting.   Genitourinary:  Negative for dysuria.   Musculoskeletal:  Negative for back pain and myalgias.   Skin:  Negative for itching and rash.   Neurological:  Negative for dizziness, tingling, tremors, sensory change and focal weakness.   Endo/Heme/Allergies:  Does not bruise/bleed easily.     Past Medical History:   Past Medical History:   Diagnosis Date    Cataract     Colon polyp 2014    History of hepatitis C, s/p successful treatment w/ SVR12 - 7/2015 10/14/2012    Kelsey 1a,  Liver biopsy 6/2014 - Stage 1 fibrosis;  Completed 8 weeks Harvoni w/ SVR12 - 7/2015      Hyperlipidemia     Lipoma of arm     Lipoma of lower extremity     Nuclear sclerosis - Both Eyes 10/22/2012    Other and unspecified hyperlipidemia 10/22/2013    PAD (peripheral artery disease)     Peripheral vascular disease, unspecified     Rheumatoid arthritis(714.0) 10/14/2012        Past Surgical History:   Past Surgical History:   Procedure  Laterality Date    BIOPSY OF URETER Left 02/16/2023    Procedure: BIOPSY, URETER/BRUSH;  Surgeon: Kem Jefferson MD;  Location: [facility] OR 1ST FLR;  Service: Urology;  Laterality: Left;    COLONOSCOPY N/A 11/29/2021    Procedure: COLONOSCOPY;  Surgeon: Kenrick Zimmer MD;  Location: [facility] ENDO (4TH FLR);  Service: Endoscopy;  Laterality: N/A;  blood thinner approval received, see telephone encounter 10/19/21-BB  fully vacc-inst email-tb    CYSTOSCOPY      CYSTOSCOPY  02/16/2023    Procedure: CYSTOSCOPY;  Surgeon: Kem Jefferson MD;  Location: [facility] OR 1ST FLR;  Service: Urology;;    HERNIA REPAIR      INSERTION OF TUNNELED CENTRAL VENOUS CATHETER (CVC) WITH SUBCUTANEOUS PORT Right 3/13/2023    Procedure: INSERTION, PORT-A-CATH;  Surgeon: Rene Cali MD;  Location: [facility] CATH LAB;  Service: Radiology;  Laterality: Right;    KNEE ARTHROPLASTY      LAPAROSCOPIC EXPLORATION OF GROIN Left 09/06/2018    Procedure: EXPLORATION, INGUINAL REGION, LAPAROSCOPIC;  Surgeon: Mingo De Guzman Jr., MD;  Location: [facility] OR 2ND FLR;  Service: General;  Laterality: Left;    PYELOSCOPY Left 02/16/2023    Procedure: PYELOSCOPY;  Surgeon: Kem Jefferson MD;  Location: [facility] OR 1ST FLR;  Service: Urology;  Laterality: Left;    RETROGRADE PYELOGRAPHY Bilateral 02/16/2023    Procedure: PYELOGRAM, RETROGRADE;  Surgeon: Kem Jefferson MD;  [facility];  Service: Urology;  Laterality: Bilateral;    TONSILLECTOMY      URETEROSCOPIC REMOVAL OF URETERIC CALCULUS Left 02/16/2023    Procedure: REMOVAL, CALCULUS, URETER, URETEROSCOPIC;  Surgeon: Kem Jefferson MD;  [facility];  Service: Urology;  Laterality: Left;    URETEROSCOPY Left 02/16/2023    Procedure: URETEROSCOPY;  Surgeon: Kem Jefferson MD;  [facility];  Service: Urology;  Laterality: Left;        Family History:   Family History   Problem Relation Age of Onset    Heart disease Paternal Uncle     Dementia Mother      Heart disease Sister     Liver disease Neg Hx     Amblyopia Neg Hx     Blindness Neg Hx     Cancer Neg Hx     Cataracts Neg Hx     Diabetes Neg Hx     Glaucoma Neg Hx     Hypertension Neg Hx     Macular degeneration Neg Hx     Retinal detachment Neg Hx     Strabismus Neg Hx     Stroke Neg Hx     Thyroid disease Neg Hx         Social History:   Social History     Tobacco Use    Smoking status: Former     Types: Cigarettes     Quit date: 2020     Years since quittin.7    Smokeless tobacco: Never   Substance Use Topics    Alcohol use: Yes     Comment: 3-4 drinks per week        I have reviewed and updated the patient's past medical, surgical, family and social histories.    Allergies:   Review of patient's allergies indicates:  No Known Allergies     Medications:   Current Outpatient Medications   Medication Sig Dispense Refill    amLODIPine (NORVASC) 5 MG tablet TAKE 1 TABLET BY MOUTH ONCE DAILY 90 tablet 2    aspirin 81 MG Chew Take 81 mg by mouth once daily.      cilostazoL (PLETAL) 50 MG Tab Take 1 tablet (50 mg total) by mouth 2 (two) times daily. 180 tablet 3    doxazosin (CARDURA) 4 MG tablet TAKE 1 TABLET BY MOUTH IN  THE EVENING 90 tablet 3    folic acid (FOLVITE) 1 MG tablet Take 1 tablet (1,000 mcg total) by mouth once daily. 90 tablet 3    LIDOcaine-prilocaine (EMLA) cream Apply topically as needed (apply over port 30 minutes before chemotherapy). 30 g 0    methotrexate 2.5 MG Tab Take 8 tablets (20 mg total) by mouth every 7 days. This medication requires periodic lab monitoring. 120 tablet 1    ondansetron (ZOFRAN) 8 MG tablet Take 1 tablet (8 mg total) by mouth every 8 (eight) hours as needed for Nausea. 90 tablet 2    simvastatin (ZOCOR) 10 MG tablet TAKE 1 TABLET BY MOUTH EVERY DAY IN THE EVENING (Patient taking differently: 2 (two) times a day.) 90 tablet 6    simvastatin (ZOCOR) 10 MG tablet Take 1 tablet (10 mg total) by mouth every evening. 90 tablet 3    traZODone (DESYREL) 50 MG  "tablet TAKE 3 TABLETS BY MOUTH  NIGHTLY AS NEEDED FOR  INSOMNIA 270 tablet 3     No current facility-administered medications for this visit.        Physical Exam:   BP (!) 144/83 (BP Location: Left arm, Patient Position: Sitting, BP Method: Medium (Automatic))   Pulse 80   Temp 97.5 °F (36.4 °C)   Ht 5' 9" (1.753 m)   Wt 69 kg (152 lb 1.9 oz)   BMI 22.46 kg/m²      ECOG Performance status: 0            Physical Exam  Constitutional:       General: He is not in acute distress.     Appearance: Normal appearance.   HENT:      Head: Normocephalic and atraumatic.   Eyes:      Pupils: Pupils are equal, round, and reactive to light.   Cardiovascular:      Rate and Rhythm: Normal rate and regular rhythm.      Heart sounds: No murmur heard.  Pulmonary:      Effort: No respiratory distress.      Breath sounds: Normal breath sounds. No wheezing.   Abdominal:      General: Abdomen is flat. Bowel sounds are normal. There is no distension.      Palpations: Abdomen is soft. There is no mass.      Tenderness: There is no abdominal tenderness.   Musculoskeletal:         General: No swelling or deformity.   Skin:     Coloration: Skin is not jaundiced.   Neurological:      General: No focal deficit present.      Mental Status: He is alert and oriented to person, place, and time. Mental status is at baseline.         Labs:   Recent Results (from the past 48 hour(s))   CBC Oncology    Collection Time: 03/20/23 11:21 AM   Result Value Ref Range    WBC 6.65 3.90 - 12.70 K/uL    RBC 4.02 (L) 4.60 - 6.20 M/uL    Hemoglobin 12.0 (L) 14.0 - 18.0 g/dL    Hematocrit 38.0 (L) 40.0 - 54.0 %    MCV 95 82 - 98 fL    MCH 29.9 27.0 - 31.0 pg    MCHC 31.6 (L) 32.0 - 36.0 g/dL    RDW 14.0 11.5 - 14.5 %    Platelets 182 150 - 450 K/uL    MPV 9.6 9.2 - 12.9 fL    Gran # (ANC) 5.0 1.8 - 7.7 K/uL    Immature Grans (Abs) 0.02 0.00 - 0.04 K/uL   CMP    Collection Time: 03/20/23 11:21 AM   Result Value Ref Range    Sodium 138 136 - 145 mmol/L    " Potassium 4.5 3.5 - 5.1 mmol/L    Chloride 105 95 - 110 mmol/L    CO2 24 23 - 29 mmol/L    Glucose 97 70 - 110 mg/dL    BUN 31 (H) 8 - 23 mg/dL    Creatinine 1.4 0.5 - 1.4 mg/dL    Calcium 10.2 8.7 - 10.5 mg/dL    Total Protein 8.1 6.0 - 8.4 g/dL    Albumin 3.8 3.5 - 5.2 g/dL    Total Bilirubin 0.4 0.1 - 1.0 mg/dL    Alkaline Phosphatase 86 55 - 135 U/L    AST 18 10 - 40 U/L    ALT 24 10 - 44 U/L    Anion Gap 9 8 - 16 mmol/L    eGFR 54.4 (A) >60 mL/min/1.73 m^2   Magnesium    Collection Time: 03/20/23 11:21 AM   Result Value Ref Range    Magnesium 1.9 1.6 - 2.6 mg/dL          Assessment:       1. Urothelial carcinoma of kidney, left    2. Stage 2 chronic kidney disease    3. Hearing loss, unspecified hearing loss type, unspecified laterality    4. Chemotherapy-induced nausea    5. Rheumatoid arthritis involving multiple sites with positive rheumatoid factor    6. Primary hypertension          Plan:   1- 68 y.o. M patient presented with gross hematuria and was found to have multiple soft tissue masses in L renal pelvis. He is s/p L urethroscopy and biopsy which showed high grade urothelial dysplasia. On CT urogram, there was a 1.6 cm L adrenal nodule, as well as multiple pulmonary nodules, largest was 0.6 cm, and multiple hepatic nodules too small to characterize.   Overall picture is concerning for invasive upper tract urothelial carcinoma.   PET/CT showed no evidence of metastatic disease. There is B/L adrenal adenomas.     Plan:   - Neoadjuvant chemotherapy with Gemcitabine and Cisplatin. Split dose Cisplatin for borderline kidney functions- Labs reviewed today and are adequate for treatment.   - Plan to start C1D1 tomorrow.   - Because of his RA (not very controlled), will avoid IO adjuvantely.     Labs reviewed and acceptable to proceed with cycle 1, day 8.     RTC in 2 weeks with labs (CBC,CMP,MAG), to see me and cycle 2, day 1.    RTC in 3 weeks with labs (CBC,CMP,MAG), to see me and cycle 2, day 8.    2-  Stable. Encouraged to increase hydration based on elevated BUN.    3- Audiogram showed normal hearing sensitivity from 250-2000 Hz sloping to a severe to profound hearing loss by 8000 Hz with a slight air bone gap at 1000 Hz in the right ear and normal hearing sensitivity from 250-2000 Hz sloping to a severe sensorineural hearing loss (SNHL) by 8000 Hz in the left ear. Will recheck post chemotherapy.    4- Zofran PRN.    5- On MTX. Will avoid adjuvant immunotherapy.    6- enrolled in [facility]. Per PCP.    Patient is in agreement with the proposed treatment plan. All questions were answered to the patient's satisfaction. Pt knows to call clinic if anything is needed before the next clinic visit.    Marquita Stoner, MSN, APRN, FNP-C  Hematology and Medical Oncology  Nurse Practitioner to Dr. Issa Antony  Nurse Practitioner, [facility]

## 2023-03-21 ENCOUNTER — INFUSION (OUTPATIENT)
Dept: INFUSION THERAPY | Facility: HOSPITAL | Age: 68
End: 2023-03-21
Payer: MEDICARE

## 2023-03-21 VITALS
RESPIRATION RATE: 18 BRPM | DIASTOLIC BLOOD PRESSURE: 60 MMHG | SYSTOLIC BLOOD PRESSURE: 140 MMHG | TEMPERATURE: 98 F | HEART RATE: 62 BPM

## 2023-03-21 DIAGNOSIS — C68.9 UROTHELIAL CARCINOMA WITH HIGH RISK OF METASTASIS: Primary | ICD-10-CM

## 2023-03-21 PROCEDURE — 96417 CHEMO IV INFUS EACH ADDL SEQ: CPT

## 2023-03-21 PROCEDURE — 96361 HYDRATE IV INFUSION ADD-ON: CPT

## 2023-03-21 PROCEDURE — 25000003 PHARM REV CODE 250: Performed by: INTERNAL MEDICINE

## 2023-03-21 PROCEDURE — 96366 THER/PROPH/DIAG IV INF ADDON: CPT

## 2023-03-21 PROCEDURE — 63600175 PHARM REV CODE 636 W HCPCS: Mod: JZ,JG | Performed by: INTERNAL MEDICINE

## 2023-03-21 PROCEDURE — A4216 STERILE WATER/SALINE, 10 ML: HCPCS | Performed by: INTERNAL MEDICINE

## 2023-03-21 PROCEDURE — 96413 CHEMO IV INFUSION 1 HR: CPT

## 2023-03-21 PROCEDURE — 96375 TX/PRO/DX INJ NEW DRUG ADDON: CPT

## 2023-03-21 PROCEDURE — 96367 TX/PROPH/DG ADDL SEQ IV INF: CPT

## 2023-03-21 RX ORDER — SODIUM CHLORIDE 0.9 % (FLUSH) 0.9 %
10 SYRINGE (ML) INJECTION
Status: DISCONTINUED | OUTPATIENT
Start: 2023-03-21 | End: 2023-03-21 | Stop reason: HOSPADM

## 2023-03-21 RX ORDER — HEPARIN 100 UNIT/ML
500 SYRINGE INTRAVENOUS
Status: DISCONTINUED | OUTPATIENT
Start: 2023-03-21 | End: 2023-03-21 | Stop reason: HOSPADM

## 2023-03-21 RX ADMIN — GEMCITABINE HYDROCHLORIDE 1800 MG: 1 INJECTION, SOLUTION INTRAVENOUS at 12:03

## 2023-03-21 RX ADMIN — Medication 10 ML: at 02:03

## 2023-03-21 RX ADMIN — POTASSIUM CHLORIDE 500 ML/HR: 2 INJECTION, SOLUTION, CONCENTRATE INTRAVENOUS at 09:03

## 2023-03-21 RX ADMIN — APREPITANT 130 MG: 130 INJECTION, EMULSION INTRAVENOUS at 11:03

## 2023-03-21 RX ADMIN — HEPARIN 500 UNITS: 100 SYRINGE at 02:03

## 2023-03-21 RX ADMIN — DEXAMETHASONE SODIUM PHOSPHATE 0.25 MG: 4 INJECTION, SOLUTION INTRA-ARTICULAR; INTRALESIONAL; INTRAMUSCULAR; INTRAVENOUS; SOFT TISSUE at 11:03

## 2023-03-21 RX ADMIN — CISPLATIN 64 MG: 1 INJECTION, SOLUTION INTRAVENOUS at 01:03

## 2023-03-21 NOTE — PLAN OF CARE
1415-Pt tolerated IVFs, Gemzar,and Cisplatin infusions well today, no complaints or complications. VSS through duration of treatment. Pt aware to call provider with any questions or concerns and is aware of upcoming appts. Pt ambulatory from clinic with steady gait, no distress noted.

## 2023-03-21 NOTE — PLAN OF CARE
0906-Labs, hx, and medications reviewed, pt meets parameters for treatment today. Assessment completed and plan of care reviewed. Pt verbalized understanding. PAC accessed with no complications. Pt voices no new complaints or concerns, will continue to monitor for safety.

## 2023-03-22 ENCOUNTER — PATIENT MESSAGE (OUTPATIENT)
Dept: ADMINISTRATIVE | Facility: OTHER | Age: 68
End: 2023-03-22
Payer: MEDICARE

## 2023-03-23 ENCOUNTER — PATIENT MESSAGE (OUTPATIENT)
Dept: ADMINISTRATIVE | Facility: OTHER | Age: 68
End: 2023-03-23
Payer: MEDICARE

## 2023-03-24 ENCOUNTER — PATIENT MESSAGE (OUTPATIENT)
Dept: ADMINISTRATIVE | Facility: OTHER | Age: 68
End: 2023-03-24
Payer: MEDICARE

## 2023-03-26 ENCOUNTER — PATIENT MESSAGE (OUTPATIENT)
Dept: ADMINISTRATIVE | Facility: OTHER | Age: 68
End: 2023-03-26
Payer: MEDICARE

## 2023-03-27 ENCOUNTER — LAB VISIT (OUTPATIENT)
Dept: LAB | Facility: HOSPITAL | Age: 68
End: 2023-03-27
Attending: INTERNAL MEDICINE
Payer: MEDICARE

## 2023-03-27 ENCOUNTER — PATIENT MESSAGE (OUTPATIENT)
Dept: ADMINISTRATIVE | Facility: OTHER | Age: 68
End: 2023-03-27
Payer: MEDICARE

## 2023-03-27 DIAGNOSIS — C64.2 UROTHELIAL CARCINOMA OF KIDNEY, LEFT: ICD-10-CM

## 2023-03-27 LAB
ALBUMIN SERPL BCP-MCNC: 3.6 G/DL (ref 3.5–5.2)
ALP SERPL-CCNC: 83 U/L (ref 55–135)
ALT SERPL W/O P-5'-P-CCNC: 13 U/L (ref 10–44)
ANION GAP SERPL CALC-SCNC: 10 MMOL/L (ref 8–16)
AST SERPL-CCNC: 14 U/L (ref 10–40)
BILIRUB SERPL-MCNC: 0.4 MG/DL (ref 0.1–1)
BUN SERPL-MCNC: 28 MG/DL (ref 8–23)
CALCIUM SERPL-MCNC: 9.7 MG/DL (ref 8.7–10.5)
CHLORIDE SERPL-SCNC: 104 MMOL/L (ref 95–110)
CO2 SERPL-SCNC: 25 MMOL/L (ref 23–29)
CREAT SERPL-MCNC: 1.5 MG/DL (ref 0.5–1.4)
ERYTHROCYTE [DISTWIDTH] IN BLOOD BY AUTOMATED COUNT: 14 % (ref 11.5–14.5)
EST. GFR  (NO RACE VARIABLE): 50.1 ML/MIN/1.73 M^2
GLUCOSE SERPL-MCNC: 121 MG/DL (ref 70–110)
HCT VFR BLD AUTO: 32.6 % (ref 40–54)
HGB BLD-MCNC: 10.7 G/DL (ref 14–18)
IMM GRANULOCYTES # BLD AUTO: 0.02 K/UL (ref 0–0.04)
MAGNESIUM SERPL-MCNC: 1.7 MG/DL (ref 1.6–2.6)
MCH RBC QN AUTO: 30.7 PG (ref 27–31)
MCHC RBC AUTO-ENTMCNC: 32.8 G/DL (ref 32–36)
MCV RBC AUTO: 93 FL (ref 82–98)
NEUTROPHILS # BLD AUTO: 3.6 K/UL (ref 1.8–7.7)
PLATELET # BLD AUTO: 82 K/UL (ref 150–450)
PMV BLD AUTO: 9.6 FL (ref 9.2–12.9)
POTASSIUM SERPL-SCNC: 4.5 MMOL/L (ref 3.5–5.1)
PROT SERPL-MCNC: 7.5 G/DL (ref 6–8.4)
RBC # BLD AUTO: 3.49 M/UL (ref 4.6–6.2)
SODIUM SERPL-SCNC: 139 MMOL/L (ref 136–145)
WBC # BLD AUTO: 4.83 K/UL (ref 3.9–12.7)

## 2023-03-27 PROCEDURE — 80053 COMPREHEN METABOLIC PANEL: CPT | Performed by: NURSE PRACTITIONER

## 2023-03-27 PROCEDURE — 83735 ASSAY OF MAGNESIUM: CPT | Performed by: NURSE PRACTITIONER

## 2023-03-27 PROCEDURE — 85027 COMPLETE CBC AUTOMATED: CPT | Performed by: NURSE PRACTITIONER

## 2023-03-28 ENCOUNTER — PATIENT MESSAGE (OUTPATIENT)
Dept: ADMINISTRATIVE | Facility: OTHER | Age: 68
End: 2023-03-28
Payer: MEDICARE

## 2023-03-29 ENCOUNTER — PATIENT MESSAGE (OUTPATIENT)
Dept: ADMINISTRATIVE | Facility: OTHER | Age: 68
End: 2023-03-29
Payer: MEDICARE

## 2023-03-30 ENCOUNTER — PATIENT MESSAGE (OUTPATIENT)
Dept: ADMINISTRATIVE | Facility: OTHER | Age: 68
End: 2023-03-30
Payer: MEDICARE

## 2023-03-31 ENCOUNTER — PATIENT MESSAGE (OUTPATIENT)
Dept: HEMATOLOGY/ONCOLOGY | Facility: CLINIC | Age: 68
End: 2023-03-31
Payer: MEDICARE

## 2023-03-31 ENCOUNTER — PATIENT MESSAGE (OUTPATIENT)
Dept: ADMINISTRATIVE | Facility: OTHER | Age: 68
End: 2023-03-31
Payer: MEDICARE

## 2023-04-01 ENCOUNTER — PATIENT MESSAGE (OUTPATIENT)
Dept: ADMINISTRATIVE | Facility: OTHER | Age: 68
End: 2023-04-01
Payer: MEDICARE

## 2023-04-02 ENCOUNTER — PATIENT MESSAGE (OUTPATIENT)
Dept: ADMINISTRATIVE | Facility: OTHER | Age: 68
End: 2023-04-02
Payer: MEDICARE

## 2023-04-03 ENCOUNTER — OFFICE VISIT (OUTPATIENT)
Dept: HEMATOLOGY/ONCOLOGY | Facility: CLINIC | Age: 68
End: 2023-04-03
Payer: MEDICARE

## 2023-04-03 ENCOUNTER — LAB VISIT (OUTPATIENT)
Dept: LAB | Facility: HOSPITAL | Age: 68
End: 2023-04-03
Attending: NURSE PRACTITIONER
Payer: MEDICARE

## 2023-04-03 ENCOUNTER — PATIENT MESSAGE (OUTPATIENT)
Dept: ADMINISTRATIVE | Facility: OTHER | Age: 68
End: 2023-04-03
Payer: MEDICARE

## 2023-04-03 VITALS
TEMPERATURE: 99 F | WEIGHT: 153.69 LBS | BODY MASS INDEX: 22.76 KG/M2 | HEIGHT: 69 IN | HEART RATE: 72 BPM | OXYGEN SATURATION: 99 % | RESPIRATION RATE: 20 BRPM | DIASTOLIC BLOOD PRESSURE: 62 MMHG | SYSTOLIC BLOOD PRESSURE: 115 MMHG

## 2023-04-03 DIAGNOSIS — C64.2 UROTHELIAL CARCINOMA OF KIDNEY, LEFT: Primary | ICD-10-CM

## 2023-04-03 DIAGNOSIS — C64.2 UROTHELIAL CARCINOMA OF KIDNEY, LEFT: ICD-10-CM

## 2023-04-03 DIAGNOSIS — N18.2 STAGE 2 CHRONIC KIDNEY DISEASE: ICD-10-CM

## 2023-04-03 DIAGNOSIS — H91.90 HEARING LOSS, UNSPECIFIED HEARING LOSS TYPE, UNSPECIFIED LATERALITY: ICD-10-CM

## 2023-04-03 DIAGNOSIS — M05.79 RHEUMATOID ARTHRITIS INVOLVING MULTIPLE SITES WITH POSITIVE RHEUMATOID FACTOR: ICD-10-CM

## 2023-04-03 DIAGNOSIS — T45.1X5A CHEMOTHERAPY-INDUCED NAUSEA: ICD-10-CM

## 2023-04-03 DIAGNOSIS — I10 PRIMARY HYPERTENSION: ICD-10-CM

## 2023-04-03 DIAGNOSIS — R11.0 CHEMOTHERAPY-INDUCED NAUSEA: ICD-10-CM

## 2023-04-03 LAB
ALBUMIN SERPL BCP-MCNC: 3.4 G/DL (ref 3.5–5.2)
ALP SERPL-CCNC: 88 U/L (ref 55–135)
ALT SERPL W/O P-5'-P-CCNC: 12 U/L (ref 10–44)
ANION GAP SERPL CALC-SCNC: 7 MMOL/L (ref 8–16)
AST SERPL-CCNC: 17 U/L (ref 10–40)
BILIRUB SERPL-MCNC: 0.2 MG/DL (ref 0.1–1)
BUN SERPL-MCNC: 28 MG/DL (ref 8–23)
CALCIUM SERPL-MCNC: 9.3 MG/DL (ref 8.7–10.5)
CHLORIDE SERPL-SCNC: 109 MMOL/L (ref 95–110)
CO2 SERPL-SCNC: 26 MMOL/L (ref 23–29)
CREAT SERPL-MCNC: 1.6 MG/DL (ref 0.5–1.4)
ERYTHROCYTE [DISTWIDTH] IN BLOOD BY AUTOMATED COUNT: 14.1 % (ref 11.5–14.5)
EST. GFR  (NO RACE VARIABLE): 46.4 ML/MIN/1.73 M^2
GLUCOSE SERPL-MCNC: 89 MG/DL (ref 70–110)
HCT VFR BLD AUTO: 30 % (ref 40–54)
HGB BLD-MCNC: 9.6 G/DL (ref 14–18)
IMM GRANULOCYTES # BLD AUTO: 0.02 K/UL (ref 0–0.04)
MAGNESIUM SERPL-MCNC: 1.7 MG/DL (ref 1.6–2.6)
MCH RBC QN AUTO: 30.1 PG (ref 27–31)
MCHC RBC AUTO-ENTMCNC: 32 G/DL (ref 32–36)
MCV RBC AUTO: 94 FL (ref 82–98)
NEUTROPHILS # BLD AUTO: 2.1 K/UL (ref 1.8–7.7)
PLATELET # BLD AUTO: 162 K/UL (ref 150–450)
PMV BLD AUTO: 8.9 FL (ref 9.2–12.9)
POTASSIUM SERPL-SCNC: 4.2 MMOL/L (ref 3.5–5.1)
PROT SERPL-MCNC: 7 G/DL (ref 6–8.4)
RBC # BLD AUTO: 3.19 M/UL (ref 4.6–6.2)
SODIUM SERPL-SCNC: 142 MMOL/L (ref 136–145)
WBC # BLD AUTO: 3.44 K/UL (ref 3.9–12.7)

## 2023-04-03 PROCEDURE — 3008F PR BODY MASS INDEX (BMI) DOCUMENTED: ICD-10-PCS | Mod: CPTII,S$GLB,, | Performed by: NURSE PRACTITIONER

## 2023-04-03 PROCEDURE — 1159F PR MEDICATION LIST DOCUMENTED IN MEDICAL RECORD: ICD-10-PCS | Mod: CPTII,S$GLB,, | Performed by: NURSE PRACTITIONER

## 2023-04-03 PROCEDURE — 3074F PR MOST RECENT SYSTOLIC BLOOD PRESSURE < 130 MM HG: ICD-10-PCS | Mod: CPTII,S$GLB,, | Performed by: NURSE PRACTITIONER

## 2023-04-03 PROCEDURE — 36415 COLL VENOUS BLD VENIPUNCTURE: CPT | Performed by: NURSE PRACTITIONER

## 2023-04-03 PROCEDURE — 99999 PR PBB SHADOW E&M-EST. PATIENT-LVL IV: CPT | Mod: PBBFAC,,, | Performed by: NURSE PRACTITIONER

## 2023-04-03 PROCEDURE — 3288F PR FALLS RISK ASSESSMENT DOCUMENTED: ICD-10-PCS | Mod: CPTII,S$GLB,, | Performed by: NURSE PRACTITIONER

## 2023-04-03 PROCEDURE — 3288F FALL RISK ASSESSMENT DOCD: CPT | Mod: CPTII,S$GLB,, | Performed by: NURSE PRACTITIONER

## 2023-04-03 PROCEDURE — 1101F PT FALLS ASSESS-DOCD LE1/YR: CPT | Mod: CPTII,S$GLB,, | Performed by: NURSE PRACTITIONER

## 2023-04-03 PROCEDURE — 99999 PR PBB SHADOW E&M-EST. PATIENT-LVL IV: ICD-10-PCS | Mod: PBBFAC,,, | Performed by: NURSE PRACTITIONER

## 2023-04-03 PROCEDURE — 3078F PR MOST RECENT DIASTOLIC BLOOD PRESSURE < 80 MM HG: ICD-10-PCS | Mod: CPTII,S$GLB,, | Performed by: NURSE PRACTITIONER

## 2023-04-03 PROCEDURE — 1101F PR PT FALLS ASSESS DOC 0-1 FALLS W/OUT INJ PAST YR: ICD-10-PCS | Mod: CPTII,S$GLB,, | Performed by: NURSE PRACTITIONER

## 2023-04-03 PROCEDURE — 3074F SYST BP LT 130 MM HG: CPT | Mod: CPTII,S$GLB,, | Performed by: NURSE PRACTITIONER

## 2023-04-03 PROCEDURE — 99215 PR OFFICE/OUTPT VISIT, EST, LEVL V, 40-54 MIN: ICD-10-PCS | Mod: S$GLB,,, | Performed by: NURSE PRACTITIONER

## 2023-04-03 PROCEDURE — 83735 ASSAY OF MAGNESIUM: CPT | Performed by: NURSE PRACTITIONER

## 2023-04-03 PROCEDURE — 3078F DIAST BP <80 MM HG: CPT | Mod: CPTII,S$GLB,, | Performed by: NURSE PRACTITIONER

## 2023-04-03 PROCEDURE — 99215 OFFICE O/P EST HI 40 MIN: CPT | Mod: S$GLB,,, | Performed by: NURSE PRACTITIONER

## 2023-04-03 PROCEDURE — 1126F AMNT PAIN NOTED NONE PRSNT: CPT | Mod: CPTII,S$GLB,, | Performed by: NURSE PRACTITIONER

## 2023-04-03 PROCEDURE — 99499 UNLISTED E&M SERVICE: CPT | Mod: S$GLB,,, | Performed by: NURSE PRACTITIONER

## 2023-04-03 PROCEDURE — 85027 COMPLETE CBC AUTOMATED: CPT | Performed by: NURSE PRACTITIONER

## 2023-04-03 PROCEDURE — 80053 COMPREHEN METABOLIC PANEL: CPT | Performed by: NURSE PRACTITIONER

## 2023-04-03 PROCEDURE — 1159F MED LIST DOCD IN RCRD: CPT | Mod: CPTII,S$GLB,, | Performed by: NURSE PRACTITIONER

## 2023-04-03 PROCEDURE — 3008F BODY MASS INDEX DOCD: CPT | Mod: CPTII,S$GLB,, | Performed by: NURSE PRACTITIONER

## 2023-04-03 PROCEDURE — 1126F PR PAIN SEVERITY QUANTIFIED, NO PAIN PRESENT: ICD-10-PCS | Mod: CPTII,S$GLB,, | Performed by: NURSE PRACTITIONER

## 2023-04-03 NOTE — PROGRESS NOTES
MEDICAL ONCOLOGY - FOLLOW-UP VISIT    Reason for visit: Upper tract Urothelial carcinoma     Best Contact Phone Number(s): [phone] (home)      Cancer/Stage/TNM:    Cancer Staging   No matching staging information was found for the patient.     Oncology History   Urothelial carcinoma with high risk of metastasis   3/6/2023 Initial Diagnosis    Urothelial carcinoma with high risk of metastasis       3/14/2023 -  Chemotherapy    Treatment Summary   Plan Name: OP BLADDER GEMCITABINE CISPLATIN (SPLIT DOSE CISPLATIN) Q3W  Treatment Goal: Curative  Status: Active  Start Date: 3/14/2023  End Date: 5/23/2023 (Planned)  Provider: Issa Antony MD  Chemotherapy: CISplatin (Platinol) 35 mg/m2 = 64 mg in sodium chloride 0.9% 314 mL chemo infusion, 35 mg/m2 = 64 mg, Intravenous, Clinic/HOD 1 time, 1 of 4 cycles  Administration: 64 mg (3/14/2023), 64 mg (3/21/2023)  gemcitabine 1,800 mg in sodium chloride 0.9% SolP 332.34 mL chemo infusion, 1,840 mg, Intravenous, Clinic/HOD 1 time, 1 of 4 cycles  Administration: 1,800 mg (3/14/2023), 1,800 mg (3/21/2023)            HPI:   68 y.o. male with PVD, PAD, HLD, CKD3, AAA, treated hep C, Rheumatoid arthritis on MTX (previously on humara), smoking (about 1/2 PPD) who presented with gross hematuria. He saw urology and underwent CT urogram which showed Few soft tissue masses within the left renal pelvis, the largest measures 2.5 x 1.3 cm. There was also a L adrenal nodule measuring 1.6 cm. Urine cytology was evident of atypical urothelial cells, and repeat sample showed high grade urothelial carcinoma.   He underwent a flexible cystoscopy with normal bladder findings. L RPG showed Left RPG with mild hydronephrosis and filling defect ~2 cm in central renal pelvis.  Left ureteroscopy with was done and showed papillary tumors along the proximal ureter and renal pelvis. - Large ~2 cm nodular lesion in the renal pelvis consistent with filling defect. Biopsy of the mass came back as - High  grade urothelial dysplasia/carcinoma in situ. - No definitive invasive carcinoma identified (outside report)    PET/CT done on 03/08 showed Left proximal ureteral lesion compatible with urothelial cancer.  No FDG PET evidence of metastatic disease.  Benign bilateral adrenal adenomas.    Interval history:   He returns today prior to cycle 2 day 1 cis/gem.  He feels well overall. He Noticing mild fatigue. noticed constipation, took Ducolax. Eating well. Weight is stable. Denies nausea/vomiting. Usually has 2 bowel movements per day but now maybe 1. +pruritis. Denies neuropathy to fingers/toes. +hearing issues, worse in R. Went to the [employer] this weekend.     ROS:   Review of Systems   Constitutional:  Negative for chills, fever, malaise/fatigue and weight loss.   HENT:  Positive for hearing loss. Negative for congestion. Ear discharge: r>L.   Eyes:  Negative for blurred vision.   Respiratory:  Negative for shortness of breath.    Cardiovascular:  Negative for chest pain, palpitations and leg swelling.   Gastrointestinal:  Positive for constipation. Negative for blood in stool, diarrhea, nausea and vomiting.   Genitourinary:  Negative for dysuria.   Musculoskeletal:  Negative for back pain and myalgias.   Skin:  Negative for itching and rash.   Neurological:  Negative for dizziness, tingling, tremors, sensory change and focal weakness.   Endo/Heme/Allergies:  Does not bruise/bleed easily.     Past Medical History:   Past Medical History:   Diagnosis Date    Cataract     Colon polyp 2014    History of hepatitis C, s/p successful treatment w/ SVR12 - 7/2015 10/14/2012    Kelsey 1a,  Liver biopsy 6/2014 - Stage 1 fibrosis;  Completed 8 weeks Harvoni w/ SVR12 - 7/2015      Hyperlipidemia     Lipoma of arm     Lipoma of lower extremity     Nuclear sclerosis - Both Eyes 10/22/2012    Other and unspecified hyperlipidemia 10/22/2013    PAD (peripheral artery disease)     Peripheral vascular disease, unspecified     Rheumatoid  arthritis(714.0) 10/14/2012        Past Surgical History:   Past Surgical History:   Procedure Laterality Date    BIOPSY OF URETER Left 02/16/2023    Procedure: BIOPSY, URETER/BRUSH;  Surgeon: Kem Jefferson MD;  Location: [facility] OR 1ST FLR;  Service: Urology;  Laterality: Left;    COLONOSCOPY N/A 11/29/2021    Procedure: COLONOSCOPY;  Surgeon: Kenrick Zimmer MD;  Location: [facility] ENDO (4TH FLR);  Service: Endoscopy;  Laterality: N/A;  blood thinner approval received, see telephone encounter 10/19/21-BB  fully vacc-inst email-tb    CYSTOSCOPY      CYSTOSCOPY  02/16/2023    Procedure: CYSTOSCOPY;  Surgeon: Kem Jefferson MD;  Location: [facility] OR 1ST FLR;  Service: Urology;;    HERNIA REPAIR      INSERTION OF TUNNELED CENTRAL VENOUS CATHETER (CVC) WITH SUBCUTANEOUS PORT Right 3/13/2023    Procedure: INSERTION, PORT-A-CATH;  Surgeon: Rene Cali MD;  Location: [facility] CATH LAB;  Service: Radiology;  Laterality: Right;    KNEE ARTHROPLASTY      LAPAROSCOPIC EXPLORATION OF GROIN Left 09/06/2018    Procedure: EXPLORATION, INGUINAL REGION, LAPAROSCOPIC;  Surgeon: Mingo De Guzman Jr., MD;  Location: [facility] OR 2ND FLR;  Service: General;  Laterality: Left;    PYELOSCOPY Left 02/16/2023    Procedure: PYELOSCOPY;  Surgeon: Kem Jefferson MD;  Location: [facility] OR 1ST FLR;  Service: Urology;  Laterality: Left;    RETROGRADE PYELOGRAPHY Bilateral 02/16/2023    Procedure: PYELOGRAM, RETROGRADE;  Surgeon: Kem Jefferson MD;  [facility];  Service: Urology;  Laterality: Bilateral;    TONSILLECTOMY      URETEROSCOPIC REMOVAL OF URETERIC CALCULUS Left 02/16/2023    Procedure: REMOVAL, CALCULUS, URETER, URETEROSCOPIC;  Surgeon: Kem Jefferson MD;  Location: [facility] OR 1ST FLR;  Service: Urology;  Laterality: Left;    URETEROSCOPY Left 02/16/2023    Procedure: URETEROSCOPY;  Surgeon: Kem Jefferson MD;  Location: [facility] OR [facility] FLR;  Service: Urology;  Laterality: Left;        Family History:   Family  History   Problem Relation Age of Onset    Heart disease Paternal Uncle     Dementia Mother     Heart disease Sister     Liver disease Neg Hx     Amblyopia Neg Hx     Blindness Neg Hx     Cancer Neg Hx     Cataracts Neg Hx     Diabetes Neg Hx     Glaucoma Neg Hx     Hypertension Neg Hx     Macular degeneration Neg Hx     Retinal detachment Neg Hx     Strabismus Neg Hx     Stroke Neg Hx     Thyroid disease Neg Hx         Social History:   Social History     Tobacco Use    Smoking status: Former     Types: Cigarettes     Quit date: 2020     Years since quittin.7    Smokeless tobacco: Never   Substance Use Topics    Alcohol use: Yes     Comment: 3-4 drinks per week        I have reviewed and updated the patient's past medical, surgical, family and social histories.    Allergies:   Review of patient's allergies indicates:  No Known Allergies     Medications:   Current Outpatient Medications   Medication Sig Dispense Refill    amLODIPine (NORVASC) 5 MG tablet TAKE 1 TABLET BY MOUTH ONCE DAILY 90 tablet 2    aspirin 81 MG Chew Take 81 mg by mouth once daily.      cilostazoL (PLETAL) 50 MG Tab Take 1 tablet (50 mg total) by mouth 2 (two) times daily. 180 tablet 3    doxazosin (CARDURA) 4 MG tablet TAKE 1 TABLET BY MOUTH IN  THE EVENING 90 tablet 3    folic acid (FOLVITE) 1 MG tablet Take 1 tablet (1,000 mcg total) by mouth once daily. 90 tablet 3    LIDOcaine-prilocaine (EMLA) cream Apply topically as needed (apply over port 30 minutes before chemotherapy). 30 g 0    methotrexate 2.5 MG Tab Take 8 tablets (20 mg total) by mouth every 7 days. This medication requires periodic lab monitoring. 120 tablet 1    ondansetron (ZOFRAN) 8 MG tablet Take 1 tablet (8 mg total) by mouth every 8 (eight) hours as needed for Nausea. 90 tablet 2    simvastatin (ZOCOR) 10 MG tablet TAKE 1 TABLET BY MOUTH EVERY DAY IN THE EVENING (Patient taking differently: 2 (two) times a day.) 90 tablet 6    simvastatin (ZOCOR) 10 MG tablet Take  "1 tablet (10 mg total) by mouth every evening. 90 tablet 3    traZODone (DESYREL) 50 MG tablet TAKE 3 TABLETS BY MOUTH AT  NIGHT AS NEEDED FOR  INSOMNIA 270 tablet 3     No current facility-administered medications for this visit.        Physical Exam:   /62 (BP Location: Right arm, Patient Position: Sitting, BP Method: Medium (Automatic))   Pulse 72   Temp 98.5 °F (36.9 °C) (Oral)   Resp 20   Ht 5' 9" (1.753 m)   Wt 69.7 kg (153 lb 10.6 oz)   SpO2 99%   BMI 22.69 kg/m²      ECOG Performance status: 0            Physical Exam  Constitutional:       General: He is not in acute distress.     Appearance: Normal appearance.   HENT:      Head: Normocephalic and atraumatic.   Eyes:      Pupils: Pupils are equal, round, and reactive to light.   Cardiovascular:      Rate and Rhythm: Normal rate and regular rhythm.      Heart sounds: No murmur heard.  Pulmonary:      Effort: No respiratory distress.      Breath sounds: Normal breath sounds. No wheezing.   Abdominal:      General: Abdomen is flat. Bowel sounds are normal. There is no distension.      Palpations: Abdomen is soft. There is no mass.      Tenderness: There is no abdominal tenderness.   Musculoskeletal:         General: No swelling or deformity.   Skin:     Coloration: Skin is not jaundiced.   Neurological:      General: No focal deficit present.      Mental Status: He is alert and oriented to person, place, and time. Mental status is at baseline.         Labs:   No results found for this or any previous visit (from the past 48 hour(s)).         Assessment:       1. Urothelial carcinoma of kidney, left    2. Stage 2 chronic kidney disease    3. Hearing loss, unspecified hearing loss type, unspecified laterality    4. Chemotherapy-induced nausea    5. Rheumatoid arthritis involving multiple sites with positive rheumatoid factor    6. Primary hypertension            Plan:   1- 68 y.o. M patient presented with gross hematuria and was found to have " multiple soft tissue masses in L renal pelvis. He is s/p L urethroscopy and biopsy which showed high grade urothelial dysplasia. On CT urogram, there was a 1.6 cm L adrenal nodule, as well as multiple pulmonary nodules, largest was 0.6 cm, and multiple hepatic nodules too small to characterize.   Overall picture is concerning for invasive upper tract urothelial carcinoma.   PET/CT showed no evidence of metastatic disease. There is B/L adrenal adenomas.     Plan:   - Neoadjuvant chemotherapy with Gemcitabine and Cisplatin. Split dose Cisplatin for borderline kidney functions- Labs today are pending.  - Plan to start C2D1 tomorrow.   - Because of his RA (not very controlled), will avoid IO adjuvantely.     RTC as scheduled.    2- Stable. Encouraged to increase hydration based on elevated BUN.    3- Audiogram showed normal hearing sensitivity from 250-2000 Hz sloping to a severe to profound hearing loss by 8000 Hz with a slight air bone gap at 1000 Hz in the right ear and normal hearing sensitivity from 250-2000 Hz sloping to a severe sensorineural hearing loss (SNHL) by 8000 Hz in the left ear. Will recheck post chemotherapy.    4- Zofran PRN.    5- On MTX. Will avoid adjuvant immunotherapy.    6- enrolled in [facility]. Per PCP.    Patient is in agreement with the proposed treatment plan. All questions were answered to the patient's satisfaction. Pt knows to call clinic if anything is needed before the next clinic visit.    Marquita Stoner, MSN, APRN, FNP-C  Hematology and Medical Oncology  Nurse Practitioner to Dr. Issa Antony  Nurse Practitioner, [facility]

## 2023-04-04 ENCOUNTER — PATIENT MESSAGE (OUTPATIENT)
Dept: HEMATOLOGY/ONCOLOGY | Facility: CLINIC | Age: 68
End: 2023-04-04
Payer: MEDICARE

## 2023-04-04 ENCOUNTER — INFUSION (OUTPATIENT)
Dept: INFUSION THERAPY | Facility: HOSPITAL | Age: 68
End: 2023-04-04
Payer: MEDICARE

## 2023-04-04 ENCOUNTER — PATIENT MESSAGE (OUTPATIENT)
Dept: ADMINISTRATIVE | Facility: OTHER | Age: 68
End: 2023-04-04
Payer: MEDICARE

## 2023-04-04 VITALS
TEMPERATURE: 98 F | BODY MASS INDEX: 22.76 KG/M2 | HEIGHT: 69 IN | HEART RATE: 62 BPM | RESPIRATION RATE: 18 BRPM | DIASTOLIC BLOOD PRESSURE: 70 MMHG | SYSTOLIC BLOOD PRESSURE: 132 MMHG | WEIGHT: 153.69 LBS | OXYGEN SATURATION: 100 %

## 2023-04-04 DIAGNOSIS — C68.9 UROTHELIAL CARCINOMA WITH HIGH RISK OF METASTASIS: Primary | ICD-10-CM

## 2023-04-04 PROCEDURE — 96375 TX/PRO/DX INJ NEW DRUG ADDON: CPT

## 2023-04-04 PROCEDURE — 25000003 PHARM REV CODE 250: Performed by: INTERNAL MEDICINE

## 2023-04-04 PROCEDURE — A4216 STERILE WATER/SALINE, 10 ML: HCPCS | Performed by: NURSE PRACTITIONER

## 2023-04-04 PROCEDURE — 96360 HYDRATION IV INFUSION INIT: CPT

## 2023-04-04 PROCEDURE — 63600175 PHARM REV CODE 636 W HCPCS: Performed by: INTERNAL MEDICINE

## 2023-04-04 PROCEDURE — 63600175 PHARM REV CODE 636 W HCPCS: Performed by: NURSE PRACTITIONER

## 2023-04-04 PROCEDURE — 25000003 PHARM REV CODE 250: Performed by: NURSE PRACTITIONER

## 2023-04-04 PROCEDURE — 96366 THER/PROPH/DIAG IV INF ADDON: CPT

## 2023-04-04 PROCEDURE — 96367 TX/PROPH/DG ADDL SEQ IV INF: CPT

## 2023-04-04 PROCEDURE — 96413 CHEMO IV INFUSION 1 HR: CPT

## 2023-04-04 PROCEDURE — 96417 CHEMO IV INFUS EACH ADDL SEQ: CPT

## 2023-04-04 RX ORDER — SODIUM CHLORIDE 0.9 % (FLUSH) 0.9 %
10 SYRINGE (ML) INJECTION
Status: DISCONTINUED | OUTPATIENT
Start: 2023-04-04 | End: 2023-04-04 | Stop reason: HOSPADM

## 2023-04-04 RX ORDER — SODIUM CHLORIDE 0.9 % (FLUSH) 0.9 %
10 SYRINGE (ML) INJECTION
Status: CANCELLED | OUTPATIENT
Start: 2023-04-18

## 2023-04-04 RX ORDER — HEPARIN 100 UNIT/ML
500 SYRINGE INTRAVENOUS
Status: DISCONTINUED | OUTPATIENT
Start: 2023-04-04 | End: 2023-04-04 | Stop reason: HOSPADM

## 2023-04-04 RX ORDER — HEPARIN 100 UNIT/ML
500 SYRINGE INTRAVENOUS
Status: CANCELLED | OUTPATIENT
Start: 2023-04-04

## 2023-04-04 RX ORDER — SODIUM CHLORIDE 0.9 % (FLUSH) 0.9 %
10 SYRINGE (ML) INJECTION
Status: CANCELLED | OUTPATIENT
Start: 2023-04-04

## 2023-04-04 RX ORDER — HEPARIN 100 UNIT/ML
500 SYRINGE INTRAVENOUS
Status: CANCELLED | OUTPATIENT
Start: 2023-04-18

## 2023-04-04 RX ADMIN — GEMCITABINE HYDROCHLORIDE 1800 MG: 1 INJECTION, SOLUTION INTRAVENOUS at 10:04

## 2023-04-04 RX ADMIN — POTASSIUM CHLORIDE 500 ML/HR: 2 INJECTION, SOLUTION, CONCENTRATE INTRAVENOUS at 08:04

## 2023-04-04 RX ADMIN — DEXAMETHASONE SODIUM PHOSPHATE 0.25 MG: 4 INJECTION, SOLUTION INTRA-ARTICULAR; INTRALESIONAL; INTRAMUSCULAR; INTRAVENOUS; SOFT TISSUE at 10:04

## 2023-04-04 RX ADMIN — SODIUM CHLORIDE 999 ML/HR: 0.9 INJECTION, SOLUTION INTRAVENOUS at 11:04

## 2023-04-04 RX ADMIN — APREPITANT 130 MG: 130 INJECTION, EMULSION INTRAVENOUS at 10:04

## 2023-04-04 RX ADMIN — HEPARIN 500 UNITS: 100 SYRINGE at 12:04

## 2023-04-04 RX ADMIN — CISPLATIN 32 MG: 1 INJECTION, SOLUTION INTRAVENOUS at 11:04

## 2023-04-04 RX ADMIN — Medication 10 ML: at 12:04

## 2023-04-04 NOTE — PLAN OF CARE
Pt received Gemzar, Cisplatin & IVFs today and tolerated well, without complications. Educated patient about Gemzar, Cisplatin & IVFs (indications, side effects, possible reactions, chemotherapy precautions) and verbalized understanding.  VSS. CW port positive for blood return, saline flushed, Heparin flush instilled to dwell and de accessed prior to DC. Pt DC with no distress noted, ambulated off unit, w/o event, via self, pleased.

## 2023-04-05 ENCOUNTER — PATIENT MESSAGE (OUTPATIENT)
Dept: ADMINISTRATIVE | Facility: OTHER | Age: 68
End: 2023-04-05
Payer: MEDICARE

## 2023-04-06 ENCOUNTER — PATIENT MESSAGE (OUTPATIENT)
Dept: ADMINISTRATIVE | Facility: OTHER | Age: 68
End: 2023-04-06
Payer: MEDICARE

## 2023-04-06 ENCOUNTER — OFFICE VISIT (OUTPATIENT)
Dept: RHEUMATOLOGY | Facility: CLINIC | Age: 68
End: 2023-04-06
Payer: MEDICARE

## 2023-04-06 VITALS
SYSTOLIC BLOOD PRESSURE: 125 MMHG | DIASTOLIC BLOOD PRESSURE: 72 MMHG | WEIGHT: 158.75 LBS | BODY MASS INDEX: 23.51 KG/M2 | HEART RATE: 71 BPM | HEIGHT: 69 IN

## 2023-04-06 DIAGNOSIS — N18.31 STAGE 3A CHRONIC KIDNEY DISEASE: ICD-10-CM

## 2023-04-06 DIAGNOSIS — Z79.631 LONG TERM METHOTREXATE USER: ICD-10-CM

## 2023-04-06 DIAGNOSIS — M05.79 RHEUMATOID ARTHRITIS INVOLVING MULTIPLE SITES WITH POSITIVE RHEUMATOID FACTOR: Primary | ICD-10-CM

## 2023-04-06 PROCEDURE — 3078F DIAST BP <80 MM HG: CPT | Mod: CPTII,S$GLB,, | Performed by: INTERNAL MEDICINE

## 2023-04-06 PROCEDURE — 3074F SYST BP LT 130 MM HG: CPT | Mod: CPTII,S$GLB,, | Performed by: INTERNAL MEDICINE

## 2023-04-06 PROCEDURE — 99999 PR PBB SHADOW E&M-EST. PATIENT-LVL IV: ICD-10-PCS | Mod: PBBFAC,,, | Performed by: INTERNAL MEDICINE

## 2023-04-06 PROCEDURE — 1125F PR PAIN SEVERITY QUANTIFIED, PAIN PRESENT: ICD-10-PCS | Mod: CPTII,S$GLB,, | Performed by: INTERNAL MEDICINE

## 2023-04-06 PROCEDURE — 1159F PR MEDICATION LIST DOCUMENTED IN MEDICAL RECORD: ICD-10-PCS | Mod: CPTII,S$GLB,, | Performed by: INTERNAL MEDICINE

## 2023-04-06 PROCEDURE — 99999 PR PBB SHADOW E&M-EST. PATIENT-LVL IV: CPT | Mod: PBBFAC,,, | Performed by: INTERNAL MEDICINE

## 2023-04-06 PROCEDURE — 1159F MED LIST DOCD IN RCRD: CPT | Mod: CPTII,S$GLB,, | Performed by: INTERNAL MEDICINE

## 2023-04-06 PROCEDURE — 1125F AMNT PAIN NOTED PAIN PRSNT: CPT | Mod: CPTII,S$GLB,, | Performed by: INTERNAL MEDICINE

## 2023-04-06 PROCEDURE — 99214 PR OFFICE/OUTPT VISIT, EST, LEVL IV, 30-39 MIN: ICD-10-PCS | Mod: S$GLB,,, | Performed by: INTERNAL MEDICINE

## 2023-04-06 PROCEDURE — 3008F PR BODY MASS INDEX (BMI) DOCUMENTED: ICD-10-PCS | Mod: CPTII,S$GLB,, | Performed by: INTERNAL MEDICINE

## 2023-04-06 PROCEDURE — 3078F PR MOST RECENT DIASTOLIC BLOOD PRESSURE < 80 MM HG: ICD-10-PCS | Mod: CPTII,S$GLB,, | Performed by: INTERNAL MEDICINE

## 2023-04-06 PROCEDURE — 1160F PR REVIEW ALL MEDS BY PRESCRIBER/CLIN PHARMACIST DOCUMENTED: ICD-10-PCS | Mod: CPTII,S$GLB,, | Performed by: INTERNAL MEDICINE

## 2023-04-06 PROCEDURE — 3074F PR MOST RECENT SYSTOLIC BLOOD PRESSURE < 130 MM HG: ICD-10-PCS | Mod: CPTII,S$GLB,, | Performed by: INTERNAL MEDICINE

## 2023-04-06 PROCEDURE — 1160F RVW MEDS BY RX/DR IN RCRD: CPT | Mod: CPTII,S$GLB,, | Performed by: INTERNAL MEDICINE

## 2023-04-06 PROCEDURE — 99214 OFFICE O/P EST MOD 30 MIN: CPT | Mod: S$GLB,,, | Performed by: INTERNAL MEDICINE

## 2023-04-06 PROCEDURE — 3008F BODY MASS INDEX DOCD: CPT | Mod: CPTII,S$GLB,, | Performed by: INTERNAL MEDICINE

## 2023-04-06 ASSESSMENT — ROUTINE ASSESSMENT OF PATIENT INDEX DATA (RAPID3)
PSYCHOLOGICAL DISTRESS SCORE: 4.4
TOTAL RAPID3 SCORE: 4
WHEN YOU AWAKENED IN THE MORNING OVER THE LAST WEEK, PLEASE INDICATE THE AMOUNT OF TIME IT TAKES UNTIL YOU ARE AS LIMBER AS YOU WILL BE FOR THE DAY: 1
AM STIFFNESS SCORE: 1, YES
PATIENT GLOBAL ASSESSMENT SCORE: 7
FATIGUE SCORE: 5
PAIN SCORE: 3
MDHAQ FUNCTION SCORE: 0.6

## 2023-04-06 NOTE — PROGRESS NOTES
Answers submitted by the patient for this visit:  Rheumatology Questionnaire (Submitted on 4/4/2023)  fever: No  eye redness: No  mouth sores: No  headaches: No  shortness of breath: No  chest pain: No  trouble swallowing: No    Subjective:       Patient ID: Andrea Gant is a 68 y.o. male with nodular sero+ccp+ RA, OA and Hepatitis C (cured with Harvoni) on MTX    Chief Complaint: Disease Management    Returns for follow-up. Last seen on 1/3/23.    Was dx w L urothelial cancer & is on chemorx & will be undergoing L nephrectomy in the future.  RA del castillo has been taking 15 mg of MTX/wk + folic acid 1 mg/d and feels great RA wise.   Joint pain in hands/wrists and swelling all gone.  No AM stiffness. Denies dysphagia, tight skin, oral ulcers, pleurisy, pericarditis, photosensitivity, thromboses. Has dry eyes. No dry mouth. No true Raynaud's.   Had SC nodules removed from R elbow; in past had them removed from L elbow. Pathology both times: rheumatoid nodules.        Current Outpatient Medications   Medication Sig Dispense Refill    amLODIPine (NORVASC) 5 MG tablet TAKE 1 TABLET BY MOUTH ONCE DAILY 90 tablet 2    aspirin 81 MG Chew Take 81 mg by mouth once daily.      cilostazoL (PLETAL) 50 MG Tab Take 1 tablet (50 mg total) by mouth 2 (two) times daily. 180 tablet 3    doxazosin (CARDURA) 4 MG tablet TAKE 1 TABLET BY MOUTH IN  THE EVENING 90 tablet 3    folic acid (FOLVITE) 1 MG tablet Take 1 tablet (1,000 mcg total) by mouth once daily. 90 tablet 3    LIDOcaine-prilocaine (EMLA) cream Apply topically as needed (apply over port 30 minutes before chemotherapy). 30 g 0    methotrexate 2.5 MG Tab Take 8 tablets (20 mg total) by mouth every 7 days. This medication requires periodic lab monitoring. 120 tablet 1    simvastatin (ZOCOR) 10 MG tablet TAKE 1 TABLET BY MOUTH EVERY DAY IN THE EVENING (Patient taking differently: 2 (two) times a day.) 90 tablet 6    traZODone (DESYREL) 50 MG tablet TAKE 3 TABLETS BY MOUTH AT   NIGHT AS NEEDED FOR  INSOMNIA 270 tablet 3     No current facility-administered medications for this visit.     Probable Allergic to Enbrel (rash);     Past Medical History:   Diagnosis Date    Cataract     Colon polyp 2014    History of hepatitis C, s/p successful treatment w/ SVR12 - 7/2015 10/14/2012    Kelsey 1a,  Liver biopsy 6/2014 - Stage 1 fibrosis;  Completed 8 weeks Harvoni w/ SVR12 - 7/2015      Hyperlipidemia     Lipoma of arm     Lipoma of lower extremity     Nuclear sclerosis - Both Eyes 10/22/2012    Other and unspecified hyperlipidemia 10/22/2013    PAD (peripheral artery disease)     Peripheral vascular disease, unspecified     Rheumatoid arthritis(714.0) 10/14/2012       Past Surgical History:   Procedure Laterality Date    BIOPSY OF URETER Left 02/16/2023    Procedure: BIOPSY, URETER/BRUSH;  Surgeon: Kem Jefferson MD;  Location: [facility] OR 1ST FLR;  Service: Urology;  Laterality: Left;    COLONOSCOPY N/A 11/29/2021    Procedure: COLONOSCOPY;  Surgeon: Kenrick Zimmer MD;  Location: [facility] ENDO (4TH FLR);  Service: Endoscopy;  Laterality: N/A;  blood thinner approval received, see telephone encounter 10/19/21-BB  fully vacc-inst email-tb    CYSTOSCOPY      CYSTOSCOPY  02/16/2023    Procedure: CYSTOSCOPY;  Surgeon: Kem Jefferson MD;  Location: [facility] OR 1ST FLR;  Service: Urology;;    HERNIA REPAIR      INSERTION OF TUNNELED CENTRAL VENOUS CATHETER (CVC) WITH SUBCUTANEOUS PORT Right 3/13/2023    Procedure: INSERTION, PORT-A-CATH;  Surgeon: Rene Cali MD;  Location: [facility] CATH LAB;  Service: Radiology;  Laterality: Right;    KNEE ARTHROPLASTY      LAPAROSCOPIC EXPLORATION OF GROIN Left 09/06/2018    Procedure: EXPLORATION, INGUINAL REGION, LAPAROSCOPIC;  Surgeon: Mingo De Guzman Jr., MD;  Location: [facility] OR 2ND FLR;  Service: General;  Laterality: Left;    PYELOSCOPY Left 02/16/2023    Procedure: PYELOSCOPY;  Surgeon: Kem Jefferson MD;  Location: [facility] OR 1ST FLR;  Service:  Urology;  Laterality: Left;    RETROGRADE PYELOGRAPHY Bilateral 02/16/2023    Procedure: PYELOGRAM, RETROGRADE;  Surgeon: Kem Jefferson MD;  Location: [facility] OR [address] [place] [address];  Service: Urology;  Laterality: Bilateral;    TONSILLECTOMY      URETEROSCOPIC REMOVAL OF URETERIC CALCULUS Left 02/16/2023    Procedure: REMOVAL, CALCULUS, URETER, URETEROSCOPIC;  Surgeon: Kem Jefferson MD;  Location: [facility] OR [address] [place] [address];  Service: Urology;  Laterality: Left;    URETEROSCOPY Left 02/16/2023    Procedure: URETEROSCOPY;  Surgeon: Kem Jefferson MD;  Location: [facility] OR [address] [place] [address];  Service: Urology;  Laterality: Left;       Review of Systems   Constitutional:  Negative for fatigue, fever and unexpected weight change.        Initial weight loss attributed to dental issues.  Now maintaining weight loss;    HENT: Negative.  Negative for hearing loss, mouth sores, sore throat, tinnitus and trouble swallowing.    Eyes: Negative.  Negative for redness and visual disturbance.        Dry eyes.   Respiratory:  Negative for cough, choking, chest tightness and shortness of breath.    Cardiovascular: Negative.  Negative for chest pain, palpitations and leg swelling.   Gastrointestinal: Negative.  Negative for abdominal pain, blood in stool, constipation, diarrhea, nausea and vomiting.        Heartburn improved   Endocrine: Negative.    Genitourinary:  Positive for frequency. Negative for genital sores, hematuria and urgency.   Musculoskeletal: Negative.  Negative for back pain, neck pain and neck stiffness.   Skin: Negative.  Negative for rash.   Allergic/Immunologic: Negative.    Neurological: Negative.  Negative for dizziness, syncope, numbness and headaches.   Hematological: Negative.  Does not bruise/bleed easily.   Psychiatric/Behavioral:  Positive for dysphoric mood. Negative for sleep disturbance. The patient is nervous/anxious.         Sleep helped by trazodone.        SH:Gave up smoking was former smoker.  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  "      Objective:     /72   Pulse 71   Ht 5' 9" (1.753 m)   Wt 72 kg (158 lb 11.7 oz)   BMI 23.44 kg/m²     Was 154 lb 15.7 lbs on 5/17/22  Was 158 lb 11.7 oz on 11/2/21  Was 173 lb 11.6 oz on 8/8/19  Was 172 lbs 4.8 oz on 12/13/16;  Physical Exam   Constitutional: He is oriented to person, place, and time. No distress.   HENT:   Head: Normocephalic and atraumatic.   Mouth/Throat: Oropharynx is clear and moist. No oropharyngeal exudate.   No facial rashes  Parotids not enlarged     Eyes: Pupils are equal, round, and reactive to light. Conjunctivae are normal. Right eye exhibits no discharge. Left eye exhibits no discharge. No scleral icterus.   Neck: No JVD present. No tracheal deviation present. No thyromegaly present.   Cardiovascular: Normal rate and regular rhythm. Exam reveals no gallop and no friction rub.   Murmur heard.  Pulmonary/Chest: Effort normal and breath sounds normal. No respiratory distress. He has no wheezes. He has no rales. He exhibits no tenderness.   Abdominal: Soft. Bowel sounds are normal. He exhibits no distension and no mass. There is no splenomegaly or hepatomegaly. There is no abdominal tenderness. There is no rebound and no guarding.   Musculoskeletal:         General: No tenderness. Normal range of motion.      Cervical back: Neck supple.      Comments: Cspine FROM no tenderness  Tspine FROM no tenderness; mild kyposis  Lspine FROM no tenderness.  TMJ: unremarkable  Shoulders: FROM now; no synovitis; no tenderness  Elbows: FROM; bilateral surgical scars; R mild flexion contractre.  Wrists: FROM; no SHT & nol TTP  MCPs: FROM; no SHT; no metacarpalgia;  ok;  PIPs: FROM; +Bouchards--servando 4th R PIP; no SHT; no TTP; makes good fists.  DIPs: FROM; + Heberden's; no synovitis  HIPS: FROM  Knees: FROM; no synovitis; no instability; minimal PF crepitus;  Ankles: FROM: no synovitis   Toes: ok; no metatarsalgia;        Lymphadenopathy:     He has no cervical adenopathy.   Neurological: " He is alert and oriented to person, place, and time. He has normal reflexes. No cranial nerve deficit. Gait normal.   Proximal and distal muscle strength 5/5.   Skin: Skin is warm and dry. No rash noted. He is not diaphoretic.   Very small nodules bilateral elbows;  Bilateral superficial varicosities LE;   Psychiatric: His behavior is normal. Mood, memory, affect and judgment normal.   Vitals reviewed.    LABS:   4/3/23: CBC Hg 9.6; Ht 30; WC 3.44; CMP cnne 1.6; GFR 46.4; BUN 28  23: ESR 20; CRP 3.2  22: CBC Hg 13.6; BMP ok;  11/3/22: ESR 11; CRP 1.9; CMP: BUN 26; cnne 1.5; GFR 50.4; UA 1+ pr; >100 RBC; >100 WBC;   8/3/22: ESR 8; CRP 2.8; CBC Hg 13.1; Ht 39.5; CMP cnne 1.3; GFR 59.8; glu 154  22: Hg A1C 5.0  22: ESR 20 (23); CRP 2.0; CBC Hg 12.8; Ht 38.1; CMP glu 164;   22: ESR 9; CRP 5.4; CBC Hg 13.6; Na 134;   10/11/21: CBC ok; CMP ok; TSH ok; HgA1C 5.2;   3/24/21: ESR 19; CRP 3.5; CBC ok; CMP CMP cnne 1.3; GFR 56.5;   20: ESR 13 (23); CRP 2.5; CBC ok; CMP cnne 1.4; GFR 52; BUN 30; TP 8.7; ; CCP 30.4  19: ESR 9; CRP 2.5; CBC ok; CMP BUN 24; cnne 1.3; GFR 57.3;   19: ESR 24 (23); CRP 5.4; CBC ok; CMP cnne 1.3; GFR 57.3; BUN 27;   10/10/18; ESR 7; CRP 4.1; CBC ; CMP cnne 1.3; GFR 57.7;   18: ESR 29; CRP; CBC ok; CMP ok; ; CCP 44;   17: ESR 23; CRP 4.2; CBC ok; CMP cnne 1.3; GFR 58.1;   3/26/16: CBC ok; cnne 1.3  3/10/16: ESR 9; CRP 1.7; CBC, CMP ok;  9/15/15: ESR 20; CRP 0.9; CBC ok; CMP ok;  3/2/15: CMP BUN 28; cnne 1.1  1/12/15: Hg 13.9  10/21/14: ESR 8;   14: ESR 19; CRP 2.9; Hg 12.8; cnne 1.3; Vit D 18; ; CCP 44.3   Hepatitis B & HIV negative; HCV+;     IMAGIN19: Bilateral hands: personally viewed: L: cystic changes at the distal aspect of the left ulna, also involving some of the carpal bones, as well as the distal aspects of the 1st and 3rd metacarpal bones.  Mild degenerative changes at some of the DIP joints; R: Cystic changes  involving some of the carpal bones as well as the distal aspect of the 1st and 3rd metacarpals and proximal aspect of the proximal phalanx of the thumb.  Degenerative changes involving some of the DIP joints and also the interphalangeal joint of the right thumb.    9/29/16: US Dopplers: R:minimal peripheral arterial occlusive disease: L: moderate peripheral arterial occlusive disease; unchanged  4/2/15: Bilateral wrists: personal review: cystic lesion left lunate, possibly left ulnar notch. (not too different from previous)  4/2/15: R ankle personally reviewed: ok  1/20/14: Arthritis survey personally reviewed again: OA lower CS; min OA hands & feet;      Assessment:   Seropositive (), CCP (44) positive nodular (bilateral elbows now removed) rheumatoid arthritis with no erosions--some activity   Enbrel effective but resulted in rash.    MTX and Leflunomide were contraindicated at the time due to HCV.   SSZ given ok by Dr. Moore, but never begun   Was on hydroxychloroquine 400 mg/d for a while   Humira 40 mg qow since 6/15~ 1/2021   On MTX 15 mg/wk (ok for use of MTX or Leflunomide if needed as per Dr. Moore) since 11/2020    Possible Raynaud's in past.     L urothelial cancer on chemo    S/P R Achilles tendinitis    Dermatitis c/w seborrheic keratoses & other skin lesions   S/P basal cell carcinoma    Bilateral adhesive capsulitis R>L--resolved    HTN    CKD 3 a  renal insufficiency worsened on meloxicam & ibuprofen      Hx of nephrolithiasis    Hepatitis C with normal liver function tests--genotype 1a   Completed Harvoni; cured    Osteoarthritis of hands.     Recurrent hypovitaminosis D -treated in past    Peripheral Arterial Disease.    Compression deformity of the lower lumbar and thoracic vertebral bodies by x-ray.     Multiple lipomas of the arms and lumbar spine area.     Occupational injuries in the past.     Persistent insomnia.   Helped by trazodone.     Normal weight from overweight  Plan:    Continue MTX 15 mg/wk + folic acid 1 mg/d.  Labs q 3 months  RTC 6 months as per patient's request.                                 genital sore: No  During the last 3 days, have you had a skin rash?: No  Bruises or bleeds easily: No  cough: No

## 2023-04-06 NOTE — PATIENT INSTRUCTIONS
Labs are due every 3 months on methotrexate:   Next ~ 7/3/23 & 10/3/23.  Let us know if you have labs around those dates so we can take a look at them.    Drop Methotrexate to 15 mg/wk  = 6 tablets/wk + folic acid 1 mg/d.

## 2023-04-07 ENCOUNTER — PATIENT MESSAGE (OUTPATIENT)
Dept: ADMINISTRATIVE | Facility: OTHER | Age: 68
End: 2023-04-07
Payer: MEDICARE

## 2023-04-08 ENCOUNTER — PATIENT MESSAGE (OUTPATIENT)
Dept: ADMINISTRATIVE | Facility: OTHER | Age: 68
End: 2023-04-08
Payer: MEDICARE

## 2023-04-09 ENCOUNTER — PATIENT MESSAGE (OUTPATIENT)
Dept: ADMINISTRATIVE | Facility: OTHER | Age: 68
End: 2023-04-09
Payer: MEDICARE

## 2023-04-10 ENCOUNTER — PATIENT MESSAGE (OUTPATIENT)
Dept: ADMINISTRATIVE | Facility: OTHER | Age: 68
End: 2023-04-10
Payer: MEDICARE

## 2023-04-11 ENCOUNTER — OFFICE VISIT (OUTPATIENT)
Dept: HEMATOLOGY/ONCOLOGY | Facility: CLINIC | Age: 68
End: 2023-04-11
Payer: MEDICARE

## 2023-04-11 ENCOUNTER — PATIENT MESSAGE (OUTPATIENT)
Dept: ADMINISTRATIVE | Facility: OTHER | Age: 68
End: 2023-04-11
Payer: MEDICARE

## 2023-04-11 ENCOUNTER — INFUSION (OUTPATIENT)
Dept: INFUSION THERAPY | Facility: HOSPITAL | Age: 68
End: 2023-04-11
Payer: MEDICARE

## 2023-04-11 VITALS
TEMPERATURE: 98 F | OXYGEN SATURATION: 98 % | WEIGHT: 153.25 LBS | DIASTOLIC BLOOD PRESSURE: 72 MMHG | SYSTOLIC BLOOD PRESSURE: 123 MMHG | RESPIRATION RATE: 16 BRPM | HEART RATE: 72 BPM | HEIGHT: 69 IN | BODY MASS INDEX: 22.7 KG/M2

## 2023-04-11 DIAGNOSIS — M05.79 RHEUMATOID ARTHRITIS INVOLVING MULTIPLE SITES WITH POSITIVE RHEUMATOID FACTOR: ICD-10-CM

## 2023-04-11 DIAGNOSIS — N18.2 STAGE 2 CHRONIC KIDNEY DISEASE: ICD-10-CM

## 2023-04-11 DIAGNOSIS — D70.1 CHEMOTHERAPY INDUCED NEUTROPENIA: ICD-10-CM

## 2023-04-11 DIAGNOSIS — T45.1X5A CHEMOTHERAPY-INDUCED NAUSEA: ICD-10-CM

## 2023-04-11 DIAGNOSIS — D64.81 ANTINEOPLASTIC CHEMOTHERAPY INDUCED ANEMIA: ICD-10-CM

## 2023-04-11 DIAGNOSIS — T45.1X5A CHEMOTHERAPY INDUCED NEUTROPENIA: ICD-10-CM

## 2023-04-11 DIAGNOSIS — T45.1X5A CHEMOTHERAPY INDUCED NEUTROPENIA: Primary | ICD-10-CM

## 2023-04-11 DIAGNOSIS — R11.0 CHEMOTHERAPY-INDUCED NAUSEA: ICD-10-CM

## 2023-04-11 DIAGNOSIS — H91.90 HEARING LOSS, UNSPECIFIED HEARING LOSS TYPE, UNSPECIFIED LATERALITY: ICD-10-CM

## 2023-04-11 DIAGNOSIS — I10 PRIMARY HYPERTENSION: ICD-10-CM

## 2023-04-11 DIAGNOSIS — T45.1X5A ANTINEOPLASTIC CHEMOTHERAPY INDUCED ANEMIA: ICD-10-CM

## 2023-04-11 DIAGNOSIS — D70.1 CHEMOTHERAPY INDUCED NEUTROPENIA: Primary | ICD-10-CM

## 2023-04-11 DIAGNOSIS — C64.2 UROTHELIAL CARCINOMA OF KIDNEY, LEFT: Primary | ICD-10-CM

## 2023-04-11 PROCEDURE — 3288F PR FALLS RISK ASSESSMENT DOCUMENTED: ICD-10-PCS | Mod: CPTII,S$GLB,, | Performed by: NURSE PRACTITIONER

## 2023-04-11 PROCEDURE — 99215 OFFICE O/P EST HI 40 MIN: CPT | Mod: S$GLB,,, | Performed by: NURSE PRACTITIONER

## 2023-04-11 PROCEDURE — 3074F PR MOST RECENT SYSTOLIC BLOOD PRESSURE < 130 MM HG: ICD-10-PCS | Mod: CPTII,S$GLB,, | Performed by: NURSE PRACTITIONER

## 2023-04-11 PROCEDURE — 99499 UNLISTED E&M SERVICE: CPT | Mod: S$GLB,,, | Performed by: NURSE PRACTITIONER

## 2023-04-11 PROCEDURE — 3288F FALL RISK ASSESSMENT DOCD: CPT | Mod: CPTII,S$GLB,, | Performed by: NURSE PRACTITIONER

## 2023-04-11 PROCEDURE — 3008F PR BODY MASS INDEX (BMI) DOCUMENTED: ICD-10-PCS | Mod: CPTII,S$GLB,, | Performed by: NURSE PRACTITIONER

## 2023-04-11 PROCEDURE — 1126F PR PAIN SEVERITY QUANTIFIED, NO PAIN PRESENT: ICD-10-PCS | Mod: CPTII,S$GLB,, | Performed by: NURSE PRACTITIONER

## 2023-04-11 PROCEDURE — 99999 PR PBB SHADOW E&M-EST. PATIENT-LVL IV: CPT | Mod: PBBFAC,,, | Performed by: NURSE PRACTITIONER

## 2023-04-11 PROCEDURE — 63600175 PHARM REV CODE 636 W HCPCS: Performed by: NURSE PRACTITIONER

## 2023-04-11 PROCEDURE — 96372 THER/PROPH/DIAG INJ SC/IM: CPT

## 2023-04-11 PROCEDURE — 1159F PR MEDICATION LIST DOCUMENTED IN MEDICAL RECORD: ICD-10-PCS | Mod: CPTII,S$GLB,, | Performed by: NURSE PRACTITIONER

## 2023-04-11 PROCEDURE — 99215 PR OFFICE/OUTPT VISIT, EST, LEVL V, 40-54 MIN: ICD-10-PCS | Mod: S$GLB,,, | Performed by: NURSE PRACTITIONER

## 2023-04-11 PROCEDURE — 85027 COMPLETE CBC AUTOMATED: CPT | Performed by: NURSE PRACTITIONER

## 2023-04-11 PROCEDURE — 1101F PR PT FALLS ASSESS DOC 0-1 FALLS W/OUT INJ PAST YR: ICD-10-PCS | Mod: CPTII,S$GLB,, | Performed by: NURSE PRACTITIONER

## 2023-04-11 PROCEDURE — 3078F PR MOST RECENT DIASTOLIC BLOOD PRESSURE < 80 MM HG: ICD-10-PCS | Mod: CPTII,S$GLB,, | Performed by: NURSE PRACTITIONER

## 2023-04-11 PROCEDURE — 83735 ASSAY OF MAGNESIUM: CPT | Performed by: NURSE PRACTITIONER

## 2023-04-11 PROCEDURE — 3008F BODY MASS INDEX DOCD: CPT | Mod: CPTII,S$GLB,, | Performed by: NURSE PRACTITIONER

## 2023-04-11 PROCEDURE — 1101F PT FALLS ASSESS-DOCD LE1/YR: CPT | Mod: CPTII,S$GLB,, | Performed by: NURSE PRACTITIONER

## 2023-04-11 PROCEDURE — 1126F AMNT PAIN NOTED NONE PRSNT: CPT | Mod: CPTII,S$GLB,, | Performed by: NURSE PRACTITIONER

## 2023-04-11 PROCEDURE — 1159F MED LIST DOCD IN RCRD: CPT | Mod: CPTII,S$GLB,, | Performed by: NURSE PRACTITIONER

## 2023-04-11 PROCEDURE — 3074F SYST BP LT 130 MM HG: CPT | Mod: CPTII,S$GLB,, | Performed by: NURSE PRACTITIONER

## 2023-04-11 PROCEDURE — 3078F DIAST BP <80 MM HG: CPT | Mod: CPTII,S$GLB,, | Performed by: NURSE PRACTITIONER

## 2023-04-11 PROCEDURE — 80053 COMPREHEN METABOLIC PANEL: CPT | Performed by: NURSE PRACTITIONER

## 2023-04-11 PROCEDURE — 99999 PR PBB SHADOW E&M-EST. PATIENT-LVL IV: ICD-10-PCS | Mod: PBBFAC,,, | Performed by: NURSE PRACTITIONER

## 2023-04-11 RX ADMIN — FILGRASTIM-SNDZ 480 MCG: 480 INJECTION, SOLUTION INTRAVENOUS; SUBCUTANEOUS at 10:04

## 2023-04-11 NOTE — PLAN OF CARE
Pt here for zarxio injection only. - chemo cancelled per Marquita Stoner NP. Information given to patient on zarxio and instructed to take claritin daily. RTC tomorrow and Thursday for injections.  aware and making appts. Pt uses [facility]. Pt discharged in stable condition.

## 2023-04-11 NOTE — Clinical Note
Schedule Zarxio only today, 4/12 and 4/13  RTC 1 week with labs (CBC,CMP,MAG), to see me and cisplatin/gem on day 1 with udencya on day 2.  **Udencya needs auth. Please send treatment plan.

## 2023-04-11 NOTE — PROGRESS NOTES
MEDICAL ONCOLOGY - FOLLOW-UP VISIT    Reason for visit: Upper tract Urothelial carcinoma     Best Contact Phone Number(s): [phone] (home)      Cancer/Stage/TNM:    Cancer Staging   No matching staging information was found for the patient.     Oncology History   Urothelial carcinoma with high risk of metastasis   3/6/2023 Initial Diagnosis    Urothelial carcinoma with high risk of metastasis       3/14/2023 -  Chemotherapy    Treatment Summary   Plan Name: OP BLADDER GEMCITABINE CISPLATIN (SPLIT DOSE CISPLATIN) Q3W  Treatment Goal: Curative  Status: Active  Start Date: 3/14/2023  End Date: 5/31/2023 (Planned)  Provider: Issa Antony MD  Chemotherapy: CISplatin (Platinol) 35 mg/m2 = 64 mg in sodium chloride 0.9% 314 mL chemo infusion, 35 mg/m2 = 64 mg, Intravenous, Clinic/HOD 1 time, 2 of 4 cycles  Dose modification: 17.5 mg/m2 (original dose 35 mg/m2, Cycle 3, Reason: Other (see comments), Comment: renal function), 35 mg/m2 (original dose 35 mg/m2, Cycle 3, Reason: Other (see comments)), 17.5 mg/m2 (original dose 35 mg/m2, Cycle 2, Reason: Other (see comments), Comment: renal function)  Administration: 64 mg (3/14/2023), 64 mg (3/21/2023), 32 mg (4/4/2023)  gemcitabine 1,800 mg in sodium chloride 0.9% SolP 332.34 mL chemo infusion, 1,840 mg, Intravenous, Clinic/HOD 1 time, 2 of 4 cycles  Administration: 1,800 mg (3/14/2023), 1,800 mg (3/21/2023), 1,800 mg (4/4/2023)            HPI:   68 y.o. male with PVD, PAD, HLD, CKD3, AAA, treated hep C, Rheumatoid arthritis on MTX (previously on humara), smoking (about 1/2 PPD) who presented with gross hematuria. He saw urology and underwent CT urogram which showed Few soft tissue masses within the left renal pelvis, the largest measures 2.5 x 1.3 cm. There was also a L adrenal nodule measuring 1.6 cm. Urine cytology was evident of atypical urothelial cells, and repeat sample showed high grade urothelial carcinoma.   He underwent a flexible cystoscopy with normal  bladder findings. L RPG showed Left RPG with mild hydronephrosis and filling defect ~2 cm in central renal pelvis.  Left ureteroscopy with was done and showed papillary tumors along the proximal ureter and renal pelvis. - Large ~2 cm nodular lesion in the renal pelvis consistent with filling defect. Biopsy of the mass came back as - High grade urothelial dysplasia/carcinoma in situ. - No definitive invasive carcinoma identified (outside report)    PET/CT done on 03/08 showed Left proximal ureteral lesion compatible with urothelial cancer.  No FDG PET evidence of metastatic disease.  Benign bilateral adrenal adenomas.    Interval history:   He returns today prior to cycle 2 day 8 cis/gem.  He feels well overall. He Noticing mild fatigue. noticed constipation, took Ducolax. Eating well. Weight is stable. Denies nausea/vomiting. Usually has 2 bowel movements per day but now maybe 1. +pruritis. Denies neuropathy to fingers/toes. +hearing issues, worse in R. No new issues.    ROS:   Review of Systems   Constitutional:  Negative for chills, fever, malaise/fatigue and weight loss.   HENT:  Positive for hearing loss. Negative for congestion. Ear discharge: r>L.   Eyes:  Negative for blurred vision.   Respiratory:  Negative for cough and shortness of breath.    Cardiovascular:  Negative for chest pain, palpitations and leg swelling.   Gastrointestinal:  Positive for constipation. Negative for abdominal pain, blood in stool, diarrhea, nausea and vomiting.   Genitourinary:  Negative for dysuria and frequency.   Musculoskeletal:  Negative for back pain and myalgias.   Skin:  Negative for itching and rash.   Neurological:  Negative for dizziness, tingling, tremors, sensory change, focal weakness and headaches.   Endo/Heme/Allergies:  Does not bruise/bleed easily.   Psychiatric/Behavioral:  The patient is not nervous/anxious.      Past Medical History:   Past Medical History:   Diagnosis Date    Cataract     Colon polyp 2014     History of hepatitis C, s/p successful treatment w/ SVR12 - 7/2015 10/14/2012    Kelsey 1a,  Liver biopsy 6/2014 - Stage 1 fibrosis;  Completed 8 weeks Harvoni w/ SVR12 - 7/2015      Hyperlipidemia     Lipoma of arm     Lipoma of lower extremity     Nuclear sclerosis - Both Eyes 10/22/2012    Other and unspecified hyperlipidemia 10/22/2013    PAD (peripheral artery disease)     Peripheral vascular disease, unspecified     Rheumatoid arthritis(714.0) 10/14/2012        Past Surgical History:   Past Surgical History:   Procedure Laterality Date    BIOPSY OF URETER Left 02/16/2023    Procedure: BIOPSY, URETER/BRUSH;  Surgeon: Kem Jefferson MD;  Location: [facility] OR 1ST FLR;  Service: Urology;  Laterality: Left;    COLONOSCOPY N/A 11/29/2021    Procedure: COLONOSCOPY;  Surgeon: Kenrick Zimmer MD;  Location: [facility] ENDO (4TH FLR);  Service: Endoscopy;  Laterality: N/A;  blood thinner approval received, see telephone encounter 10/19/21-BB  fully vacc-inst email-tb    CYSTOSCOPY      CYSTOSCOPY  02/16/2023    Procedure: CYSTOSCOPY;  Surgeon: Kem Jefferson MD;  Location: [facility] OR 1ST FLR;  Service: Urology;;    HERNIA REPAIR      INSERTION OF TUNNELED CENTRAL VENOUS CATHETER (CVC) WITH SUBCUTANEOUS PORT Right 3/13/2023    Procedure: INSERTION, PORT-A-CATH;  Surgeon: Rene Cali MD;  Location: [facility] CATH LAB;  Service: Radiology;  Laterality: Right;    KNEE ARTHROPLASTY      LAPAROSCOPIC EXPLORATION OF GROIN Left 09/06/2018    Procedure: EXPLORATION, INGUINAL REGION, LAPAROSCOPIC;  Surgeon: Mingo De Guzman Jr., MD;  Location: [facility] OR 2ND FLR;  Service: General;  Laterality: Left;    PYELOSCOPY Left 02/16/2023    Procedure: PYELOSCOPY;  Surgeon: Kem Jefferson MD;  Location: [facility] OR 1ST FLR;  Service: Urology;  Laterality: Left;    RETROGRADE PYELOGRAPHY Bilateral 02/16/2023    Procedure: PYELOGRAM, RETROGRADE;  Surgeon: Kem Jefferson MD;  [facility];  Service: Urology;  Laterality:  Bilateral;    TONSILLECTOMY      URETEROSCOPIC REMOVAL OF URETERIC CALCULUS Left 2023    Procedure: REMOVAL, CALCULUS, URETER, URETEROSCOPIC;  Surgeon: Kem Jefferson MD;  Location: [facility] OR [address] [place] [address];  Service: Urology;  Laterality: Left;    URETEROSCOPY Left 2023    Procedure: URETEROSCOPY;  Surgeon: Kem Jefferson MD;  Location: [facility] OR [address] [place] [address];  Service: Urology;  Laterality: Left;        Family History:   Family History   Problem Relation Age of Onset    Heart disease Paternal Uncle     Dementia Mother     Heart disease Sister     Liver disease Neg Hx     Amblyopia Neg Hx     Blindness Neg Hx     Cancer Neg Hx     Cataracts Neg Hx     Diabetes Neg Hx     Glaucoma Neg Hx     Hypertension Neg Hx     Macular degeneration Neg Hx     Retinal detachment Neg Hx     Strabismus Neg Hx     Stroke Neg Hx     Thyroid disease Neg Hx         Social History:   Social History     Tobacco Use    Smoking status: Former     Types: Cigarettes     Quit date: 2020     Years since quittin.7    Smokeless tobacco: Never   Substance Use Topics    Alcohol use: Yes     Comment: 3-4 drinks per week        I have reviewed and updated the patient's past medical, surgical, family and social histories.    Allergies:   Review of patient's allergies indicates:  No Known Allergies     Medications:   Current Outpatient Medications   Medication Sig Dispense Refill    amLODIPine (NORVASC) 5 MG tablet TAKE 1 TABLET BY MOUTH ONCE DAILY 90 tablet 2    aspirin 81 MG Chew Take 81 mg by mouth once daily.      cilostazoL (PLETAL) 50 MG Tab Take 1 tablet (50 mg total) by mouth 2 (two) times daily. 180 tablet 3    doxazosin (CARDURA) 4 MG tablet TAKE 1 TABLET BY MOUTH IN  THE EVENING 90 tablet 3    folic acid (FOLVITE) 1 MG tablet Take 1 tablet (1,000 mcg total) by mouth once daily. 90 tablet 3    LIDOcaine-prilocaine (EMLA) cream Apply topically as needed (apply over port 30 minutes before chemotherapy). 30 g 0    methotrexate 2.5  "MG Tab Take 8 tablets (20 mg total) by mouth every 7 days. This medication requires periodic lab monitoring. 120 tablet 1    simvastatin (ZOCOR) 10 MG tablet TAKE 1 TABLET BY MOUTH EVERY DAY IN THE EVENING (Patient taking differently: 2 (two) times a day.) 90 tablet 6    traZODone (DESYREL) 50 MG tablet TAKE 3 TABLETS BY MOUTH AT  NIGHT AS NEEDED FOR  INSOMNIA 270 tablet 3     No current facility-administered medications for this visit.     Facility-Administered Medications Ordered in Other Visits   Medication Dose Route Frequency Provider Last Rate Last Admin    filgrastim-sndz (ZARXIO) injection 480 mcg/0.8 mL (Preferred Regimen)  480 mcg Subcutaneous 1 time in Clinic/HOD Marquita Stoner NP            Physical Exam:   /72 (BP Location: Left arm, Patient Position: Sitting, BP Method: Medium (Automatic))   Pulse 72   Temp 97.5 °F (36.4 °C) (Oral)   Resp 16   Ht 5' 9" (1.753 m)   Wt 69.5 kg (153 lb 3.5 oz)   SpO2 98%   BMI 22.63 kg/m²      ECOG Performance status: 0            Physical Exam  Constitutional:       General: He is not in acute distress.     Appearance: Normal appearance.   HENT:      Head: Normocephalic and atraumatic.   Eyes:      Pupils: Pupils are equal, round, and reactive to light.   Cardiovascular:      Rate and Rhythm: Normal rate and regular rhythm.      Heart sounds: No murmur heard.  Pulmonary:      Effort: No respiratory distress.      Breath sounds: Normal breath sounds. No wheezing.   Abdominal:      General: Abdomen is flat. Bowel sounds are normal. There is no distension.      Palpations: Abdomen is soft. There is no mass.      Tenderness: There is no abdominal tenderness.   Musculoskeletal:         General: No swelling or deformity.   Skin:     Coloration: Skin is not jaundiced.   Neurological:      General: No focal deficit present.      Mental Status: He is alert and oriented to person, place, and time. Mental status is at baseline.         Labs:   Recent Results (from the " past 48 hour(s))   CBC Oncology    Collection Time: 04/11/23  7:20 AM   Result Value Ref Range    WBC 2.15 (L) 3.90 - 12.70 K/uL    RBC 3.50 (L) 4.60 - 6.20 M/uL    Hemoglobin 10.3 (L) 14.0 - 18.0 g/dL    Hematocrit 32.7 (L) 40.0 - 54.0 %    MCV 93 82 - 98 fL    MCH 29.4 27.0 - 31.0 pg    MCHC 31.5 (L) 32.0 - 36.0 g/dL    RDW 14.6 (H) 11.5 - 14.5 %    Platelets 367 150 - 450 K/uL    MPV 8.9 (L) 9.2 - 12.9 fL    Gran # (ANC) 0.6 (L) 1.8 - 7.7 K/uL    Immature Grans (Abs) 0.05 (H) 0.00 - 0.04 K/uL   COMPREHENSIVE METABOLIC PANEL    Collection Time: 04/11/23  7:20 AM   Result Value Ref Range    Sodium 138 136 - 145 mmol/L    Potassium 4.6 3.5 - 5.1 mmol/L    Chloride 104 95 - 110 mmol/L    CO2 28 23 - 29 mmol/L    Glucose 121 (H) 70 - 110 mg/dL    BUN 23 8 - 23 mg/dL    Creatinine 1.4 0.5 - 1.4 mg/dL    Calcium 10.2 8.7 - 10.5 mg/dL    Total Protein 7.3 6.0 - 8.4 g/dL    Albumin 3.5 3.5 - 5.2 g/dL    Total Bilirubin 0.2 0.1 - 1.0 mg/dL    Alkaline Phosphatase 87 55 - 135 U/L    AST 17 10 - 40 U/L    ALT 16 10 - 44 U/L    Anion Gap 6 (L) 8 - 16 mmol/L    eGFR 54.4 (A) >60 mL/min/1.73 m^2   Magnesium    Collection Time: 04/11/23  7:20 AM   Result Value Ref Range    Magnesium 1.9 1.6 - 2.6 mg/dL            Assessment:       1. Urothelial carcinoma of kidney, left    2. Chemotherapy induced neutropenia    3. Antineoplastic chemotherapy induced anemia    4. Stage 2 chronic kidney disease    5. Hearing loss, unspecified hearing loss type, unspecified laterality    6. Chemotherapy-induced nausea    7. Rheumatoid arthritis involving multiple sites with positive rheumatoid factor    8. Primary hypertension          Plan:   1,2- 68 y.o. M patient presented with gross hematuria and was found to have multiple soft tissue masses in L renal pelvis. He is s/p L urethroscopy and biopsy which showed high grade urothelial dysplasia. On CT urogram, there was a 1.6 cm L adrenal nodule, as well as multiple pulmonary nodules, largest was  0.6 cm, and multiple hepatic nodules too small to characterize.   Overall picture is concerning for invasive upper tract urothelial carcinoma.   PET/CT showed no evidence of metastatic disease. There is B/L adrenal adenomas.     Plan:   - Neoadjuvant chemotherapy with Gemcitabine and Cisplatin. Split dose Cisplatin for borderline kidney functions.  - Because of his RA (not very controlled), will avoid IO adjuvantely.     Due for cycle 2, day 8 today with ANC of 600. Hold chemo scheduled for today. Zarxio x 3. Will go to every 2 week split dose with udencya on day 2.    RTC 1 week with labs (CBC,CMP,MAG), to see me and cisplatin/gem on day 1 with udencya on day 2.   **Udencya needs auth. Please send treatment plan.    3- Mild, monitor.    4- Improved..    5- Audiogram showed normal hearing sensitivity from 250-2000 Hz sloping to a severe to profound hearing loss by 8000 Hz with a slight air bone gap at 1000 Hz in the right ear and normal hearing sensitivity from 250-2000 Hz sloping to a severe sensorineural hearing loss (SNHL) by 8000 Hz in the left ear. Will recheck post chemotherapy.    6- Zofran PRN.    7- On MTX. Will avoid adjuvant immunotherapy.    8- enrolled in [facility]. Per PCP.    Patient is in agreement with the proposed treatment plan. All questions were answered to the patient's satisfaction. Pt knows to call clinic if anything is needed before the next clinic visit.    Marquita Stoner, MSN, APRN, FNP-C  Hematology and Medical Oncology  Nurse Practitioner to Dr. Issa Antony  Nurse Practitioner, [facility]

## 2023-04-12 ENCOUNTER — INFUSION (OUTPATIENT)
Dept: INFUSION THERAPY | Facility: HOSPITAL | Age: 68
End: 2023-04-12
Attending: INTERNAL MEDICINE
Payer: MEDICARE

## 2023-04-12 ENCOUNTER — PATIENT MESSAGE (OUTPATIENT)
Dept: ADMINISTRATIVE | Facility: OTHER | Age: 68
End: 2023-04-12
Payer: MEDICARE

## 2023-04-12 DIAGNOSIS — D70.1 CHEMOTHERAPY INDUCED NEUTROPENIA: Primary | ICD-10-CM

## 2023-04-12 DIAGNOSIS — T45.1X5A CHEMOTHERAPY INDUCED NEUTROPENIA: Primary | ICD-10-CM

## 2023-04-12 PROCEDURE — 96372 THER/PROPH/DIAG INJ SC/IM: CPT

## 2023-04-12 PROCEDURE — 63600175 PHARM REV CODE 636 W HCPCS: Performed by: NURSE PRACTITIONER

## 2023-04-12 RX ADMIN — FILGRASTIM-SNDZ 480 MCG: 480 INJECTION, SOLUTION INTRAVENOUS; SUBCUTANEOUS at 10:04

## 2023-04-12 NOTE — NURSING
Patient presents for Zarxio injection. Reports some increasing diarrhea. Encouraged to increase fluid intake. Medications administered per orders. Tolerated well. Discharged off unit in NAD

## 2023-04-13 ENCOUNTER — PATIENT MESSAGE (OUTPATIENT)
Dept: ADMINISTRATIVE | Facility: OTHER | Age: 68
End: 2023-04-13
Payer: MEDICARE

## 2023-04-13 ENCOUNTER — INFUSION (OUTPATIENT)
Dept: INFUSION THERAPY | Facility: HOSPITAL | Age: 68
End: 2023-04-13
Attending: INTERNAL MEDICINE
Payer: MEDICARE

## 2023-04-13 DIAGNOSIS — T45.1X5A CHEMOTHERAPY INDUCED NEUTROPENIA: Primary | ICD-10-CM

## 2023-04-13 DIAGNOSIS — D70.1 CHEMOTHERAPY INDUCED NEUTROPENIA: Primary | ICD-10-CM

## 2023-04-13 PROCEDURE — 96372 THER/PROPH/DIAG INJ SC/IM: CPT

## 2023-04-13 PROCEDURE — 63600175 PHARM REV CODE 636 W HCPCS: Performed by: NURSE PRACTITIONER

## 2023-04-13 RX ADMIN — FILGRASTIM-SNDZ 480 MCG: 480 INJECTION, SOLUTION INTRAVENOUS; SUBCUTANEOUS at 02:04

## 2023-04-13 NOTE — NURSING
"[facility] spoke with pt individually and provided him the phone number to his  Stephen Giles. Pt did not appear interested in contacting him. He tells that he does not plan to return to [place] and reports this is to avoid his ""gang banging friends\"". He tells he was planning to discharge to the [place] but spoke to his brother this afternoon who suggested he go see his son and stay with his ex. He believes she would be willing to let him stay a few weeks and he plans to call her later this afternoon. He would need [facility] transport to [facility] where she lives and [facility] encouraged pt to get her address if he intends to go there. He tells he does not plan to follow up with appointments in [place] as he does not wish to return there. [facility] again encouraged pt to reconsider as he will have difficulty accessing appointment and providers in this area. He tells he does not need any program and just wants his medications. Overall pt is doing better and identifies this as well. He plans to discharge tomorrow.      Cristela Nicole, LPC    " Patient presents to clinic for Zarixio injection. Cont to report episodes of diarrhea. Denies nausea/vomiting/dizziness. Medication administered per orders. Counseled on OTC Imodium PRN. If no relief, he will contact his primary team. Discharged in NAD.

## 2023-04-14 ENCOUNTER — PATIENT MESSAGE (OUTPATIENT)
Dept: ADMINISTRATIVE | Facility: OTHER | Age: 68
End: 2023-04-14
Payer: MEDICARE

## 2023-04-15 ENCOUNTER — PATIENT MESSAGE (OUTPATIENT)
Dept: ADMINISTRATIVE | Facility: OTHER | Age: 68
End: 2023-04-15
Payer: MEDICARE

## 2023-04-16 ENCOUNTER — PATIENT MESSAGE (OUTPATIENT)
Dept: ADMINISTRATIVE | Facility: OTHER | Age: 68
End: 2023-04-16
Payer: MEDICARE

## 2023-04-17 ENCOUNTER — PATIENT MESSAGE (OUTPATIENT)
Dept: ADMINISTRATIVE | Facility: OTHER | Age: 68
End: 2023-04-17
Payer: MEDICARE

## 2023-04-18 ENCOUNTER — PATIENT MESSAGE (OUTPATIENT)
Dept: ADMINISTRATIVE | Facility: OTHER | Age: 68
End: 2023-04-18
Payer: MEDICARE

## 2023-04-18 ENCOUNTER — PES CALL (OUTPATIENT)
Dept: ADMINISTRATIVE | Facility: CLINIC | Age: 68
End: 2023-04-18
Payer: MEDICARE

## 2023-04-19 ENCOUNTER — LAB VISIT (OUTPATIENT)
Dept: LAB | Facility: HOSPITAL | Age: 68
End: 2023-04-19
Payer: MEDICARE

## 2023-04-19 ENCOUNTER — PATIENT MESSAGE (OUTPATIENT)
Dept: ADMINISTRATIVE | Facility: OTHER | Age: 68
End: 2023-04-19
Payer: MEDICARE

## 2023-04-19 ENCOUNTER — OFFICE VISIT (OUTPATIENT)
Dept: HEMATOLOGY/ONCOLOGY | Facility: CLINIC | Age: 68
End: 2023-04-19
Payer: MEDICARE

## 2023-04-19 ENCOUNTER — INFUSION (OUTPATIENT)
Dept: INFUSION THERAPY | Facility: HOSPITAL | Age: 68
End: 2023-04-19
Attending: INTERNAL MEDICINE
Payer: MEDICARE

## 2023-04-19 VITALS
BODY MASS INDEX: 22.96 KG/M2 | DIASTOLIC BLOOD PRESSURE: 72 MMHG | TEMPERATURE: 98 F | HEIGHT: 69 IN | WEIGHT: 155 LBS | HEART RATE: 74 BPM | RESPIRATION RATE: 18 BRPM | SYSTOLIC BLOOD PRESSURE: 121 MMHG

## 2023-04-19 VITALS
DIASTOLIC BLOOD PRESSURE: 69 MMHG | OXYGEN SATURATION: 99 % | RESPIRATION RATE: 18 BRPM | HEIGHT: 69 IN | WEIGHT: 155 LBS | TEMPERATURE: 98 F | BODY MASS INDEX: 22.96 KG/M2 | SYSTOLIC BLOOD PRESSURE: 123 MMHG | HEART RATE: 77 BPM

## 2023-04-19 DIAGNOSIS — H91.90 HEARING LOSS, UNSPECIFIED HEARING LOSS TYPE, UNSPECIFIED LATERALITY: ICD-10-CM

## 2023-04-19 DIAGNOSIS — D64.81 ANTINEOPLASTIC CHEMOTHERAPY INDUCED ANEMIA: ICD-10-CM

## 2023-04-19 DIAGNOSIS — I10 PRIMARY HYPERTENSION: ICD-10-CM

## 2023-04-19 DIAGNOSIS — C64.2 UROTHELIAL CARCINOMA OF KIDNEY, LEFT: Primary | ICD-10-CM

## 2023-04-19 DIAGNOSIS — R11.0 CHEMOTHERAPY-INDUCED NAUSEA: ICD-10-CM

## 2023-04-19 DIAGNOSIS — T45.1X5A CHEMOTHERAPY-INDUCED NAUSEA: ICD-10-CM

## 2023-04-19 DIAGNOSIS — M05.79 RHEUMATOID ARTHRITIS INVOLVING MULTIPLE SITES WITH POSITIVE RHEUMATOID FACTOR: ICD-10-CM

## 2023-04-19 DIAGNOSIS — N18.2 STAGE 2 CHRONIC KIDNEY DISEASE: ICD-10-CM

## 2023-04-19 DIAGNOSIS — C68.9 UROTHELIAL CARCINOMA WITH HIGH RISK OF METASTASIS: Primary | ICD-10-CM

## 2023-04-19 DIAGNOSIS — D70.1 CHEMOTHERAPY INDUCED NEUTROPENIA: ICD-10-CM

## 2023-04-19 DIAGNOSIS — T45.1X5A ANTINEOPLASTIC CHEMOTHERAPY INDUCED ANEMIA: ICD-10-CM

## 2023-04-19 DIAGNOSIS — Z79.631 LONG TERM METHOTREXATE USER: ICD-10-CM

## 2023-04-19 DIAGNOSIS — C64.2 UROTHELIAL CARCINOMA OF KIDNEY, LEFT: ICD-10-CM

## 2023-04-19 DIAGNOSIS — T45.1X5A CHEMOTHERAPY INDUCED NEUTROPENIA: ICD-10-CM

## 2023-04-19 LAB
ALBUMIN SERPL BCP-MCNC: 3.5 G/DL (ref 3.5–5.2)
ALP SERPL-CCNC: 115 U/L (ref 55–135)
ALT SERPL W/O P-5'-P-CCNC: 9 U/L (ref 10–44)
ANION GAP SERPL CALC-SCNC: 10 MMOL/L (ref 8–16)
ANISOCYTOSIS BLD QL SMEAR: SLIGHT
AST SERPL-CCNC: 15 U/L (ref 10–40)
BASOPHILS # BLD AUTO: ABNORMAL K/UL (ref 0–0.2)
BASOPHILS NFR BLD: 2 % (ref 0–1.9)
BILIRUB SERPL-MCNC: 0.2 MG/DL (ref 0.1–1)
BUN SERPL-MCNC: 20 MG/DL (ref 8–23)
CALCIUM SERPL-MCNC: 9.7 MG/DL (ref 8.7–10.5)
CHLORIDE SERPL-SCNC: 104 MMOL/L (ref 95–110)
CO2 SERPL-SCNC: 27 MMOL/L (ref 23–29)
CREAT SERPL-MCNC: 1.4 MG/DL (ref 0.5–1.4)
DIFFERENTIAL METHOD: ABNORMAL
EOSINOPHIL # BLD AUTO: ABNORMAL K/UL (ref 0–0.5)
EOSINOPHIL NFR BLD: 0 % (ref 0–8)
ERYTHROCYTE [DISTWIDTH] IN BLOOD BY AUTOMATED COUNT: 15.7 % (ref 11.5–14.5)
EST. GFR  (NO RACE VARIABLE): 54.4 ML/MIN/1.73 M^2
GLUCOSE SERPL-MCNC: 116 MG/DL (ref 70–110)
HCT VFR BLD AUTO: 35.2 % (ref 40–54)
HGB BLD-MCNC: 11 G/DL (ref 14–18)
HYPOCHROMIA BLD QL SMEAR: ABNORMAL
IMM GRANULOCYTES # BLD AUTO: ABNORMAL K/UL (ref 0–0.04)
IMM GRANULOCYTES NFR BLD AUTO: ABNORMAL % (ref 0–0.5)
LYMPHOCYTES # BLD AUTO: ABNORMAL K/UL (ref 1–4.8)
LYMPHOCYTES NFR BLD: 3 % (ref 18–48)
MAGNESIUM SERPL-MCNC: 1.7 MG/DL (ref 1.6–2.6)
MCH RBC QN AUTO: 30.1 PG (ref 27–31)
MCHC RBC AUTO-ENTMCNC: 31.3 G/DL (ref 32–36)
MCV RBC AUTO: 96 FL (ref 82–98)
MONOCYTES # BLD AUTO: ABNORMAL K/UL (ref 0.3–1)
MONOCYTES NFR BLD: 4 % (ref 4–15)
MYELOCYTES NFR BLD MANUAL: 2 %
NEUTROPHILS NFR BLD: 89 % (ref 38–73)
NRBC BLD-RTO: 0 /100 WBC
OVALOCYTES BLD QL SMEAR: ABNORMAL
PLATELET # BLD AUTO: 164 K/UL (ref 150–450)
PMV BLD AUTO: 10.1 FL (ref 9.2–12.9)
POIKILOCYTOSIS BLD QL SMEAR: SLIGHT
POLYCHROMASIA BLD QL SMEAR: ABNORMAL
POTASSIUM SERPL-SCNC: 4.4 MMOL/L (ref 3.5–5.1)
PROT SERPL-MCNC: 7.4 G/DL (ref 6–8.4)
RBC # BLD AUTO: 3.65 M/UL (ref 4.6–6.2)
SODIUM SERPL-SCNC: 141 MMOL/L (ref 136–145)
SPHEROCYTES BLD QL SMEAR: ABNORMAL
WBC # BLD AUTO: 9.59 K/UL (ref 3.9–12.7)

## 2023-04-19 PROCEDURE — 99499 UNLISTED E&M SERVICE: CPT | Mod: S$GLB,,, | Performed by: NURSE PRACTITIONER

## 2023-04-19 PROCEDURE — A4216 STERILE WATER/SALINE, 10 ML: HCPCS | Performed by: NURSE PRACTITIONER

## 2023-04-19 PROCEDURE — 96413 CHEMO IV INFUSION 1 HR: CPT

## 2023-04-19 PROCEDURE — 96367 TX/PROPH/DG ADDL SEQ IV INF: CPT

## 2023-04-19 PROCEDURE — 99999 PR PBB SHADOW E&M-EST. PATIENT-LVL III: CPT | Mod: PBBFAC,,, | Performed by: NURSE PRACTITIONER

## 2023-04-19 PROCEDURE — 3078F PR MOST RECENT DIASTOLIC BLOOD PRESSURE < 80 MM HG: ICD-10-PCS | Mod: CPTII,S$GLB,, | Performed by: NURSE PRACTITIONER

## 2023-04-19 PROCEDURE — 96375 TX/PRO/DX INJ NEW DRUG ADDON: CPT

## 2023-04-19 PROCEDURE — 3074F SYST BP LT 130 MM HG: CPT | Mod: CPTII,S$GLB,, | Performed by: NURSE PRACTITIONER

## 2023-04-19 PROCEDURE — 85027 COMPLETE CBC AUTOMATED: CPT | Performed by: INTERNAL MEDICINE

## 2023-04-19 PROCEDURE — 99215 PR OFFICE/OUTPT VISIT, EST, LEVL V, 40-54 MIN: ICD-10-PCS | Mod: S$GLB,,, | Performed by: NURSE PRACTITIONER

## 2023-04-19 PROCEDURE — 3288F FALL RISK ASSESSMENT DOCD: CPT | Mod: CPTII,S$GLB,, | Performed by: NURSE PRACTITIONER

## 2023-04-19 PROCEDURE — 3078F DIAST BP <80 MM HG: CPT | Mod: CPTII,S$GLB,, | Performed by: NURSE PRACTITIONER

## 2023-04-19 PROCEDURE — 83735 ASSAY OF MAGNESIUM: CPT | Performed by: NURSE PRACTITIONER

## 2023-04-19 PROCEDURE — 1101F PT FALLS ASSESS-DOCD LE1/YR: CPT | Mod: CPTII,S$GLB,, | Performed by: NURSE PRACTITIONER

## 2023-04-19 PROCEDURE — 99215 OFFICE O/P EST HI 40 MIN: CPT | Mod: S$GLB,,, | Performed by: NURSE PRACTITIONER

## 2023-04-19 PROCEDURE — 96361 HYDRATE IV INFUSION ADD-ON: CPT

## 2023-04-19 PROCEDURE — 99999 PR PBB SHADOW E&M-EST. PATIENT-LVL III: ICD-10-PCS | Mod: PBBFAC,,, | Performed by: NURSE PRACTITIONER

## 2023-04-19 PROCEDURE — 96366 THER/PROPH/DIAG IV INF ADDON: CPT

## 2023-04-19 PROCEDURE — 25000003 PHARM REV CODE 250: Performed by: NURSE PRACTITIONER

## 2023-04-19 PROCEDURE — 80053 COMPREHEN METABOLIC PANEL: CPT | Performed by: INTERNAL MEDICINE

## 2023-04-19 PROCEDURE — 96417 CHEMO IV INFUS EACH ADDL SEQ: CPT

## 2023-04-19 PROCEDURE — 1159F PR MEDICATION LIST DOCUMENTED IN MEDICAL RECORD: ICD-10-PCS | Mod: CPTII,S$GLB,, | Performed by: NURSE PRACTITIONER

## 2023-04-19 PROCEDURE — 1126F AMNT PAIN NOTED NONE PRSNT: CPT | Mod: CPTII,S$GLB,, | Performed by: NURSE PRACTITIONER

## 2023-04-19 PROCEDURE — 1159F MED LIST DOCD IN RCRD: CPT | Mod: CPTII,S$GLB,, | Performed by: NURSE PRACTITIONER

## 2023-04-19 PROCEDURE — 85007 BL SMEAR W/DIFF WBC COUNT: CPT | Performed by: INTERNAL MEDICINE

## 2023-04-19 PROCEDURE — 36415 COLL VENOUS BLD VENIPUNCTURE: CPT | Performed by: NURSE PRACTITIONER

## 2023-04-19 PROCEDURE — 3008F BODY MASS INDEX DOCD: CPT | Mod: CPTII,S$GLB,, | Performed by: NURSE PRACTITIONER

## 2023-04-19 PROCEDURE — 3008F PR BODY MASS INDEX (BMI) DOCUMENTED: ICD-10-PCS | Mod: CPTII,S$GLB,, | Performed by: NURSE PRACTITIONER

## 2023-04-19 PROCEDURE — 63600175 PHARM REV CODE 636 W HCPCS: Performed by: NURSE PRACTITIONER

## 2023-04-19 PROCEDURE — 3288F PR FALLS RISK ASSESSMENT DOCUMENTED: ICD-10-PCS | Mod: CPTII,S$GLB,, | Performed by: NURSE PRACTITIONER

## 2023-04-19 PROCEDURE — 3074F PR MOST RECENT SYSTOLIC BLOOD PRESSURE < 130 MM HG: ICD-10-PCS | Mod: CPTII,S$GLB,, | Performed by: NURSE PRACTITIONER

## 2023-04-19 PROCEDURE — 1101F PR PT FALLS ASSESS DOC 0-1 FALLS W/OUT INJ PAST YR: ICD-10-PCS | Mod: CPTII,S$GLB,, | Performed by: NURSE PRACTITIONER

## 2023-04-19 PROCEDURE — 1126F PR PAIN SEVERITY QUANTIFIED, NO PAIN PRESENT: ICD-10-PCS | Mod: CPTII,S$GLB,, | Performed by: NURSE PRACTITIONER

## 2023-04-19 RX ORDER — SODIUM CHLORIDE 0.9 % (FLUSH) 0.9 %
10 SYRINGE (ML) INJECTION
Status: DISCONTINUED | OUTPATIENT
Start: 2023-04-19 | End: 2023-04-19 | Stop reason: HOSPADM

## 2023-04-19 RX ORDER — HEPARIN 100 UNIT/ML
500 SYRINGE INTRAVENOUS
Status: DISCONTINUED | OUTPATIENT
Start: 2023-04-19 | End: 2023-04-19 | Stop reason: HOSPADM

## 2023-04-19 RX ADMIN — GEMCITABINE HYDROCHLORIDE 1800 MG: 1 INJECTION, SOLUTION INTRAVENOUS at 12:04

## 2023-04-19 RX ADMIN — HEPARIN 500 UNITS: 100 SYRINGE at 03:04

## 2023-04-19 RX ADMIN — CISPLATIN 64 MG: 100 INJECTION, SOLUTION INTRAVENOUS at 12:04

## 2023-04-19 RX ADMIN — APREPITANT 130 MG: 130 INJECTION, EMULSION INTRAVENOUS at 12:04

## 2023-04-19 RX ADMIN — SODIUM CHLORIDE 500 ML/HR: 0.9 INJECTION, SOLUTION INTRAVENOUS at 10:04

## 2023-04-19 RX ADMIN — Medication 10 ML: at 03:04

## 2023-04-19 RX ADMIN — DEXAMETHASONE SODIUM PHOSPHATE 0.25 MG: 4 INJECTION, SOLUTION INTRA-ARTICULAR; INTRALESIONAL; INTRAMUSCULAR; INTRAVENOUS; SOFT TISSUE at 11:04

## 2023-04-19 RX ADMIN — MAGNESIUM SULFATE HEPTAHYDRATE 500 ML/HR: 500 INJECTION, SOLUTION INTRAMUSCULAR; INTRAVENOUS at 01:04

## 2023-04-19 NOTE — PROGRESS NOTES
MEDICAL ONCOLOGY - FOLLOW-UP VISIT    Reason for visit: Upper tract Urothelial carcinoma     Best Contact Phone Number(s): [phone] (home)      Cancer/Stage/TNM:    Cancer Staging   No matching staging information was found for the patient.     Oncology History   Urothelial carcinoma with high risk of metastasis   3/6/2023 Initial Diagnosis    Urothelial carcinoma with high risk of metastasis       3/14/2023 -  Chemotherapy    Treatment Summary   Plan Name: OP BLADDER GEMCITABINE CISPLATIN (SPLIT DOSE CISPLATIN) Q3W  Treatment Goal: Curative  Status: Active  Start Date: 3/14/2023  End Date: 6/14/2023 (Planned)  Provider: Issa Antony MD  Chemotherapy: CISplatin (Platinol) 35 mg/m2 = 64 mg in sodium chloride 0.9% 314 mL chemo infusion, 35 mg/m2 = 64 mg, Intravenous, Clinic/HOD 1 time, 2 of 4 cycles  Dose modification: 17.5 mg/m2 (original dose 35 mg/m2, Cycle 3, Reason: Other (see comments), Comment: renal function), 35 mg/m2 (original dose 35 mg/m2, Cycle 3, Reason: Other (see comments)), 17.5 mg/m2 (original dose 35 mg/m2, Cycle 2, Reason: Other (see comments), Comment: renal function)  Administration: 64 mg (3/14/2023), 64 mg (3/21/2023), 32 mg (4/4/2023)  gemcitabine 1,800 mg in sodium chloride 0.9% SolP 332.34 mL chemo infusion, 1,840 mg, Intravenous, Clinic/HOD 1 time, 2 of 4 cycles  Administration: 1,800 mg (3/14/2023), 1,800 mg (3/21/2023), 1,800 mg (4/4/2023)            HPI:   68 y.o. male with PVD, PAD, HLD, CKD3, AAA, treated hep C, Rheumatoid arthritis on MTX (previously on humara), smoking (about 1/2 PPD) who presented with gross hematuria. He saw urology and underwent CT urogram which showed Few soft tissue masses within the left renal pelvis, the largest measures 2.5 x 1.3 cm. There was also a L adrenal nodule measuring 1.6 cm. Urine cytology was evident of atypical urothelial cells, and repeat sample showed high grade urothelial carcinoma.   He underwent a flexible cystoscopy with normal  bladder findings. L RPG showed Left RPG with mild hydronephrosis and filling defect ~2 cm in central renal pelvis.  Left ureteroscopy with was done and showed papillary tumors along the proximal ureter and renal pelvis. - Large ~2 cm nodular lesion in the renal pelvis consistent with filling defect. Biopsy of the mass came back as - High grade urothelial dysplasia/carcinoma in situ. - No definitive invasive carcinoma identified (outside report)    PET/CT done on 03/08 showed Left proximal ureteral lesion compatible with urothelial cancer.  No FDG PET evidence of metastatic disease.  Benign bilateral adrenal adenomas.    Interval history:   He returns today prior to cycle 2 day 8 cis/gem.  He feels well overall. No isues with zarxio.He Noticing mild fatigue.  Eating well. Weight is stable. Denies nausea/vomiting. Usually has 2 bowel movements per day but now maybe 1. +pruritis. Denies neuropathy to fingers/toes. +hearing issues, worse in R. No new issues.    ROS:   Review of Systems   Constitutional:  Negative for chills, fever, malaise/fatigue and weight loss.   HENT:  Positive for hearing loss. Negative for congestion. Ear discharge: r>L.   Eyes:  Negative for blurred vision.   Respiratory:  Negative for cough and shortness of breath.    Cardiovascular:  Negative for chest pain, palpitations and leg swelling.   Gastrointestinal:  Positive for constipation. Negative for abdominal pain, blood in stool, diarrhea, nausea and vomiting.   Genitourinary:  Negative for dysuria and frequency.   Musculoskeletal:  Negative for back pain and myalgias.   Skin:  Negative for itching and rash.   Neurological:  Negative for dizziness, tingling, tremors, sensory change, focal weakness and headaches.   Endo/Heme/Allergies:  Does not bruise/bleed easily.   Psychiatric/Behavioral:  The patient is not nervous/anxious.      Past Medical History:   Past Medical History:   Diagnosis Date    Cataract     Colon polyp 2014    History of  hepatitis C, s/p successful treatment w/ SVR12 - 7/2015 10/14/2012    Kelsey 1a,  Liver biopsy 6/2014 - Stage 1 fibrosis;  Completed 8 weeks Harvoni w/ SVR12 - 7/2015      Hyperlipidemia     Lipoma of arm     Lipoma of lower extremity     Nuclear sclerosis - Both Eyes 10/22/2012    Other and unspecified hyperlipidemia 10/22/2013    PAD (peripheral artery disease)     Peripheral vascular disease, unspecified     Rheumatoid arthritis(714.0) 10/14/2012        Past Surgical History:   Past Surgical History:   Procedure Laterality Date    BIOPSY OF URETER Left 02/16/2023    Procedure: BIOPSY, URETER/BRUSH;  Surgeon: Kem Jefferson MD;  Location: [facility] OR 1ST FLR;  Service: Urology;  Laterality: Left;    COLONOSCOPY N/A 11/29/2021    Procedure: COLONOSCOPY;  Surgeon: Kenrick Zimmer MD;  Location: [facility] ENDO (4TH FLR);  Service: Endoscopy;  Laterality: N/A;  blood thinner approval received, see telephone encounter 10/19/21-BB  fully vacc-inst email-tb    CYSTOSCOPY      CYSTOSCOPY  02/16/2023    Procedure: CYSTOSCOPY;  Surgeon: Kem Jefferson MD;  Location: [facility] OR 1ST FLR;  Service: Urology;;    HERNIA REPAIR      INSERTION OF TUNNELED CENTRAL VENOUS CATHETER (CVC) WITH SUBCUTANEOUS PORT Right 3/13/2023    Procedure: INSERTION, PORT-A-CATH;  Surgeon: Rene Cali MD;  Location: [facility] CATH LAB;  Service: Radiology;  Laterality: Right;    KNEE ARTHROPLASTY      LAPAROSCOPIC EXPLORATION OF GROIN Left 09/06/2018    Procedure: EXPLORATION, INGUINAL REGION, LAPAROSCOPIC;  Surgeon: Mingo De Guzman Jr., MD;  Location: [facility] OR 2ND FLR;  Service: General;  Laterality: Left;    PYELOSCOPY Left 02/16/2023    Procedure: PYELOSCOPY;  Surgeon: Kem Jefferson MD;  Location: [facility] OR 1ST FLR;  Service: Urology;  Laterality: Left;    RETROGRADE PYELOGRAPHY Bilateral 02/16/2023    Procedure: PYELOGRAM, RETROGRADE;  Surgeon: Kem Jefferson MD;  [facility];  Service: Urology;  Laterality: Bilateral;     TONSILLECTOMY      URETEROSCOPIC REMOVAL OF URETERIC CALCULUS Left 2023    Procedure: REMOVAL, CALCULUS, URETER, URETEROSCOPIC;  Surgeon: Kem Jefferson MD;  Location: [facility] OR [address] [place] [address];  Service: Urology;  Laterality: Left;    URETEROSCOPY Left 2023    Procedure: URETEROSCOPY;  Surgeon: Kem Jefferson MD;  Location: [facility] OR [address] [place] [address];  Service: Urology;  Laterality: Left;        Family History:   Family History   Problem Relation Age of Onset    Heart disease Paternal Uncle     Dementia Mother     Heart disease Sister     Liver disease Neg Hx     Amblyopia Neg Hx     Blindness Neg Hx     Cancer Neg Hx     Cataracts Neg Hx     Diabetes Neg Hx     Glaucoma Neg Hx     Hypertension Neg Hx     Macular degeneration Neg Hx     Retinal detachment Neg Hx     Strabismus Neg Hx     Stroke Neg Hx     Thyroid disease Neg Hx         Social History:   Social History     Tobacco Use    Smoking status: Former     Types: Cigarettes     Quit date: 2020     Years since quittin.8    Smokeless tobacco: Never   Substance Use Topics    Alcohol use: Yes     Comment: 3-4 drinks per week        I have reviewed and updated the patient's past medical, surgical, family and social histories.    Allergies:   Review of patient's allergies indicates:  No Known Allergies     Medications:   Current Outpatient Medications   Medication Sig Dispense Refill    amLODIPine (NORVASC) 5 MG tablet TAKE 1 TABLET BY MOUTH ONCE DAILY 90 tablet 2    aspirin 81 MG Chew Take 81 mg by mouth once daily.      cilostazoL (PLETAL) 50 MG Tab Take 1 tablet (50 mg total) by mouth 2 (two) times daily. 180 tablet 3    doxazosin (CARDURA) 4 MG tablet TAKE 1 TABLET BY MOUTH IN  THE EVENING 90 tablet 3    folic acid (FOLVITE) 1 MG tablet Take 1 tablet (1,000 mcg total) by mouth once daily. 90 tablet 3    methotrexate 2.5 MG Tab Take 8 tablets (20 mg total) by mouth every 7 days. This medication requires periodic lab monitoring. 120 tablet 1     "simvastatin (ZOCOR) 10 MG tablet TAKE 1 TABLET BY MOUTH EVERY DAY IN THE EVENING (Patient taking differently: 2 (two) times a day.) 90 tablet 6    traZODone (DESYREL) 50 MG tablet TAKE 3 TABLETS BY MOUTH AT  NIGHT AS NEEDED FOR  INSOMNIA 270 tablet 3     No current facility-administered medications for this visit.        Physical Exam:   /69 (BP Location: Left arm, Patient Position: Sitting, BP Method: Medium (Automatic))   Pulse 77   Temp 98 °F (36.7 °C) (Oral)   Resp 18   Ht 5' 9" (1.753 m)   Wt 70.3 kg (154 lb 15.7 oz)   SpO2 99%   BMI 22.89 kg/m²      ECOG Performance status: 0            Physical Exam  Constitutional:       General: He is not in acute distress.     Appearance: Normal appearance.   HENT:      Head: Normocephalic and atraumatic.   Eyes:      Pupils: Pupils are equal, round, and reactive to light.   Cardiovascular:      Rate and Rhythm: Normal rate and regular rhythm.      Heart sounds: No murmur heard.  Pulmonary:      Effort: No respiratory distress.      Breath sounds: Normal breath sounds. No wheezing.   Abdominal:      General: Abdomen is flat. Bowel sounds are normal. There is no distension.      Palpations: Abdomen is soft. There is no mass.      Tenderness: There is no abdominal tenderness.   Musculoskeletal:         General: No swelling or deformity.   Skin:     Coloration: Skin is not jaundiced.   Neurological:      General: No focal deficit present.      Mental Status: He is alert and oriented to person, place, and time. Mental status is at baseline.         Labs:   Recent Results (from the past 48 hour(s))   Magnesium    Collection Time: 04/19/23  8:38 AM   Result Value Ref Range    Magnesium 1.7 1.6 - 2.6 mg/dL   CBC Auto Differential    Collection Time: 04/19/23  8:38 AM   Result Value Ref Range    WBC 9.59 3.90 - 12.70 K/uL    RBC 3.65 (L) 4.60 - 6.20 M/uL    Hemoglobin 11.0 (L) 14.0 - 18.0 g/dL    Hematocrit 35.2 (L) 40.0 - 54.0 %    MCV 96 82 - 98 fL    MCH 30.1 27.0 " - 31.0 pg    MCHC 31.3 (L) 32.0 - 36.0 g/dL    RDW 15.7 (H) 11.5 - 14.5 %    Platelets 164 150 - 450 K/uL    MPV 10.1 9.2 - 12.9 fL    Immature Granulocytes CANCELED 0.0 - 0.5 %    Immature Grans (Abs) CANCELED 0.00 - 0.04 K/uL    Lymph # CANCELED 1.0 - 4.8 K/uL    Mono # CANCELED 0.3 - 1.0 K/uL    Eos # CANCELED 0.0 - 0.5 K/uL    Baso # CANCELED 0.00 - 0.20 K/uL    nRBC 0 0 /100 WBC    Gran % 89.0 (H) 38.0 - 73.0 %    Lymph % 3.0 (L) 18.0 - 48.0 %    Mono % 4.0 4.0 - 15.0 %    Eosinophil % 0.0 0.0 - 8.0 %    Basophil % 2.0 (H) 0.0 - 1.9 %    Myelocytes 2.0 %    Aniso Slight     Poik Slight     Poly Occasional     Hypo Occasional     Ovalocytes Occasional     Spherocytes Occasional     Differential Method Manual    Comprehensive Metabolic Panel    Collection Time: 04/19/23  8:38 AM   Result Value Ref Range    Sodium 141 136 - 145 mmol/L    Potassium 4.4 3.5 - 5.1 mmol/L    Chloride 104 95 - 110 mmol/L    CO2 27 23 - 29 mmol/L    Glucose 116 (H) 70 - 110 mg/dL    BUN 20 8 - 23 mg/dL    Creatinine 1.4 0.5 - 1.4 mg/dL    Calcium 9.7 8.7 - 10.5 mg/dL    Total Protein 7.4 6.0 - 8.4 g/dL    Albumin 3.5 3.5 - 5.2 g/dL    Total Bilirubin 0.2 0.1 - 1.0 mg/dL    Alkaline Phosphatase 115 55 - 135 U/L    AST 15 10 - 40 U/L    ALT 9 (L) 10 - 44 U/L    Anion Gap 10 8 - 16 mmol/L    eGFR 54.4 (A) >60 mL/min/1.73 m^2            Assessment:       1. Urothelial carcinoma of kidney, left    2. Chemotherapy induced neutropenia    3. Antineoplastic chemotherapy induced anemia    4. Stage 2 chronic kidney disease    5. Hearing loss, unspecified hearing loss type, unspecified laterality    6. Chemotherapy-induced nausea    7. Rheumatoid arthritis involving multiple sites with positive rheumatoid factor    8. Primary hypertension          Plan:   1,2- 68 y.o. M patient presented with gross hematuria and was found to have multiple soft tissue masses in L renal pelvis. He is s/p L urethroscopy and biopsy which showed high grade urothelial  dysplasia. On CT urogram, there was a 1.6 cm L adrenal nodule, as well as multiple pulmonary nodules, largest was 0.6 cm, and multiple hepatic nodules too small to characterize.   Overall picture is concerning for invasive upper tract urothelial carcinoma.   PET/CT showed no evidence of metastatic disease. There is B/L adrenal adenomas.     Plan:   - Neoadjuvant chemotherapy with Gemcitabine and Cisplatin. Split dose Cisplatin for borderline kidney functions.  - Because of his RA (not very controlled), will avoid IO adjuvantely.   -chemo held cycle 2, day 8 today with ANC of 600. Zarxio x 3. Will go to every 2 week split dose with udencya on day 2.    Labs recovered and reviewed and acceptable to proceed with cycle 2, day 8 split dose cis/gem with day 2 udencya as ordered.     RTC 5/2 with labs the day before (CBC,CMP,MAG), to see me and cisplatin/gem on day 1 with udencya on day 2.     3- Mild, monitor.    4- Improved..    5- Audiogram showed normal hearing sensitivity from 250-2000 Hz sloping to a severe to profound hearing loss by 8000 Hz with a slight air bone gap at 1000 Hz in the right ear and normal hearing sensitivity from 250-2000 Hz sloping to a severe sensorineural hearing loss (SNHL) by 8000 Hz in the left ear. Will recheck post chemotherapy.    6- Zofran PRN.    7- On MTX. Will avoid adjuvant immunotherapy.    8- enrolled in [facility]. Per PCP.    Patient is in agreement with the proposed treatment plan. All questions were answered to the patient's satisfaction. Pt knows to call clinic if anything is needed before the next clinic visit.    Marquita Stoner, MSN, APRN, FNP-C  Hematology and Medical Oncology  Nurse Practitioner to Dr. Issa Antony  Nurse Practitioner, [facility]          Route Chart for Scheduling    Med Onc Chart Routing      Follow up with physician    Follow up with DESIREE 2 weeks. RTC 5/2 with labs the day before (CBC,CMP,MAG), to see me and cisplatin/gem on day 1  with udencya on day 2.   Infusion scheduling note    Injection scheduling note    Labs   Schedulin day prior to infusion  Preferred lab:  Lab interval:     Imaging    Pharmacy appointment    Other referrals           Treatment Plan Information   OP BLADDER GEMCITABINE CISPLATIN (SPLIT DOSE CISPLATIN) Q3W   Issa Antony MD   Upcoming Treatment Dates - OP BLADDER GEMCITABINE CISPLATIN (SPLIT DOSE CISPLATIN) Q3W    2023       Pre-Medications       aprepitant (CINVANTI) injection 130 mg       Chemotherapy       gemcitabine (GEMZAR) 1,840 mg in sodium chloride 0.9% SolP 250 mL chemo infusion       CISplatin (Platinol) 35 mg/m2 = 64 mg in sodium chloride 0.9% 564 mL chemo infusion       Pre-Hydration       sodium chloride 0.9% 1,000 mL with magnesium sulfate 1 g, potassium chloride 20 mEq infusion       Post-Hydration       sodium chloride 0.9% 1,000 mL infusion       Antiemetics       palonosetron (ALOXI) 0.25 mg with Dexamethasone (DECADRON) 12 mg in NS 50 mL IVPB  2023       Growth Factor       pegfilgrastim (NEULASTA) injection 6 mg  2023       Pre-Medications       aprepitant (CINVANTI) injection 130 mg       Chemotherapy       gemcitabine (GEMZAR) 1,840 mg in sodium chloride 0.9% SolP 250 mL chemo infusion       CISplatin (Platinol) 35 mg/m2 = 64 mg in sodium chloride 0.9% 564 mL chemo infusion       Pre-Hydration       sodium chloride 0.9% 1,000 mL with magnesium sulfate 1 g, potassium chloride 20 mEq infusion       Post-Hydration       sodium chloride 0.9% 1,000 mL infusion       Antiemetics       palonosetron (ALOXI) 0.25 mg with Dexamethasone (DECADRON) 12 mg in NS 50 mL IVPB  5/3/2023       Growth Factor       pegfilgrastim (NEULASTA) injection 6 mg    Supportive Plan Information  OP FILGRASTIM 480 MCG   Issa Antony MD   Upcoming Treatment Dates - OP FILGRASTIM 480 MCG    No upcoming days in selected categories.

## 2023-04-20 ENCOUNTER — PATIENT MESSAGE (OUTPATIENT)
Dept: ADMINISTRATIVE | Facility: OTHER | Age: 68
End: 2023-04-20
Payer: MEDICARE

## 2023-04-20 ENCOUNTER — INFUSION (OUTPATIENT)
Dept: INFUSION THERAPY | Facility: HOSPITAL | Age: 68
End: 2023-04-20
Attending: INTERNAL MEDICINE
Payer: MEDICARE

## 2023-04-20 DIAGNOSIS — C68.9 UROTHELIAL CARCINOMA WITH HIGH RISK OF METASTASIS: Primary | ICD-10-CM

## 2023-04-20 PROCEDURE — 63600175 PHARM REV CODE 636 W HCPCS: Mod: JZ,JG | Performed by: INTERNAL MEDICINE

## 2023-04-20 PROCEDURE — 96372 THER/PROPH/DIAG INJ SC/IM: CPT

## 2023-04-20 RX ADMIN — PEGFILGRASTIM 6 MG: 6 INJECTION SUBCUTANEOUS at 09:04

## 2023-04-21 ENCOUNTER — PATIENT MESSAGE (OUTPATIENT)
Dept: ADMINISTRATIVE | Facility: OTHER | Age: 68
End: 2023-04-21
Payer: MEDICARE

## 2023-04-22 ENCOUNTER — PATIENT MESSAGE (OUTPATIENT)
Dept: ADMINISTRATIVE | Facility: OTHER | Age: 68
End: 2023-04-22
Payer: MEDICARE

## 2023-04-23 ENCOUNTER — PATIENT MESSAGE (OUTPATIENT)
Dept: ADMINISTRATIVE | Facility: OTHER | Age: 68
End: 2023-04-23
Payer: MEDICARE

## 2023-04-24 ENCOUNTER — PATIENT MESSAGE (OUTPATIENT)
Dept: ADMINISTRATIVE | Facility: OTHER | Age: 68
End: 2023-04-24
Payer: MEDICARE

## 2023-04-25 ENCOUNTER — PATIENT MESSAGE (OUTPATIENT)
Dept: ADMINISTRATIVE | Facility: OTHER | Age: 68
End: 2023-04-25
Payer: MEDICARE

## 2023-04-26 ENCOUNTER — PATIENT MESSAGE (OUTPATIENT)
Dept: ADMINISTRATIVE | Facility: OTHER | Age: 68
End: 2023-04-26
Payer: MEDICARE

## 2023-04-27 ENCOUNTER — PATIENT MESSAGE (OUTPATIENT)
Dept: ADMINISTRATIVE | Facility: OTHER | Age: 68
End: 2023-04-27
Payer: MEDICARE

## 2023-04-28 ENCOUNTER — PATIENT MESSAGE (OUTPATIENT)
Dept: ADMINISTRATIVE | Facility: OTHER | Age: 68
End: 2023-04-28
Payer: MEDICARE

## 2023-04-29 ENCOUNTER — PATIENT MESSAGE (OUTPATIENT)
Dept: ADMINISTRATIVE | Facility: OTHER | Age: 68
End: 2023-04-29
Payer: MEDICARE

## 2023-04-30 ENCOUNTER — PATIENT MESSAGE (OUTPATIENT)
Dept: ADMINISTRATIVE | Facility: OTHER | Age: 68
End: 2023-04-30
Payer: MEDICARE

## 2023-05-01 ENCOUNTER — PATIENT MESSAGE (OUTPATIENT)
Dept: HEMATOLOGY/ONCOLOGY | Facility: CLINIC | Age: 68
End: 2023-05-01
Payer: MEDICARE

## 2023-05-01 ENCOUNTER — LAB VISIT (OUTPATIENT)
Dept: LAB | Facility: HOSPITAL | Age: 68
End: 2023-05-01
Payer: MEDICARE

## 2023-05-01 ENCOUNTER — PATIENT MESSAGE (OUTPATIENT)
Dept: ADMINISTRATIVE | Facility: OTHER | Age: 68
End: 2023-05-01
Payer: MEDICARE

## 2023-05-01 DIAGNOSIS — C64.2 UROTHELIAL CARCINOMA OF KIDNEY, LEFT: ICD-10-CM

## 2023-05-01 LAB
ALBUMIN SERPL BCP-MCNC: 3.4 G/DL (ref 3.5–5.2)
ALP SERPL-CCNC: 128 U/L (ref 55–135)
ALT SERPL W/O P-5'-P-CCNC: 6 U/L (ref 10–44)
ANION GAP SERPL CALC-SCNC: 9 MMOL/L (ref 8–16)
AST SERPL-CCNC: 13 U/L (ref 10–40)
BILIRUB SERPL-MCNC: 0.3 MG/DL (ref 0.1–1)
BUN SERPL-MCNC: 24 MG/DL (ref 8–23)
CALCIUM SERPL-MCNC: 9.5 MG/DL (ref 8.7–10.5)
CHLORIDE SERPL-SCNC: 103 MMOL/L (ref 95–110)
CO2 SERPL-SCNC: 27 MMOL/L (ref 23–29)
CREAT SERPL-MCNC: 1.3 MG/DL (ref 0.5–1.4)
ERYTHROCYTE [DISTWIDTH] IN BLOOD BY AUTOMATED COUNT: 15.2 % (ref 11.5–14.5)
EST. GFR  (NO RACE VARIABLE): 59.5 ML/MIN/1.73 M^2
GLUCOSE SERPL-MCNC: 102 MG/DL (ref 70–110)
HCT VFR BLD AUTO: 30.2 % (ref 40–54)
HGB BLD-MCNC: 9.6 G/DL (ref 14–18)
IMM GRANULOCYTES # BLD AUTO: 0.2 K/UL (ref 0–0.04)
MAGNESIUM SERPL-MCNC: 1.8 MG/DL (ref 1.6–2.6)
MCH RBC QN AUTO: 29.9 PG (ref 27–31)
MCHC RBC AUTO-ENTMCNC: 31.8 G/DL (ref 32–36)
MCV RBC AUTO: 94 FL (ref 82–98)
NEUTROPHILS # BLD AUTO: 8.3 K/UL (ref 1.8–7.7)
PLATELET # BLD AUTO: 120 K/UL (ref 150–450)
PMV BLD AUTO: 9.2 FL (ref 9.2–12.9)
POTASSIUM SERPL-SCNC: 4.2 MMOL/L (ref 3.5–5.1)
PROT SERPL-MCNC: 7.4 G/DL (ref 6–8.4)
RBC # BLD AUTO: 3.21 M/UL (ref 4.6–6.2)
SODIUM SERPL-SCNC: 139 MMOL/L (ref 136–145)
WBC # BLD AUTO: 10.66 K/UL (ref 3.9–12.7)

## 2023-05-01 PROCEDURE — 83735 ASSAY OF MAGNESIUM: CPT | Performed by: NURSE PRACTITIONER

## 2023-05-01 PROCEDURE — 80053 COMPREHEN METABOLIC PANEL: CPT | Performed by: NURSE PRACTITIONER

## 2023-05-01 PROCEDURE — 36415 COLL VENOUS BLD VENIPUNCTURE: CPT | Performed by: NURSE PRACTITIONER

## 2023-05-01 PROCEDURE — 85027 COMPLETE CBC AUTOMATED: CPT | Performed by: NURSE PRACTITIONER

## 2023-05-01 NOTE — TELEPHONE ENCOUNTER
Care Companion Intervention    Reason for intervention: Questionnaire response  Comment:  constipation    Intervention: Education provided to patient  Comment:  Start Miralax TID until bowel movement.

## 2023-05-02 ENCOUNTER — PATIENT MESSAGE (OUTPATIENT)
Dept: ADMINISTRATIVE | Facility: OTHER | Age: 68
End: 2023-05-02
Payer: MEDICARE

## 2023-05-02 ENCOUNTER — HOSPITAL ENCOUNTER (OUTPATIENT)
Dept: RADIOLOGY | Facility: HOSPITAL | Age: 68
Discharge: HOME | End: 2023-05-02
Attending: NURSE PRACTITIONER
Payer: MEDICARE

## 2023-05-02 ENCOUNTER — OFFICE VISIT (OUTPATIENT)
Dept: HEMATOLOGY/ONCOLOGY | Facility: CLINIC | Age: 68
End: 2023-05-02
Payer: MEDICARE

## 2023-05-02 ENCOUNTER — PATIENT MESSAGE (OUTPATIENT)
Dept: HEMATOLOGY/ONCOLOGY | Facility: CLINIC | Age: 68
End: 2023-05-02
Payer: MEDICARE

## 2023-05-02 VITALS
WEIGHT: 152.13 LBS | RESPIRATION RATE: 18 BRPM | OXYGEN SATURATION: 98 % | DIASTOLIC BLOOD PRESSURE: 68 MMHG | HEIGHT: 69 IN | HEART RATE: 73 BPM | BODY MASS INDEX: 22.53 KG/M2 | SYSTOLIC BLOOD PRESSURE: 117 MMHG | TEMPERATURE: 98 F

## 2023-05-02 DIAGNOSIS — H91.90 HEARING LOSS, UNSPECIFIED HEARING LOSS TYPE, UNSPECIFIED LATERALITY: ICD-10-CM

## 2023-05-02 DIAGNOSIS — M05.79 RHEUMATOID ARTHRITIS INVOLVING MULTIPLE SITES WITH POSITIVE RHEUMATOID FACTOR: ICD-10-CM

## 2023-05-02 DIAGNOSIS — D64.81 ANTINEOPLASTIC CHEMOTHERAPY INDUCED ANEMIA: ICD-10-CM

## 2023-05-02 DIAGNOSIS — N18.2 STAGE 2 CHRONIC KIDNEY DISEASE: ICD-10-CM

## 2023-05-02 DIAGNOSIS — T45.1X5A ANTINEOPLASTIC CHEMOTHERAPY INDUCED ANEMIA: ICD-10-CM

## 2023-05-02 DIAGNOSIS — D70.1 CHEMOTHERAPY INDUCED NEUTROPENIA: ICD-10-CM

## 2023-05-02 DIAGNOSIS — C64.2 UROTHELIAL CARCINOMA OF KIDNEY, LEFT: ICD-10-CM

## 2023-05-02 DIAGNOSIS — T45.1X5A CHEMOTHERAPY-INDUCED NAUSEA: ICD-10-CM

## 2023-05-02 DIAGNOSIS — K59.00 CONSTIPATION, UNSPECIFIED CONSTIPATION TYPE: ICD-10-CM

## 2023-05-02 DIAGNOSIS — I10 PRIMARY HYPERTENSION: ICD-10-CM

## 2023-05-02 DIAGNOSIS — R11.0 CHEMOTHERAPY-INDUCED NAUSEA: ICD-10-CM

## 2023-05-02 DIAGNOSIS — T45.1X5A CHEMOTHERAPY INDUCED NEUTROPENIA: ICD-10-CM

## 2023-05-02 DIAGNOSIS — C64.2 UROTHELIAL CARCINOMA OF KIDNEY, LEFT: Primary | ICD-10-CM

## 2023-05-02 PROCEDURE — 3074F SYST BP LT 130 MM HG: CPT | Mod: CPTII,S$GLB,, | Performed by: NURSE PRACTITIONER

## 2023-05-02 PROCEDURE — 1101F PR PT FALLS ASSESS DOC 0-1 FALLS W/OUT INJ PAST YR: ICD-10-PCS | Mod: CPTII,S$GLB,, | Performed by: NURSE PRACTITIONER

## 2023-05-02 PROCEDURE — 3288F FALL RISK ASSESSMENT DOCD: CPT | Mod: CPTII,S$GLB,, | Performed by: NURSE PRACTITIONER

## 2023-05-02 PROCEDURE — 99999 PR PBB SHADOW E&M-EST. PATIENT-LVL IV: ICD-10-PCS | Mod: PBBFAC,,, | Performed by: NURSE PRACTITIONER

## 2023-05-02 PROCEDURE — 3078F DIAST BP <80 MM HG: CPT | Mod: CPTII,S$GLB,, | Performed by: NURSE PRACTITIONER

## 2023-05-02 PROCEDURE — 1159F MED LIST DOCD IN RCRD: CPT | Mod: CPTII,S$GLB,, | Performed by: NURSE PRACTITIONER

## 2023-05-02 PROCEDURE — 3078F PR MOST RECENT DIASTOLIC BLOOD PRESSURE < 80 MM HG: ICD-10-PCS | Mod: CPTII,S$GLB,, | Performed by: NURSE PRACTITIONER

## 2023-05-02 PROCEDURE — 74019 RADEX ABDOMEN 2 VIEWS: CPT | Mod: TC,FY

## 2023-05-02 PROCEDURE — 1101F PT FALLS ASSESS-DOCD LE1/YR: CPT | Mod: CPTII,S$GLB,, | Performed by: NURSE PRACTITIONER

## 2023-05-02 PROCEDURE — 99215 OFFICE O/P EST HI 40 MIN: CPT | Mod: S$GLB,,, | Performed by: NURSE PRACTITIONER

## 2023-05-02 PROCEDURE — 1125F AMNT PAIN NOTED PAIN PRSNT: CPT | Mod: CPTII,S$GLB,, | Performed by: NURSE PRACTITIONER

## 2023-05-02 PROCEDURE — 74019 XR ABDOMEN FLAT AND ERECT: ICD-10-PCS | Mod: 26,,, | Performed by: RADIOLOGY

## 2023-05-02 PROCEDURE — 3074F PR MOST RECENT SYSTOLIC BLOOD PRESSURE < 130 MM HG: ICD-10-PCS | Mod: CPTII,S$GLB,, | Performed by: NURSE PRACTITIONER

## 2023-05-02 PROCEDURE — 99215 PR OFFICE/OUTPT VISIT, EST, LEVL V, 40-54 MIN: ICD-10-PCS | Mod: S$GLB,,, | Performed by: NURSE PRACTITIONER

## 2023-05-02 PROCEDURE — 99499 UNLISTED E&M SERVICE: CPT | Mod: S$GLB,,, | Performed by: NURSE PRACTITIONER

## 2023-05-02 PROCEDURE — 3008F PR BODY MASS INDEX (BMI) DOCUMENTED: ICD-10-PCS | Mod: CPTII,S$GLB,, | Performed by: NURSE PRACTITIONER

## 2023-05-02 PROCEDURE — 3008F BODY MASS INDEX DOCD: CPT | Mod: CPTII,S$GLB,, | Performed by: NURSE PRACTITIONER

## 2023-05-02 PROCEDURE — 1125F PR PAIN SEVERITY QUANTIFIED, PAIN PRESENT: ICD-10-PCS | Mod: CPTII,S$GLB,, | Performed by: NURSE PRACTITIONER

## 2023-05-02 PROCEDURE — 99999 PR PBB SHADOW E&M-EST. PATIENT-LVL IV: CPT | Mod: PBBFAC,,, | Performed by: NURSE PRACTITIONER

## 2023-05-02 PROCEDURE — 1159F PR MEDICATION LIST DOCUMENTED IN MEDICAL RECORD: ICD-10-PCS | Mod: CPTII,S$GLB,, | Performed by: NURSE PRACTITIONER

## 2023-05-02 PROCEDURE — 3288F PR FALLS RISK ASSESSMENT DOCUMENTED: ICD-10-PCS | Mod: CPTII,S$GLB,, | Performed by: NURSE PRACTITIONER

## 2023-05-02 PROCEDURE — 74019 RADEX ABDOMEN 2 VIEWS: CPT | Mod: 26,,, | Performed by: RADIOLOGY

## 2023-05-02 RX ORDER — LACTULOSE 10 G/15ML
20 SOLUTION ORAL 3 TIMES DAILY
Qty: 200 ML | Refills: 0 | Status: SHIPPED | OUTPATIENT
Start: 2023-05-02 | End: 2023-07-05

## 2023-05-02 RX ORDER — HEPARIN 100 UNIT/ML
500 SYRINGE INTRAVENOUS
Status: CANCELLED | OUTPATIENT
Start: 2023-05-16

## 2023-05-02 RX ORDER — SODIUM CHLORIDE 0.9 % (FLUSH) 0.9 %
10 SYRINGE (ML) INJECTION
Status: CANCELLED | OUTPATIENT
Start: 2023-05-16

## 2023-05-02 RX ORDER — SODIUM CHLORIDE 0.9 % (FLUSH) 0.9 %
10 SYRINGE (ML) INJECTION
Status: CANCELLED | OUTPATIENT
Start: 2023-05-04

## 2023-05-02 RX ORDER — HEPARIN 100 UNIT/ML
500 SYRINGE INTRAVENOUS
Status: CANCELLED | OUTPATIENT
Start: 2023-05-04

## 2023-05-02 NOTE — PROGRESS NOTES
MEDICAL ONCOLOGY - FOLLOW-UP VISIT    Reason for visit: Upper tract Urothelial carcinoma     Best Contact Phone Number(s): [phone] (home)      Cancer/Stage/TNM:    Cancer Staging   No matching staging information was found for the patient.     Oncology History   Urothelial carcinoma with high risk of metastasis   3/6/2023 Initial Diagnosis    Urothelial carcinoma with high risk of metastasis       3/14/2023 -  Chemotherapy    Treatment Summary   Plan Name: OP BLADDER GEMCITABINE CISPLATIN (SPLIT DOSE CISPLATIN) Q3W  Treatment Goal: Curative  Status: Active  Start Date: 3/14/2023  End Date: 6/16/2023 (Planned)  Provider: Issa Antony MD  Chemotherapy: CISplatin (Platinol) 35 mg/m2 = 64 mg in sodium chloride 0.9% 314 mL chemo infusion, 35 mg/m2 = 64 mg, Intravenous, Clinic/HOD 1 time, 2 of 4 cycles  Dose modification: 17.5 mg/m2 (original dose 35 mg/m2, Cycle 3, Reason: Other (see comments), Comment: renal function), 35 mg/m2 (original dose 35 mg/m2, Cycle 3, Reason: Other (see comments)), 17.5 mg/m2 (original dose 35 mg/m2, Cycle 2, Reason: Other (see comments), Comment: renal function)  Administration: 64 mg (3/14/2023), 64 mg (3/21/2023), 64 mg (4/19/2023), 32 mg (4/4/2023)  gemcitabine 1,800 mg in sodium chloride 0.9% SolP 332.34 mL chemo infusion, 1,840 mg, Intravenous, Clinic/HOD 1 time, 2 of 4 cycles  Administration: 1,800 mg (3/14/2023), 1,800 mg (3/21/2023), 1,800 mg (4/4/2023), 1,800 mg (4/19/2023)            HPI:   68 y.o. male with PVD, PAD, HLD, CKD3, AAA, treated hep C, Rheumatoid arthritis on MTX (previously on humara), smoking (about 1/2 PPD) who presented with gross hematuria. He saw urology and underwent CT urogram which showed Few soft tissue masses within the left renal pelvis, the largest measures 2.5 x 1.3 cm. There was also a L adrenal nodule measuring 1.6 cm. Urine cytology was evident of atypical urothelial cells, and repeat sample showed high grade urothelial carcinoma.   He underwent  a flexible cystoscopy with normal bladder findings. L RPG showed Left RPG with mild hydronephrosis and filling defect ~2 cm in central renal pelvis.  Left ureteroscopy with was done and showed papillary tumors along the proximal ureter and renal pelvis. - Large ~2 cm nodular lesion in the renal pelvis consistent with filling defect. Biopsy of the mass came back as - High grade urothelial dysplasia/carcinoma in situ. - No definitive invasive carcinoma identified (outside report)    PET/CT done on 03/08 showed Left proximal ureteral lesion compatible with urothelial cancer.  No FDG PET evidence of metastatic disease.  Benign bilateral adrenal adenomas.    Interval history:   He returns today prior to cycle 3, day 1 cis/gem. He notes increasing body aches and fatigue. No bowel movement in 1 week. +passing gas.. +hearing issues, worse in R. No new issues.    ROS:   Review of Systems   Constitutional:  Positive for malaise/fatigue. Negative for chills, fever and weight loss.   HENT:  Positive for hearing loss. Negative for congestion. Ear discharge: r>L.   Eyes:  Negative for blurred vision.   Respiratory:  Negative for cough and shortness of breath.    Cardiovascular:  Negative for chest pain, palpitations and leg swelling.   Gastrointestinal:  Positive for constipation. Negative for abdominal pain, blood in stool, diarrhea, nausea and vomiting.   Genitourinary:  Negative for dysuria and frequency.   Musculoskeletal:  Negative for back pain and myalgias.   Skin:  Negative for itching and rash.   Neurological:  Negative for dizziness, tingling, tremors, sensory change, focal weakness and headaches.   Endo/Heme/Allergies:  Does not bruise/bleed easily.   Psychiatric/Behavioral:  The patient is not nervous/anxious.      Past Medical History:   Past Medical History:   Diagnosis Date    Cataract     Colon polyp 2014    History of hepatitis C, s/p successful treatment w/ SVR12 - 7/2015 10/14/2012    Kelsey 1a,  Liver biopsy  6/2014 - Stage 1 fibrosis;  Completed 8 weeks Harvoni w/ SVR12 - 7/2015      Hyperlipidemia     Lipoma of arm     Lipoma of lower extremity     Nuclear sclerosis - Both Eyes 10/22/2012    Other and unspecified hyperlipidemia 10/22/2013    PAD (peripheral artery disease)     Peripheral vascular disease, unspecified     Rheumatoid arthritis(714.0) 10/14/2012        Past Surgical History:   Past Surgical History:   Procedure Laterality Date    BIOPSY OF URETER Left 02/16/2023    Procedure: BIOPSY, URETER/BRUSH;  Surgeon: Kem Jefferson MD;  Location: [facility] OR 1ST FLR;  Service: Urology;  Laterality: Left;    COLONOSCOPY N/A 11/29/2021    Procedure: COLONOSCOPY;  Surgeon: Kenrick Zimmer MD;  Location: [facility] ENDO (4TH FLR);  Service: Endoscopy;  Laterality: N/A;  blood thinner approval received, see telephone encounter 10/19/21-BB  fully vacc-inst email-tb    CYSTOSCOPY      CYSTOSCOPY  02/16/2023    Procedure: CYSTOSCOPY;  Surgeon: Kem Jefferson MD;  Location: [facility] OR 1ST FLR;  Service: Urology;;    HERNIA REPAIR      INSERTION OF TUNNELED CENTRAL VENOUS CATHETER (CVC) WITH SUBCUTANEOUS PORT Right 3/13/2023    Procedure: INSERTION, PORT-A-CATH;  Surgeon: Rene Cali MD;  Location: [facility] CATH LAB;  Service: Radiology;  Laterality: Right;    KNEE ARTHROPLASTY      LAPAROSCOPIC EXPLORATION OF GROIN Left 09/06/2018    Procedure: EXPLORATION, INGUINAL REGION, LAPAROSCOPIC;  Surgeon: Mingo De Guzman Jr., MD;  Location: [facility] OR 2ND FLR;  Service: General;  Laterality: Left;    PYELOSCOPY Left 02/16/2023    Procedure: PYELOSCOPY;  Surgeon: Kem Jefferson MD;  Location: [facility] OR 1ST FLR;  Service: Urology;  Laterality: Left;    RETROGRADE PYELOGRAPHY Bilateral 02/16/2023    Procedure: PYELOGRAM, RETROGRADE;  Surgeon: Kem Jefferson MD;  Location: [facility] OR 1ST FLR;  Service: Urology;  Laterality: Bilateral;    TONSILLECTOMY      URETEROSCOPIC REMOVAL OF URETERIC CALCULUS Left 02/16/2023    Procedure:  REMOVAL, CALCULUS, URETER, URETEROSCOPIC;  Surgeon: Kem Jefferson MD;  Location: [facility] OR [address] [place] [address];  Service: Urology;  Laterality: Left;    URETEROSCOPY Left 2023    Procedure: URETEROSCOPY;  Surgeon: Kem Jefferson MD;  Location: [facility] OR [address] [place] [address];  Service: Urology;  Laterality: Left;        Family History:   Family History   Problem Relation Age of Onset    Heart disease Paternal Uncle     Dementia Mother     Heart disease Sister     Liver disease Neg Hx     Amblyopia Neg Hx     Blindness Neg Hx     Cancer Neg Hx     Cataracts Neg Hx     Diabetes Neg Hx     Glaucoma Neg Hx     Hypertension Neg Hx     Macular degeneration Neg Hx     Retinal detachment Neg Hx     Strabismus Neg Hx     Stroke Neg Hx     Thyroid disease Neg Hx         Social History:   Social History     Tobacco Use    Smoking status: Former     Types: Cigarettes     Quit date: 2020     Years since quittin.8    Smokeless tobacco: Never   Substance Use Topics    Alcohol use: Yes     Comment: 3-4 drinks per week        I have reviewed and updated the patient's past medical, surgical, family and social histories.    Allergies:   Review of patient's allergies indicates:  No Known Allergies     Medications:   Current Outpatient Medications   Medication Sig Dispense Refill    amLODIPine (NORVASC) 5 MG tablet TAKE 1 TABLET BY MOUTH ONCE DAILY 90 tablet 2    aspirin 81 MG Chew Take 81 mg by mouth once daily.      cilostazoL (PLETAL) 50 MG Tab Take 1 tablet (50 mg total) by mouth 2 (two) times daily. 180 tablet 3    doxazosin (CARDURA) 4 MG tablet TAKE 1 TABLET BY MOUTH IN  THE EVENING 90 tablet 3    folic acid (FOLVITE) 1 MG tablet Take 1 tablet (1,000 mcg total) by mouth once daily. 90 tablet 3    methotrexate 2.5 MG Tab Take 8 tablets (20 mg total) by mouth every 7 days. This medication requires periodic lab monitoring. 120 tablet 1    simvastatin (ZOCOR) 10 MG tablet TAKE 1 TABLET BY MOUTH EVERY DAY IN THE EVENING (Patient taking  "differently: 2 (two) times a day.) 90 tablet 6    traZODone (DESYREL) 50 MG tablet TAKE 3 TABLETS BY MOUTH AT  NIGHT AS NEEDED FOR  INSOMNIA 270 tablet 3    lactulose (CHRONULAC) 20 gram/30 mL Soln Take 30 mLs (20 g total) by mouth 3 (three) times daily. 200 mL 0     No current facility-administered medications for this visit.        Physical Exam:   /68 (BP Location: Left arm, Patient Position: Sitting, BP Method: Medium (Automatic))   Pulse 73   Temp 97.7 °F (36.5 °C) (Oral)   Resp 18   Ht 5' 9" (1.753 m)   Wt 69 kg (152 lb 1.9 oz)   SpO2 98%   BMI 22.46 kg/m²      ECOG Performance status: 0            Physical Exam  Constitutional:       General: He is not in acute distress.     Appearance: Normal appearance.   HENT:      Head: Normocephalic and atraumatic.   Eyes:      Pupils: Pupils are equal, round, and reactive to light.   Cardiovascular:      Rate and Rhythm: Normal rate and regular rhythm.      Heart sounds: No murmur heard.  Pulmonary:      Effort: No respiratory distress.      Breath sounds: Normal breath sounds. No wheezing.   Abdominal:      General: Abdomen is flat. Bowel sounds are normal. There is no distension.      Palpations: Abdomen is soft. There is no mass.      Tenderness: There is no abdominal tenderness.   Musculoskeletal:         General: No swelling or deformity.   Skin:     Coloration: Skin is not jaundiced.   Neurological:      General: No focal deficit present.      Mental Status: He is alert and oriented to person, place, and time. Mental status is at baseline.         Labs:   Recent Results (from the past 48 hour(s))   CBC Oncology    Collection Time: 05/01/23 10:08 AM   Result Value Ref Range    WBC 10.66 3.90 - 12.70 K/uL    RBC 3.21 (L) 4.60 - 6.20 M/uL    Hemoglobin 9.6 (L) 14.0 - 18.0 g/dL    Hematocrit 30.2 (L) 40.0 - 54.0 %    MCV 94 82 - 98 fL    MCH 29.9 27.0 - 31.0 pg    MCHC 31.8 (L) 32.0 - 36.0 g/dL    RDW 15.2 (H) 11.5 - 14.5 %    Platelets 120 (L) 150 - 450 " K/uL    MPV 9.2 9.2 - 12.9 fL    Gran # (ANC) 8.3 (H) 1.8 - 7.7 K/uL    Immature Grans (Abs) 0.20 (H) 0.00 - 0.04 K/uL   COMPREHENSIVE METABOLIC PANEL    Collection Time: 05/01/23 10:08 AM   Result Value Ref Range    Sodium 139 136 - 145 mmol/L    Potassium 4.2 3.5 - 5.1 mmol/L    Chloride 103 95 - 110 mmol/L    CO2 27 23 - 29 mmol/L    Glucose 102 70 - 110 mg/dL    BUN 24 (H) 8 - 23 mg/dL    Creatinine 1.3 0.5 - 1.4 mg/dL    Calcium 9.5 8.7 - 10.5 mg/dL    Total Protein 7.4 6.0 - 8.4 g/dL    Albumin 3.4 (L) 3.5 - 5.2 g/dL    Total Bilirubin 0.3 0.1 - 1.0 mg/dL    Alkaline Phosphatase 128 55 - 135 U/L    AST 13 10 - 40 U/L    ALT 6 (L) 10 - 44 U/L    Anion Gap 9 8 - 16 mmol/L    eGFR 59.5 (A) >60 mL/min/1.73 m^2   Magnesium    Collection Time: 05/01/23 10:08 AM   Result Value Ref Range    Magnesium 1.8 1.6 - 2.6 mg/dL            Assessment:       1. Urothelial carcinoma of kidney, left    2. Chemotherapy induced neutropenia    3. Antineoplastic chemotherapy induced anemia    4. Stage 2 chronic kidney disease    5. Hearing loss, unspecified hearing loss type, unspecified laterality    6. Chemotherapy-induced nausea    7. Rheumatoid arthritis involving multiple sites with positive rheumatoid factor    8. Primary hypertension    9. Constipation, unspecified constipation type            Plan:   1,2- 68 y.o. M patient presented with gross hematuria and was found to have multiple soft tissue masses in L renal pelvis. He is s/p L urethroscopy and biopsy which showed high grade urothelial dysplasia. On CT urogram, there was a 1.6 cm L adrenal nodule, as well as multiple pulmonary nodules, largest was 0.6 cm, and multiple hepatic nodules too small to characterize.   Overall picture is concerning for invasive upper tract urothelial carcinoma.   PET/CT showed no evidence of metastatic disease. There is B/L adrenal adenomas.     Plan:   - Neoadjuvant chemotherapy with Gemcitabine and Cisplatin. Split dose Cisplatin for  borderline kidney functions.  - Because of his RA (not very controlled), will avoid IO adjuvantely.   -chemo held cycle 2, day 8 today with ANC of 600. Zarxio x 3. Will go to every 2 week split dose with udencya on day 2.    Labs recovered and reviewed and acceptable to proceed with cycle 3, day 1 split dose cis/gem with day 2 udencya as ordered. Will plan yo get repeat imaging after next vist.    Abdominal XR ASAP today. RTC ib 5/16 at 730 with labs the day before (CBC,CMP,Mag), to see me and cis/gem cycle 3, day 15 with neulasta on day 16.    3- Mild, monitor.    4- Improved..    5- Audiogram showed normal hearing sensitivity from 250-2000 Hz sloping to a severe to profound hearing loss by 8000 Hz with a slight air bone gap at 1000 Hz in the right ear and normal hearing sensitivity from 250-2000 Hz sloping to a severe sensorineural hearing loss (SNHL) by 8000 Hz in the left ear. Will recheck post chemotherapy.    6- Zofran PRN.    7- On MTX. Will avoid adjuvant immunotherapy.    8- enrolled in [facility]. Per PCP.    9- abdominal XR today. Lactulose BID until bowel movement.     Patient is in agreement with the proposed treatment plan. All questions were answered to the patient's satisfaction. Pt knows to call clinic if anything is needed before the next clinic visit.    Marquita Stoner, MSN, APRN, FNP-C  Hematology and Medical Oncology  Nurse Practitioner to Dr. Issa Antony  Nurse Practitioner, [facility]          Route Chart for Scheduling  Med Onc Route Chart for Scheduling    Treatment Plan Information   OP BLADDER GEMCITABINE CISPLATIN (SPLIT DOSE CISPLATIN) Q3W   Issa Antony MD   Upcoming Treatment Dates - OP BLADDER GEMCITABINE CISPLATIN (SPLIT DOSE CISPLATIN) Q3W    5/4/2023       Pre-Medications       aprepitant (CINVANTI) injection 130 mg       Chemotherapy       gemcitabine (GEMZAR) 1,840 mg in sodium chloride 0.9% SolP 250 mL chemo infusion       CISplatin (Platinol) 35 mg/m2  = 64 mg in sodium chloride 0.9% 564 mL chemo infusion       Pre-Hydration       sodium chloride 0.9% 1,000 mL with magnesium sulfate 1 g, potassium chloride 20 mEq infusion       Post-Hydration       sodium chloride 0.9% 1,000 mL infusion       Antiemetics       palonosetron 0.25mg/dexAMETHasone 12mg in NS IVPB 0.25 mg 50 mL  5/5/2023       Growth Factor       pegfilgrastim (NEULASTA) injection 6 mg  5/18/2023       Pre-Medications       aprepitant (CINVANTI) injection 130 mg       Chemotherapy       gemcitabine (GEMZAR) 1,840 mg in sodium chloride 0.9% SolP 250 mL chemo infusion       CISplatin (Platinol) 35 mg/m2 = 64 mg in sodium chloride 0.9% 564 mL chemo infusion       Pre-Hydration       sodium chloride 0.9% 1,000 mL with magnesium sulfate 1 g, potassium chloride 20 mEq infusion       Post-Hydration       sodium chloride 0.9% 1,000 mL infusion       Antiemetics       palonosetron 0.25mg/dexAMETHasone 12mg in NS IVPB 0.25 mg 50 mL  5/19/2023       Growth Factor       pegfilgrastim (NEULASTA) injection 6 mg    Supportive Plan Information  OP FILGRASTIM 480 MCG   Issa Antony MD   Upcoming Treatment Dates - OP FILGRASTIM 480 MCG    No upcoming days in selected categories.

## 2023-05-02 NOTE — PROGRESS NOTES
MEDICAL ONCOLOGY - FOLLOW-UP VISIT    Reason for visit: Upper tract Urothelial carcinoma     Best Contact Phone Number(s): [phone] (home)      Cancer/Stage/TNM:    Cancer Staging   No matching staging information was found for the patient.     Oncology History   Urothelial carcinoma with high risk of metastasis   3/6/2023 Initial Diagnosis    Urothelial carcinoma with high risk of metastasis     3/14/2023 -  Chemotherapy    Treatment Summary   Plan Name: OP BLADDER GEMCITABINE CISPLATIN (SPLIT DOSE CISPLATIN) Q3W  Treatment Goal: Curative  Status: Active  Start Date: 3/14/2023  End Date: 6/15/2023 (Planned)  Provider: Issa Antony MD  Chemotherapy: CISplatin (Platinol) 35 mg/m2 = 64 mg in sodium chloride 0.9% 314 mL chemo infusion, 35 mg/m2 = 64 mg, Intravenous, Clinic/HOD 1 time, 2 of 4 cycles  Dose modification: 17.5 mg/m2 (original dose 35 mg/m2, Cycle 3, Reason: Other (see comments), Comment: renal function), 35 mg/m2 (original dose 35 mg/m2, Cycle 3, Reason: Other (see comments)), 17.5 mg/m2 (original dose 35 mg/m2, Cycle 2, Reason: Other (see comments), Comment: renal function)  Administration: 64 mg (3/14/2023), 64 mg (3/21/2023), 64 mg (4/19/2023), 32 mg (4/4/2023)  gemcitabine 1,800 mg in sodium chloride 0.9% SolP 332.34 mL chemo infusion, 1,840 mg, Intravenous, Clinic/HOD 1 time, 2 of 4 cycles  Administration: 1,800 mg (3/14/2023), 1,800 mg (3/21/2023), 1,800 mg (4/4/2023), 1,800 mg (4/19/2023)          HPI:   68 y.o. male with PVD, PAD, HLD, CKD3, AAA, treated hep C, Rheumatoid arthritis on MTX (previously on humara), smoking (about 1/2 PPD) who presented with gross hematuria. He saw urology and underwent CT urogram which showed Few soft tissue masses within the left renal pelvis, the largest measures 2.5 x 1.3 cm. There was also a L adrenal nodule measuring 1.6 cm. Urine cytology was evident of atypical urothelial cells, and repeat sample showed high grade urothelial carcinoma.   He underwent a  flexible cystoscopy with normal bladder findings. L RPG showed Left RPG with mild hydronephrosis and filling defect ~2 cm in central renal pelvis.  Left ureteroscopy with was done and showed papillary tumors along the proximal ureter and renal pelvis. - Large ~2 cm nodular lesion in the renal pelvis consistent with filling defect. Biopsy of the mass came back as - High grade urothelial dysplasia/carcinoma in situ. - No definitive invasive carcinoma identified (outside report)    PET/CT done on 03/08 showed Left proximal ureteral lesion compatible with urothelial cancer.  No FDG PET evidence of metastatic disease.  Benign bilateral adrenal adenomas.    Interval history:   He returns today prior to cycle 3, day 1 cis/gem.  He feels well overall. No isues with zarxio.He Noticing mild fatigue.  Eating well. Weight is stable. Denies nausea/vomiting. Usually has 2 bowel movements per day but now maybe 1. +pruritis. Denies neuropathy to fingers/toes. +hearing issues, worse in R. No new issues.    ROS:   Review of Systems   Constitutional:  Negative for chills, fever, malaise/fatigue and weight loss.   HENT:  Positive for hearing loss. Negative for congestion. Ear discharge: r>L.   Eyes:  Negative for blurred vision.   Respiratory:  Negative for cough and shortness of breath.    Cardiovascular:  Negative for chest pain, palpitations and leg swelling.   Gastrointestinal:  Positive for constipation. Negative for abdominal pain, blood in stool, diarrhea, nausea and vomiting.   Genitourinary:  Negative for dysuria and frequency.   Musculoskeletal:  Negative for back pain and myalgias.   Skin:  Negative for itching and rash.   Neurological:  Negative for dizziness, tingling, tremors, sensory change, focal weakness and headaches.   Endo/Heme/Allergies:  Does not bruise/bleed easily.   Psychiatric/Behavioral:  The patient is not nervous/anxious.      Past Medical History:   Past Medical History:   Diagnosis Date    Cataract      Colon polyp 2014    History of hepatitis C, s/p successful treatment w/ SVR12 - 7/2015 10/14/2012    Kelsey 1a,  Liver biopsy 6/2014 - Stage 1 fibrosis;  Completed 8 weeks Harvoni w/ SVR12 - 7/2015      Hyperlipidemia     Lipoma of arm     Lipoma of lower extremity     Nuclear sclerosis - Both Eyes 10/22/2012    Other and unspecified hyperlipidemia 10/22/2013    PAD (peripheral artery disease)     Peripheral vascular disease, unspecified     Rheumatoid arthritis(714.0) 10/14/2012        Past Surgical History:   Past Surgical History:   Procedure Laterality Date    BIOPSY OF URETER Left 02/16/2023    Procedure: BIOPSY, URETER/BRUSH;  Surgeon: Kem Jefferson MD;  Location: [facility] OR 1ST FLR;  Service: Urology;  Laterality: Left;    COLONOSCOPY N/A 11/29/2021    Procedure: COLONOSCOPY;  Surgeon: Kenrick Zimmer MD;  Location: [facility] ENDO (4TH FLR);  Service: Endoscopy;  Laterality: N/A;  blood thinner approval received, see telephone encounter 10/19/21-BB  fully vacc-inst email-tb    CYSTOSCOPY      CYSTOSCOPY  02/16/2023    Procedure: CYSTOSCOPY;  Surgeon: Kem Jefferson MD;  Location: [facility] OR 1ST FLR;  Service: Urology;;    HERNIA REPAIR      INSERTION OF TUNNELED CENTRAL VENOUS CATHETER (CVC) WITH SUBCUTANEOUS PORT Right 3/13/2023    Procedure: INSERTION, PORT-A-CATH;  Surgeon: Rene Cali MD;  Location: [facility] CATH LAB;  Service: Radiology;  Laterality: Right;    KNEE ARTHROPLASTY      LAPAROSCOPIC EXPLORATION OF GROIN Left 09/06/2018    Procedure: EXPLORATION, INGUINAL REGION, LAPAROSCOPIC;  Surgeon: Mingo De Guzman Jr., MD;  Location: [facility] OR 2ND FLR;  Service: General;  Laterality: Left;    PYELOSCOPY Left 02/16/2023    Procedure: PYELOSCOPY;  Surgeon: Kem Jefferson MD;  Location: [facility] OR 1ST FLR;  Service: Urology;  Laterality: Left;    RETROGRADE PYELOGRAPHY Bilateral 02/16/2023    Procedure: PYELOGRAM, RETROGRADE;  Surgeon: Kem Jefferson MD;  Location: [facility] OR 1ST FLR;  Service:  Urology;  Laterality: Bilateral;    TONSILLECTOMY      URETEROSCOPIC REMOVAL OF URETERIC CALCULUS Left 2023    Procedure: REMOVAL, CALCULUS, URETER, URETEROSCOPIC;  Surgeon: Kem Jefferson MD;  Location: [facility] OR [address] [place] [address];  Service: Urology;  Laterality: Left;    URETEROSCOPY Left 2023    Procedure: URETEROSCOPY;  Surgeon: Kem Jefferson MD;  Location: [facility] OR [address] [place] [address];  Service: Urology;  Laterality: Left;        Family History:   Family History   Problem Relation Age of Onset    Heart disease Paternal Uncle     Dementia Mother     Heart disease Sister     Liver disease Neg Hx     Amblyopia Neg Hx     Blindness Neg Hx     Cancer Neg Hx     Cataracts Neg Hx     Diabetes Neg Hx     Glaucoma Neg Hx     Hypertension Neg Hx     Macular degeneration Neg Hx     Retinal detachment Neg Hx     Strabismus Neg Hx     Stroke Neg Hx     Thyroid disease Neg Hx         Social History:   Social History     Tobacco Use    Smoking status: Former     Types: Cigarettes     Quit date: 2020     Years since quittin.8    Smokeless tobacco: Never   Substance Use Topics    Alcohol use: Yes     Comment: 3-4 drinks per week        I have reviewed and updated the patient's past medical, surgical, family and social histories.    Allergies:   Review of patient's allergies indicates:  No Known Allergies     Medications:   Current Outpatient Medications   Medication Sig Dispense Refill    amLODIPine (NORVASC) 5 MG tablet TAKE 1 TABLET BY MOUTH ONCE DAILY 90 tablet 2    aspirin 81 MG Chew Take 81 mg by mouth once daily.      cilostazoL (PLETAL) 50 MG Tab Take 1 tablet (50 mg total) by mouth 2 (two) times daily. 180 tablet 3    doxazosin (CARDURA) 4 MG tablet TAKE 1 TABLET BY MOUTH IN  THE EVENING 90 tablet 3    folic acid (FOLVITE) 1 MG tablet Take 1 tablet (1,000 mcg total) by mouth once daily. 90 tablet 3    methotrexate 2.5 MG Tab Take 8 tablets (20 mg total) by mouth every 7 days. This medication requires periodic lab  "monitoring. 120 tablet 1    simvastatin (ZOCOR) 10 MG tablet TAKE 1 TABLET BY MOUTH EVERY DAY IN THE EVENING (Patient taking differently: 2 (two) times a day.) 90 tablet 6    traZODone (DESYREL) 50 MG tablet TAKE 3 TABLETS BY MOUTH AT  NIGHT AS NEEDED FOR  INSOMNIA 270 tablet 3     No current facility-administered medications for this visit.        Physical Exam:   /68 (BP Location: Left arm, Patient Position: Sitting, BP Method: Medium (Automatic))   Pulse 73   Temp 97.7 °F (36.5 °C) (Oral)   Resp 18   Ht 5' 9" (1.753 m)   Wt 69 kg (152 lb 1.9 oz)   SpO2 98%   BMI 22.46 kg/m²      ECOG Performance status: 0            Physical Exam  Constitutional:       General: He is not in acute distress.     Appearance: Normal appearance.   HENT:      Head: Normocephalic and atraumatic.   Eyes:      Pupils: Pupils are equal, round, and reactive to light.   Cardiovascular:      Rate and Rhythm: Normal rate and regular rhythm.      Heart sounds: No murmur heard.  Pulmonary:      Effort: No respiratory distress.      Breath sounds: Normal breath sounds. No wheezing.   Abdominal:      General: Abdomen is flat. Bowel sounds are normal. There is no distension.      Palpations: Abdomen is soft. There is no mass.      Tenderness: There is no abdominal tenderness.   Musculoskeletal:         General: No swelling or deformity.   Skin:     Coloration: Skin is not jaundiced.   Neurological:      General: No focal deficit present.      Mental Status: He is alert and oriented to person, place, and time. Mental status is at baseline.         Labs:   Recent Results (from the past 48 hour(s))   CBC Oncology    Collection Time: 05/01/23 10:08 AM   Result Value Ref Range    WBC 10.66 3.90 - 12.70 K/uL    RBC 3.21 (L) 4.60 - 6.20 M/uL    Hemoglobin 9.6 (L) 14.0 - 18.0 g/dL    Hematocrit 30.2 (L) 40.0 - 54.0 %    MCV 94 82 - 98 fL    MCH 29.9 27.0 - 31.0 pg    MCHC 31.8 (L) 32.0 - 36.0 g/dL    RDW 15.2 (H) 11.5 - 14.5 %    Platelets 120 " (L) 150 - 450 K/uL    MPV 9.2 9.2 - 12.9 fL    Gran # (ANC) 8.3 (H) 1.8 - 7.7 K/uL    Immature Grans (Abs) 0.20 (H) 0.00 - 0.04 K/uL   COMPREHENSIVE METABOLIC PANEL    Collection Time: 05/01/23 10:08 AM   Result Value Ref Range    Sodium 139 136 - 145 mmol/L    Potassium 4.2 3.5 - 5.1 mmol/L    Chloride 103 95 - 110 mmol/L    CO2 27 23 - 29 mmol/L    Glucose 102 70 - 110 mg/dL    BUN 24 (H) 8 - 23 mg/dL    Creatinine 1.3 0.5 - 1.4 mg/dL    Calcium 9.5 8.7 - 10.5 mg/dL    Total Protein 7.4 6.0 - 8.4 g/dL    Albumin 3.4 (L) 3.5 - 5.2 g/dL    Total Bilirubin 0.3 0.1 - 1.0 mg/dL    Alkaline Phosphatase 128 55 - 135 U/L    AST 13 10 - 40 U/L    ALT 6 (L) 10 - 44 U/L    Anion Gap 9 8 - 16 mmol/L    eGFR 59.5 (A) >60 mL/min/1.73 m^2   Magnesium    Collection Time: 05/01/23 10:08 AM   Result Value Ref Range    Magnesium 1.8 1.6 - 2.6 mg/dL            Assessment:       No diagnosis found.        Plan:   1,2- 68 y.o. M patient presented with gross hematuria and was found to have multiple soft tissue masses in L renal pelvis. He is s/p L urethroscopy and biopsy which showed high grade urothelial dysplasia. On CT urogram, there was a 1.6 cm L adrenal nodule, as well as multiple pulmonary nodules, largest was 0.6 cm, and multiple hepatic nodules too small to characterize.   Overall picture is concerning for invasive upper tract urothelial carcinoma.   PET/CT showed no evidence of metastatic disease. There is B/L adrenal adenomas.     Plan:   - Neoadjuvant chemotherapy with Gemcitabine and Cisplatin. Split dose Cisplatin for borderline kidney functions.  - Because of his RA (not very controlled), will avoid IO adjuvantely.   -chemo held cycle 2, day 8 today with ANC of 600. Zarxio x 3. Will go to every 2 week split dose with udencya on day 2.    Labs recovered and reviewed and acceptable to proceed with cycle 3, day 1 split dose cis/gem with day 2 udencya as ordered.     Abdominal XR ASAP today. RTC ib 5/16 at 730 with labs the  day before (CBC,CMP,Mag), to see me and cis/gem cycle 3, day 15 with neulasta on day 16.    3- Mild, monitor.    4- Improved..    5- Audiogram showed normal hearing sensitivity from 250-2000 Hz sloping to a severe to profound hearing loss by 8000 Hz with a slight air bone gap at 1000 Hz in the right ear and normal hearing sensitivity from 250-2000 Hz sloping to a severe sensorineural hearing loss (SNHL) by 8000 Hz in the left ear. Will recheck post chemotherapy.    6- Zofran PRN.    7- On MTX. Will avoid adjuvant immunotherapy.    8- enrolled in [facility]. Per PCP.    9- abdominal XR today. Lactulose BID until bowel movement.     Patient is in agreement with the proposed treatment plan. All questions were answered to the patient's satisfaction. Pt knows to call clinic if anything is needed before the next clinic visit.    Marquita Stoner, MSN, APRN, FNP-C  Hematology and Medical Oncology  Nurse Practitioner to Dr. Issa Antony  Nurse Practitioner, [facility]          Route Chart for Scheduling    Med Onc Chart Routing  Urgent    Follow up with physician    Follow up with DESIREE 2 weeks. Abdominal XR ASAP today. RTC  at 730 with labs the day before (CBC,CMP,Mag), to see me and cis/gem cycle 3, day 15 with neulasta on day 16.   Infusion scheduling note    Injection scheduling note    Labs CBC, CMP and magnesium   Schedulin day prior to infusion  Preferred lab:  Lab interval:     Imaging    Pharmacy appointment    Other referrals           Treatment Plan Information   OP BLADDER GEMCITABINE CISPLATIN (SPLIT DOSE CISPLATIN) Q3W   Issa Antony MD   Upcoming Treatment Dates - OP BLADDER GEMCITABINE CISPLATIN (SPLIT DOSE CISPLATIN) Q3W    5/3/2023       Pre-Medications       aprepitant (CINVANTI) injection 130 mg       Chemotherapy       gemcitabine (GEMZAR) 1,840 mg in sodium chloride 0.9% SolP 250 mL chemo infusion       CISplatin (Platinol) 35 mg/m2 = 64 mg in sodium chloride 0.9% 564 mL  chemo infusion       Pre-Hydration       sodium chloride 0.9% 1,000 mL with magnesium sulfate 1 g, potassium chloride 20 mEq infusion       Post-Hydration       sodium chloride 0.9% 1,000 mL infusion       Antiemetics       palonosetron (ALOXI) 0.25 mg with Dexamethasone (DECADRON) 12 mg in NS 50 mL IVPB  5/4/2023       Growth Factor       pegfilgrastim (NEULASTA) injection 6 mg  5/17/2023       Pre-Medications       aprepitant (CINVANTI) injection 130 mg       Chemotherapy       gemcitabine (GEMZAR) 1,840 mg in sodium chloride 0.9% SolP 250 mL chemo infusion       CISplatin (Platinol) 35 mg/m2 = 64 mg in sodium chloride 0.9% 564 mL chemo infusion       Pre-Hydration       sodium chloride 0.9% 1,000 mL with magnesium sulfate 1 g, potassium chloride 20 mEq infusion       Post-Hydration       sodium chloride 0.9% 1,000 mL infusion       Antiemetics       palonosetron (ALOXI) 0.25 mg with Dexamethasone (DECADRON) 12 mg in NS 50 mL IVPB  5/18/2023       Growth Factor       pegfilgrastim (NEULASTA) injection 6 mg    Supportive Plan Information  OP FILGRASTIM 480 MCG   Issa Antony MD   Upcoming Treatment Dates - OP FILGRASTIM 480 MCG    No upcoming days in selected categories.

## 2023-05-03 ENCOUNTER — PATIENT MESSAGE (OUTPATIENT)
Dept: ADMINISTRATIVE | Facility: OTHER | Age: 68
End: 2023-05-03
Payer: MEDICARE

## 2023-05-04 ENCOUNTER — INFUSION (OUTPATIENT)
Dept: INFUSION THERAPY | Facility: HOSPITAL | Age: 68
End: 2023-05-04
Attending: INTERNAL MEDICINE
Payer: MEDICARE

## 2023-05-04 ENCOUNTER — PATIENT MESSAGE (OUTPATIENT)
Dept: ADMINISTRATIVE | Facility: OTHER | Age: 68
End: 2023-05-04
Payer: MEDICARE

## 2023-05-04 VITALS
HEIGHT: 69 IN | RESPIRATION RATE: 18 BRPM | SYSTOLIC BLOOD PRESSURE: 104 MMHG | DIASTOLIC BLOOD PRESSURE: 55 MMHG | TEMPERATURE: 98 F | WEIGHT: 155 LBS | BODY MASS INDEX: 22.96 KG/M2 | HEART RATE: 79 BPM

## 2023-05-04 DIAGNOSIS — C68.9 UROTHELIAL CARCINOMA WITH HIGH RISK OF METASTASIS: Primary | ICD-10-CM

## 2023-05-04 PROCEDURE — 96366 THER/PROPH/DIAG IV INF ADDON: CPT

## 2023-05-04 PROCEDURE — A4216 STERILE WATER/SALINE, 10 ML: HCPCS | Performed by: NURSE PRACTITIONER

## 2023-05-04 PROCEDURE — 63600175 PHARM REV CODE 636 W HCPCS: Performed by: NURSE PRACTITIONER

## 2023-05-04 PROCEDURE — 96413 CHEMO IV INFUSION 1 HR: CPT

## 2023-05-04 PROCEDURE — 25000003 PHARM REV CODE 250: Performed by: NURSE PRACTITIONER

## 2023-05-04 PROCEDURE — 96361 HYDRATE IV INFUSION ADD-ON: CPT

## 2023-05-04 PROCEDURE — 96367 TX/PROPH/DG ADDL SEQ IV INF: CPT

## 2023-05-04 PROCEDURE — 96417 CHEMO IV INFUS EACH ADDL SEQ: CPT

## 2023-05-04 PROCEDURE — 96375 TX/PRO/DX INJ NEW DRUG ADDON: CPT

## 2023-05-04 RX ORDER — SODIUM CHLORIDE 0.9 % (FLUSH) 0.9 %
10 SYRINGE (ML) INJECTION
Status: DISCONTINUED | OUTPATIENT
Start: 2023-05-04 | End: 2023-05-04 | Stop reason: HOSPADM

## 2023-05-04 RX ORDER — HEPARIN 100 UNIT/ML
500 SYRINGE INTRAVENOUS
Status: DISCONTINUED | OUTPATIENT
Start: 2023-05-04 | End: 2023-05-04 | Stop reason: HOSPADM

## 2023-05-04 RX ADMIN — APREPITANT 130 MG: 130 INJECTION, EMULSION INTRAVENOUS at 11:05

## 2023-05-04 RX ADMIN — POTASSIUM CHLORIDE 500 ML/HR: 2 INJECTION, SOLUTION, CONCENTRATE INTRAVENOUS at 08:05

## 2023-05-04 RX ADMIN — DEXAMETHASONE SODIUM PHOSPHATE 0.25 MG: 4 INJECTION, SOLUTION INTRA-ARTICULAR; INTRALESIONAL; INTRAMUSCULAR; INTRAVENOUS; SOFT TISSUE at 10:05

## 2023-05-04 RX ADMIN — GEMCITABINE HYDROCHLORIDE 1800 MG: 1 INJECTION, SOLUTION INTRAVENOUS at 11:05

## 2023-05-04 RX ADMIN — SODIUM CHLORIDE 999 ML/HR: 0.9 INJECTION, SOLUTION INTRAVENOUS at 12:05

## 2023-05-04 RX ADMIN — Medication 10 ML: at 01:05

## 2023-05-04 RX ADMIN — CISPLATIN 64 MG: 1 INJECTION, SOLUTION INTRAVENOUS at 11:05

## 2023-05-04 RX ADMIN — HEPARIN 500 UNITS: 100 SYRINGE at 01:05

## 2023-05-05 ENCOUNTER — PATIENT MESSAGE (OUTPATIENT)
Dept: ADMINISTRATIVE | Facility: OTHER | Age: 68
End: 2023-05-05
Payer: MEDICARE

## 2023-05-05 ENCOUNTER — INFUSION (OUTPATIENT)
Dept: INFUSION THERAPY | Facility: HOSPITAL | Age: 68
End: 2023-05-05
Attending: INTERNAL MEDICINE
Payer: MEDICARE

## 2023-05-05 DIAGNOSIS — C68.9 UROTHELIAL CARCINOMA WITH HIGH RISK OF METASTASIS: Primary | ICD-10-CM

## 2023-05-05 PROCEDURE — 96372 THER/PROPH/DIAG INJ SC/IM: CPT

## 2023-05-05 PROCEDURE — 63600175 PHARM REV CODE 636 W HCPCS: Mod: JZ,JG | Performed by: NURSE PRACTITIONER

## 2023-05-05 RX ADMIN — PEGFILGRASTIM 6 MG: 6 INJECTION SUBCUTANEOUS at 09:05

## 2023-05-05 NOTE — NURSING
Pt here for D2 Neulasta. Administered Neulasta in abdominal tissue. No questions or concerns. Pt ambulated out of unit unassisted.

## 2023-05-06 ENCOUNTER — PATIENT MESSAGE (OUTPATIENT)
Dept: ADMINISTRATIVE | Facility: OTHER | Age: 68
End: 2023-05-06
Payer: MEDICARE

## 2023-05-07 ENCOUNTER — PATIENT MESSAGE (OUTPATIENT)
Dept: ADMINISTRATIVE | Facility: OTHER | Age: 68
End: 2023-05-07
Payer: MEDICARE

## 2023-05-08 ENCOUNTER — PATIENT MESSAGE (OUTPATIENT)
Dept: ADMINISTRATIVE | Facility: OTHER | Age: 68
End: 2023-05-08
Payer: MEDICARE

## 2023-05-10 ENCOUNTER — PATIENT MESSAGE (OUTPATIENT)
Dept: ADMINISTRATIVE | Facility: OTHER | Age: 68
End: 2023-05-10
Payer: MEDICARE

## 2023-05-11 ENCOUNTER — PATIENT MESSAGE (OUTPATIENT)
Dept: ADMINISTRATIVE | Facility: OTHER | Age: 68
End: 2023-05-11
Payer: MEDICARE

## 2023-05-12 ENCOUNTER — PATIENT MESSAGE (OUTPATIENT)
Dept: ADMINISTRATIVE | Facility: OTHER | Age: 68
End: 2023-05-12
Payer: MEDICARE

## 2023-05-13 ENCOUNTER — PATIENT MESSAGE (OUTPATIENT)
Dept: ADMINISTRATIVE | Facility: OTHER | Age: 68
End: 2023-05-13
Payer: MEDICARE

## 2023-05-14 ENCOUNTER — PATIENT MESSAGE (OUTPATIENT)
Dept: ADMINISTRATIVE | Facility: OTHER | Age: 68
End: 2023-05-14
Payer: MEDICARE

## 2023-05-15 ENCOUNTER — OFFICE VISIT (OUTPATIENT)
Dept: HEMATOLOGY/ONCOLOGY | Facility: CLINIC | Age: 68
End: 2023-05-15
Payer: MEDICARE

## 2023-05-15 ENCOUNTER — PATIENT MESSAGE (OUTPATIENT)
Dept: ADMINISTRATIVE | Facility: OTHER | Age: 68
End: 2023-05-15
Payer: MEDICARE

## 2023-05-15 VITALS
TEMPERATURE: 99 F | SYSTOLIC BLOOD PRESSURE: 125 MMHG | HEIGHT: 69 IN | RESPIRATION RATE: 16 BRPM | BODY MASS INDEX: 22.43 KG/M2 | OXYGEN SATURATION: 99 % | HEART RATE: 52 BPM | DIASTOLIC BLOOD PRESSURE: 64 MMHG | WEIGHT: 151.44 LBS

## 2023-05-15 DIAGNOSIS — N18.2 STAGE 2 CHRONIC KIDNEY DISEASE: ICD-10-CM

## 2023-05-15 DIAGNOSIS — I10 PRIMARY HYPERTENSION: ICD-10-CM

## 2023-05-15 DIAGNOSIS — C64.2 UROTHELIAL CARCINOMA OF KIDNEY, LEFT: Primary | ICD-10-CM

## 2023-05-15 DIAGNOSIS — R11.0 CHEMOTHERAPY-INDUCED NAUSEA: ICD-10-CM

## 2023-05-15 DIAGNOSIS — T45.1X5A CHEMOTHERAPY-INDUCED NAUSEA: ICD-10-CM

## 2023-05-15 DIAGNOSIS — T45.1X5A ANTINEOPLASTIC CHEMOTHERAPY INDUCED ANEMIA: ICD-10-CM

## 2023-05-15 DIAGNOSIS — T45.1X5A CHEMOTHERAPY INDUCED NEUTROPENIA: ICD-10-CM

## 2023-05-15 DIAGNOSIS — M05.79 RHEUMATOID ARTHRITIS INVOLVING MULTIPLE SITES WITH POSITIVE RHEUMATOID FACTOR: ICD-10-CM

## 2023-05-15 DIAGNOSIS — D64.81 ANTINEOPLASTIC CHEMOTHERAPY INDUCED ANEMIA: ICD-10-CM

## 2023-05-15 DIAGNOSIS — D70.1 CHEMOTHERAPY INDUCED NEUTROPENIA: ICD-10-CM

## 2023-05-15 DIAGNOSIS — H91.90 HEARING LOSS, UNSPECIFIED HEARING LOSS TYPE, UNSPECIFIED LATERALITY: ICD-10-CM

## 2023-05-15 PROCEDURE — 3078F PR MOST RECENT DIASTOLIC BLOOD PRESSURE < 80 MM HG: ICD-10-PCS | Mod: CPTII,S$GLB,, | Performed by: NURSE PRACTITIONER

## 2023-05-15 PROCEDURE — 1101F PR PT FALLS ASSESS DOC 0-1 FALLS W/OUT INJ PAST YR: ICD-10-PCS | Mod: CPTII,S$GLB,, | Performed by: NURSE PRACTITIONER

## 2023-05-15 PROCEDURE — 1101F PT FALLS ASSESS-DOCD LE1/YR: CPT | Mod: CPTII,S$GLB,, | Performed by: NURSE PRACTITIONER

## 2023-05-15 PROCEDURE — 3008F PR BODY MASS INDEX (BMI) DOCUMENTED: ICD-10-PCS | Mod: CPTII,S$GLB,, | Performed by: NURSE PRACTITIONER

## 2023-05-15 PROCEDURE — 3288F PR FALLS RISK ASSESSMENT DOCUMENTED: ICD-10-PCS | Mod: CPTII,S$GLB,, | Performed by: NURSE PRACTITIONER

## 2023-05-15 PROCEDURE — 1126F PR PAIN SEVERITY QUANTIFIED, NO PAIN PRESENT: ICD-10-PCS | Mod: CPTII,S$GLB,, | Performed by: NURSE PRACTITIONER

## 2023-05-15 PROCEDURE — 3074F SYST BP LT 130 MM HG: CPT | Mod: CPTII,S$GLB,, | Performed by: NURSE PRACTITIONER

## 2023-05-15 PROCEDURE — 3074F PR MOST RECENT SYSTOLIC BLOOD PRESSURE < 130 MM HG: ICD-10-PCS | Mod: CPTII,S$GLB,, | Performed by: NURSE PRACTITIONER

## 2023-05-15 PROCEDURE — 3288F FALL RISK ASSESSMENT DOCD: CPT | Mod: CPTII,S$GLB,, | Performed by: NURSE PRACTITIONER

## 2023-05-15 PROCEDURE — 1159F MED LIST DOCD IN RCRD: CPT | Mod: CPTII,S$GLB,, | Performed by: NURSE PRACTITIONER

## 2023-05-15 PROCEDURE — 99999 PR PBB SHADOW E&M-EST. PATIENT-LVL III: ICD-10-PCS | Mod: PBBFAC,,, | Performed by: NURSE PRACTITIONER

## 2023-05-15 PROCEDURE — 99215 PR OFFICE/OUTPT VISIT, EST, LEVL V, 40-54 MIN: ICD-10-PCS | Mod: S$GLB,,, | Performed by: NURSE PRACTITIONER

## 2023-05-15 PROCEDURE — 3008F BODY MASS INDEX DOCD: CPT | Mod: CPTII,S$GLB,, | Performed by: NURSE PRACTITIONER

## 2023-05-15 PROCEDURE — 1126F AMNT PAIN NOTED NONE PRSNT: CPT | Mod: CPTII,S$GLB,, | Performed by: NURSE PRACTITIONER

## 2023-05-15 PROCEDURE — 99499 UNLISTED E&M SERVICE: CPT | Mod: S$GLB,,, | Performed by: NURSE PRACTITIONER

## 2023-05-15 PROCEDURE — 99215 OFFICE O/P EST HI 40 MIN: CPT | Mod: S$GLB,,, | Performed by: NURSE PRACTITIONER

## 2023-05-15 PROCEDURE — 1159F PR MEDICATION LIST DOCUMENTED IN MEDICAL RECORD: ICD-10-PCS | Mod: CPTII,S$GLB,, | Performed by: NURSE PRACTITIONER

## 2023-05-15 PROCEDURE — 3078F DIAST BP <80 MM HG: CPT | Mod: CPTII,S$GLB,, | Performed by: NURSE PRACTITIONER

## 2023-05-15 PROCEDURE — 99999 PR PBB SHADOW E&M-EST. PATIENT-LVL III: CPT | Mod: PBBFAC,,, | Performed by: NURSE PRACTITIONER

## 2023-05-15 NOTE — PROGRESS NOTES
MEDICAL ONCOLOGY - FOLLOW-UP VISIT    Reason for visit: Upper tract Urothelial carcinoma     Best Contact Phone Number(s): [phone] (home)      Cancer/Stage/TNM:    Cancer Staging   No matching staging information was found for the patient.     Oncology History   Urothelial carcinoma with high risk of metastasis   3/6/2023 Initial Diagnosis    Urothelial carcinoma with high risk of metastasis       3/14/2023 -  Chemotherapy    Treatment Summary   Plan Name: OP BLADDER GEMCITABINE CISPLATIN (SPLIT DOSE CISPLATIN) Q3W  Treatment Goal: Curative  Status: Active  Start Date: 3/14/2023  End Date: 6/14/2023 (Planned)  Provider: Issa Antony MD  Chemotherapy: CISplatin (Platinol) 35 mg/m2 = 64 mg in sodium chloride 0.9% 314 mL chemo infusion, 35 mg/m2 = 64 mg, Intravenous, Clinic/HOD 1 time, 3 of 4 cycles  Dose modification: 17.5 mg/m2 (original dose 35 mg/m2, Cycle 3, Reason: Other (see comments), Comment: renal function), 35 mg/m2 (original dose 35 mg/m2, Cycle 3, Reason: Other (see comments)), 17.5 mg/m2 (original dose 35 mg/m2, Cycle 2, Reason: Other (see comments), Comment: renal function)  Administration: 64 mg (3/14/2023), 64 mg (3/21/2023), 64 mg (4/19/2023), 64 mg (5/4/2023), 32 mg (4/4/2023)  gemcitabine 1,800 mg in sodium chloride 0.9% SolP 332.34 mL chemo infusion, 1,840 mg, Intravenous, Clinic/HOD 1 time, 3 of 4 cycles  Administration: 1,800 mg (3/14/2023), 1,800 mg (3/21/2023), 1,800 mg (4/4/2023), 1,800 mg (4/19/2023), 1,800 mg (5/4/2023)            HPI:   68 y.o. male with PVD, PAD, HLD, CKD3, AAA, treated hep C, Rheumatoid arthritis on MTX (previously on humara), smoking (about 1/2 PPD) who presented with gross hematuria. He saw urology and underwent CT urogram which showed Few soft tissue masses within the left renal pelvis, the largest measures 2.5 x 1.3 cm. There was also a L adrenal nodule measuring 1.6 cm. Urine cytology was evident of atypical urothelial cells, and repeat sample showed high  grade urothelial carcinoma.   He underwent a flexible cystoscopy with normal bladder findings. L RPG showed Left RPG with mild hydronephrosis and filling defect ~2 cm in central renal pelvis.  Left ureteroscopy with was done and showed papillary tumors along the proximal ureter and renal pelvis. - Large ~2 cm nodular lesion in the renal pelvis consistent with filling defect. Biopsy of the mass came back as - High grade urothelial dysplasia/carcinoma in situ. - No definitive invasive carcinoma identified (outside report)    PET/CT done on 03/08 showed Left proximal ureteral lesion compatible with urothelial cancer.  No FDG PET evidence of metastatic disease.  Benign bilateral adrenal adenomas.    Interval history:   He returns today prior to cycle 3, day 15 cis/gem. He notes using laxatives to move bowels. +hearing issues, worse in R. No new issues.    ROS:   Review of Systems   Constitutional:  Positive for malaise/fatigue. Negative for chills, fever and weight loss.   HENT:  Positive for hearing loss. Negative for congestion. Ear discharge: r>L.   Eyes:  Negative for blurred vision.   Respiratory:  Negative for cough and shortness of breath.    Cardiovascular:  Negative for chest pain, palpitations and leg swelling.   Gastrointestinal:  Positive for constipation. Negative for abdominal pain, blood in stool, diarrhea, nausea and vomiting.   Genitourinary:  Negative for dysuria and frequency.   Musculoskeletal:  Negative for back pain and myalgias.   Skin:  Negative for itching and rash.   Neurological:  Negative for dizziness, tingling, tremors, sensory change, focal weakness and headaches.   Endo/Heme/Allergies:  Does not bruise/bleed easily.   Psychiatric/Behavioral:  The patient is not nervous/anxious.      Past Medical History:   Past Medical History:   Diagnosis Date    Cataract     Colon polyp 2014    History of hepatitis C, s/p successful treatment w/ SVR12 - 7/2015 10/14/2012    Kelsey 1a,  Liver biopsy 6/2014  - Stage 1 fibrosis;  Completed 8 weeks Harvoni w/ SVR12 - 7/2015      Hyperlipidemia     Lipoma of arm     Lipoma of lower extremity     Nuclear sclerosis - Both Eyes 10/22/2012    Other and unspecified hyperlipidemia 10/22/2013    PAD (peripheral artery disease)     Peripheral vascular disease, unspecified     Rheumatoid arthritis(714.0) 10/14/2012        Past Surgical History:   Past Surgical History:   Procedure Laterality Date    BIOPSY OF URETER Left 02/16/2023    Procedure: BIOPSY, URETER/BRUSH;  Surgeon: Kem Jefferson MD;  Location: [facility] OR 1ST FLR;  Service: Urology;  Laterality: Left;    COLONOSCOPY N/A 11/29/2021    Procedure: COLONOSCOPY;  Surgeon: Kenrick Zimmer MD;  Location: [facility] ENDO (4TH FLR);  Service: Endoscopy;  Laterality: N/A;  blood thinner approval received, see telephone encounter 10/19/21-BB  fully vacc-inst email-tb    CYSTOSCOPY      CYSTOSCOPY  02/16/2023    Procedure: CYSTOSCOPY;  Surgeon: Kem Jefferson MD;  Location: [facility] OR 1ST FLR;  Service: Urology;;    HERNIA REPAIR      INSERTION OF TUNNELED CENTRAL VENOUS CATHETER (CVC) WITH SUBCUTANEOUS PORT Right 3/13/2023    Procedure: INSERTION, PORT-A-CATH;  Surgeon: Rene Cali MD;  Location: [facility] CATH LAB;  Service: Radiology;  Laterality: Right;    KNEE ARTHROPLASTY      LAPAROSCOPIC EXPLORATION OF GROIN Left 09/06/2018    Procedure: EXPLORATION, INGUINAL REGION, LAPAROSCOPIC;  Surgeon: Mingo De Guzman Jr., MD;  Location: [facility] OR 2ND FLR;  Service: General;  Laterality: Left;    PYELOSCOPY Left 02/16/2023    Procedure: PYELOSCOPY;  Surgeon: Kem Jefferson MD;  Location: [facility] OR 1ST FLR;  Service: Urology;  Laterality: Left;    RETROGRADE PYELOGRAPHY Bilateral 02/16/2023    Procedure: PYELOGRAM, RETROGRADE;  Surgeon: Kem Jefferson MD;  Location: [facility] OR 1ST FLR;  Service: Urology;  Laterality: Bilateral;    TONSILLECTOMY      URETEROSCOPIC REMOVAL OF URETERIC CALCULUS Left 02/16/2023    Procedure:  REMOVAL, CALCULUS, URETER, URETEROSCOPIC;  Surgeon: Kem Jefferson MD;  Location: [facility] OR [address] [place] [address];  Service: Urology;  Laterality: Left;    URETEROSCOPY Left 2023    Procedure: URETEROSCOPY;  Surgeon: Kem Jefferson MD;  Location: [facility] OR [address] [place] [address];  Service: Urology;  Laterality: Left;        Family History:   Family History   Problem Relation Age of Onset    Heart disease Paternal Uncle     Dementia Mother     Heart disease Sister     Liver disease Neg Hx     Amblyopia Neg Hx     Blindness Neg Hx     Cancer Neg Hx     Cataracts Neg Hx     Diabetes Neg Hx     Glaucoma Neg Hx     Hypertension Neg Hx     Macular degeneration Neg Hx     Retinal detachment Neg Hx     Strabismus Neg Hx     Stroke Neg Hx     Thyroid disease Neg Hx         Social History:   Social History     Tobacco Use    Smoking status: Former     Types: Cigarettes     Quit date: 2020     Years since quittin.8    Smokeless tobacco: Never   Substance Use Topics    Alcohol use: Yes     Comment: 3-4 drinks per week        I have reviewed and updated the patient's past medical, surgical, family and social histories.    Allergies:   Review of patient's allergies indicates:  No Known Allergies     Medications:   Current Outpatient Medications   Medication Sig Dispense Refill    amLODIPine (NORVASC) 5 MG tablet TAKE 1 TABLET BY MOUTH ONCE DAILY 90 tablet 2    aspirin 81 MG Chew Take 81 mg by mouth once daily.      cilostazoL (PLETAL) 50 MG Tab Take 1 tablet (50 mg total) by mouth 2 (two) times daily. 180 tablet 3    doxazosin (CARDURA) 4 MG tablet TAKE 1 TABLET BY MOUTH IN  THE EVENING 90 tablet 3    folic acid (FOLVITE) 1 MG tablet Take 1 tablet (1,000 mcg total) by mouth once daily. 90 tablet 3    lactulose (CHRONULAC) 20 gram/30 mL Soln Take 30 mLs (20 g total) by mouth 3 (three) times daily. 200 mL 0    methotrexate 2.5 MG Tab Take 8 tablets (20 mg total) by mouth every 7 days. This medication requires periodic lab monitoring. 120  "tablet 1    simvastatin (ZOCOR) 10 MG tablet TAKE 1 TABLET BY MOUTH EVERY DAY IN THE EVENING (Patient taking differently: 2 (two) times a day.) 90 tablet 6    traZODone (DESYREL) 50 MG tablet TAKE 3 TABLETS BY MOUTH AT  NIGHT AS NEEDED FOR  INSOMNIA 270 tablet 3     No current facility-administered medications for this visit.        Physical Exam:   /64 (BP Location: Left arm, Patient Position: Sitting, BP Method: Medium (Automatic))   Pulse (!) 52   Temp 98.7 °F (37.1 °C) (Oral)   Resp 16   Ht 5' 9" (1.753 m)   Wt 68.7 kg (151 lb 7.3 oz)   SpO2 99%   BMI 22.37 kg/m²      ECOG Performance status: 1         Physical Exam  Constitutional:       General: He is not in acute distress.     Appearance: Normal appearance.   HENT:      Head: Normocephalic and atraumatic.   Eyes:      Pupils: Pupils are equal, round, and reactive to light.   Cardiovascular:      Rate and Rhythm: Normal rate and regular rhythm.      Heart sounds: No murmur heard.  Pulmonary:      Effort: No respiratory distress.      Breath sounds: Normal breath sounds. No wheezing.   Abdominal:      General: Abdomen is flat. Bowel sounds are normal. There is no distension.      Palpations: Abdomen is soft. There is no mass.      Tenderness: There is no abdominal tenderness.   Musculoskeletal:         General: No swelling or deformity.   Skin:     Coloration: Skin is not jaundiced.   Neurological:      General: No focal deficit present.      Mental Status: He is alert and oriented to person, place, and time. Mental status is at baseline.         Labs:   Recent Results (from the past 48 hour(s))   CBC Oncology    Collection Time: 05/15/23  7:16 AM   Result Value Ref Range    WBC 21.34 (H) 3.90 - 12.70 K/uL    RBC 3.11 (L) 4.60 - 6.20 M/uL    Hemoglobin 9.5 (L) 14.0 - 18.0 g/dL    Hematocrit 29.8 (L) 40.0 - 54.0 %    MCV 96 82 - 98 fL    MCH 30.5 27.0 - 31.0 pg    MCHC 31.9 (L) 32.0 - 36.0 g/dL    RDW 15.7 (H) 11.5 - 14.5 %    Platelets 127 (L) 150 " - 450 K/uL    MPV 9.5 9.2 - 12.9 fL    Gran # (ANC) 18.2 (H) 1.8 - 7.7 K/uL    Immature Grans (Abs) 0.47 (H) 0.00 - 0.04 K/uL   COMPREHENSIVE METABOLIC PANEL    Collection Time: 05/15/23  7:16 AM   Result Value Ref Range    Sodium 138 136 - 145 mmol/L    Potassium 4.5 3.5 - 5.1 mmol/L    Chloride 105 95 - 110 mmol/L    CO2 24 23 - 29 mmol/L    Glucose 116 (H) 70 - 110 mg/dL    BUN 26 (H) 8 - 23 mg/dL    Creatinine 1.4 0.5 - 1.4 mg/dL    Calcium 9.5 8.7 - 10.5 mg/dL    Total Protein 7.3 6.0 - 8.4 g/dL    Albumin 3.3 (L) 3.5 - 5.2 g/dL    Total Bilirubin 0.3 0.1 - 1.0 mg/dL    Alkaline Phosphatase 151 (H) 55 - 135 U/L    AST 9 (L) 10 - 40 U/L    ALT 8 (L) 10 - 44 U/L    Anion Gap 9 8 - 16 mmol/L    eGFR 54.4 (A) >60 mL/min/1.73 m^2   Magnesium    Collection Time: 05/15/23  7:16 AM   Result Value Ref Range    Magnesium 1.8 1.6 - 2.6 mg/dL       Assessment:       1. Urothelial carcinoma of kidney, left    2. Chemotherapy induced neutropenia    3. Antineoplastic chemotherapy induced anemia    4. Stage 2 chronic kidney disease    5. Hearing loss, unspecified hearing loss type, unspecified laterality    6. Chemotherapy-induced nausea    7. Rheumatoid arthritis involving multiple sites with positive rheumatoid factor    8. Primary hypertension            Plan:   1,2- 68 y.o. M patient presented with gross hematuria and was found to have multiple soft tissue masses in L renal pelvis. He is s/p L urethroscopy and biopsy which showed high grade urothelial dysplasia. On CT urogram, there was a 1.6 cm L adrenal nodule, as well as multiple pulmonary nodules, largest was 0.6 cm, and multiple hepatic nodules too small to characterize.   Overall picture is concerning for invasive upper tract urothelial carcinoma.   PET/CT showed no evidence of metastatic disease. There is B/L adrenal adenomas.     Plan:   - Neoadjuvant chemotherapy with Gemcitabine and Cisplatin. Split dose Cisplatin for borderline kidney functions.  - Because of  his RA (not very controlled), will avoid IO adjuvantely.   -chemo held cycle 2, day 8 today with ANC of 600. Zarxio x 3. Will go to every 2 week split dose with udencya on day 2.    Labs recovered and reviewed and acceptable to proceed with cycle 3, day 15 split dose cis/gem . Hold udencya today. Will plan to get repeat imaging after next vist.    RTC 2 weeks with PET scan, labs the day before (CBC,CMP,Mag), to see Dr. Antony and cis/gem cycle 4, day 1 with neulasta on day 2.    3- Mild, monitor.    4- Improved.    5- Audiogram showed normal hearing sensitivity from 250-2000 Hz sloping to a severe to profound hearing loss by 8000 Hz with a slight air bone gap at 1000 Hz in the right ear and normal hearing sensitivity from 250-2000 Hz sloping to a severe sensorineural hearing loss (SNHL) by 8000 Hz in the left ear. Will recheck post chemotherapy.    6- Zofran PRN.    7- On MTX. Will avoid adjuvant immunotherapy.    8- enrolled in [facility]. Per PCP.      Patient is in agreement with the proposed treatment plan. All questions were answered to the patient's satisfaction. Pt knows to call clinic if anything is needed before the next clinic visit.    Marquita Stoner, MSN, APRN, FNP-C  Hematology and Medical Oncology  Nurse Practitioner to Dr. Issa Antony  Nurse Practitioner, [facility]

## 2023-05-16 ENCOUNTER — PATIENT MESSAGE (OUTPATIENT)
Dept: ADMINISTRATIVE | Facility: OTHER | Age: 68
End: 2023-05-16
Payer: MEDICARE

## 2023-05-16 ENCOUNTER — INFUSION (OUTPATIENT)
Dept: INFUSION THERAPY | Facility: HOSPITAL | Age: 68
End: 2023-05-16
Attending: INTERNAL MEDICINE
Payer: MEDICARE

## 2023-05-16 VITALS
TEMPERATURE: 98 F | WEIGHT: 151.88 LBS | HEIGHT: 69 IN | SYSTOLIC BLOOD PRESSURE: 117 MMHG | HEART RATE: 58 BPM | BODY MASS INDEX: 22.49 KG/M2 | RESPIRATION RATE: 18 BRPM | OXYGEN SATURATION: 98 % | DIASTOLIC BLOOD PRESSURE: 56 MMHG

## 2023-05-16 DIAGNOSIS — C68.9 UROTHELIAL CARCINOMA WITH HIGH RISK OF METASTASIS: Primary | ICD-10-CM

## 2023-05-16 PROCEDURE — 96367 TX/PROPH/DG ADDL SEQ IV INF: CPT

## 2023-05-16 PROCEDURE — 63600175 PHARM REV CODE 636 W HCPCS: Performed by: NURSE PRACTITIONER

## 2023-05-16 PROCEDURE — 96366 THER/PROPH/DIAG IV INF ADDON: CPT

## 2023-05-16 PROCEDURE — 96417 CHEMO IV INFUS EACH ADDL SEQ: CPT

## 2023-05-16 PROCEDURE — 96413 CHEMO IV INFUSION 1 HR: CPT

## 2023-05-16 PROCEDURE — A4216 STERILE WATER/SALINE, 10 ML: HCPCS | Performed by: NURSE PRACTITIONER

## 2023-05-16 PROCEDURE — 25000003 PHARM REV CODE 250: Performed by: NURSE PRACTITIONER

## 2023-05-16 PROCEDURE — 96361 HYDRATE IV INFUSION ADD-ON: CPT

## 2023-05-16 PROCEDURE — 96375 TX/PRO/DX INJ NEW DRUG ADDON: CPT

## 2023-05-16 RX ORDER — SODIUM CHLORIDE 0.9 % (FLUSH) 0.9 %
10 SYRINGE (ML) INJECTION
Status: DISCONTINUED | OUTPATIENT
Start: 2023-05-16 | End: 2023-05-16 | Stop reason: HOSPADM

## 2023-05-16 RX ORDER — HEPARIN 100 UNIT/ML
500 SYRINGE INTRAVENOUS
Status: DISCONTINUED | OUTPATIENT
Start: 2023-05-16 | End: 2023-05-16 | Stop reason: HOSPADM

## 2023-05-16 RX ADMIN — CISPLATIN 64 MG: 1 INJECTION, SOLUTION INTRAVENOUS at 11:05

## 2023-05-16 RX ADMIN — SODIUM CHLORIDE 999 ML/HR: 9 INJECTION, SOLUTION INTRAVENOUS at 12:05

## 2023-05-16 RX ADMIN — APREPITANT 130 MG: 130 INJECTION, EMULSION INTRAVENOUS at 08:05

## 2023-05-16 RX ADMIN — POTASSIUM CHLORIDE 500 ML/HR: 2 INJECTION, SOLUTION, CONCENTRATE INTRAVENOUS at 08:05

## 2023-05-16 RX ADMIN — HEPARIN 500 UNITS: 100 SYRINGE at 02:05

## 2023-05-16 RX ADMIN — Medication 10 ML: at 02:05

## 2023-05-16 RX ADMIN — GEMCITABINE HYDROCHLORIDE 1800 MG: 1 INJECTION, SOLUTION INTRAVENOUS at 10:05

## 2023-05-16 RX ADMIN — DEXAMETHASONE SODIUM PHOSPHATE 0.25 MG: 4 INJECTION, SOLUTION INTRA-ARTICULAR; INTRALESIONAL; INTRAMUSCULAR; INTRAVENOUS; SOFT TISSUE at 08:05

## 2023-05-16 NOTE — PLAN OF CARE
1409  Patient completed Gemzar, Cisplatin, IVFs (1 with K&Mag), tolerated well.  Port deaccessed without issue, flushed, blood return noted, heparin locked.  RTC 6/1/23  Patient ambulated off floor independently, NAD.

## 2023-05-16 NOTE — PLAN OF CARE
0800  Patient seated in chair, VSS, assessment done. Port accessed without issue, flushed, blood return noted, NS @ 25 cc/hr KVO started while waiting for liter NS w/1g mag & 20 mEq K, Gemzar, Cisplatin from pharmacy.  [facility] for safety

## 2023-05-17 ENCOUNTER — PATIENT MESSAGE (OUTPATIENT)
Dept: ADMINISTRATIVE | Facility: OTHER | Age: 68
End: 2023-05-17
Payer: MEDICARE

## 2023-05-18 ENCOUNTER — PATIENT MESSAGE (OUTPATIENT)
Dept: ADMINISTRATIVE | Facility: OTHER | Age: 68
End: 2023-05-18
Payer: MEDICARE

## 2023-05-19 ENCOUNTER — PATIENT MESSAGE (OUTPATIENT)
Dept: ADMINISTRATIVE | Facility: OTHER | Age: 68
End: 2023-05-19
Payer: MEDICARE

## 2023-05-20 ENCOUNTER — PATIENT MESSAGE (OUTPATIENT)
Dept: ADMINISTRATIVE | Facility: OTHER | Age: 68
End: 2023-05-20
Payer: MEDICARE

## 2023-05-21 ENCOUNTER — PATIENT MESSAGE (OUTPATIENT)
Dept: ADMINISTRATIVE | Facility: OTHER | Age: 68
End: 2023-05-21
Payer: MEDICARE

## 2023-05-22 ENCOUNTER — PATIENT MESSAGE (OUTPATIENT)
Dept: ADMINISTRATIVE | Facility: OTHER | Age: 68
End: 2023-05-22
Payer: MEDICARE

## 2023-05-23 ENCOUNTER — PATIENT MESSAGE (OUTPATIENT)
Dept: ADMINISTRATIVE | Facility: OTHER | Age: 68
End: 2023-05-23
Payer: MEDICARE

## 2023-05-24 ENCOUNTER — PATIENT MESSAGE (OUTPATIENT)
Dept: ADMINISTRATIVE | Facility: OTHER | Age: 68
End: 2023-05-24
Payer: MEDICARE

## 2023-05-25 ENCOUNTER — PATIENT MESSAGE (OUTPATIENT)
Dept: ADMINISTRATIVE | Facility: OTHER | Age: 68
End: 2023-05-25
Payer: MEDICARE

## 2023-05-26 ENCOUNTER — PATIENT MESSAGE (OUTPATIENT)
Dept: ADMINISTRATIVE | Facility: OTHER | Age: 68
End: 2023-05-26
Payer: MEDICARE

## 2023-05-27 ENCOUNTER — PATIENT MESSAGE (OUTPATIENT)
Dept: ADMINISTRATIVE | Facility: OTHER | Age: 68
End: 2023-05-27
Payer: MEDICARE

## 2023-05-28 ENCOUNTER — PATIENT MESSAGE (OUTPATIENT)
Dept: ADMINISTRATIVE | Facility: OTHER | Age: 68
End: 2023-05-28
Payer: MEDICARE

## 2023-05-29 ENCOUNTER — PATIENT MESSAGE (OUTPATIENT)
Dept: ADMINISTRATIVE | Facility: OTHER | Age: 68
End: 2023-05-29
Payer: MEDICARE

## 2023-05-29 ENCOUNTER — HOSPITAL ENCOUNTER (OUTPATIENT)
Dept: RADIOLOGY | Facility: HOSPITAL | Age: 68
Discharge: HOME | End: 2023-05-29
Attending: NURSE PRACTITIONER
Payer: MEDICARE

## 2023-05-29 DIAGNOSIS — C64.2 UROTHELIAL CARCINOMA OF KIDNEY, LEFT: ICD-10-CM

## 2023-05-29 PROCEDURE — 78815 PET IMAGE W/CT SKULL-THIGH: CPT | Mod: 26,PS,, | Performed by: STUDENT IN AN ORGANIZED HEALTH CARE EDUCATION/TRAINING PROGRAM

## 2023-05-29 PROCEDURE — A9698 NON-RAD CONTRAST MATERIALNOC: HCPCS | Performed by: NURSE PRACTITIONER

## 2023-05-29 PROCEDURE — 78815 NM PET CT ROUTINE: ICD-10-PCS | Mod: 26,PS,, | Performed by: STUDENT IN AN ORGANIZED HEALTH CARE EDUCATION/TRAINING PROGRAM

## 2023-05-29 PROCEDURE — 25500020 PHARM REV CODE 255: Performed by: NURSE PRACTITIONER

## 2023-05-29 PROCEDURE — A9552 F18 FDG: HCPCS

## 2023-05-29 RX ADMIN — Medication 450 ML: at 09:05

## 2023-05-30 ENCOUNTER — PATIENT MESSAGE (OUTPATIENT)
Dept: ADMINISTRATIVE | Facility: OTHER | Age: 68
End: 2023-05-30
Payer: MEDICARE

## 2023-05-30 ENCOUNTER — OFFICE VISIT (OUTPATIENT)
Dept: HEMATOLOGY/ONCOLOGY | Facility: CLINIC | Age: 68
End: 2023-05-30
Payer: MEDICARE

## 2023-05-30 VITALS
WEIGHT: 154.31 LBS | SYSTOLIC BLOOD PRESSURE: 146 MMHG | RESPIRATION RATE: 16 BRPM | HEART RATE: 69 BPM | BODY MASS INDEX: 22.85 KG/M2 | TEMPERATURE: 98 F | DIASTOLIC BLOOD PRESSURE: 70 MMHG | HEIGHT: 69 IN | OXYGEN SATURATION: 100 %

## 2023-05-30 DIAGNOSIS — T45.1X5A CHEMOTHERAPY INDUCED NEUTROPENIA: ICD-10-CM

## 2023-05-30 DIAGNOSIS — M05.79 RHEUMATOID ARTHRITIS INVOLVING MULTIPLE SITES WITH POSITIVE RHEUMATOID FACTOR: ICD-10-CM

## 2023-05-30 DIAGNOSIS — T45.1X5A ANTINEOPLASTIC CHEMOTHERAPY INDUCED ANEMIA: ICD-10-CM

## 2023-05-30 DIAGNOSIS — D69.59 CHEMOTHERAPY-INDUCED THROMBOCYTOPENIA: ICD-10-CM

## 2023-05-30 DIAGNOSIS — I10 PRIMARY HYPERTENSION: ICD-10-CM

## 2023-05-30 DIAGNOSIS — D70.1 CHEMOTHERAPY INDUCED NEUTROPENIA: ICD-10-CM

## 2023-05-30 DIAGNOSIS — N18.2 STAGE 2 CHRONIC KIDNEY DISEASE: ICD-10-CM

## 2023-05-30 DIAGNOSIS — T45.1X5A CHEMOTHERAPY-INDUCED THROMBOCYTOPENIA: ICD-10-CM

## 2023-05-30 DIAGNOSIS — H91.90 HEARING LOSS, UNSPECIFIED HEARING LOSS TYPE, UNSPECIFIED LATERALITY: ICD-10-CM

## 2023-05-30 DIAGNOSIS — T45.1X5A CHEMOTHERAPY-INDUCED NAUSEA: ICD-10-CM

## 2023-05-30 DIAGNOSIS — D64.81 ANTINEOPLASTIC CHEMOTHERAPY INDUCED ANEMIA: ICD-10-CM

## 2023-05-30 DIAGNOSIS — R11.0 CHEMOTHERAPY-INDUCED NAUSEA: ICD-10-CM

## 2023-05-30 DIAGNOSIS — C64.2 UROTHELIAL CARCINOMA OF KIDNEY, LEFT: Primary | ICD-10-CM

## 2023-05-30 PROCEDURE — 3008F PR BODY MASS INDEX (BMI) DOCUMENTED: ICD-10-PCS | Mod: CPTII,S$GLB,, | Performed by: INTERNAL MEDICINE

## 2023-05-30 PROCEDURE — 3078F PR MOST RECENT DIASTOLIC BLOOD PRESSURE < 80 MM HG: ICD-10-PCS | Mod: CPTII,S$GLB,, | Performed by: INTERNAL MEDICINE

## 2023-05-30 PROCEDURE — 1126F PR PAIN SEVERITY QUANTIFIED, NO PAIN PRESENT: ICD-10-PCS | Mod: CPTII,S$GLB,, | Performed by: INTERNAL MEDICINE

## 2023-05-30 PROCEDURE — 3078F DIAST BP <80 MM HG: CPT | Mod: CPTII,S$GLB,, | Performed by: INTERNAL MEDICINE

## 2023-05-30 PROCEDURE — 1101F PR PT FALLS ASSESS DOC 0-1 FALLS W/OUT INJ PAST YR: ICD-10-PCS | Mod: CPTII,S$GLB,, | Performed by: INTERNAL MEDICINE

## 2023-05-30 PROCEDURE — 1126F AMNT PAIN NOTED NONE PRSNT: CPT | Mod: CPTII,S$GLB,, | Performed by: INTERNAL MEDICINE

## 2023-05-30 PROCEDURE — 99215 OFFICE O/P EST HI 40 MIN: CPT | Mod: S$GLB,,, | Performed by: INTERNAL MEDICINE

## 2023-05-30 PROCEDURE — 99999 PR PBB SHADOW E&M-EST. PATIENT-LVL III: ICD-10-PCS | Mod: PBBFAC,,, | Performed by: INTERNAL MEDICINE

## 2023-05-30 PROCEDURE — 3077F SYST BP >= 140 MM HG: CPT | Mod: CPTII,S$GLB,, | Performed by: INTERNAL MEDICINE

## 2023-05-30 PROCEDURE — 3077F PR MOST RECENT SYSTOLIC BLOOD PRESSURE >= 140 MM HG: ICD-10-PCS | Mod: CPTII,S$GLB,, | Performed by: INTERNAL MEDICINE

## 2023-05-30 PROCEDURE — 99999 PR PBB SHADOW E&M-EST. PATIENT-LVL III: CPT | Mod: PBBFAC,,, | Performed by: INTERNAL MEDICINE

## 2023-05-30 PROCEDURE — 99215 PR OFFICE/OUTPT VISIT, EST, LEVL V, 40-54 MIN: ICD-10-PCS | Mod: S$GLB,,, | Performed by: INTERNAL MEDICINE

## 2023-05-30 PROCEDURE — 1101F PT FALLS ASSESS-DOCD LE1/YR: CPT | Mod: CPTII,S$GLB,, | Performed by: INTERNAL MEDICINE

## 2023-05-30 PROCEDURE — 99499 UNLISTED E&M SERVICE: CPT | Mod: S$GLB,,, | Performed by: INTERNAL MEDICINE

## 2023-05-30 PROCEDURE — 3008F BODY MASS INDEX DOCD: CPT | Mod: CPTII,S$GLB,, | Performed by: INTERNAL MEDICINE

## 2023-05-30 PROCEDURE — 3288F FALL RISK ASSESSMENT DOCD: CPT | Mod: CPTII,S$GLB,, | Performed by: INTERNAL MEDICINE

## 2023-05-30 PROCEDURE — 3288F PR FALLS RISK ASSESSMENT DOCUMENTED: ICD-10-PCS | Mod: CPTII,S$GLB,, | Performed by: INTERNAL MEDICINE

## 2023-05-30 RX ORDER — SIMVASTATIN 10 MG/1
10 TABLET, FILM COATED ORAL NIGHTLY
Qty: 90 TABLET | Refills: 9 | Status: SHIPPED | OUTPATIENT
Start: 2023-05-30 | End: 2025-11-15

## 2023-05-30 RX ORDER — SODIUM CHLORIDE 0.9 % (FLUSH) 0.9 %
10 SYRINGE (ML) INJECTION
Status: CANCELLED | OUTPATIENT
Start: 2023-05-30

## 2023-05-30 RX ORDER — HEPARIN 100 UNIT/ML
500 SYRINGE INTRAVENOUS
Status: CANCELLED | OUTPATIENT
Start: 2023-05-30

## 2023-05-30 NOTE — PROGRESS NOTES
MEDICAL ONCOLOGY - FOLLOW-UP VISIT    Reason for visit: Upper tract Urothelial carcinoma     Cancer/Stage/TNM:    Cancer Staging   No matching staging information was found for the patient.     Oncology History   Urothelial carcinoma with high risk of metastasis   3/6/2023 Initial Diagnosis    Urothelial carcinoma with high risk of metastasis     3/14/2023 -  Chemotherapy    Treatment Summary   Plan Name: OP BLADDER GEMCITABINE CISPLATIN (SPLIT DOSE CISPLATIN) Q3W  Treatment Goal: Curative  Status: Active  Start Date: 3/14/2023  End Date: 6/14/2023 (Planned)  Provider: Issa Antony MD  Chemotherapy: CISplatin (Platinol) 35 mg/m2 = 64 mg in sodium chloride 0.9% 314 mL chemo infusion, 35 mg/m2 = 64 mg, Intravenous, Clinic/HOD 1 time, 3 of 4 cycles  Dose modification: 17.5 mg/m2 (original dose 35 mg/m2, Cycle 3, Reason: Other (see comments), Comment: renal function), 35 mg/m2 (original dose 35 mg/m2, Cycle 3, Reason: Other (see comments)), 17.5 mg/m2 (original dose 35 mg/m2, Cycle 2, Reason: Other (see comments), Comment: renal function)  Administration: 64 mg (3/14/2023), 64 mg (3/21/2023), 64 mg (4/19/2023), 64 mg (5/4/2023), 64 mg (5/16/2023), 32 mg (4/4/2023)  gemcitabine 1,800 mg in sodium chloride 0.9% SolP 332.34 mL chemo infusion, 1,840 mg, Intravenous, Clinic/HOD 1 time, 3 of 4 cycles  Administration: 1,800 mg (3/14/2023), 1,800 mg (3/21/2023), 1,800 mg (4/4/2023), 1,800 mg (4/19/2023), 1,800 mg (5/4/2023), 1,800 mg (5/16/2023)          HPI:   68 y.o. male with PVD, PAD, HLD, CKD3, AAA, treated hep C, Rheumatoid arthritis on MTX (previously on humara), smoking (about 1/2 PPD) who presented with gross hematuria. He saw urology and underwent CT urogram which showed Few soft tissue masses within the left renal pelvis, the largest measures 2.5 x 1.3 cm. There was also a L adrenal nodule measuring 1.6 cm. Urine cytology was evident of atypical urothelial cells, and repeat sample showed high grade urothelial  carcinoma.   He underwent a flexible cystoscopy with normal bladder findings. L RPG showed Left RPG with mild hydronephrosis and filling defect ~2 cm in central renal pelvis.  Left ureteroscopy with was done and showed papillary tumors along the proximal ureter and renal pelvis. - Large ~2 cm nodular lesion in the renal pelvis consistent with filling defect. Biopsy of the mass came back as - High grade urothelial dysplasia/carcinoma in situ. - No definitive invasive carcinoma identified (outside report)    PET/CT done on 03/08 showed Left proximal ureteral lesion compatible with urothelial cancer.  No FDG PET evidence of metastatic disease.  Benign bilateral adrenal adenomas.    Interval history:   He returns today prior to cycle 4, day 1 cis/gem. His bowel movements have improved and denies nausea. He has baseline hearing issues, worse in R, but not worse with last cycle. No new issues. Denies fevers, chills.    ROS:   Review of Systems   Constitutional:  Positive for malaise/fatigue. Negative for chills, fever and weight loss.   HENT:  Positive for hearing loss. Negative for congestion. Ear discharge: r>L.   Eyes:  Negative for blurred vision.   Respiratory:  Negative for cough and shortness of breath.    Cardiovascular:  Negative for chest pain, palpitations and leg swelling.   Gastrointestinal:  Positive for constipation. Negative for abdominal pain, blood in stool, diarrhea, nausea and vomiting.   Genitourinary:  Negative for dysuria and frequency.   Musculoskeletal:  Negative for back pain and myalgias.   Skin:  Negative for itching and rash.   Neurological:  Negative for dizziness, tingling, tremors, sensory change, focal weakness and headaches.   Endo/Heme/Allergies:  Does not bruise/bleed easily.   Psychiatric/Behavioral:  The patient is not nervous/anxious.      Past Medical History:   Past Medical History:   Diagnosis Date    Cataract     Colon polyp 2014    History of hepatitis C, s/p successful  treatment w/ SVR12 - 7/2015 10/14/2012    Kelsey 1a,  Liver biopsy 6/2014 - Stage 1 fibrosis;  Completed 8 weeks Harvoni w/ SVR12 - 7/2015      Hyperlipidemia     Lipoma of arm     Lipoma of lower extremity     Nuclear sclerosis - Both Eyes 10/22/2012    Other and unspecified hyperlipidemia 10/22/2013    PAD (peripheral artery disease)     Peripheral vascular disease, unspecified     Rheumatoid arthritis(714.0) 10/14/2012        Past Surgical History:   Past Surgical History:   Procedure Laterality Date    BIOPSY OF URETER Left 02/16/2023    Procedure: BIOPSY, URETER/BRUSH;  Surgeon: Kem Jefferson MD;  Location: [facility] OR 1ST FLR;  Service: Urology;  Laterality: Left;    COLONOSCOPY N/A 11/29/2021    Procedure: COLONOSCOPY;  Surgeon: Kenrick Zimmer MD;  Location: [facility] ENDO (4TH FLR);  Service: Endoscopy;  Laterality: N/A;  blood thinner approval received, see telephone encounter 10/19/21-BB  fully vacc-inst email-tb    CYSTOSCOPY      CYSTOSCOPY  02/16/2023    Procedure: CYSTOSCOPY;  Surgeon: Kem Jefferson MD;  Location: [facility] OR 1ST FLR;  Service: Urology;;    HERNIA REPAIR      INSERTION OF TUNNELED CENTRAL VENOUS CATHETER (CVC) WITH SUBCUTANEOUS PORT Right 3/13/2023    Procedure: INSERTION, PORT-A-CATH;  Surgeon: Rene Cali MD;  Location: [facility] CATH LAB;  Service: Radiology;  Laterality: Right;    KNEE ARTHROPLASTY      LAPAROSCOPIC EXPLORATION OF GROIN Left 09/06/2018    Procedure: EXPLORATION, INGUINAL REGION, LAPAROSCOPIC;  Surgeon: Mingo De Guzman Jr., MD;  Location: [facility] OR 2ND FLR;  Service: General;  Laterality: Left;    PYELOSCOPY Left 02/16/2023    Procedure: PYELOSCOPY;  Surgeon: Kem Jefferson MD;  Location: [facility] OR 1ST FLR;  Service: Urology;  Laterality: Left;    RETROGRADE PYELOGRAPHY Bilateral 02/16/2023    Procedure: PYELOGRAM, RETROGRADE;  Surgeon: Kem Jefferson MD;  [facility];  Service: Urology;  Laterality: Bilateral;    TONSILLECTOMY       URETEROSCOPIC REMOVAL OF URETERIC CALCULUS Left 2023    Procedure: REMOVAL, CALCULUS, URETER, URETEROSCOPIC;  Surgeon: Kem Jefferson MD;  Location: [facility] OR [address] [place] [address];  Service: Urology;  Laterality: Left;    URETEROSCOPY Left 2023    Procedure: URETEROSCOPY;  Surgeon: Kem Jefferson MD;  Location: [facility] OR [address] [place] [address];  Service: Urology;  Laterality: Left;        Family History:   Family History   Problem Relation Age of Onset    Heart disease Paternal Uncle     Dementia Mother     Heart disease Sister     Liver disease Neg Hx     Amblyopia Neg Hx     Blindness Neg Hx     Cancer Neg Hx     Cataracts Neg Hx     Diabetes Neg Hx     Glaucoma Neg Hx     Hypertension Neg Hx     Macular degeneration Neg Hx     Retinal detachment Neg Hx     Strabismus Neg Hx     Stroke Neg Hx     Thyroid disease Neg Hx         Social History:   Social History     Tobacco Use    Smoking status: Former     Types: Cigarettes     Quit date: 2020     Years since quittin.9    Smokeless tobacco: Never   Substance Use Topics    Alcohol use: Yes     Comment: 3-4 drinks per week        I have reviewed and updated the patient's past medical, surgical, family and social histories.    Allergies:   Review of patient's allergies indicates:  No Known Allergies     Medications:   Current Outpatient Medications   Medication Sig Dispense Refill    amLODIPine (NORVASC) 5 MG tablet TAKE 1 TABLET BY MOUTH ONCE DAILY 90 tablet 2    aspirin 81 MG Chew Take 81 mg by mouth once daily.      cilostazoL (PLETAL) 50 MG Tab Take 1 tablet (50 mg total) by mouth 2 (two) times daily. 180 tablet 3    doxazosin (CARDURA) 4 MG tablet TAKE 1 TABLET BY MOUTH IN  THE EVENING 90 tablet 3    folic acid (FOLVITE) 1 MG tablet Take 1 tablet (1,000 mcg total) by mouth once daily. 90 tablet 3    lactulose (CHRONULAC) 20 gram/30 mL Soln Take 30 mLs (20 g total) by mouth 3 (three) times daily. 200 mL 0    methotrexate 2.5 MG Tab Take 8 tablets (20 mg total) by  "mouth every 7 days. This medication requires periodic lab monitoring. 120 tablet 1    simvastatin (ZOCOR) 10 MG tablet TAKE 1 TABLET BY MOUTH EVERY DAY IN THE EVENING (Patient taking differently: 2 (two) times a day.) 90 tablet 6    traZODone (DESYREL) 50 MG tablet TAKE 3 TABLETS BY MOUTH AT  NIGHT AS NEEDED FOR  INSOMNIA 270 tablet 3     No current facility-administered medications for this visit.        Physical Exam:   BP (!) 146/70   Pulse 69   Temp 97.9 °F (36.6 °C) (Oral)   Resp 16   Ht 5' 9" (1.753 m)   Wt 70 kg (154 lb 5.2 oz)   SpO2 100%   BMI 22.79 kg/m²      ECOG Performance status: 1         Physical Exam  Constitutional:       General: He is not in acute distress.     Appearance: Normal appearance.   HENT:      Head: Normocephalic and atraumatic.   Eyes:      Pupils: Pupils are equal, round, and reactive to light.   Cardiovascular:      Rate and Rhythm: Normal rate and regular rhythm.      Heart sounds: No murmur heard.  Pulmonary:      Effort: No respiratory distress.      Breath sounds: Normal breath sounds. No wheezing.   Abdominal:      General: Abdomen is flat. Bowel sounds are normal. There is no distension.      Palpations: Abdomen is soft. There is no mass.      Tenderness: There is no abdominal tenderness.   Musculoskeletal:         General: No swelling or deformity.   Skin:     Coloration: Skin is not jaundiced.   Neurological:      General: No focal deficit present.      Mental Status: He is alert and oriented to person, place, and time. Mental status is at baseline.         Labs:   Recent Results (from the past 48 hour(s))   CBC Oncology    Collection Time: 05/30/23  7:36 AM   Result Value Ref Range    WBC 6.06 3.90 - 12.70 K/uL    RBC 2.94 (L) 4.60 - 6.20 M/uL    Hemoglobin 9.0 (L) 14.0 - 18.0 g/dL    Hematocrit 28.1 (L) 40.0 - 54.0 %    MCV 96 82 - 98 fL    MCH 30.6 27.0 - 31.0 pg    MCHC 32.0 32.0 - 36.0 g/dL    RDW 16.4 (H) 11.5 - 14.5 %    Platelets 109 (L) 150 - 450 K/uL    MPV " 9.3 9.2 - 12.9 fL    Gran # (ANC) 4.6 1.8 - 7.7 K/uL    Immature Grans (Abs) 0.06 (H) 0.00 - 0.04 K/uL   Comprehensive Metabolic Panel    Collection Time: 05/30/23  7:36 AM   Result Value Ref Range    Sodium 139 136 - 145 mmol/L    Potassium 4.4 3.5 - 5.1 mmol/L    Chloride 106 95 - 110 mmol/L    CO2 23 23 - 29 mmol/L    Glucose 98 70 - 110 mg/dL    BUN 22 8 - 23 mg/dL    Creatinine 1.3 0.5 - 1.4 mg/dL    Calcium 9.5 8.7 - 10.5 mg/dL    Total Protein 7.2 6.0 - 8.4 g/dL    Albumin 3.4 (L) 3.5 - 5.2 g/dL    Total Bilirubin 0.3 0.1 - 1.0 mg/dL    Alkaline Phosphatase 98 55 - 135 U/L    AST 11 10 - 40 U/L    ALT 8 (L) 10 - 44 U/L    Anion Gap 10 8 - 16 mmol/L    eGFR 59.5 (A) >60 mL/min/1.73 m^2   Magnesium    Collection Time: 05/30/23  7:36 AM   Result Value Ref Range    Magnesium 1.8 1.6 - 2.6 mg/dL       Assessment:       1. Urothelial carcinoma of kidney, left    2. Chemotherapy induced neutropenia    3. Antineoplastic chemotherapy induced anemia    4. Chemotherapy-induced thrombocytopenia    5. Stage 2 chronic kidney disease    6. Hearing loss, unspecified hearing loss type, unspecified laterality    7. Chemotherapy-induced nausea    8. Rheumatoid arthritis involving multiple sites with positive rheumatoid factor    9. Primary hypertension            Plan:   1,2- 68 y.o. M patient presented with gross hematuria and was found to have multiple soft tissue masses in L renal pelvis. He is s/p L urethroscopy and biopsy which showed high grade urothelial dysplasia. On CT urogram, there was a 1.6 cm L adrenal nodule, as well as multiple pulmonary nodules, largest was 0.6 cm, and multiple hepatic nodules too small to characterize.   Overall picture is concerning for invasive upper tract urothelial carcinoma.   PET/CT showed no evidence of metastatic disease. There is B/L adrenal adenomas.     Plan:   - Neoadjuvant chemotherapy with Gemcitabine and Cisplatin. Split dose Cisplatin for borderline kidney functions.  - Because  of his RA (not very controlled), will avoid IO adjuvantely.   -chemo held cycle 2, day 8 today with ANC of 600. Zarxio x 3. Now on every 2 week split dose +/- udencya on day 2.    Labs good for treatment today without growth factor last cycle.  Ok to proceed without growth factor this cycle. PETCT pending, will need to review with Dr. Antony or Dr. Jefferson before next cycle.     RTC 2 weeks with labs the day before (CBC,CMP,Mag), to see Dr. Antony and cis/gem cycle 4, day 15 with neulasta on day 2.    3,4- Mild, monitor.    5- Stable.    6- Audiogram showed normal hearing sensitivity from 250-2000 Hz sloping to a severe to profound hearing loss by 8000 Hz with a slight air bone gap at 1000 Hz in the right ear and normal hearing sensitivity from 250-2000 Hz sloping to a severe sensorineural hearing loss (SNHL) by 8000 Hz in the left ear. Will recheck post chemotherapy.    7- Zofran PRN. Tolerating well    8- On MTX. Will avoid adjuvant immunotherapy.    9- enrolled in [facility]. Per PCP.      Patient is in agreement with the proposed treatment plan. All questions were answered to the patient's satisfaction. Pt knows to call clinic if anything is needed before the next clinic visit.    MDM includes:    - Acute or chronic illness or injury that poses a threat to life or bodily function  - Independent review and explanation of 2 results from unique tests  - Discussion of management and ordering 2 unique tests  - Extensive discussion of treatment and management  - Prescription drug management  - Drug therapy requiring intensive monitoring for toxicity    Juan Blevins M.D.  Hematology/Oncology Attending  Director [facility] Program  [facility]    \        Med Onc Chart Routing      Follow up with physician 2 weeks. with labs the day before (CBC,CMP,Mag), to see Dr. Antony and cis/gem cycle 4, day 15 with neulasta on day 2.   Follow up with DESIREE    Infusion scheduling note cis/gem cycle 4, day 15  with neulasta on day 2.   Injection scheduling note    Labs CBC, CMP and magnesium   Scheduling: prior to infusion  Preferred lab:  Lab interval:     Imaging    Pharmacy appointment    Other referrals

## 2023-05-31 ENCOUNTER — PATIENT MESSAGE (OUTPATIENT)
Dept: ADMINISTRATIVE | Facility: OTHER | Age: 68
End: 2023-05-31
Payer: MEDICARE

## 2023-06-01 ENCOUNTER — INFUSION (OUTPATIENT)
Dept: INFUSION THERAPY | Facility: HOSPITAL | Age: 68
End: 2023-06-01
Attending: INTERNAL MEDICINE
Payer: MEDICARE

## 2023-06-01 ENCOUNTER — PATIENT MESSAGE (OUTPATIENT)
Dept: ADMINISTRATIVE | Facility: OTHER | Age: 68
End: 2023-06-01
Payer: MEDICARE

## 2023-06-01 VITALS
HEART RATE: 72 BPM | DIASTOLIC BLOOD PRESSURE: 72 MMHG | SYSTOLIC BLOOD PRESSURE: 150 MMHG | RESPIRATION RATE: 18 BRPM | WEIGHT: 154.31 LBS | BODY MASS INDEX: 22.79 KG/M2 | OXYGEN SATURATION: 99 % | TEMPERATURE: 98 F

## 2023-06-01 DIAGNOSIS — C68.9 UROTHELIAL CARCINOMA WITH HIGH RISK OF METASTASIS: Primary | ICD-10-CM

## 2023-06-01 PROCEDURE — 96413 CHEMO IV INFUSION 1 HR: CPT

## 2023-06-01 PROCEDURE — 25000003 PHARM REV CODE 250: Performed by: INTERNAL MEDICINE

## 2023-06-01 PROCEDURE — 96367 TX/PROPH/DG ADDL SEQ IV INF: CPT

## 2023-06-01 PROCEDURE — 96417 CHEMO IV INFUS EACH ADDL SEQ: CPT

## 2023-06-01 PROCEDURE — 96375 TX/PRO/DX INJ NEW DRUG ADDON: CPT

## 2023-06-01 PROCEDURE — A4216 STERILE WATER/SALINE, 10 ML: HCPCS | Performed by: INTERNAL MEDICINE

## 2023-06-01 PROCEDURE — 96366 THER/PROPH/DIAG IV INF ADDON: CPT

## 2023-06-01 PROCEDURE — 63600175 PHARM REV CODE 636 W HCPCS: Performed by: INTERNAL MEDICINE

## 2023-06-01 RX ORDER — SODIUM CHLORIDE 0.9 % (FLUSH) 0.9 %
10 SYRINGE (ML) INJECTION
Status: DISCONTINUED | OUTPATIENT
Start: 2023-06-01 | End: 2023-06-01 | Stop reason: HOSPADM

## 2023-06-01 RX ORDER — HEPARIN 100 UNIT/ML
500 SYRINGE INTRAVENOUS
Status: DISCONTINUED | OUTPATIENT
Start: 2023-06-01 | End: 2023-06-01 | Stop reason: HOSPADM

## 2023-06-01 RX ADMIN — GEMCITABINE HYDROCHLORIDE 1800 MG: 1 INJECTION, SOLUTION INTRAVENOUS at 01:06

## 2023-06-01 RX ADMIN — CISPLATIN 64 MG: 1 INJECTION, SOLUTION INTRAVENOUS at 02:06

## 2023-06-01 RX ADMIN — SODIUM CHLORIDE 999 ML/HR: 9 INJECTION, SOLUTION INTRAVENOUS at 02:06

## 2023-06-01 RX ADMIN — HEPARIN 500 UNITS: 100 SYRINGE at 03:06

## 2023-06-01 RX ADMIN — POTASSIUM CHLORIDE 500 ML/HR: 2 INJECTION, SOLUTION, CONCENTRATE INTRAVENOUS at 11:06

## 2023-06-01 RX ADMIN — Medication 10 ML: at 03:06

## 2023-06-01 RX ADMIN — APREPITANT 130 MG: 130 INJECTION, EMULSION INTRAVENOUS at 01:06

## 2023-06-01 RX ADMIN — DEXAMETHASONE SODIUM PHOSPHATE 0.25 MG: 4 INJECTION, SOLUTION INTRA-ARTICULAR; INTRALESIONAL; INTRAMUSCULAR; INTRAVENOUS; SOFT TISSUE at 01:06

## 2023-06-01 NOTE — PLAN OF CARE
1550 pt tolerated gemzar/Cisplatin and IVF without issue, pt to rtc 6/13/23, no distress noted upon d/c to home

## 2023-06-01 NOTE — PLAN OF CARE
1115 pt here for D1C4 Gemzar/Cisplatin with IVF, labs, hx, meds, allergies reviewed, pt with no new complaints at this time, reclined in chair, continue to monitor

## 2023-06-02 ENCOUNTER — PATIENT MESSAGE (OUTPATIENT)
Dept: ADMINISTRATIVE | Facility: OTHER | Age: 68
End: 2023-06-02
Payer: MEDICARE

## 2023-06-03 ENCOUNTER — PATIENT MESSAGE (OUTPATIENT)
Dept: ADMINISTRATIVE | Facility: OTHER | Age: 68
End: 2023-06-03
Payer: MEDICARE

## 2023-06-04 ENCOUNTER — PATIENT MESSAGE (OUTPATIENT)
Dept: ADMINISTRATIVE | Facility: OTHER | Age: 68
End: 2023-06-04
Payer: MEDICARE

## 2023-06-05 ENCOUNTER — TELEPHONE (OUTPATIENT)
Dept: HEMATOLOGY/ONCOLOGY | Facility: CLINIC | Age: 68
End: 2023-06-05
Payer: MEDICARE

## 2023-06-05 ENCOUNTER — PATIENT MESSAGE (OUTPATIENT)
Dept: ADMINISTRATIVE | Facility: OTHER | Age: 68
End: 2023-06-05
Payer: MEDICARE

## 2023-06-05 NOTE — TELEPHONE ENCOUNTER
----- Message from Melissa Murphy sent at 6/5/2023  4:30 PM CDT -----  Regarding: Treatment Plan  Contact: Pt  Pt is requesting a callback in regards to treatment plan and care. Please adv pt          Confirmed contact below:   Contact Name:Andrea Martinoeveliaashlyn  Phone Number: [phone]

## 2023-06-05 NOTE — TELEPHONE ENCOUNTER
I spoke with patient. He had a PET scan a couple of weeks ago and he doesn't understand it. He would like for someone to call and explain to him. I told him that Dr. Antony has been out of town, but would return tomorrow and I would ask him to call.

## 2023-06-06 ENCOUNTER — PATIENT MESSAGE (OUTPATIENT)
Dept: ADMINISTRATIVE | Facility: OTHER | Age: 68
End: 2023-06-06
Payer: MEDICARE

## 2023-06-07 ENCOUNTER — PATIENT MESSAGE (OUTPATIENT)
Dept: ADMINISTRATIVE | Facility: OTHER | Age: 68
End: 2023-06-07
Payer: MEDICARE

## 2023-06-08 ENCOUNTER — PATIENT MESSAGE (OUTPATIENT)
Dept: ADMINISTRATIVE | Facility: OTHER | Age: 68
End: 2023-06-08
Payer: MEDICARE

## 2023-06-09 ENCOUNTER — PATIENT MESSAGE (OUTPATIENT)
Dept: ADMINISTRATIVE | Facility: OTHER | Age: 68
End: 2023-06-09
Payer: MEDICARE

## 2023-06-10 ENCOUNTER — PATIENT MESSAGE (OUTPATIENT)
Dept: ADMINISTRATIVE | Facility: OTHER | Age: 68
End: 2023-06-10
Payer: MEDICARE

## 2023-06-11 ENCOUNTER — PATIENT MESSAGE (OUTPATIENT)
Dept: ADMINISTRATIVE | Facility: OTHER | Age: 68
End: 2023-06-11
Payer: MEDICARE

## 2023-06-12 ENCOUNTER — PATIENT MESSAGE (OUTPATIENT)
Dept: ADMINISTRATIVE | Facility: OTHER | Age: 68
End: 2023-06-12
Payer: MEDICARE

## 2023-06-13 ENCOUNTER — PATIENT MESSAGE (OUTPATIENT)
Dept: ADMINISTRATIVE | Facility: OTHER | Age: 68
End: 2023-06-13
Payer: MEDICARE

## 2023-06-13 NOTE — PROGRESS NOTES
MEDICAL ONCOLOGY - FOLLOW-UP VISIT    Reason for visit: Upper tract Urothelial carcinoma     Oncology History   Urothelial carcinoma with high risk of metastasis   3/6/2023 Initial Diagnosis    Urothelial carcinoma with high risk of metastasis     3/14/2023 -  Chemotherapy    Treatment Summary   Plan Name: OP BLADDER GEMCITABINE CISPLATIN (SPLIT DOSE CISPLATIN) Q3W  Treatment Goal: Curative  Status: Active  Start Date: 3/14/2023  End Date: 6/14/2023 (Planned)  Provider: Issa Antony MD  Chemotherapy: CISplatin (Platinol) 35 mg/m2 = 64 mg in sodium chloride 0.9% 314 mL chemo infusion, 35 mg/m2 = 64 mg, Intravenous, Clinic/HOD 1 time, 4 of 4 cycles  Dose modification: 17.5 mg/m2 (original dose 35 mg/m2, Cycle 3, Reason: Other (see comments), Comment: renal function), 35 mg/m2 (original dose 35 mg/m2, Cycle 3, Reason: Other (see comments)), 17.5 mg/m2 (original dose 35 mg/m2, Cycle 2, Reason: Other (see comments), Comment: renal function)  Administration: 64 mg (3/14/2023), 64 mg (3/21/2023), 64 mg (4/19/2023), 64 mg (5/4/2023), 64 mg (5/16/2023), 32 mg (4/4/2023), 64 mg (6/1/2023)  gemcitabine 1,800 mg in sodium chloride 0.9% SolP 332.34 mL chemo infusion, 1,840 mg, Intravenous, Clinic/HOD 1 time, 4 of 4 cycles  Administration: 1,800 mg (3/14/2023), 1,800 mg (3/21/2023), 1,800 mg (4/4/2023), 1,800 mg (4/19/2023), 1,800 mg (5/4/2023), 1,800 mg (5/16/2023), 1,800 mg (6/1/2023)          HPI:   68 y.o. male with PVD, PAD, HLD, CKD3, AAA, treated hep C, Rheumatoid arthritis on MTX (previously on humara), smoking (about 1/2 PPD) who presented with gross hematuria. He saw urology and underwent CT urogram which showed Few soft tissue masses within the left renal pelvis, the largest measures 2.5 x 1.3 cm. There was also a L adrenal nodule measuring 1.6 cm. Urine cytology was evident of atypical urothelial cells, and repeat sample showed high grade urothelial carcinoma.   He underwent a flexible cystoscopy with normal  bladder findings. L RPG showed Left RPG with mild hydronephrosis and filling defect ~2 cm in central renal pelvis.  Left ureteroscopy with was done and showed papillary tumors along the proximal ureter and renal pelvis. - Large ~2 cm nodular lesion in the renal pelvis consistent with filling defect. Biopsy of the mass came back as - High grade urothelial dysplasia/carcinoma in situ. - No definitive invasive carcinoma identified (outside report)    PET/CT done on 03/08 showed Left proximal ureteral lesion compatible with urothelial cancer.  No FDG PET evidence of metastatic disease.  Benign bilateral adrenal adenomas.    Interval history:   He returns today prior to cycle 4, day 14 of split dose cis/gem. His bowel movements have improved and denies nausea. He has baseline hearing issues, worse in R, but not worse with last cycle. No new issues. Denies fevers, chills.    ROS:   Review of Systems   Constitutional:  Positive for malaise/fatigue. Negative for chills, fever and weight loss.   HENT:  Positive for hearing loss. Negative for congestion. Ear discharge: r>L.   Eyes:  Negative for blurred vision.   Respiratory:  Negative for cough and shortness of breath.    Cardiovascular:  Negative for chest pain, palpitations and leg swelling.   Gastrointestinal:  Positive for constipation. Negative for abdominal pain, blood in stool, diarrhea, nausea and vomiting.   Genitourinary:  Negative for dysuria and frequency.   Musculoskeletal:  Negative for back pain and myalgias.   Skin:  Negative for itching and rash.   Neurological:  Negative for dizziness, tingling, tremors, sensory change, focal weakness and headaches.   Endo/Heme/Allergies:  Does not bruise/bleed easily.   Psychiatric/Behavioral:  The patient is not nervous/anxious.      Past Medical History:   Past Medical History:   Diagnosis Date    Cataract     Colon polyp 2014    History of hepatitis C, s/p successful treatment w/ SVR12 - 7/2015 10/14/2012    Kelsey 1a,   Liver biopsy 6/2014 - Stage 1 fibrosis;  Completed 8 weeks Harvoni w/ SVR12 - 7/2015      Hyperlipidemia     Lipoma of arm     Lipoma of lower extremity     Nuclear sclerosis - Both Eyes 10/22/2012    Other and unspecified hyperlipidemia 10/22/2013    PAD (peripheral artery disease)     Peripheral vascular disease, unspecified     Rheumatoid arthritis(714.0) 10/14/2012        Past Surgical History:   Past Surgical History:   Procedure Laterality Date    BIOPSY OF URETER Left 02/16/2023    Procedure: BIOPSY, URETER/BRUSH;  Surgeon: Kem Jefferson MD;  Location: [facility] OR 1ST FLR;  Service: Urology;  Laterality: Left;    COLONOSCOPY N/A 11/29/2021    Procedure: COLONOSCOPY;  Surgeon: Kenrick Zimmer MD;  Location: [facility] ENDO (4TH FLR);  Service: Endoscopy;  Laterality: N/A;  blood thinner approval received, see telephone encounter 10/19/21-BB  fully vacc-inst email-tb    CYSTOSCOPY      CYSTOSCOPY  02/16/2023    Procedure: CYSTOSCOPY;  Surgeon: Kem Jefferson MD;  Location: [facility] OR 1ST FLR;  Service: Urology;;    HERNIA REPAIR      INSERTION OF TUNNELED CENTRAL VENOUS CATHETER (CVC) WITH SUBCUTANEOUS PORT Right 3/13/2023    Procedure: INSERTION, PORT-A-CATH;  Surgeon: Rene Cali MD;  Location: [facility] CATH LAB;  Service: Radiology;  Laterality: Right;    KNEE ARTHROPLASTY      LAPAROSCOPIC EXPLORATION OF GROIN Left 09/06/2018    Procedure: EXPLORATION, INGUINAL REGION, LAPAROSCOPIC;  Surgeon: Mingo De Guzman Jr., MD;  Location: [facility] OR 2ND FLR;  Service: General;  Laterality: Left;    PYELOSCOPY Left 02/16/2023    Procedure: PYELOSCOPY;  Surgeon: Kem Jefferson MD;  Location: [facility] OR 1ST FLR;  Service: Urology;  Laterality: Left;    RETROGRADE PYELOGRAPHY Bilateral 02/16/2023    Procedure: PYELOGRAM, RETROGRADE;  Surgeon: Kem Jefferson MD;  [facility];  Service: Urology;  Laterality: Bilateral;    TONSILLECTOMY      URETEROSCOPIC REMOVAL OF URETERIC CALCULUS Left 02/16/2023     Procedure: REMOVAL, CALCULUS, URETER, URETEROSCOPIC;  Surgeon: Kem Jefferson MD;  Location: [facility] OR [address] [place] [address];  Service: Urology;  Laterality: Left;    URETEROSCOPY Left 2023    Procedure: URETEROSCOPY;  Surgeon: Kem Jefferson MD;  Location: [facility] OR [address] [place] [address];  Service: Urology;  Laterality: Left;        Family History:   Family History   Problem Relation Age of Onset    Heart disease Paternal Uncle     Dementia Mother     Heart disease Sister     Liver disease Neg Hx     Amblyopia Neg Hx     Blindness Neg Hx     Cancer Neg Hx     Cataracts Neg Hx     Diabetes Neg Hx     Glaucoma Neg Hx     Hypertension Neg Hx     Macular degeneration Neg Hx     Retinal detachment Neg Hx     Strabismus Neg Hx     Stroke Neg Hx     Thyroid disease Neg Hx         Social History:   Social History     Tobacco Use    Smoking status: Former     Types: Cigarettes     Quit date: 2020     Years since quittin.9    Smokeless tobacco: Never   Substance Use Topics    Alcohol use: Yes     Comment: 3-4 drinks per week        I have reviewed and updated the patient's past medical, surgical, family and social histories.    Allergies:   Review of patient's allergies indicates:  No Known Allergies     Medications:   Current Outpatient Medications   Medication Sig Dispense Refill    amLODIPine (NORVASC) 5 MG tablet TAKE 1 TABLET BY MOUTH ONCE DAILY 90 tablet 2    aspirin 81 MG Chew Take 81 mg by mouth once daily.      cilostazoL (PLETAL) 50 MG Tab Take 1 tablet (50 mg total) by mouth 2 (two) times daily. 180 tablet 3    doxazosin (CARDURA) 4 MG tablet TAKE 1 TABLET BY MOUTH IN  THE EVENING 90 tablet 3    folic acid (FOLVITE) 1 MG tablet Take 1 tablet (1,000 mcg total) by mouth once daily. 90 tablet 3    lactulose (CHRONULAC) 20 gram/30 mL Soln Take 30 mLs (20 g total) by mouth 3 (three) times daily. 200 mL 0    methotrexate 2.5 MG Tab Take 8 tablets (20 mg total) by mouth every 7 days. This medication requires periodic lab  "monitoring. 120 tablet 1    simvastatin (ZOCOR) 10 MG tablet TAKE 1 TABLET BY MOUTH EVERY DAY IN THE EVENING (Patient taking differently: 2 (two) times a day.) 90 tablet 6    simvastatin (ZOCOR) 10 MG tablet Take 1 tablet (10 mg total) by mouth every evening. 90 tablet 9    traZODone (DESYREL) 50 MG tablet TAKE 3 TABLETS BY MOUTH AT  NIGHT AS NEEDED FOR  INSOMNIA 270 tablet 3     No current facility-administered medications for this visit.        Physical Exam:   /72 (BP Location: Right arm, Patient Position: Sitting, BP Method: Medium (Automatic))   Pulse 63   Temp 97.8 °F (36.6 °C) (Oral)   Resp 18   Ht 5' 9" (1.753 m)   Wt 70 kg (154 lb 5.2 oz)   SpO2 99%   BMI 22.79 kg/m²      ECOG Performance status: 1         Physical Exam  Constitutional:       General: He is not in acute distress.     Appearance: Normal appearance.   HENT:      Head: Normocephalic and atraumatic.   Eyes:      Pupils: Pupils are equal, round, and reactive to light.   Cardiovascular:      Rate and Rhythm: Normal rate and regular rhythm.      Heart sounds: No murmur heard.  Pulmonary:      Effort: No respiratory distress.      Breath sounds: Normal breath sounds. No wheezing.   Abdominal:      General: Abdomen is flat. Bowel sounds are normal. There is no distension.      Palpations: Abdomen is soft. There is no mass.      Tenderness: There is no abdominal tenderness.   Musculoskeletal:         General: No swelling or deformity.   Skin:     Coloration: Skin is not jaundiced.   Neurological:      General: No focal deficit present.      Mental Status: He is alert and oriented to person, place, and time. Mental status is at baseline.         Labs:   Recent Results (from the past 48 hour(s))   CBC Oncology    Collection Time: 06/14/23  9:03 AM   Result Value Ref Range    WBC 5.80 3.90 - 12.70 K/uL    RBC 2.89 (L) 4.60 - 6.20 M/uL    Hemoglobin 9.1 (L) 14.0 - 18.0 g/dL    Hematocrit 28.9 (L) 40.0 - 54.0 %     (H) 82 - 98 fL    " MCH 31.5 (H) 27.0 - 31.0 pg    MCHC 31.5 (L) 32.0 - 36.0 g/dL    RDW 16.2 (H) 11.5 - 14.5 %    Platelets 64 (L) 150 - 450 K/uL    MPV 9.7 9.2 - 12.9 fL    Gran # (ANC) 4.2 1.8 - 7.7 K/uL    Immature Grans (Abs) 0.02 0.00 - 0.04 K/uL   COMPREHENSIVE METABOLIC PANEL    Collection Time: 06/14/23  9:03 AM   Result Value Ref Range    Sodium 138 136 - 145 mmol/L    Potassium 4.6 3.5 - 5.1 mmol/L    Chloride 105 95 - 110 mmol/L    CO2 25 23 - 29 mmol/L    Glucose 92 70 - 110 mg/dL    BUN 18 8 - 23 mg/dL    Creatinine 1.3 0.5 - 1.4 mg/dL    Calcium 9.4 8.7 - 10.5 mg/dL    Total Protein 7.2 6.0 - 8.4 g/dL    Albumin 3.7 3.5 - 5.2 g/dL    Total Bilirubin 0.3 0.1 - 1.0 mg/dL    Alkaline Phosphatase 95 55 - 135 U/L    AST 14 10 - 40 U/L    ALT 8 (L) 10 - 44 U/L    Anion Gap 8 8 - 16 mmol/L    eGFR 59.5 (A) >60 mL/min/1.73 m^2   Magnesium    Collection Time: 06/14/23  9:03 AM   Result Value Ref Range    Magnesium 2.0 1.6 - 2.6 mg/dL         Assessment:       1. Urothelial carcinoma of kidney, left          Plan:      68 y.o. M patient presented with gross hematuria and was found to have multiple soft tissue masses in L renal pelvis. He is s/p L urethroscopy and biopsy which showed high grade urothelial dysplasia. On CT urogram, there was a 1.6 cm L adrenal nodule, as well as multiple pulmonary nodules, largest was 0.6 cm, and multiple hepatic nodules too small to characterize.   Overall picture is concerning for invasive upper tract urothelial carcinoma.   PET/CT showed no evidence of metastatic disease. There is B/L adrenal adenomas.  Also PET showed small pleural effusion/inflammation?  Will monitor closely.     Plan:   - Neoadjuvant chemotherapy with Gemcitabine and Cisplatin. Split dose Cisplatin for borderline kidney functions.  - Because of his RA (not very controlled), will avoid IO adjuvantely.   -chemo hold today given low plts, will see pt back as scheduled in 2 wks.     Will also send back to Dr. Jefferson to begin  surgery planing.        Schedule to see Dr. Jefferson in urology ASA for surgery planning (he has seen him before).  RTC as scheduled.        Audiogram showed normal hearing sensitivity from 250-2000 Hz sloping to a severe to profound hearing loss by 8000 Hz with a slight air bone gap at 1000 Hz in the right ear and normal hearing sensitivity from 250-2000 Hz sloping to a severe sensorineural hearing loss (SNHL) by 8000 Hz in the left ear. Will recheck post chemotherapy.    On MTX. Will avoid adjuvant immunotherapy.      Patient is in agreement with the proposed treatment plan. All questions were answered to the patient's satisfaction. Pt knows to call clinic if anything is needed before the next clinic visit.    More than 40 mins total time were spent during this encounter.    Issa Antony M.D., M.S., F.A.C.P.  Hematology and Oncology Attending  [facility]                  Med Onc Chart Routing      Follow up with physician Other. Schedule to see Dr. Jefferson in urology ASAP for surgery planning (he has seen him before).  RTC as scheduled.   Follow up with DESIREE    Infusion scheduling note    Injection scheduling note    Labs    Imaging    Pharmacy appointment    Other referrals

## 2023-06-14 ENCOUNTER — LAB VISIT (OUTPATIENT)
Dept: LAB | Facility: HOSPITAL | Age: 68
End: 2023-06-14
Payer: MEDICARE

## 2023-06-14 ENCOUNTER — PATIENT MESSAGE (OUTPATIENT)
Dept: ADMINISTRATIVE | Facility: OTHER | Age: 68
End: 2023-06-14
Payer: MEDICARE

## 2023-06-14 ENCOUNTER — OFFICE VISIT (OUTPATIENT)
Dept: HEMATOLOGY/ONCOLOGY | Facility: CLINIC | Age: 68
End: 2023-06-14
Payer: MEDICARE

## 2023-06-14 VITALS
TEMPERATURE: 98 F | WEIGHT: 154.31 LBS | SYSTOLIC BLOOD PRESSURE: 132 MMHG | RESPIRATION RATE: 18 BRPM | HEART RATE: 63 BPM | DIASTOLIC BLOOD PRESSURE: 72 MMHG | HEIGHT: 69 IN | BODY MASS INDEX: 22.85 KG/M2 | OXYGEN SATURATION: 99 %

## 2023-06-14 DIAGNOSIS — C64.2 UROTHELIAL CARCINOMA OF KIDNEY, LEFT: ICD-10-CM

## 2023-06-14 DIAGNOSIS — C64.2 UROTHELIAL CARCINOMA OF KIDNEY, LEFT: Primary | ICD-10-CM

## 2023-06-14 LAB
ALBUMIN SERPL BCP-MCNC: 3.7 G/DL (ref 3.5–5.2)
ALP SERPL-CCNC: 95 U/L (ref 55–135)
ALT SERPL W/O P-5'-P-CCNC: 8 U/L (ref 10–44)
ANION GAP SERPL CALC-SCNC: 8 MMOL/L (ref 8–16)
AST SERPL-CCNC: 14 U/L (ref 10–40)
BILIRUB SERPL-MCNC: 0.3 MG/DL (ref 0.1–1)
BUN SERPL-MCNC: 18 MG/DL (ref 8–23)
CALCIUM SERPL-MCNC: 9.4 MG/DL (ref 8.7–10.5)
CHLORIDE SERPL-SCNC: 105 MMOL/L (ref 95–110)
CO2 SERPL-SCNC: 25 MMOL/L (ref 23–29)
CREAT SERPL-MCNC: 1.3 MG/DL (ref 0.5–1.4)
ERYTHROCYTE [DISTWIDTH] IN BLOOD BY AUTOMATED COUNT: 16.2 % (ref 11.5–14.5)
EST. GFR  (NO RACE VARIABLE): 59.5 ML/MIN/1.73 M^2
GLUCOSE SERPL-MCNC: 92 MG/DL (ref 70–110)
HCT VFR BLD AUTO: 28.9 % (ref 40–54)
HGB BLD-MCNC: 9.1 G/DL (ref 14–18)
IMM GRANULOCYTES # BLD AUTO: 0.02 K/UL (ref 0–0.04)
MAGNESIUM SERPL-MCNC: 2 MG/DL (ref 1.6–2.6)
MCH RBC QN AUTO: 31.5 PG (ref 27–31)
MCHC RBC AUTO-ENTMCNC: 31.5 G/DL (ref 32–36)
MCV RBC AUTO: 100 FL (ref 82–98)
NEUTROPHILS # BLD AUTO: 4.2 K/UL (ref 1.8–7.7)
PLATELET # BLD AUTO: 64 K/UL (ref 150–450)
PMV BLD AUTO: 9.7 FL (ref 9.2–12.9)
POTASSIUM SERPL-SCNC: 4.6 MMOL/L (ref 3.5–5.1)
PROT SERPL-MCNC: 7.2 G/DL (ref 6–8.4)
RBC # BLD AUTO: 2.89 M/UL (ref 4.6–6.2)
SODIUM SERPL-SCNC: 138 MMOL/L (ref 136–145)
WBC # BLD AUTO: 5.8 K/UL (ref 3.9–12.7)

## 2023-06-14 PROCEDURE — 99999 PR PBB SHADOW E&M-EST. PATIENT-LVL III: CPT | Mod: PBBFAC,,, | Performed by: INTERNAL MEDICINE

## 2023-06-14 PROCEDURE — 99499 UNLISTED E&M SERVICE: CPT | Mod: S$GLB,,, | Performed by: INTERNAL MEDICINE

## 2023-06-14 PROCEDURE — 3008F PR BODY MASS INDEX (BMI) DOCUMENTED: ICD-10-PCS | Mod: CPTII,S$GLB,, | Performed by: INTERNAL MEDICINE

## 2023-06-14 PROCEDURE — 1126F AMNT PAIN NOTED NONE PRSNT: CPT | Mod: CPTII,S$GLB,, | Performed by: INTERNAL MEDICINE

## 2023-06-14 PROCEDURE — 80053 COMPREHEN METABOLIC PANEL: CPT | Performed by: NURSE PRACTITIONER

## 2023-06-14 PROCEDURE — 3075F SYST BP GE 130 - 139MM HG: CPT | Mod: CPTII,S$GLB,, | Performed by: INTERNAL MEDICINE

## 2023-06-14 PROCEDURE — 1126F PR PAIN SEVERITY QUANTIFIED, NO PAIN PRESENT: ICD-10-PCS | Mod: CPTII,S$GLB,, | Performed by: INTERNAL MEDICINE

## 2023-06-14 PROCEDURE — 3288F PR FALLS RISK ASSESSMENT DOCUMENTED: ICD-10-PCS | Mod: CPTII,S$GLB,, | Performed by: INTERNAL MEDICINE

## 2023-06-14 PROCEDURE — 99215 PR OFFICE/OUTPT VISIT, EST, LEVL V, 40-54 MIN: ICD-10-PCS | Mod: S$GLB,,, | Performed by: INTERNAL MEDICINE

## 2023-06-14 PROCEDURE — 3078F DIAST BP <80 MM HG: CPT | Mod: CPTII,S$GLB,, | Performed by: INTERNAL MEDICINE

## 2023-06-14 PROCEDURE — 85027 COMPLETE CBC AUTOMATED: CPT | Performed by: NURSE PRACTITIONER

## 2023-06-14 PROCEDURE — 3075F PR MOST RECENT SYSTOLIC BLOOD PRESS GE 130-139MM HG: ICD-10-PCS | Mod: CPTII,S$GLB,, | Performed by: INTERNAL MEDICINE

## 2023-06-14 PROCEDURE — 1101F PT FALLS ASSESS-DOCD LE1/YR: CPT | Mod: CPTII,S$GLB,, | Performed by: INTERNAL MEDICINE

## 2023-06-14 PROCEDURE — 3078F PR MOST RECENT DIASTOLIC BLOOD PRESSURE < 80 MM HG: ICD-10-PCS | Mod: CPTII,S$GLB,, | Performed by: INTERNAL MEDICINE

## 2023-06-14 PROCEDURE — 99215 OFFICE O/P EST HI 40 MIN: CPT | Mod: S$GLB,,, | Performed by: INTERNAL MEDICINE

## 2023-06-14 PROCEDURE — 3008F BODY MASS INDEX DOCD: CPT | Mod: CPTII,S$GLB,, | Performed by: INTERNAL MEDICINE

## 2023-06-14 PROCEDURE — 3288F FALL RISK ASSESSMENT DOCD: CPT | Mod: CPTII,S$GLB,, | Performed by: INTERNAL MEDICINE

## 2023-06-14 PROCEDURE — 83735 ASSAY OF MAGNESIUM: CPT | Performed by: NURSE PRACTITIONER

## 2023-06-14 PROCEDURE — 99999 PR PBB SHADOW E&M-EST. PATIENT-LVL III: ICD-10-PCS | Mod: PBBFAC,,, | Performed by: INTERNAL MEDICINE

## 2023-06-14 PROCEDURE — 99499 RISK ADDL DX/OHS AUDIT: ICD-10-PCS | Mod: S$GLB,,, | Performed by: INTERNAL MEDICINE

## 2023-06-14 PROCEDURE — 36415 COLL VENOUS BLD VENIPUNCTURE: CPT | Performed by: NURSE PRACTITIONER

## 2023-06-14 PROCEDURE — 1101F PR PT FALLS ASSESS DOC 0-1 FALLS W/OUT INJ PAST YR: ICD-10-PCS | Mod: CPTII,S$GLB,, | Performed by: INTERNAL MEDICINE

## 2023-06-14 RX ORDER — SODIUM CHLORIDE 0.9 % (FLUSH) 0.9 %
10 SYRINGE (ML) INJECTION
Status: CANCELLED | OUTPATIENT
Start: 2023-06-14

## 2023-06-14 RX ORDER — HEPARIN 100 UNIT/ML
500 SYRINGE INTRAVENOUS
Status: CANCELLED | OUTPATIENT
Start: 2023-06-14

## 2023-06-15 ENCOUNTER — PATIENT MESSAGE (OUTPATIENT)
Dept: ADMINISTRATIVE | Facility: OTHER | Age: 68
End: 2023-06-15
Payer: MEDICARE

## 2023-06-16 ENCOUNTER — TELEPHONE (OUTPATIENT)
Dept: UROLOGY | Facility: CLINIC | Age: 68
End: 2023-06-16
Payer: MEDICARE

## 2023-06-16 ENCOUNTER — PATIENT MESSAGE (OUTPATIENT)
Dept: ADMINISTRATIVE | Facility: OTHER | Age: 68
End: 2023-06-16
Payer: MEDICARE

## 2023-06-16 NOTE — TELEPHONE ENCOUNTER
I spoke with patient, who agreed to appt with  to discuss surgery per  orders.        ----- Message from Kem Jefferson MD sent at 6/15/2023  1:47 PM CDT -----  Regarding: RE: Oncology CC'd Chart  Yes---needs f/u in the office to discuss nephroureterectomy.     MM    ----- Message -----  From: Estela Gaona LPN  Sent: 6/15/2023  11:48 AM CDT  To: Kem Jefferson MD, Sanam Sandoval RN  Subject: FW: Oncology CC'd Chart                            ----- Message -----  From: Roque Gordon  Sent: 6/14/2023   7:37 PM CDT  To: [facility] Urology Clinical Staff, #  Subject: FW: Oncology CC'd Chart                          Can we get pt in for surgery planning, patient is established.    ----- Message -----  From: Issa Antony MD  Sent: 6/14/2023  10:19 AM CDT  To: [facility] Hemonc  Pool  Subject: Oncology CC'd Chart

## 2023-06-17 ENCOUNTER — PATIENT MESSAGE (OUTPATIENT)
Dept: ADMINISTRATIVE | Facility: OTHER | Age: 68
End: 2023-06-17
Payer: MEDICARE

## 2023-06-18 ENCOUNTER — PATIENT MESSAGE (OUTPATIENT)
Dept: ADMINISTRATIVE | Facility: OTHER | Age: 68
End: 2023-06-18
Payer: MEDICARE

## 2023-06-19 ENCOUNTER — PATIENT MESSAGE (OUTPATIENT)
Dept: ADMINISTRATIVE | Facility: OTHER | Age: 68
End: 2023-06-19
Payer: MEDICARE

## 2023-06-19 NOTE — PROGRESS NOTES
"[facility] - Urology [facility]   Clinic Note    SUBJECTIVE:     Chief Complaint: UTUC    History of Present Illness:  Andrea Gant is a 68 y.o. male who presents to clinic for UTUC. He is established to our clinic. Referral from No ref. provider found.    Patient with a history of CKD, PVD, HLD presenting for UTUC. Patient initially presented to [facility] with gross hematuria, had a normal cystoscopy. Had a CTU with a filling defect in the L renal pelvis. No evidence of RP lymphadenopathy.           Underwent ureteroscopy with biopsy on 2/16/23, pathology with HG urothelial dysplasia, CIS. Cytology positive.   Recently completed neoadjuvant chemotherapy with gemcitabine and cisplatin (next week 6/27/23).  Reports no adverse events or side effects from the chemo. Here to discuss consolidative surgery.      OBJECTIVE:     Estimated body mass index is 22.79 kg/m² as calculated from the following:    Height as of 6/14/23: 5' 9" (1.753 m).    Weight as of 6/14/23: 70 kg (154 lb 5.2 oz).    Vital Signs (Most Recent)       Physical Exam  Constitutional:       General: He is not in acute distress.     Appearance: He is well-developed. He is not diaphoretic.   HENT:      Head: Normocephalic and atraumatic.   Eyes:      General: No scleral icterus.  Pulmonary:      Effort: Pulmonary effort is normal. No respiratory distress.      Breath sounds: No stridor.   Abdominal:      General: There is no distension.      Palpations: Abdomen is soft.      Tenderness: There is no abdominal tenderness.       Musculoskeletal:         General: Normal range of motion.      Cervical back: Normal range of motion.   Skin:     General: Skin is warm and dry.   Neurological:      Mental Status: He is alert.   Psychiatric:         Behavior: Behavior normal.       Lab Results   Component Value Date    BUN 18 06/14/2023    CREATININE 1.3 06/14/2023    WBC 5.80 06/14/2023    HGB 9.1 (L) 06/14/2023    HCT 28.9 (L) 06/14/2023    PLT 64 (L) " 06/14/2023    AST 14 06/14/2023    ALT 8 (L) 06/14/2023    ALKPHOS 95 06/14/2023    ALBUMIN 3.7 06/14/2023    HGBA1C 5.0 05/17/2022        Lab Results   Component Value Date    PSA 0.76 10/11/2021    PSA 0.49 08/01/2019    PSA 0.42 08/15/2018    PSA 0.43 01/11/2012    PSA 0.35 08/19/2009    PSA 0.2 05/02/2006    PSADIAG 0.71 02/08/2023       ASSESSMENT     1. Urothelial carcinoma with high risk of metastasis    2. Ureteral tumor    3. Stage 3a chronic kidney disease    4. Primary hypertension        PLAN:   1. Urothelial carcinoma with high risk of metastasis    2. Ureteral tumor    3. Stage 3a chronic kidney disease    4. Primary hypertension        --I have explained the risk, benefits, and alternatives of the procedure in detail.  The risks including but not limited to bleeding, injury to adjacent structures including the spleen, liver, lung, pancreas, colon and intestines, blood vessels in the abdomen including the renal artery or renal vein, or need for conversion to open nephrectomy were explained to the patient in depth. The patient voices understanding and all questions have been answered. The patient agrees to proceed as planned with a left robotic nephrectomy with total ureterectomy.  Tentatively plan for surgery on 7/10/23.     -most recent serum creatinine was 1.3 which is stable.  Will continue to follow.    --BP reviewed today and normal  -continue current medications        The patient was seen and examined with the resident physician.  All questions were answered.  The plan was discussed with the patient and I concur with the resident physician's documentation.        Kem Jefferson MD

## 2023-06-20 ENCOUNTER — TELEPHONE (OUTPATIENT)
Dept: PREADMISSION TESTING | Facility: HOSPITAL | Age: 68
End: 2023-06-20

## 2023-06-20 ENCOUNTER — PATIENT MESSAGE (OUTPATIENT)
Dept: ADMINISTRATIVE | Facility: OTHER | Age: 68
End: 2023-06-20
Payer: MEDICARE

## 2023-06-20 ENCOUNTER — OFFICE VISIT (OUTPATIENT)
Dept: UROLOGY | Facility: CLINIC | Age: 68
End: 2023-06-20
Payer: MEDICARE

## 2023-06-20 VITALS
HEIGHT: 69 IN | WEIGHT: 154.31 LBS | HEART RATE: 75 BPM | DIASTOLIC BLOOD PRESSURE: 73 MMHG | SYSTOLIC BLOOD PRESSURE: 128 MMHG | BODY MASS INDEX: 22.85 KG/M2

## 2023-06-20 DIAGNOSIS — N18.31 STAGE 3A CHRONIC KIDNEY DISEASE: ICD-10-CM

## 2023-06-20 DIAGNOSIS — C68.9 UROTHELIAL CARCINOMA WITH HIGH RISK OF METASTASIS: Primary | ICD-10-CM

## 2023-06-20 DIAGNOSIS — D49.59 URETERAL TUMOR: ICD-10-CM

## 2023-06-20 DIAGNOSIS — I10 PRIMARY HYPERTENSION: ICD-10-CM

## 2023-06-20 PROCEDURE — 99215 PR OFFICE/OUTPT VISIT, EST, LEVL V, 40-54 MIN: ICD-10-PCS | Mod: S$GLB,,, | Performed by: UROLOGY

## 2023-06-20 PROCEDURE — 1160F RVW MEDS BY RX/DR IN RCRD: CPT | Mod: CPTII,S$GLB,, | Performed by: UROLOGY

## 2023-06-20 PROCEDURE — 1101F PR PT FALLS ASSESS DOC 0-1 FALLS W/OUT INJ PAST YR: ICD-10-PCS | Mod: CPTII,S$GLB,, | Performed by: UROLOGY

## 2023-06-20 PROCEDURE — 1159F MED LIST DOCD IN RCRD: CPT | Mod: CPTII,S$GLB,, | Performed by: UROLOGY

## 2023-06-20 PROCEDURE — 99999 PR PBB SHADOW E&M-EST. PATIENT-LVL III: CPT | Mod: PBBFAC,,, | Performed by: UROLOGY

## 2023-06-20 PROCEDURE — 3078F PR MOST RECENT DIASTOLIC BLOOD PRESSURE < 80 MM HG: ICD-10-PCS | Mod: CPTII,S$GLB,, | Performed by: UROLOGY

## 2023-06-20 PROCEDURE — 1126F AMNT PAIN NOTED NONE PRSNT: CPT | Mod: CPTII,S$GLB,, | Performed by: UROLOGY

## 2023-06-20 PROCEDURE — 99999 PR PBB SHADOW E&M-EST. PATIENT-LVL III: ICD-10-PCS | Mod: PBBFAC,,, | Performed by: UROLOGY

## 2023-06-20 PROCEDURE — 1126F PR PAIN SEVERITY QUANTIFIED, NO PAIN PRESENT: ICD-10-PCS | Mod: CPTII,S$GLB,, | Performed by: UROLOGY

## 2023-06-20 PROCEDURE — 3008F BODY MASS INDEX DOCD: CPT | Mod: CPTII,S$GLB,, | Performed by: UROLOGY

## 2023-06-20 PROCEDURE — 3288F FALL RISK ASSESSMENT DOCD: CPT | Mod: CPTII,S$GLB,, | Performed by: UROLOGY

## 2023-06-20 PROCEDURE — 3074F PR MOST RECENT SYSTOLIC BLOOD PRESSURE < 130 MM HG: ICD-10-PCS | Mod: CPTII,S$GLB,, | Performed by: UROLOGY

## 2023-06-20 PROCEDURE — 3008F PR BODY MASS INDEX (BMI) DOCUMENTED: ICD-10-PCS | Mod: CPTII,S$GLB,, | Performed by: UROLOGY

## 2023-06-20 PROCEDURE — 3078F DIAST BP <80 MM HG: CPT | Mod: CPTII,S$GLB,, | Performed by: UROLOGY

## 2023-06-20 PROCEDURE — 1159F PR MEDICATION LIST DOCUMENTED IN MEDICAL RECORD: ICD-10-PCS | Mod: CPTII,S$GLB,, | Performed by: UROLOGY

## 2023-06-20 PROCEDURE — 1101F PT FALLS ASSESS-DOCD LE1/YR: CPT | Mod: CPTII,S$GLB,, | Performed by: UROLOGY

## 2023-06-20 PROCEDURE — 99215 OFFICE O/P EST HI 40 MIN: CPT | Mod: S$GLB,,, | Performed by: UROLOGY

## 2023-06-20 PROCEDURE — 3074F SYST BP LT 130 MM HG: CPT | Mod: CPTII,S$GLB,, | Performed by: UROLOGY

## 2023-06-20 PROCEDURE — 1160F PR REVIEW ALL MEDS BY PRESCRIBER/CLIN PHARMACIST DOCUMENTED: ICD-10-PCS | Mod: CPTII,S$GLB,, | Performed by: UROLOGY

## 2023-06-20 PROCEDURE — 3288F PR FALLS RISK ASSESSMENT DOCUMENTED: ICD-10-PCS | Mod: CPTII,S$GLB,, | Performed by: UROLOGY

## 2023-06-20 NOTE — ANESTHESIA PAT ROS NOTE
06/20/2023  Andrea Gant is a 68 y.o., male.      Pre-op Assessment          Review of Systems         Anesthesia Assessment: Preoperative EQUATION    Planned Procedure: Procedure(s) (LRB):  XI ROBOTIC NEPHROURETERECTOMY (Left)  Requested Anesthesia Type:General  Surgeon: Kem Jefferson MD  Service: Urology  Known or anticipated Date of Surgery:7/10/2023    Surgeon notes: reviewed    Previous anesthesia records:LMA General and No problems  2/16/2023 Cysto with biopsy  Airway:  Mallampati: II / II  Mouth Opening: Normal  TM Distance: Normal  Tongue: Normal  Neck ROM: Normal ROM    Airway Device: LMA Mask Ventilation: Easy Intubated: Postinduction Airway Device Size: 4.5 Placement Verified By: Capnometry Complicating Factors: None Findings Post-Intubation: Bilateral breath sounds;Atraumatic/Condition of teeth unchanged Secured at: Lips Complications: None     Last PCP note: 3-6 months ago , [facility]   Subspecialty notes: Hematology/Oncology, Rheumatology, Urology, Vascular Surgery    Other important co-morbidities: HLD, HTN, Smoker, and RA, PVD, Hx Hep C, CKD3       Tests already available:  Available tests,  within 3 months , within [facility] .  6/14/2023 CMP, CBC  2/9/2023 EKG     Optimization:  Anesthesia Preop Clinic Assessment  Indicated.    Medical Opinion Indicated.           Plan:    Testing:  PT/INR, PTT, and T&S   Pre-anesthesia  visit       Visit focus: concerns in complex and/or prolonged anesthesia, position other than supine     Consultation:IM Perioperative Hospitalist and Dr. Jensen for ASA/Pletal instructions     Patient  has previously scheduled Medical Appointment: 7/5/2023    Navigation: Tests Scheduled.              Consults scheduled.             Results will be tracked by Preop Clinic.    6/26/2023 Per Dr. Jensen:  May hold pletal x7d  Please do cont ASA 81 po  QD    Also requesting confirmation on Methotrexate from Dr. Jimenez.   stated

## 2023-06-21 ENCOUNTER — PATIENT MESSAGE (OUTPATIENT)
Dept: ADMINISTRATIVE | Facility: OTHER | Age: 68
End: 2023-06-21
Payer: MEDICARE

## 2023-06-22 ENCOUNTER — PATIENT MESSAGE (OUTPATIENT)
Dept: ADMINISTRATIVE | Facility: OTHER | Age: 68
End: 2023-06-22
Payer: MEDICARE

## 2023-06-23 ENCOUNTER — PATIENT MESSAGE (OUTPATIENT)
Dept: ADMINISTRATIVE | Facility: OTHER | Age: 68
End: 2023-06-23
Payer: MEDICARE

## 2023-06-24 ENCOUNTER — PATIENT MESSAGE (OUTPATIENT)
Dept: ADMINISTRATIVE | Facility: OTHER | Age: 68
End: 2023-06-24
Payer: MEDICARE

## 2023-06-25 ENCOUNTER — PATIENT MESSAGE (OUTPATIENT)
Dept: ADMINISTRATIVE | Facility: OTHER | Age: 68
End: 2023-06-25
Payer: MEDICARE

## 2023-06-26 ENCOUNTER — PATIENT MESSAGE (OUTPATIENT)
Dept: ADMINISTRATIVE | Facility: OTHER | Age: 68
End: 2023-06-26
Payer: MEDICARE

## 2023-06-26 ENCOUNTER — TELEPHONE (OUTPATIENT)
Dept: PREADMISSION TESTING | Facility: HOSPITAL | Age: 68
End: 2023-06-26
Payer: MEDICARE

## 2023-06-26 NOTE — TELEPHONE ENCOUNTER
----- Message from Loren Herndon LPN sent at 6/21/2023  3:55 PM CDT -----  Please see Dr Jensen's response below.    Thanks,  Loren  ----- Message -----  From: Feroz Jensen MD  Sent: 6/21/2023   1:37 PM CDT  To: Loren Herndon LPN    May hold pletal x7d  Please do cont ASA 81 po QD    ----- Message -----  From: Loren Herndon LPN  Sent: 6/20/2023   3:30 PM CDT  To: Feroz Jensen MD    Please advise!  ----- Message -----  From: Marylu Cancino RN  Sent: 6/20/2023   3:19 PM CDT  To: Feroz Jensen MD, Camila CH[facility] Provider,     Your patient indicated above requires ASA/Pletal instructions for a Robotic Nephroureterectomy with Dr. Jefferson on 7/10/2023 (General anesthesia, 330 minutes).  Anesthesia review is in progress.    Thank you,  Marylu Cancino, MYRA  Anest[facility] [place]

## 2023-06-26 NOTE — PRE-PROCEDURE INSTRUCTIONS
Called patient and reviewed ASA and Pletal instructions per Dr. Jensen's recommendations.  Patient verbalized understanding and all questions were answered.

## 2023-06-27 ENCOUNTER — PATIENT MESSAGE (OUTPATIENT)
Dept: ADMINISTRATIVE | Facility: OTHER | Age: 68
End: 2023-06-27
Payer: MEDICARE

## 2023-06-27 ENCOUNTER — TELEPHONE (OUTPATIENT)
Dept: PREADMISSION TESTING | Facility: HOSPITAL | Age: 68
End: 2023-06-27
Payer: MEDICARE

## 2023-06-27 NOTE — TELEPHONE ENCOUNTER
----- Message from Marline Jimenez MD sent at 6/26/2023  3:44 PM CDT -----  Regarding: No need to stop MTX prior to nephroureterectomy  Yes. No need to stop Methotrexate prior to procedure.   ----- Message -----  From: Marylu Cancino RN  Sent: 6/26/2023  12:40 PM CDT  To: Marline Jimenez MD, #    Good afternoon!    Your patient indicated above requires Methotrexate instructions for a Robotic Nephroureterectomy with Dr. Jefferson on 7/10/2023 (General anesthesia, 330 minutes).  Anesthesia review is in progress.    The patient currently takes the medication once a week on Wednesday.  He has previously been instructed to take as directed.  I just wanted to confirm instructions for this procedure on 7/10/2023.      Thank you,  Marylu Cancino RN  Anesthesia [facility], [facility] [place]

## 2023-06-28 ENCOUNTER — PATIENT MESSAGE (OUTPATIENT)
Dept: ADMINISTRATIVE | Facility: OTHER | Age: 68
End: 2023-06-28
Payer: MEDICARE

## 2023-06-28 ENCOUNTER — TELEPHONE (OUTPATIENT)
Dept: RHEUMATOLOGY | Facility: CLINIC | Age: 68
End: 2023-06-28
Payer: MEDICARE

## 2023-06-28 ENCOUNTER — OFFICE VISIT (OUTPATIENT)
Dept: HEMATOLOGY/ONCOLOGY | Facility: CLINIC | Age: 68
End: 2023-06-28
Payer: MEDICARE

## 2023-06-28 ENCOUNTER — INFUSION (OUTPATIENT)
Dept: INFUSION THERAPY | Facility: HOSPITAL | Age: 68
End: 2023-06-28
Attending: INTERNAL MEDICINE
Payer: MEDICARE

## 2023-06-28 VITALS
BODY MASS INDEX: 23.18 KG/M2 | TEMPERATURE: 98 F | HEART RATE: 65 BPM | HEIGHT: 69 IN | WEIGHT: 156.5 LBS | DIASTOLIC BLOOD PRESSURE: 72 MMHG | SYSTOLIC BLOOD PRESSURE: 135 MMHG | RESPIRATION RATE: 18 BRPM

## 2023-06-28 VITALS
TEMPERATURE: 98 F | BODY MASS INDEX: 23.18 KG/M2 | OXYGEN SATURATION: 99 % | DIASTOLIC BLOOD PRESSURE: 64 MMHG | SYSTOLIC BLOOD PRESSURE: 118 MMHG | HEART RATE: 64 BPM | WEIGHT: 156.5 LBS | RESPIRATION RATE: 17 BRPM | HEIGHT: 69 IN

## 2023-06-28 DIAGNOSIS — R11.0 CHEMOTHERAPY-INDUCED NAUSEA: ICD-10-CM

## 2023-06-28 DIAGNOSIS — D69.59 CHEMOTHERAPY-INDUCED THROMBOCYTOPENIA: ICD-10-CM

## 2023-06-28 DIAGNOSIS — T45.1X5A CHEMOTHERAPY-INDUCED THROMBOCYTOPENIA: ICD-10-CM

## 2023-06-28 DIAGNOSIS — T45.1X5A CHEMOTHERAPY INDUCED NEUTROPENIA: ICD-10-CM

## 2023-06-28 DIAGNOSIS — I10 PRIMARY HYPERTENSION: ICD-10-CM

## 2023-06-28 DIAGNOSIS — T45.1X5A CHEMOTHERAPY-INDUCED NAUSEA: ICD-10-CM

## 2023-06-28 DIAGNOSIS — D70.1 CHEMOTHERAPY INDUCED NEUTROPENIA: ICD-10-CM

## 2023-06-28 DIAGNOSIS — H91.90 HEARING LOSS, UNSPECIFIED HEARING LOSS TYPE, UNSPECIFIED LATERALITY: ICD-10-CM

## 2023-06-28 DIAGNOSIS — M05.79 RHEUMATOID ARTHRITIS INVOLVING MULTIPLE SITES WITH POSITIVE RHEUMATOID FACTOR: ICD-10-CM

## 2023-06-28 DIAGNOSIS — D64.81 ANTINEOPLASTIC CHEMOTHERAPY INDUCED ANEMIA: ICD-10-CM

## 2023-06-28 DIAGNOSIS — C68.9 UROTHELIAL CARCINOMA WITH HIGH RISK OF METASTASIS: Primary | ICD-10-CM

## 2023-06-28 DIAGNOSIS — C64.2 UROTHELIAL CARCINOMA OF KIDNEY, LEFT: Primary | ICD-10-CM

## 2023-06-28 DIAGNOSIS — T45.1X5A ANTINEOPLASTIC CHEMOTHERAPY INDUCED ANEMIA: ICD-10-CM

## 2023-06-28 PROCEDURE — 3074F PR MOST RECENT SYSTOLIC BLOOD PRESSURE < 130 MM HG: ICD-10-PCS | Mod: CPTII,S$GLB,, | Performed by: NURSE PRACTITIONER

## 2023-06-28 PROCEDURE — 1101F PR PT FALLS ASSESS DOC 0-1 FALLS W/OUT INJ PAST YR: ICD-10-PCS | Mod: CPTII,S$GLB,, | Performed by: NURSE PRACTITIONER

## 2023-06-28 PROCEDURE — 1126F PR PAIN SEVERITY QUANTIFIED, NO PAIN PRESENT: ICD-10-PCS | Mod: CPTII,S$GLB,, | Performed by: NURSE PRACTITIONER

## 2023-06-28 PROCEDURE — 99999 PR PBB SHADOW E&M-EST. PATIENT-LVL IV: CPT | Mod: PBBFAC,,, | Performed by: NURSE PRACTITIONER

## 2023-06-28 PROCEDURE — 1101F PT FALLS ASSESS-DOCD LE1/YR: CPT | Mod: CPTII,S$GLB,, | Performed by: NURSE PRACTITIONER

## 2023-06-28 PROCEDURE — 99999 PR PBB SHADOW E&M-EST. PATIENT-LVL IV: ICD-10-PCS | Mod: PBBFAC,,, | Performed by: NURSE PRACTITIONER

## 2023-06-28 PROCEDURE — 96361 HYDRATE IV INFUSION ADD-ON: CPT

## 2023-06-28 PROCEDURE — 1159F MED LIST DOCD IN RCRD: CPT | Mod: CPTII,S$GLB,, | Performed by: NURSE PRACTITIONER

## 2023-06-28 PROCEDURE — 99499 UNLISTED E&M SERVICE: CPT | Mod: S$GLB,,, | Performed by: NURSE PRACTITIONER

## 2023-06-28 PROCEDURE — 3008F BODY MASS INDEX DOCD: CPT | Mod: CPTII,S$GLB,, | Performed by: NURSE PRACTITIONER

## 2023-06-28 PROCEDURE — 96367 TX/PROPH/DG ADDL SEQ IV INF: CPT

## 2023-06-28 PROCEDURE — 3288F FALL RISK ASSESSMENT DOCD: CPT | Mod: CPTII,S$GLB,, | Performed by: NURSE PRACTITIONER

## 2023-06-28 PROCEDURE — 3078F PR MOST RECENT DIASTOLIC BLOOD PRESSURE < 80 MM HG: ICD-10-PCS | Mod: CPTII,S$GLB,, | Performed by: NURSE PRACTITIONER

## 2023-06-28 PROCEDURE — 96413 CHEMO IV INFUSION 1 HR: CPT

## 2023-06-28 PROCEDURE — 3078F DIAST BP <80 MM HG: CPT | Mod: CPTII,S$GLB,, | Performed by: NURSE PRACTITIONER

## 2023-06-28 PROCEDURE — 99215 PR OFFICE/OUTPT VISIT, EST, LEVL V, 40-54 MIN: ICD-10-PCS | Mod: S$GLB,,, | Performed by: NURSE PRACTITIONER

## 2023-06-28 PROCEDURE — 99215 OFFICE O/P EST HI 40 MIN: CPT | Mod: S$GLB,,, | Performed by: NURSE PRACTITIONER

## 2023-06-28 PROCEDURE — 3074F SYST BP LT 130 MM HG: CPT | Mod: CPTII,S$GLB,, | Performed by: NURSE PRACTITIONER

## 2023-06-28 PROCEDURE — 1159F PR MEDICATION LIST DOCUMENTED IN MEDICAL RECORD: ICD-10-PCS | Mod: CPTII,S$GLB,, | Performed by: NURSE PRACTITIONER

## 2023-06-28 PROCEDURE — 25000003 PHARM REV CODE 250: Performed by: INTERNAL MEDICINE

## 2023-06-28 PROCEDURE — 1126F AMNT PAIN NOTED NONE PRSNT: CPT | Mod: CPTII,S$GLB,, | Performed by: NURSE PRACTITIONER

## 2023-06-28 PROCEDURE — 63600175 PHARM REV CODE 636 W HCPCS: Performed by: INTERNAL MEDICINE

## 2023-06-28 PROCEDURE — 3008F PR BODY MASS INDEX (BMI) DOCUMENTED: ICD-10-PCS | Mod: CPTII,S$GLB,, | Performed by: NURSE PRACTITIONER

## 2023-06-28 PROCEDURE — 96417 CHEMO IV INFUS EACH ADDL SEQ: CPT

## 2023-06-28 PROCEDURE — 96375 TX/PRO/DX INJ NEW DRUG ADDON: CPT

## 2023-06-28 PROCEDURE — A4216 STERILE WATER/SALINE, 10 ML: HCPCS | Performed by: INTERNAL MEDICINE

## 2023-06-28 PROCEDURE — 3288F PR FALLS RISK ASSESSMENT DOCUMENTED: ICD-10-PCS | Mod: CPTII,S$GLB,, | Performed by: NURSE PRACTITIONER

## 2023-06-28 RX ORDER — HEPARIN 100 UNIT/ML
500 SYRINGE INTRAVENOUS
Status: DISCONTINUED | OUTPATIENT
Start: 2023-06-28 | End: 2023-06-28 | Stop reason: HOSPADM

## 2023-06-28 RX ORDER — SODIUM CHLORIDE 0.9 % (FLUSH) 0.9 %
10 SYRINGE (ML) INJECTION
Status: DISCONTINUED | OUTPATIENT
Start: 2023-06-28 | End: 2023-06-28 | Stop reason: HOSPADM

## 2023-06-28 RX ADMIN — APREPITANT 130 MG: 130 INJECTION, EMULSION INTRAVENOUS at 10:06

## 2023-06-28 RX ADMIN — Medication 10 ML: at 12:06

## 2023-06-28 RX ADMIN — CISPLATIN 64 MG: 1 INJECTION, SOLUTION INTRAVENOUS at 11:06

## 2023-06-28 RX ADMIN — HEPARIN 500 UNITS: 100 SYRINGE at 12:06

## 2023-06-28 RX ADMIN — POTASSIUM CHLORIDE 500 ML/HR: 2 INJECTION, SOLUTION, CONCENTRATE INTRAVENOUS at 10:06

## 2023-06-28 RX ADMIN — DEXAMETHASONE SODIUM PHOSPHATE 0.25 MG: 4 INJECTION, SOLUTION INTRA-ARTICULAR; INTRALESIONAL; INTRAMUSCULAR; INTRAVENOUS; SOFT TISSUE at 10:06

## 2023-06-28 RX ADMIN — SODIUM CHLORIDE 500 ML/HR: 9 INJECTION, SOLUTION INTRAVENOUS at 09:06

## 2023-06-28 RX ADMIN — GEMCITABINE HYDROCHLORIDE 1800 MG: 1 INJECTION, SOLUTION INTRAVENOUS at 10:06

## 2023-06-28 NOTE — PROGRESS NOTES
MEDICAL ONCOLOGY - FOLLOW-UP VISIT    Reason for visit: Upper tract Urothelial carcinoma     Best Contact Phone Number(s): [phone] (home)      Cancer/Stage/TNM:    Cancer Staging   No matching staging information was found for the patient.     Oncology History   Urothelial carcinoma with high risk of metastasis   3/6/2023 Initial Diagnosis    Urothelial carcinoma with high risk of metastasis     3/14/2023 -  Chemotherapy    Treatment Summary   Plan Name: OP BLADDER GEMCITABINE CISPLATIN (SPLIT DOSE CISPLATIN) Q3W  Treatment Goal: Curative  Status: Active  Start Date: 3/14/2023  End Date: 6/13/2023 (Planned)  Provider: Issa Antony MD  Chemotherapy: CISplatin (Platinol) 35 mg/m2 = 64 mg in sodium chloride 0.9% 314 mL chemo infusion, 35 mg/m2 = 64 mg, Intravenous, Clinic/HOD 1 time, 4 of 4 cycles  Dose modification: 17.5 mg/m2 (original dose 35 mg/m2, Cycle 3, Reason: Other (see comments), Comment: renal function), 35 mg/m2 (original dose 35 mg/m2, Cycle 3, Reason: Other (see comments)), 17.5 mg/m2 (original dose 35 mg/m2, Cycle 2, Reason: Other (see comments), Comment: renal function)  Administration: 64 mg (3/14/2023), 64 mg (3/21/2023), 64 mg (4/19/2023), 64 mg (5/4/2023), 64 mg (5/16/2023), 32 mg (4/4/2023), 64 mg (6/1/2023)  gemcitabine 1,800 mg in sodium chloride 0.9% SolP 332.34 mL chemo infusion, 1,840 mg, Intravenous, Clinic/HOD 1 time, 4 of 4 cycles  Administration: 1,800 mg (3/14/2023), 1,800 mg (3/21/2023), 1,800 mg (4/4/2023), 1,800 mg (4/19/2023), 1,800 mg (5/4/2023), 1,800 mg (5/16/2023), 1,800 mg (6/1/2023)          HPI:   68 y.o. male with PVD, PAD, HLD, CKD3, AAA, treated hep C, Rheumatoid arthritis on MTX (previously on humara), smoking (about 1/2 PPD) who presented with gross hematuria. He saw urology and underwent CT urogram which showed Few soft tissue masses within the left renal pelvis, the largest measures 2.5 x 1.3 cm. There was also a L adrenal nodule measuring 1.6 cm. Urine cytology  was evident of atypical urothelial cells, and repeat sample showed high grade urothelial carcinoma.   He underwent a flexible cystoscopy with normal bladder findings. L RPG showed Left RPG with mild hydronephrosis and filling defect ~2 cm in central renal pelvis.  Left ureteroscopy with was done and showed papillary tumors along the proximal ureter and renal pelvis. - Large ~2 cm nodular lesion in the renal pelvis consistent with filling defect. Biopsy of the mass came back as - High grade urothelial dysplasia/carcinoma in situ. - No definitive invasive carcinoma identified (outside report)    PET/CT done on 03/08 showed Left proximal ureteral lesion compatible with urothelial cancer.  No FDG PET evidence of metastatic disease.      Interval history:   He returns today prior to cycle 4, day 15 cis/gem. He notes using laxatives to move bowels. +hearing issues, worse in R. No new issues. Seeing Dr. Jefferson next week for surgery planning.    ROS:   Review of Systems   Constitutional:  Positive for malaise/fatigue. Negative for chills, fever and weight loss.   HENT:  Positive for hearing loss. Negative for congestion. Ear discharge: r>L.   Eyes:  Negative for blurred vision.   Respiratory:  Negative for cough and shortness of breath.    Cardiovascular:  Negative for chest pain, palpitations and leg swelling.   Gastrointestinal:  Positive for constipation. Negative for abdominal pain, blood in stool, diarrhea, nausea and vomiting.   Genitourinary:  Negative for dysuria and frequency.   Musculoskeletal:  Negative for back pain and myalgias.   Skin:  Negative for itching and rash.   Neurological:  Negative for dizziness, tingling, tremors, sensory change, focal weakness and headaches.   Endo/Heme/Allergies:  Does not bruise/bleed easily.   Psychiatric/Behavioral:  The patient is not nervous/anxious.      Past Medical History:   Past Medical History:   Diagnosis Date    Cataract     Colon polyp 2014    History of hepatitis  C, s/p successful treatment w/ SVR12 - 7/2015 10/14/2012    Kelsey 1a,  Liver biopsy 6/2014 - Stage 1 fibrosis;  Completed 8 weeks Harvoni w/ SVR12 - 7/2015      Hyperlipidemia     Lipoma of arm     Lipoma of lower extremity     Nuclear sclerosis - Both Eyes 10/22/2012    Other and unspecified hyperlipidemia 10/22/2013    PAD (peripheral artery disease)     Peripheral vascular disease, unspecified     Rheumatoid arthritis(714.0) 10/14/2012        Past Surgical History:   Past Surgical History:   Procedure Laterality Date    BIOPSY OF URETER Left 02/16/2023    Procedure: BIOPSY, URETER/BRUSH;  Surgeon: Kem Jefferson MD;  Location: [facility] OR 1ST FLR;  Service: Urology;  Laterality: Left;    COLONOSCOPY N/A 11/29/2021    Procedure: COLONOSCOPY;  Surgeon: Kenrick Zimmer MD;  Location: [facility] ENDO (4TH FLR);  Service: Endoscopy;  Laterality: N/A;  blood thinner approval received, see telephone encounter 10/19/21-BB  fully vacc-inst email-tb    CYSTOSCOPY      CYSTOSCOPY  02/16/2023    Procedure: CYSTOSCOPY;  Surgeon: Kem Jefferson MD;  Location: [facility] OR 1ST FLR;  Service: Urology;;    HERNIA REPAIR      INSERTION OF TUNNELED CENTRAL VENOUS CATHETER (CVC) WITH SUBCUTANEOUS PORT Right 3/13/2023    Procedure: INSERTION, PORT-A-CATH;  Surgeon: Rene Cali MD;  Location: [facility] CATH LAB;  Service: Radiology;  Laterality: Right;    KNEE ARTHROPLASTY      LAPAROSCOPIC EXPLORATION OF GROIN Left 09/06/2018    Procedure: EXPLORATION, INGUINAL REGION, LAPAROSCOPIC;  Surgeon: Mingo De Guzman Jr., MD;  Location: [facility] OR 2ND FLR;  Service: General;  Laterality: Left;    PYELOSCOPY Left 02/16/2023    Procedure: PYELOSCOPY;  Surgeon: Kem Jefferson MD;  Location: [facility] OR 1ST FLR;  Service: Urology;  Laterality: Left;    RETROGRADE PYELOGRAPHY Bilateral 02/16/2023    Procedure: PYELOGRAM, RETROGRADE;  Surgeon: Kem Jefferson MD;  [facility];  Service: Urology;  Laterality: Bilateral;     TONSILLECTOMY      URETEROSCOPIC REMOVAL OF URETERIC CALCULUS Left 02/16/2023    Procedure: REMOVAL, CALCULUS, URETER, URETEROSCOPIC;  Surgeon: Kem Jefferson MD;  Location: [facility] OR [address] [place] [address];  Service: Urology;  Laterality: Left;    URETEROSCOPY Left 02/16/2023    Procedure: URETEROSCOPY;  Surgeon: Kem Jefferson MD;  Location: [facility] OR [address] [place] [address];  Service: Urology;  Laterality: Left;        Family History:   Family History   Problem Relation Age of Onset    Heart disease Paternal Uncle     Dementia Mother     Heart disease Sister     Liver disease Neg Hx     Amblyopia Neg Hx     Blindness Neg Hx     Cancer Neg Hx     Cataracts Neg Hx     Diabetes Neg Hx     Glaucoma Neg Hx     Hypertension Neg Hx     Macular degeneration Neg Hx     Retinal detachment Neg Hx     Strabismus Neg Hx     Stroke Neg Hx     Thyroid disease Neg Hx         Social History:   Social History     Tobacco Use    Smoking status: Former     Types: Cigarettes     Quit date: 6/27/2020     Years since quitting: 3.0    Smokeless tobacco: Never   Substance Use Topics    Alcohol use: Yes     Comment: 3-4 drinks per week        I have reviewed and updated the patient's past medical, surgical, family and social histories.    Allergies:   Review of patient's allergies indicates:  No Known Allergies     Medications:   Current Outpatient Medications   Medication Sig Dispense Refill    amLODIPine (NORVASC) 5 MG tablet TAKE 1 TABLET BY MOUTH ONCE DAILY 90 tablet 2    aspirin 81 MG Chew Take 81 mg by mouth once daily.      cilostazoL (PLETAL) 50 MG Tab Take 1 tablet (50 mg total) by mouth 2 (two) times daily. 180 tablet 3    doxazosin (CARDURA) 4 MG tablet TAKE 1 TABLET BY MOUTH IN  THE EVENING 90 tablet 3    folic acid (FOLVITE) 1 MG tablet Take 1 tablet (1,000 mcg total) by mouth once daily. 90 tablet 3    lactulose (CHRONULAC) 20 gram/30 mL Soln Take 30 mLs (20 g total) by mouth 3 (three) times daily. 200 mL 0    methotrexate 2.5 MG Tab Take 8 tablets  "(20 mg total) by mouth every 7 days. This medication requires periodic lab monitoring. 120 tablet 1    simvastatin (ZOCOR) 10 MG tablet Take 1 tablet (10 mg total) by mouth every evening. 90 tablet 9    traZODone (DESYREL) 50 MG tablet TAKE 3 TABLETS BY MOUTH AT  NIGHT AS NEEDED FOR  INSOMNIA 270 tablet 3    simvastatin (ZOCOR) 10 MG tablet TAKE 1 TABLET BY MOUTH EVERY DAY IN THE EVENING (Patient taking differently: 2 (two) times a day.) 90 tablet 6     No current facility-administered medications for this visit.        Physical Exam:   /64 (BP Location: Left arm, Patient Position: Sitting)   Pulse 64   Temp 98.3 °F (36.8 °C)   Resp 17   Ht 5' 9" (1.753 m)   Wt 71 kg (156 lb 8.4 oz)   SpO2 99%   BMI 23.11 kg/m²      ECOG Performance status: 1         Physical Exam  Constitutional:       General: He is not in acute distress.     Appearance: Normal appearance.   HENT:      Head: Normocephalic and atraumatic.   Eyes:      Pupils: Pupils are equal, round, and reactive to light.   Cardiovascular:      Rate and Rhythm: Normal rate and regular rhythm.      Heart sounds: No murmur heard.  Pulmonary:      Effort: No respiratory distress.      Breath sounds: Normal breath sounds. No wheezing.   Abdominal:      General: Abdomen is flat. Bowel sounds are normal. There is no distension.      Palpations: Abdomen is soft. There is no mass.      Tenderness: There is no abdominal tenderness.   Musculoskeletal:         General: No swelling or deformity.   Skin:     Coloration: Skin is not jaundiced.   Neurological:      General: No focal deficit present.      Mental Status: He is alert and oriented to person, place, and time. Mental status is at baseline.         Labs:   Recent Results (from the past 48 hour(s))   CBC Oncology    Collection Time: 06/27/23  7:48 AM   Result Value Ref Range    WBC 5.05 3.90 - 12.70 K/uL    RBC 3.23 (L) 4.60 - 6.20 M/uL    Hemoglobin 10.2 (L) 14.0 - 18.0 g/dL    Hematocrit 32.3 (L) 40.0 - " 54.0 %     (H) 82 - 98 fL    MCH 31.6 (H) 27.0 - 31.0 pg    MCHC 31.6 (L) 32.0 - 36.0 g/dL    RDW 15.9 (H) 11.5 - 14.5 %    Platelets 271 150 - 450 K/uL    MPV 9.1 (L) 9.2 - 12.9 fL    Gran # (ANC) 3.1 1.8 - 7.7 K/uL    Immature Grans (Abs) 0.02 0.00 - 0.04 K/uL   COMPREHENSIVE METABOLIC PANEL    Collection Time: 06/27/23  7:48 AM   Result Value Ref Range    Sodium 141 136 - 145 mmol/L    Potassium 4.6 3.5 - 5.1 mmol/L    Chloride 107 95 - 110 mmol/L    CO2 25 23 - 29 mmol/L    Glucose 104 70 - 110 mg/dL    BUN 21 8 - 23 mg/dL    Creatinine 1.4 0.5 - 1.4 mg/dL    Calcium 9.7 8.7 - 10.5 mg/dL    Total Protein 7.6 6.0 - 8.4 g/dL    Albumin 3.9 3.5 - 5.2 g/dL    Total Bilirubin 0.3 0.1 - 1.0 mg/dL    Alkaline Phosphatase 83 55 - 135 U/L    AST 15 10 - 40 U/L    ALT 8 (L) 10 - 44 U/L    eGFR 54.4 (A) >60 mL/min/1.73 m^2    Anion Gap 9 8 - 16 mmol/L   Magnesium    Collection Time: 06/27/23  7:48 AM   Result Value Ref Range    Magnesium 1.9 1.6 - 2.6 mg/dL       Assessment:       1. Urothelial carcinoma of kidney, left    2. Chemotherapy induced neutropenia    3. Antineoplastic chemotherapy induced anemia    4. Chemotherapy-induced thrombocytopenia    5. Hearing loss, unspecified hearing loss type, unspecified laterality    6. Chemotherapy-induced nausea    7. Rheumatoid arthritis involving multiple sites with positive rheumatoid factor    8. Primary hypertension          Plan:   1,2- 68 y.o. M patient presented with gross hematuria and was found to have multiple soft tissue masses in L renal pelvis. He is s/p L urethroscopy and biopsy which showed high grade urothelial dysplasia. On CT urogram, there was a 1.6 cm L adrenal nodule, as well as multiple pulmonary nodules, largest was 0.6 cm, and multiple hepatic nodules too small to characterize.   Overall picture is concerning for invasive upper tract urothelial carcinoma.   PET/CT showed no evidence of metastatic disease. There is B/L adrenal adenomas.     Plan:    - Neoadjuvant chemotherapy with Gemcitabine and Cisplatin. Split dose Cisplatin for borderline kidney functions.  - Because of his RA (not very controlled), will avoid IO adjuvantely.   -chemo held cycle 2, day 8 today with ANC of 600. Zarxio x 3. Will go to every 2 week split dose with udencya on day 2.    Labs recovered and reviewed and acceptable to proceed with cycle 4, day 15 split dose cis/gem .     RTC mid August with CT CAP, labs (CBC,CMP,) and to see Dr. Antony.    3- Mild, monitor.    4- resolved.    5- Audiogram showed normal hearing sensitivity from 250-2000 Hz sloping to a severe to profound hearing loss by 8000 Hz with a slight air bone gap at 1000 Hz in the right ear and normal hearing sensitivity from 250-2000 Hz sloping to a severe sensorineural hearing loss (SNHL) by 8000 Hz in the left ear. Will recheck post chemotherapy.    6- Zofran PRN.    7- On MTX. Will avoid adjuvant immunotherapy.    8- enrolled in [facility]. Per PCP.      Patient is in agreement with the proposed treatment plan. All questions were answered to the patient's satisfaction. Pt knows to call clinic if anything is needed before the next clinic visit.    Marquita Stoner, MSN, APRN, FNP-C  Hematology and Medical Oncology  Nurse Practitioner to Dr. Issa Antony  Nurse Practitioner, [facility]          Med Onc Chart Routing      Follow up with physician 2 months. RTC mid August with CT CAP, labs (CBC,CMP,) and to see Dr. Antony.   Follow up with DESIREE    Infusion scheduling note    Injection scheduling note    Labs    Imaging    Pharmacy appointment    Other referrals

## 2023-06-28 NOTE — PLAN OF CARE
Problem: Adult Inpatient Plan of Care  Goal: Plan of Care Review  Outcome: Ongoing, Progressing   Patient completed infusions today. NAD noted. PAC + blood return upon deaccess. Discharged home and ambulates independently

## 2023-06-29 ENCOUNTER — PATIENT MESSAGE (OUTPATIENT)
Dept: ADMINISTRATIVE | Facility: OTHER | Age: 68
End: 2023-06-29
Payer: MEDICARE

## 2023-06-29 ENCOUNTER — INFUSION (OUTPATIENT)
Dept: INFUSION THERAPY | Facility: HOSPITAL | Age: 68
End: 2023-06-29
Attending: INTERNAL MEDICINE
Payer: MEDICARE

## 2023-06-29 DIAGNOSIS — C68.9 UROTHELIAL CARCINOMA WITH HIGH RISK OF METASTASIS: Primary | ICD-10-CM

## 2023-06-29 PROCEDURE — 96372 THER/PROPH/DIAG INJ SC/IM: CPT

## 2023-06-29 PROCEDURE — 63600175 PHARM REV CODE 636 W HCPCS: Mod: JZ,JG | Performed by: NURSE PRACTITIONER

## 2023-06-29 RX ADMIN — PEGFILGRASTIM 6 MG: 6 INJECTION SUBCUTANEOUS at 02:06

## 2023-06-30 ENCOUNTER — PATIENT MESSAGE (OUTPATIENT)
Dept: ADMINISTRATIVE | Facility: OTHER | Age: 68
End: 2023-06-30
Payer: MEDICARE

## 2023-06-30 NOTE — ASSESSMENT & PLAN NOTE
GFR 54.4 BUN 21 Cr 1.4    Stages of CKD discussed  Deleterious effects NSAID's , Beneficial effects of Hydration discussed   Tylenol as needed for pain   I suggest monitoring renal function, in put and out put status chris-operatively. I  suggest avoiding nephrotoxic medication including NSAIDs, COX2 inhibitors, intravenous contrast agent,avoiding hypotension to prevent further renal impairment.

## 2023-07-02 ENCOUNTER — PATIENT MESSAGE (OUTPATIENT)
Dept: ADMINISTRATIVE | Facility: OTHER | Age: 68
End: 2023-07-02
Payer: MEDICARE

## 2023-07-02 NOTE — H&P (VIEW-ONLY)
Urology ([facility]) H&P for upcoming procedure  Staff:  Kem Jefferson MD    CC: Left UTUC with high risk of metastasis    HPI:  Andrea Gant is a 68 y.o. male with left UTUC. Recently completed NAC with gemcitabine/cisplatin 6/27/23. Tolerated chemo well. Scheduled for left robotic nephroureterectomy with Dr. Jefferson on 7/14/23.  Works for [employer] near [employer].    Patient initially presented with gross hematuria December 2022. They deny flank pain, night sweats or weight loss. Denies abdominal pain, nausea or vomiting.    The mass subsequently found on CTU imaging performed for GH. This revealed a approximately 2.5 cm mass located at the left proximal ureter. No evidence of RP adenopathy.  Office cystoscopy revealed no bladder tumors. Urine cytology 1/25/23 with atypical cells. Repeat urine cytology 2/1/23 with HGUC.    Left ureteral biopsy 2/16/23 revealed HG urothelial dysplasia/carcinoma in situ. No definitive invasive carcinoma identified.    Patient denies personal and family history of bladder cancer. Two cousins had kidney cancer. No additional personal or family history of known carcinomas.  Patient reports personal history of current tobacco use, 0.5-1ppd/50 years. Stopped 6 months ago.  Radiation therapy to pelvis: None.  Exposure to harmful chemicals: None.  History of Urolithiasis: yes. Did not require procedure.  Last urine culture in Feb 2023 was negative.    No history of DM.  Personal history of CKD. Baseline Cr 1.4 on 6/27/23. Baseline GFR 54. They have never previously been seen by nephrology.  Additional PMH: pvd, pad, hld, ckd 3, hx hep c (svr 2015).    Previous abdominal surgery: Right inguinal hernia repair 2018 with mesh.  Blood thinners: ASA 81 mg, pletal. Currently holding pletal.    He has LUTS. Reports nocturia 3x, drinks fluid prior to bedtime. Not bothersome to him. Constipation during chemo.  He has ED. Previously discussed no interest in  medications at this time with Radha Vasquez.      ROS: Negative except for as stated above    Past Medical History:   Diagnosis Date    Cataract     Colon polyp 2014    History of hepatitis C, s/p successful treatment w/ SVR12 - 7/2015 10/14/2012    Kelsey 1a,  Liver biopsy 6/2014 - Stage 1 fibrosis;  Completed 8 weeks Harvoni w/ SVR12 - 7/2015      Hyperlipidemia     Lipoma of arm     Lipoma of lower extremity     Nuclear sclerosis - Both Eyes 10/22/2012    Other and unspecified hyperlipidemia 10/22/2013    PAD (peripheral artery disease)     Peripheral vascular disease, unspecified     Rheumatoid arthritis(714.0) 10/14/2012       Past Surgical History:   Procedure Laterality Date    BIOPSY OF URETER Left 02/16/2023    Procedure: BIOPSY, URETER/BRUSH;  Surgeon: Kem Jefferson MD;  Location: [facility] OR 1ST FLR;  Service: Urology;  Laterality: Left;    COLONOSCOPY N/A 11/29/2021    Procedure: COLONOSCOPY;  Surgeon: Kenrick Zimmer MD;  Location: [facility] ENDO (4TH FLR);  Service: Endoscopy;  Laterality: N/A;  blood thinner approval received, see telephone encounter 10/19/21-BB  fully vacc-inst email-tb    CYSTOSCOPY      CYSTOSCOPY  02/16/2023    Procedure: CYSTOSCOPY;  Surgeon: Kem Jefferson MD;  Location: [facility] OR 1ST FLR;  Service: Urology;;    HERNIA REPAIR      INSERTION OF TUNNELED CENTRAL VENOUS CATHETER (CVC) WITH SUBCUTANEOUS PORT Right 3/13/2023    Procedure: INSERTION, PORT-A-CATH;  Surgeon: Rene Cali MD;  Location: [facility] CATH LAB;  Service: Radiology;  Laterality: Right;    KNEE ARTHROPLASTY      LAPAROSCOPIC EXPLORATION OF GROIN Left 09/06/2018    Procedure: EXPLORATION, INGUINAL REGION, LAPAROSCOPIC;  Surgeon: Mingo De Guzman Jr., MD;  Location: [facility] OR 2ND FLR;  Service: General;  Laterality: Left;    PYELOSCOPY Left 02/16/2023    Procedure: PYELOSCOPY;  Surgeon: Kem Jefferson MD;  Location: [facility] OR 1ST FLR;  Service: Urology;  Laterality: Left;    RETROGRADE PYELOGRAPHY Bilateral  02/16/2023    Procedure: PYELOGRAM, RETROGRADE;  Surgeon: Kem Jefferson MD;  Location: [facility] OR [address] [place] [address];  Service: Urology;  Laterality: Bilateral;    TONSILLECTOMY      URETEROSCOPIC REMOVAL OF URETERIC CALCULUS Left 02/16/2023    Procedure: REMOVAL, CALCULUS, URETER, URETEROSCOPIC;  Surgeon: Kem Jefferson MD;  Location: [facility] OR [address] [place] [address];  Service: Urology;  Laterality: Left;    URETEROSCOPY Left 02/16/2023    Procedure: URETEROSCOPY;  Surgeon: Kem Jefferson MD;  Location: [facility] OR [address] [place] [address];  Service: Urology;  Laterality: Left;       Social History     Socioeconomic History    Marital status:    Tobacco Use    Smoking status: Former     Types: Cigarettes     Quit date: 6/27/2020     Years since quitting: 3.0    Smokeless tobacco: Never   Substance and Sexual Activity    Alcohol use: Yes     Comment: 3-4 drinks per week    Drug use: Yes     Types: Marijuana     Comment: marajuana used on Saturday   Social History Narrative    Resides locally    [employer] at [employer]     w/ 2 adult children       Family History   Problem Relation Age of Onset    Heart disease Paternal Uncle     Dementia Mother     Heart disease Sister     Liver disease Neg Hx     Amblyopia Neg Hx     Blindness Neg Hx     Cancer Neg Hx     Cataracts Neg Hx     Diabetes Neg Hx     Glaucoma Neg Hx     Hypertension Neg Hx     Macular degeneration Neg Hx     Retinal detachment Neg Hx     Strabismus Neg Hx     Stroke Neg Hx     Thyroid disease Neg Hx        Review of patient's allergies indicates:  No Known Allergies    Current Outpatient Medications on File Prior to Visit   Medication Sig Dispense Refill    amLODIPine (NORVASC) 5 MG tablet TAKE 1 TABLET BY MOUTH ONCE DAILY 90 tablet 2    aspirin 81 MG Chew Take 81 mg by mouth once daily.      cilostazoL (PLETAL) 50 MG Tab Take 1 tablet (50 mg total) by mouth 2 (two) times daily. 180 tablet 3    doxazosin (CARDURA) 4 MG tablet TAKE 1 TABLET BY MOUTH IN  THE  "EVENING 90 tablet 3    folic acid (FOLVITE) 1 MG tablet Take 1 tablet (1,000 mcg total) by mouth once daily. 90 tablet 3    lactulose (CHRONULAC) 20 gram/30 mL Soln Take 30 mLs (20 g total) by mouth 3 (three) times daily. 200 mL 0    methotrexate 2.5 MG Tab Take 8 tablets (20 mg total) by mouth every 7 days. This medication requires periodic lab monitoring. 120 tablet 1    simvastatin (ZOCOR) 10 MG tablet Take 1 tablet (10 mg total) by mouth every evening. 90 tablet 9    traZODone (DESYREL) 50 MG tablet TAKE 3 TABLETS BY MOUTH AT  NIGHT AS NEEDED FOR  INSOMNIA 270 tablet 3     No current facility-administered medications on file prior to visit.       Anticoagulation:  Yes ASA 81 mg, pletal    Physical Exam:  Estimated body mass index is 23.11 kg/m² as calculated from the following:    Height as of 6/28/23: 5' 9" (1.753 m).    Weight as of 6/28/23: 71 kg (156 lb 8.4 oz).     General: No acute distress, well developed. AAOx3  Head: Normocephalic, Atraumatic  Eyes: Extra-occular movements intact, No discharge  Neck: supple, symmetrical, trachea midline  Lungs: normal respiratory effort, no respiratory distress, no wheezes  CV: regular rate, 2+ pulses  Abdomen: soft, non-tender, non-distended, no organomegaly   RIH incision well healed.  MSK: no edema, no deformities, normal ROM  Skin: skin color, texture, turgor normal.  Neurologic: no focal deficits, sensation intact     Labs:  Urine dipstick today shows negative for all components.    Lab Results   Component Value Date    WBC 5.05 06/27/2023    HGB 10.2 (L) 06/27/2023    HCT 32.3 (L) 06/27/2023     (H) 06/27/2023     06/27/2023     BMP  Lab Results   Component Value Date     06/27/2023    K 4.6 06/27/2023     06/27/2023    CO2 25 06/27/2023    BUN 21 06/27/2023    CREATININE 1.4 06/27/2023    CALCIUM 9.7 06/27/2023    ANIONGAP 9 06/27/2023    EGFRNORACEVR 54.4 (A) 06/27/2023     Urine Cytology 2/1/23: High grade urothelial carcinoma. " (Atypical cells on 1/25/23 urine cytology).    Left ureteral biopsy 2/16/23 revealed HG urothelial dysplasia/carcinoma in situ. No definitive invasive carcinoma identified.    Imaging:   CTU from 1/23/23: left proximal ureteral mass, enhancing at proximal ureter, 1 left renal artery/ies, single left renal vein with an early bifurcation.    PET scan 5/29/23: No definitive evidence of metastatic disease. Increased avidity with right pleural effusion. Decreased thickness of left ureter, difficult to determine size d/t normal urinary excretion.    Office cystoscopy 2/1/23: no urethral strictures, mild bilobar prostatic hypertrophy, no median lobe. No bladder tumors or lesions, Uos in normal position with bilateral efflux.    Assessment: Andrea Gant is a 68 y.o. male with left UTUC s/p Voca/Cis NAC.    Plan:     1. To OR on 7/14/23 for left robotic nephroureterectomy  2. Follow up preop clinic visit Wednesday 7/5/23  3. Type and screen ordered preoperatively. The risks, benefits, and indications of a blood transfusion were discussed. The patient was given a chance to ask questions and all questions answered to his satisfaction. Consent obtained.   4. No current clinical studies for UTUC at this time.  5. Already holding pletal. Will tentatively plan to hold ASA x7 days if okay with Dr. Colunga's clinic.    Konrad Treviño MD

## 2023-07-02 NOTE — PROGRESS NOTES
Urology ([facility]) H&P for upcoming procedure  Staff:  Kem Jefferson MD    CC: Left UTUC with high risk of metastasis    HPI:  Andrea Gant is a 68 y.o. male with left UTUC. Recently completed NAC with gemcitabine/cisplatin 6/27/23. Tolerated chemo well. Scheduled for left robotic nephroureterectomy with Dr. Jefferson on 7/14/23.  Works for [employer] near [place].    Patient initially presented with gross hematuria December 2022. They deny flank pain, night sweats or weight loss. Denies abdominal pain, nausea or vomiting.    The mass subsequently found on CTU imaging performed for GH. This revealed a approximately 2.5 cm mass located at the left proximal ureter. No evidence of RP adenopathy.  Office cystoscopy revealed no bladder tumors. Urine cytology 1/25/23 with atypical cells. Repeat urine cytology 2/1/23 with HGUC.    Left ureteral biopsy 2/16/23 revealed HG urothelial dysplasia/carcinoma in situ. No definitive invasive carcinoma identified.    Patient denies personal and family history of bladder cancer. Two cousins had kidney cancer. No additional personal or family history of known carcinomas.  Patient reports personal history of current tobacco use, 0.5-1ppd/50 years. Stopped 6 months ago.  Radiation therapy to pelvis: None.  Exposure to harmful chemicals: None.  History of Urolithiasis: yes. Did not require procedure.  Last urine culture in Feb 2023 was negative.    No history of DM.  Personal history of CKD. Baseline Cr 1.4 on 6/27/23. Baseline GFR 54. They have never previously been seen by nephrology.  Additional PMH: pvd, pad, hld, ckd 3, hx hep c (svr 2015).    Previous abdominal surgery: Right inguinal hernia repair 2018 with mesh.  Blood thinners: ASA 81 mg, pletal. Currently holding pletal.    He has LUTS. Reports nocturia 3x, drinks fluid prior to bedtime. Not bothersome to him. Constipation during chemo.  He has ED. Previously discussed no interest in  medications at this time with Radha Vasquez.      ROS: Negative except for as stated above    Past Medical History:   Diagnosis Date    Cataract     Colon polyp 2014    History of hepatitis C, s/p successful treatment w/ SVR12 - 7/2015 10/14/2012    Kelsey 1a,  Liver biopsy 6/2014 - Stage 1 fibrosis;  Completed 8 weeks Harvoni w/ SVR12 - 7/2015      Hyperlipidemia     Lipoma of arm     Lipoma of lower extremity     Nuclear sclerosis - Both Eyes 10/22/2012    Other and unspecified hyperlipidemia 10/22/2013    PAD (peripheral artery disease)     Peripheral vascular disease, unspecified     Rheumatoid arthritis(714.0) 10/14/2012       Past Surgical History:   Procedure Laterality Date    BIOPSY OF URETER Left 02/16/2023    Procedure: BIOPSY, URETER/BRUSH;  Surgeon: Kem Jefferson MD;  Location: [facility] OR 1ST FLR;  Service: Urology;  Laterality: Left;    COLONOSCOPY N/A 11/29/2021    Procedure: COLONOSCOPY;  Surgeon: Kenrick Zimmer MD;  Location: [facility] ENDO (4TH FLR);  Service: Endoscopy;  Laterality: N/A;  blood thinner approval received, see telephone encounter 10/19/21-BB  fully vacc-inst email-tb    CYSTOSCOPY      CYSTOSCOPY  02/16/2023    Procedure: CYSTOSCOPY;  Surgeon: Kem Jefferson MD;  Location: [facility] OR 1ST FLR;  Service: Urology;;    HERNIA REPAIR      INSERTION OF TUNNELED CENTRAL VENOUS CATHETER (CVC) WITH SUBCUTANEOUS PORT Right 3/13/2023    Procedure: INSERTION, PORT-A-CATH;  Surgeon: Rene Cali MD;  Location: [facility] CATH LAB;  Service: Radiology;  Laterality: Right;    KNEE ARTHROPLASTY      LAPAROSCOPIC EXPLORATION OF GROIN Left 09/06/2018    Procedure: EXPLORATION, INGUINAL REGION, LAPAROSCOPIC;  Surgeon: Mingo De Guzman Jr., MD;  Location: [facility] OR 2ND FLR;  Service: General;  Laterality: Left;    PYELOSCOPY Left 02/16/2023    Procedure: PYELOSCOPY;  Surgeon: Kem Jefferson MD;  Location: [facility] OR 1ST FLR;  Service: Urology;  Laterality: Left;    RETROGRADE PYELOGRAPHY Bilateral  02/16/2023    Procedure: PYELOGRAM, RETROGRADE;  Surgeon: Kem Jefferson MD;  Location: [facility] OR [address] [place] [address];  Service: Urology;  Laterality: Bilateral;    TONSILLECTOMY      URETEROSCOPIC REMOVAL OF URETERIC CALCULUS Left 02/16/2023    Procedure: REMOVAL, CALCULUS, URETER, URETEROSCOPIC;  Surgeon: Kem Jefferson MD;  Location: [facility] OR [address] [place] [address];  Service: Urology;  Laterality: Left;    URETEROSCOPY Left 02/16/2023    Procedure: URETEROSCOPY;  Surgeon: Kem Jefferson MD;  Location: [facility] OR [address] [place] [address];  Service: Urology;  Laterality: Left;       Social History     Socioeconomic History    Marital status:    Tobacco Use    Smoking status: Former     Types: Cigarettes     Quit date: 6/27/2020     Years since quitting: 3.0    Smokeless tobacco: Never   Substance and Sexual Activity    Alcohol use: Yes     Comment: 3-4 drinks per week    Drug use: Yes     Types: Marijuana     Comment: marajuana used on Saturday   Social History Narrative    Resides locally    [place] at [employer]     w/ 2 adult children       Family History   Problem Relation Age of Onset    Heart disease Paternal Uncle     Dementia Mother     Heart disease Sister     Liver disease Neg Hx     Amblyopia Neg Hx     Blindness Neg Hx     Cancer Neg Hx     Cataracts Neg Hx     Diabetes Neg Hx     Glaucoma Neg Hx     Hypertension Neg Hx     Macular degeneration Neg Hx     Retinal detachment Neg Hx     Strabismus Neg Hx     Stroke Neg Hx     Thyroid disease Neg Hx        Review of patient's allergies indicates:  No Known Allergies    Current Outpatient Medications on File Prior to Visit   Medication Sig Dispense Refill    amLODIPine (NORVASC) 5 MG tablet TAKE 1 TABLET BY MOUTH ONCE DAILY 90 tablet 2    aspirin 81 MG Chew Take 81 mg by mouth once daily.      cilostazoL (PLETAL) 50 MG Tab Take 1 tablet (50 mg total) by mouth 2 (two) times daily. 180 tablet 3    doxazosin (CARDURA) 4 MG tablet TAKE 1 TABLET BY MOUTH IN  THE  "EVENING 90 tablet 3    folic acid (FOLVITE) 1 MG tablet Take 1 tablet (1,000 mcg total) by mouth once daily. 90 tablet 3    lactulose (CHRONULAC) 20 gram/30 mL Soln Take 30 mLs (20 g total) by mouth 3 (three) times daily. 200 mL 0    methotrexate 2.5 MG Tab Take 8 tablets (20 mg total) by mouth every 7 days. This medication requires periodic lab monitoring. 120 tablet 1    simvastatin (ZOCOR) 10 MG tablet Take 1 tablet (10 mg total) by mouth every evening. 90 tablet 9    traZODone (DESYREL) 50 MG tablet TAKE 3 TABLETS BY MOUTH AT  NIGHT AS NEEDED FOR  INSOMNIA 270 tablet 3     No current facility-administered medications on file prior to visit.       Anticoagulation:  Yes ASA 81 mg, pletal    Physical Exam:  Estimated body mass index is 23.11 kg/m² as calculated from the following:    Height as of 6/28/23: 5' 9" (1.753 m).    Weight as of 6/28/23: 71 kg (156 lb 8.4 oz).     General: No acute distress, well developed. AAOx3  Head: Normocephalic, Atraumatic  Eyes: Extra-occular movements intact, No discharge  Neck: supple, symmetrical, trachea midline  Lungs: normal respiratory effort, no respiratory distress, no wheezes  CV: regular rate, 2+ pulses  Abdomen: soft, non-tender, non-distended, no organomegaly   RIH incision well healed.  MSK: no edema, no deformities, normal ROM  Skin: skin color, texture, turgor normal.  Neurologic: no focal deficits, sensation intact     Labs:  Urine dipstick today shows negative for all components.    Lab Results   Component Value Date    WBC 5.05 06/27/2023    HGB 10.2 (L) 06/27/2023    HCT 32.3 (L) 06/27/2023     (H) 06/27/2023     06/27/2023     BMP  Lab Results   Component Value Date     06/27/2023    K 4.6 06/27/2023     06/27/2023    CO2 25 06/27/2023    BUN 21 06/27/2023    CREATININE 1.4 06/27/2023    CALCIUM 9.7 06/27/2023    ANIONGAP 9 06/27/2023    EGFRNORACEVR 54.4 (A) 06/27/2023     Urine Cytology 2/1/23: High grade urothelial carcinoma. " (Atypical cells on 1/25/23 urine cytology).    Left ureteral biopsy 2/16/23 revealed HG urothelial dysplasia/carcinoma in situ. No definitive invasive carcinoma identified.    Imaging:   CTU from 1/23/23: left proximal ureteral mass, enhancing at proximal ureter, 1 left renal artery/ies, single left renal vein with an early bifurcation.    PET scan 5/29/23: No definitive evidence of metastatic disease. Increased avidity with right pleural effusion. Decreased thickness of left ureter, difficult to determine size d/t normal urinary excretion.    Office cystoscopy 2/1/23: no urethral strictures, mild bilobar prostatic hypertrophy, no median lobe. No bladder tumors or lesions, Uos in normal position with bilateral efflux.    Assessment: Andrea Gant is a 68 y.o. male with left UTUC s/p Garrison/Cis NAC.    Plan:     1. To OR on 7/14/23 for left robotic nephroureterectomy  2. Follow up preop clinic visit Wednesday 7/5/23  3. Type and screen ordered preoperatively. The risks, benefits, and indications of a blood transfusion were discussed. The patient was given a chance to ask questions and all questions answered to his satisfaction. Consent obtained.   4. No current clinical studies for UTUC at this time.  5. Already holding pletal. Will tentatively plan to hold ASA x7 days if okay with Dr. Colunga's clinic.    Konrad Treviño MD

## 2023-07-03 ENCOUNTER — PATIENT MESSAGE (OUTPATIENT)
Dept: ADMINISTRATIVE | Facility: OTHER | Age: 68
End: 2023-07-03
Payer: MEDICARE

## 2023-07-03 ENCOUNTER — OFFICE VISIT (OUTPATIENT)
Dept: UROLOGY | Facility: CLINIC | Age: 68
End: 2023-07-03
Payer: MEDICARE

## 2023-07-03 DIAGNOSIS — N18.31 STAGE 3A CHRONIC KIDNEY DISEASE: Primary | ICD-10-CM

## 2023-07-03 DIAGNOSIS — C68.9 UROTHELIAL CARCINOMA: ICD-10-CM

## 2023-07-03 DIAGNOSIS — C68.9 UROTHELIAL CARCINOMA WITH HIGH RISK OF METASTASIS: ICD-10-CM

## 2023-07-03 PROCEDURE — 99499 NO LOS: ICD-10-PCS | Mod: S$GLB,,, | Performed by: UROLOGY

## 2023-07-03 PROCEDURE — 99499 UNLISTED E&M SERVICE: CPT | Mod: S$GLB,,, | Performed by: UROLOGY

## 2023-07-03 RX ORDER — PREGABALIN 150 MG/1
150 CAPSULE ORAL
Status: CANCELLED | OUTPATIENT
Start: 2023-07-03

## 2023-07-03 RX ORDER — SODIUM CHLORIDE 9 MG/ML
INJECTION, SOLUTION INTRAVENOUS CONTINUOUS
Status: CANCELLED | OUTPATIENT
Start: 2023-07-03

## 2023-07-03 RX ORDER — HEPARIN SODIUM 5000 [USP'U]/ML
5000 INJECTION, SOLUTION INTRAVENOUS; SUBCUTANEOUS
Status: CANCELLED | OUTPATIENT
Start: 2023-07-03 | End: 2023-07-03

## 2023-07-03 RX ORDER — CEFAZOLIN SODIUM 2 G/50ML
2 SOLUTION INTRAVENOUS
Status: CANCELLED | OUTPATIENT
Start: 2023-07-03

## 2023-07-03 RX ORDER — KETOROLAC TROMETHAMINE 30 MG/ML
15 INJECTION, SOLUTION INTRAMUSCULAR; INTRAVENOUS
Status: CANCELLED | OUTPATIENT
Start: 2023-07-03 | End: 2023-07-06

## 2023-07-03 RX ORDER — ACETAMINOPHEN 500 MG
1000 TABLET ORAL
Status: CANCELLED | OUTPATIENT
Start: 2023-07-03

## 2023-07-04 ENCOUNTER — PATIENT MESSAGE (OUTPATIENT)
Dept: ADMINISTRATIVE | Facility: OTHER | Age: 68
End: 2023-07-04
Payer: MEDICARE

## 2023-07-04 NOTE — ASSESSMENT & PLAN NOTE
PAtient care managed by Dr. Payton   Current meds :statin, cilostazol and aspirin  Reports improved exercise tilerance- can now ambulate 1 mil prior to L calf claudication

## 2023-07-05 ENCOUNTER — OFFICE VISIT (OUTPATIENT)
Dept: INTERNAL MEDICINE | Facility: CLINIC | Age: 68
End: 2023-07-05
Payer: MEDICARE

## 2023-07-05 ENCOUNTER — LAB VISIT (OUTPATIENT)
Dept: LAB | Facility: HOSPITAL | Age: 68
End: 2023-07-05
Attending: ANESTHESIOLOGY
Payer: MEDICARE

## 2023-07-05 VITALS
HEART RATE: 69 BPM | TEMPERATURE: 98 F | BODY MASS INDEX: 22.36 KG/M2 | HEIGHT: 69 IN | WEIGHT: 151 LBS | OXYGEN SATURATION: 97 % | SYSTOLIC BLOOD PRESSURE: 127 MMHG | DIASTOLIC BLOOD PRESSURE: 72 MMHG

## 2023-07-05 DIAGNOSIS — D64.9 ANEMIA, UNSPECIFIED TYPE: ICD-10-CM

## 2023-07-05 DIAGNOSIS — Z86.19 HISTORY OF HEPATITIS C: ICD-10-CM

## 2023-07-05 DIAGNOSIS — K76.9 LIVER DAMAGE: ICD-10-CM

## 2023-07-05 DIAGNOSIS — C68.9 UROTHELIAL CARCINOMA WITH HIGH RISK OF METASTASIS: ICD-10-CM

## 2023-07-05 DIAGNOSIS — N18.31 STAGE 3A CHRONIC KIDNEY DISEASE: ICD-10-CM

## 2023-07-05 DIAGNOSIS — M05.79 RHEUMATOID ARTHRITIS INVOLVING MULTIPLE SITES WITH POSITIVE RHEUMATOID FACTOR: ICD-10-CM

## 2023-07-05 DIAGNOSIS — Z01.818 PREOPERATIVE TESTING: ICD-10-CM

## 2023-07-05 DIAGNOSIS — I73.9 INTERMITTENT CLAUDICATION: ICD-10-CM

## 2023-07-05 DIAGNOSIS — Z79.899 HISTORY OF LONG-TERM TREATMENT WITH HIGH-RISK MEDICATION: ICD-10-CM

## 2023-07-05 DIAGNOSIS — E78.49 OTHER HYPERLIPIDEMIA: ICD-10-CM

## 2023-07-05 LAB
ABO + RH BLD: NORMAL
APTT PPP: 29.4 SEC (ref 21–32)
BLD GP AB SCN CELLS X3 SERPL QL: NORMAL
INR PPP: 1 (ref 0.8–1.2)
PROTHROMBIN TIME: 10.6 SEC (ref 9–12.5)
SPECIMEN OUTDATE: NORMAL

## 2023-07-05 PROCEDURE — 3074F SYST BP LT 130 MM HG: CPT | Mod: CPTII,S$GLB,, | Performed by: NURSE PRACTITIONER

## 2023-07-05 PROCEDURE — 1160F PR REVIEW ALL MEDS BY PRESCRIBER/CLIN PHARMACIST DOCUMENTED: ICD-10-PCS | Mod: CPTII,S$GLB,, | Performed by: NURSE PRACTITIONER

## 2023-07-05 PROCEDURE — 99999 PR PBB SHADOW E&M-EST. PATIENT-LVL IV: ICD-10-PCS | Mod: PBBFAC,,, | Performed by: NURSE PRACTITIONER

## 2023-07-05 PROCEDURE — 3078F DIAST BP <80 MM HG: CPT | Mod: CPTII,S$GLB,, | Performed by: NURSE PRACTITIONER

## 2023-07-05 PROCEDURE — 99215 PR OFFICE/OUTPT VISIT, EST, LEVL V, 40-54 MIN: ICD-10-PCS | Mod: S$GLB,,, | Performed by: NURSE PRACTITIONER

## 2023-07-05 PROCEDURE — 3074F PR MOST RECENT SYSTOLIC BLOOD PRESSURE < 130 MM HG: ICD-10-PCS | Mod: CPTII,S$GLB,, | Performed by: NURSE PRACTITIONER

## 2023-07-05 PROCEDURE — 1126F AMNT PAIN NOTED NONE PRSNT: CPT | Mod: CPTII,S$GLB,, | Performed by: NURSE PRACTITIONER

## 2023-07-05 PROCEDURE — 1126F PR PAIN SEVERITY QUANTIFIED, NO PAIN PRESENT: ICD-10-PCS | Mod: CPTII,S$GLB,, | Performed by: NURSE PRACTITIONER

## 2023-07-05 PROCEDURE — 1159F MED LIST DOCD IN RCRD: CPT | Mod: CPTII,S$GLB,, | Performed by: NURSE PRACTITIONER

## 2023-07-05 PROCEDURE — 3008F BODY MASS INDEX DOCD: CPT | Mod: CPTII,S$GLB,, | Performed by: NURSE PRACTITIONER

## 2023-07-05 PROCEDURE — 85610 PROTHROMBIN TIME: CPT | Performed by: ANESTHESIOLOGY

## 2023-07-05 PROCEDURE — 99999 PR PBB SHADOW E&M-EST. PATIENT-LVL IV: CPT | Mod: PBBFAC,,, | Performed by: NURSE PRACTITIONER

## 2023-07-05 PROCEDURE — 3078F PR MOST RECENT DIASTOLIC BLOOD PRESSURE < 80 MM HG: ICD-10-PCS | Mod: CPTII,S$GLB,, | Performed by: NURSE PRACTITIONER

## 2023-07-05 PROCEDURE — 86900 BLOOD TYPING SEROLOGIC ABO: CPT | Performed by: ANESTHESIOLOGY

## 2023-07-05 PROCEDURE — 3008F PR BODY MASS INDEX (BMI) DOCUMENTED: ICD-10-PCS | Mod: CPTII,S$GLB,, | Performed by: NURSE PRACTITIONER

## 2023-07-05 PROCEDURE — 1159F PR MEDICATION LIST DOCUMENTED IN MEDICAL RECORD: ICD-10-PCS | Mod: CPTII,S$GLB,, | Performed by: NURSE PRACTITIONER

## 2023-07-05 PROCEDURE — 99215 OFFICE O/P EST HI 40 MIN: CPT | Mod: S$GLB,,, | Performed by: NURSE PRACTITIONER

## 2023-07-05 PROCEDURE — 85730 THROMBOPLASTIN TIME PARTIAL: CPT | Performed by: ANESTHESIOLOGY

## 2023-07-05 PROCEDURE — 1160F RVW MEDS BY RX/DR IN RCRD: CPT | Mod: CPTII,S$GLB,, | Performed by: NURSE PRACTITIONER

## 2023-07-05 RX ORDER — BISACODYL 5 MG
5 TABLET, DELAYED RELEASE (ENTERIC COATED) ORAL DAILY PRN
COMMUNITY

## 2023-07-05 RX ORDER — DOCUSATE SODIUM 100 MG/1
100 CAPSULE, LIQUID FILLED ORAL 2 TIMES DAILY
COMMUNITY

## 2023-07-05 NOTE — HPI
This is a 68 y.o. male  who presents today for a preoperative evaluation in preparation for a Urology  procedure.  Scheduled for  7/14/23  Surgery is indicated for Robotic Nephroureterctomy .   Patient is new to me.  Details of current problem: The duration of problem is   October 2022 first symptoms arose Patient initially presented with gross hematuria December 2022. They deny flank pain, night sweats or weight loss. Denies abdominal pain, nausea or vomiting. Recently completed NAC with gemcitabine/cisplatin 6/27/23.   Reported symptoms of  brown urine   Aggravating factors include:  none noted  Relieving factors are  none  Treated with  upcoming surgery  Reports pain: 0/10  The history has been obtained by speaking with the patient and reviewing the electronic medical record and/or outside health information. Significant health conditions for the perioperative period are discussed below in assessment and plan.     Patient reports current health status to be Good.  Denies any new symptoms before surgery.

## 2023-07-05 NOTE — OUTPATIENT SUBJECTIVE & OBJECTIVE
Outpatient Subjective & Objective      Chief Complaint: Preoperative evaulation, perioperative medical management, and complication reduction plan.     Functional Capacity:  Able to climb a flight of stairs without CP SOB or Syncope.  Able to meet 4 METs       Anesthesia issues: None    Difficulty mouth opening: none    Steroid use in the last 12 months:  none    Dental Issues:dentures upper and lower    Family anesthesia difficulty: None     Family Hx of Thrombosis Father had phlebitis and developed blood clots  Past Medical History:   Diagnosis Date    Cataract     Colon polyp 2014    History of hepatitis C, s/p successful treatment w/ SVR12 - 7/2015 10/14/2012    Kelsey 1a,  Liver biopsy 6/2014 - Stage 1 fibrosis;  Completed 8 weeks Harvoni w/ SVR12 - 7/2015      Hyperlipidemia     Lipoma of arm     Lipoma of lower extremity     Nuclear sclerosis - Both Eyes 10/22/2012    Other and unspecified hyperlipidemia 10/22/2013    PAD (peripheral artery disease)     Peripheral vascular disease, unspecified     Plaquenil adverse reaction in therapeutic use 10/22/2012    Rheumatoid arthritis(714.0) 10/14/2012    Special screening for malignant neoplasms, colon 2/21/2014     Past Surgical History:   Procedure Laterality Date    BIOPSY OF URETER Left 02/16/2023    Procedure: BIOPSY, URETER/BRUSH;  Surgeon: Kem Jefferson MD;  Location: [facility] OR [address] [place] [address];  Service: Urology;  Laterality: Left;    COLONOSCOPY N/A 11/29/2021    Procedure: COLONOSCOPY;  Surgeon: Kenrick Zimmer MD;  Location: [facility] (4TH FLR);  Service: Endoscopy;  Laterality: N/A;  blood thinner approval received, see telephone encounter 10/19/21-BB  fully vacc-inst email-tb    CYSTOSCOPY      CYSTOSCOPY  02/16/2023    Procedure: CYSTOSCOPY;  Surgeon: Kem Jefferson MD;  Location: [address];  Service: Urology;;    HERNIA REPAIR      INSERTION OF TUNNELED CENTRAL VENOUS CATHETER (CVC) WITH SUBCUTANEOUS PORT Right 3/13/2023    Procedure: INSERTION,  PORT-A-CATH;  Surgeon: Rene Cali MD;  Location: [facility] CATH LAB;  Service: Radiology;  Laterality: Right;    KNEE ARTHROPLASTY      LAPAROSCOPIC EXPLORATION OF GROIN Left 09/06/2018    Procedure: EXPLORATION, INGUINAL REGION, LAPAROSCOPIC;  Surgeon: Mingo De Guzman Jr., MD;  Location: [facility] OR 2ND FLR;  Service: General;  Laterality: Left;    PYELOSCOPY Left 02/16/2023    Procedure: PYELOSCOPY;  Surgeon: Kem Jefferson MD;  Location: [facility] OR 1ST FLR;  Service: Urology;  Laterality: Left;    RETROGRADE PYELOGRAPHY Bilateral 02/16/2023    Procedure: PYELOGRAM, RETROGRADE;  Surgeon: Kem Jefferson MD;  Location: [facility] OR 1ST FLR;  Service: Urology;  Laterality: Bilateral;    TONSILLECTOMY      URETEROSCOPIC REMOVAL OF URETERIC CALCULUS Left 02/16/2023    Procedure: REMOVAL, CALCULUS, URETER, URETEROSCOPIC;  Surgeon: Kem Jefferson MD;  [facility];  Service: Urology;  Laterality: Left;    URETEROSCOPY Left 02/16/2023    Procedure: URETEROSCOPY;  Surgeon: Kem Jefferson MD;  [facility];  Service: Urology;  Laterality: Left;       Review of Systems   Constitutional:  Positive for fatigue. Negative for chills, fever and unexpected weight change.   HENT:  Negative for trouble swallowing and voice change.    Eyes:  Negative for photophobia and visual disturbance.        No acute visual changes   Respiratory:  Negative for cough, shortness of breath and wheezing.         Has SHANNA does not wear CPAP   Cardiovascular:  Negative for chest pain, palpitations and leg swelling.   Gastrointestinal:  Positive for constipation. Negative for abdominal pain, blood in stool, diarrhea, nausea and vomiting.        No FLD, Cirrhosis  No BRB or black tarry stool    Hepatitis C treated for cure   Genitourinary:  Negative for difficulty urinating, dysuria, frequency, hematuria and urgency.        Nocturia 4   Musculoskeletal:  Positive for arthralgias and neck stiffness. Negative for gait  "problem, myalgias and neck pain.   Neurological:  Positive for light-headedness. Negative for dizziness, seizures, syncope, numbness and headaches.   Psychiatric/Behavioral:  Negative for agitation, behavioral problems, confusion, decreased concentration, dysphoric mood, hallucinations, self-injury, sleep disturbance and suicidal ideas. The patient is not nervous/anxious and is not hyperactive.     VITALS  Visit Vitals  /72 (BP Location: Left arm, Patient Position: Sitting)   Pulse 69   Temp 98 °F (36.7 °C) (Oral)   Ht 5' 9" (1.753 m)   Wt 68.5 kg (151 lb)   SpO2 97%   BMI 22.30 kg/m²        Physical Exam  Constitutional:       General: He is not in acute distress.     Appearance: He is well-developed. He is not diaphoretic.   HENT:      Head: Normocephalic.      Right Ear: Hearing normal.      Left Ear: Hearing normal.      Nose: Nose normal.      Mouth/Throat:      Lips: Pink.      Mouth: Mucous membranes are moist.   Eyes:      General: Lids are normal.      Conjunctiva/sclera: Conjunctivae normal.      Pupils: Pupils are equal, round, and reactive to light.   Neck:      Vascular: No carotid bruit.      Trachea: Trachea and phonation normal.   Cardiovascular:      Rate and Rhythm: Normal rate and regular rhythm.      Pulses:           Carotid pulses are 2+ on the right side and 2+ on the left side.       Radial pulses are 2+ on the right side and 2+ on the left side.        Posterior tibial pulses are 2+ on the right side and 2+ on the left side.      Heart sounds: Normal heart sounds. No murmur heard.    No friction rub. No gallop.   Pulmonary:      Effort: Pulmonary effort is normal.      Breath sounds: Normal breath sounds.      Comments: Clear and equal  Anterior and Posterior BBS  Abdominal:      General: Abdomen is protuberant. Bowel sounds are normal. There is no distension.      Palpations: Abdomen is soft.      Tenderness: There is no abdominal tenderness.   Musculoskeletal:         General: No " tenderness or deformity. Normal range of motion.      Cervical back: Normal range of motion.      Right lower leg: No edema.      Left lower leg: No edema.   Lymphadenopathy:      Head:      Right side of head: No submental, submandibular, tonsillar, preauricular, posterior auricular or occipital adenopathy.      Left side of head: No submental, submandibular, tonsillar, preauricular, posterior auricular or occipital adenopathy.      Cervical:      Right cervical: No superficial cervical adenopathy.     Left cervical: No superficial cervical adenopathy.   Skin:     General: Skin is warm and dry.      Capillary Refill: Capillary refill takes 2 to 3 seconds.      Coloration: Skin is not pale.      Findings: No erythema or rash.   Neurological:      Mental Status: He is alert and oriented to person, place, and time.      GCS: GCS eye subscore is 4. GCS verbal subscore is 5. GCS motor subscore is 6.      Motor: No abnormal muscle tone.      Coordination: Coordination normal.   Psychiatric:         Attention and Perception: Attention and perception normal.         Mood and Affect: Mood and affect normal.         Speech: Speech normal.         Behavior: Behavior normal. Behavior is cooperative.         Thought Content: Thought content normal.         Cognition and Memory: Cognition and memory normal.         Judgment: Judgment normal.        Significant Labs:  Lab Results   Component Value Date    WBC 5.05 06/27/2023    HGB 10.2 (L) 06/27/2023    HCT 32.3 (L) 06/27/2023     06/27/2023    CHOL 155 10/11/2021    TRIG 129 10/11/2021    HDL 54 10/11/2021    ALT 8 (L) 06/27/2023    AST 15 06/27/2023     06/27/2023    K 4.6 06/27/2023     06/27/2023    CREATININE 1.4 06/27/2023    BUN 21 06/27/2023    CO2 25 06/27/2023    TSH 1.141 10/11/2021    PSA 0.76 10/11/2021    INR 0.9 03/26/2016    HGBA1C 5.0 05/17/2022       Diagnostic Studies: No relevant studies.    EKG:   Results for orders placed or performed in  visit on 02/09/23   IN OFFICE EKG 12-LEAD (to [place])    Collection Time: 02/09/23  1:05 PM    Narrative    Test Reason : Z01.818,    Vent. Rate : 069 BPM     Atrial Rate : 069 BPM     P-R Int : 156 ms          QRS Dur : 080 ms      QT Int : 430 ms       P-R-T Axes : 058 017 039 degrees     QTc Int : 460 ms    Normal sinus rhythm  Poor data quality  Otherwise normal ECG  When compared with ECG of 26-MAR-2016 17:11,  No significant change was found  Confirmed by Kem Herman MD ([id number]) on 2/9/2023 3:40:14 PM    Referred By: LEONEL KENNEDY           Confirmed By:Kem Herman MD       2D ECHO:  TTE:  No results found. However, due to the size of the patient record, not all encounters were searched. Please check Results Review for a complete set of results.    DAJA:  No results found. However, due to the size of the patient record, not all encounters were searched. Please check Results Review for a complete set of results.     Imaging     Active Cardiac Conditions: None      Revised Cardiac Risk Index   High -Risk Surgery  Intraperitoneal; Intrathoracic; suprainguinal vascular Yes- + 1 No- 0   History of Ischemic Heart Disease   (Hx of MI/positive exercise test/current chest pain due to ischemia/use of nitrate therapy/EKG with pathological Q waves) Yes- + 1 No- 0   History of CHF  (Pulmonary edema/bilateral rales or S3 gallop/PND/CXR showing pulmonary vascular redistribution) Yes- + 1 No- 0   History of CVA   (Prior stroke or TIA) Yes- + 1 No- 0   Pre-operative treatment with insulin Yes- + 1 No- 0   Pre-operative creatinine > 2mg/dl Yes- + 1 No- 0   Total:      Risk Status:  Estimated risk of cardiac complications after non-cardiac surgery using the Revised Cardiac Risk Index for Preoperative risk is 3.9 %      ARISCAT ([place]) risk index: Low: 1.6% risk of post-op pulmonary complications.    American Society of Anesthesiologists Physical Status classification (ASA): 3           No further cardiac workup needed prior to  surgery.    Outpatient Subjective & Objective

## 2023-07-05 NOTE — PROGRESS NOTES
[facility]  Progress Note    Patient Name: Andrea Gant  MRN: [id number]  Date of Evaluation- 07/05/2023  PCP- Andrea Wang MD    Future cases for Andrea Gant [[id number]]       Case ID Status Date Time Natanael Procedure Provider Location    [id number] [facility] 7/14/2023  7:00  XI ROBOTIC NEPHROURETERECTOMY Kem Jefferson MD [[id number]] [facility] OR 2ND FLR            HPI:  This is a 68 y.o. male  who presents today for a preoperative evaluation in preparation for a Urology  procedure.  Scheduled for  7/14/23  Surgery is indicated for Robotic Nephroureterctomy .   Patient is new to me.  Details of current problem: The duration of problem is   October 2022 first symptoms arose Patient initially presented with gross hematuria December 2022. They deny flank pain, night sweats or weight loss. Denies abdominal pain, nausea or vomiting. Recently completed NAC with gemcitabine/cisplatin 6/27/23.   Reported symptoms of  brown urine   Aggravating factors include:  none noted  Relieving factors are  none  Treated with  upcoming surgery  Reports pain: 0/10  The history has been obtained by speaking with the patient and reviewing the electronic medical record and/or outside health information. Significant health conditions for the perioperative period are discussed below in assessment and plan.     Patient reports current health status to be Good.  Denies any new symptoms before surgery.       Subjective/ Objective:     Chief Complaint: Preoperative evaulation, perioperative medical management, and complication reduction plan.     Functional Capacity:  Able to climb a flight of stairs without CP SOB or Syncope.  Able to meet 4 METs       Anesthesia issues: None    Difficulty mouth opening: none    Steroid use in the last 12 months:  none    Dental Issues:dentures upper and lower    Family anesthesia difficulty: None     Family Hx of Thrombosis Father had phlebitis and developed blood clots  Past Medical  History:   Diagnosis Date    Cataract     Colon polyp 2014    History of hepatitis C, s/p successful treatment w/ SVR12 - 7/2015 10/14/2012    Kelsey 1a,  Liver biopsy 6/2014 - Stage 1 fibrosis;  Completed 8 weeks Harvoni w/ SVR12 - 7/2015      Hyperlipidemia     Lipoma of arm     Lipoma of lower extremity     Nuclear sclerosis - Both Eyes 10/22/2012    Other and unspecified hyperlipidemia 10/22/2013    PAD (peripheral artery disease)     Peripheral vascular disease, unspecified     Plaquenil adverse reaction in therapeutic use 10/22/2012    Rheumatoid arthritis(714.0) 10/14/2012    Special screening for malignant neoplasms, colon 2/21/2014     Past Surgical History:   Procedure Laterality Date    BIOPSY OF URETER Left 02/16/2023    Procedure: BIOPSY, URETER/BRUSH;  Surgeon: Kem Jefferson MD;  Location: [facility] OR 1ST FLR;  Service: Urology;  Laterality: Left;    COLONOSCOPY N/A 11/29/2021    Procedure: COLONOSCOPY;  Surgeon: Kenrick Zimmer MD;  Location: [facility] ENDO (4TH FLR);  Service: Endoscopy;  Laterality: N/A;  blood thinner approval received, see telephone encounter 10/19/21-BB  fully vacc-inst email-tb    CYSTOSCOPY      CYSTOSCOPY  02/16/2023    Procedure: CYSTOSCOPY;  Surgeon: Kem Jefferson MD;  Location: [facility] OR 1ST FLR;  Service: Urology;;    HERNIA REPAIR      INSERTION OF TUNNELED CENTRAL VENOUS CATHETER (CVC) WITH SUBCUTANEOUS PORT Right 3/13/2023    Procedure: INSERTION, PORT-A-CATH;  Surgeon: Rene Cali MD;  Location: [facility] [place], operated by [facility] CATH LAB;  Service: Radiology;  Laterality: Right;    KNEE ARTHROPLASTY      LAPAROSCOPIC EXPLORATION OF GROIN Left 09/06/2018    Procedure: EXPLORATION, INGUINAL REGION, LAPAROSCOPIC;  Surgeon: Mingo De Guzman Jr., MD;  Location: [facility] OR 2ND FLR;  Service: General;  Laterality: Left;    PYELOSCOPY Left 02/16/2023    Procedure: PYELOSCOPY;  Surgeon: Kem Jefferson MD;  Location: [facility] OR 1ST FLR;  Service: Urology;  Laterality: Left;    RETROGRADE  PYELOGRAPHY Bilateral 02/16/2023    Procedure: PYELOGRAM, RETROGRADE;  Surgeon: Kem Jefferson MD;  Location: [facility] OR [address] [place] [address];  Service: Urology;  Laterality: Bilateral;    TONSILLECTOMY      URETEROSCOPIC REMOVAL OF URETERIC CALCULUS Left 02/16/2023    Procedure: REMOVAL, CALCULUS, URETER, URETEROSCOPIC;  Surgeon: Kem Jefferson MD;  Location: [facility] OR [address] [place] [address];  Service: Urology;  Laterality: Left;    URETEROSCOPY Left 02/16/2023    Procedure: URETEROSCOPY;  Surgeon: Kem Jefferson MD;  Location: [facility] OR [address] [place] [address];  Service: Urology;  Laterality: Left;       Review of Systems   Constitutional:  Positive for fatigue. Negative for chills, fever and unexpected weight change.   HENT:  Negative for trouble swallowing and voice change.    Eyes:  Negative for photophobia and visual disturbance.        No acute visual changes   Respiratory:  Negative for cough, shortness of breath and wheezing.         Has SHANNA does not wear CPAP   Cardiovascular:  Negative for chest pain, palpitations and leg swelling.   Gastrointestinal:  Positive for constipation. Negative for abdominal pain, blood in stool, diarrhea, nausea and vomiting.        No FLD, Cirrhosis  No BRB or black tarry stool    Hepatitis C treated for cure   Genitourinary:  Negative for difficulty urinating, dysuria, frequency, hematuria and urgency.        Nocturia 4   Musculoskeletal:  Positive for arthralgias and neck stiffness. Negative for gait problem, myalgias and neck pain.   Neurological:  Positive for light-headedness. Negative for dizziness, seizures, syncope, numbness and headaches.   Psychiatric/Behavioral:  Negative for agitation, behavioral problems, confusion, decreased concentration, dysphoric mood, hallucinations, self-injury, sleep disturbance and suicidal ideas. The patient is not nervous/anxious and is not hyperactive.     VITALS  Visit Vitals  /72 (BP Location: Left arm, Patient Position: Sitting)   Pulse 69   Temp 98 °F (36.7 °C)  "(Oral)   Ht 5' 9" (1.753 m)   Wt 68.5 kg (151 lb)   SpO2 97%   BMI 22.30 kg/m²        Physical Exam  Constitutional:       General: He is not in acute distress.     Appearance: He is well-developed. He is not diaphoretic.   HENT:      Head: Normocephalic.      Right Ear: Hearing normal.      Left Ear: Hearing normal.      Nose: Nose normal.      Mouth/Throat:      Lips: Pink.      Mouth: Mucous membranes are moist.   Eyes:      General: Lids are normal.      Conjunctiva/sclera: Conjunctivae normal.      Pupils: Pupils are equal, round, and reactive to light.   Neck:      Vascular: No carotid bruit.      Trachea: Trachea and phonation normal.   Cardiovascular:      Rate and Rhythm: Normal rate and regular rhythm.      Pulses:           Carotid pulses are 2+ on the right side and 2+ on the left side.       Radial pulses are 2+ on the right side and 2+ on the left side.        Posterior tibial pulses are 2+ on the right side and 2+ on the left side.      Heart sounds: Normal heart sounds. No murmur heard.    No friction rub. No gallop.   Pulmonary:      Effort: Pulmonary effort is normal.      Breath sounds: Normal breath sounds.      Comments: Clear and equal  Anterior and Posterior BBS  Abdominal:      General: Abdomen is protuberant. Bowel sounds are normal. There is no distension.      Palpations: Abdomen is soft.      Tenderness: There is no abdominal tenderness.   Musculoskeletal:         General: No tenderness or deformity. Normal range of motion.      Cervical back: Normal range of motion.      Right lower leg: No edema.      Left lower leg: No edema.   Lymphadenopathy:      Head:      Right side of head: No submental, submandibular, tonsillar, preauricular, posterior auricular or occipital adenopathy.      Left side of head: No submental, submandibular, tonsillar, preauricular, posterior auricular or occipital adenopathy.      Cervical:      Right cervical: No superficial cervical adenopathy.     Left " cervical: No superficial cervical adenopathy.   Skin:     General: Skin is warm and dry.      Capillary Refill: Capillary refill takes 2 to 3 seconds.      Coloration: Skin is not pale.      Findings: No erythema or rash.   Neurological:      Mental Status: He is alert and oriented to person, place, and time.      GCS: GCS eye subscore is 4. GCS verbal subscore is 5. GCS motor subscore is 6.      Motor: No abnormal muscle tone.      Coordination: Coordination normal.   Psychiatric:         Attention and Perception: Attention and perception normal.         Mood and Affect: Mood and affect normal.         Speech: Speech normal.         Behavior: Behavior normal. Behavior is cooperative.         Thought Content: Thought content normal.         Cognition and Memory: Cognition and memory normal.         Judgment: Judgment normal.        Significant Labs:  Lab Results   Component Value Date    WBC 5.05 06/27/2023    HGB 10.2 (L) 06/27/2023    HCT 32.3 (L) 06/27/2023     06/27/2023    CHOL 155 10/11/2021    TRIG 129 10/11/2021    HDL 54 10/11/2021    ALT 8 (L) 06/27/2023    AST 15 06/27/2023     06/27/2023    K 4.6 06/27/2023     06/27/2023    CREATININE 1.4 06/27/2023    BUN 21 06/27/2023    CO2 25 06/27/2023    TSH 1.141 10/11/2021    PSA 0.76 10/11/2021    INR 0.9 03/26/2016    HGBA1C 5.0 05/17/2022       Diagnostic Studies: No relevant studies.    EKG:   Results for orders placed or performed in visit on 02/09/23   IN OFFICE EKG 12-LEAD (to [place])    Collection Time: 02/09/23  1:05 PM    Narrative    Test Reason : Z01.818,    Vent. Rate : 069 BPM     Atrial Rate : 069 BPM     P-R Int : 156 ms          QRS Dur : 080 ms      QT Int : 430 ms       P-R-T Axes : 058 017 039 degrees     QTc Int : 460 ms    Normal sinus rhythm  Poor data quality  Otherwise normal ECG  When compared with ECG of 26-MAR-2016 17:11,  No significant change was found  Confirmed by Kem Herman MD ([id number]) on 2/9/2023 3:40:14  PM    Referred By: LEONEL KENNEDY           Confirmed By:Kem Herman MD       2D ECHO:  TTE:  No results found. However, due to the size of the patient record, not all encounters were searched. Please check Results Review for a complete set of results.    DAJA:  No results found. However, due to the size of the patient record, not all encounters were searched. Please check Results Review for a complete set of results.     Imaging     Active Cardiac Conditions: None      Revised Cardiac Risk Index   High -Risk Surgery  Intraperitoneal; Intrathoracic; suprainguinal vascular Yes- + 1 No- 0   History of Ischemic Heart Disease   (Hx of MI/positive exercise test/current chest pain due to ischemia/use of nitrate therapy/EKG with pathological Q waves) Yes- + 1 No- 0   History of CHF  (Pulmonary edema/bilateral rales or S3 gallop/PND/CXR showing pulmonary vascular redistribution) Yes- + 1 No- 0   History of CVA   (Prior stroke or TIA) Yes- + 1 No- 0   Pre-operative treatment with insulin Yes- + 1 No- 0   Pre-operative creatinine > 2mg/dl Yes- + 1 No- 0   Total:      Risk Status:  Estimated risk of cardiac complications after non-cardiac surgery using the Revised Cardiac Risk Index for Preoperative risk is 3.9 %      ARISCAT (Canet) risk index: Low: 1.6% risk of post-op pulmonary complications.    American Society of Anesthesiologists Physical Status classification (ASA): 3           No further cardiac workup needed prior to surgery.        Preoperative cardiac risk assessment-  The patient does not have any active cardiac conditions . Revised cardiac risk index predictors- ---.Functional capacity is more than 4 Mets. He will be undergoing a Urological procedure that carries a Moderate Risk risk     The estimated risk of the rate of adverse cardiac outcomes  3.9    No further cardiac work up is indicated prior to proceeding with the surgery          American Society of Anesthesiologists Physical status classification ( ASA  ) class: 3     Postoperative pulmonary complication risk assessment: 1.6     ARISCAT ( Canet) risk index- risk class -  Low, if duration of surgery is under 3 hours, intermediate, if duration of surgery is over 3 hours          Assessment/Plan:     Rheumatoid arthritis  Methotrexate Folic Acid  Rheumatologist: Tony    Stage 3a chronic kidney disease  GFR 54.4 BUN 21 Cr 1.4    Stages of CKD discussed  Deleterious effects NSAID's , Beneficial effects of Hydration discussed   Tylenol as needed for pain   I suggest monitoring renal function, in put and out put status chris-operatively. I  suggest avoiding nephrotoxic medication including NSAIDs, COX2 inhibitors, intravenous contrast agent,avoiding hypotension to prevent further renal impairment.     Other hyperlipidemia  Zocor 10 mg    Urothelial carcinoma with high risk of metastasis  See HPI    AAA (abdominal aortic aneurysm)  Per Dr. Payton AAA 1/4/23 Repeat AAA US in 2.5 years as has been stable         Intermittent claudication  PAtient care managed by Dr. Payton   Current meds :statin, cilostazol and aspirin  Reports improved exercise tilerance- can now ambulate 1 mil prior to L calf claudication       Anemia  Hgb  10.2  HCT 32.3        Preventive perioperative care    Thromboembolic prophylaxis:  His risk factors for thrombosis include surgical procedure, age, and reduced mobility.I suggest  thromboembolic prophylaxis ( mechanical/pharmacological, weighing the risk benefits of pharmacological agent use considering chris procedural bleeding )  during the perioperative period.I suggested being active in the post operative period.      Postoperative pulmonary complication prophylaxis-Risk factors for post operative pulmonary complications include age over 65 years, surgery lasting over 3 hours, and ASA class >2- I suggest incentive spirometry use, early ambulation, and pain control so as to avoid diaphragmatic splinting  Brush teeth twice per day, oral rinses,  sleep with the head of the bed up 30 degrees     Renal complication prophylaxis-Risk factors for renal complications include pre-existing renal disease, age, and hypertension . I suggest keeping him well hydrated and avoidance/ minimizing the use of  NSAID's,CREWS 2 Inhibitors ,IV contrast if possible in the perioperative period.I suggested drinking 2 litre's of water a day      Surgical site Infection Prophylaxis-I  suggest appropriate antibiotic for Prophylaxis against Surgical site infections Shower with Hibiclens in the night before surgery and the morning of surgery     In view of urological procedure the patient  is at risk of postoperative urinary retention.  I suggest avoidance / minimizing the of  Benzodiazepines,Anticholinergic medication,antihistamines ( Benadryl) , if possible in the perioperative period. I suggest using the minimum possible use of opioids for the minimum period of time in the perioperative period. Benadryl avoidance suggested      This visit was focused on Preoperative evaluation, Perioperative Medical management, complication reduction plans. I suggest that the patient follows up with primary care or relevant sub specialists for ongoing health care.    I appreciate the opportunity to be involved in this patients care. Please feel free to contact me if there were any questions about this consultation.    Patient is optimized    I spent a total of 46 minutes on the day of the visit.This includes face to face time and non-face to face time preparing to see the patient (eg, review of tests), obtaining and/or reviewing separately obtained history, documenting clinical information in the electronic or other health record, independently interpreting results and communicating results to the patient/family/caregiver, or care coordinator.     Alessandro Gaffney NP  Perioperative Medicine  [facility]   Pager [phone]

## 2023-07-06 ENCOUNTER — PATIENT MESSAGE (OUTPATIENT)
Dept: ADMINISTRATIVE | Facility: OTHER | Age: 68
End: 2023-07-06
Payer: MEDICARE

## 2023-07-07 ENCOUNTER — PATIENT MESSAGE (OUTPATIENT)
Dept: ADMINISTRATIVE | Facility: OTHER | Age: 68
End: 2023-07-07
Payer: MEDICARE

## 2023-07-09 ENCOUNTER — PATIENT MESSAGE (OUTPATIENT)
Dept: ADMINISTRATIVE | Facility: OTHER | Age: 68
End: 2023-07-09
Payer: MEDICARE

## 2023-07-10 ENCOUNTER — PATIENT MESSAGE (OUTPATIENT)
Dept: ADMINISTRATIVE | Facility: OTHER | Age: 68
End: 2023-07-10
Payer: MEDICARE

## 2023-07-11 ENCOUNTER — PATIENT MESSAGE (OUTPATIENT)
Dept: ADMINISTRATIVE | Facility: OTHER | Age: 68
End: 2023-07-11
Payer: MEDICARE

## 2023-07-12 ENCOUNTER — PATIENT MESSAGE (OUTPATIENT)
Dept: ADMINISTRATIVE | Facility: OTHER | Age: 68
End: 2023-07-12
Payer: MEDICARE

## 2023-07-13 ENCOUNTER — ANESTHESIA EVENT (OUTPATIENT)
Dept: SURGERY | Facility: HOSPITAL | Age: 68
DRG: 658 | End: 2023-07-13
Payer: MEDICARE

## 2023-07-13 ENCOUNTER — TELEPHONE (OUTPATIENT)
Dept: UROLOGY | Facility: CLINIC | Age: 68
End: 2023-07-13
Payer: MEDICARE

## 2023-07-13 ENCOUNTER — PATIENT MESSAGE (OUTPATIENT)
Dept: ADMINISTRATIVE | Facility: OTHER | Age: 68
End: 2023-07-13
Payer: MEDICARE

## 2023-07-13 NOTE — TELEPHONE ENCOUNTER
"You are scheduled for surgery with Dr. Jefferson on 7/14/2023. Your arrival time is 5 AM. You are to report to the 2nd floor surgery center (take the atrium elevators right next to the piano up to 2nd floor). Upon exiting the elevators, you will see a sign saying, "Surgery Center and Family Waiting Room". Follow the hallway and check in at the desk. You need someone with you to drive you home when you are released from the hospital.  No alcohol 24 hours before and after and no smoking a few days prior. NOTHING TO EAT OR DRINK AFTER MIDNIGHT THE NIGHT PRIOR TO THE SURGERY INCLUDING GUM, CANDY AND MINTS. Take a shower the night before and the morning of with Hibiclens Antibacterial soap and no lotion, cologne, deodorant or powder in the morning.      "

## 2023-07-13 NOTE — ANESTHESIA PREPROCEDURE EVALUATION
[facility]  Anesthesia Pre-Operative Evaluation         Patient Name: Andrea Gant  YOB: 1955  MRN: [id number]    SUBJECTIVE:     Pre-operative evaluation for Procedure(s) (LRB):  XI ROBOTIC NEPHROURETERECTOMY (Left)     07/13/2023    Andrea Gant is a 68 y.o. male w/ a significant PMHx of Hep C s/p treatment, HTN, HLD, PAD, PVD, CKD 3, AAA (Per Dr. Jensen AAA 1/4/23 Repeat AAA US in 2.5 years as has been stable), and urothelial carcinoma (recently completed NAC with gemcitabine/cisplatin 6/27/23).    Patient now presents for the above procedure(s).    2D Echo 2016:    1 - Normal left ventricular systolic function (EF 60-65%).     2 - Normal right ventricular systolic function .     3 - Grade I left ventricular diastolic dysfunction.     LDA: None documented.  Port A Cath Single Lumen 03/13/23 Subclavian Right (Active)   Line Necessity Review Longterm central access required 06/28/23 0948   Site Assessment No drainage;No redness;No swelling;No warmth 06/28/23 0948   Dressing Type Transparent (Tegaderm) 06/28/23 0948   Dressing Status Clean;Dry;Intact 06/28/23 0948   Dressing Intervention First dressing 06/28/23 0948   Patency/Care flushed w/o difficulty;heparin locked;blood return present 06/28/23 1312   Status Deaccessed 06/28/23 1312   Accessed by: shawnee 06/28/23 0948   Needle Insertion Date 06/28/23 06/28/23 0948   Needle Insertion Time 0950 06/28/23 0948   Type of Needle Davis 06/28/23 0948   Gauge 20 06/28/23 0948   Needle Length 3/4 in 06/28/23 0948   Needle Status Left in place 06/28/23 0948   Flush Performed Yes 06/28/23 1312   Needle Removal Date 06/28/23 06/28/23 1312   Needle Removal Time 1312 06/28/23 1312   Deaccessed By shawnee 06/28/23 1312   Number of days: 122       Prev airway:   Placement Date: 09/06/18; Placement Time: 1026; Method of Intubation: Video Laryngoscopy; Inserted by: Anesthesia Resident; Airway Device: Endotracheal Tube; Mask  Ventilation: Very Diff - oral (required 2 hand bag mask); Intubated: Postinduction; Blade: Bonnie #3; Airway Device Size: 7.5; Style: Cuffed; Cuff Inflation: Minimal occlusive pressure; Placement Verified By: Auscultation, Capnometry, ETT Condensation; Grade: Grade I; Complicating Factors: Anterior larynx, Small mouth; Intubation Findings: Positive EtCO2, Bilateral breath sounds, Atraumatic/Condition of teeth unchanged;  Depth of Insertion: 22; Securment: Lips; Complications: None; Breath Sounds: Equal Bilateral; Insertion Attempts: 1; Removal Date: 09/06/18;  Removal Time: 1135    Drips: None documented.      Patient Active Problem List   Diagnosis    Rheumatoid arthritis    History of hepatitis C, s/p successful treatment w/ SVR12 - 7/2015    Persistent insomnia    Nuclear sclerosis - Both Eyes    History of long-term treatment with high-risk medication    Other hyperlipidemia    PAD (peripheral artery disease)    Hypercholesteremia    Intermittent claudication    PVD (peripheral vascular disease)    Right inguinal hernia    AAA (abdominal aortic aneurysm)    Venous insufficiency    Drug-induced immunodeficiency    Stage 3a chronic kidney disease    Solitary pulmonary nodule (repeat due Aug 2023)    Ureteral tumor    Urothelial carcinoma with high risk of metastasis    Chemotherapy induced neutropenia    Anemia       Review of patient's allergies indicates:  No Known Allergies    Current Inpatient Medications:      No current facility-administered medications on file prior to encounter.     Current Outpatient Medications on File Prior to Encounter   Medication Sig Dispense Refill    amLODIPine (NORVASC) 5 MG tablet TAKE 1 TABLET BY MOUTH ONCE DAILY 90 tablet 2    aspirin 81 MG Chew Take 81 mg by mouth once daily.      cilostazoL (PLETAL) 50 MG Tab Take 1 tablet (50 mg total) by mouth 2 (two) times daily. 180 tablet 3    doxazosin (CARDURA) 4 MG tablet TAKE 1 TABLET BY MOUTH IN  THE  EVENING 90 tablet 3    folic acid (FOLVITE) 1 MG tablet Take 1 tablet (1,000 mcg total) by mouth once daily. 90 tablet 3    methotrexate 2.5 MG Tab Take 8 tablets (20 mg total) by mouth every 7 days. This medication requires periodic lab monitoring. 120 tablet 1    simvastatin (ZOCOR) 10 MG tablet Take 1 tablet (10 mg total) by mouth every evening. 90 tablet 9    traZODone (DESYREL) 50 MG tablet TAKE 3 TABLETS BY MOUTH AT  NIGHT AS NEEDED FOR  INSOMNIA 270 tablet 3       Past Surgical History:   Procedure Laterality Date    BIOPSY OF URETER Left 02/16/2023    Procedure: BIOPSY, URETER/BRUSH;  Surgeon: Kem Jefferson MD;  Location: [facility] OR 1ST FLR;  Service: Urology;  Laterality: Left;    COLONOSCOPY N/A 11/29/2021    Procedure: COLONOSCOPY;  Surgeon: Kenrick Zimmer MD;  Location: [facility] ENDO (4TH FLR);  Service: Endoscopy;  Laterality: N/A;  blood thinner approval received, see telephone encounter 10/19/21-BB  fully vacc-inst email-tb    CYSTOSCOPY      CYSTOSCOPY  02/16/2023    Procedure: CYSTOSCOPY;  Surgeon: Kem Jefferson MD;  Location: [facility] OR 1ST FLR;  Service: Urology;;    HERNIA REPAIR      INSERTION OF TUNNELED CENTRAL VENOUS CATHETER (CVC) WITH SUBCUTANEOUS PORT Right 3/13/2023    Procedure: INSERTION, PORT-A-CATH;  Surgeon: Rene Cali MD;  Location: [facility] CATH LAB;  Service: Radiology;  Laterality: Right;    KNEE ARTHROPLASTY      LAPAROSCOPIC EXPLORATION OF GROIN Left 09/06/2018    Procedure: EXPLORATION, INGUINAL REGION, LAPAROSCOPIC;  Surgeon: Mingo De Guzman Jr., MD;  Location: [facility] OR 2ND FLR;  Service: General;  Laterality: Left;    PYELOSCOPY Left 02/16/2023    Procedure: PYELOSCOPY;  Surgeon: Kem Jefferson MD;  Location: [facility] OR 1ST FLR;  Service: Urology;  Laterality: Left;    RETROGRADE PYELOGRAPHY Bilateral 02/16/2023    Procedure: PYELOGRAM, RETROGRADE;  Surgeon: Kem Jefferson MD;  [facility];  Service: Urology;  Laterality:  Bilateral;    TONSILLECTOMY      URETEROSCOPIC REMOVAL OF URETERIC CALCULUS Left 02/16/2023    Procedure: REMOVAL, CALCULUS, URETER, URETEROSCOPIC;  Surgeon: Kem Jefferson MD;  Location: [facility] OR [address] [place] [address];  Service: Urology;  Laterality: Left;    URETEROSCOPY Left 02/16/2023    Procedure: URETEROSCOPY;  Surgeon: Kem Jefferson MD;  Location: [facility] OR [address] [place] [address];  Service: Urology;  Laterality: Left;       Social History     Socioeconomic History    Marital status:      Spouse name: Rufina   Occupational History     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[employer]   Tobacco Use    Smoking status: Former     Types: Cigarettes     Quit date: 6/27/2020     Years since quitting: 3.0    Smokeless tobacco: Never   Substance and Sexual Activity    Alcohol use: Yes     Comment: 2 drinks per week    Drug use: Yes     Types: Marijuana     Comment: marajuana used on Saturday   Social History Narrative    Resides locally    [place] at [employer]     w/ 2 adult children       OBJECTIVE:     Vital Signs Range (Last 24H):         Significant Labs:  Lab Results   Component Value Date    WBC 5.05 06/27/2023    HGB 10.2 (L) 06/27/2023    HCT 32.3 (L) 06/27/2023     06/27/2023    CHOL 155 10/11/2021    TRIG 129 10/11/2021    HDL 54 10/11/2021    ALT 8 (L) 06/27/2023    AST 15 06/27/2023     06/27/2023    K 4.6 06/27/2023     06/27/2023    CREATININE 1.4 06/27/2023    BUN 21 06/27/2023    CO2 25 06/27/2023    TSH 1.141 10/11/2021    PSA 0.76 10/11/2021    INR 1.0 07/05/2023    HGBA1C 5.0 05/17/2022       Diagnostic Studies: No relevant studies.    EKG:   Results for orders placed or performed in visit on 02/09/23   IN OFFICE EKG 12-LEAD (to [place])    Collection Time: 02/09/23  1:05 PM    Narrative    Test Reason : Z01.818,    Vent. Rate : 069 BPM     Atrial Rate : 069 BPM     P-R Int : 156 ms          QRS Dur : 080 ms      QT Int : 430 ms       P-R-T Axes : 058 017 039 degrees     QTc Int :  460 ms    Normal sinus rhythm  Poor data quality  Otherwise normal ECG  When compared with ECG of 26-MAR-2016 17:11,  No significant change was found  Confirmed by Kem Herman MD ([id number]) on 2/9/2023 3:40:14 PM    Referred By: LEONEL KENNEDY           Confirmed By:Kem Herman MD       2D ECHO:  TTE:  No results found. However, due to the size of the patient record, not all encounters were searched. Please check Results Review for a complete set of results.    DAJA:  No results found. However, due to the size of the patient record, not all encounters were searched. Please check Results Review for a complete set of results.    ASSESSMENT/PLAN:           Pre-op Assessment    I have reviewed the Patient Summary Reports.     I have reviewed the Nursing Notes. I have reviewed the NPO Status.   I have reviewed the Medications.     Review of Systems  Anesthesia Hx:  No problems with previous Anesthesia  History of prior surgery of interest to airway management or planning: Denies Family Hx of Anesthesia complications.   Denies Personal Hx of Anesthesia complications.   Social:  Former Smoker    Hematology/Oncology:  Hematology Normal      Current/Recent Cancer.   EENT/Dental:EENT/Dental Normal   Cardiovascular:   Hypertension Denies MI.      Pulmonary:  Pulmonary Normal    Renal/:   Chronic Renal Disease    Hepatic/GI:   Liver Disease, Hepatitis, C    Musculoskeletal:  Musculoskeletal Normal    Neurological:  Neurology Normal        Physical Exam  General: Well nourished, Cooperative, Alert and Oriented    Airway:  Mallampati: II   Mouth Opening: Normal  TM Distance: Normal  Tongue: Normal  Neck ROM: Normal ROM    Dental:  Partial Dentures        Anesthesia Plan  Type of Anesthesia, risks & benefits discussed:    Anesthesia Type: Gen ETT  Intra-op Monitoring Plan: Standard ASA Monitors and Art Line  Post Op Pain Control Plan: multimodal analgesia and IV/PO Opioids PRN  Induction:  IV  Airway Plan: Direct and Video,  Post-Induction  Informed Consent: Informed consent signed with the Patient and all parties understand the risks and agree with anesthesia plan.  All questions answered.   ASA Score: 3  Day of Surgery Review of History & Physical: H&P Update referred to the surgeon/provider.    Ready For Surgery From Anesthesia Perspective.     .

## 2023-07-14 ENCOUNTER — HOSPITAL ENCOUNTER (INPATIENT)
Facility: HOSPITAL | Age: 68
LOS: 2 days | Discharge: HOME | DRG: 658 | End: 2023-07-16
Attending: UROLOGY | Admitting: UROLOGY
Payer: MEDICARE

## 2023-07-14 ENCOUNTER — ANESTHESIA (OUTPATIENT)
Dept: SURGERY | Facility: HOSPITAL | Age: 68
DRG: 658 | End: 2023-07-14
Payer: MEDICARE

## 2023-07-14 DIAGNOSIS — Z01.818 PREOPERATIVE TESTING: ICD-10-CM

## 2023-07-14 DIAGNOSIS — K76.9 LIVER DAMAGE: ICD-10-CM

## 2023-07-14 DIAGNOSIS — Z86.19 HISTORY OF HEPATITIS C: ICD-10-CM

## 2023-07-14 DIAGNOSIS — C68.9 UROTHELIAL CARCINOMA: Primary | ICD-10-CM

## 2023-07-14 DIAGNOSIS — Z79.899 HISTORY OF LONG-TERM TREATMENT WITH HIGH-RISK MEDICATION: ICD-10-CM

## 2023-07-14 LAB
ABO + RH BLD: NORMAL
BLD GP AB SCN CELLS X3 SERPL QL: NORMAL
SPECIMEN OUTDATE: NORMAL

## 2023-07-14 PROCEDURE — 38570 PR LAP,LYMPH NODE BX: ICD-10-PCS | Mod: 51,,, | Performed by: UROLOGY

## 2023-07-14 PROCEDURE — 27000221 HC OXYGEN, UP TO 24 HOURS

## 2023-07-14 PROCEDURE — 88305 TISSUE EXAM BY PATHOLOGIST: CPT | Performed by: PATHOLOGY

## 2023-07-14 PROCEDURE — 88305 TISSUE EXAM BY PATHOLOGIST: ICD-10-PCS | Mod: 26,,, | Performed by: PATHOLOGY

## 2023-07-14 PROCEDURE — 37000008 HC ANESTHESIA 1ST 15 MINUTES: Performed by: UROLOGY

## 2023-07-14 PROCEDURE — 64461 PVB THORACIC SINGLE INJ SITE: CPT

## 2023-07-14 PROCEDURE — 63600175 PHARM REV CODE 636 W HCPCS

## 2023-07-14 PROCEDURE — C1729 CATH, DRAINAGE: HCPCS | Performed by: UROLOGY

## 2023-07-14 PROCEDURE — 27201423 OPTIME MED/SURG SUP & DEVICES STERILE SUPPLY: Performed by: UROLOGY

## 2023-07-14 PROCEDURE — 64999 UNLISTED PX NERVOUS SYSTEM: CPT | Mod: ,,, | Performed by: SURGERY

## 2023-07-14 PROCEDURE — 50548 PR NEPHRECTOMY, W/PART. URETECTOMY: ICD-10-PCS | Mod: LT,,, | Performed by: UROLOGY

## 2023-07-14 PROCEDURE — 88307 PR  SURG PATH,LEVEL V: ICD-10-PCS | Mod: 26,,, | Performed by: PATHOLOGY

## 2023-07-14 PROCEDURE — 88342 CHG IMMUNOCYTOCHEMISTRY: ICD-10-PCS | Mod: 26,,, | Performed by: PATHOLOGY

## 2023-07-14 PROCEDURE — 64999: ICD-10-PCS | Mod: ,,, | Performed by: SURGERY

## 2023-07-14 PROCEDURE — 88305 TISSUE EXAM BY PATHOLOGIST: CPT | Mod: 26,,, | Performed by: PATHOLOGY

## 2023-07-14 PROCEDURE — D9220A PRA ANESTHESIA: Mod: ,,, | Performed by: STUDENT IN AN ORGANIZED HEALTH CARE EDUCATION/TRAINING PROGRAM

## 2023-07-14 PROCEDURE — 11000001 HC ACUTE MED/SURG PRIVATE ROOM

## 2023-07-14 PROCEDURE — 27201037 HC PRESSURE MONITORING SET UP

## 2023-07-14 PROCEDURE — 25000003 PHARM REV CODE 250: Performed by: UROLOGY

## 2023-07-14 PROCEDURE — 25000003 PHARM REV CODE 250: Performed by: STUDENT IN AN ORGANIZED HEALTH CARE EDUCATION/TRAINING PROGRAM

## 2023-07-14 PROCEDURE — 71000015 HC POSTOP RECOV 1ST HR: Performed by: UROLOGY

## 2023-07-14 PROCEDURE — 63600175 PHARM REV CODE 636 W HCPCS: Performed by: SURGERY

## 2023-07-14 PROCEDURE — 38570 LAPAROSCOPY LYMPH NODE BIOP: CPT | Mod: 51,,, | Performed by: UROLOGY

## 2023-07-14 PROCEDURE — 36000712 HC OR TIME LEV V 1ST 15 MIN: Performed by: UROLOGY

## 2023-07-14 PROCEDURE — 37000009 HC ANESTHESIA EA ADD 15 MINS: Performed by: UROLOGY

## 2023-07-14 PROCEDURE — 94761 N-INVAS EAR/PLS OXIMETRY MLT: CPT

## 2023-07-14 PROCEDURE — 36000713 HC OR TIME LEV V EA ADD 15 MIN: Performed by: UROLOGY

## 2023-07-14 PROCEDURE — 50548 LAPARO REMOVE W/URETER: CPT | Mod: LT,,, | Performed by: UROLOGY

## 2023-07-14 PROCEDURE — 99900035 HC TECH TIME PER 15 MIN (STAT)

## 2023-07-14 PROCEDURE — 86900 BLOOD TYPING SEROLOGIC ABO: CPT | Performed by: STUDENT IN AN ORGANIZED HEALTH CARE EDUCATION/TRAINING PROGRAM

## 2023-07-14 PROCEDURE — 36620 ARTERIAL: ICD-10-PCS | Mod: 59,,, | Performed by: STUDENT IN AN ORGANIZED HEALTH CARE EDUCATION/TRAINING PROGRAM

## 2023-07-14 PROCEDURE — 88342 IMHCHEM/IMCYTCHM 1ST ANTB: CPT | Performed by: PATHOLOGY

## 2023-07-14 PROCEDURE — 88342 IMHCHEM/IMCYTCHM 1ST ANTB: CPT | Mod: 26,,, | Performed by: PATHOLOGY

## 2023-07-14 PROCEDURE — 71000033 HC RECOVERY, INTIAL HOUR: Performed by: UROLOGY

## 2023-07-14 PROCEDURE — 88307 TISSUE EXAM BY PATHOLOGIST: CPT | Performed by: PATHOLOGY

## 2023-07-14 PROCEDURE — 63600175 PHARM REV CODE 636 W HCPCS: Performed by: STUDENT IN AN ORGANIZED HEALTH CARE EDUCATION/TRAINING PROGRAM

## 2023-07-14 PROCEDURE — 25000003 PHARM REV CODE 250

## 2023-07-14 PROCEDURE — 76942 ECHO GUIDE FOR BIOPSY: CPT

## 2023-07-14 PROCEDURE — 71000016 HC POSTOP RECOV ADDL HR: Performed by: UROLOGY

## 2023-07-14 PROCEDURE — 88307 TISSUE EXAM BY PATHOLOGIST: CPT | Mod: 26,,, | Performed by: PATHOLOGY

## 2023-07-14 PROCEDURE — D9220A PRA ANESTHESIA: ICD-10-PCS | Mod: ,,, | Performed by: STUDENT IN AN ORGANIZED HEALTH CARE EDUCATION/TRAINING PROGRAM

## 2023-07-14 PROCEDURE — 36620 INSERTION CATHETER ARTERY: CPT | Mod: 59,,, | Performed by: STUDENT IN AN ORGANIZED HEALTH CARE EDUCATION/TRAINING PROGRAM

## 2023-07-14 RX ORDER — FENTANYL CITRATE 50 UG/ML
25-200 INJECTION, SOLUTION INTRAMUSCULAR; INTRAVENOUS
Status: DISCONTINUED | OUTPATIENT
Start: 2023-07-14 | End: 2023-07-14 | Stop reason: HOSPADM

## 2023-07-14 RX ORDER — PRAVASTATIN SODIUM 10 MG/1
20 TABLET ORAL DAILY
Status: DISCONTINUED | OUTPATIENT
Start: 2023-07-14 | End: 2023-07-16 | Stop reason: HOSPADM

## 2023-07-14 RX ORDER — FENTANYL CITRATE 50 UG/ML
25 INJECTION, SOLUTION INTRAMUSCULAR; INTRAVENOUS EVERY 5 MIN PRN
Status: DISCONTINUED | OUTPATIENT
Start: 2023-07-14 | End: 2023-07-14 | Stop reason: HOSPADM

## 2023-07-14 RX ORDER — PREGABALIN 75 MG/1
75 CAPSULE ORAL NIGHTLY
Status: DISCONTINUED | OUTPATIENT
Start: 2023-07-14 | End: 2023-07-16 | Stop reason: HOSPADM

## 2023-07-14 RX ORDER — MIDAZOLAM HYDROCHLORIDE 1 MG/ML
.5-4 INJECTION INTRAMUSCULAR; INTRAVENOUS
Status: DISCONTINUED | OUTPATIENT
Start: 2023-07-14 | End: 2023-07-14 | Stop reason: HOSPADM

## 2023-07-14 RX ORDER — HALOPERIDOL 5 MG/ML
0.5 INJECTION INTRAMUSCULAR EVERY 10 MIN PRN
Status: DISCONTINUED | OUTPATIENT
Start: 2023-07-14 | End: 2023-07-14 | Stop reason: HOSPADM

## 2023-07-14 RX ORDER — PROCHLORPERAZINE EDISYLATE 5 MG/ML
5 INJECTION INTRAMUSCULAR; INTRAVENOUS EVERY 6 HOURS PRN
Status: CANCELLED | OUTPATIENT
Start: 2023-07-14

## 2023-07-14 RX ORDER — OXYCODONE HYDROCHLORIDE 5 MG/1
5 TABLET ORAL EVERY 4 HOURS PRN
Status: CANCELLED | OUTPATIENT
Start: 2023-07-14

## 2023-07-14 RX ORDER — SODIUM CHLORIDE 0.9 % (FLUSH) 0.9 %
10 SYRINGE (ML) INJECTION
Status: CANCELLED | OUTPATIENT
Start: 2023-07-14

## 2023-07-14 RX ORDER — BUPIVACAINE HYDROCHLORIDE 7.5 MG/ML
INJECTION, SOLUTION EPIDURAL; RETROBULBAR
Status: COMPLETED | OUTPATIENT
Start: 2023-07-14 | End: 2023-07-14

## 2023-07-14 RX ORDER — KETOROLAC TROMETHAMINE 30 MG/ML
15 INJECTION, SOLUTION INTRAMUSCULAR; INTRAVENOUS
Status: DISCONTINUED | OUTPATIENT
Start: 2023-07-14 | End: 2023-07-14 | Stop reason: HOSPADM

## 2023-07-14 RX ORDER — METHOCARBAMOL 500 MG/1
500 TABLET, FILM COATED ORAL 3 TIMES DAILY PRN
Status: DISCONTINUED | OUTPATIENT
Start: 2023-07-14 | End: 2023-07-16 | Stop reason: HOSPADM

## 2023-07-14 RX ORDER — ACETAMINOPHEN 500 MG
1000 TABLET ORAL EVERY 6 HOURS
Status: DISCONTINUED | OUTPATIENT
Start: 2023-07-14 | End: 2023-07-16 | Stop reason: HOSPADM

## 2023-07-14 RX ORDER — TALC
6 POWDER (GRAM) TOPICAL NIGHTLY PRN
Status: CANCELLED | OUTPATIENT
Start: 2023-07-14

## 2023-07-14 RX ORDER — FENTANYL CITRATE 50 UG/ML
INJECTION, SOLUTION INTRAMUSCULAR; INTRAVENOUS
Status: DISCONTINUED | OUTPATIENT
Start: 2023-07-14 | End: 2023-07-14

## 2023-07-14 RX ORDER — SODIUM CHLORIDE 9 MG/ML
INJECTION, SOLUTION INTRAVENOUS CONTINUOUS
Status: DISCONTINUED | OUTPATIENT
Start: 2023-07-14 | End: 2023-07-16 | Stop reason: HOSPADM

## 2023-07-14 RX ORDER — PREGABALIN 75 MG/1
150 CAPSULE ORAL
Status: DISCONTINUED | OUTPATIENT
Start: 2023-07-14 | End: 2023-07-14

## 2023-07-14 RX ORDER — ACETAMINOPHEN 500 MG
1000 TABLET ORAL
Status: COMPLETED | OUTPATIENT
Start: 2023-07-14 | End: 2023-07-14

## 2023-07-14 RX ORDER — PROPOFOL 10 MG/ML
VIAL (ML) INTRAVENOUS
Status: DISCONTINUED | OUTPATIENT
Start: 2023-07-14 | End: 2023-07-14

## 2023-07-14 RX ORDER — LIDOCAINE HYDROCHLORIDE 10 MG/ML
1 INJECTION, SOLUTION EPIDURAL; INFILTRATION; INTRACAUDAL; PERINEURAL ONCE AS NEEDED
Status: COMPLETED | OUTPATIENT
Start: 2023-07-14 | End: 2023-07-14

## 2023-07-14 RX ORDER — DOCUSATE SODIUM 100 MG/1
100 CAPSULE, LIQUID FILLED ORAL 2 TIMES DAILY
Status: DISCONTINUED | OUTPATIENT
Start: 2023-07-14 | End: 2023-07-16 | Stop reason: HOSPADM

## 2023-07-14 RX ORDER — BISACODYL 5 MG
5 TABLET, DELAYED RELEASE (ENTERIC COATED) ORAL DAILY PRN
Status: DISCONTINUED | OUTPATIENT
Start: 2023-07-14 | End: 2023-07-16 | Stop reason: HOSPADM

## 2023-07-14 RX ORDER — HEPARIN SODIUM 5000 [USP'U]/ML
5000 INJECTION, SOLUTION INTRAVENOUS; SUBCUTANEOUS
Status: COMPLETED | OUTPATIENT
Start: 2023-07-14 | End: 2023-07-14

## 2023-07-14 RX ORDER — KETAMINE HCL IN 0.9 % NACL 50 MG/5 ML
SYRINGE (ML) INTRAVENOUS
Status: DISCONTINUED | OUTPATIENT
Start: 2023-07-14 | End: 2023-07-14

## 2023-07-14 RX ORDER — ONDANSETRON 2 MG/ML
INJECTION INTRAMUSCULAR; INTRAVENOUS
Status: DISCONTINUED | OUTPATIENT
Start: 2023-07-14 | End: 2023-07-14

## 2023-07-14 RX ORDER — DOXAZOSIN 1 MG/1
4 TABLET ORAL NIGHTLY
Status: DISCONTINUED | OUTPATIENT
Start: 2023-07-14 | End: 2023-07-16 | Stop reason: HOSPADM

## 2023-07-14 RX ORDER — POLYETHYLENE GLYCOL 3350 17 G/17G
17 POWDER, FOR SOLUTION ORAL DAILY
Status: DISCONTINUED | OUTPATIENT
Start: 2023-07-15 | End: 2023-07-16 | Stop reason: HOSPADM

## 2023-07-14 RX ORDER — LIDOCAINE HYDROCHLORIDE 20 MG/ML
INJECTION, SOLUTION EPIDURAL; INFILTRATION; INTRACAUDAL; PERINEURAL
Status: DISCONTINUED | OUTPATIENT
Start: 2023-07-14 | End: 2023-07-14

## 2023-07-14 RX ORDER — ONDANSETRON 2 MG/ML
4 INJECTION INTRAMUSCULAR; INTRAVENOUS EVERY 6 HOURS PRN
Status: CANCELLED | OUTPATIENT
Start: 2023-07-14

## 2023-07-14 RX ORDER — PHENYLEPHRINE HYDROCHLORIDE 10 MG/ML
INJECTION INTRAVENOUS
Status: DISCONTINUED | OUTPATIENT
Start: 2023-07-14 | End: 2023-07-14

## 2023-07-14 RX ORDER — ROCURONIUM BROMIDE 10 MG/ML
INJECTION, SOLUTION INTRAVENOUS
Status: DISCONTINUED | OUTPATIENT
Start: 2023-07-14 | End: 2023-07-14

## 2023-07-14 RX ORDER — CEFAZOLIN SODIUM 1 G/3ML
INJECTION, POWDER, FOR SOLUTION INTRAMUSCULAR; INTRAVENOUS
Status: DISCONTINUED | OUTPATIENT
Start: 2023-07-14 | End: 2023-07-14

## 2023-07-14 RX ORDER — SODIUM CHLORIDE, SODIUM LACTATE, POTASSIUM CHLORIDE, CALCIUM CHLORIDE 600; 310; 30; 20 MG/100ML; MG/100ML; MG/100ML; MG/100ML
INJECTION, SOLUTION INTRAVENOUS CONTINUOUS
Status: CANCELLED | OUTPATIENT
Start: 2023-07-14 | End: 2023-07-14

## 2023-07-14 RX ORDER — AMLODIPINE BESYLATE 5 MG/1
5 TABLET ORAL DAILY
Status: DISCONTINUED | OUTPATIENT
Start: 2023-07-15 | End: 2023-07-16 | Stop reason: HOSPADM

## 2023-07-14 RX ORDER — DEXAMETHASONE SODIUM PHOSPHATE 4 MG/ML
INJECTION, SOLUTION INTRA-ARTICULAR; INTRALESIONAL; INTRAMUSCULAR; INTRAVENOUS; SOFT TISSUE
Status: DISCONTINUED | OUTPATIENT
Start: 2023-07-14 | End: 2023-07-14

## 2023-07-14 RX ORDER — SODIUM CHLORIDE 0.9 % (FLUSH) 0.9 %
10 SYRINGE (ML) INJECTION
Status: DISCONTINUED | OUTPATIENT
Start: 2023-07-14 | End: 2023-07-14 | Stop reason: HOSPADM

## 2023-07-14 RX ORDER — OXYCODONE HYDROCHLORIDE 10 MG/1
10 TABLET ORAL EVERY 4 HOURS PRN
Status: CANCELLED | OUTPATIENT
Start: 2023-07-14

## 2023-07-14 RX ADMIN — PHENYLEPHRINE HYDROCHLORIDE 100 MCG: 10 INJECTION INTRAVENOUS at 07:07

## 2023-07-14 RX ADMIN — FENTANYL CITRATE 100 MCG: 50 INJECTION, SOLUTION INTRAMUSCULAR; INTRAVENOUS at 07:07

## 2023-07-14 RX ADMIN — LIDOCAINE HYDROCHLORIDE 100 MG: 20 INJECTION, SOLUTION EPIDURAL; INFILTRATION; INTRACAUDAL; PERINEURAL at 07:07

## 2023-07-14 RX ADMIN — SODIUM CHLORIDE, SODIUM GLUCONATE, SODIUM ACETATE, POTASSIUM CHLORIDE, MAGNESIUM CHLORIDE, SODIUM PHOSPHATE, DIBASIC, AND POTASSIUM PHOSPHATE: .53; .5; .37; .037; .03; .012; .00082 INJECTION, SOLUTION INTRAVENOUS at 07:07

## 2023-07-14 RX ADMIN — FENTANYL CITRATE 100 MCG: 50 INJECTION, SOLUTION INTRAMUSCULAR; INTRAVENOUS at 06:07

## 2023-07-14 RX ADMIN — ACETAMINOPHEN 1000 MG: 500 TABLET ORAL at 11:07

## 2023-07-14 RX ADMIN — CEFAZOLIN 2 G: 2 INJECTION, POWDER, FOR SOLUTION INTRAMUSCULAR; INTRAVENOUS at 07:07

## 2023-07-14 RX ADMIN — DOCUSATE SODIUM 100 MG: 100 CAPSULE, LIQUID FILLED ORAL at 08:07

## 2023-07-14 RX ADMIN — ACETAMINOPHEN 1000 MG: 500 TABLET ORAL at 06:07

## 2023-07-14 RX ADMIN — PHENYLEPHRINE HYDROCHLORIDE 100 MCG: 10 INJECTION INTRAVENOUS at 11:07

## 2023-07-14 RX ADMIN — PHENYLEPHRINE HYDROCHLORIDE 100 MCG: 10 INJECTION INTRAVENOUS at 08:07

## 2023-07-14 RX ADMIN — ROCURONIUM BROMIDE 20 MG: 10 INJECTION, SOLUTION INTRAVENOUS at 11:07

## 2023-07-14 RX ADMIN — Medication 30 MG: at 07:07

## 2023-07-14 RX ADMIN — SUGAMMADEX 400 MG: 100 INJECTION, SOLUTION INTRAVENOUS at 12:07

## 2023-07-14 RX ADMIN — GLYCOPYRROLATE 0.2 MG: 0.2 INJECTION, SOLUTION INTRAMUSCULAR; INTRAVENOUS at 07:07

## 2023-07-14 RX ADMIN — ROCURONIUM BROMIDE 20 MG: 10 INJECTION, SOLUTION INTRAVENOUS at 08:07

## 2023-07-14 RX ADMIN — FENTANYL CITRATE 25 MCG: 0.05 INJECTION, SOLUTION INTRAMUSCULAR; INTRAVENOUS at 02:07

## 2023-07-14 RX ADMIN — SODIUM CHLORIDE: 0.9 INJECTION, SOLUTION INTRAVENOUS at 06:07

## 2023-07-14 RX ADMIN — PHENYLEPHRINE HYDROCHLORIDE 100 MCG: 10 INJECTION INTRAVENOUS at 10:07

## 2023-07-14 RX ADMIN — ROCURONIUM BROMIDE 10 MG: 10 INJECTION, SOLUTION INTRAVENOUS at 09:07

## 2023-07-14 RX ADMIN — Medication 10 MG: at 08:07

## 2023-07-14 RX ADMIN — PROPOFOL 20 MG: 10 INJECTION, EMULSION INTRAVENOUS at 12:07

## 2023-07-14 RX ADMIN — ONDANSETRON 4 MG: 2 INJECTION INTRAMUSCULAR; INTRAVENOUS at 12:07

## 2023-07-14 RX ADMIN — PRAVASTATIN SODIUM 20 MG: 20 TABLET ORAL at 01:07

## 2023-07-14 RX ADMIN — METHOCARBAMOL 500 MG: 500 TABLET ORAL at 08:07

## 2023-07-14 RX ADMIN — Medication 10 MG: at 10:07

## 2023-07-14 RX ADMIN — PREGABALIN 75 MG: 75 CAPSULE ORAL at 08:07

## 2023-07-14 RX ADMIN — LIDOCAINE HYDROCHLORIDE 10 MG: 10 INJECTION, SOLUTION EPIDURAL; INFILTRATION; INTRACAUDAL; PERINEURAL at 06:07

## 2023-07-14 RX ADMIN — KETOROLAC TROMETHAMINE 15 MG: 30 INJECTION INTRAMUSCULAR; INTRAVENOUS at 06:07

## 2023-07-14 RX ADMIN — ROCURONIUM BROMIDE 100 MG: 10 INJECTION, SOLUTION INTRAVENOUS at 07:07

## 2023-07-14 RX ADMIN — DOXAZOSIN 4 MG: 1 TABLET ORAL at 08:07

## 2023-07-14 RX ADMIN — PROPOFOL 20 MG: 10 INJECTION, EMULSION INTRAVENOUS at 07:07

## 2023-07-14 RX ADMIN — BUPIVACAINE HYDROCHLORIDE 15 ML: 7.5 INJECTION, SOLUTION EPIDURAL; RETROBULBAR at 06:07

## 2023-07-14 RX ADMIN — ROCURONIUM BROMIDE 20 MG: 10 INJECTION, SOLUTION INTRAVENOUS at 10:07

## 2023-07-14 RX ADMIN — HEPARIN SODIUM 5000 UNITS: 5000 INJECTION INTRAVENOUS; SUBCUTANEOUS at 06:07

## 2023-07-14 RX ADMIN — TRAZODONE HYDROCHLORIDE 150 MG: 50 TABLET ORAL at 09:07

## 2023-07-14 RX ADMIN — PROPOFOL 150 MG: 10 INJECTION, EMULSION INTRAVENOUS at 07:07

## 2023-07-14 RX ADMIN — Medication 10 MG: at 09:07

## 2023-07-14 RX ADMIN — PREGABALIN 150 MG: 75 CAPSULE ORAL at 06:07

## 2023-07-14 RX ADMIN — DEXAMETHASONE SODIUM PHOSPHATE 8 MG: 4 INJECTION, SOLUTION INTRAMUSCULAR; INTRAVENOUS at 07:07

## 2023-07-14 RX ADMIN — SODIUM CHLORIDE: 9 INJECTION, SOLUTION INTRAVENOUS at 06:07

## 2023-07-14 RX ADMIN — MIDAZOLAM HYDROCHLORIDE 1 MG: 2 INJECTION, SOLUTION INTRAMUSCULAR; INTRAVENOUS at 06:07

## 2023-07-14 RX ADMIN — CEFAZOLIN 2 G: 2 INJECTION, POWDER, FOR SOLUTION INTRAMUSCULAR; INTRAVENOUS at 11:07

## 2023-07-14 NOTE — INTERVAL H&P NOTE
The patient has been examined and the H&P has been reviewed:    I concur with the findings and no changes have occurred since H&P was written.    Anesthesia/Surgery risks, benefits and alternative options discussed and understood by patient/family.  Left side marked.    All patient and family questions answered in pre-op.        There are no hospital problems to display for this patient.

## 2023-07-14 NOTE — ANESTHESIA PROCEDURE NOTES
Left GERALDO single shot nerve injection    Patient location during procedure: pre-op   Block not for primary anesthetic.  Reason for block: at surgeon's request and post-op pain management   Post-op Pain Location: abdominal pain   Start time: 7/14/2023 6:43 AM  Timeout: 7/14/2023 6:42 AM   End time: 7/14/2023 6:44 AM    Staffing  Authorizing Provider: Kem Pimentel MD  Performing Provider: Walter Larry DO    Preanesthetic Checklist  Completed: patient identified, IV checked, site marked, risks and benefits discussed, surgical consent, monitors and equipment checked, pre-op evaluation and timeout performed  Peripheral Block  Patient position: sitting  Prep: ChloraPrep  Patient monitoring: heart rate, cardiac monitor, continuous pulse ox, continuous capnometry and frequent blood pressure checks  Block type: erector spinae plane  Laterality: left  Injection technique: single shot  Interspace: T7-8    Needle  Needle type: Tuohy   Needle gauge: 17 G  Needle length: 3.5 in  Needle localization: anatomical landmarks and ultrasound guidance   -ultrasound image captured on disc.  Assessment  Injection assessment: negative aspiration, negative parasthesia and local visualized surrounding nerve  Paresthesia pain: none  Heart rate change: no  Slow fractionated injection: yes  Pain Tolerance: comfortable throughout block and no complaints  Medications:    Medications: bupivacaine (pf) (MARCAINE) injection 0.75% - Perineural   15 mL - 7/14/2023 6:44:00 AM    Additional Notes  Patient tolerated well.  See [facility] RN record for vitals.

## 2023-07-14 NOTE — ANESTHESIA POSTPROCEDURE EVALUATION
Anesthesia Post Evaluation    Patient: Andrea Gant    Procedure(s) Performed: Procedure(s) (LRB):  XI ROBOTIC NEPHROURETERECTOMY (Left)    Final Anesthesia Type: general      Patient location during evaluation: PACU  Patient participation: Yes- Able to Participate  Level of consciousness: awake and alert, awake and oriented  Post-procedure vital signs: reviewed and stable  Pain management: adequate  Airway patency: patent    PONV status at discharge: No PONV  Anesthetic complications: no      Cardiovascular status: blood pressure returned to baseline, hemodynamically stable and stable  Respiratory status: unassisted, spontaneous ventilation and room air  Hydration status: euvolemic  Follow-up not needed.          Vitals Value Taken Time   /70 07/14/23 1645   Temp 36.7 °C (98 °F) 07/14/23 1645   Pulse 72 07/14/23 1645   Resp 16 07/14/23 1645   SpO2 100 % 07/14/23 1645         Event Time   Out of Recovery 14:00:00         Pain/Nick Score: Pain Rating Prior to Med Admin: 7 (7/14/2023  2:43 PM)  Pain Rating Post Med Admin: 2 (7/14/2023  3:15 PM)  Nick Score: 10 (7/14/2023  2:00 PM)

## 2023-07-14 NOTE — TRANSFER OF CARE
"Anesthesia Transfer of Care Note    Patient: Andrea Gant    Procedure(s) Performed: Procedure(s) (LRB):  XI ROBOTIC NEPHROURETERECTOMY (Left)    Patient location: PACU    Anesthesia Type: general    Transport from OR: Transported from OR on 6-10 L/min O2 by face mask with adequate spontaneous ventilation    Post pain: adequate analgesia    Post assessment: no apparent anesthetic complications    Post vital signs: stable    Level of consciousness: awake and alert    Nausea/Vomiting: no nausea/vomiting    Complications: none    Transfer of care protocol was followed      Last vitals:   Visit Vitals  BP (!) 106/57 (BP Location: Right arm, Patient Position: Lying)   Pulse 62   Temp 37.2 °C (99 °F) (Temporal)   Resp 14   Ht 5' 9" (1.753 m)   Wt 68.5 kg (151 lb 0.2 oz)   SpO2 100%   BMI 22.30 kg/m²     "

## 2023-07-14 NOTE — ANESTHESIA PROCEDURE NOTES
Intubation    Date/Time: 7/14/2023 7:10 AM  Performed by: Liborio Loco MD  Authorized by: Attila Sotelo MD     Intubation:     Induction:  Intravenous    Intubated:  Postinduction    Mask Ventilation:  Easy mask    Attempts:  1    Attempted By:  Resident anesthesiologist    Method of Intubation:  Video laryngoscopy    Blade:  Negro 3    Laryngeal View Grade: Grade I - full view of cords      Difficult Airway Encountered?: No      Complications:  None    Airway Device:  Oral endotracheal tube    Airway Device Size:  7.5    Style/Cuff Inflation:  Cuffed (inflated to minimal occlusive pressure)    Tube secured:  22    Secured at:  The lips    Placement Verified By:  Capnometry    Complicating Factors:  None    Findings Post-Intubation:  BS equal bilateral and atraumatic/condition of teeth unchanged

## 2023-07-14 NOTE — ANESTHESIA PROCEDURE NOTES
Arterial    Diagnosis: urothelial carcinoma    Patient location during procedure: done in OR  Procedure start time: 7/14/2023 7:15 AM  Timeout: 7/14/2023 7:14 AM  Procedure end time: 7/14/2023 7:17 AM    Staffing  Authorizing Provider: Attila Sotelo MD  Performing Provider: Liborio Loco MD    Anesthesiologist was present at the time of the procedure.    Preanesthetic Checklist  Completed: patient identified, IV checked, site marked, risks and benefits discussed, surgical consent, monitors and equipment checked, pre-op evaluation, timeout performed and anesthesia consent givenArterial  Skin Prep: chlorhexidine gluconate  Local Infiltration: none  Orientation: left  Location: radial    Catheter Size: 20 G  Catheter placement by Anatomical landmarks. Heme positive aspiration all ports. Insertion Attempts: 1  Assessment  Dressing: secured with tape and tegaderm  Patient: Tolerated well

## 2023-07-14 NOTE — NURSING TRANSFER
Nursing Transfer Note      7/14/2023   3:43 PM        Reason patient is being transferred: recovery care complete    Transfer To: 502    Transfer via bed        Transported by pt escort    Order for Tele Monitor? No    Additional Lines: Huff Catheter    4eyes on Skin: yes    Medicines sent: n/a    Any special needs or follow-up needed: routine    Patient belongings transferred with patient: Yes, dentures,phone    Chart send with patient: Yes    Notified: spouse    Patient reassessed at: 07/14/23 9618

## 2023-07-14 NOTE — OP NOTE
[facility]  Operative Note     [facility]  Operative Note     Date: 07/14/2023    Pre-Op Diagnosis:   1. Left upper tract UC  2. S/p edil adjuvant chemotherapy      Post-Op Diagnosis: same     Procedure(s) Performed:   1. Robotically-assisted laparoscopic left nephroureterectomy  2. Retroperitoneal lymph node dissection     Specimen(s):   1. Left kidney, ureter, and bladder cuff  2. Para aortic nodes     Surgeon: Kem Jefferson MD     Surgical Bedside Assistant: YULISA Cruz     Assistant: Benjy Schilling MD     Anesthesia: General endotracheal anesthesia     Indications:  Andrea Gant 68 y.o. male with upper tract UC for surgical excision.     Findings:  - Nephroureterectomy performed with bladder cuff without difficulty, No grossly positive smauel disease  - 1 Renal artery and 1 renal vein with early branching. The renal artery and posterior branch of the vein were taking en bloc. Separate staple load used for anterior venous branch  - Small bladder cuff leak following repair. For this reason, postoperative intravesical gemcitabine was not instilled     EBL: 50     Drains:   1. 16 Fr rahman catheter  2.  15 Fr KY drain    Anesthesia: General endotracheal anesthesia     Complications:  none     Procedure in Detail:     After discussion of risks and benefits of the procedure, informed consent was obtained. The patient was brought to the operating room and placed supine on the operating table. SCDs were applied and working prior to induction of anesthesia. General anesthesia was administered.  A time out was performed and pre-operative antibiotics were confirmed.  A 20 Fr Rahman catheter was placed in the standard fashion with 10 ml sterile water used to inflate the balloon. An OG tube was placed. The patient was then moved into the modified flank position with the left side up. The patient was appropriately padded and secured to the table. The patient was then prepped  and draped in the usual sterile fashion. Timeout was performed and preoperative antibiotics were confirmed.      A Veress needle was introduced into the abdomen via the umbilicus. Entry into the peritoneal cavity was confirmed with the drop test. Aspiration during our drop test revealed no blood or succus. We connected the insufflation and low pressures were noted confirming Veress placement. The camera port was then placed 9 cm from the costal margin lateral to the border of the rectus. The camera was passed through the port and the abdomen was inspected and found to be free of bowel or vascular injury. The location of the 8mm robotic port was marked with a marking pen, 8 cm cranial to the camera port just below th costal margin. This was incised sharply using the bovie. The 8 mm robotic port was then introduced under direct vision.  Using direct vision the other 2 robotic ports were placed. Two other robotic trochars were placed 8cm apart from the previous camera port in a straight line towards the pubis.  A 12 mm assistant port was placed just inferior to the umbilicus. Each trocar was introduced under direct vision ensuring no injury to the underlying abdominal contents.      Dissection began along the white line of Toldt, reflecting the colon medially away from the kidney. The psoas muscle was identified. Once the colon was reflected, dissection was carried out just below the kidney, and the ureter was identified. Early clipping of the ureter was performed to contain any residual malignancy in the renal pelvis and proximal ureter. The ureter was elevated and we continued our dissection toward the lower pole of the kidney proximally until we identified the renal hilum. There was one renal artery and two renal vein branches. The hilum was carefully dissected until the vessels were isolated. A robotic stapler with a 45 mm vascular staple load was used to take the posterior venous branch and artery en block. A  second staple load was used to take the anterior vein branch. Hemostasis was achieved. The remainder of the superior and lateral attachments to the kidney were removed.    Attention was then turned to the ureterolysis. The ureter was then freed from its surrounding attachments until the bladder was identified.  The bladder was instilled with 120ml of sterile saline. The bladder cuff was then excised.  The bladder was then closed with a 3-0 moncryl v-lock suture in a running fashion. A leak test was then performed with 120 ml of sterile saline and a scant amount of fluid was seen as rundown, although an obvious defect could not be identified. The rahman balloon was then tested to ensure it was not inadvertently ruptured during the bladder closure.    Attention was then refocused back to the kidney where the remaining lateral and posterior attachments were released. We then turned our attention to the aorta. The para-aortic lymphnodes were then sampled with sharp and blunt dissection. Lymphatics were sealed with Weck clips. These were passed off the field for analysis. Hemostasis was achieved.     The specimens were retrieved from the pelvis and placed in an endo-catch bag placed through the assistant port. The bag was then deployed and transferred to the 12 mm midline assistant port. A 15 Fr KY drain was placed in the inferiormost robotic port and placed into the pelvis. This was secured to the skin with a 2-0 nylon stitch.     The robot was undocked and all trocars were removed. The assistant port incision was extended along the midline from the skin down to fascia to allow removal of the specimen. The specimen was extracted.    The extraction site fascia was closed using 1-0 PDS. Subcutainous tissue was re-approximated with 2-0 vicryl. All skin incisions were closed using 4-0 monocryl. Dermabond was applied to the incisions.      The patient tolerated the procedure well and was transferred to the recovery room in  stable condition.     Disposition: The patient will remain on the urology service for postoperative monitoring.        I have reviewed the operative note performed by Dr. Schilling, and I concur with her/his documentation of Andrea Gant. I was present for the critical or key portions of the procedure.

## 2023-07-15 LAB
ANION GAP SERPL CALC-SCNC: 11 MMOL/L (ref 8–16)
ANION GAP SERPL CALC-SCNC: 8 MMOL/L (ref 8–16)
BASOPHILS # BLD AUTO: 0.03 K/UL (ref 0–0.2)
BASOPHILS NFR BLD: 0.2 % (ref 0–1.9)
BODY FLUID SOURCE, CREATININE: NORMAL
BUN SERPL-MCNC: 30 MG/DL (ref 8–23)
BUN SERPL-MCNC: 32 MG/DL (ref 8–23)
CALCIUM SERPL-MCNC: 8.5 MG/DL (ref 8.7–10.5)
CALCIUM SERPL-MCNC: 8.8 MG/DL (ref 8.7–10.5)
CHLORIDE SERPL-SCNC: 106 MMOL/L (ref 95–110)
CHLORIDE SERPL-SCNC: 108 MMOL/L (ref 95–110)
CO2 SERPL-SCNC: 22 MMOL/L (ref 23–29)
CO2 SERPL-SCNC: 22 MMOL/L (ref 23–29)
CREAT FLD-MCNC: 2.1 MG/DL
CREAT SERPL-MCNC: 2.3 MG/DL (ref 0.5–1.4)
CREAT SERPL-MCNC: 2.3 MG/DL (ref 0.5–1.4)
DIFFERENTIAL METHOD: ABNORMAL
EOSINOPHIL # BLD AUTO: 0.1 K/UL (ref 0–0.5)
EOSINOPHIL NFR BLD: 0.6 % (ref 0–8)
ERYTHROCYTE [DISTWIDTH] IN BLOOD BY AUTOMATED COUNT: 14 % (ref 11.5–14.5)
EST. GFR  (NO RACE VARIABLE): 30 ML/MIN/1.73 M^2
EST. GFR  (NO RACE VARIABLE): 30 ML/MIN/1.73 M^2
GLUCOSE SERPL-MCNC: 91 MG/DL (ref 70–110)
GLUCOSE SERPL-MCNC: 95 MG/DL (ref 70–110)
HCT VFR BLD AUTO: 29.1 % (ref 40–54)
HGB BLD-MCNC: 9.3 G/DL (ref 14–18)
IMM GRANULOCYTES # BLD AUTO: 0.07 K/UL (ref 0–0.04)
IMM GRANULOCYTES NFR BLD AUTO: 0.4 % (ref 0–0.5)
LYMPHOCYTES # BLD AUTO: 1.2 K/UL (ref 1–4.8)
LYMPHOCYTES NFR BLD: 7.4 % (ref 18–48)
MCH RBC QN AUTO: 31.6 PG (ref 27–31)
MCHC RBC AUTO-ENTMCNC: 32 G/DL (ref 32–36)
MCV RBC AUTO: 99 FL (ref 82–98)
MONOCYTES # BLD AUTO: 1 K/UL (ref 0.3–1)
MONOCYTES NFR BLD: 6.3 % (ref 4–15)
NEUTROPHILS # BLD AUTO: 13.5 K/UL (ref 1.8–7.7)
NEUTROPHILS NFR BLD: 85.1 % (ref 38–73)
NRBC BLD-RTO: 0 /100 WBC
PLATELET # BLD AUTO: 188 K/UL (ref 150–450)
PMV BLD AUTO: 9.5 FL (ref 9.2–12.9)
POTASSIUM SERPL-SCNC: 4.5 MMOL/L (ref 3.5–5.1)
POTASSIUM SERPL-SCNC: 4.6 MMOL/L (ref 3.5–5.1)
RBC # BLD AUTO: 2.94 M/UL (ref 4.6–6.2)
SODIUM SERPL-SCNC: 138 MMOL/L (ref 136–145)
SODIUM SERPL-SCNC: 139 MMOL/L (ref 136–145)
WBC # BLD AUTO: 15.82 K/UL (ref 3.9–12.7)

## 2023-07-15 PROCEDURE — 36415 COLL VENOUS BLD VENIPUNCTURE: CPT | Performed by: STUDENT IN AN ORGANIZED HEALTH CARE EDUCATION/TRAINING PROGRAM

## 2023-07-15 PROCEDURE — 11000001 HC ACUTE MED/SURG PRIVATE ROOM

## 2023-07-15 PROCEDURE — 82570 ASSAY OF URINE CREATININE: CPT | Performed by: STUDENT IN AN ORGANIZED HEALTH CARE EDUCATION/TRAINING PROGRAM

## 2023-07-15 PROCEDURE — 63600175 PHARM REV CODE 636 W HCPCS

## 2023-07-15 PROCEDURE — 25000003 PHARM REV CODE 250: Performed by: STUDENT IN AN ORGANIZED HEALTH CARE EDUCATION/TRAINING PROGRAM

## 2023-07-15 PROCEDURE — 25000003 PHARM REV CODE 250

## 2023-07-15 PROCEDURE — 80048 BASIC METABOLIC PNL TOTAL CA: CPT | Mod: 91 | Performed by: STUDENT IN AN ORGANIZED HEALTH CARE EDUCATION/TRAINING PROGRAM

## 2023-07-15 PROCEDURE — 85025 COMPLETE CBC W/AUTO DIFF WBC: CPT | Performed by: STUDENT IN AN ORGANIZED HEALTH CARE EDUCATION/TRAINING PROGRAM

## 2023-07-15 RX ORDER — SODIUM CHLORIDE, SODIUM LACTATE, POTASSIUM CHLORIDE, CALCIUM CHLORIDE 600; 310; 30; 20 MG/100ML; MG/100ML; MG/100ML; MG/100ML
INJECTION, SOLUTION INTRAVENOUS CONTINUOUS
Status: DISCONTINUED | OUTPATIENT
Start: 2023-07-15 | End: 2023-07-15

## 2023-07-15 RX ORDER — OXYCODONE HYDROCHLORIDE 5 MG/1
5 TABLET ORAL EVERY 4 HOURS PRN
Qty: 10 TABLET | Refills: 0 | Status: SHIPPED | OUTPATIENT
Start: 2023-07-15 | End: 2023-07-27 | Stop reason: SDUPTHER

## 2023-07-15 RX ORDER — POLYETHYLENE GLYCOL 3350 17 G/17G
17 POWDER, FOR SOLUTION ORAL DAILY
Qty: 238 G | Refills: 0 | Status: SHIPPED | OUTPATIENT
Start: 2023-07-15 | End: 2023-07-30

## 2023-07-15 RX ORDER — OXYCODONE HYDROCHLORIDE 5 MG/1
5 TABLET ORAL EVERY 4 HOURS PRN
Status: DISCONTINUED | OUTPATIENT
Start: 2023-07-15 | End: 2023-07-16 | Stop reason: HOSPADM

## 2023-07-15 RX ORDER — SODIUM CHLORIDE, SODIUM LACTATE, POTASSIUM CHLORIDE, CALCIUM CHLORIDE 600; 310; 30; 20 MG/100ML; MG/100ML; MG/100ML; MG/100ML
INJECTION, SOLUTION INTRAVENOUS CONTINUOUS
Status: DISCONTINUED | OUTPATIENT
Start: 2023-07-15 | End: 2023-07-16

## 2023-07-15 RX ORDER — DEXTROMETHORPHAN HYDROBROMIDE, GUAIFENESIN 5; 100 MG/5ML; MG/5ML
650 LIQUID ORAL EVERY 8 HOURS
Qty: 63 TABLET | Refills: 0 | Status: SHIPPED | OUTPATIENT
Start: 2023-07-15

## 2023-07-15 RX ORDER — OXYCODONE HYDROCHLORIDE 10 MG/1
10 TABLET ORAL EVERY 4 HOURS PRN
Status: DISCONTINUED | OUTPATIENT
Start: 2023-07-15 | End: 2023-07-16 | Stop reason: HOSPADM

## 2023-07-15 RX ORDER — SULFAMETHOXAZOLE AND TRIMETHOPRIM 800; 160 MG/1; MG/1
1 TABLET ORAL DAILY
Qty: 1 TABLET | Refills: 0 | Status: SHIPPED | OUTPATIENT
Start: 2023-07-28 | End: 2023-07-29

## 2023-07-15 RX ADMIN — DOCUSATE SODIUM 100 MG: 100 CAPSULE, LIQUID FILLED ORAL at 10:07

## 2023-07-15 RX ADMIN — AMLODIPINE BESYLATE 5 MG: 5 TABLET ORAL at 08:07

## 2023-07-15 RX ADMIN — METHOCARBAMOL 500 MG: 500 TABLET ORAL at 05:07

## 2023-07-15 RX ADMIN — SODIUM CHLORIDE, POTASSIUM CHLORIDE, SODIUM LACTATE AND CALCIUM CHLORIDE: 600; 310; 30; 20 INJECTION, SOLUTION INTRAVENOUS at 05:07

## 2023-07-15 RX ADMIN — OXYCODONE HYDROCHLORIDE 10 MG: 10 TABLET ORAL at 09:07

## 2023-07-15 RX ADMIN — SODIUM CHLORIDE, POTASSIUM CHLORIDE, SODIUM LACTATE AND CALCIUM CHLORIDE 1000 ML: 600; 310; 30; 20 INJECTION, SOLUTION INTRAVENOUS at 09:07

## 2023-07-15 RX ADMIN — PRAVASTATIN SODIUM 20 MG: 20 TABLET ORAL at 08:07

## 2023-07-15 RX ADMIN — PREGABALIN 75 MG: 75 CAPSULE ORAL at 10:07

## 2023-07-15 RX ADMIN — POLYETHYLENE GLYCOL 3350 17 G: 17 POWDER, FOR SOLUTION ORAL at 08:07

## 2023-07-15 RX ADMIN — DOXAZOSIN 4 MG: 1 TABLET ORAL at 10:07

## 2023-07-15 RX ADMIN — ACETAMINOPHEN 1000 MG: 500 TABLET ORAL at 12:07

## 2023-07-15 RX ADMIN — OXYCODONE HYDROCHLORIDE 10 MG: 10 TABLET ORAL at 05:07

## 2023-07-15 RX ADMIN — DOCUSATE SODIUM 100 MG: 100 CAPSULE, LIQUID FILLED ORAL at 08:07

## 2023-07-15 RX ADMIN — SODIUM CHLORIDE, POTASSIUM CHLORIDE, SODIUM LACTATE AND CALCIUM CHLORIDE: 600; 310; 30; 20 INJECTION, SOLUTION INTRAVENOUS at 10:07

## 2023-07-15 RX ADMIN — ACETAMINOPHEN 1000 MG: 500 TABLET ORAL at 05:07

## 2023-07-15 NOTE — PROGRESS NOTES
[facility]  Urology  Progress Note    Patient Name: Andrea Gant  MRN: [id number]  Admission Date: 7/14/2023  Hospital Length of Stay: 1 days  Code Status: Full Code   Attending Provider: Kem Jefferson MD   Primary Care Physician: Andrea Wang MD    Subjective:     HPI:  No notes on file    Interval History: NAEO.  Tolerating diet, ambulated.  Pain well-controlled.  Cr 2.3 from baseline 1.4.  Overall doing well.      UOP: 500/650/600  BLAKE drain: -/35/50     Review of Systems: per HPI   Objective:     Temp:  [96.2 °F (35.7 °C)-98 °F (36.7 °C)] 97.5 °F (36.4 °C)  Pulse:  [59-74] 64  Resp:  [12-20] 18  SpO2:  [97 %-100 %] 97 %  BP: (106-153)/(55-71) 127/65     Body mass index is 22.3 kg/m².           Drains       Drain  Duration                  Urethral Catheter 07/14/23 0828 Latex;Straight-tip 20 Fr. 1 day         Closed/Suction Drain 07/14/23 1112 Left Abdomen Bulb 15 Fr. <1 day                     Physical Exam  Constitutional:       General: He is not in acute distress.  HENT:      Head: Normocephalic.   Eyes:      Extraocular Movements: Extraocular movements intact.   Cardiovascular:      Rate and Rhythm: Normal rate.   Pulmonary:      Effort: Pulmonary effort is normal. No respiratory distress.   Abdominal:      Palpations: Abdomen is soft. There is no mass.      Comments: Abdominal incisions c/d/I.  Abdomen soft, appropriately tender.  Nondistended.      Blake drain with SS fluid.     Genitourinary:     Comments: Huff draining clear yellow urine.     Musculoskeletal:         General: No swelling or deformity.      Cervical back: Normal range of motion.   Skin:     General: Skin is warm and dry.   Neurological:      General: No focal deficit present.      Mental Status: He is alert.   Psychiatric:         Mood and Affect: Mood normal.         Behavior: Behavior normal.         Significant Labs:    BMP:  Recent Labs   Lab 07/15/23  0739      K 4.6      CO2 22*   BUN 30*   CREATININE  2.3*   CALCIUM 8.8       CBC:   Recent Labs   Lab 07/15/23  0739   WBC 15.82*   HGB 9.3*   HCT 29.1*          All pertinent labs results from the past 24 hours have been reviewed.    Significant Imaging:  All pertinent imaging results/findings from the past 24 hours have been reviewed.                    Assessment/Plan:     Urothelial carcinoma with high risk of metastasis  68M s/p L nephU on 7/14.    - KY Cr pending, if negative will d/c drain   - Hgb stable   - Cr 2.3 from 1.4, will start maintenance fluids/bolus this AM  - will check afternoon BMP   - maintain rahman on discharge, will arrange for voiding trial in 2 weeks   - regular diet   - OOB, ambulate  - home meds   - Dispo: likely PM discharge           VTE Risk Mitigation (From admission, onward)         Ordered     Place sequential compression device  Until discontinued         07/14/23 0515                Tarsha Herring MD  Urology  [facility] - Surgery

## 2023-07-15 NOTE — NURSING
Nurses Note -- 4 Eyes      7/14/2023   7:12 PM      Skin assessed during: Admit      [x] No Altered Skin Integrity Present    []Prevention Measures Documented      [] Yes- Altered Skin Integrity Present or Discovered   [] LDA Added if Not in Epic (Describe Wound)   [] New Altered Skin Integrity was Present on Admit and Documented in LDA   [] Wound Image Taken    Wound Care Consulted? No    Attending Nurse:  Kirti Romo RN     Second RN/Staff Member: Yvette Gutierrez LPN

## 2023-07-15 NOTE — ASSESSMENT & PLAN NOTE
68M s/p L nephU on 7/14.    - KY Cr pending, if negative will d/c drain   - Hgb stable   - Cr 2.3 from 1.4, will start maintenance fluids/bolus this AM  - will check afternoon BMP   - maintain rahman on discharge, will arrange for voiding trial in 2 weeks   - regular diet   - OOB, ambulate  - home meds   - Dispo: likely PM discharge

## 2023-07-15 NOTE — SUBJECTIVE & OBJECTIVE
Interval History: NAEO.  Tolerating diet, ambulated.  Pain well-controlled.  Cr 2.3 from baseline 1.4.  Overall doing well.      Review of Systems: per HPI   Objective:     Temp:  [96.2 °F (35.7 °C)-98 °F (36.7 °C)] 97.5 °F (36.4 °C)  Pulse:  [59-74] 64  Resp:  [12-20] 18  SpO2:  [97 %-100 %] 97 %  BP: (106-153)/(55-71) 127/65     Body mass index is 22.3 kg/m².           Drains       Drain  Duration                  Urethral Catheter 07/14/23 0828 Latex;Straight-tip 20 Fr. 1 day         Closed/Suction Drain 07/14/23 1112 Left Abdomen Bulb 15 Fr. <1 day                     Physical Exam  Constitutional:       General: He is not in acute distress.  HENT:      Head: Normocephalic.   Eyes:      Extraocular Movements: Extraocular movements intact.   Cardiovascular:      Rate and Rhythm: Normal rate.   Pulmonary:      Effort: Pulmonary effort is normal. No respiratory distress.   Abdominal:      Palpations: Abdomen is soft. There is no mass.      Comments: Abdominal incisions c/d/I.  Abdomen soft, appropriately tender.  Nondistended.      Blake drain with SS fluid.     Genitourinary:     Comments: Huff draining clear yellow urine.     Musculoskeletal:         General: No swelling or deformity.      Cervical back: Normal range of motion.   Skin:     General: Skin is warm and dry.   Neurological:      General: No focal deficit present.      Mental Status: He is alert.   Psychiatric:         Mood and Affect: Mood normal.         Behavior: Behavior normal.         Significant Labs:    BMP:  Recent Labs   Lab 07/15/23  0739      K 4.6      CO2 22*   BUN 30*   CREATININE 2.3*   CALCIUM 8.8       CBC:   Recent Labs   Lab 07/15/23  0739   WBC 15.82*   HGB 9.3*   HCT 29.1*          All pertinent labs results from the past 24 hours have been reviewed.    Significant Imaging:  All pertinent imaging results/findings from the past 24 hours have been reviewed.

## 2023-07-16 VITALS
RESPIRATION RATE: 18 BRPM | TEMPERATURE: 99 F | OXYGEN SATURATION: 94 % | DIASTOLIC BLOOD PRESSURE: 64 MMHG | HEART RATE: 84 BPM | WEIGHT: 151 LBS | BODY MASS INDEX: 22.36 KG/M2 | SYSTOLIC BLOOD PRESSURE: 140 MMHG | HEIGHT: 69 IN

## 2023-07-16 LAB
ANION GAP SERPL CALC-SCNC: 7 MMOL/L (ref 8–16)
BUN SERPL-MCNC: 29 MG/DL (ref 8–23)
CALCIUM SERPL-MCNC: 8.8 MG/DL (ref 8.7–10.5)
CHLORIDE SERPL-SCNC: 105 MMOL/L (ref 95–110)
CO2 SERPL-SCNC: 26 MMOL/L (ref 23–29)
CREAT SERPL-MCNC: 2.2 MG/DL (ref 0.5–1.4)
EST. GFR  (NO RACE VARIABLE): 31.6 ML/MIN/1.73 M^2
GLUCOSE SERPL-MCNC: 92 MG/DL (ref 70–110)
POTASSIUM SERPL-SCNC: 4.7 MMOL/L (ref 3.5–5.1)
SODIUM SERPL-SCNC: 138 MMOL/L (ref 136–145)

## 2023-07-16 PROCEDURE — 36415 COLL VENOUS BLD VENIPUNCTURE: CPT | Performed by: STUDENT IN AN ORGANIZED HEALTH CARE EDUCATION/TRAINING PROGRAM

## 2023-07-16 PROCEDURE — 63600175 PHARM REV CODE 636 W HCPCS

## 2023-07-16 PROCEDURE — 25000003 PHARM REV CODE 250: Performed by: STUDENT IN AN ORGANIZED HEALTH CARE EDUCATION/TRAINING PROGRAM

## 2023-07-16 PROCEDURE — 25000003 PHARM REV CODE 250

## 2023-07-16 PROCEDURE — 80048 BASIC METABOLIC PNL TOTAL CA: CPT | Performed by: STUDENT IN AN ORGANIZED HEALTH CARE EDUCATION/TRAINING PROGRAM

## 2023-07-16 RX ADMIN — PRAVASTATIN SODIUM 20 MG: 20 TABLET ORAL at 08:07

## 2023-07-16 RX ADMIN — ACETAMINOPHEN 1000 MG: 500 TABLET ORAL at 11:07

## 2023-07-16 RX ADMIN — ACETAMINOPHEN 1000 MG: 500 TABLET ORAL at 12:07

## 2023-07-16 RX ADMIN — OXYCODONE HYDROCHLORIDE 10 MG: 10 TABLET ORAL at 09:07

## 2023-07-16 RX ADMIN — AMLODIPINE BESYLATE 5 MG: 5 TABLET ORAL at 08:07

## 2023-07-16 RX ADMIN — SODIUM CHLORIDE, POTASSIUM CHLORIDE, SODIUM LACTATE AND CALCIUM CHLORIDE: 600; 310; 30; 20 INJECTION, SOLUTION INTRAVENOUS at 08:07

## 2023-07-16 RX ADMIN — ACETAMINOPHEN 1000 MG: 500 TABLET ORAL at 06:07

## 2023-07-16 RX ADMIN — OXYCODONE HYDROCHLORIDE 10 MG: 10 TABLET ORAL at 01:07

## 2023-07-16 RX ADMIN — METHOCARBAMOL 500 MG: 500 TABLET ORAL at 01:07

## 2023-07-16 RX ADMIN — DOCUSATE SODIUM 100 MG: 100 CAPSULE, LIQUID FILLED ORAL at 08:07

## 2023-07-16 NOTE — ASSESSMENT & PLAN NOTE
68M s/p L nephU on 7/14.    - KY Cr negative, drain removed POD 1  - Cr 2.2 from preop 1.4, stable from yesterday  - Will plan for discharge today with outpatient follow up 2 weeks for catheter removal  - will consider outpatient nephrology referral at follow up  - maintain rahman on discharge, will arrange for voiding trial in 2 weeks   - regular diet   - OOB, ambulate  - home meds

## 2023-07-16 NOTE — PROGRESS NOTES
[facility]  Urology  Progress Note    Patient Name: Andrea Gant  MRN: [id number]  Admission Date: 7/14/2023  Hospital Length of Stay: 2 days  Code Status: Full Code   Attending Provider: Kem Jefferson MD   Primary Care Physician: Andrea Wang MD    Subjective:     HPI:  No notes on file    Interval History: NAEON. Tolerating diet, pain controlled. Ambulating without issue.      Objective:     Temp:  [98.1 °F (36.7 °C)-98.9 °F (37.2 °C)] 98.7 °F (37.1 °C)  Pulse:  [65-77] 77  Resp:  [16-18] 17  SpO2:  [96 %-99 %] 97 %  BP: (123-145)/(62-75) 123/63     Body mass index is 22.3 kg/m².    Date 07/16/23 0700 - 07/17/23 0659   Shift 7904-7183 6056-7311 0129-0445 24 Hour Total   INTAKE   P.O. 360   360   Shift Total(mL/kg) 360(5.3)   360(5.3)   OUTPUT   Urine(mL/kg/hr) 325   325   Shift Total(mL/kg) 325(4.7)   325(4.7)   Weight (kg) 68.5 68.5 68.5 68.5          Drains       Drain  Duration                  Urethral Catheter 07/14/23 0828 Latex;Straight-tip 20 Fr. 2 days                     Physical Exam  Constitutional:       General: He is not in acute distress.  HENT:      Head: Normocephalic.   Eyes:      Extraocular Movements: Extraocular movements intact.   Cardiovascular:      Rate and Rhythm: Normal rate.   Pulmonary:      Effort: Pulmonary effort is normal. No respiratory distress.   Abdominal:      Palpations: Abdomen is soft. There is no mass.      Comments: Abdominal incisions c/d/I.  Abdomen soft, appropriately tender.  Nondistended.       Genitourinary:     Comments: Huff draining clear yellow urine.     Musculoskeletal:         General: No swelling or deformity.      Cervical back: Normal range of motion.   Skin:     General: Skin is warm and dry.   Neurological:      General: No focal deficit present.      Mental Status: He is alert.   Psychiatric:         Mood and Affect: Mood normal.         Behavior: Behavior normal.         Significant Labs:    BMP:  Recent Labs   Lab 07/15/23  0739  07/15/23  1515 07/16/23  0310    139 138   K 4.6 4.5 4.7    106 105   CO2 22* 22* 26   BUN 30* 32* 29*   CREATININE 2.3* 2.3* 2.2*   CALCIUM 8.8 8.5* 8.8       CBC:   Recent Labs   Lab 07/15/23  0739   WBC 15.82*   HGB 9.3*   HCT 29.1*          Blood Culture: No results for input(s): LABBLOO in the last 168 hours.  Urine Culture: No results for input(s): LABURIN in the last 168 hours.  Urine Studies: No results for input(s): COLORU, APPEARANCEUA, PHUR, SPECGRAV, PROTEINUA, GLUCUA, KETONESU, BILIRUBINUA, OCCULTUA, NITRITE, UROBILINOGEN, LEUKOCYTESUR, RBCUA, WBCUA, BACTERIA, SQUAMEPITHEL, HYALINECASTS in the last 168 hours.    Invalid input(s): WRIGHTSUR    Significant Imaging:  All pertinent imaging results/findings from the past 24 hours have been reviewed.                    Assessment/Plan:     Urothelial carcinoma with high risk of metastasis  68M s/p L nephU on 7/14.    - KY Cr negative, drain removed POD 1  - Cr 2.2 from preop 1.4, stable from yesterday  - Will plan for discharge today with outpatient follow up 2 weeks for catheter removal  - will consider outpatient nephrology referral at follow up  - maintain rahman on discharge, will arrange for voiding trial in 2 weeks   - regular diet   - OOB, ambulate  - home meds         VTE Risk Mitigation (From admission, onward)         Ordered     Place sequential compression device  Until discontinued         07/14/23 0515                Milad Jolly MD  Urology  [facility] - Surgery

## 2023-07-16 NOTE — PLAN OF CARE
[facility]  Discharge Final Note    Primary Care Provider: Andrea Wang MD    Expected Discharge Date: 7/16/2023    Final Discharge Note (most recent)       Final Note - 07/16/23 0952          Final Note    Assessment Type Final Discharge Note     Anticipated Discharge Disposition Home or Self Care     Hospital Resources/Appts/Education Provided Provided patient/caregiver with written discharge plan information        Post-Acute Status    Discharge Delays None known at this time                     Important Message from Medicare             Contact Info       [facility]   Specialty: Urology    [address]  [facility] [address]   Phone: [phone]       Next Steps: Follow up in 2 week(s)            Per [facility], pt has urology F/U scheduled for 8/8/2023.    RW approved and delivered to pt.    Melissa Phan MSW, LCSW  Weekend -[facility] [place] [facility]  [facility] [phone]

## 2023-07-16 NOTE — SUBJECTIVE & OBJECTIVE
Interval History: NAEON. Tolerating diet, pain controlled. Ambulating without issue.      Objective:     Temp:  [98.1 °F (36.7 °C)-98.9 °F (37.2 °C)] 98.7 °F (37.1 °C)  Pulse:  [65-77] 77  Resp:  [16-18] 17  SpO2:  [96 %-99 %] 97 %  BP: (123-145)/(62-75) 123/63     Body mass index is 22.3 kg/m².    Date 07/16/23 0700 - 07/17/23 0659   Shift 5590-1853 5349-6086 4207-0171 24 Hour Total   INTAKE   P.O. 360   360   Shift Total(mL/kg) 360(5.3)   360(5.3)   OUTPUT   Urine(mL/kg/hr) 325   325   Shift Total(mL/kg) 325(4.7)   325(4.7)   Weight (kg) 68.5 68.5 68.5 68.5          Drains       Drain  Duration                  Urethral Catheter 07/14/23 0828 Latex;Straight-tip 20 Fr. 2 days                     Physical Exam  Constitutional:       General: He is not in acute distress.  HENT:      Head: Normocephalic.   Eyes:      Extraocular Movements: Extraocular movements intact.   Cardiovascular:      Rate and Rhythm: Normal rate.   Pulmonary:      Effort: Pulmonary effort is normal. No respiratory distress.   Abdominal:      Palpations: Abdomen is soft. There is no mass.      Comments: Abdominal incisions c/d/I.  Abdomen soft, appropriately tender.  Nondistended.       Genitourinary:     Comments: Huff draining clear yellow urine.     Musculoskeletal:         General: No swelling or deformity.      Cervical back: Normal range of motion.   Skin:     General: Skin is warm and dry.   Neurological:      General: No focal deficit present.      Mental Status: He is alert.   Psychiatric:         Mood and Affect: Mood normal.         Behavior: Behavior normal.         Significant Labs:    BMP:  Recent Labs   Lab 07/15/23  0739 07/15/23  1515 07/16/23  0310    139 138   K 4.6 4.5 4.7    106 105   CO2 22* 22* 26   BUN 30* 32* 29*   CREATININE 2.3* 2.3* 2.2*   CALCIUM 8.8 8.5* 8.8       CBC:   Recent Labs   Lab 07/15/23  0739   WBC 15.82*   HGB 9.3*   HCT 29.1*          Blood Culture: No results for input(s): PRAFUL  in the last 168 hours.  Urine Culture: No results for input(s): LABURIN in the last 168 hours.  Urine Studies: No results for input(s): COLORU, APPEARANCEUA, PHUR, SPECGRAV, PROTEINUA, GLUCUA, KETONESU, BILIRUBINUA, OCCULTUA, NITRITE, UROBILINOGEN, LEUKOCYTESUR, RBCUA, WBCUA, BACTERIA, SQUAMEPITHEL, HYALINECASTS in the last 168 hours.    Invalid input(s): WRIGHTSUR    Significant Imaging:  All pertinent imaging results/findings from the past 24 hours have been reviewed.

## 2023-07-16 NOTE — PATIENT INSTRUCTIONS
What to expect with your surgery  [facility]  After surgery  You will have a catheter after your surgery.  DO NOT HAVE THIS CATHETER REMOVED OR EXCHANGED BY ANYONE OTHER THAN THE [facility] TE[facility]  If your catheter is not draining, call the on call physician or go to the [facility] ER if possible for further evaluation  If you live out of town and have to go to a local ER, DO NOT let that doctor remove or exchange your catheter; have them page the [facility] resident on call immediately at [phone] to help answer any questions  The catheter should come out in 14 days  The night of surgery we expect and hope that you will:  Walk - walking helps start the bowels moving. Also after your surgery, you are at a risk for a deep venous thrombosis (a clot in the legs that can form by remaining inactive or still for extended periods of time) and this can travel to your lungs and make you feel short of breath. This is a very serious condition. Walking helps prevent the risk of a DVT from occurring.  Eat and Drink - you do not have to eat a whole meal, but we want to make sure you can tolerate liquid and/or solid food without nausea and vomiting  Use your incentive spirometer - this is the breathing apparatus that helps you expand your lungs. If you have pain, you may not want to take deep breaths, but if you shallow breaths put you at risk for pneumonia. The incentive spirometer will help decrease this risk by expanding your lungs.  Symptoms you may experience immediately post-op:  Bloating and/or shoulder pain - during the operation, the abdominal cavity is filled with gas to help visualize the organs and allow the instruments to fit. After the surgery, not all the air is removed, but your body will eventually absorb this small amount of air. This air can make you feel bloated. In addition, when you sit up, the air can sit under a muscle (the diaphragm) which has connecting nerves  to the shoulders, and this pain is referred of felt in the shoulder.  It may take a few days to pass gas or to have a bowel movement - this goes along with the bloating, you may feel like you want to pass gas or have a bowel movement but you cant. This is normal and should pass in a couple days. There are no pills to help with this. Small walks throughout the day should help with this.  Pain - take ibuprofen and tylenol around the clock for the first week. You will be given oxycodone tablets to take as needed. Only take these if your pain is not controlled by the over the counter medications. Opioids can cause constipation and worsen of bloating and abdominal cramping  Bladder spasms - this feels like you have to urinate but cant. Sometimes it can be due to clots clogging up your catheter. As long as the catheter is draining, the spasms should subside. If the spasms persist, there is a prescription medication that can help relieve that spasms   When you go home:  Catheter care  Blood in your urine (hematuria) is normal. However if you are having clots, your catheter stops draining, and you are experiencing worsening abdominal pain, go to the ER.  If the tip of your penis hurts with the catheter in place, you can put some Vaseline or Orajel (numbing medication) at the end of the catheter to help lubricate the catheter.  Activity  Continue to walk - small walks throughout the day are better than one long walk.   Do not lift anything greater than 8 pounds for 6 weeks - this allows the abdominal wall muscles to heal and prevents a hernia formation  Bowel Movements - Do not strain to have a bowel movement - the pain medicines will make you constipated. Take 1 cap of miralax daily to help keep your bowels regular. If you are still having trouble, then you can increase to 2 caps of Miralax per day. Once you are having regular bowel movements you can stop taking the stool softeners  Smoking - If you smoke, it is  encouraged that you STOP smoking. Smoking interferes with the healing process  Driving - Do not drive while you are on pain medications or with your catheter in place.  Bathing - You can shower 24 hours after your surgery. Do not submerge incisions in a bathtub or pool. Let soap and water run over the incisions but do not scrub the incisions  Dressing - incisions have skin glue on them which will peel off over the course of 1-2 weeks.  Kegels - do not start your Kegels until after your catheter is out.  When to return to the ER  Fever - If you have a fever >101.5, this could be due to a number of reasons. Please either call the office or be evaluated in the ER.  Severe pain - pain is expected, but severe or new onset of pain is not normal.   Inability to tolerate food or liquid with nausea and vomiting - go to the ER for them to better evaluate you.

## 2023-07-16 NOTE — DISCHARGE SUMMARY
[facility]  Urology  Discharge Summary      Patient Name: Andrea Gant  MRN: [id number]  Admission Date: 7/14/2023  Hospital Length of Stay: 2 days  Discharge Date and Time:  07/16/2023 9:09 AM  Attending Physician: Kem Jefferson MD   Discharging Provider: Milad Jolly MD  Primary Care Physician: Andrea Wang MD    HPI:   Patient is a 67 yo M s/p robotic nephroureterectomy on 7/14/23    Procedure(s) (LRB):  XI ROBOTIC NEPHROURETERECTOMY (Left)     Indwelling Lines/Drains at time of discharge:   Lines/Drains/Airways       Central Venous Catheter Line  Duration             Port A Cath Single Lumen 03/13/23 Subclavian Right 125 days              Drain  Duration                  Urethral Catheter 07/14/23 0828 Latex;Straight-tip 20 Fr. 2 days                    Hospital Course (synopsis of major diagnoses, care, treatment, and services provided during the course of the hospital stay):    Patient was admitted to the hospital for procedures as described above, please see operative note for full details. Patient tolerated the procedure well, and was taken to PACU postoperatively for observation during their continued recovery from anesthesia. After an appropriate duration of observation, patient was deemed stable for transfer to the floor to continue their recovery. The postoperative course was largely normal. Patient was able to ambulate normally. Patient was able to tolerate prescribed diet. Nausea and pain were controlled with oral medications. Drain was removed prior to discharge. Patient was deemed stable for discharge on POD 2 and was discharged with appropriate medications, instructions, and follow up.      Medications and instructions as below.  For more thorough information, please refer to the hospital record and operative report.    Consults:     Significant Diagnostic Studies:     Pending Diagnostic Studies:       Procedure Component Value Units Date/Time    Specimen to Pathology, Surgery  Urology [[id number]] Collected: 07/14/23 1244    Order Status: Sent Lab Status: In process Updated: 07/14/23 1626    Specimen: Tissue             Final Active Diagnoses:    Diagnosis Date Noted POA    PRINCIPAL PROBLEM:  Urothelial carcinoma with high risk of metastasis [C68.9] 03/06/2023 Yes      Problems Resolved During this Admission:       Discharged Condition: good    Disposition: Home or Self Care    Follow Up:   Follow-up Information       [facility] Follow up in 2 week(s).    Specialty: Urology  Contact information:  [address]  [facility] [address] [phone]  Additional information:  Main Building, 4th Floor   Please park in [facility] and take [facility] elevator                         Patient Instructions:      Protime-INR   Standing Status: Future Number of Occurrences: 1 Standing Exp. Date: 08/18/24     APTT   Standing Status: Future Number of Occurrences: 1 Standing Exp. Date: 08/18/24     Notify your health care provider if you experience any of the following:  temperature >100.4     Notify your health care provider if you experience any of the following:  persistent nausea and vomiting or diarrhea     Notify your health care provider if you experience any of the following:  severe uncontrolled pain     Notify your health care provider if you experience any of the following:  difficulty breathing or increased cough     Notify your health care provider if you experience any of the following:  severe persistent headache     Notify your health care provider if you experience any of the following:  persistent dizziness, light-headedness, or visual disturbances     Notify your health care provider if you experience any of the following:  increased confusion or weakness     Type & Screen   Standing Status: Future Number of Occurrences: 1 Standing Exp. Date: 08/18/24     Medications:  Reconciled Home Medications:      Medication List        START taking these  medications      acetaminophen 650 MG Tbsr  Commonly known as: TYLENOL  Take 1 tablet (650 mg total) by mouth every 8 (eight) hours.     oxyCODONE 5 MG immediate release tablet  Commonly known as: ROXICODONE  Take 1 tablet (5 mg total) by mouth every 4 (four) hours as needed for Pain.     polyethylene glycol 17 gram/dose powder  Commonly known as: GLYCOLAX  Use cap to measure 17 g, then mix in liquid and drink by mouth once daily. for 14 days     sulfamethoxazole-trimethoprim 800-160mg 800-160 mg Tab  Commonly known as: BACTRIM DS  Take 1 tablet by mouth once daily. Take one pill the morning of catheter removal. for 1 dose  Start taking on: July 28, 2023            CONTINUE taking these medications      amLODIPine 5 MG tablet  Commonly known as: NORVASC  TAKE 1 TABLET BY MOUTH ONCE DAILY     aspirin 81 MG Chew  Take 81 mg by mouth once daily.     bisacodyL 5 mg EC tablet  Commonly known as: DULCOLAX  Take 5 mg by mouth daily as needed for Constipation.     cilostazoL 50 MG Tab  Commonly known as: PLETAL  Take 1 tablet (50 mg total) by mouth 2 (two) times daily.     docusate sodium 100 MG capsule  Commonly known as: COLACE  Take 100 mg by mouth 2 (two) times daily.     doxazosin 4 MG tablet  Commonly known as: CARDURA  TAKE 1 TABLET BY MOUTH IN  THE EVENING     folic acid 1 MG tablet  Commonly known as: FOLVITE  Take 1 tablet (1,000 mcg total) by mouth once daily.     methotrexate 2.5 MG Tab  Take 8 tablets (20 mg total) by mouth every 7 days. This medication requires periodic lab monitoring.     multivitamin capsule  Take 1 capsule by mouth once daily.     simvastatin 10 MG tablet  Commonly known as: ZOCOR  Take 1 tablet (10 mg total) by mouth every evening.     traZODone 50 MG tablet  Commonly known as: DESYREL  TAKE 3 TABLETS BY MOUTH AT  NIGHT AS NEEDED FOR  INSOMNIA              Time spent on the discharge of patient: 15 minutes    Milad Jolly MD  Urology  [facility] - Surgery

## 2023-07-16 NOTE — NURSING
Pt received discharge instructions and education provided on home rahman care. Verbalized understanding of all instructions. Prescriptions and home equipment delivered to bedside. PIV removed, no redness/swelling. Pt awaiting wife for ride then will transport out via wheelchair.

## 2023-07-17 ENCOUNTER — PATIENT OUTREACH (OUTPATIENT)
Dept: ADMINISTRATIVE | Facility: CLINIC | Age: 68
End: 2023-07-17
Payer: MEDICARE

## 2023-07-17 ENCOUNTER — TELEPHONE (OUTPATIENT)
Dept: UROLOGY | Facility: CLINIC | Age: 68
End: 2023-07-17
Payer: MEDICARE

## 2023-07-19 ENCOUNTER — PATIENT MESSAGE (OUTPATIENT)
Dept: ADMINISTRATIVE | Facility: OTHER | Age: 68
End: 2023-07-19
Payer: MEDICARE

## 2023-07-19 ENCOUNTER — TELEPHONE (OUTPATIENT)
Dept: UROLOGY | Facility: CLINIC | Age: 68
End: 2023-07-19
Payer: MEDICARE

## 2023-07-19 ENCOUNTER — TELEPHONE (OUTPATIENT)
Dept: ADMINISTRATIVE | Facility: CLINIC | Age: 68
End: 2023-07-19
Payer: MEDICARE

## 2023-07-19 NOTE — TELEPHONE ENCOUNTER
I spoke with patient Andrea now and encouraged him to drink as much liquid as possible so that he can have a bowel movement.  As well patient was given instructions from the pharmacist to increase his MiraLax as well as his Colace.  Patient states he is taking extra-strength Tylenol every 8 hours for pain.  If patient does not have BM or is not able to control his pain he will give us a call back.  Thank you.    ----- Message from Elizabeth Rivera PharmD sent at 7/19/2023 10:14 AM CDT -----  Regarding: Pain medication and constipation  Good morning. I'm a pharmacist with the post-discharge department.    Mr. Jefferson was discharged on 7/16 with 10 tablets of oxycodone 5 mg with directions to take q4h prn pain. He states he has been alternating with the Tylenol in order to make the oxycodone last, but the oxycodone is the only one that alleviates the pain. He is not scheduled for a post-op f/u with your office until 8/8 therefore he is requesting additional pain medication, as he will be out before then. He is asking that an e-prescription be sent to the preferred [facility] on file.   Also, he states he has not had a bowel movement since being home, and has been taking the Miralax 1 capful once daily. Recommended to patient to increase the Miralax to twice a day (or 2 capful once a day) and to resume the stool softener, docusate sodium 100 mg BID (until BM is desired consistency or stop if diarrhea occurs). Please advise patient further if you are not in agreement with these recommendations. Mr. Gant may be reached directly at  [phone].    Please note, this message was sent on the patient's behalf as a courtesy. Please do not reply to this message as this mailbox is not routinely monitored. Thanks.     Elizabeth

## 2023-07-19 NOTE — PROGRESS NOTES
"Phoned patient in response to reply of "3" to post-discharge texting tracker text, "Are you confused about which medications you should be taking or what times you should take them? Reply 3 to this text to speak with a nurse." Mr. Jefferson was discharged on 7/16 with 10 tablets of oxycodone 5 mg with directions to take q4h prn pain. He states he has been alternating with the Tylenol in order to make the oxycodone last, but the oxycodone is the only one that alleviates the pain. He is not scheduled for a post-op f/u with the surgeon until 8/8. Therefore, patient accepted offer to send a message to surgeon's office on his behalf with his request for additional pain medication, as he will be out before then. He is asking that an e-prescription be sent to [facility] preferred [facility]. Also, he states he has not had a bowel movement since being home, and has been taking the Miralax 1 capful once daily. Recommended to patient to increase the Miralax to twice a day (or 2 capful once a day) and to resume the stool softener, docusate sodium 100 mg BID (until BM is desired consistency or stop if diarrhea occurs). Patient verbalized understanding. Message sent to provider with patient's request for additional pain medication and included that he is experiencing constipation along with recommendation on how he should treat at home.  "

## 2023-07-20 ENCOUNTER — PATIENT MESSAGE (OUTPATIENT)
Dept: ADMINISTRATIVE | Facility: OTHER | Age: 68
End: 2023-07-20
Payer: MEDICARE

## 2023-07-21 ENCOUNTER — PATIENT MESSAGE (OUTPATIENT)
Dept: ADMINISTRATIVE | Facility: OTHER | Age: 68
End: 2023-07-21
Payer: MEDICARE

## 2023-07-22 ENCOUNTER — PATIENT MESSAGE (OUTPATIENT)
Dept: ADMINISTRATIVE | Facility: OTHER | Age: 68
End: 2023-07-22
Payer: MEDICARE

## 2023-07-23 ENCOUNTER — PATIENT MESSAGE (OUTPATIENT)
Dept: ADMINISTRATIVE | Facility: OTHER | Age: 68
End: 2023-07-23
Payer: MEDICARE

## 2023-07-24 ENCOUNTER — PATIENT MESSAGE (OUTPATIENT)
Dept: ADMINISTRATIVE | Facility: OTHER | Age: 68
End: 2023-07-24
Payer: MEDICARE

## 2023-07-25 ENCOUNTER — TELEPHONE (OUTPATIENT)
Dept: HEMATOLOGY/ONCOLOGY | Facility: CLINIC | Age: 68
End: 2023-07-25
Payer: MEDICARE

## 2023-07-25 ENCOUNTER — PATIENT MESSAGE (OUTPATIENT)
Dept: UROLOGY | Facility: CLINIC | Age: 68
End: 2023-07-25
Payer: MEDICARE

## 2023-07-25 ENCOUNTER — TELEPHONE (OUTPATIENT)
Dept: INTERNAL MEDICINE | Facility: CLINIC | Age: 68
End: 2023-07-25
Payer: MEDICARE

## 2023-07-25 NOTE — TELEPHONE ENCOUNTER
I spoke to patient's wife, Rufina. She is worried about him. She says he doesn't look good, he has pain in his butt and the top of his legs, he can't get comfortable, he's up, he's down, he's sleeping a lot. He is always really cold, he doesn't eat much and he's losing weight. He says he doesn't feel right. He only takes tylenol, one every 8 hours. She just want's him to be comfortable. I told her I would talk to Marquita about it and get back with her in the morning. He may need some medication to help. She thanked me for calling.

## 2023-07-25 NOTE — TELEPHONE ENCOUNTER
Returned patient's call.    Pt reports pain (6/10)  to circumferential hip/buttock region,  7-8/10 with touch  Onset - since surgery    Denies fever, erythema, edema, limited ROM     Reports sending message to Dr. Jefferson with no return reply.    Pt reports having apt with Dr. Wang this Thursday. Message routed to Dr. Wang and Dr. Jefferson staff for review.

## 2023-07-25 NOTE — TELEPHONE ENCOUNTER
If pain is 7 or 8/10 he probably should not wait 2 days to be seen.  Is he able to walk?  This this seem like it is the hip joint or something else related to surgery?

## 2023-07-25 NOTE — TELEPHONE ENCOUNTER
----- Message from Kristin Reeder sent at 7/25/2023  1:14 PM CDT -----  Type:  Needs Medical Advice    Who Called: spouse Rufina  Would the patient rather a call back or a response via [facility]? call  Best Call Back Number: [phone]  Additional Information:   Spouse requesting a call regarding pt's care

## 2023-07-25 NOTE — TELEPHONE ENCOUNTER
----- Message from Trena Cifuentes sent at 7/24/2023  7:43 PM CDT -----  Regarding: Symptom: Hip Pain - Not From Injury  Contact: elin patrick  Symptom: Hip Pain - Not From Injury  Outcome: Schedule an appointment to be seen within 24 hours.  Reason: Caller denied all higher acuity questions    The caller accepted this outcome

## 2023-07-26 ENCOUNTER — TELEPHONE (OUTPATIENT)
Dept: HEMATOLOGY/ONCOLOGY | Facility: CLINIC | Age: 68
End: 2023-07-26
Payer: MEDICARE

## 2023-07-26 ENCOUNTER — TELEPHONE (OUTPATIENT)
Dept: UROLOGY | Facility: HOSPITAL | Age: 68
End: 2023-07-26
Payer: MEDICARE

## 2023-07-26 DIAGNOSIS — C68.9 UROTHELIAL CARCINOMA WITH HIGH RISK OF METASTASIS: Primary | ICD-10-CM

## 2023-07-26 DIAGNOSIS — D49.59 URETERAL TUMOR: Primary | ICD-10-CM

## 2023-07-26 RX ORDER — METHOCARBAMOL 500 MG/1
500 TABLET, FILM COATED ORAL 3 TIMES DAILY PRN
Qty: 15 TABLET | Refills: 0 | Status: SHIPPED | OUTPATIENT
Start: 2023-07-26 | End: 2024-01-10

## 2023-07-26 NOTE — TELEPHONE ENCOUNTER
Spoke with patient's wife, Rufina who was able to provide acceptable patient identifier prior to conversation.    Rufina reports worsening of pain to groin and legs.  Not eating and weight loss.  Dr. ePng notified and will follow up.

## 2023-07-26 NOTE — TELEPHONE ENCOUNTER
Called Mr. Gant to address his concerns. Since surgery, he has had persistent musculoskeletal pain along his bilateral hips and buttocks. He denies any tingling or muscle weakness and is not having difficulty walking. Huff catheter draining well with clear output. Denies abdominal pain, fatigue, fevers, chills, incisional pain, nausea, vomiting, or diarrhea. States he is still having some constipation but is taking miralax 1-2 times a day and is having 1-2 bowel movements each day.    After discussion with the patient, I believe his pain to mostly be musculoskeletal and is possibly due to positioning during his surgery. Will prescribe robaxin for the pain and instructed him to call back in 1-2 days if this pain is not relieved with medication.    ED precautions also provided including experiencing any fever, fatigue, decreased/no catheter output, gross hematuria with clots, severe abdominal pain or changes to appetite, nausea or vomiting.    Benjy Schilling MD  Department of Urology  PGY-5  Pager: [phone]

## 2023-07-26 NOTE — TELEPHONE ENCOUNTER
I spoke to patients wife, Rufina. Told her I had discussed with Marquita and they may need to reach out to surgery. She told me someone from Dr. Jefferson office had called and they are calling in some muscle relaxers for him and if that doesn't work in a few days, call them back. I told her that sounded like a good plan and if she needed anything further from us, please call.

## 2023-07-27 ENCOUNTER — OFFICE VISIT (OUTPATIENT)
Dept: INTERNAL MEDICINE | Facility: CLINIC | Age: 68
End: 2023-07-27
Payer: MEDICARE

## 2023-07-27 VITALS
SYSTOLIC BLOOD PRESSURE: 110 MMHG | WEIGHT: 151.25 LBS | BODY MASS INDEX: 22.4 KG/M2 | HEART RATE: 89 BPM | DIASTOLIC BLOOD PRESSURE: 64 MMHG | OXYGEN SATURATION: 98 % | HEIGHT: 69 IN

## 2023-07-27 DIAGNOSIS — D64.9 ANEMIA, UNSPECIFIED TYPE: ICD-10-CM

## 2023-07-27 DIAGNOSIS — C68.9 UROTHELIAL CARCINOMA WITH HIGH RISK OF METASTASIS: ICD-10-CM

## 2023-07-27 DIAGNOSIS — T45.1X5A CHEMOTHERAPY INDUCED NEUTROPENIA: ICD-10-CM

## 2023-07-27 DIAGNOSIS — D70.1 CHEMOTHERAPY INDUCED NEUTROPENIA: ICD-10-CM

## 2023-07-27 DIAGNOSIS — M05.79 RHEUMATOID ARTHRITIS INVOLVING MULTIPLE SITES WITH POSITIVE RHEUMATOID FACTOR: ICD-10-CM

## 2023-07-27 DIAGNOSIS — E78.49 OTHER HYPERLIPIDEMIA: ICD-10-CM

## 2023-07-27 DIAGNOSIS — M54.50 ACUTE BILATERAL LOW BACK PAIN WITHOUT SCIATICA: Primary | ICD-10-CM

## 2023-07-27 DIAGNOSIS — G47.00 PERSISTENT INSOMNIA: ICD-10-CM

## 2023-07-27 DIAGNOSIS — I73.9 PAD (PERIPHERAL ARTERY DISEASE): ICD-10-CM

## 2023-07-27 PROCEDURE — 3078F PR MOST RECENT DIASTOLIC BLOOD PRESSURE < 80 MM HG: ICD-10-PCS | Mod: CPTII,S$GLB,, | Performed by: INTERNAL MEDICINE

## 2023-07-27 PROCEDURE — 1101F PT FALLS ASSESS-DOCD LE1/YR: CPT | Mod: CPTII,S$GLB,, | Performed by: INTERNAL MEDICINE

## 2023-07-27 PROCEDURE — 3008F PR BODY MASS INDEX (BMI) DOCUMENTED: ICD-10-PCS | Mod: CPTII,S$GLB,, | Performed by: INTERNAL MEDICINE

## 2023-07-27 PROCEDURE — 1125F PR PAIN SEVERITY QUANTIFIED, PAIN PRESENT: ICD-10-PCS | Mod: CPTII,S$GLB,, | Performed by: INTERNAL MEDICINE

## 2023-07-27 PROCEDURE — 3008F BODY MASS INDEX DOCD: CPT | Mod: CPTII,S$GLB,, | Performed by: INTERNAL MEDICINE

## 2023-07-27 PROCEDURE — 3078F DIAST BP <80 MM HG: CPT | Mod: CPTII,S$GLB,, | Performed by: INTERNAL MEDICINE

## 2023-07-27 PROCEDURE — 1101F PR PT FALLS ASSESS DOC 0-1 FALLS W/OUT INJ PAST YR: ICD-10-PCS | Mod: CPTII,S$GLB,, | Performed by: INTERNAL MEDICINE

## 2023-07-27 PROCEDURE — 3074F SYST BP LT 130 MM HG: CPT | Mod: CPTII,S$GLB,, | Performed by: INTERNAL MEDICINE

## 2023-07-27 PROCEDURE — 3288F PR FALLS RISK ASSESSMENT DOCUMENTED: ICD-10-PCS | Mod: CPTII,S$GLB,, | Performed by: INTERNAL MEDICINE

## 2023-07-27 PROCEDURE — 1111F DSCHRG MED/CURRENT MED MERGE: CPT | Mod: CPTII,S$GLB,, | Performed by: INTERNAL MEDICINE

## 2023-07-27 PROCEDURE — 1111F PR DISCHARGE MEDS RECONCILED W/ CURRENT OUTPATIENT MED LIST: ICD-10-PCS | Mod: CPTII,S$GLB,, | Performed by: INTERNAL MEDICINE

## 2023-07-27 PROCEDURE — 1125F AMNT PAIN NOTED PAIN PRSNT: CPT | Mod: CPTII,S$GLB,, | Performed by: INTERNAL MEDICINE

## 2023-07-27 PROCEDURE — 99999 PR PBB SHADOW E&M-EST. PATIENT-LVL III: CPT | Mod: PBBFAC,,, | Performed by: INTERNAL MEDICINE

## 2023-07-27 PROCEDURE — 99999 PR PBB SHADOW E&M-EST. PATIENT-LVL III: ICD-10-PCS | Mod: PBBFAC,,, | Performed by: INTERNAL MEDICINE

## 2023-07-27 PROCEDURE — 1159F MED LIST DOCD IN RCRD: CPT | Mod: CPTII,S$GLB,, | Performed by: INTERNAL MEDICINE

## 2023-07-27 PROCEDURE — 99213 PR OFFICE/OUTPT VISIT, EST, LEVL III, 20-29 MIN: ICD-10-PCS | Mod: S$GLB,,, | Performed by: INTERNAL MEDICINE

## 2023-07-27 PROCEDURE — 3288F FALL RISK ASSESSMENT DOCD: CPT | Mod: CPTII,S$GLB,, | Performed by: INTERNAL MEDICINE

## 2023-07-27 PROCEDURE — 3074F PR MOST RECENT SYSTOLIC BLOOD PRESSURE < 130 MM HG: ICD-10-PCS | Mod: CPTII,S$GLB,, | Performed by: INTERNAL MEDICINE

## 2023-07-27 PROCEDURE — 99213 OFFICE O/P EST LOW 20 MIN: CPT | Mod: S$GLB,,, | Performed by: INTERNAL MEDICINE

## 2023-07-27 PROCEDURE — 1159F PR MEDICATION LIST DOCUMENTED IN MEDICAL RECORD: ICD-10-PCS | Mod: CPTII,S$GLB,, | Performed by: INTERNAL MEDICINE

## 2023-07-27 RX ORDER — OXYCODONE HYDROCHLORIDE 5 MG/1
5 TABLET ORAL EVERY 6 HOURS PRN
Qty: 10 TABLET | Refills: 0 | Status: SHIPPED | OUTPATIENT
Start: 2023-07-27 | End: 2023-08-28 | Stop reason: SDUPTHER

## 2023-07-27 NOTE — PROGRESS NOTES
Subjective:       Patient ID: Andrea Gant is a 68 y.o. male.    Chief Complaint: Hospital Follow Up    HPI: Patient in for hospital follow-up for kidney and urethral cancer.  He had chemo then surgery.  He said it was a lengthy surgery and he was on his side for a large portion of the time.  He has had significant low back pain postop.  He has a history of rheumatoid arthritis in the past he used oxycodone briefly and he is asking for small prescription.  He says he was given a muscle relaxer by the surgical team but it has not helped and the pain is 8 to 9/10.  It feels musculoskeletal.  No bruising or bleeding.  No fever.  Incisions are unremarkable and healing.  His catheter seems to be working normally and there is no blood.  I reviewed his labs and medications  reviewed.      Transitional Care Note    Family and/or Caretaker present at visit?  No.  Diagnostic tests reviewed/disposition: No diagnosic tests pending after this hospitalization.  Disease/illness education: Discussed back pain and Kidney cancer  Home health/community services discussion/referrals: Patient does not have home health established from hospital visit.  They do not need home health.  If needed, we will set up home health for the patient.   Establishment or re-establishment of referral orders for community resources: No other necessary community resources.   Discussion with other health care providers: No discussion with other health care providers necessary.            Review of Systems   Constitutional:  Negative for fever.   Gastrointestinal:  Negative for abdominal pain and blood in stool.   Genitourinary:  Negative for hematuria.   Musculoskeletal:  Positive for arthralgias, back pain, gait problem and leg pain.         Past Medical History:   Diagnosis Date    Anemia     Cancer     Cataract     Colon polyp 2014    Depression     Disorder of kidney and ureter     History of hepatitis C, s/p successful treatment w/ SVR12 -  7/2015 10/14/2012    Kelsey 1a,  Liver biopsy 6/2014 - Stage 1 fibrosis;  Completed 8 weeks Harvoni w/ SVR12 - 7/2015      Hyperlipidemia     Lipoma of arm     Lipoma of lower extremity     Nuclear sclerosis - Both Eyes 10/22/2012    Other and unspecified hyperlipidemia 10/22/2013    PAD (peripheral artery disease)     Peripheral vascular disease, unspecified     Plaquenil adverse reaction in therapeutic use 10/22/2012    Rheumatoid arthritis(714.0) 10/14/2012    Special screening for malignant neoplasms, colon 02/21/2014     Past Surgical History:   Procedure Laterality Date    BIOPSY OF URETER Left 02/16/2023    Procedure: BIOPSY, URETER/BRUSH;  Surgeon: Kem Jefferson MD;  Location: [facility] OR 1ST FLR;  Service: Urology;  Laterality: Left;    COLONOSCOPY N/A 11/29/2021    Procedure: COLONOSCOPY;  Surgeon: Kenrick Zimmer MD;  Location: [facility] ENDO (4TH FLR);  Service: Endoscopy;  Laterality: N/A;  blood thinner approval received, see telephone encounter 10/19/21-BB  fully vacc-inst email-tb    CYSTOSCOPY      CYSTOSCOPY  02/16/2023    Procedure: CYSTOSCOPY;  Surgeon: Kem Jefferson MD;  Location: [facility] OR 1ST FLR;  Service: Urology;;    HERNIA REPAIR      INSERTION OF TUNNELED CENTRAL VENOUS CATHETER (CVC) WITH SUBCUTANEOUS PORT Right 3/13/2023    Procedure: INSERTION, PORT-A-CATH;  Surgeon: Rene Cali MD;  Location: [facility] CATH LAB;  Service: Radiology;  Laterality: Right;    KNEE ARTHROPLASTY      LAPAROSCOPIC EXPLORATION OF GROIN Left 09/06/2018    Procedure: EXPLORATION, INGUINAL REGION, LAPAROSCOPIC;  Surgeon: Mingo De Guzman Jr., MD;  Location: [facility] OR 2ND FLR;  Service: General;  Laterality: Left;    PYELOSCOPY Left 02/16/2023    Procedure: PYELOSCOPY;  Surgeon: Kem Jefferson MD;  Location: [facility] OR 1ST FLR;  Service: Urology;  Laterality: Left;    RETROGRADE PYELOGRAPHY Bilateral 02/16/2023    Procedure: PYELOGRAM, RETROGRADE;  Surgeon: Kem Jefferson MD;  [facility];   Service: Urology;  Laterality: Bilateral;    ROBOT-ASSISTED LAPAROSCOPIC SURGICAL REMOVAL OF KIDNEY AND URETER USING DA BRIJESH XI Left 7/14/2023    Procedure: XI ROBOTIC NEPHROURETERECTOMY;  Surgeon: Kem Jefferson MD;  Location: [facility] OR [address] [place] [address];  Service: Urology;  Laterality: Left;  5 hours    TONSILLECTOMY      URETEROSCOPIC REMOVAL OF URETERIC CALCULUS Left 02/16/2023    Procedure: REMOVAL, CALCULUS, URETER, URETEROSCOPIC;  Surgeon: Kem Jefferson MD;  [facility];  Service: Urology;  Laterality: Left;    URETEROSCOPY Left 02/16/2023    Procedure: URETEROSCOPY;  Surgeon: Kem Jefferson MD;  [facility];  Service: Urology;  Laterality: Left;      Patient Active Problem List   Diagnosis    Rheumatoid arthritis    History of hepatitis C, s/p successful treatment w/ SVR12 - 7/2015    Persistent insomnia    Nuclear sclerosis - Both Eyes    History of long-term treatment with high-risk medication    Other hyperlipidemia    PAD (peripheral artery disease)    Hypercholesteremia    Intermittent claudication    PVD (peripheral vascular disease)    Right inguinal hernia    AAA (abdominal aortic aneurysm)    Venous insufficiency    Drug-induced immunodeficiency    Stage 3a chronic kidney disease    Solitary pulmonary nodule (repeat due Aug 2023)    Ureteral tumor    Urothelial carcinoma with high risk of metastasis    Chemotherapy induced neutropenia    Anemia        Objective:      Physical Exam  Constitutional:       Appearance: Normal appearance.   Cardiovascular:      Rate and Rhythm: Normal rate.      Pulses: Normal pulses.      Heart sounds: No murmur heard.  Pulmonary:      Effort: No respiratory distress.      Breath sounds: No wheezing.   Abdominal:      Tenderness: There is no abdominal tenderness.      Comments: Well healing abdominal incisions.  Urinary catheter in place.   Musculoskeletal:         General: Tenderness (lower lumbar region radiating bilaterally toward the  "upper hip area.  No bleeding noted.) present.   Neurological:      General: No focal deficit present.      Mental Status: He is alert and oriented to person, place, and time.   Psychiatric:         Mood and Affect: Mood normal.         Behavior: Behavior normal.         Thought Content: Thought content normal.       Assessment:       Problem List Items Addressed This Visit          Cardiac/Vascular    Other hyperlipidemia    PAD (peripheral artery disease)       Immunology/Multi System    Rheumatoid arthritis       Oncology    Urothelial carcinoma with high risk of metastasis    Chemotherapy induced neutropenia    Anemia       Other    Persistent insomnia     Other Visit Diagnoses       Acute bilateral low back pain without sciatica    -  Primary            Plan:         Andrea was seen today for hospital follow up.    Diagnoses and all orders for this visit:    Acute bilateral low back pain without sciatica    Rheumatoid arthritis involving multiple sites with positive rheumatoid factor    Persistent insomnia    Other hyperlipidemia    PAD (peripheral artery disease)    Chemotherapy induced neutropenia    Anemia, unspecified type    Urothelial carcinoma with high risk of metastasis    Other orders  -     oxyCODONE (ROXICODONE) 5 MG immediate release tablet; Take 1 tablet (5 mg total) by mouth every 6 (six) hours as needed for Pain.        reviewed, use sparingly.   Stool softener to help prevent constipation.   Call for any changes.               Portions of this note may have been created with voice recognition software. Occasional "wrong-word" or "sound-a-like" substitutions may have occurred due to the inherent limitations of voice recognition software. Please, read the note carefully and recognize, using context, where substitutions have occurred.  "

## 2023-07-28 ENCOUNTER — TELEPHONE (OUTPATIENT)
Dept: UROLOGY | Facility: CLINIC | Age: 68
End: 2023-07-28
Payer: MEDICARE

## 2023-07-28 LAB
FINAL PATHOLOGIC DIAGNOSIS: NORMAL
GROSS: NORMAL
Lab: NORMAL
MICROSCOPIC EXAM: NORMAL

## 2023-07-28 NOTE — TELEPHONE ENCOUNTER
Spoke with patient who was able to provide acceptable patient identifiers prior to conversation.  Discussed appointment in radiology and new appt date and time with AMY Chaparro.  Patient verbalizes understanding of all.

## 2023-08-01 ENCOUNTER — PATIENT MESSAGE (OUTPATIENT)
Dept: ADMINISTRATIVE | Facility: OTHER | Age: 68
End: 2023-08-01
Payer: MEDICARE

## 2023-08-04 ENCOUNTER — OFFICE VISIT (OUTPATIENT)
Dept: UROLOGY | Facility: CLINIC | Age: 68
End: 2023-08-04
Payer: MEDICARE

## 2023-08-04 ENCOUNTER — HOSPITAL ENCOUNTER (OUTPATIENT)
Dept: RADIOLOGY | Facility: HOSPITAL | Age: 68
Discharge: HOME | End: 2023-08-04
Attending: STUDENT IN AN ORGANIZED HEALTH CARE EDUCATION/TRAINING PROGRAM
Payer: MEDICARE

## 2023-08-04 DIAGNOSIS — D49.59 URETERAL TUMOR: ICD-10-CM

## 2023-08-04 DIAGNOSIS — C68.9 UROTHELIAL CARCINOMA WITH HIGH RISK OF METASTASIS: Primary | ICD-10-CM

## 2023-08-04 PROCEDURE — 51600 INJECTION FOR BLADDER X-RAY: CPT | Mod: ,,, | Performed by: RADIOLOGY

## 2023-08-04 PROCEDURE — 1159F MED LIST DOCD IN RCRD: CPT | Mod: CPTII,S$GLB,,

## 2023-08-04 PROCEDURE — 99999 PR PBB SHADOW E&M-EST. PATIENT-LVL III: ICD-10-PCS | Mod: PBBFAC,,,

## 2023-08-04 PROCEDURE — 1159F PR MEDICATION LIST DOCUMENTED IN MEDICAL RECORD: ICD-10-PCS | Mod: CPTII,S$GLB,,

## 2023-08-04 PROCEDURE — 99999 PR PBB SHADOW E&M-EST. PATIENT-LVL III: CPT | Mod: PBBFAC,,,

## 2023-08-04 PROCEDURE — 1160F PR REVIEW ALL MEDS BY PRESCRIBER/CLIN PHARMACIST DOCUMENTED: ICD-10-PCS | Mod: CPTII,S$GLB,,

## 2023-08-04 PROCEDURE — 74430 CONTRAST X-RAY BLADDER: CPT | Mod: 26,,, | Performed by: RADIOLOGY

## 2023-08-04 PROCEDURE — 51600 FL CYSTOGRAM MIN 3 VIEWS RADIOLOGIST PERFORMED: ICD-10-PCS | Mod: ,,, | Performed by: RADIOLOGY

## 2023-08-04 PROCEDURE — 25500020 PHARM REV CODE 255: Performed by: STUDENT IN AN ORGANIZED HEALTH CARE EDUCATION/TRAINING PROGRAM

## 2023-08-04 PROCEDURE — 51600 INJECTION FOR BLADDER X-RAY: CPT

## 2023-08-04 PROCEDURE — 1160F RVW MEDS BY RX/DR IN RCRD: CPT | Mod: CPTII,S$GLB,,

## 2023-08-04 PROCEDURE — 99024 PR POST-OP FOLLOW-UP VISIT: ICD-10-PCS | Mod: S$GLB,,,

## 2023-08-04 PROCEDURE — 74430 FL CYSTOGRAM MIN 3 VIEWS RADIOLOGIST PERFORMED: ICD-10-PCS | Mod: 26,,, | Performed by: RADIOLOGY

## 2023-08-04 PROCEDURE — 99024 POSTOP FOLLOW-UP VISIT: CPT | Mod: S$GLB,,,

## 2023-08-04 RX ADMIN — DIATRIZOATE MEGLUMINE 210 ML: 180 INJECTION, SOLUTION INTRAVESICAL at 08:08

## 2023-08-04 NOTE — PROGRESS NOTES
CHIEF COMPLAINT:  FL cystogram      HISTORY OF PRESENTING ILLINESS:  Andrea Gant is a 68 y.o. male with left UTUC. Recently completed NAC with gemcitabine/cisplatin 6/27/23. Tolerated chemo well. Post op left robotic nephroureterectomy with Dr. Jefferson on 7/14/23. [place] cystogram 8/4/23 suggestive of possible leak. He is doing well, tolerating PO intake. Passing flatus and having BMs. He has some bilateral hip and buttock numbness and pain since surgery.    Works for [employer] near [place].     Patient initially presented with gross hematuria December 2022. They deny flank pain, night sweats or weight loss. Denies abdominal pain, nausea or vomiting.     The mass subsequently found on CTU imaging performed for GH. This revealed a approximately 2.5 cm mass located at the left proximal ureter. No evidence of RP adenopathy.  Office cystoscopy revealed no bladder tumors. Urine cytology 1/25/23 with atypical cells. Repeat urine cytology 2/1/23 with HGUC.     Left ureteral biopsy 2/16/23 revealed HG urothelial dysplasia/carcinoma in situ. No definitive invasive carcinoma identified.     Patient denies personal and family history of bladder cancer. Two cousins had kidney cancer. No additional personal or family history of known carcinomas.  Patient reports personal history of current tobacco use, 0.5-1ppd/50 years. Stopped 6 months ago.  Radiation therapy to pelvis: None.  Exposure to harmful chemicals: None.  History of Urolithiasis: yes. Did not require procedure.  Last urine culture in Feb 2023 was negative.     No history of DM.  Personal history of CKD. Baseline Cr 1.4 on 6/27/23. Baseline GFR 54. They have never previously been seen by nephrology.  Additional PMH: pvd, pad, hld, ckd 3, hx hep c (svr 2015).     Previous abdominal surgery: Right inguinal hernia repair 2018 with mesh.  Blood thinners: ASA 81 mg, pletal. Currently holding pletal.     He has LUTS. Reports nocturia 3x,  drinks fluid prior to bedtime. Not bothersome to him. Constipation during chemo.  He has ED. Previously discussed no interest in medications at this time with Radha Vasquez.        REVIEW OF SYSTEMS:  Review of Systems   Constitutional:  Negative for chills and fever.   HENT:  Negative for congestion and sore throat.    Respiratory:  Negative for cough and shortness of breath.    Cardiovascular:  Negative for chest pain and palpitations.   Gastrointestinal:  Negative for nausea and vomiting.   Genitourinary:  Negative for flank pain and hematuria.   Neurological:  Negative for dizziness and headaches.         PATIENT HISTORY:  Past Medical History:   Diagnosis Date    Anemia     Cancer     Cataract     Colon polyp 2014    Depression     Disorder of kidney and ureter     History of hepatitis C, s/p successful treatment w/ SVR12 - 7/2015 10/14/2012    Kelsey 1a,  Liver biopsy 6/2014 - Stage 1 fibrosis;  Completed 8 weeks Harvoni w/ SVR12 - 7/2015      Hyperlipidemia     Lipoma of arm     Lipoma of lower extremity     Nuclear sclerosis - Both Eyes 10/22/2012    Other and unspecified hyperlipidemia 10/22/2013    PAD (peripheral artery disease)     Peripheral vascular disease, unspecified     Plaquenil adverse reaction in therapeutic use 10/22/2012    Rheumatoid arthritis(714.0) 10/14/2012    Special screening for malignant neoplasms, colon 02/21/2014       Past Surgical History:   Procedure Laterality Date    BIOPSY OF URETER Left 02/16/2023    Procedure: BIOPSY, URETER/BRUSH;  Surgeon: Kem Jefferson MD;  Location: [facility] OR [address] [place] [address];  Service: Urology;  Laterality: Left;    COLONOSCOPY N/A 11/29/2021    Procedure: COLONOSCOPY;  Surgeon: Kenrick Zimmer MD;  Location: [facility] (4TH FLR);  Service: Endoscopy;  Laterality: N/A;  blood thinner approval received, see telephone encounter 10/19/21-BB  fully vacc-inst email-tb    CYSTOSCOPY      CYSTOSCOPY  02/16/2023    Procedure: CYSTOSCOPY;  Surgeon: Kem Jefferson,  MD;  Location: [address];  Service: Urology;;    HERNIA REPAIR      INSERTION OF TUNNELED CENTRAL VENOUS CATHETER (CVC) WITH SUBCUTANEOUS PORT Right 3/13/2023    Procedure: INSERTION, PORT-A-CATH;  Surgeon: Rene Cali MD;  Location: [facility] CATH LAB;  Service: Radiology;  Laterality: Right;    KNEE ARTHROPLASTY      LAPAROSCOPIC EXPLORATION OF GROIN Left 09/06/2018    Procedure: EXPLORATION, INGUINAL REGION, LAPAROSCOPIC;  Surgeon: Mingo De Guzman Jr., MD;  Location: [address];  Service: General;  Laterality: Left;    PYELOSCOPY Left 02/16/2023    Procedure: PYELOSCOPY;  Surgeon: Kem Jefferson MD;  Location: [facility] OR [address] [place] [address];  Service: Urology;  Laterality: Left;    RETROGRADE PYELOGRAPHY Bilateral 02/16/2023    Procedure: PYELOGRAM, RETROGRADE;  Surgeon: Kem Jefferson MD;  Location: [address];  Service: Urology;  Laterality: Bilateral;    ROBOT-ASSISTED LAPAROSCOPIC SURGICAL REMOVAL OF KIDNEY AND URETER USING DA BRIJESH XI Left 7/14/2023    Procedure: XI ROBOTIC NEPHROURETERECTOMY;  Surgeon: Kem Jefferson MD;  Location: [address];  Service: Urology;  Laterality: Left;  5 hours    TONSILLECTOMY      URETEROSCOPIC REMOVAL OF URETERIC CALCULUS Left 02/16/2023    Procedure: REMOVAL, CALCULUS, URETER, URETEROSCOPIC;  Surgeon: Kem Jefferson MD;  Location: [address];  Service: Urology;  Laterality: Left;    URETEROSCOPY Left 02/16/2023    Procedure: URETEROSCOPY;  Surgeon: Kem Jefferson MD;  Location: [address];  Service: Urology;  Laterality: Left;       Family History   Problem Relation Age of Onset    Heart disease Paternal Uncle     Dementia Mother     Heart disease Sister     Liver disease Neg Hx     Amblyopia Neg Hx     Blindness Neg Hx     Cancer Neg Hx     Cataracts Neg Hx     Diabetes Neg Hx     Glaucoma Neg Hx     Hypertension Neg Hx     Macular degeneration Neg Hx     Retinal detachment Neg Hx     Strabismus Neg Hx     Stroke Neg Hx     Thyroid  disease Neg Hx        Social History     Socioeconomic History    Marital status:      Spouse name: Rufina   Occupational History     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[employer]   Tobacco Use    Smoking status: Former     Current packs/day: 0.00     Types: Cigarettes     Quit date: 6/27/2020     Years since quitting: 3.1    Smokeless tobacco: Never   Substance and Sexual Activity    Alcohol use: Yes     Comment: 2 drinks per week    Drug use: Yes     Types: Marijuana     Comment: marajuana used on Saturday   Social History Narrative    Resides locally    [place] [employer] at [employer]     w/ 2 adult children     Social Determinants of Health     Financial Resource Strain: Low Risk  (7/16/2023)    Overall Financial Resource Strain (CARDIA)     Difficulty of Paying Living Expenses: Not hard at all   Food Insecurity: No Food Insecurity (7/16/2023)    Hunger Vital Sign     Worried About Running Out of Food in the Last Year: Never true     Ran Out of Food in the Last Year: Never true   Transportation Needs: No Transportation Needs (7/16/2023)    PRAPARE - Transportation     Lack of Transportation (Medical): No     Lack of Transportation (Non-Medical): No   Physical Activity: Insufficiently Active (7/16/2023)    Exercise Vital Sign     Days of Exercise per Week: 2 days     Minutes of Exercise per Session: 20 min   Stress: No Stress Concern Present (7/16/2023)    [employer] - Occupational Stress Questionnaire     Feeling of Stress : Only a little   Social Connections: Moderately Isolated (7/16/2023)    Social Connection and Isolation Panel [NHANES]     Frequency of Communication with Friends and Family: More than three times a week     Frequency of Social Gatherings with Friends and Family: More than three times a week     Attends Orthodox Services: Never     Active Member of Clubs or Organizations: No     Attends Club or Organization Meetings: Never     Marital Status:     Housing Stability: Low Risk  (7/16/2023)    Housing Stability Vital Sign     Unable to Pay for Housing in the Last Year: No     Number of Places Lived in the Last Year: 1     Unstable Housing in the Last Year: No       Allergies:  Patient has no known allergies.    Medications:    Current Outpatient Medications:     acetaminophen (TYLENOL) 650 MG TbSR, Take 1 tablet (650 mg total) by mouth every 8 (eight) hours., Disp: 63 tablet, Rfl: 0    amLODIPine (NORVASC) 5 MG tablet, TAKE 1 TABLET BY MOUTH ONCE DAILY, Disp: 90 tablet, Rfl: 2    aspirin 81 MG Chew, Take 81 mg by mouth once daily., Disp: , Rfl:     bisacodyL (DULCOLAX) 5 mg EC tablet, Take 5 mg by mouth daily as needed for Constipation., Disp: , Rfl:     cilostazoL (PLETAL) 50 MG Tab, Take 1 tablet (50 mg total) by mouth 2 (two) times daily., Disp: 180 tablet, Rfl: 3    docusate sodium (COLACE) 100 MG capsule, Take 100 mg by mouth 2 (two) times daily., Disp: , Rfl:     doxazosin (CARDURA) 4 MG tablet, TAKE 1 TABLET BY MOUTH IN  THE EVENING, Disp: 90 tablet, Rfl: 3    folic acid (FOLVITE) 1 MG tablet, Take 1 tablet (1,000 mcg total) by mouth once daily., Disp: 90 tablet, Rfl: 3    methocarbamoL (ROBAXIN) 500 MG Tab, Take 1 tablet (500 mg total) by mouth 3 (three) times daily as needed (muscle pain)., Disp: 15 tablet, Rfl: 0    methotrexate 2.5 MG Tab, Take 8 tablets (20 mg total) by mouth every 7 days. This medication requires periodic lab monitoring., Disp: 120 tablet, Rfl: 1    multivitamin capsule, Take 1 capsule by mouth once daily., Disp: , Rfl:     oxyCODONE (ROXICODONE) 5 MG immediate release tablet, Take 1 tablet (5 mg total) by mouth every 6 (six) hours as needed for Pain., Disp: 10 tablet, Rfl: 0    simvastatin (ZOCOR) 10 MG tablet, Take 1 tablet (10 mg total) by mouth every evening., Disp: 90 tablet, Rfl: 9    traZODone (DESYREL) 50 MG tablet, TAKE 3 TABLETS BY MOUTH AT  NIGHT AS NEEDED FOR  INSOMNIA, Disp: 270 tablet, Rfl: 3  No current  facility-administered medications for this visit.    PHYSICAL EXAMINATION:  Physical Exam  Constitutional:       Appearance: Normal appearance.   HENT:      Head: Normocephalic and atraumatic.      Right Ear: External ear normal.      Left Ear: External ear normal.   Pulmonary:      Effort: Pulmonary effort is normal. No respiratory distress.   Abdominal:      Comments: Abdominal incisions healing well, CDI   Genitourinary:     Comments: Huff catheter patent draining clear yellow urine  Skin:     General: Skin is warm and dry.   Neurological:      General: No focal deficit present.      Mental Status: He is alert and oriented to person, place, and time.   Psychiatric:         Mood and Affect: Mood normal.         Behavior: Behavior normal.           Lab Results   Component Value Date    PSA 0.76 10/11/2021    PSA 0.49 08/01/2019    PSA 0.42 08/15/2018    PSADIAG 0.71 02/08/2023       Lab Results   Component Value Date    CREATININE 2.2 (H) 07/16/2023    EGFRNORACEVR 31.6 (A) 07/16/2023         IMPRESSION:  Encounter Diagnoses   Name Primary?    Urothelial carcinoma with high risk of metastasis Yes         Assessment:       1. Urothelial carcinoma with high risk of metastasis        Plan:   - repeat FL cystogram 10 days     I spent 30 minutes with the patient of which more than half was spent in direct consultation with the patient in regards to our treatment and plan.  We addressed the office findings and recent labs.   Education and recommendations of today's plan of care including home remedies and needed follow up with PCP.

## 2023-08-07 ENCOUNTER — TELEPHONE (OUTPATIENT)
Dept: UROLOGY | Facility: CLINIC | Age: 68
End: 2023-08-07
Payer: MEDICARE

## 2023-08-07 NOTE — TELEPHONE ENCOUNTER
Patient has Ronny office visit on 08/08/2023.  Fluoro cysto was 08/04/2023.  Dr. Jefferson will review the images at the office visit.      ----- Message from Marylu Chaparro NP sent at 8/4/2023 12:25 PM CDT -----  Regarding: repeat fl cystogram  Please schedule pt for a repeat FL cystogram in 2 weeks due to possible leak on imaging today. Sanam, I included you in this message in the event Dr. Jefferson would like to review the imaging.

## 2023-08-08 ENCOUNTER — LAB VISIT (OUTPATIENT)
Dept: LAB | Facility: HOSPITAL | Age: 68
End: 2023-08-08
Attending: UROLOGY
Payer: MEDICARE

## 2023-08-08 ENCOUNTER — OFFICE VISIT (OUTPATIENT)
Dept: UROLOGY | Facility: CLINIC | Age: 68
End: 2023-08-08
Payer: MEDICARE

## 2023-08-08 VITALS
DIASTOLIC BLOOD PRESSURE: 65 MMHG | WEIGHT: 151.25 LBS | BODY MASS INDEX: 22.4 KG/M2 | HEIGHT: 69 IN | SYSTOLIC BLOOD PRESSURE: 112 MMHG | HEART RATE: 73 BPM

## 2023-08-08 DIAGNOSIS — N18.4 CKD (CHRONIC KIDNEY DISEASE) STAGE 4, GFR 15-29 ML/MIN: ICD-10-CM

## 2023-08-08 DIAGNOSIS — N18.31 STAGE 3A CHRONIC KIDNEY DISEASE: ICD-10-CM

## 2023-08-08 DIAGNOSIS — C68.9 UROTHELIAL CARCINOMA WITH HIGH RISK OF METASTASIS: Primary | ICD-10-CM

## 2023-08-08 LAB
ALBUMIN SERPL BCP-MCNC: 3.7 G/DL (ref 3.5–5.2)
ALP SERPL-CCNC: 95 U/L (ref 55–135)
ALT SERPL W/O P-5'-P-CCNC: 11 U/L (ref 10–44)
ANION GAP SERPL CALC-SCNC: 11 MMOL/L (ref 8–16)
AST SERPL-CCNC: 17 U/L (ref 10–40)
BILIRUB SERPL-MCNC: 0.3 MG/DL (ref 0.1–1)
BUN SERPL-MCNC: 33 MG/DL (ref 8–23)
CALCIUM SERPL-MCNC: 9.7 MG/DL (ref 8.7–10.5)
CHLORIDE SERPL-SCNC: 102 MMOL/L (ref 95–110)
CO2 SERPL-SCNC: 22 MMOL/L (ref 23–29)
CREAT SERPL-MCNC: 2.2 MG/DL (ref 0.5–1.4)
EST. GFR  (NO RACE VARIABLE): 31.6 ML/MIN/1.73 M^2
GLUCOSE SERPL-MCNC: 91 MG/DL (ref 70–110)
POTASSIUM SERPL-SCNC: 4.7 MMOL/L (ref 3.5–5.1)
PROT SERPL-MCNC: 7.7 G/DL (ref 6–8.4)
SODIUM SERPL-SCNC: 135 MMOL/L (ref 136–145)

## 2023-08-08 PROCEDURE — 1160F RVW MEDS BY RX/DR IN RCRD: CPT | Mod: CPTII,S$GLB,, | Performed by: UROLOGY

## 2023-08-08 PROCEDURE — 3078F DIAST BP <80 MM HG: CPT | Mod: CPTII,S$GLB,, | Performed by: UROLOGY

## 2023-08-08 PROCEDURE — 1160F PR REVIEW ALL MEDS BY PRESCRIBER/CLIN PHARMACIST DOCUMENTED: ICD-10-PCS | Mod: CPTII,S$GLB,, | Performed by: UROLOGY

## 2023-08-08 PROCEDURE — 3078F PR MOST RECENT DIASTOLIC BLOOD PRESSURE < 80 MM HG: ICD-10-PCS | Mod: CPTII,S$GLB,, | Performed by: UROLOGY

## 2023-08-08 PROCEDURE — 1126F AMNT PAIN NOTED NONE PRSNT: CPT | Mod: CPTII,S$GLB,, | Performed by: UROLOGY

## 2023-08-08 PROCEDURE — 99999 PR PBB SHADOW E&M-EST. PATIENT-LVL III: ICD-10-PCS | Mod: PBBFAC,,, | Performed by: UROLOGY

## 2023-08-08 PROCEDURE — 1126F PR PAIN SEVERITY QUANTIFIED, NO PAIN PRESENT: ICD-10-PCS | Mod: CPTII,S$GLB,, | Performed by: UROLOGY

## 2023-08-08 PROCEDURE — 1101F PT FALLS ASSESS-DOCD LE1/YR: CPT | Mod: CPTII,S$GLB,, | Performed by: UROLOGY

## 2023-08-08 PROCEDURE — 3074F SYST BP LT 130 MM HG: CPT | Mod: CPTII,S$GLB,, | Performed by: UROLOGY

## 2023-08-08 PROCEDURE — 3074F PR MOST RECENT SYSTOLIC BLOOD PRESSURE < 130 MM HG: ICD-10-PCS | Mod: CPTII,S$GLB,, | Performed by: UROLOGY

## 2023-08-08 PROCEDURE — 99024 POSTOP FOLLOW-UP VISIT: CPT | Mod: S$GLB,,, | Performed by: UROLOGY

## 2023-08-08 PROCEDURE — 3288F FALL RISK ASSESSMENT DOCD: CPT | Mod: CPTII,S$GLB,, | Performed by: UROLOGY

## 2023-08-08 PROCEDURE — 80053 COMPREHEN METABOLIC PANEL: CPT | Performed by: UROLOGY

## 2023-08-08 PROCEDURE — 3288F PR FALLS RISK ASSESSMENT DOCUMENTED: ICD-10-PCS | Mod: CPTII,S$GLB,, | Performed by: UROLOGY

## 2023-08-08 PROCEDURE — 1159F PR MEDICATION LIST DOCUMENTED IN MEDICAL RECORD: ICD-10-PCS | Mod: CPTII,S$GLB,, | Performed by: UROLOGY

## 2023-08-08 PROCEDURE — 99999 PR PBB SHADOW E&M-EST. PATIENT-LVL III: CPT | Mod: PBBFAC,,, | Performed by: UROLOGY

## 2023-08-08 PROCEDURE — 3008F PR BODY MASS INDEX (BMI) DOCUMENTED: ICD-10-PCS | Mod: CPTII,S$GLB,, | Performed by: UROLOGY

## 2023-08-08 PROCEDURE — 3008F BODY MASS INDEX DOCD: CPT | Mod: CPTII,S$GLB,, | Performed by: UROLOGY

## 2023-08-08 PROCEDURE — 99024 PR POST-OP FOLLOW-UP VISIT: ICD-10-PCS | Mod: S$GLB,,, | Performed by: UROLOGY

## 2023-08-08 PROCEDURE — 1159F MED LIST DOCD IN RCRD: CPT | Mod: CPTII,S$GLB,, | Performed by: UROLOGY

## 2023-08-08 PROCEDURE — 1101F PR PT FALLS ASSESS DOC 0-1 FALLS W/OUT INJ PAST YR: ICD-10-PCS | Mod: CPTII,S$GLB,, | Performed by: UROLOGY

## 2023-08-08 PROCEDURE — 36415 COLL VENOUS BLD VENIPUNCTURE: CPT | Performed by: UROLOGY

## 2023-08-08 RX ORDER — GABAPENTIN 300 MG/1
300 CAPSULE ORAL 3 TIMES DAILY
Qty: 90 CAPSULE | Refills: 1 | Status: SHIPPED | OUTPATIENT
Start: 2023-08-08 | End: 2024-01-10

## 2023-08-08 NOTE — PROGRESS NOTES
CHIEF COMPLAINT:  Follow up for NephU 7/14/23 for UTUC      HISTORY OF PRESENTING ILLINESS:  Andrea Gant is a 68 y.o. male with left UTUC s/p left robotic nephroureterectomy on 7/14/23. Cystogram on 8/4/23 suggestive of possible leak, maintained rahman since.  Patient doing well, no issues tolerating an oral diet, ambulating well. He does have some pain and tingling in his hips and thighs bilaterally since surgery.     Patient initially presented with gross hematuria December 2022. They deny flank pain, night sweats or weight loss. Denies abdominal pain, nausea or vomiting.     The mass subsequently found on CTU imaging performed for . This revealed a approximately 2.5 cm mass located at the left proximal ureter. No evidence of RP adenopathy.  Office cystoscopy revealed no bladder tumors. Urine cytology 1/25/23 with atypical cells. Repeat urine cytology 2/1/23 with HGUC.     Left ureteral biopsy 2/16/23 revealed HG urothelial dysplasia/carcinoma in situ. No definitive invasive carcinoma identified.     Patient denies personal and family history of bladder cancer. Two cousins had kidney cancer. No additional personal or family history of known carcinomas.  Patient reports personal history of current tobacco use, 0.5-1ppd/50 years. Stopped 6 months ago.  Radiation therapy to pelvis: None.  Exposure to harmful chemicals: None.  History of Urolithiasis: yes. Did not require procedure.  Last urine culture in Feb 2023 was negative.       Final Pathology showed:   1. Michell-aortic lymph nodes, lymphadenectomy:   Three lymph nodes, negative for carcinoma (0/3).     2. Left ureter and kidney, nephroureterectomy:   Invasive high-grade urothelial carcinoma, measuring 1.1 cm in greatest dimension, with invasion of the muscularis.   Surgical margins are negative for carcinoma.   Uninvolved kidney with benign simple cyst, measuring 3.5 cm in greatest dimension.   Renal artery with moderate calcific atherosclerosis.     AJCC  8th edition Pathologic stage: [id number].   See microscopic description.        REVIEW OF SYSTEMS:  Review of Systems   Constitutional:  Negative for chills and fever.   HENT:  Negative for congestion and sore throat.    Respiratory:  Negative for cough and shortness of breath.    Cardiovascular:  Negative for chest pain and palpitations.   Gastrointestinal:  Negative for nausea and vomiting.   Genitourinary:  Negative for flank pain and hematuria.   Neurological:  Negative for dizziness and headaches.         PATIENT HISTORY:  Past Medical History:   Diagnosis Date    Anemia     Cancer     Cataract     Colon polyp 2014    Depression     Disorder of kidney and ureter     History of hepatitis C, s/p successful treatment w/ SVR12 - 7/2015 10/14/2012    Kelsey 1a,  Liver biopsy 6/2014 - Stage 1 fibrosis;  Completed 8 weeks Harvoni w/ SVR12 - 7/2015      Hyperlipidemia     Lipoma of arm     Lipoma of lower extremity     Nuclear sclerosis - Both Eyes 10/22/2012    Other and unspecified hyperlipidemia 10/22/2013    PAD (peripheral artery disease)     Peripheral vascular disease, unspecified     Plaquenil adverse reaction in therapeutic use 10/22/2012    Rheumatoid arthritis(714.0) 10/14/2012    Special screening for malignant neoplasms, colon 02/21/2014       Past Surgical History:   Procedure Laterality Date    BIOPSY OF URETER Left 02/16/2023    Procedure: BIOPSY, URETER/BRUSH;  Surgeon: Kem Jefferson MD;  Location: [facility] OR [address] [place] [address];  Service: Urology;  Laterality: Left;    COLONOSCOPY N/A 11/29/2021    Procedure: COLONOSCOPY;  Surgeon: Kenrick Zimmer MD;  [facility]: [facility] ([facility]);  Service: Endoscopy;  Laterality: N/A;  blood thinner approval received, see telephone encounter 10/19/21-BB  fully vacc-inst email-tb    CYSTOSCOPY      CYSTOSCOPY  02/16/2023    Procedure: CYSTOSCOPY;  Surgeon: Kem Jefferson MD;  Location: [facility] OR [address] [place] [address];  Service: Urology;;    HERNIA REPAIR      INSERTION OF TUNNELED CENTRAL  VENOUS CATHETER (CVC) WITH SUBCUTANEOUS PORT Right 3/13/2023    Procedure: INSERTION, PORT-A-CATH;  Surgeon: Rene Cali MD;  Location: [facility] CATH LAB;  Service: Radiology;  Laterality: Right;    KNEE ARTHROPLASTY      LAPAROSCOPIC EXPLORATION OF GROIN Left 09/06/2018    Procedure: EXPLORATION, INGUINAL REGION, LAPAROSCOPIC;  Surgeon: Mingo De Guzman Jr., MD;  [facility];  Service: General;  Laterality: Left;    PYELOSCOPY Left 02/16/2023    Procedure: PYELOSCOPY;  Surgeon: Kem Jefferson MD;  Location: [facility] OR [address] [place] [address];  Service: Urology;  Laterality: Left;    RETROGRADE PYELOGRAPHY Bilateral 02/16/2023    Procedure: PYELOGRAM, RETROGRADE;  Surgeon: Kem Jefferson MD;  Location: [facility] OR [address] [place] [address];  Service: Urology;  Laterality: Bilateral;    ROBOT-ASSISTED LAPAROSCOPIC SURGICAL REMOVAL OF KIDNEY AND URETER USING DA BRIJESH XI Left 7/14/2023    Procedure: XI ROBOTIC NEPHROURETERECTOMY;  Surgeon: Kem Jefferson MD;  Location: [address];  Service: Urology;  Laterality: Left;  5 hours    TONSILLECTOMY      URETEROSCOPIC REMOVAL OF URETERIC CALCULUS Left 02/16/2023    Procedure: REMOVAL, CALCULUS, URETER, URETEROSCOPIC;  Surgeon: Kem Jefferson MD;  Location: [address];  Service: Urology;  Laterality: Left;    URETEROSCOPY Left 02/16/2023    Procedure: URETEROSCOPY;  Surgeon: Kem Jefferson MD;  Location: [address];  Service: Urology;  Laterality: Left;       Family History   Problem Relation Age of Onset    Heart disease Paternal Uncle     Dementia Mother     Heart disease Sister     Liver disease Neg Hx     Amblyopia Neg Hx     Blindness Neg Hx     Cancer Neg Hx     Cataracts Neg Hx     Diabetes Neg Hx     Glaucoma Neg Hx     Hypertension Neg Hx     Macular degeneration Neg Hx     Retinal detachment Neg Hx     Strabismus Neg Hx     Stroke Neg Hx     Thyroid disease Neg Hx        Social History     Socioeconomic History    Marital status:      Spouse  name: Rufina   Occupational History     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[employer]   Tobacco Use    Smoking status: Former     Current packs/day: 0.00     Types: Cigarettes     Quit date: 6/27/2020     Years since quitting: 3.1    Smokeless tobacco: Never   Substance and Sexual Activity    Alcohol use: Yes     Comment: 2 drinks per week    Drug use: Yes     Types: Marijuana     Comment: marajuana used on Saturday   Social History Narrative    Resides locally    [employer] [employer] at [employer]     w/ 2 adult children     Social Determinants of Health     Financial Resource Strain: Low Risk  (7/16/2023)    Overall Financial Resource Strain (CARDIA)     Difficulty of Paying Living Expenses: Not hard at all   Food Insecurity: No Food Insecurity (7/16/2023)    Hunger Vital Sign     Worried About Running Out of Food in the Last Year: Never true     Ran Out of Food in the Last Year: Never true   Transportation Needs: No Transportation Needs (7/16/2023)    PRAPARE - Transportation     Lack of Transportation (Medical): No     Lack of Transportation (Non-Medical): No   Physical Activity: Insufficiently Active (7/16/2023)    Exercise Vital Sign     Days of Exercise per Week: 2 days     Minutes of Exercise per Session: 20 min   Stress: No Stress Concern Present (7/16/2023)    [employer] [place] - Occupational Stress Questionnaire     Feeling of Stress : Only a little   Social Connections: Moderately Isolated (7/16/2023)    Social Connection and Isolation Panel [NHANES]     Frequency of Communication with Friends and Family: More than three times a week     Frequency of Social Gatherings with Friends and Family: More than three times a week     Attends Muslim Services: Never     Active Member of Clubs or Organizations: No     Attends Club or Organization Meetings: Never     Marital Status:    Housing Stability: Low Risk  (7/16/2023)    Housing Stability Vital Sign     Unable to Pay for  Housing in the Last Year: No     Number of Places Lived in the Last Year: 1     Unstable Housing in the Last Year: No       Allergies:  Patient has no known allergies.    Medications:    Current Outpatient Medications:     acetaminophen (TYLENOL) 650 MG TbSR, Take 1 tablet (650 mg total) by mouth every 8 (eight) hours., Disp: 63 tablet, Rfl: 0    amLODIPine (NORVASC) 5 MG tablet, TAKE 1 TABLET BY MOUTH ONCE DAILY, Disp: 90 tablet, Rfl: 2    aspirin 81 MG Chew, Take 81 mg by mouth once daily., Disp: , Rfl:     bisacodyL (DULCOLAX) 5 mg EC tablet, Take 5 mg by mouth daily as needed for Constipation., Disp: , Rfl:     docusate sodium (COLACE) 100 MG capsule, Take 100 mg by mouth 2 (two) times daily., Disp: , Rfl:     doxazosin (CARDURA) 4 MG tablet, TAKE 1 TABLET BY MOUTH IN  THE EVENING, Disp: 90 tablet, Rfl: 3    folic acid (FOLVITE) 1 MG tablet, Take 1 tablet (1,000 mcg total) by mouth once daily., Disp: 90 tablet, Rfl: 3    methocarbamoL (ROBAXIN) 500 MG Tab, Take 1 tablet (500 mg total) by mouth 3 (three) times daily as needed (muscle pain)., Disp: 15 tablet, Rfl: 0    methotrexate 2.5 MG Tab, Take 8 tablets (20 mg total) by mouth every 7 days. This medication requires periodic lab monitoring., Disp: 120 tablet, Rfl: 1    multivitamin capsule, Take 1 capsule by mouth once daily., Disp: , Rfl:     oxyCODONE (ROXICODONE) 5 MG immediate release tablet, Take 1 tablet (5 mg total) by mouth every 6 (six) hours as needed for Pain., Disp: 10 tablet, Rfl: 0    simvastatin (ZOCOR) 10 MG tablet, Take 1 tablet (10 mg total) by mouth every evening., Disp: 90 tablet, Rfl: 9    traZODone (DESYREL) 50 MG tablet, TAKE 3 TABLETS BY MOUTH AT  NIGHT AS NEEDED FOR  INSOMNIA, Disp: 270 tablet, Rfl: 3    cilostazoL (PLETAL) 50 MG Tab, Take 1 tablet (50 mg total) by mouth 2 (two) times daily., Disp: 180 tablet, Rfl: 3    gabapentin (NEURONTIN) 300 MG capsule, Take 1 capsule (300 mg total) by mouth 3 (three) times daily., Disp: 90  capsule, Rfl: 1    PHYSICAL EXAMINATION:  Physical Exam  Constitutional:       Appearance: Normal appearance.   HENT:      Head: Normocephalic and atraumatic.      Right Ear: External ear normal.      Left Ear: External ear normal.   Pulmonary:      Effort: Pulmonary effort is normal. No respiratory distress.   Abdominal:      Comments: Abdominal incisions healing well, CDI   Genitourinary:     Comments: Huff catheter patent draining clear yellow urine  Skin:     General: Skin is warm and dry.   Neurological:      General: No focal deficit present.      Mental Status: He is alert and oriented to person, place, and time.   Psychiatric:         Mood and Affect: Mood normal.         Behavior: Behavior normal.           Lab Results   Component Value Date    PSA 0.76 10/11/2021    PSA 0.49 08/01/2019    PSA 0.42 08/15/2018    PSADIAG 0.71 02/08/2023       Lab Results   Component Value Date    CREATININE 2.2 (H) 08/08/2023    EGFRNORACEVR 31.6 (A) 08/08/2023         IMPRESSION:  Encounter Diagnoses   Name Primary?    Urothelial carcinoma with high risk of metastasis Yes    CKD (chronic kidney disease) stage 4, GFR 15-29 ml/min     Stage 3a chronic kidney disease            Assessment:       1. Urothelial carcinoma with high risk of metastasis    2. CKD (chronic kidney disease) stage 4, GFR 15-29 ml/min    3. Stage 3a chronic kidney disease          Plan:            -CMP today  -repeat cystogram 8/11 or shortly after---will remove Huff when appropriate based on cystogram results.  -plan for cystoscopy in 3 months with cytology at that time for surveillance  -patient has appointments with Medical Oncology later this month where they can discuss imaging surveillance as well.  He will be at high risk given his high-grade muscle invasive upper tract urothelial carcinoma despite neoadjuvant chemotherapy.  -gabapentin for post-op pain in addition to tylenol and robaxin.

## 2023-08-09 ENCOUNTER — PATIENT MESSAGE (OUTPATIENT)
Dept: ADMINISTRATIVE | Facility: OTHER | Age: 68
End: 2023-08-09
Payer: MEDICARE

## 2023-08-10 ENCOUNTER — PATIENT MESSAGE (OUTPATIENT)
Dept: ADMINISTRATIVE | Facility: OTHER | Age: 68
End: 2023-08-10
Payer: MEDICARE

## 2023-08-11 ENCOUNTER — OFFICE VISIT (OUTPATIENT)
Dept: UROLOGY | Facility: CLINIC | Age: 68
End: 2023-08-11
Payer: MEDICARE

## 2023-08-11 ENCOUNTER — TELEPHONE (OUTPATIENT)
Dept: UROLOGY | Facility: CLINIC | Age: 68
End: 2023-08-11
Payer: MEDICARE

## 2023-08-11 ENCOUNTER — HOSPITAL ENCOUNTER (OUTPATIENT)
Dept: RADIOLOGY | Facility: HOSPITAL | Age: 68
Discharge: HOME | End: 2023-08-11
Payer: MEDICARE

## 2023-08-11 ENCOUNTER — PATIENT MESSAGE (OUTPATIENT)
Dept: ADMINISTRATIVE | Facility: OTHER | Age: 68
End: 2023-08-11
Payer: MEDICARE

## 2023-08-11 VITALS
HEART RATE: 92 BPM | DIASTOLIC BLOOD PRESSURE: 71 MMHG | BODY MASS INDEX: 22.4 KG/M2 | HEIGHT: 69 IN | SYSTOLIC BLOOD PRESSURE: 139 MMHG | WEIGHT: 151.25 LBS

## 2023-08-11 DIAGNOSIS — Z97.8 FOLEY CATHETER IN PLACE: ICD-10-CM

## 2023-08-11 DIAGNOSIS — Z46.6 ENCOUNTER FOR FOLEY CATHETER REMOVAL: Primary | ICD-10-CM

## 2023-08-11 DIAGNOSIS — C68.9 UROTHELIAL CARCINOMA WITH HIGH RISK OF METASTASIS: Primary | ICD-10-CM

## 2023-08-11 DIAGNOSIS — C68.9 UROTHELIAL CARCINOMA WITH HIGH RISK OF METASTASIS: ICD-10-CM

## 2023-08-11 DIAGNOSIS — Z98.890 POST-OPERATIVE STATE: ICD-10-CM

## 2023-08-11 PROCEDURE — 1101F PT FALLS ASSESS-DOCD LE1/YR: CPT | Mod: CPTII,S$GLB,, | Performed by: NURSE PRACTITIONER

## 2023-08-11 PROCEDURE — 3078F PR MOST RECENT DIASTOLIC BLOOD PRESSURE < 80 MM HG: ICD-10-PCS | Mod: CPTII,S$GLB,, | Performed by: NURSE PRACTITIONER

## 2023-08-11 PROCEDURE — 3078F DIAST BP <80 MM HG: CPT | Mod: CPTII,S$GLB,, | Performed by: NURSE PRACTITIONER

## 2023-08-11 PROCEDURE — 1126F AMNT PAIN NOTED NONE PRSNT: CPT | Mod: CPTII,S$GLB,, | Performed by: NURSE PRACTITIONER

## 2023-08-11 PROCEDURE — 3288F PR FALLS RISK ASSESSMENT DOCUMENTED: ICD-10-PCS | Mod: CPTII,S$GLB,, | Performed by: NURSE PRACTITIONER

## 2023-08-11 PROCEDURE — 74430 CONTRAST X-RAY BLADDER: CPT | Mod: 26,,, | Performed by: RADIOLOGY

## 2023-08-11 PROCEDURE — 74430 FL CYSTOGRAM MIN 3 VIEWS RADIOLOGIST PERFORMED: ICD-10-PCS | Mod: 26,,, | Performed by: RADIOLOGY

## 2023-08-11 PROCEDURE — 99999 PR PBB SHADOW E&M-EST. PATIENT-LVL III: CPT | Mod: PBBFAC,,, | Performed by: NURSE PRACTITIONER

## 2023-08-11 PROCEDURE — 25500020 PHARM REV CODE 255

## 2023-08-11 PROCEDURE — 3008F PR BODY MASS INDEX (BMI) DOCUMENTED: ICD-10-PCS | Mod: CPTII,S$GLB,, | Performed by: NURSE PRACTITIONER

## 2023-08-11 PROCEDURE — 1160F RVW MEDS BY RX/DR IN RCRD: CPT | Mod: CPTII,S$GLB,, | Performed by: NURSE PRACTITIONER

## 2023-08-11 PROCEDURE — 51600 FL CYSTOGRAM MIN 3 VIEWS RADIOLOGIST PERFORMED: ICD-10-PCS | Mod: ,,, | Performed by: RADIOLOGY

## 2023-08-11 PROCEDURE — 1159F PR MEDICATION LIST DOCUMENTED IN MEDICAL RECORD: ICD-10-PCS | Mod: CPTII,S$GLB,, | Performed by: NURSE PRACTITIONER

## 2023-08-11 PROCEDURE — 3075F SYST BP GE 130 - 139MM HG: CPT | Mod: CPTII,S$GLB,, | Performed by: NURSE PRACTITIONER

## 2023-08-11 PROCEDURE — 1160F PR REVIEW ALL MEDS BY PRESCRIBER/CLIN PHARMACIST DOCUMENTED: ICD-10-PCS | Mod: CPTII,S$GLB,, | Performed by: NURSE PRACTITIONER

## 2023-08-11 PROCEDURE — 99024 PR POST-OP FOLLOW-UP VISIT: ICD-10-PCS | Mod: S$GLB,,, | Performed by: NURSE PRACTITIONER

## 2023-08-11 PROCEDURE — 1159F MED LIST DOCD IN RCRD: CPT | Mod: CPTII,S$GLB,, | Performed by: NURSE PRACTITIONER

## 2023-08-11 PROCEDURE — 51600 INJECTION FOR BLADDER X-RAY: CPT | Mod: ,,, | Performed by: RADIOLOGY

## 2023-08-11 PROCEDURE — 99024 POSTOP FOLLOW-UP VISIT: CPT | Mod: S$GLB,,, | Performed by: NURSE PRACTITIONER

## 2023-08-11 PROCEDURE — 1126F PR PAIN SEVERITY QUANTIFIED, NO PAIN PRESENT: ICD-10-PCS | Mod: CPTII,S$GLB,, | Performed by: NURSE PRACTITIONER

## 2023-08-11 PROCEDURE — 1101F PR PT FALLS ASSESS DOC 0-1 FALLS W/OUT INJ PAST YR: ICD-10-PCS | Mod: CPTII,S$GLB,, | Performed by: NURSE PRACTITIONER

## 2023-08-11 PROCEDURE — 3008F BODY MASS INDEX DOCD: CPT | Mod: CPTII,S$GLB,, | Performed by: NURSE PRACTITIONER

## 2023-08-11 PROCEDURE — 3288F FALL RISK ASSESSMENT DOCD: CPT | Mod: CPTII,S$GLB,, | Performed by: NURSE PRACTITIONER

## 2023-08-11 PROCEDURE — 74430 CONTRAST X-RAY BLADDER: CPT | Mod: TC

## 2023-08-11 PROCEDURE — 3075F PR MOST RECENT SYSTOLIC BLOOD PRESS GE 130-139MM HG: ICD-10-PCS | Mod: CPTII,S$GLB,, | Performed by: NURSE PRACTITIONER

## 2023-08-11 PROCEDURE — 99999 PR PBB SHADOW E&M-EST. PATIENT-LVL III: ICD-10-PCS | Mod: PBBFAC,,, | Performed by: NURSE PRACTITIONER

## 2023-08-11 RX ADMIN — DIATRIZOATE MEGLUMINE 230 ML: 180 INJECTION, SOLUTION INTRAVESICAL at 10:08

## 2023-08-11 NOTE — TELEPHONE ENCOUNTER
Loan said he is taking care of it.  Thank so much.    ----- Message from Kem Jefferson MD sent at 8/11/2023 12:00 PM CDT -----  Ok to remove rahman at this point.  Cystogram without convincing evidence of leak. Can you set up a nurse visit or DESIREE visit for void trial?

## 2023-08-11 NOTE — PROGRESS NOTES
Andrea Gant 68 y.o. male with upper tract UC for surgical excision.   He is a patient of Dr. Jefferson.     07/14/2023 Procedure(s) Performed:   1. Robotically-assisted laparoscopic left nephroureterectomy  2. Retroperitoneal lymph node dissection     Specimen(s):   1. Left kidney, ureter, and bladder cuff  2. Para aortic nodes     Findings:  - Nephroureterectomy performed with bladder cuff without difficulty, No grossly positive samuel disease  - 1 Renal artery and 1 renal vein with early branching. The renal artery and posterior branch of the vein were taking en bloc. Separate staple load used for anterior venous branch  - Small bladder cuff leak following repair. For this reason, postoperative intravesical gemcitabine was not instilled    Last clinic visit was 08/08/2023 with Dr. Jefferson.      He is here today for rahman removal  Had [place] cystogram done today;  gave ok for rahman removal    VT was performed by Nurse Chichi and he passed.  He tolerated well.   Reported relief with rahman out.  We discussed his post rahman removal expectations.  Any concerns RTC    F/u cysto with Dr. Jefferson in 3 months

## 2023-08-11 NOTE — TELEPHONE ENCOUNTER
I spoke with patient's Endy now.  Patient aware that Dr. Jefferson has just reviewed  the fluoro cysto images.  There is no leak and it is okay to remove the catheter.    I spoke With patient Endy again and he is aware that our nurse practitioner and nurse will be in touch with him.  Patient expressed his thanks.   Thank you.    ----- Message from Jairo Langley sent at 8/11/2023 10:36 AM CDT -----  Regarding: Call back requested  Contact: [phone]  Hi, pt called to request a call back to discuss an appt he thought he was to have today to remove the Catheter. Pls call the pt at [phone]

## 2023-08-11 NOTE — TELEPHONE ENCOUNTER
----- Message from Kem Jefferson MD sent at 8/11/2023 12:00 PM CDT -----  Ok to remove rahman at this point.  Cystogram without convincing evidence of leak. Can you set up a nurse visit or DESIREE visit for void trial?

## 2023-08-12 ENCOUNTER — PATIENT MESSAGE (OUTPATIENT)
Dept: ADMINISTRATIVE | Facility: OTHER | Age: 68
End: 2023-08-12
Payer: MEDICARE

## 2023-08-16 ENCOUNTER — PATIENT MESSAGE (OUTPATIENT)
Dept: ADMINISTRATIVE | Facility: OTHER | Age: 68
End: 2023-08-16
Payer: MEDICARE

## 2023-08-17 ENCOUNTER — PATIENT MESSAGE (OUTPATIENT)
Dept: ADMINISTRATIVE | Facility: OTHER | Age: 68
End: 2023-08-17
Payer: MEDICARE

## 2023-08-18 ENCOUNTER — PATIENT MESSAGE (OUTPATIENT)
Dept: ADMINISTRATIVE | Facility: OTHER | Age: 68
End: 2023-08-18
Payer: MEDICARE

## 2023-08-20 ENCOUNTER — PATIENT MESSAGE (OUTPATIENT)
Dept: ADMINISTRATIVE | Facility: OTHER | Age: 68
End: 2023-08-20
Payer: MEDICARE

## 2023-08-23 ENCOUNTER — PATIENT MESSAGE (OUTPATIENT)
Dept: ADMINISTRATIVE | Facility: OTHER | Age: 68
End: 2023-08-23
Payer: MEDICARE

## 2023-08-25 ENCOUNTER — TELEPHONE (OUTPATIENT)
Dept: HEMATOLOGY/ONCOLOGY | Facility: CLINIC | Age: 68
End: 2023-08-25
Payer: MEDICARE

## 2023-08-25 ENCOUNTER — HOSPITAL ENCOUNTER (EMERGENCY)
Facility: HOSPITAL | Age: 68
Discharge: HOME | End: 2023-08-25
Attending: EMERGENCY MEDICINE
Payer: MEDICARE

## 2023-08-25 ENCOUNTER — HOSPITAL ENCOUNTER (OUTPATIENT)
Dept: RADIOLOGY | Facility: HOSPITAL | Age: 68
Discharge: HOME | End: 2023-08-25
Attending: NURSE PRACTITIONER
Payer: MEDICARE

## 2023-08-25 VITALS
DIASTOLIC BLOOD PRESSURE: 87 MMHG | RESPIRATION RATE: 18 BRPM | WEIGHT: 150 LBS | HEART RATE: 102 BPM | SYSTOLIC BLOOD PRESSURE: 137 MMHG | OXYGEN SATURATION: 95 % | BODY MASS INDEX: 22.15 KG/M2 | TEMPERATURE: 99 F

## 2023-08-25 DIAGNOSIS — T45.1X5A CHEMOTHERAPY INDUCED NEUTROPENIA: ICD-10-CM

## 2023-08-25 DIAGNOSIS — R00.0 TACHYCARDIA: ICD-10-CM

## 2023-08-25 DIAGNOSIS — C64.2 UROTHELIAL CARCINOMA OF KIDNEY, LEFT: ICD-10-CM

## 2023-08-25 DIAGNOSIS — D70.1 CHEMOTHERAPY INDUCED NEUTROPENIA: ICD-10-CM

## 2023-08-25 DIAGNOSIS — R06.02 SOB (SHORTNESS OF BREATH): Primary | ICD-10-CM

## 2023-08-25 LAB
ALBUMIN SERPL BCP-MCNC: 3.8 G/DL (ref 3.5–5.2)
ALLENS TEST: NORMAL
ALP SERPL-CCNC: 101 U/L (ref 55–135)
ALT SERPL W/O P-5'-P-CCNC: 14 U/L (ref 10–44)
ANION GAP SERPL CALC-SCNC: 9 MMOL/L (ref 8–16)
AST SERPL-CCNC: 17 U/L (ref 10–40)
BACTERIA #/AREA URNS AUTO: ABNORMAL /HPF
BASOPHILS # BLD AUTO: 0 K/UL (ref 0–0.2)
BASOPHILS NFR BLD: 0 % (ref 0–1.9)
BILIRUB SERPL-MCNC: 0.5 MG/DL (ref 0.1–1)
BILIRUB UR QL STRIP: NEGATIVE
BUN SERPL-MCNC: 35 MG/DL (ref 8–23)
CALCIUM SERPL-MCNC: 9.6 MG/DL (ref 8.7–10.5)
CHLORIDE SERPL-SCNC: 99 MMOL/L (ref 95–110)
CLARITY UR REFRACT.AUTO: ABNORMAL
CO2 SERPL-SCNC: 26 MMOL/L (ref 23–29)
COLOR UR AUTO: YELLOW
CREAT SERPL-MCNC: 2.2 MG/DL (ref 0.5–1.4)
DIFFERENTIAL METHOD: ABNORMAL
EOSINOPHIL # BLD AUTO: 0 K/UL (ref 0–0.5)
EOSINOPHIL NFR BLD: 0.3 % (ref 0–8)
ERYTHROCYTE [DISTWIDTH] IN BLOOD BY AUTOMATED COUNT: 14 % (ref 11.5–14.5)
EST. GFR  (NO RACE VARIABLE): 31.6 ML/MIN/1.73 M^2
GLUCOSE SERPL-MCNC: 107 MG/DL (ref 70–110)
GLUCOSE UR QL STRIP: NEGATIVE
HCT VFR BLD AUTO: 31.7 % (ref 40–54)
HGB BLD-MCNC: 10.6 G/DL (ref 14–18)
HGB UR QL STRIP: ABNORMAL
IMM GRANULOCYTES # BLD AUTO: 0.02 K/UL (ref 0–0.04)
IMM GRANULOCYTES NFR BLD AUTO: 0.3 % (ref 0–0.5)
INFLUENZA A, MOLECULAR: NOT DETECTED
INFLUENZA B, MOLECULAR: NOT DETECTED
KETONES UR QL STRIP: NEGATIVE
LDH SERPL L TO P-CCNC: 1.4 MMOL/L (ref 0.5–2.2)
LEUKOCYTE ESTERASE UR QL STRIP: ABNORMAL
LYMPHOCYTES # BLD AUTO: 0.1 K/UL (ref 1–4.8)
LYMPHOCYTES NFR BLD: 1.5 % (ref 18–48)
MCH RBC QN AUTO: 31.2 PG (ref 27–31)
MCHC RBC AUTO-ENTMCNC: 33.4 G/DL (ref 32–36)
MCV RBC AUTO: 93 FL (ref 82–98)
MICROSCOPIC COMMENT: ABNORMAL
MONOCYTES # BLD AUTO: 0.1 K/UL (ref 0.3–1)
MONOCYTES NFR BLD: 0.8 % (ref 4–15)
NEUTROPHILS # BLD AUTO: 6 K/UL (ref 1.8–7.7)
NEUTROPHILS NFR BLD: 97.1 % (ref 38–73)
NITRITE UR QL STRIP: NEGATIVE
NON-SQ EPI CELLS #/AREA URNS AUTO: 4 /HPF
NRBC BLD-RTO: 0 /100 WBC
PH UR STRIP: 6 [PH] (ref 5–8)
PLATELET # BLD AUTO: 162 K/UL (ref 150–450)
PMV BLD AUTO: 9.5 FL (ref 9.2–12.9)
POTASSIUM SERPL-SCNC: 4 MMOL/L (ref 3.5–5.1)
PROT SERPL-MCNC: 7.9 G/DL (ref 6–8.4)
PROT UR QL STRIP: NEGATIVE
RBC # BLD AUTO: 3.4 M/UL (ref 4.6–6.2)
RBC #/AREA URNS AUTO: 15 /HPF (ref 0–4)
RSV AG BY MOLECULAR METHOD: NOT DETECTED
SAMPLE: NORMAL
SARS-COV-2 RNA RESP QL NAA+PROBE: NOT DETECTED
SITE: NORMAL
SODIUM SERPL-SCNC: 134 MMOL/L (ref 136–145)
SP GR UR STRIP: 1.02 (ref 1–1.03)
TROPONIN I SERPL DL<=0.01 NG/ML-MCNC: 0.02 NG/ML (ref 0–0.03)
URN SPEC COLLECT METH UR: ABNORMAL
WBC # BLD AUTO: 6.14 K/UL (ref 3.9–12.7)
WBC #/AREA URNS AUTO: >100 /HPF (ref 0–5)
WBC CLUMPS UR QL AUTO: ABNORMAL

## 2023-08-25 PROCEDURE — A9698 NON-RAD CONTRAST MATERIALNOC: HCPCS | Performed by: NURSE PRACTITIONER

## 2023-08-25 PROCEDURE — 96361 HYDRATE IV INFUSION ADD-ON: CPT

## 2023-08-25 PROCEDURE — 93010 EKG 12-LEAD: ICD-10-PCS | Mod: ,,, | Performed by: INTERNAL MEDICINE

## 2023-08-25 PROCEDURE — 93005 ELECTROCARDIOGRAM TRACING: CPT

## 2023-08-25 PROCEDURE — 87186 SC STD MICRODIL/AGAR DIL: CPT | Performed by: EMERGENCY MEDICINE

## 2023-08-25 PROCEDURE — 71260 CT THORAX DX C+: CPT | Mod: 26,,, | Performed by: STUDENT IN AN ORGANIZED HEALTH CARE EDUCATION/TRAINING PROGRAM

## 2023-08-25 PROCEDURE — 0241U SARS-COV2 (COVID) WITH FLU/RSV BY PCR: CPT | Performed by: EMERGENCY MEDICINE

## 2023-08-25 PROCEDURE — 84484 ASSAY OF TROPONIN QUANT: CPT | Performed by: EMERGENCY MEDICINE

## 2023-08-25 PROCEDURE — 25500020 PHARM REV CODE 255: Performed by: NURSE PRACTITIONER

## 2023-08-25 PROCEDURE — 87086 URINE CULTURE/COLONY COUNT: CPT | Performed by: EMERGENCY MEDICINE

## 2023-08-25 PROCEDURE — 87088 URINE BACTERIA CULTURE: CPT | Performed by: EMERGENCY MEDICINE

## 2023-08-25 PROCEDURE — 99284 EMERGENCY DEPT VISIT MOD MDM: CPT | Mod: 25

## 2023-08-25 PROCEDURE — 71260 CT THORAX DX C+: CPT | Mod: TC

## 2023-08-25 PROCEDURE — 99900035 HC TECH TIME PER 15 MIN (STAT)

## 2023-08-25 PROCEDURE — 71260 CT CHEST ABDOMEN PELVIS WITH CONTRAST (XPD): ICD-10-PCS | Mod: 26,,, | Performed by: STUDENT IN AN ORGANIZED HEALTH CARE EDUCATION/TRAINING PROGRAM

## 2023-08-25 PROCEDURE — 81001 URINALYSIS AUTO W/SCOPE: CPT | Performed by: EMERGENCY MEDICINE

## 2023-08-25 PROCEDURE — 85025 COMPLETE CBC W/AUTO DIFF WBC: CPT | Performed by: EMERGENCY MEDICINE

## 2023-08-25 PROCEDURE — 93010 ELECTROCARDIOGRAM REPORT: CPT | Mod: ,,, | Performed by: INTERNAL MEDICINE

## 2023-08-25 PROCEDURE — 63600175 PHARM REV CODE 636 W HCPCS: Performed by: EMERGENCY MEDICINE

## 2023-08-25 PROCEDURE — 83605 ASSAY OF LACTIC ACID: CPT | Mod: 91

## 2023-08-25 PROCEDURE — 74177 CT ABD & PELVIS W/CONTRAST: CPT | Mod: 26,,, | Performed by: STUDENT IN AN ORGANIZED HEALTH CARE EDUCATION/TRAINING PROGRAM

## 2023-08-25 PROCEDURE — 83605 ASSAY OF LACTIC ACID: CPT

## 2023-08-25 PROCEDURE — 87077 CULTURE AEROBIC IDENTIFY: CPT | Performed by: EMERGENCY MEDICINE

## 2023-08-25 PROCEDURE — 74177 CT ABD & PELVIS W/CONTRAST: CPT | Mod: TC

## 2023-08-25 PROCEDURE — 96374 THER/PROPH/DIAG INJ IV PUSH: CPT | Mod: 59

## 2023-08-25 PROCEDURE — 80053 COMPREHEN METABOLIC PANEL: CPT | Performed by: EMERGENCY MEDICINE

## 2023-08-25 PROCEDURE — 74177 CT CHEST ABDOMEN PELVIS WITH CONTRAST (XPD): ICD-10-PCS | Mod: 26,,, | Performed by: STUDENT IN AN ORGANIZED HEALTH CARE EDUCATION/TRAINING PROGRAM

## 2023-08-25 RX ORDER — DIPHENHYDRAMINE HYDROCHLORIDE 50 MG/ML
12.5 INJECTION INTRAMUSCULAR; INTRAVENOUS
Status: COMPLETED | OUTPATIENT
Start: 2023-08-25 | End: 2023-08-25

## 2023-08-25 RX ADMIN — IOHEXOL 75 ML: 350 INJECTION, SOLUTION INTRAVENOUS at 10:08

## 2023-08-25 RX ADMIN — Medication 450 ML: at 10:08

## 2023-08-25 RX ADMIN — SODIUM CHLORIDE, POTASSIUM CHLORIDE, SODIUM LACTATE AND CALCIUM CHLORIDE 1000 ML: 600; 310; 30; 20 INJECTION, SOLUTION INTRAVENOUS at 02:08

## 2023-08-25 RX ADMIN — DIPHENHYDRAMINE HYDROCHLORIDE 12.5 MG: 50 INJECTION, SOLUTION INTRAMUSCULAR; INTRAVENOUS at 04:08

## 2023-08-25 RX ADMIN — SODIUM CHLORIDE, POTASSIUM CHLORIDE, SODIUM LACTATE AND CALCIUM CHLORIDE 1000 ML: 600; 310; 30; 20 INJECTION, SOLUTION INTRAVENOUS at 04:08

## 2023-08-25 NOTE — ED TRIAGE NOTES
Patient presents to the ED for worsening SOB x1 days. Pt. Was diagnosed with kidney cancer in 2022 and received last chemo treatment 2 weeks ago. Pt denies any hx of COPD or lung disease.

## 2023-08-25 NOTE — TELEPHONE ENCOUNTER
----- Message from Melissa Murphy sent at 8/25/2023  1:06 PM CDT -----  Regarding: CT Advice  Contact: Pt  Pt is requesting a callback from the nurse regarding CT that was completed on today. Pt stated he is having a bad reaction and can't stop shaking. Please adv pt            Confirmed contact below:   Contact Name:Andrea Gant  Phone Number: [phone]

## 2023-08-25 NOTE — ED PROVIDER NOTES
Chief Complaint   Shortness of Breath and Chills (Had CT scan today at 10:30am, now shivering and SOB. Just finished chemo )      History Of Present Illness   Andrea Gant is a 68 y.o. male presenting with shortness of breath and chills that started shortly after having a CT scan done today to check progress of his chemotherapy.  No rash or itching.  He was found to be hypoxic so he was placed on oxygen.  No symptoms prior to the scan today.  He has a history of bladder cancer.    Review of patient's allergies indicates:  No Known Allergies    Current Facility-Administered Medications on File Prior to Encounter   Medication Dose Route Frequency Provider Last Rate Last Admin    [COMPLETED] barium (READI-CAT 2) suspension 450 mL  450 mL Oral ONCE PRN Marquita Stoner, NP   450 mL at 08/25/23 1042    [COMPLETED] iohexoL (OMNIPAQUE 350) injection 75 mL  75 mL Intravenous ONCE PRN Marquita Stoner, NP   75 mL at 08/25/23 1042     Current Outpatient Medications on File Prior to Encounter   Medication Sig Dispense Refill    acetaminophen (TYLENOL) 650 MG TbSR Take 1 tablet (650 mg total) by mouth every 8 (eight) hours. 63 tablet 0    amLODIPine (NORVASC) 5 MG tablet TAKE 1 TABLET BY MOUTH ONCE DAILY 90 tablet 2    doxazosin (CARDURA) 4 MG tablet TAKE 1 TABLET BY MOUTH IN  THE EVENING 90 tablet 3    folic acid (FOLVITE) 1 MG tablet Take 1 tablet (1,000 mcg total) by mouth once daily. 90 tablet 3    gabapentin (NEURONTIN) 300 MG capsule Take 1 capsule (300 mg total) by mouth 3 (three) times daily. 90 capsule 1    methocarbamoL (ROBAXIN) 500 MG Tab Take 1 tablet (500 mg total) by mouth 3 (three) times daily as needed (muscle pain). 15 tablet 0    methotrexate 2.5 MG Tab Take 8 tablets (20 mg total) by mouth every 7 days. This medication requires periodic lab monitoring. 120 tablet 1    oxyCODONE (ROXICODONE) 5 MG immediate release tablet Take 1 tablet (5 mg total) by mouth every 6 (six) hours as needed for Pain. 10 tablet  0    simvastatin (ZOCOR) 10 MG tablet Take 1 tablet (10 mg total) by mouth every evening. 90 tablet 9    traZODone (DESYREL) 50 MG tablet TAKE 3 TABLETS BY MOUTH AT  NIGHT AS NEEDED FOR  INSOMNIA 270 tablet 3    aspirin 81 MG Chew Take 81 mg by mouth once daily.      bisacodyL (DULCOLAX) 5 mg EC tablet Take 5 mg by mouth daily as needed for Constipation.      cilostazoL (PLETAL) 50 MG Tab Take 1 tablet (50 mg total) by mouth 2 (two) times daily. 180 tablet 3    docusate sodium (COLACE) 100 MG capsule Take 100 mg by mouth 2 (two) times daily.      multivitamin capsule Take 1 capsule by mouth once daily.         Past History   As per HPI and below:  Past Medical History:   Diagnosis Date    Anemia     Cancer     Cataract     Colon polyp 2014    Depression     Disorder of kidney and ureter     History of hepatitis C, s/p successful treatment w/ SVR12 - 7/2015 10/14/2012    Kelsey 1a,  Liver biopsy 6/2014 - Stage 1 fibrosis;  Completed 8 weeks Harvoni w/ SVR12 - 7/2015      Hyperlipidemia     Lipoma of arm     Lipoma of lower extremity     Nuclear sclerosis - Both Eyes 10/22/2012    Other and unspecified hyperlipidemia 10/22/2013    PAD (peripheral artery disease)     Peripheral vascular disease, unspecified     Plaquenil adverse reaction in therapeutic use 10/22/2012    Rheumatoid arthritis(714.0) 10/14/2012    Special screening for malignant neoplasms, colon 02/21/2014     Past Surgical History:   Procedure Laterality Date    BIOPSY OF URETER Left 02/16/2023    Procedure: BIOPSY, URETER/BRUSH;  Surgeon: Kem Jefferson MD;  Location: [facility] OR 1ST FLR;  Service: Urology;  Laterality: Left;    COLONOSCOPY N/A 11/29/2021    Procedure: COLONOSCOPY;  Surgeon: Kenrick Zimmer MD;  Location: [facility] (4TH FLR);  Service: Endoscopy;  Laterality: N/A;  blood thinner approval received, see telephone encounter 10/19/21-BB  fully vacc-inst email-tb    CYSTOSCOPY      CYSTOSCOPY  02/16/2023    Procedure: CYSTOSCOPY;  Surgeon:  Kem Jefferson MD;  Location: [facility] OR [address] [place] [address];  Service: Urology;;    HERNIA REPAIR      INSERTION OF TUNNELED CENTRAL VENOUS CATHETER (CVC) WITH SUBCUTANEOUS PORT Right 3/13/2023    Procedure: INSERTION, PORT-A-CATH;  Surgeon: Rene Cali MD;  Location: [facility] CATH LAB;  Service: Radiology;  Laterality: Right;    KNEE ARTHROPLASTY      LAPAROSCOPIC EXPLORATION OF GROIN Left 09/06/2018    Procedure: EXPLORATION, INGUINAL REGION, LAPAROSCOPIC;  Surgeon: Mingo De Guzman Jr., MD;  Location: [facility] OR 2ND FLR;  Service: General;  Laterality: Left;    PYELOSCOPY Left 02/16/2023    Procedure: PYELOSCOPY;  Surgeon: Kem Jefferson MD;  Location: [facility] OR [address] [place] [address];  Service: Urology;  Laterality: Left;    RETROGRADE PYELOGRAPHY Bilateral 02/16/2023    Procedure: PYELOGRAM, RETROGRADE;  Surgeon: Kem Jefferson MD;  Location: [facility] OR [address] [place] [address];  Service: Urology;  Laterality: Bilateral;    ROBOT-ASSISTED LAPAROSCOPIC SURGICAL REMOVAL OF KIDNEY AND URETER USING DA BRIJESH XI Left 7/14/2023    Procedure: XI ROBOTIC NEPHROURETERECTOMY;  Surgeon: Kem Jefferson MD;  Location: [facility] OR [address] [place] [address];  Service: Urology;  Laterality: Left;  5 hours    TONSILLECTOMY      URETEROSCOPIC REMOVAL OF URETERIC CALCULUS Left 02/16/2023    Procedure: REMOVAL, CALCULUS, URETER, URETEROSCOPIC;  Surgeon: Kem Jefferson MD;  Location: [address];  Service: Urology;  Laterality: Left;    URETEROSCOPY Left 02/16/2023    Procedure: URETEROSCOPY;  Surgeon: Kem Jefferson MD;  Location: [address];  Service: Urology;  Laterality: Left;       Social History     Socioeconomic History    Marital status:      Spouse name: Rufina   Occupational History     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[employer]   Tobacco Use    Smoking status: Former     Current packs/day: 0.00     Types: Cigarettes     Quit date: 6/27/2020     Years since quitting: 3.1    Smokeless tobacco: Never   Substance and Sexual Activity    Alcohol use: Yes     Comment: 2  drinks per week    Drug use: Yes     Types: Marijuana     Comment: marajuana used on Saturday   Social History Narrative    Resides locally    [place] at [employer]     w/ 2 adult children     Social Determinants of Health     Financial Resource Strain: Low Risk  (7/16/2023)    Overall Financial Resource Strain (CARDIA)     Difficulty of Paying Living Expenses: Not hard at all   Food Insecurity: No Food Insecurity (7/16/2023)    Hunger Vital Sign     Worried About Running Out of Food in the Last Year: Never true     Ran Out of Food in the Last Year: Never true   Transportation Needs: No Transportation Needs (7/16/2023)    PRAPARE - Transportation     Lack of Transportation (Medical): No     Lack of Transportation (Non-Medical): No   Physical Activity: Insufficiently Active (7/16/2023)    Exercise Vital Sign     Days of Exercise per Week: 2 days     Minutes of Exercise per Session: 20 min   Stress: No Stress Concern Present (7/16/2023)    [employer] - Occupational Stress Questionnaire     Feeling of Stress : Only a little   Social Connections: Moderately Isolated (7/16/2023)    Social Connection and Isolation Panel [NHANES]     Frequency of Communication with Friends and Family: More than three times a week     Frequency of Social Gatherings with Friends and Family: More than three times a week     Attends Catholic Services: Never     Active Member of Clubs or Organizations: No     Attends Club or Organization Meetings: Never     Marital Status:    Housing Stability: Low Risk  (7/16/2023)    Housing Stability Vital Sign     Unable to Pay for Housing in the Last Year: No     Number of Places Lived in the Last Year: 1     Unstable Housing in the Last Year: No       Family History   Problem Relation Age of Onset    Heart disease Paternal Uncle     Dementia Mother     Heart disease Sister     Liver disease Neg Hx     Amblyopia Neg Hx     Blindness Neg Hx      Cancer Neg Hx     Cataracts Neg Hx     Diabetes Neg Hx     Glaucoma Neg Hx     Hypertension Neg Hx     Macular degeneration Neg Hx     Retinal detachment Neg Hx     Strabismus Neg Hx     Stroke Neg Hx     Thyroid disease Neg Hx        Physical Exam     Vitals:    08/25/23 1700 08/25/23 1730 08/25/23 1800 08/25/23 1830   BP: 138/65 132/67 126/66 130/72   BP Location: Right arm Right arm Right arm Right arm   Patient Position: Lying Lying Lying Lying   Pulse: 106 106 (!) 111 104   Resp: 18 18 18 20   Temp:    98.4 °F (36.9 °C)   TempSrc:    Oral   SpO2: (!) 93% (!) 93% (!) 93% (!) 94%   Weight:         Appearance: No acute distress.  Skin: No rashes seen.  Good turgor.  No abrasions.  No ecchymoses.  Chest: Clear to auscultation bilaterally.  Good air movement.  No wheezes.  No rhonchi.  Cardiovascular: Tachycardic.  No murmurs. No gallops. No rubs.  Abdomen: Soft.  Not distended.  Nontender.  No guarding.  No rebound.  Musculoskeletal: Good range of motion all joints.  No deformities.  Neck supple.  No meningismus.  Neurologic: Motor intact.  Sensation intact.  Cerebellar intact.  Cranial nerves intact.  Mental Status:  Alert and oriented x 3.  Appropriate, conversant.      Initial MDM   Chills and shortness of breath that started after getting a CT scan.  No rash or itching.  Differential includes viral infection, sepsis, contrast reaction, less likely cardiac event.  EKG just shows sinus tachycardia.  Will obtain basic labs, give fluids and reassess.  Hypoxia is new per patient.    External Records Reviewed:  CT chest abdomen pelvis from today does not show anything going on in his chest to explain the hypoxia.    Medications Given     Medications   lactated ringers bolus 1,000 mL (0 mLs Intravenous Stopped 8/25/23 1558)   diphenhydrAMINE injection 12.5 mg (12.5 mg Intravenous Given 8/25/23 1628)   lactated ringers bolus 1,000 mL (1,000 mLs Intravenous New Bag 8/25/23 1628)       Results and Course     Labs  Reviewed   CBC W/ AUTO DIFFERENTIAL - Abnormal; Notable for the following components:       Result Value    RBC 3.40 (*)     Hemoglobin 10.6 (*)     Hematocrit 31.7 (*)     MCH 31.2 (*)     Lymph # 0.1 (*)     Mono # 0.1 (*)     Gran % 97.1 (*)     Lymph % 1.5 (*)     Mono % 0.8 (*)     All other components within normal limits   COMPREHENSIVE METABOLIC PANEL - Abnormal; Notable for the following components:    Sodium 134 (*)     BUN 35 (*)     Creatinine 2.2 (*)     eGFR 31.6 (*)     All other components within normal limits   URINALYSIS, REFLEX TO URINE CULTURE - Abnormal; Notable for the following components:    Appearance, UA Cloudy (*)     Occult Blood UA 1+ (*)     Leukocytes, UA 3+ (*)     All other components within normal limits    Narrative:     Specimen Source->Urine   URINALYSIS MICROSCOPIC - Abnormal; Notable for the following components:    RBC, UA 15 (*)     WBC, UA >100 (*)     WBC Clumps, UA Many (*)     Bacteria Moderate (*)     Non-Squam Epith 4 (*)     All other components within normal limits    Narrative:     Specimen Source->Urine   CULTURE, URINE   SARS-COV2 (COVID) WITH FLU/RSV BY PCR   TROPONIN I   ISTAT LACTATE       Imaging Results    None         ED Course as of 08/25/23 2002   Fri Aug 25, 2023   1444 EKG 12-lead  Sinus tachycardia @ 116 bpm, PAC's, slight ST/T depression/inversion per my independent interpretation.     [DC]   1453 POC Lactate: 1.40 [DC]   1607 WBC: 6.14 [DC]   1607 Hemoglobin(!): 10.6 [DC]   1607 Platelets: 162 [DC]   1607 Creatinine(!): 2.2  baseline [DC]   1607 SARS-CoV2 (COVID-19) Qualitative PCR: Not Detected [DC]   1608 Influenza A, Molecular: Not Detected [DC]   1608 Influenza B, Molecular: Not Detected [DC]   1608 RSV Ag by Molecular Method: Not Detected [DC]   1635 Still with tachycardia 110-115, sats 91-93 while sleeping.  Patient reports symptoms resolved.  Will give fluids, benadryl in case this is a reaction to contrast, and continue to observe. [DC]   1759  WBC Clumps, UA(!): Many [DC]   1753 WBC, UA(!): >100 [DC]   1849 Discussed with urology, given persistent pyuria, no fever, no urinary symptoms, they recommend awaiting culture result to initiate any antibiotic treatment [DC]   1850 Pulse: 104 [DC]   1907 Heart rate 118 when standing.  Re-reviewed EKG and compared to previous and there are some subtle changes.  Given persistent tachycardia, will check troponin.  Shortness of breath has resolved, there was never any chest pain. [DC]   1941 EKG 12-lead  No significant changes per my independent interpretation.     [DC]   1951 Troponin I: 0.023 [DC]   1958 Discussed troponin and persistent tachycardia with patient.  He does not want any further testing done.  I offered him observation to trend the troponin and perhaps to a PE study in the morning.  I explained that I still can not fully account for his persistent tachycardia and slight hypoxia.  He understands but states he has to work in the morning and he is ready to go home.  He will return for any worsening symptoms.  He has the capacity to understand the risks of leaving at this time. [DC]      ED Course User Index  [DC] Attila Bernard MD           MDM, Impression and Plan   68 y.o. male with shortness of breath that occurred shortly after getting a CT scan, now resolved.  CT scan was a contrasted chest abdomen pelvis study following his urothelial malignancy.  He had no rash or itching, doubt classic anaphylaxis.  Symptoms resolved in the ED, but tachycardia persisted despite fluids.  I did get him to urinate a large amount, however.  His EKG had some subtle changes so I checked her troponin at our 6, which was on the higher end of normal, but still normal.  I offered observation to trend the troponins and check a PE study in the morning, but patient declined, stating he felt fine and had to go to work.  He understands that I do not have a clear cause of his slightly low oxygen level and tachycardia, and will  return if he feels worse.  He has the capacity to understand the risks of leaving at this time.         Final diagnoses:  [R00.0] Tachycardia  [R06.02] SOB (shortness of breath) (Primary)        ED Disposition Condition    Discharge Stable          ED Prescriptions    None       Follow-up Information       Follow up With Specialties Details Why Contact Info    Andrea Wang MD Internal Medicine Schedule an appointment as soon as possible for a visit   [address]  [place] [address]  [phone]      Issa Antony MD Hematology and Oncology Schedule an appointment as soon as possible for a visit   [address] [facility] [address]  [phone]      [facility] - Emergency Dept Emergency Medicine  If symptoms worsen [address] [facility] [id number] [phone]               Attila Bernard MD  08/25/23 2004

## 2023-08-25 NOTE — TELEPHONE ENCOUNTER
Scan done at 10:30, he started shaking and is SOB since returning to the office. Recommended that he go to the ED to r/o that he is not having a reaction to the contrast.     He states he has had scans in the past with contrast but this has never happened before.

## 2023-08-26 ENCOUNTER — PATIENT MESSAGE (OUTPATIENT)
Dept: ADMINISTRATIVE | Facility: OTHER | Age: 68
End: 2023-08-26
Payer: MEDICARE

## 2023-08-27 ENCOUNTER — PATIENT MESSAGE (OUTPATIENT)
Dept: ADMINISTRATIVE | Facility: OTHER | Age: 68
End: 2023-08-27
Payer: MEDICARE

## 2023-08-28 ENCOUNTER — OFFICE VISIT (OUTPATIENT)
Dept: HEMATOLOGY/ONCOLOGY | Facility: CLINIC | Age: 68
End: 2023-08-28
Payer: MEDICARE

## 2023-08-28 ENCOUNTER — PATIENT MESSAGE (OUTPATIENT)
Dept: ADMINISTRATIVE | Facility: OTHER | Age: 68
End: 2023-08-28
Payer: MEDICARE

## 2023-08-28 VITALS
DIASTOLIC BLOOD PRESSURE: 75 MMHG | HEART RATE: 94 BPM | OXYGEN SATURATION: 98 % | RESPIRATION RATE: 18 BRPM | WEIGHT: 146.63 LBS | SYSTOLIC BLOOD PRESSURE: 116 MMHG | TEMPERATURE: 98 F | HEIGHT: 69 IN | BODY MASS INDEX: 21.72 KG/M2

## 2023-08-28 DIAGNOSIS — T45.1X5A ANTINEOPLASTIC CHEMOTHERAPY INDUCED ANEMIA: ICD-10-CM

## 2023-08-28 DIAGNOSIS — N39.0 URINARY TRACT INFECTION WITHOUT HEMATURIA, SITE UNSPECIFIED: ICD-10-CM

## 2023-08-28 DIAGNOSIS — D64.81 ANTINEOPLASTIC CHEMOTHERAPY INDUCED ANEMIA: ICD-10-CM

## 2023-08-28 DIAGNOSIS — T45.1X5A CHEMOTHERAPY-INDUCED THROMBOCYTOPENIA: ICD-10-CM

## 2023-08-28 DIAGNOSIS — Z90.5 H/O NEPHROURETERECTOMY: ICD-10-CM

## 2023-08-28 DIAGNOSIS — E27.8 ADRENAL NODULE: ICD-10-CM

## 2023-08-28 DIAGNOSIS — I10 PRIMARY HYPERTENSION: ICD-10-CM

## 2023-08-28 DIAGNOSIS — H91.90 HEARING LOSS, UNSPECIFIED HEARING LOSS TYPE, UNSPECIFIED LATERALITY: ICD-10-CM

## 2023-08-28 DIAGNOSIS — N18.4 CKD (CHRONIC KIDNEY DISEASE) STAGE 4, GFR 15-29 ML/MIN: ICD-10-CM

## 2023-08-28 DIAGNOSIS — D69.59 CHEMOTHERAPY-INDUCED THROMBOCYTOPENIA: ICD-10-CM

## 2023-08-28 DIAGNOSIS — Z90.6 H/O NEPHROURETERECTOMY: ICD-10-CM

## 2023-08-28 DIAGNOSIS — K76.89 LIVER NODULE: ICD-10-CM

## 2023-08-28 DIAGNOSIS — C64.2 UROTHELIAL CARCINOMA OF KIDNEY, LEFT: Primary | ICD-10-CM

## 2023-08-28 DIAGNOSIS — M05.79 RHEUMATOID ARTHRITIS INVOLVING MULTIPLE SITES WITH POSITIVE RHEUMATOID FACTOR: ICD-10-CM

## 2023-08-28 PROCEDURE — 1126F AMNT PAIN NOTED NONE PRSNT: CPT | Mod: CPTII,S$GLB,, | Performed by: INTERNAL MEDICINE

## 2023-08-28 PROCEDURE — 99215 OFFICE O/P EST HI 40 MIN: CPT | Mod: S$GLB,,, | Performed by: INTERNAL MEDICINE

## 2023-08-28 PROCEDURE — 3288F FALL RISK ASSESSMENT DOCD: CPT | Mod: CPTII,S$GLB,, | Performed by: INTERNAL MEDICINE

## 2023-08-28 PROCEDURE — 99999 PR PBB SHADOW E&M-EST. PATIENT-LVL V: CPT | Mod: PBBFAC,,, | Performed by: INTERNAL MEDICINE

## 2023-08-28 PROCEDURE — 3288F PR FALLS RISK ASSESSMENT DOCUMENTED: ICD-10-PCS | Mod: CPTII,S$GLB,, | Performed by: INTERNAL MEDICINE

## 2023-08-28 PROCEDURE — 3074F PR MOST RECENT SYSTOLIC BLOOD PRESSURE < 130 MM HG: ICD-10-PCS | Mod: CPTII,S$GLB,, | Performed by: INTERNAL MEDICINE

## 2023-08-28 PROCEDURE — 3078F PR MOST RECENT DIASTOLIC BLOOD PRESSURE < 80 MM HG: ICD-10-PCS | Mod: CPTII,S$GLB,, | Performed by: INTERNAL MEDICINE

## 2023-08-28 PROCEDURE — 3074F SYST BP LT 130 MM HG: CPT | Mod: CPTII,S$GLB,, | Performed by: INTERNAL MEDICINE

## 2023-08-28 PROCEDURE — 99999 PR PBB SHADOW E&M-EST. PATIENT-LVL V: ICD-10-PCS | Mod: PBBFAC,,, | Performed by: INTERNAL MEDICINE

## 2023-08-28 PROCEDURE — 3078F DIAST BP <80 MM HG: CPT | Mod: CPTII,S$GLB,, | Performed by: INTERNAL MEDICINE

## 2023-08-28 PROCEDURE — 1101F PR PT FALLS ASSESS DOC 0-1 FALLS W/OUT INJ PAST YR: ICD-10-PCS | Mod: CPTII,S$GLB,, | Performed by: INTERNAL MEDICINE

## 2023-08-28 PROCEDURE — 1159F PR MEDICATION LIST DOCUMENTED IN MEDICAL RECORD: ICD-10-PCS | Mod: CPTII,S$GLB,, | Performed by: INTERNAL MEDICINE

## 2023-08-28 PROCEDURE — 1126F PR PAIN SEVERITY QUANTIFIED, NO PAIN PRESENT: ICD-10-PCS | Mod: CPTII,S$GLB,, | Performed by: INTERNAL MEDICINE

## 2023-08-28 PROCEDURE — 1159F MED LIST DOCD IN RCRD: CPT | Mod: CPTII,S$GLB,, | Performed by: INTERNAL MEDICINE

## 2023-08-28 PROCEDURE — 99215 PR OFFICE/OUTPT VISIT, EST, LEVL V, 40-54 MIN: ICD-10-PCS | Mod: S$GLB,,, | Performed by: INTERNAL MEDICINE

## 2023-08-28 PROCEDURE — 3008F BODY MASS INDEX DOCD: CPT | Mod: CPTII,S$GLB,, | Performed by: INTERNAL MEDICINE

## 2023-08-28 PROCEDURE — 3008F PR BODY MASS INDEX (BMI) DOCUMENTED: ICD-10-PCS | Mod: CPTII,S$GLB,, | Performed by: INTERNAL MEDICINE

## 2023-08-28 PROCEDURE — 1101F PT FALLS ASSESS-DOCD LE1/YR: CPT | Mod: CPTII,S$GLB,, | Performed by: INTERNAL MEDICINE

## 2023-08-28 RX ORDER — CIPROFLOXACIN 500 MG/1
500 TABLET ORAL DAILY
Qty: 5 TABLET | Refills: 0 | Status: SHIPPED | OUTPATIENT
Start: 2023-08-28 | End: 2023-09-02

## 2023-08-28 RX ORDER — OXYCODONE HYDROCHLORIDE 5 MG/1
5 TABLET ORAL EVERY 6 HOURS PRN
Qty: 60 TABLET | Refills: 0 | Status: SHIPPED | OUTPATIENT
Start: 2023-08-28 | End: 2023-09-18 | Stop reason: SDUPTHER

## 2023-08-28 NOTE — PROGRESS NOTES
MEDICAL ONCOLOGY - FOLLOW-UP VISIT    Reason for visit: Upper tract Urothelial carcinoma     Best Contact Phone Number(s): [phone] (home)      Cancer/Stage/TNM:    Cancer Staging   Ureteral tumor  Staging form: Renal Pelvis and Ureter, AJCC 8th Edition  - Clinical stage from 7/28/2023: Stage II (cT2, cN0, cM0) - Signed by Marquita Stoner NP on 8/28/2023       Oncology History   Ureteral tumor   2/16/2023 Initial Diagnosis    Ureteral tumor     7/28/2023 Cancer Staged    Staging form: Renal Pelvis and Ureter, AJCC 8th Edition  - Clinical stage from 7/28/2023: Stage II (cT2, cN0, cM0)     Urothelial carcinoma with high risk of metastasis   3/6/2023 Initial Diagnosis    Urothelial carcinoma with high risk of metastasis     3/14/2023 -  Chemotherapy    Treatment Summary   Plan Name: OP BLADDER GEMCITABINE CISPLATIN (SPLIT DOSE CISPLATIN) Q3W  Treatment Goal: Curative  Status: Active  Start Date: 3/14/2023  End Date: 6/29/2023  Provider: Issa Antony MD  Chemotherapy: CISplatin (Platinol) 35 mg/m2 = 64 mg in sodium chloride 0.9% 314 mL chemo infusion, 35 mg/m2 = 64 mg, Intravenous, Clinic/HOD 1 time, 4 of 4 cycles  Dose modification: 17.5 mg/m2 (original dose 35 mg/m2, Cycle 3, Reason: Other (see comments), Comment: renal function), 35 mg/m2 (original dose 35 mg/m2, Cycle 3, Reason: Other (see comments)), 17.5 mg/m2 (original dose 35 mg/m2, Cycle 2, Reason: Other (see comments), Comment: renal function)  Administration: 64 mg (3/14/2023), 64 mg (3/21/2023), 64 mg (4/19/2023), 64 mg (5/4/2023), 64 mg (5/16/2023), 32 mg (4/4/2023), 64 mg (6/1/2023), 64 mg (6/28/2023)  gemcitabine 1,800 mg in sodium chloride 0.9% SolP 332.34 mL chemo infusion, 1,840 mg, Intravenous, Clinic/HOD 1 time, 4 of 4 cycles  Administration: 1,800 mg (3/14/2023), 1,800 mg (3/21/2023), 1,800 mg (4/4/2023), 1,800 mg (4/19/2023), 1,800 mg (5/4/2023), 1,800 mg (5/16/2023), 1,800 mg (6/1/2023), 1,800 mg (6/28/2023)          HPI:   68 y.o. male  with PVD, PAD, HLD, CKD3, AAA, treated hep C, Rheumatoid arthritis on MTX (previously on humara), smoking (about 1/2 PPD) who presented with gross hematuria. He saw urology and underwent CT urogram which showed Few soft tissue masses within the left renal pelvis, the largest measures 2.5 x 1.3 cm. There was also a L adrenal nodule measuring 1.6 cm. Urine cytology was evident of atypical urothelial cells, and repeat sample showed high grade urothelial carcinoma. He underwent a flexible cystoscopy with normal bladder findings. L RPG showed Left RPG with mild hydronephrosis and filling defect ~2 cm in central renal pelvis.  Left ureteroscopy with was done and showed papillary tumors along the proximal ureter and renal pelvis. - Large ~2 cm nodular lesion in the renal pelvis consistent with filling defect. Biopsy of the mass came back as - High grade urothelial dysplasia/carcinoma in situ. - No definitive invasive carcinoma identified (outside report)    PET/CT done on 03/08 showed Left proximal ureteral lesion compatible with urothelial cancer.  No FDG PET evidence of metastatic disease.      Interval history:   s/p robotic nephroureterectomy on 7/14/23 with Dr. Jefferson with negative margins, high grade UTUC. Patient developed significant shortness of breath after contrast on Friday. He felt better after being seen in the ER. He is now back to work and feeling better. Appetite still low. Noticing hearing loss. Noticing weak stream. Denies dysuria.    Review of Systems   Constitutional:  Positive for malaise/fatigue. Negative for chills, fever and weight loss.   HENT:  Positive for hearing loss. Negative for congestion. Ear discharge: r>L.   Eyes:  Negative for blurred vision.   Respiratory:  Negative for cough and shortness of breath.    Cardiovascular:  Negative for chest pain, palpitations and leg swelling.   Gastrointestinal:  Positive for constipation. Negative for abdominal pain, blood in stool, diarrhea, nausea  and vomiting.   Genitourinary:  Negative for dysuria and frequency.   Musculoskeletal:  Negative for back pain and myalgias.   Skin:  Negative for itching and rash.   Neurological:  Negative for dizziness, tingling, tremors, sensory change, focal weakness and headaches.   Endo/Heme/Allergies:  Does not bruise/bleed easily.   Psychiatric/Behavioral:  The patient is not nervous/anxious.        Past Medical History:   Past Medical History:   Diagnosis Date    Anemia     Cancer     Cataract     Colon polyp 2014    Depression     Disorder of kidney and ureter     History of hepatitis C, s/p successful treatment w/ SVR12 - 7/2015 10/14/2012    Kelsey 1a,  Liver biopsy 6/2014 - Stage 1 fibrosis;  Completed 8 weeks Harvoni w/ SVR12 - 7/2015      Hyperlipidemia     Lipoma of arm     Lipoma of lower extremity     Nuclear sclerosis - Both Eyes 10/22/2012    Other and unspecified hyperlipidemia 10/22/2013    PAD (peripheral artery disease)     Peripheral vascular disease, unspecified     Plaquenil adverse reaction in therapeutic use 10/22/2012    Rheumatoid arthritis(714.0) 10/14/2012    Special screening for malignant neoplasms, colon 02/21/2014        Past Surgical History:   Past Surgical History:   Procedure Laterality Date    BIOPSY OF URETER Left 02/16/2023    Procedure: BIOPSY, URETER/BRUSH;  Surgeon: Kem Jefferson MD;  Location: [facility] OR [address] [place] [address];  Service: Urology;  Laterality: Left;    COLONOSCOPY N/A 11/29/2021    Procedure: COLONOSCOPY;  Surgeon: Kenrick Zimmer MD;  Location: [facility] (4TH FLR);  Service: Endoscopy;  Laterality: N/A;  blood thinner approval received, see telephone encounter 10/19/21-BB  fully vacc-inst email-tb    CYSTOSCOPY      CYSTOSCOPY  02/16/2023    Procedure: CYSTOSCOPY;  Surgeon: Kem Jefferson MD;  Location: [facility] OR [address] [place] [address];  Service: Urology;;    HERNIA REPAIR      INSERTION OF TUNNELED CENTRAL VENOUS CATHETER (CVC) WITH SUBCUTANEOUS PORT Right 3/13/2023    Procedure: INSERTION,  PORT-A-CATH;  Surgeon: Rene Cali MD;  Location: [facility] CATH LAB;  Service: Radiology;  Laterality: Right;    KNEE ARTHROPLASTY      LAPAROSCOPIC EXPLORATION OF GROIN Left 09/06/2018    Procedure: EXPLORATION, INGUINAL REGION, LAPAROSCOPIC;  Surgeon: Mingo De Guzman Jr., MD;  Location: [facility] OR 2ND FLR;  Service: General;  Laterality: Left;    PYELOSCOPY Left 02/16/2023    Procedure: PYELOSCOPY;  Surgeon: Kem Jefferson MD;  Location: [facility] OR [address] [place] [address];  Service: Urology;  Laterality: Left;    RETROGRADE PYELOGRAPHY Bilateral 02/16/2023    Procedure: PYELOGRAM, RETROGRADE;  Surgeon: Kem Jefferson MD;  Location: [facility] OR [address] [place] [address];  Service: Urology;  Laterality: Bilateral;    ROBOT-ASSISTED LAPAROSCOPIC SURGICAL REMOVAL OF KIDNEY AND URETER USING DA BRIJESH XI Left 7/14/2023    Procedure: XI ROBOTIC NEPHROURETERECTOMY;  Surgeon: Kem Jefferson MD;  Location: [facility] OR [address] [place] [address];  Service: Urology;  Laterality: Left;  5 hours    TONSILLECTOMY      URETEROSCOPIC REMOVAL OF URETERIC CALCULUS Left 02/16/2023    Procedure: REMOVAL, CALCULUS, URETER, URETEROSCOPIC;  Surgeon: Kem Jefferson MD;  Location: [facility] OR [address] [place] [address];  Service: Urology;  Laterality: Left;    URETEROSCOPY Left 02/16/2023    Procedure: URETEROSCOPY;  Surgeon: Kem Jefferson MD;  Location: [facility] OR [address] [place] [address];  Service: Urology;  Laterality: Left;        Family History:   Family History   Problem Relation Age of Onset    Heart disease Paternal Uncle     Dementia Mother     Heart disease Sister     Liver disease Neg Hx     Amblyopia Neg Hx     Blindness Neg Hx     Cancer Neg Hx     Cataracts Neg Hx     Diabetes Neg Hx     Glaucoma Neg Hx     Hypertension Neg Hx     Macular degeneration Neg Hx     Retinal detachment Neg Hx     Strabismus Neg Hx     Stroke Neg Hx     Thyroid disease Neg Hx         Social History:   Social History     Tobacco Use    Smoking status: Former     Current packs/day: 0.00     Types: Cigarettes     Quit date:  6/27/2020     Years since quitting: 3.1    Smokeless tobacco: Never   Substance Use Topics    Alcohol use: Yes     Comment: 2 drinks per week        I have reviewed and updated the patient's past medical, surgical, family and social histories.    Allergies:   Review of patient's allergies indicates:   Allergen Reactions    Iodinated contrast media      Shortness of breath        Medications:   Current Outpatient Medications   Medication Sig Dispense Refill    acetaminophen (TYLENOL) 650 MG TbSR Take 1 tablet (650 mg total) by mouth every 8 (eight) hours. 63 tablet 0    amLODIPine (NORVASC) 5 MG tablet TAKE 1 TABLET BY MOUTH ONCE DAILY 90 tablet 2    aspirin 81 MG Chew Take 81 mg by mouth once daily.      bisacodyL (DULCOLAX) 5 mg EC tablet Take 5 mg by mouth daily as needed for Constipation.      docusate sodium (COLACE) 100 MG capsule Take 100 mg by mouth 2 (two) times daily.      doxazosin (CARDURA) 4 MG tablet TAKE 1 TABLET BY MOUTH IN  THE EVENING 90 tablet 3    folic acid (FOLVITE) 1 MG tablet Take 1 tablet (1,000 mcg total) by mouth once daily. 90 tablet 3    gabapentin (NEURONTIN) 300 MG capsule Take 1 capsule (300 mg total) by mouth 3 (three) times daily. 90 capsule 1    methocarbamoL (ROBAXIN) 500 MG Tab Take 1 tablet (500 mg total) by mouth 3 (three) times daily as needed (muscle pain). 15 tablet 0    methotrexate 2.5 MG Tab Take 8 tablets (20 mg total) by mouth every 7 days. This medication requires periodic lab monitoring. 120 tablet 1    multivitamin capsule Take 1 capsule by mouth once daily.      simvastatin (ZOCOR) 10 MG tablet Take 1 tablet (10 mg total) by mouth every evening. 90 tablet 9    traZODone (DESYREL) 50 MG tablet TAKE 3 TABLETS BY MOUTH AT  NIGHT AS NEEDED FOR  INSOMNIA 270 tablet 3    cilostazoL (PLETAL) 50 MG Tab Take 1 tablet (50 mg total) by mouth 2 (two) times daily. 180 tablet 3    oxyCODONE (ROXICODONE) 5 MG immediate release tablet Take 1 tablet (5 mg total) by mouth every 6  "(six) hours as needed for Pain. 60 tablet 0     No current facility-administered medications for this visit.        Physical Exam:   /75 (BP Location: Left arm, Patient Position: Sitting, BP Method: Medium (Automatic))   Pulse 94   Temp 97.8 °F (36.6 °C) (Oral)   Resp 18   Ht 5' 9" (1.753 m)   Wt 66.5 kg (146 lb 9.7 oz)   SpO2 98%   BMI 21.65 kg/m²      ECOG Performance status: 1         Physical Exam  Constitutional:       General: He is not in acute distress.     Appearance: Normal appearance.   HENT:      Head: Normocephalic and atraumatic.   Eyes:      Pupils: Pupils are equal, round, and reactive to light.   Cardiovascular:      Rate and Rhythm: Normal rate and regular rhythm.      Heart sounds: No murmur heard.  Pulmonary:      Effort: No respiratory distress.      Breath sounds: Normal breath sounds. No wheezing.   Abdominal:      General: Abdomen is flat. Bowel sounds are normal. There is no distension.      Palpations: Abdomen is soft. There is no mass.      Tenderness: There is no abdominal tenderness.   Musculoskeletal:         General: No swelling or deformity.   Skin:     Coloration: Skin is not jaundiced.   Neurological:      General: No focal deficit present.      Mental Status: He is alert and oriented to person, place, and time. Mental status is at baseline.           Labs:   Recent Results (from the past 48 hour(s))   CBC Oncology    Collection Time: 08/28/23  7:22 AM   Result Value Ref Range    WBC 5.97 3.90 - 12.70 K/uL    RBC 3.27 (L) 4.60 - 6.20 M/uL    Hemoglobin 9.9 (L) 14.0 - 18.0 g/dL    Hematocrit 30.8 (L) 40.0 - 54.0 %    MCV 94 82 - 98 fL    MCH 30.3 27.0 - 31.0 pg    MCHC 32.1 32.0 - 36.0 g/dL    RDW 13.9 11.5 - 14.5 %    Platelets 144 (L) 150 - 450 K/uL    MPV 9.0 (L) 9.2 - 12.9 fL    Gran # (ANC) 4.3 1.8 - 7.7 K/uL    Immature Grans (Abs) 0.02 0.00 - 0.04 K/uL   COMPREHENSIVE METABOLIC PANEL    Collection Time: 08/28/23  7:22 AM   Result Value Ref Range    Sodium 137 136 " - 145 mmol/L    Potassium 4.4 3.5 - 5.1 mmol/L    Chloride 107 95 - 110 mmol/L    CO2 20 (L) 23 - 29 mmol/L    Glucose 107 70 - 110 mg/dL    BUN 37 (H) 8 - 23 mg/dL    Creatinine 2.5 (H) 0.5 - 1.4 mg/dL    Calcium 9.3 8.7 - 10.5 mg/dL    Total Protein 7.3 6.0 - 8.4 g/dL    Albumin 3.5 3.5 - 5.2 g/dL    Total Bilirubin 0.3 0.1 - 1.0 mg/dL    Alkaline Phosphatase 79 55 - 135 U/L    AST 15 10 - 40 U/L    ALT 12 10 - 44 U/L    eGFR 27.1 (A) >60 mL/min/1.73 m^2    Anion Gap 10 8 - 16 mmol/L       Assessment:       1. Urothelial carcinoma of kidney, left    2. Adrenal nodule    3. Liver nodule    4. H/O nephroureterectomy    5. Antineoplastic chemotherapy induced anemia    6. Chemotherapy-induced thrombocytopenia    7. Hearing loss, unspecified hearing loss type, unspecified laterality    8. Rheumatoid arthritis involving multiple sites with positive rheumatoid factor    9. Primary hypertension    10. Stage 3 chronic kidney disease, unspecified whether stage 3a or 3b CKD          Plan:   1,2- 68 y.o. M patient presented with gross hematuria and was found to have multiple soft tissue masses in L renal pelvis. He is s/p L urethroscopy and biopsy which showed high grade urothelial dysplasia. On CT urogram, there was a 1.6 cm L adrenal nodule, as well as multiple pulmonary nodules, largest was 0.6 cm, and multiple hepatic nodules too small to characterize.   Overall picture is concerning for invasive upper tract urothelial carcinoma.   PET/CT showed no evidence of metastatic disease. There is B/L adrenal adenomas.     Plan:   - Neoadjuvant chemotherapy with Gemcitabine and Cisplatin. Split dose Cisplatin for borderline kidney functions.  - Because of his RA (not very controlled), will avoid IO adjuvantely.   -chemo held cycle 2, day 8 today with ANC of 600. Zarxio x 3. Will go to every 2 week split dose with udencya on day 2.  - completed    Refer back to urology to discuss urinary stream. Scans reviewed and show slightly  enlarging liver nodule and lymph node. Will get PET for further evaluation. WIll avoid adjuvant immunotherapy due to uncontrolled RA.    Refer to nephrology for likely new CKD.    RTC 3 week with PET, labs (CBC,CMP),and to see Dr. Antony.    5,6- Mild, monitor.    7- Audiogram showed normal hearing sensitivity from 250-2000 Hz sloping to a severe to profound hearing loss by 8000 Hz with a slight air bone gap at 1000 Hz in the right ear and normal hearing sensitivity from 250-2000 Hz sloping to a severe sensorineural hearing loss (SNHL) by 8000 Hz in the left ear. Will recheck post chemotherapy.    8-On MTX. Will avoid adjuvant immunotherapy.    9- enrolled in [facility]. Per PCP.    10- may be new baseline. Refer to nephrology.    11- start Cipro. Will change based on sensitivity.      Patient was also seen and examined by Dr. Antony. Patient is in agreement with the proposed treatment plan. All questions were answered to the patient's satisfaction. Pt knows to call clinic if anything is needed before the next clinic visit.    Marquita Stoner, MSN, APRN, FNP-C  Hematology and Medical Oncology  Nurse Practitioner to Dr. Issa Antony  Nurse Practitioner, [facility]            I have reviewed the notes, assessments, and/or procedures performed by the nurse practitioner, as above.  I have personally interviewed the patient at the beside, and rounded with the nurse practitioner. I formulated the plan of care.  I concur with her documentation of Andrea Gant.  I, Dr. Issa Antony, personally spent more than 40 mins during this encounter.     Issa Antony M.D., M.S., F.A.C.P.  Hematology/Oncology Attending  [facility]                Med Onc Chart Routing  Urgent    Follow up with physician 3 weeks. RTC 3 weeks with PET, labs (CBC,CMP) and to see Dr. Antony.   Follow up with DESIREE    Infusion scheduling note    Injection scheduling note    Labs    Imaging    Pharmacy appointment     Other referrals     Additional referrals needed  refer to nephrology, urology for weak stream, and schedule complete audiogram.

## 2023-08-29 ENCOUNTER — PATIENT MESSAGE (OUTPATIENT)
Dept: ADMINISTRATIVE | Facility: OTHER | Age: 68
End: 2023-08-29
Payer: MEDICARE

## 2023-08-29 LAB — BACTERIA UR CULT: ABNORMAL

## 2023-08-30 ENCOUNTER — PATIENT MESSAGE (OUTPATIENT)
Dept: ADMINISTRATIVE | Facility: OTHER | Age: 68
End: 2023-08-30
Payer: MEDICARE

## 2023-08-31 ENCOUNTER — PATIENT MESSAGE (OUTPATIENT)
Dept: ADMINISTRATIVE | Facility: OTHER | Age: 68
End: 2023-08-31
Payer: MEDICARE

## 2023-09-01 ENCOUNTER — PATIENT MESSAGE (OUTPATIENT)
Dept: ADMINISTRATIVE | Facility: OTHER | Age: 68
End: 2023-09-01
Payer: MEDICARE

## 2023-09-04 ENCOUNTER — PATIENT MESSAGE (OUTPATIENT)
Dept: ADMINISTRATIVE | Facility: OTHER | Age: 68
End: 2023-09-04
Payer: MEDICARE

## 2023-09-05 ENCOUNTER — PATIENT MESSAGE (OUTPATIENT)
Dept: ADMINISTRATIVE | Facility: OTHER | Age: 68
End: 2023-09-05
Payer: MEDICARE

## 2023-09-06 ENCOUNTER — PATIENT MESSAGE (OUTPATIENT)
Dept: ADMINISTRATIVE | Facility: OTHER | Age: 68
End: 2023-09-06
Payer: MEDICARE

## 2023-09-07 ENCOUNTER — CLINICAL SUPPORT (OUTPATIENT)
Dept: AUDIOLOGY | Facility: CLINIC | Age: 68
End: 2023-09-07
Payer: MEDICARE

## 2023-09-07 DIAGNOSIS — H90.3 SENSORINEURAL HEARING LOSS, BILATERAL: Primary | ICD-10-CM

## 2023-09-07 DIAGNOSIS — Z79.899 ENCOUNTER FOR MONITORING OTOTOXIC DRUG THERAPY: ICD-10-CM

## 2023-09-07 DIAGNOSIS — Z51.81 ENCOUNTER FOR MONITORING OTOTOXIC DRUG THERAPY: ICD-10-CM

## 2023-09-07 DIAGNOSIS — H91.90 HEARING LOSS, UNSPECIFIED HEARING LOSS TYPE, UNSPECIFIED LATERALITY: ICD-10-CM

## 2023-09-07 PROCEDURE — 92552 PR PURE TONE AUDIOMETRY, AIR: ICD-10-PCS | Mod: S$GLB,,, | Performed by: AUDIOLOGIST

## 2023-09-07 PROCEDURE — 92552 PURE TONE AUDIOMETRY AIR: CPT | Mod: S$GLB,,, | Performed by: AUDIOLOGIST

## 2023-09-07 NOTE — PROGRESS NOTES
Andrea Gant, a 68 y.o. male, was seen today in the clinic for an audiologic evaluation for ototoxic monitoring follow up.  Mr. Gant reported that he has completed his treatments and that he is feeling well overall.  He denied and changes in hearing and still denies otalgia, aural fullness, tinnitus and vertgio.    Audiogram results revealed normal sloping to severe sensorineural hearing loss (SNHL) in the right ear and normal sloping to severe SNHL in the left ear.    Today's test results are consistent with previous testing.  No significant change is noted.  Pt should return for testing if symptoms change.        Recommendations:  Otologic evaluation  Continue to monitor hearing if symptoms change  Hearing protection when in noise

## 2023-09-08 ENCOUNTER — PATIENT MESSAGE (OUTPATIENT)
Dept: ADMINISTRATIVE | Facility: OTHER | Age: 68
End: 2023-09-08
Payer: MEDICARE

## 2023-09-12 ENCOUNTER — PATIENT MESSAGE (OUTPATIENT)
Dept: ADMINISTRATIVE | Facility: OTHER | Age: 68
End: 2023-09-12
Payer: MEDICARE

## 2023-09-15 ENCOUNTER — PATIENT MESSAGE (OUTPATIENT)
Dept: ADMINISTRATIVE | Facility: OTHER | Age: 68
End: 2023-09-15
Payer: MEDICARE

## 2023-09-15 ENCOUNTER — HOSPITAL ENCOUNTER (OUTPATIENT)
Dept: RADIOLOGY | Facility: HOSPITAL | Age: 68
Discharge: HOME | End: 2023-09-15
Attending: NURSE PRACTITIONER
Payer: MEDICARE

## 2023-09-15 DIAGNOSIS — D64.81 ANTINEOPLASTIC CHEMOTHERAPY INDUCED ANEMIA: ICD-10-CM

## 2023-09-15 DIAGNOSIS — C64.2 UROTHELIAL CARCINOMA OF KIDNEY, LEFT: ICD-10-CM

## 2023-09-15 DIAGNOSIS — K76.89 LIVER NODULE: ICD-10-CM

## 2023-09-15 DIAGNOSIS — E27.8 ADRENAL NODULE: ICD-10-CM

## 2023-09-15 DIAGNOSIS — T45.1X5A ANTINEOPLASTIC CHEMOTHERAPY INDUCED ANEMIA: ICD-10-CM

## 2023-09-15 PROCEDURE — 78815 NM PET CT ROUTINE: ICD-10-PCS | Mod: 26,PS,, | Performed by: STUDENT IN AN ORGANIZED HEALTH CARE EDUCATION/TRAINING PROGRAM

## 2023-09-15 PROCEDURE — 78815 PET IMAGE W/CT SKULL-THIGH: CPT | Mod: 26,PS,, | Performed by: STUDENT IN AN ORGANIZED HEALTH CARE EDUCATION/TRAINING PROGRAM

## 2023-09-15 PROCEDURE — A9552 F18 FDG: HCPCS

## 2023-09-17 ENCOUNTER — PATIENT MESSAGE (OUTPATIENT)
Dept: ADMINISTRATIVE | Facility: OTHER | Age: 68
End: 2023-09-17
Payer: MEDICARE

## 2023-09-18 ENCOUNTER — OFFICE VISIT (OUTPATIENT)
Dept: HEMATOLOGY/ONCOLOGY | Facility: CLINIC | Age: 68
End: 2023-09-18
Payer: MEDICARE

## 2023-09-18 VITALS
DIASTOLIC BLOOD PRESSURE: 70 MMHG | HEART RATE: 81 BPM | WEIGHT: 145.5 LBS | HEIGHT: 69 IN | RESPIRATION RATE: 16 BRPM | SYSTOLIC BLOOD PRESSURE: 130 MMHG | TEMPERATURE: 98 F | BODY MASS INDEX: 21.55 KG/M2 | OXYGEN SATURATION: 99 %

## 2023-09-18 DIAGNOSIS — C64.2 UROTHELIAL CARCINOMA OF KIDNEY, LEFT: Primary | ICD-10-CM

## 2023-09-18 DIAGNOSIS — I73.9 PAD (PERIPHERAL ARTERY DISEASE): ICD-10-CM

## 2023-09-18 DIAGNOSIS — I10 PRIMARY HYPERTENSION: ICD-10-CM

## 2023-09-18 DIAGNOSIS — D69.59 CHEMOTHERAPY-INDUCED THROMBOCYTOPENIA: ICD-10-CM

## 2023-09-18 DIAGNOSIS — N18.4 CKD (CHRONIC KIDNEY DISEASE) STAGE 4, GFR 15-29 ML/MIN: ICD-10-CM

## 2023-09-18 DIAGNOSIS — T45.1X5A CHEMOTHERAPY-INDUCED THROMBOCYTOPENIA: ICD-10-CM

## 2023-09-18 DIAGNOSIS — T45.1X5A ANTINEOPLASTIC CHEMOTHERAPY INDUCED ANEMIA: ICD-10-CM

## 2023-09-18 DIAGNOSIS — D64.81 ANTINEOPLASTIC CHEMOTHERAPY INDUCED ANEMIA: ICD-10-CM

## 2023-09-18 DIAGNOSIS — E27.8 ADRENAL NODULE: ICD-10-CM

## 2023-09-18 DIAGNOSIS — Z90.6 H/O NEPHROURETERECTOMY: ICD-10-CM

## 2023-09-18 DIAGNOSIS — K76.89 LIVER NODULE: ICD-10-CM

## 2023-09-18 DIAGNOSIS — H91.90 HEARING LOSS, UNSPECIFIED HEARING LOSS TYPE, UNSPECIFIED LATERALITY: ICD-10-CM

## 2023-09-18 DIAGNOSIS — M05.79 RHEUMATOID ARTHRITIS INVOLVING MULTIPLE SITES WITH POSITIVE RHEUMATOID FACTOR: ICD-10-CM

## 2023-09-18 DIAGNOSIS — Z90.5 H/O NEPHROURETERECTOMY: ICD-10-CM

## 2023-09-18 PROCEDURE — 3078F DIAST BP <80 MM HG: CPT | Mod: CPTII,S$GLB,, | Performed by: INTERNAL MEDICINE

## 2023-09-18 PROCEDURE — 3078F PR MOST RECENT DIASTOLIC BLOOD PRESSURE < 80 MM HG: ICD-10-PCS | Mod: CPTII,S$GLB,, | Performed by: INTERNAL MEDICINE

## 2023-09-18 PROCEDURE — 99499 UNLISTED E&M SERVICE: CPT | Mod: S$GLB,,, | Performed by: NURSE PRACTITIONER

## 2023-09-18 PROCEDURE — 99999 PR PBB SHADOW E&M-EST. PATIENT-LVL IV: ICD-10-PCS | Mod: PBBFAC,,, | Performed by: INTERNAL MEDICINE

## 2023-09-18 PROCEDURE — 1159F MED LIST DOCD IN RCRD: CPT | Mod: CPTII,S$GLB,, | Performed by: INTERNAL MEDICINE

## 2023-09-18 PROCEDURE — 1126F AMNT PAIN NOTED NONE PRSNT: CPT | Mod: CPTII,S$GLB,, | Performed by: INTERNAL MEDICINE

## 2023-09-18 PROCEDURE — 3288F PR FALLS RISK ASSESSMENT DOCUMENTED: ICD-10-PCS | Mod: CPTII,S$GLB,, | Performed by: INTERNAL MEDICINE

## 2023-09-18 PROCEDURE — 99215 PR OFFICE/OUTPT VISIT, EST, LEVL V, 40-54 MIN: ICD-10-PCS | Mod: S$GLB,,, | Performed by: INTERNAL MEDICINE

## 2023-09-18 PROCEDURE — 3008F PR BODY MASS INDEX (BMI) DOCUMENTED: ICD-10-PCS | Mod: CPTII,S$GLB,, | Performed by: INTERNAL MEDICINE

## 2023-09-18 PROCEDURE — 99215 OFFICE O/P EST HI 40 MIN: CPT | Mod: S$GLB,,, | Performed by: INTERNAL MEDICINE

## 2023-09-18 PROCEDURE — 1101F PR PT FALLS ASSESS DOC 0-1 FALLS W/OUT INJ PAST YR: ICD-10-PCS | Mod: CPTII,S$GLB,, | Performed by: INTERNAL MEDICINE

## 2023-09-18 PROCEDURE — 3288F FALL RISK ASSESSMENT DOCD: CPT | Mod: CPTII,S$GLB,, | Performed by: INTERNAL MEDICINE

## 2023-09-18 PROCEDURE — 99999 PR PBB SHADOW E&M-EST. PATIENT-LVL IV: CPT | Mod: PBBFAC,,, | Performed by: INTERNAL MEDICINE

## 2023-09-18 PROCEDURE — 3075F PR MOST RECENT SYSTOLIC BLOOD PRESS GE 130-139MM HG: ICD-10-PCS | Mod: CPTII,S$GLB,, | Performed by: INTERNAL MEDICINE

## 2023-09-18 PROCEDURE — 1159F PR MEDICATION LIST DOCUMENTED IN MEDICAL RECORD: ICD-10-PCS | Mod: CPTII,S$GLB,, | Performed by: INTERNAL MEDICINE

## 2023-09-18 PROCEDURE — 1126F PR PAIN SEVERITY QUANTIFIED, NO PAIN PRESENT: ICD-10-PCS | Mod: CPTII,S$GLB,, | Performed by: INTERNAL MEDICINE

## 2023-09-18 PROCEDURE — 3075F SYST BP GE 130 - 139MM HG: CPT | Mod: CPTII,S$GLB,, | Performed by: INTERNAL MEDICINE

## 2023-09-18 PROCEDURE — 3008F BODY MASS INDEX DOCD: CPT | Mod: CPTII,S$GLB,, | Performed by: INTERNAL MEDICINE

## 2023-09-18 PROCEDURE — 1101F PT FALLS ASSESS-DOCD LE1/YR: CPT | Mod: CPTII,S$GLB,, | Performed by: INTERNAL MEDICINE

## 2023-09-18 PROCEDURE — 99499 RISK ADDL DX/OHS AUDIT: ICD-10-PCS | Mod: S$GLB,,, | Performed by: NURSE PRACTITIONER

## 2023-09-18 RX ORDER — CILOSTAZOL 50 MG/1
50 TABLET ORAL 2 TIMES DAILY
Qty: 180 TABLET | Refills: 3 | Status: SHIPPED | OUTPATIENT
Start: 2023-09-18 | End: 2024-09-17

## 2023-09-18 RX ORDER — OXYCODONE HYDROCHLORIDE 5 MG/1
5 TABLET ORAL EVERY 6 HOURS PRN
Qty: 60 TABLET | Refills: 0 | Status: SHIPPED | OUTPATIENT
Start: 2023-09-18 | End: 2024-01-10

## 2023-09-18 NOTE — TELEPHONE ENCOUNTER
Refill Routing Note   Medication(s) are not appropriate for processing by [facility] for the following reason(s):      Medication outside of protocol    [facility] action(s):  Route Care Due:  None identified              Appointments  past 12m or future 3m with PCP    Date Provider   Last Visit   7/27/2023 Andrea Wang MD   Next Visit   Visit date not found Andrea Wang MD   ED visits in past 90 days: 1        Note composed:2:59 PM 09/18/2023

## 2023-09-18 NOTE — TELEPHONE ENCOUNTER
No care due was identified.  [facility] Embedded Care Due Messages. Reference number: [id number].   9/18/2023 1:31:50 PM CDT

## 2023-09-23 ENCOUNTER — PATIENT MESSAGE (OUTPATIENT)
Dept: ADMINISTRATIVE | Facility: OTHER | Age: 68
End: 2023-09-23
Payer: MEDICARE

## 2023-09-25 ENCOUNTER — PATIENT MESSAGE (OUTPATIENT)
Dept: ADMINISTRATIVE | Facility: OTHER | Age: 68
End: 2023-09-25
Payer: MEDICARE

## 2023-10-01 ENCOUNTER — PATIENT MESSAGE (OUTPATIENT)
Dept: ADMINISTRATIVE | Facility: OTHER | Age: 68
End: 2023-10-01
Payer: MEDICARE

## 2023-10-03 ENCOUNTER — HOSPITAL ENCOUNTER (OUTPATIENT)
Facility: OTHER | Age: 68
Discharge: HOME | End: 2023-10-03
Attending: STUDENT IN AN ORGANIZED HEALTH CARE EDUCATION/TRAINING PROGRAM | Admitting: STUDENT IN AN ORGANIZED HEALTH CARE EDUCATION/TRAINING PROGRAM
Payer: MEDICARE

## 2023-10-03 VITALS
HEIGHT: 69 IN | OXYGEN SATURATION: 99 % | TEMPERATURE: 98 F | SYSTOLIC BLOOD PRESSURE: 144 MMHG | BODY MASS INDEX: 21.48 KG/M2 | HEART RATE: 65 BPM | RESPIRATION RATE: 16 BRPM | WEIGHT: 145 LBS | DIASTOLIC BLOOD PRESSURE: 71 MMHG

## 2023-10-03 DIAGNOSIS — C68.9 UROTHELIAL CANCER: ICD-10-CM

## 2023-10-03 DIAGNOSIS — E27.8 ADRENAL NODULE: ICD-10-CM

## 2023-10-03 DIAGNOSIS — C64.2 UROTHELIAL CARCINOMA OF KIDNEY, LEFT: ICD-10-CM

## 2023-10-03 PROCEDURE — 99152 MOD SED SAME PHYS/QHP 5/>YRS: CPT | Performed by: STUDENT IN AN ORGANIZED HEALTH CARE EDUCATION/TRAINING PROGRAM

## 2023-10-03 PROCEDURE — 25000003 PHARM REV CODE 250: Performed by: STUDENT IN AN ORGANIZED HEALTH CARE EDUCATION/TRAINING PROGRAM

## 2023-10-03 PROCEDURE — 63600175 PHARM REV CODE 636 W HCPCS: Performed by: STUDENT IN AN ORGANIZED HEALTH CARE EDUCATION/TRAINING PROGRAM

## 2023-10-03 RX ORDER — FENTANYL CITRATE 50 UG/ML
INJECTION, SOLUTION INTRAMUSCULAR; INTRAVENOUS
Status: DISCONTINUED | OUTPATIENT
Start: 2023-10-03 | End: 2023-10-03 | Stop reason: HOSPADM

## 2023-10-03 RX ORDER — LIDOCAINE HYDROCHLORIDE 10 MG/ML
INJECTION INFILTRATION; PERINEURAL
Status: DISCONTINUED | OUTPATIENT
Start: 2023-10-03 | End: 2023-10-03 | Stop reason: HOSPADM

## 2023-10-03 RX ORDER — MIDAZOLAM HYDROCHLORIDE 1 MG/ML
INJECTION, SOLUTION INTRAMUSCULAR; INTRAVENOUS
Status: DISCONTINUED | OUTPATIENT
Start: 2023-10-03 | End: 2023-10-03 | Stop reason: HOSPADM

## 2023-10-03 NOTE — PROCEDURES
IR Post-Procedure Note    Pre Op Diagnosis: urothelial carcinoma    Post Op Diagnosis: Same    Procedure: Port removal    Procedure performed by: Yeimi Stanton MD    Written Informed Consent Obtained:  Yes    Specimen Removed: No    Estimated Blood Loss:  Minimal     Findings:   Successful removal of port reservoir and catheter en bloc. Patient tolerated procedure without complication.     Yeimi Stanton MD  Interventional Radiology

## 2023-10-03 NOTE — H&P
H&P Note  Interventional Radiology      SUBJECTIVE:     Chief Complaint: port removal    History of Present Illness: 68M PMHx PVC, PAD, HLD, CKD, AAA, HCV (treated), RA on MTX and urothelial carcinoma who has now completed chemotherapy.     Past Medical History:   Diagnosis Date    Anemia     Cancer     Cataract     Colon polyp 2014    Depression     Disorder of kidney and ureter     History of hepatitis C, s/p successful treatment w/ SVR12 - 7/2015 10/14/2012    Kelsey 1a,  Liver biopsy 6/2014 - Stage 1 fibrosis;  Completed 8 weeks Harvoni w/ SVR12 - 7/2015      Hyperlipidemia     Lipoma of arm     Lipoma of lower extremity     Nuclear sclerosis - Both Eyes 10/22/2012    Other and unspecified hyperlipidemia 10/22/2013    PAD (peripheral artery disease)     Peripheral vascular disease, unspecified     Plaquenil adverse reaction in therapeutic use 10/22/2012    Rheumatoid arthritis(714.0) 10/14/2012    Special screening for malignant neoplasms, colon 02/21/2014     Past Surgical History:   Procedure Laterality Date    BIOPSY OF URETER Left 02/16/2023    Procedure: BIOPSY, URETER/BRUSH;  Surgeon: Kem Jefferson MD;  Location: [facility] OR 1ST FLR;  Service: Urology;  Laterality: Left;    COLONOSCOPY N/A 11/29/2021    Procedure: COLONOSCOPY;  Surgeon: Kenrick Zimmer MD;  Location: [facility] (4TH FLR);  Service: Endoscopy;  Laterality: N/A;  blood thinner approval received, see telephone encounter 10/19/21-BB  fully vacc-inst email-tb    CYSTOSCOPY      CYSTOSCOPY  02/16/2023    Procedure: CYSTOSCOPY;  Surgeon: Kem Jefferson MD;  Location: [facility] OR 1ST FLR;  Service: Urology;;    HERNIA REPAIR      INSERTION OF TUNNELED CENTRAL VENOUS CATHETER (CVC) WITH SUBCUTANEOUS PORT Right 3/13/2023    Procedure: INSERTION, PORT-A-CATH;  Surgeon: Rene Cali MD;  Location: [facility] CATH LAB;  Service: Radiology;  Laterality: Right;    KNEE ARTHROPLASTY      LAPAROSCOPIC EXPLORATION OF GROIN Left 09/06/2018    Procedure:  EXPLORATION, INGUINAL REGION, LAPAROSCOPIC;  Surgeon: Mingo De Guzman Jr., MD;  Location: [facility] OR 2ND FLR;  Service: General;  Laterality: Left;    PYELOSCOPY Left 02/16/2023    Procedure: PYELOSCOPY;  Surgeon: Kem Jefferson MD;  [facility];  Service: Urology;  Laterality: Left;    RETROGRADE PYELOGRAPHY Bilateral 02/16/2023    Procedure: PYELOGRAM, RETROGRADE;  Surgeon: Kem Jefferson MD;  [facility];  Service: Urology;  Laterality: Bilateral;    ROBOT-ASSISTED LAPAROSCOPIC SURGICAL REMOVAL OF KIDNEY AND URETER USING DA BRIJESH XI Left 7/14/2023    Procedure: XI ROBOTIC NEPHROURETERECTOMY;  Surgeon: Kem Jefferson MD;  Location: [facility] OR [address] [place] [address];  Service: Urology;  Laterality: Left;  5 hours    TONSILLECTOMY      URETEROSCOPIC REMOVAL OF URETERIC CALCULUS Left 02/16/2023    Procedure: REMOVAL, CALCULUS, URETER, URETEROSCOPIC;  Surgeon: Kem Jefferson MD;  Location: [facility] OR [address] [place] [address];  Service: Urology;  Laterality: Left;    URETEROSCOPY Left 02/16/2023    Procedure: URETEROSCOPY;  Surgeon: Kem Jefferson MD;  Location: [facility] OR [address] [place] [address];  Service: Urology;  Laterality: Left;     Family History   Problem Relation Age of Onset    Heart disease Paternal Uncle     Dementia Mother     Heart disease Sister     Liver disease Neg Hx     Amblyopia Neg Hx     Blindness Neg Hx     Cancer Neg Hx     Cataracts Neg Hx     Diabetes Neg Hx     Glaucoma Neg Hx     Hypertension Neg Hx     Macular degeneration Neg Hx     Retinal detachment Neg Hx     Strabismus Neg Hx     Stroke Neg Hx     Thyroid disease Neg Hx      Social History     Tobacco Use    Smoking status: Former     Current packs/day: 0.00     Types: Cigarettes     Quit date: 6/27/2020     Years since quitting: 3.2    Smokeless tobacco: Never   Substance Use Topics    Alcohol use: Yes     Comment: 2 drinks per week    Drug use: Yes     Types: Marijuana     Comment: marajuana used on Saturday       Review of  "Systems:  Constitutional/General:No fever, chills, change in appetite or weight loss.  Hematological/Immuno: no known coagulopathies  Respiratory: no shortness of breath  Cardiovascular: no chest pain  Gastrointestinal: no abdominal pain  Genito-Urinary: no dysuria  Musculoskeletal: negative  Skin: Negative for rash.  Neurological: no TIA or stroke symptoms  Psychiatric: normal mood/affect, good insight/judgement      OBJECTIVE:     Vital Signs Range (Last 24H):       Physical Exam:  General- Patient alert and oriented x3 in NAD  ENT- EOMI  Neck- No masses  CV- Normal rate  Resp-  No increased WOB  GI- Non-distended  Extrem- No cyanosis, clubbing, edema  Derm- No rashes, masses, or lesions noted  Neuro-  No focal deficits noted    Physical Exam  There is no height or weight on file to calculate BMI.      Allergies:   Review of patient's allergies indicates:   Allergen Reactions    Iodinated contrast media      Shortness of breath       Labs:  No results for input(s): "INR", "PT", "PTT" in the last 168 hours.  No results for input(s): "WBC", "HGB", "HCT", "MCV", "PLT" in the last 168 hours. No results for input(s): "GLU", "NA", "K", "CL", "CO2", "BUN", "CREATININE", "CALCIUM", "MG", "ALT", "AST", "ALBUMIN", "BILITOT", "BILIDIR" in the last 168 hours.       ASA: 3  Mallampati: 2    Consent obtained.     ASSESSMENT/PLAN:     68M PMHx PVC, PAD, HLD, CKD, AAA, HCV (treated), RA on MTX and urothelial carcinoma who has now completed chemotherapy.   - Port removal with moderate sedation    There are no hospital problems to display for this patient.          Yeimi Stanton MD    "

## 2023-10-03 NOTE — Clinical Note
The site was marked. The right chest was prepped. The site was prepped with ChloraPrep. The site was clipped. The patient was draped.

## 2023-10-03 NOTE — DISCHARGE SUMMARY
Radiology Discharge Summary      Hospital Course: No complications    Admit Date: 10/3/2023  Discharge Date: 10/03/2023     Instructions Given to Patient: Yes  Diet: Resume prior diet  Activity: activity as tolerated and no driving for today    Description of Condition on Discharge: Stable  Vital Signs (Most Recent): Temp: 97.7 °F (36.5 °C) (10/03/23 1239)  Pulse: 65 (10/03/23 1239)  Resp: 16 (10/03/23 1239)  BP: 136/71 (10/03/23 1239)  SpO2: 100 % (10/03/23 1239)    Discharge Disposition: Home    Discharge Diagnosis: urothelial carcinoma having completed chemotherapy     Follow-up: As needed    Yeimi Stanton MD

## 2023-10-04 ENCOUNTER — PATIENT MESSAGE (OUTPATIENT)
Dept: ADMINISTRATIVE | Facility: OTHER | Age: 68
End: 2023-10-04
Payer: MEDICARE

## 2023-10-12 ENCOUNTER — PATIENT MESSAGE (OUTPATIENT)
Dept: ADMINISTRATIVE | Facility: OTHER | Age: 68
End: 2023-10-12
Payer: MEDICARE

## 2023-10-12 DIAGNOSIS — Z79.631 LONG TERM METHOTREXATE USER: ICD-10-CM

## 2023-10-12 DIAGNOSIS — M05.79 RHEUMATOID ARTHRITIS INVOLVING MULTIPLE SITES WITH POSITIVE RHEUMATOID FACTOR: ICD-10-CM

## 2023-10-13 RX ORDER — METHOTREXATE 2.5 MG/1
TABLET ORAL
Qty: 120 TABLET | Refills: 0 | Status: SHIPPED | OUTPATIENT
Start: 2023-10-13 | End: 2024-03-08 | Stop reason: SDUPTHER

## 2023-10-15 ENCOUNTER — PATIENT MESSAGE (OUTPATIENT)
Dept: ADMINISTRATIVE | Facility: OTHER | Age: 68
End: 2023-10-15
Payer: MEDICARE

## 2023-10-16 ENCOUNTER — PATIENT MESSAGE (OUTPATIENT)
Dept: ADMINISTRATIVE | Facility: OTHER | Age: 68
End: 2023-10-16
Payer: MEDICARE

## 2023-10-17 ENCOUNTER — PATIENT MESSAGE (OUTPATIENT)
Dept: ADMINISTRATIVE | Facility: OTHER | Age: 68
End: 2023-10-17
Payer: MEDICARE

## 2023-10-18 ENCOUNTER — PATIENT MESSAGE (OUTPATIENT)
Dept: ADMINISTRATIVE | Facility: OTHER | Age: 68
End: 2023-10-18
Payer: MEDICARE

## 2023-10-24 ENCOUNTER — PATIENT MESSAGE (OUTPATIENT)
Dept: ADMINISTRATIVE | Facility: OTHER | Age: 68
End: 2023-10-24
Payer: MEDICARE

## 2023-10-26 ENCOUNTER — PATIENT MESSAGE (OUTPATIENT)
Dept: ADMINISTRATIVE | Facility: OTHER | Age: 68
End: 2023-10-26
Payer: MEDICARE

## 2023-11-07 ENCOUNTER — TELEPHONE (OUTPATIENT)
Dept: UROLOGY | Facility: CLINIC | Age: 68
End: 2023-11-07
Payer: MEDICARE

## 2023-11-07 ENCOUNTER — PATIENT MESSAGE (OUTPATIENT)
Dept: ADMINISTRATIVE | Facility: OTHER | Age: 68
End: 2023-11-07
Payer: MEDICARE

## 2023-11-07 ENCOUNTER — TELEPHONE (OUTPATIENT)
Dept: INTERNAL MEDICINE | Facility: CLINIC | Age: 68
End: 2023-11-07
Payer: MEDICARE

## 2023-11-07 NOTE — TELEPHONE ENCOUNTER
Patient has a wound that wanted to get checked to see if it is infected---no urgent appts available ---patient will go to urgent care to be seen

## 2023-11-07 NOTE — TELEPHONE ENCOUNTER
----- Message from Ysabel Herndon sent at 11/7/2023 11:31 AM CST -----  Contact: [phone]  [email or web address]     Patient is calling for Medical Advice regarding:Pt is requesting a call back     Would like response via Frankly:  ##call back     Comments:

## 2023-11-07 NOTE — TELEPHONE ENCOUNTER
"Patient states he has an infection at his belt line below his belly button.  Patient states he sent a picture to Marquita Stoner NP.  However, I do not see the picture in his epic chart.  Patient will send picture to Dr. Jefferson.  Patient told he can expect to proceed with his cystoscopy tomorrow unless Dr. Jefferson looks at picture and feels it is not in the patient's best interest.  Thank you.    ----- Message from Marylu Beth sent at 11/7/2023  1:07 PM CST -----  Consult/Advisory:      Name Of Caller: Self    Contact Preference:  [phone]      What is the nature of the call?: Returning call to office , stated that he has an infection would like to know if he can still do procedure.       Additional Notes:  "Thank you for all that you do for our patients"          "

## 2023-11-08 ENCOUNTER — TELEPHONE (OUTPATIENT)
Dept: UROLOGY | Facility: CLINIC | Age: 68
End: 2023-11-08
Payer: MEDICARE

## 2023-11-08 ENCOUNTER — PROCEDURE VISIT (OUTPATIENT)
Dept: UROLOGY | Facility: CLINIC | Age: 68
End: 2023-11-08
Payer: MEDICARE

## 2023-11-08 VITALS
HEIGHT: 69 IN | DIASTOLIC BLOOD PRESSURE: 71 MMHG | RESPIRATION RATE: 18 BRPM | TEMPERATURE: 98 F | HEART RATE: 81 BPM | WEIGHT: 143.75 LBS | SYSTOLIC BLOOD PRESSURE: 143 MMHG | BODY MASS INDEX: 21.29 KG/M2

## 2023-11-08 DIAGNOSIS — C68.9 UROTHELIAL CARCINOMA WITH HIGH RISK OF METASTASIS: ICD-10-CM

## 2023-11-08 PROCEDURE — 88112 CYTOPATH CELL ENHANCE TECH: CPT | Performed by: STUDENT IN AN ORGANIZED HEALTH CARE EDUCATION/TRAINING PROGRAM

## 2023-11-08 PROCEDURE — 52281 CYSTOSCOPY AND TREATMENT: CPT | Mod: S$GLB,,, | Performed by: UROLOGY

## 2023-11-08 PROCEDURE — 88112 CYTOPATH CELL ENHANCE TECH: CPT | Mod: 26,,, | Performed by: STUDENT IN AN ORGANIZED HEALTH CARE EDUCATION/TRAINING PROGRAM

## 2023-11-08 PROCEDURE — 52281 PR CYSTOSCOPY,DIL URETHRAL STRICTURE: ICD-10-PCS | Mod: S$GLB,,, | Performed by: UROLOGY

## 2023-11-08 PROCEDURE — 88112 PR  CYTOPATH, CELL ENHANCE TECH: ICD-10-PCS | Mod: 26,,, | Performed by: STUDENT IN AN ORGANIZED HEALTH CARE EDUCATION/TRAINING PROGRAM

## 2023-11-08 RX ORDER — LIDOCAINE HYDROCHLORIDE 20 MG/ML
JELLY TOPICAL ONCE
Status: COMPLETED | OUTPATIENT
Start: 2023-11-08 | End: 2023-11-08

## 2023-11-08 RX ADMIN — LIDOCAINE HYDROCHLORIDE: 20 JELLY TOPICAL at 01:11

## 2023-11-08 NOTE — PROCEDURES
Procedures:  Flexible cystourethroscopy  Serial Urethral dilation to 18fr    Pre Procedure Diagnosis:left upper tract urothelial CA  S/p left nephroureterectomy on 7/14/23; HG pT2 N0R0.      Patient Active Problem List    Diagnosis Date Noted    Anemia 07/05/2023    Chemotherapy induced neutropenia 04/11/2023    Urothelial carcinoma with high risk of metastasis 03/06/2023    Ureteral tumor 02/16/2023    Solitary pulmonary nodule (repeat due Aug 2023) 02/09/2023    Drug-induced immunodeficiency 11/14/2022    Stage 3a chronic kidney disease 11/14/2022    Venous insufficiency     AAA (abdominal aortic aneurysm)     Right inguinal hernia 09/06/2018    PVD (peripheral vascular disease)     Intermittent claudication 10/22/2015    PAD (peripheral artery disease) 10/28/2013    Hypercholesteremia 10/28/2013    Other hyperlipidemia 10/22/2013    Nuclear sclerosis - Both Eyes 10/22/2012    History of long-term treatment with high-risk medication 10/22/2012    Persistent insomnia 10/15/2012    Rheumatoid arthritis 10/14/2012    History of hepatitis C, s/p successful treatment w/ SVR12 - 7/2015 10/14/2012         Post Procedure Diagnosis:Normal cystoscopy    Surgeon: Kem Jefferson MD    Anesthesia: 2% uro-jet lidocaine jelly for local analgesia    Flexible cysto-urethroscopy was performed after consent was obtained.  The risks and benefits were explained.    2% lidocaine urojet was used for local analgesia.  The genitalia was prepped and draped in the sterile fashion with hibiclens.    The flexible scope was advanced into the urethra.  However, there was a fossa navicularis stricture that the scope was not able to traverse.  A wire under direct vision was passed into the proximal urethra and bladder.  Day dilators were then used to sequentially dilate from 12 Setswana up to 18 Setswana.  At this point, the flexible cystoscope was able to be passed into the urethra without difficulty.  No posterior urethral strictures were  noted.  The prostate showed Significant hypertrophy.  There was No median lobe present.  The left ureteral orifice was absent and the right ureteral orifice was evaluated and noted to be normal with clear efflux.  The bladder was completely surveyed in a systematic fashion.   No bladder tumors or lesions were seen.      The patient tolerated the procedure well without complication.  Per NCCN guidelines, recommend q 3month cystoscopy and cytology for 1 year then at longer intervals.   Upper tract and chest imaging ordered by Marquita Stoner.     They will follow up in 3 month(s) for repeat cystoscopy.  A urine cytology was ordered and collected today.

## 2023-11-08 NOTE — TELEPHONE ENCOUNTER
Appointment made per below.  Patient uses the portal.  Thank you.    SB THOMSON MRN: [id number]    Date: 2/7/2024 Status: Kaylee   Appt Time: 12:30 PM Length: 45   Visit Type: CYSTOSCOPY PROC [2430] Copay: $0.00   Provider: PROCEDURE, UROLOGY ROOM 3       ----- Message from Pao Rosado LPN sent at 11/8/2023  1:36 PM CST -----  Cysto in 3 months f/u

## 2023-11-08 NOTE — PATIENT INSTRUCTIONS
What to Expect After a Cystoscopy  For the next 24-48 hours, you may feel a mild burning when you urinate. This burning is normal and expected. Drink plenty of water to dilute the urine to help relieve the burning sensation. You may also see a small amount of blood in your urine after the procedure.    Unless you are already taking antibiotics, you may be given an antibiotic after the test to prevent infection.    Signs and Symptoms to Report  Call the [facility] at [phone] if you develop any of the following:  Fever of 101 degrees or higher  Chills or persistent bleeding  Inability to urinate

## 2023-11-09 LAB
FINAL PATHOLOGIC DIAGNOSIS: NORMAL
Lab: NORMAL
MICROSCOPIC EXAM: NORMAL

## 2023-11-14 ENCOUNTER — PATIENT MESSAGE (OUTPATIENT)
Dept: ADMINISTRATIVE | Facility: OTHER | Age: 68
End: 2023-11-14
Payer: MEDICARE

## 2023-12-03 ENCOUNTER — PATIENT MESSAGE (OUTPATIENT)
Dept: ADMINISTRATIVE | Facility: OTHER | Age: 68
End: 2023-12-03
Payer: MEDICARE

## 2023-12-12 ENCOUNTER — HOSPITAL ENCOUNTER (OUTPATIENT)
Dept: RADIOLOGY | Facility: HOSPITAL | Age: 68
Discharge: HOME | End: 2023-12-12
Attending: NURSE PRACTITIONER
Payer: MEDICARE

## 2023-12-12 ENCOUNTER — PATIENT MESSAGE (OUTPATIENT)
Dept: ADMINISTRATIVE | Facility: OTHER | Age: 68
End: 2023-12-12
Payer: MEDICARE

## 2023-12-12 DIAGNOSIS — E27.8 ADRENAL NODULE: ICD-10-CM

## 2023-12-12 DIAGNOSIS — C64.2 UROTHELIAL CARCINOMA OF KIDNEY, LEFT: ICD-10-CM

## 2023-12-12 PROCEDURE — 71250 CT THORAX DX C-: CPT | Mod: 26,,, | Performed by: RADIOLOGY

## 2023-12-12 PROCEDURE — 74176 CT ABD & PELVIS W/O CONTRAST: CPT | Mod: 26,,, | Performed by: RADIOLOGY

## 2023-12-12 PROCEDURE — 71250 CT CHEST ABDOMEN PELVIS WITHOUT CONTRAST(XPD): ICD-10-PCS | Mod: 26,,, | Performed by: RADIOLOGY

## 2023-12-12 PROCEDURE — 74176 CT ABD & PELVIS W/O CONTRAST: CPT | Mod: TC

## 2023-12-12 PROCEDURE — 74176 CT CHEST ABDOMEN PELVIS WITHOUT CONTRAST(XPD): ICD-10-PCS | Mod: 26,,, | Performed by: RADIOLOGY

## 2023-12-14 ENCOUNTER — OFFICE VISIT (OUTPATIENT)
Dept: HEMATOLOGY/ONCOLOGY | Facility: CLINIC | Age: 68
End: 2023-12-14
Payer: MEDICARE

## 2023-12-14 VITALS
HEART RATE: 83 BPM | DIASTOLIC BLOOD PRESSURE: 76 MMHG | BODY MASS INDEX: 24.39 KG/M2 | OXYGEN SATURATION: 96 % | HEIGHT: 65 IN | TEMPERATURE: 98 F | SYSTOLIC BLOOD PRESSURE: 125 MMHG | RESPIRATION RATE: 16 BRPM | WEIGHT: 146.38 LBS

## 2023-12-14 DIAGNOSIS — C64.2 UROTHELIAL CARCINOMA OF KIDNEY, LEFT: Primary | ICD-10-CM

## 2023-12-14 DIAGNOSIS — Z12.5 ENCOUNTER FOR SCREENING FOR MALIGNANT NEOPLASM OF PROSTATE: ICD-10-CM

## 2023-12-14 DIAGNOSIS — D53.9 ANEMIA, DEFICIENCY: ICD-10-CM

## 2023-12-14 DIAGNOSIS — R93.89 ABNORMAL FINDINGS ON DIAGNOSTIC IMAGING OF OTHER SPECIFIED BODY STRUCTURES: ICD-10-CM

## 2023-12-14 PROCEDURE — 1101F PT FALLS ASSESS-DOCD LE1/YR: CPT | Mod: CPTII,S$GLB,, | Performed by: INTERNAL MEDICINE

## 2023-12-14 PROCEDURE — 3288F PR FALLS RISK ASSESSMENT DOCUMENTED: ICD-10-PCS | Mod: CPTII,S$GLB,, | Performed by: INTERNAL MEDICINE

## 2023-12-14 PROCEDURE — 3078F PR MOST RECENT DIASTOLIC BLOOD PRESSURE < 80 MM HG: ICD-10-PCS | Mod: CPTII,S$GLB,, | Performed by: INTERNAL MEDICINE

## 2023-12-14 PROCEDURE — 99999 PR PBB SHADOW E&M-EST. PATIENT-LVL III: CPT | Mod: PBBFAC,,, | Performed by: INTERNAL MEDICINE

## 2023-12-14 PROCEDURE — 99215 PR OFFICE/OUTPT VISIT, EST, LEVL V, 40-54 MIN: ICD-10-PCS | Mod: S$GLB,,, | Performed by: INTERNAL MEDICINE

## 2023-12-14 PROCEDURE — 3008F PR BODY MASS INDEX (BMI) DOCUMENTED: ICD-10-PCS | Mod: CPTII,S$GLB,, | Performed by: INTERNAL MEDICINE

## 2023-12-14 PROCEDURE — 1126F AMNT PAIN NOTED NONE PRSNT: CPT | Mod: CPTII,S$GLB,, | Performed by: INTERNAL MEDICINE

## 2023-12-14 PROCEDURE — 1126F PR PAIN SEVERITY QUANTIFIED, NO PAIN PRESENT: ICD-10-PCS | Mod: CPTII,S$GLB,, | Performed by: INTERNAL MEDICINE

## 2023-12-14 PROCEDURE — 99215 OFFICE O/P EST HI 40 MIN: CPT | Mod: S$GLB,,, | Performed by: INTERNAL MEDICINE

## 2023-12-14 PROCEDURE — 3078F DIAST BP <80 MM HG: CPT | Mod: CPTII,S$GLB,, | Performed by: INTERNAL MEDICINE

## 2023-12-14 PROCEDURE — 3074F SYST BP LT 130 MM HG: CPT | Mod: CPTII,S$GLB,, | Performed by: INTERNAL MEDICINE

## 2023-12-14 PROCEDURE — 3288F FALL RISK ASSESSMENT DOCD: CPT | Mod: CPTII,S$GLB,, | Performed by: INTERNAL MEDICINE

## 2023-12-14 PROCEDURE — 99999 PR PBB SHADOW E&M-EST. PATIENT-LVL III: ICD-10-PCS | Mod: PBBFAC,,, | Performed by: INTERNAL MEDICINE

## 2023-12-14 PROCEDURE — 3008F BODY MASS INDEX DOCD: CPT | Mod: CPTII,S$GLB,, | Performed by: INTERNAL MEDICINE

## 2023-12-14 PROCEDURE — 3074F PR MOST RECENT SYSTOLIC BLOOD PRESSURE < 130 MM HG: ICD-10-PCS | Mod: CPTII,S$GLB,, | Performed by: INTERNAL MEDICINE

## 2023-12-14 PROCEDURE — 1101F PR PT FALLS ASSESS DOC 0-1 FALLS W/OUT INJ PAST YR: ICD-10-PCS | Mod: CPTII,S$GLB,, | Performed by: INTERNAL MEDICINE

## 2023-12-14 PROCEDURE — 99499 UNLISTED E&M SERVICE: CPT | Mod: S$GLB,,, | Performed by: INTERNAL MEDICINE

## 2023-12-14 RX ORDER — DOXYCYCLINE HYCLATE 100 MG
100 TABLET ORAL 2 TIMES DAILY
COMMUNITY
Start: 2023-12-07 | End: 2024-01-29 | Stop reason: ALTCHOICE

## 2023-12-14 NOTE — PROGRESS NOTES
MEDICAL ONCOLOGY - FOLLOW-UP VISIT    Reason for visit: Upper tract Urothelial carcinoma     Best Contact Phone Number(s): [phone] (home)      Cancer/Stage/TNM:    Cancer Staging   Ureteral tumor  Staging form: Renal Pelvis and Ureter, AJCC 8th Edition  - Clinical stage from 7/28/2023: Stage II (cT2, cN0, cM0) - Signed by Marquita Stoner NP on 8/28/2023       Oncology History   Ureteral tumor   2/16/2023 Initial Diagnosis    Ureteral tumor     7/28/2023 Cancer Staged    Staging form: Renal Pelvis and Ureter, AJCC 8th Edition  - Clinical stage from 7/28/2023: Stage II (cT2, cN0, cM0)     Urothelial carcinoma with high risk of metastasis   3/6/2023 Initial Diagnosis    Urothelial carcinoma with high risk of metastasis     3/14/2023 -  Chemotherapy    Treatment Summary   Plan Name: OP BLADDER GEMCITABINE CISPLATIN (SPLIT DOSE CISPLATIN) Q3W  Treatment Goal: Curative  Status: Active  Start Date: 3/14/2023  End Date: 6/29/2023  Provider: Issa Antony MD  Chemotherapy: CISplatin (Platinol) 35 mg/m2 = 64 mg in sodium chloride 0.9% 314 mL chemo infusion, 35 mg/m2 = 64 mg, Intravenous, Clinic/HOD 1 time, 4 of 4 cycles  Dose modification: 17.5 mg/m2 (original dose 35 mg/m2, Cycle 3, Reason: Other (see comments), Comment: renal function), 35 mg/m2 (original dose 35 mg/m2, Cycle 3, Reason: Other (see comments)), 17.5 mg/m2 (original dose 35 mg/m2, Cycle 2, Reason: Other (see comments), Comment: renal function)  Administration: 64 mg (3/14/2023), 64 mg (3/21/2023), 64 mg (4/19/2023), 64 mg (5/4/2023), 64 mg (5/16/2023), 32 mg (4/4/2023), 64 mg (6/1/2023), 64 mg (6/28/2023)  gemcitabine 1,800 mg in sodium chloride 0.9% SolP 332.34 mL chemo infusion, 1,840 mg, Intravenous, Clinic/HOD 1 time, 4 of 4 cycles  Administration: 1,800 mg (3/14/2023), 1,800 mg (3/21/2023), 1,800 mg (4/4/2023), 1,800 mg (4/19/2023), 1,800 mg (5/4/2023), 1,800 mg (5/16/2023), 1,800 mg (6/1/2023), 1,800 mg (6/28/2023)          HPI:   68 y.o. male  with PVD, PAD, HLD, CKD3, AAA, treated hep C, Rheumatoid arthritis on MTX (previously on humara), smoking (about 1/2 PPD) who presented with gross hematuria. He saw urology and underwent CT urogram which showed Few soft tissue masses within the left renal pelvis, the largest measures 2.5 x 1.3 cm. There was also a L adrenal nodule measuring 1.6 cm. Urine cytology was evident of atypical urothelial cells, and repeat sample showed high grade urothelial carcinoma. He underwent a flexible cystoscopy with normal bladder findings. L RPG showed Left RPG with mild hydronephrosis and filling defect ~2 cm in central renal pelvis.  Left ureteroscopy with was done and showed papillary tumors along the proximal ureter and renal pelvis. - Large ~2 cm nodular lesion in the renal pelvis consistent with filling defect. Biopsy of the mass came back as - High grade urothelial dysplasia/carcinoma in situ. - No definitive invasive carcinoma identified (outside report)    PET/CT done on 03/08 showed Left proximal ureteral lesion compatible with urothelial cancer.  No FDG PET evidence of metastatic disease.      Interval history:   s/p robotic nephroureterectomy on 7/14/23 with Dr. Jefferson with negative margins, high grade UTUC. Patient developed significant shortness of breath after contrast on Friday. He felt better after being seen in the ER. He is now back to work and feeling better. Appetite still low. Noticing hearing loss. Noticing weak stream. Denies dysuria.    Review of Systems   Constitutional:  Positive for malaise/fatigue. Negative for chills, fever and weight loss.   HENT:  Positive for hearing loss. Negative for congestion. Ear discharge: r>L.   Eyes:  Negative for blurred vision.   Respiratory:  Negative for cough and shortness of breath.    Cardiovascular:  Negative for chest pain, palpitations and leg swelling.   Gastrointestinal:  Positive for constipation. Negative for abdominal pain, blood in stool, diarrhea, nausea  and vomiting.   Genitourinary:  Negative for dysuria and frequency.   Musculoskeletal:  Negative for back pain and myalgias.   Skin:  Negative for itching and rash.   Neurological:  Negative for dizziness, tingling, tremors, sensory change, focal weakness and headaches.   Endo/Heme/Allergies:  Does not bruise/bleed easily.   Psychiatric/Behavioral:  The patient is not nervous/anxious.        Past Medical History:   Past Medical History:   Diagnosis Date    Anemia     Cancer     Cataract     Colon polyp 2014    Depression     Disorder of kidney and ureter     History of hepatitis C, s/p successful treatment w/ SVR12 - 7/2015 10/14/2012    Kelsey 1a,  Liver biopsy 6/2014 - Stage 1 fibrosis;  Completed 8 weeks Harvoni w/ SVR12 - 7/2015      Hyperlipidemia     Lipoma of arm     Lipoma of lower extremity     Nuclear sclerosis - Both Eyes 10/22/2012    Other and unspecified hyperlipidemia 10/22/2013    PAD (peripheral artery disease)     Peripheral vascular disease, unspecified     Plaquenil adverse reaction in therapeutic use 10/22/2012    Rheumatoid arthritis(714.0) 10/14/2012    Special screening for malignant neoplasms, colon 02/21/2014        Past Surgical History:   Past Surgical History:   Procedure Laterality Date    BIOPSY OF URETER Left 02/16/2023    Procedure: BIOPSY, URETER/BRUSH;  Surgeon: Kem Jefferson MD;  Location: [facility] OR [address] [place] [address];  Service: Urology;  Laterality: Left;    COLONOSCOPY N/A 11/29/2021    Procedure: COLONOSCOPY;  Surgeon: Kenrick Zimmer MD;  Location: [facility] (4TH FLR);  Service: Endoscopy;  Laterality: N/A;  blood thinner approval received, see telephone encounter 10/19/21-BB  fully vacc-inst email-tb    CYSTOSCOPY      CYSTOSCOPY  02/16/2023    Procedure: CYSTOSCOPY;  Surgeon: Kem Jefferson MD;  Location: [facility] OR [address] [place] [address];  Service: Urology;;    HERNIA REPAIR      INSERTION OF TUNNELED CENTRAL VENOUS CATHETER (CVC) WITH SUBCUTANEOUS PORT Right 3/13/2023    Procedure: INSERTION,  PORT-A-CATH;  Surgeon: Rene Cali MD;  Location: [facility] CATH LAB;  Service: Radiology;  Laterality: Right;    KNEE ARTHROPLASTY      LAPAROSCOPIC EXPLORATION OF GROIN Left 09/06/2018    Procedure: EXPLORATION, INGUINAL REGION, LAPAROSCOPIC;  Surgeon: Mingo De Guzman Jr., MD;  Location: [facility] OR 2ND FLR;  Service: General;  Laterality: Left;    MEDIPORT REMOVAL N/A 10/3/2023    Procedure: REMOVAL, CATHETER, CENTRAL VENOUS, TUNNELED, WITH PORT;  Surgeon: Yeimi Stanton MD;  Location: [facility] CATH LAB;  Service: Radiology;  Laterality: N/A;    PYELOSCOPY Left 02/16/2023    Procedure: PYELOSCOPY;  Surgeon: Kem Jefferson MD;  Location: [facility] OR [address] [place] [address];  Service: Urology;  Laterality: Left;    RETROGRADE PYELOGRAPHY Bilateral 02/16/2023    Procedure: PYELOGRAM, RETROGRADE;  Surgeon: eKm Jefferson MD;  Location: [facility] OR [address] [place] [address];  Service: Urology;  Laterality: Bilateral;    ROBOT-ASSISTED LAPAROSCOPIC SURGICAL REMOVAL OF KIDNEY AND URETER USING DA BRIJESH XI Left 7/14/2023    Procedure: XI ROBOTIC NEPHROURETERECTOMY;  Surgeon: Kem Jefferson MD;  Location: [facility] OR [address] [place] [address];  Service: Urology;  Laterality: Left;  5 hours    TONSILLECTOMY      URETEROSCOPIC REMOVAL OF URETERIC CALCULUS Left 02/16/2023    Procedure: REMOVAL, CALCULUS, URETER, URETEROSCOPIC;  Surgeon: Kem Jefferson MD;  Location: [facility] OR [address] [place] [address];  Service: Urology;  Laterality: Left;    URETEROSCOPY Left 02/16/2023    Procedure: URETEROSCOPY;  Surgeon: Kem Jefferson MD;  Location: [facility] OR [address] [place] [address];  Service: Urology;  Laterality: Left;        Family History:   Family History   Problem Relation Age of Onset    Heart disease Paternal Uncle     Dementia Mother     Heart disease Sister     Liver disease Neg Hx     Amblyopia Neg Hx     Blindness Neg Hx     Cancer Neg Hx     Cataracts Neg Hx     Diabetes Neg Hx     Glaucoma Neg Hx     Hypertension Neg Hx     Macular degeneration Neg Hx     Retinal detachment Neg Hx     Strabismus Neg Hx      Stroke Neg Hx     Thyroid disease Neg Hx         Social History:   Social History     Tobacco Use    Smoking status: Former     Current packs/day: 0.00     Types: Cigarettes     Quit date: 6/27/2020     Years since quitting: 3.4    Smokeless tobacco: Never   Substance Use Topics    Alcohol use: Yes     Comment: 2 drinks per week        I have reviewed and updated the patient's past medical, surgical, family and social histories.    Allergies:   Review of patient's allergies indicates:   Allergen Reactions    Iodinated contrast media      Shortness of breath        Medications:   Current Outpatient Medications   Medication Sig Dispense Refill    acetaminophen (TYLENOL) 650 MG TbSR Take 1 tablet (650 mg total) by mouth every 8 (eight) hours. 63 tablet 0    amLODIPine (NORVASC) 5 MG tablet TAKE 1 TABLET BY MOUTH ONCE  DAILY 90 tablet 3    aspirin 81 MG Chew Take 81 mg by mouth once daily.      bisacodyL (DULCOLAX) 5 mg EC tablet Take 5 mg by mouth daily as needed for Constipation.      cilostazoL (PLETAL) 50 MG Tab Take 1 tablet (50 mg total) by mouth 2 (two) times daily. 180 tablet 3    docusate sodium (COLACE) 100 MG capsule Take 100 mg by mouth 2 (two) times daily.      doxazosin (CARDURA) 4 MG tablet TAKE 1 TABLET BY MOUTH IN THE  EVENING 90 tablet 3    folic acid (FOLVITE) 1 MG tablet Take 1 tablet (1,000 mcg total) by mouth once daily. 90 tablet 3    gabapentin (NEURONTIN) 300 MG capsule Take 1 capsule (300 mg total) by mouth 3 (three) times daily. 90 capsule 1    methocarbamoL (ROBAXIN) 500 MG Tab Take 1 tablet (500 mg total) by mouth 3 (three) times daily as needed (muscle pain). 15 tablet 0    methotrexate 2.5 MG Tab TAKE 8 TABLETS BY MOUTH EVERY 7  DAYS THIS MEDICATION REQUIRES  PERIODIC LAB MONITORING 120 tablet 0    multivitamin capsule Take 1 capsule by mouth once daily.      oxyCODONE (ROXICODONE) 5 MG immediate release tablet Take 1 tablet (5 mg total) by mouth every 6 (six) hours as needed for Pain.  "60 tablet 0    simvastatin (ZOCOR) 10 MG tablet Take 1 tablet (10 mg total) by mouth every evening. 90 tablet 9    traZODone (DESYREL) 50 MG tablet TAKE 3 TABLETS BY MOUTH AT  NIGHT AS NEEDED FOR  INSOMNIA 270 tablet 3     No current facility-administered medications for this visit.        Physical Exam:   /76 (BP Location: Right arm, Patient Position: Sitting, BP Method: Medium (Automatic))   Pulse 83   Temp 97.6 °F (36.4 °C) (Oral)   Resp 16   Ht 5' 5" (1.651 m)   Wt 66.4 kg (146 lb 6.2 oz)   SpO2 96%   BMI 24.36 kg/m²      ECOG Performance status: 1         Physical Exam  Constitutional:       General: He is not in acute distress.     Appearance: Normal appearance.   HENT:      Head: Normocephalic and atraumatic.   Eyes:      Pupils: Pupils are equal, round, and reactive to light.   Cardiovascular:      Rate and Rhythm: Normal rate and regular rhythm.      Heart sounds: No murmur heard.  Pulmonary:      Effort: No respiratory distress.      Breath sounds: Normal breath sounds. No wheezing.   Abdominal:      General: Abdomen is flat. Bowel sounds are normal. There is no distension.      Palpations: Abdomen is soft. There is no mass.      Tenderness: There is no abdominal tenderness.   Musculoskeletal:         General: No swelling or deformity.   Skin:     Coloration: Skin is not jaundiced.   Neurological:      General: No focal deficit present.      Mental Status: He is alert and oriented to person, place, and time. Mental status is at baseline.           Labs:   Reviewed      Assessment:       1. Urothelial carcinoma of kidney, left            Plan:   1,2-  Cancer Staging   Ureteral tumor  Staging form: Renal Pelvis and Ureter, AJCC 8th Edition  - Clinical stage from 7/28/2023: Stage II (cT2, cN0, cM0) - Signed by Marquita Stoner NP on 8/28/2023    68 y.o. M patient presented with gross hematuria and was found to have multiple soft tissue masses in L renal pelvis. He is s/p L urethroscopy and biopsy " which showed high grade urothelial dysplasia. On CT urogram, there was a 1.6 cm L adrenal nodule, as well as multiple pulmonary nodules, largest was 0.6 cm, and multiple hepatic nodules too small to characterize.   Overall picture is concerning for invasive upper tract urothelial carcinoma.   PET/CT showed no evidence of metastatic disease. There is B/L adrenal adenomas.     Now s/p neoadjuvant chemotherapy with Gemcitabine and Cisplatin. Split dose Cisplatin for borderline kidney functions.  Because of his RA (not very controlled), we avoided IO adjuvantely.     Current scans show JU.  Pt to see vascular surgery re:AAA       RTC in 3 mos with CT CAP and labs (CBC, CMP, Iron studies, TIBC, B12, folate, TSH, PSA).      The patient agrees with the plan, and all questions have been answered to their satisfaction.      I, Dr. Issa Antony, personally spent more than 40 mins during this encounter.     Issa Antony M.D., M.S., F.A.C.P.  Hematology/Oncology Attending  [facility]                Med Onc Chart Routing      Follow up with physician 3 months. RTC in 3 mos with CT CAP and labs (CBC, CMP, Iron studies, TIBC, B12, folate, TSH, PSA).   Follow up with [facility]    Infusion scheduling note    Injection scheduling note    Labs CBC, CMP, folate, iron and TIBC, PSA and TSH   Scheduling:  Preferred lab:  Lab interval:  and B12 level   Imaging CT chest abdomen pelvis      Pharmacy appointment    Other referrals
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
## 2024-01-08 NOTE — PROGRESS NOTES
Answers submitted by the patient for this visit:  Rheumatology Questionnaire (Submitted on 1/7/2024)  fever: No  eye redness: No  mouth sores: No  headaches: No  shortness of breath: No      chest pain: No  trouble swallowing: No  diarrhea: No  constipation: No  unexpected weight change: No  genital sore: No  During the last 3 days, have you had a skin rash?: No  Bruises or bleeds easily: No  cough: Yes    Subjective:       Patient ID: Andrea Gant is a 68 y.o. male with nodular sero+ccp+ RA, OA and Hepatitis C (cured with Harvoni) on MTX    Chief Complaint: No chief complaint on file.    Returns for follow-up. Last seen on 4/6/23.  Was dx w L urothelial cancer, got chemo & underwent robotic nephroureterectomy on 7/14/23 Noticing hearing loss. Noticing weak stream. Denies dysuria.  Renal insufficiency has worsened.   However, despite this, he feels he is doing very well overall and is working.   He is currently taking 20 mg of MTX/wk + folic acid 1 mg/d. Takes it Wednesdays.  Has some joint pain--mostly feet, R knee which he says is inflamed & swollen. Also some PIP jt pain in hands.  Foot pain is worse in AM & dissipates; had 1 day hx of severe pain R toe that spontaneously resolved.  No known hx of gout.  He is c/o nodules on his thumbs & several fingers as well as over the elbows. Nodules hurt when he bangs his elbows.   Had SC nodules removed from R elbow; in past had them removed from L elbow. Pathology both times: rheumatoid nodules.  AM stiffness is 30 minutes or less; Denies dysphagia, tight skin, oral ulcers, pleurisy, pericarditis, photosensitivity, thromboses. Has dry eyes. No dry mouth. No true Raynaud's.       Current Outpatient Medications   Medication Sig Dispense Refill    acetaminophen (TYLENOL) 650 MG TbSR Take 1 tablet (650 mg total) by mouth every 8 (eight) hours. 63 tablet 0    amLODIPine (NORVASC) 5 MG tablet TAKE 1 TABLET BY MOUTH ONCE  DAILY 90 tablet 3    aspirin 81 MG Chew Take 81 mg  by mouth once daily.      bisacodyL (DULCOLAX) 5 mg EC tablet Take 5 mg by mouth daily as needed for Constipation.      cilostazoL (PLETAL) 50 MG Tab Take 1 tablet (50 mg total) by mouth 2 (two) times daily. 180 tablet 3    docusate sodium (COLACE) 100 MG capsule Take 100 mg by mouth 2 (two) times daily.      doxazosin (CARDURA) 4 MG tablet TAKE 1 TABLET BY MOUTH IN THE  EVENING 90 tablet 3    doxycycline (VIBRA-TABS) 100 MG tablet Take 100 mg by mouth 2 (two) times daily.      folic acid (FOLVITE) 1 MG tablet Take 1 tablet (1,000 mcg total) by mouth once daily. 90 tablet 3    methotrexate 2.5 MG Tab TAKE 8 TABLETS BY MOUTH EVERY 7  DAYS THIS MEDICATION REQUIRES  PERIODIC LAB MONITORING 120 tablet 0    multivitamin capsule Take 1 capsule by mouth once daily.      simvastatin (ZOCOR) 10 MG tablet Take 1 tablet (10 mg total) by mouth every evening. 90 tablet 9    traZODone (DESYREL) 50 MG tablet TAKE 3 TABLETS BY MOUTH AT  NIGHT AS NEEDED FOR  INSOMNIA 270 tablet 3     No current facility-administered medications for this visit.     Probable Allergic to Enbrel (rash);     Past Medical History:   Diagnosis Date    Anemia     Cancer     Cataract     Colon polyp 2014    Depression     Disorder of kidney and ureter     History of hepatitis C, s/p successful treatment w/ SVR12 - 7/2015 10/14/2012    Kelsey 1a,  Liver biopsy 6/2014 - Stage 1 fibrosis;  Completed 8 weeks Harvoni w/ SVR12 - 7/2015      Hyperlipidemia     Lipoma of arm     Lipoma of lower extremity     Nuclear sclerosis - Both Eyes 10/22/2012    Other and unspecified hyperlipidemia 10/22/2013    PAD (peripheral artery disease)     Peripheral vascular disease, unspecified     Plaquenil adverse reaction in therapeutic use 10/22/2012    Rheumatoid arthritis(714.0) 10/14/2012    Special screening for malignant neoplasms, colon 02/21/2014       Past Surgical History:   Procedure Laterality Date    BIOPSY OF URETER Left 02/16/2023    Procedure: BIOPSY, URETER/BRUSH;   Surgeon: Kem Jefferson MD;  Location: [facility] OR 1ST FLR;  Service: Urology;  Laterality: Left;    COLONOSCOPY N/A 11/29/2021    Procedure: COLONOSCOPY;  Surgeon: Kenrick Zimmer MD;  Location: [facility] ENDO (4TH FLR);  Service: Endoscopy;  Laterality: N/A;  blood thinner approval received, see telephone encounter 10/19/21-BB  fully vacc-inst email-tb    CYSTOSCOPY      CYSTOSCOPY  02/16/2023    Procedure: CYSTOSCOPY;  Surgeon: Kem Jefferson MD;  Location: [facility] OR 1ST FLR;  Service: Urology;;    HERNIA REPAIR      INSERTION OF TUNNELED CENTRAL VENOUS CATHETER (CVC) WITH SUBCUTANEOUS PORT Right 3/13/2023    Procedure: INSERTION, PORT-A-CATH;  Surgeon: Rene Cali MD;  Location: [facility] CATH LAB;  Service: Radiology;  Laterality: Right;    KNEE ARTHROPLASTY      LAPAROSCOPIC EXPLORATION OF GROIN Left 09/06/2018    Procedure: EXPLORATION, INGUINAL REGION, LAPAROSCOPIC;  Surgeon: Mingo De Guzman Jr., MD;  Location: [facility] OR 2ND FLR;  Service: General;  Laterality: Left;    MEDIPORT REMOVAL N/A 10/3/2023    Procedure: REMOVAL, CATHETER, CENTRAL VENOUS, TUNNELED, WITH PORT;  Surgeon: Yeimi Stanton MD;  Location: [facility] CATH LAB;  Service: Radiology;  Laterality: N/A;    PYELOSCOPY Left 02/16/2023    Procedure: PYELOSCOPY;  Surgeon: Kem Jefferson MD;  [facility];  Service: Urology;  Laterality: Left;    RETROGRADE PYELOGRAPHY Bilateral 02/16/2023    Procedure: PYELOGRAM, RETROGRADE;  Surgeon: Kem Jefferson MD;  [address]  Service: Urology;  Laterality: Bilateral;    ROBOT-ASSISTED LAPAROSCOPIC SURGICAL REMOVAL OF KIDNEY AND URETER USING DA BRIJESH XI Left 7/14/2023    Procedure: XI ROBOTIC NEPHROURETERECTOMY;  Surgeon: Kem Jefferson MD;  Location: [facility] OR 2ND FLR;  Service: Urology;  Laterality: Left;  5 hours    TONSILLECTOMY      URETEROSCOPIC REMOVAL OF URETERIC CALCULUS Left 02/16/2023    Procedure: REMOVAL, CALCULUS, URETER, URETEROSCOPIC;  Surgeon: Kem  "JOSHUA Jefferson MD;  Location: [facility] OR [address] [place] [address];  Service: Urology;  Laterality: Left;    URETEROSCOPY Left 02/16/2023    Procedure: URETEROSCOPY;  Surgeon: Kem Jefferson MD;  Location: [facility] OR [address] [place] [address];  Service: Urology;  Laterality: Left;       Review of Systems   Constitutional:  Negative for fatigue, fever and unexpected weight change.        Initial weight loss attributed to dental issues.  Now maintaining weight loss;    HENT: Negative.  Negative for hearing loss, mouth sores, sore throat, tinnitus and trouble swallowing.    Eyes: Negative.  Negative for redness and visual disturbance.        Dry eyes.   Respiratory:  Negative for cough, choking, chest tightness and shortness of breath.    Cardiovascular: Negative.  Negative for chest pain, palpitations and leg swelling.   Gastrointestinal: Negative.  Negative for abdominal pain, blood in stool, constipation, diarrhea, nausea and vomiting.        Heartburn improved   Endocrine: Negative.    Genitourinary:  Positive for frequency. Negative for genital sores, hematuria and urgency.   Musculoskeletal:  Positive for arthralgias. Negative for back pain, neck pain and neck stiffness.   Skin: Negative.  Negative for rash.   Allergic/Immunologic: Negative.    Neurological: Negative.  Negative for dizziness, syncope, numbness and headaches.   Hematological: Negative.  Does not bruise/bleed easily.   Psychiatric/Behavioral:  Positive for sleep disturbance. Negative for dysphoric mood. The patient is not nervous/anxious.         Sleep helped by trazodone.          SH:Gave up smoking was former smoker.  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       Objective:   /86   Pulse 82   Ht 5' 5" (1.651 m)   Wt 69.9 kg (154 lb 1.6 oz)   BMI 25.64 kg/m²   Was 154 lb 15.7 lbs on 5/17/22  Was 158 lb 11.7 oz on 11/2/21  Was 173 lb 11.6 oz on 8/8/19  Was 172 lbs 4.8 oz on 12/13/16;  Physical Exam   Constitutional: He is oriented to person, place, and time. No distress.   HENT:   Head: " Normocephalic and atraumatic.   Mouth/Throat: Oropharynx is clear and moist. No oropharyngeal exudate.   No facial rashes  Parotids not enlarged     Eyes: Pupils are equal, round, and reactive to light. Conjunctivae are normal. Right eye exhibits no discharge. Left eye exhibits no discharge. No scleral icterus.   Neck: No JVD present. No tracheal deviation present. No thyromegaly present.   Cardiovascular: Normal rate and regular rhythm. Exam reveals no gallop and no friction rub.   Murmur heard.  Pulmonary/Chest: Effort normal and breath sounds normal. No respiratory distress. He has no wheezes. He has no rales. He exhibits no tenderness.   Abdominal: Soft. Bowel sounds are normal. He exhibits no distension and no mass. There is no splenomegaly or hepatomegaly. There is no abdominal tenderness. There is no rebound and no guarding.   Musculoskeletal:         General: No tenderness. Normal range of motion.      Cervical back: Neck supple.      Comments: Cspine FROM no tenderness  Tspine FROM no tenderness; mild kyposis  Lspine FROM no tenderness.  TMJ: unremarkable  Shoulders: FROM now; no synovitis; no tenderness  Elbows: FROM; bilateral surgical scars; R mild flexion contracture.  Wrists: FROM; no SHT & noTTP  MCPs: FROM; no SHT; no metacarpalgia;  ok;  PIPs: FROM; +Bouchards--servando 4th R PIP; no SHT; no TTP; makes good fists.  DIPs: FROM; + Heberden's; no synovitis  HIPS: FROM  Knees: FROM; no synovitis; no instability; no effusions;   Ankles: FROM: no synovitis   Toes: ok; no metatarsalgia; mild HV on L;   R Achilles: mild TTP w Yoon's deformity       Lymphadenopathy:     He has no cervical adenopathy.   Neurological: He is alert and oriented to person, place, and time. He has normal reflexes. No cranial nerve deficit. Gait normal.   Proximal and distal muscle strength 5/5.   Skin: Skin is warm and dry. No rash noted. He is not diaphoretic.   Small nodules bilateral elbows L>R;  Small nodules over IP of each  thumb & 3rd R PIP  Bilateral superficial varicosities LE;   Psychiatric: His behavior is normal. Mood, memory, affect and judgment normal.   Vitals reviewed.      LABS:   23: CBC Hg 11.4; Ht 34; CMP cnne 2.3; GFR 30; BUN 47  4/3/23: CBC Hg 9.6; Ht 30; WC 3.44; CMP cnne 1.6; GFR 46.4; BUN 28  23: ESR 20; CRP 3.2  22: CBC Hg 13.6; BMP ok;  11/3/22: ESR 11; CRP 1.9; CMP: BUN 26; cnne 1.5; GFR 50.4; UA 1+ pr; >100 RBC; >100 WBC;   8/3/22: ESR 8; CRP 2.8; CBC Hg 13.1; Ht 39.5; CMP cnne 1.3; GFR 59.8; glu 154  22: Hg A1C 5.0  22: ESR 20 (23); CRP 2.0; CBC Hg 12.8; Ht 38.1; CMP glu 164;   22: ESR 9; CRP 5.4; CBC Hg 13.6; Na 134;   10/11/21: CBC ok; CMP ok; TSH ok; HgA1C 5.2;   3/24/21: ESR 19; CRP 3.5; CBC ok; CMP CMP cnne 1.3; GFR 56.5;   20: ESR 13 (23); CRP 2.5; CBC ok; CMP cnne 1.4; GFR 52; BUN 30; TP 8.7; ; CCP 30.4  19: ESR 9; CRP 2.5; CBC ok; CMP BUN 24; cnne 1.3; GFR 57.3;   19: ESR 24 (23); CRP 5.4; CBC ok; CMP cnne 1.3; GFR 57.3; BUN 27;   10/10/18; ESR 7; CRP 4.1; CBC ; CMP cnne 1.3; GFR 57.7;   18: ESR 29; CRP; CBC ok; CMP ok; ; CCP 44;   17: ESR 23; CRP 4.2; CBC ok; CMP cnne 1.3; GFR 58.1;   3/26/16: CBC ok; cnne 1.3  3/10/16: ESR 9; CRP 1.7; CBC, CMP ok;  9/15/15: ESR 20; CRP 0.9; CBC ok; CMP ok;  3/2/15: CMP BUN 28; cnne 1.1  1/12/15: Hg 13.9  10/21/14: ESR 8;   14: ESR 19; CRP 2.9; Hg 12.8; cnne 1.3; Vit D 18; ; CCP 44.3   Hepatitis B & HIV negative; HCV+;     IMAGIN19: Bilateral hands: personally viewed: L: cystic changes at the distal aspect of the left ulna, also involving some of the carpal bones, as well as the distal aspects of the 1st and 3rd metacarpal bones.  Mild degenerative changes at some of the DIP joints; R: Cystic changes involving some of the carpal bones as well as the distal aspect of the 1st and 3rd metacarpals and proximal aspect of the proximal phalanx of the thumb.  Degenerative changes involving some  of the DIP joints and also the interphalangeal joint of the right thumb.    9/29/16: US Dopplers: R:minimal peripheral arterial occlusive disease: L: moderate peripheral arterial occlusive disease; unchanged  4/2/15: Bilateral wrists: personal review: cystic lesion left lunate, possibly left ulnar notch. (not too different from previous)  4/2/15: R ankle personally reviewed: ok  1/20/14: Arthritis survey personally reviewed again: OA lower CS; min OA hands & feet;      Assessment:   Seropositive (), CCP (44) positive nodular (bilateral elbows removed-grew back & fingers) rheumatoid arthritis with no erosions--some activity   Enbrel effective but resulted in rash.    MTX and Leflunomide were contraindicated at the time due to HCV.   SSZ given ok by Dr. Moore, but never begun   Was on hydroxychloroquine 400 mg/d for a while   Humira 40 mg qow since 6/15~ 1/2021   On MTX 20 mg/wk (ok for use of MTX or Leflunomide if needed as per Dr. Moore) since 11/2020    Joint pain multiple sites  Osteoarthritis of hands.  Prob OA of feet & knees     Possible Raynaud's in past.     L urothelial cancer S/P chemo & roboti nephroureterectomy 7/15/23.    CKD 3b  renal insufficiency worsened on meloxicam & ibuprofen & now w nephrectomy    S/P R Achilles tendinitis    Dermatitis c/w seborrheic keratoses & other skin lesions   S/P basal cell carcinoma    Bilateral adhesive capsulitis R>L--resolved    HTN    Hx of nephrolithiasis    Hepatitis C with normal liver function tests--genotype 1a   Completed Harvoni; cured        Recurrent hypovitaminosis D -treated in past    Peripheral Arterial Disease.    Compression deformity of the lower lumbar and thoracic vertebral bodies by x-ray.     Multiple lipomas of the arms and lumbar spine area.     Occupational injuries in the past.     Persistent insomnia.   Helped by trazodone.     Normal weight from overweight  Plan:   Imaging of hands/feet/knees.  Drop MTX  to15 mg/wk + folic acid 1  mg/d.  Recheck labs w inflammatory numbers & UA in 1 months  Labs again q 3 months on MTX  RTC 3 months or prn.

## 2024-01-10 ENCOUNTER — PATIENT MESSAGE (OUTPATIENT)
Dept: ADMINISTRATIVE | Facility: OTHER | Age: 69
End: 2024-01-10
Payer: MEDICARE

## 2024-01-10 ENCOUNTER — OFFICE VISIT (OUTPATIENT)
Dept: RHEUMATOLOGY | Facility: CLINIC | Age: 69
End: 2024-01-10
Payer: MEDICARE

## 2024-01-10 VITALS
BODY MASS INDEX: 25.68 KG/M2 | WEIGHT: 154.13 LBS | SYSTOLIC BLOOD PRESSURE: 127 MMHG | HEIGHT: 65 IN | HEART RATE: 82 BPM | DIASTOLIC BLOOD PRESSURE: 86 MMHG

## 2024-01-10 DIAGNOSIS — N18.32 STAGE 3B CHRONIC KIDNEY DISEASE: ICD-10-CM

## 2024-01-10 DIAGNOSIS — R22.9 SUBCUTANEOUS NODULES: ICD-10-CM

## 2024-01-10 DIAGNOSIS — M05.79 RHEUMATOID ARTHRITIS INVOLVING MULTIPLE SITES WITH POSITIVE RHEUMATOID FACTOR: Primary | ICD-10-CM

## 2024-01-10 DIAGNOSIS — Z79.631 LONG TERM METHOTREXATE USER: ICD-10-CM

## 2024-01-10 PROCEDURE — 99999 PR PBB SHADOW E&M-EST. PATIENT-LVL V: CPT | Mod: PBBFAC,,, | Performed by: INTERNAL MEDICINE

## 2024-01-10 PROCEDURE — 99214 OFFICE O/P EST MOD 30 MIN: CPT | Mod: S$GLB,,, | Performed by: INTERNAL MEDICINE

## 2024-01-10 ASSESSMENT — ROUTINE ASSESSMENT OF PATIENT INDEX DATA (RAPID3)
PSYCHOLOGICAL DISTRESS SCORE: 1.1
TOTAL RAPID3 SCORE: 5.67
MDHAQ FUNCTION SCORE: 0.3
FATIGUE SCORE: 0
PATIENT GLOBAL ASSESSMENT SCORE: 9
WHEN YOU AWAKENED IN THE MORNING OVER THE LAST WEEK, PLEASE INDICATE THE AMOUNT OF TIME IT TAKES UNTIL YOU ARE AS LIMBER AS YOU WILL BE FOR THE DAY: 0.5
AM STIFFNESS SCORE: 1, YES
PAIN SCORE: 7

## 2024-01-10 NOTE — PROGRESS NOTES
1/7/2024     6:29 PM   Rapid3 Question Responses and Scores   MDHAQ Score 0.3   Psychologic Score 1.1   Pain Score 7   When you awakened in the morning OVER THE LAST WEEK, did you feel stiff? Yes   If Yes, please indicate the number of hours until you are as limber as you will be for the day 0.5   Fatigue Score 0   Global Health Score 9   RAPID3 Score 5.67

## 2024-01-10 NOTE — PATIENT INSTRUCTIONS
Go down to 6 MTX once a week with folic acid 1 mg/d.    Use paraffin wax hands.    Ok to apply voltaren gel to painful toes 3 x/day as needed.

## 2024-01-16 ENCOUNTER — HOSPITAL ENCOUNTER (OUTPATIENT)
Dept: RADIOLOGY | Facility: HOSPITAL | Age: 69
Discharge: HOME | End: 2024-01-16
Attending: INTERNAL MEDICINE
Payer: MEDICARE

## 2024-01-16 DIAGNOSIS — M05.79 RHEUMATOID ARTHRITIS INVOLVING MULTIPLE SITES WITH POSITIVE RHEUMATOID FACTOR: ICD-10-CM

## 2024-01-16 PROCEDURE — 73562 X-RAY EXAM OF KNEE 3: CPT | Mod: TC,50,PO

## 2024-01-16 PROCEDURE — 73630 X-RAY EXAM OF FOOT: CPT | Mod: 26,50,, | Performed by: RADIOLOGY

## 2024-01-16 PROCEDURE — 73630 X-RAY EXAM OF FOOT: CPT | Mod: TC,50,PO

## 2024-01-16 PROCEDURE — 73130 X-RAY EXAM OF HAND: CPT | Mod: 26,50,, | Performed by: RADIOLOGY

## 2024-01-16 PROCEDURE — 73562 X-RAY EXAM OF KNEE 3: CPT | Mod: 26,50,, | Performed by: RADIOLOGY

## 2024-01-16 PROCEDURE — 73130 X-RAY EXAM OF HAND: CPT | Mod: TC,50,PO

## 2024-01-20 DIAGNOSIS — Z79.60 LONG-TERM USE OF IMMUNOSUPPRESSANT MEDICATION: ICD-10-CM

## 2024-01-20 DIAGNOSIS — M05.79 RHEUMATOID ARTHRITIS INVOLVING MULTIPLE SITES WITH POSITIVE RHEUMATOID FACTOR: ICD-10-CM

## 2024-01-22 ENCOUNTER — PATIENT MESSAGE (OUTPATIENT)
Dept: ADMINISTRATIVE | Facility: OTHER | Age: 69
End: 2024-01-22
Payer: MEDICARE

## 2024-01-22 RX ORDER — FOLIC ACID 1 MG/1
1000 TABLET ORAL
Qty: 90 TABLET | Refills: 3 | Status: SHIPPED | OUTPATIENT
Start: 2024-01-22

## 2024-01-23 NOTE — PROGRESS NOTES
Assessment/Plan   Diagnoses and all orders for this visit:  Glaucoma suspect, unspecified laterality  -     OCT, Optic Nerve - OU - Both Eyes  Ocular hypertension, bilateral  -     OCT, Optic Nerve - OU - Both Eyes  Borderline, stable    Controlled type 2 diabetes mellitus without complication, unspecified whether long term insulin use (CMS/[facility])  no retinopathy observed on exam today od/os, pt ed to continue good BGlc, blood pressure and lipid control, rtc with any changes in vision, otherwise monitor 1 year   Pseudophakia of both eyes    OCT, visual field (VF) DFE next visit  I discussed the results of the exam and testing done today with the patient.Expressed understanding and all questions answered      Rapid3 Question Responses and Scores 4/4/2023   MDHAQ Score 0.6   Psychologic Score 4.4   Pain Score 3   When you awakened in the morning OVER THE LAST WEEK, did you feel stiff? Yes   If Yes, please indicate the number of hours until you are as limber as you will be for the day 1   Fatigue Score 5   Global Health Score 7   RAPID3 Score 4      Answers submitted by the patient for this visit:  Rheumatology Questionnaire (Submitted on 4/4/2023)  fever: No  eye redness: No  mouth sores: No  headaches: No  shortness of breath: No  chest pain: No  trouble swallowing: No  diarrhea: Yes  constipation: Yes  unexpected weight change: Yes  genital sore: No  During the last 3 days, have you had a skin rash?: No  Bruises or bleeds easily: No  cough: No

## 2024-01-29 ENCOUNTER — OFFICE VISIT (OUTPATIENT)
Dept: INTERNAL MEDICINE | Facility: CLINIC | Age: 69
End: 2024-01-29
Payer: MEDICARE

## 2024-01-29 ENCOUNTER — HOSPITAL ENCOUNTER (OUTPATIENT)
Dept: RADIOLOGY | Facility: HOSPITAL | Age: 69
Discharge: HOME | End: 2024-01-29
Attending: PHYSICIAN ASSISTANT
Payer: MEDICARE

## 2024-01-29 VITALS
HEIGHT: 65 IN | DIASTOLIC BLOOD PRESSURE: 64 MMHG | OXYGEN SATURATION: 94 % | HEART RATE: 60 BPM | SYSTOLIC BLOOD PRESSURE: 100 MMHG | WEIGHT: 154.13 LBS | BODY MASS INDEX: 25.68 KG/M2

## 2024-01-29 DIAGNOSIS — J40 BRONCHITIS: ICD-10-CM

## 2024-01-29 DIAGNOSIS — I71.43 INFRARENAL ABDOMINAL AORTIC ANEURYSM (AAA) WITHOUT RUPTURE: ICD-10-CM

## 2024-01-29 DIAGNOSIS — E27.8 ADRENAL NODULE: ICD-10-CM

## 2024-01-29 DIAGNOSIS — R05.3 PERSISTENT COUGH: ICD-10-CM

## 2024-01-29 DIAGNOSIS — M05.79 RHEUMATOID ARTHRITIS INVOLVING MULTIPLE SITES WITH POSITIVE RHEUMATOID FACTOR: ICD-10-CM

## 2024-01-29 DIAGNOSIS — C68.9 UROTHELIAL CARCINOMA WITH HIGH RISK OF METASTASIS: ICD-10-CM

## 2024-01-29 DIAGNOSIS — Z79.899 DRUG-INDUCED IMMUNODEFICIENCY: ICD-10-CM

## 2024-01-29 DIAGNOSIS — I73.9 PAD (PERIPHERAL ARTERY DISEASE): ICD-10-CM

## 2024-01-29 DIAGNOSIS — D84.821 DRUG-INDUCED IMMUNODEFICIENCY: ICD-10-CM

## 2024-01-29 DIAGNOSIS — J43.2 CENTRILOBULAR EMPHYSEMA: ICD-10-CM

## 2024-01-29 DIAGNOSIS — J40 BRONCHITIS: Primary | ICD-10-CM

## 2024-01-29 PROBLEM — E27.9 ADRENAL NODULE: Status: ACTIVE | Noted: 2024-01-29

## 2024-01-29 PROCEDURE — 71046 X-RAY EXAM CHEST 2 VIEWS: CPT | Mod: 26,,, | Performed by: RADIOLOGY

## 2024-01-29 PROCEDURE — 71046 X-RAY EXAM CHEST 2 VIEWS: CPT | Mod: TC

## 2024-01-29 PROCEDURE — 99214 OFFICE O/P EST MOD 30 MIN: CPT | Mod: S$GLB,,, | Performed by: PHYSICIAN ASSISTANT

## 2024-01-29 PROCEDURE — 99999 PR PBB SHADOW E&M-EST. PATIENT-LVL V: CPT | Mod: PBBFAC,,, | Performed by: PHYSICIAN ASSISTANT

## 2024-01-29 RX ORDER — LEVOCETIRIZINE DIHYDROCHLORIDE 5 MG/1
5 TABLET, FILM COATED ORAL NIGHTLY
Qty: 30 TABLET | Refills: 1 | Status: SHIPPED | OUTPATIENT
Start: 2024-01-29 | End: 2024-02-20

## 2024-01-29 RX ORDER — DOXYCYCLINE 100 MG/1
100 CAPSULE ORAL EVERY 12 HOURS
Qty: 20 CAPSULE | Refills: 0 | Status: SHIPPED | OUTPATIENT
Start: 2024-01-29 | End: 2024-02-08

## 2024-01-29 NOTE — PATIENT INSTRUCTIONS
Plain mucinex ( a lot of water)    Allergy meds (xyzal to help dry up things)    Antibiotics    I will call about xray results

## 2024-01-29 NOTE — PROGRESS NOTES
Subjective     Patient ID: Andrea Gant is a 68 y.o. male.    Chief Complaint: Cough    HPI    Established pt of Andrea Wang MD (new to me)    Here with concerns cough cough, nasal and chest congestion for the past 3 weeks.   Thick mucus, a little wheezing.   No cp, sob, fevers, chills, sweats  He has tried OTC cough meds, expectorant, honey/lemon  Wife with similar symptoms.     Answers submitted by the patient for this visit:  Cough Questionnaire (Submitted on 1/22/2024)  Chief Complaint: Cough  Chronicity: new  Onset: 1 to 4 weeks ago  Progression since onset: waxing and waning  Frequency: hourly  Cough characteristics: productive of sputum  chest pain: No  chills: No  ear congestion: No  ear pain: No  fever: No  headaches: No  heartburn: No  hemoptysis: No  myalgias: No  nasal congestion: No  postnasal drip: No  rash: No  rhinorrhea: No  shortness of breath: No  sore throat: No  sweats: No  weight loss: No  wheezing: No  Aggravated by: dust, exercise, lying down, pollens  asthma: No  bronchiectasis: No  bronchitis: No  COPD: No  emphysema: No  environmental allergies: Yes  pneumonia: No      Past Medical History:   Diagnosis Date    Anemia     Cancer     Cataract     Colon polyp 2014    Depression     Disorder of kidney and ureter     History of hepatitis C, s/p successful treatment w/ SVR12 - 7/2015 10/14/2012    Kelsey 1a,  Liver biopsy 6/2014 - Stage 1 fibrosis;  Completed 8 weeks Harvoni w/ SVR12 - 7/2015      Hyperlipidemia     Lipoma of arm     Lipoma of lower extremity     Nuclear sclerosis - Both Eyes 10/22/2012    Other and unspecified hyperlipidemia 10/22/2013    PAD (peripheral artery disease)     Peripheral vascular disease, unspecified     Plaquenil adverse reaction in therapeutic use 10/22/2012    Rheumatoid arthritis(714.0) 10/14/2012    Special screening for malignant neoplasms, colon 02/21/2014     Social History     Tobacco Use    Smoking status: Former     Current packs/day:  0.00     Types: Cigarettes     Quit date: 6/27/2020     Years since quitting: 3.5    Smokeless tobacco: Never   Substance Use Topics    Alcohol use: Yes     Comment: 2 drinks per week    Drug use: Yes     Types: Marijuana     Comment: marajuana used on Saturday     Review of patient's allergies indicates:   Allergen Reactions    Iodinated contrast media      Shortness of breath       Review of Systems   Constitutional:  Negative for chills and fever.   HENT:  Negative for ear pain, postnasal drip, rhinorrhea and sore throat.    Respiratory:  Positive for cough. Negative for shortness of breath and wheezing.    Cardiovascular:  Negative for chest pain.   Musculoskeletal:  Negative for myalgias.   Integumentary:  Negative for rash.   Allergic/Immunologic: Positive for environmental allergies.   Neurological:  Negative for headaches.          Objective     Physical Exam  Vitals reviewed.   Constitutional:       General: He is not in acute distress.     Appearance: He is well-developed. He is not ill-appearing.   HENT:      Head: Normocephalic and atraumatic.      Right Ear: Tympanic membrane, ear canal and external ear normal.      Left Ear: Tympanic membrane, ear canal and external ear normal.   Cardiovascular:      Rate and Rhythm: Normal rate and regular rhythm.      Heart sounds: No murmur heard.  Pulmonary:      Effort: Pulmonary effort is normal. No respiratory distress.      Breath sounds: Rhonchi present. No wheezing or rales.      Comments: Occ cough  Musculoskeletal:      Right lower leg: No edema.      Left lower leg: No edema.   Lymphadenopathy:      Cervical: No cervical adenopathy.   Skin:     General: Skin is warm and dry.      Findings: No rash.   Neurological:      Mental Status: He is alert and oriented to person, place, and time.   Psychiatric:         Mood and Affect: Mood normal.            Assessment and Plan     1. Bronchitis  -     X-Ray Chest PA And Lateral; Future; Expected date: 01/29/2024  -      levocetirizine (XYZAL) 5 MG tablet; Take 1 tablet (5 mg total) by mouth every evening.  Dispense: 30 tablet; Refill: 1  -     doxycycline (VIBRAMYCIN) 100 MG Cap; Take 1 capsule (100 mg total) by mouth every 12 (twelve) hours. for 10 days  Dispense: 20 capsule; Refill: 0    2. Persistent cough  -     X-Ray Chest PA And Lateral; Future; Expected date: 01/29/2024    3. Centrilobular emphysema (noted on prior imaging, stable)    4. Drug-induced immunodeficiency ( on MTX)  -     X-Ray Chest PA And Lateral; Future; Expected date: 01/29/2024    5. Urothelial carcinoma with high risk of metastasis (followed by Hem/ONC and urology)    6. Rheumatoid arthritis involving multiple sites with positive rheumatoid factor  Stable, followed by Rheum on MTX      7. PAD (peripheral artery disease)  Stable on Pletal and Statin  Followed by Vascular    8. Infrarenal abdominal aortic aneurysm (AAA) without rupture  Stable on recent imaging  Followed by Vascular    9. Adrenal nodule  Stable on recent imaging.         Course of doxy as above (pt immunocompromised RA/recent Ca)  Cxr today, will follow up  Advised OTC mucinex  Offered albuterol prn, pt declines  Further recs to follow pending results.     Kelle Barnhart PA-C

## 2024-02-06 ENCOUNTER — LAB VISIT (OUTPATIENT)
Dept: LAB | Facility: HOSPITAL | Age: 69
End: 2024-02-06
Attending: INTERNAL MEDICINE
Payer: MEDICARE

## 2024-02-06 DIAGNOSIS — Z79.631 LONG TERM METHOTREXATE USER: ICD-10-CM

## 2024-02-06 DIAGNOSIS — M05.79 RHEUMATOID ARTHRITIS INVOLVING MULTIPLE SITES WITH POSITIVE RHEUMATOID FACTOR: ICD-10-CM

## 2024-02-06 LAB
ALBUMIN SERPL BCP-MCNC: 3.8 G/DL (ref 3.5–5.2)
ALP SERPL-CCNC: 111 U/L (ref 55–135)
ALT SERPL W/O P-5'-P-CCNC: 15 U/L (ref 10–44)
ANION GAP SERPL CALC-SCNC: 7 MMOL/L (ref 8–16)
AST SERPL-CCNC: 22 U/L (ref 10–40)
BASOPHILS # BLD AUTO: 0.03 K/UL (ref 0–0.2)
BASOPHILS NFR BLD: 0.3 % (ref 0–1.9)
BILIRUB SERPL-MCNC: 0.4 MG/DL (ref 0.1–1)
BUN SERPL-MCNC: 42 MG/DL (ref 8–23)
CALCIUM SERPL-MCNC: 9.8 MG/DL (ref 8.7–10.5)
CHLORIDE SERPL-SCNC: 107 MMOL/L (ref 95–110)
CO2 SERPL-SCNC: 23 MMOL/L (ref 23–29)
CREAT SERPL-MCNC: 2.3 MG/DL (ref 0.5–1.4)
CRP SERPL-MCNC: 12 MG/L (ref 0–8.2)
DIFFERENTIAL METHOD BLD: ABNORMAL
EOSINOPHIL # BLD AUTO: 0.2 K/UL (ref 0–0.5)
EOSINOPHIL NFR BLD: 1.6 % (ref 0–8)
ERYTHROCYTE [DISTWIDTH] IN BLOOD BY AUTOMATED COUNT: 14.6 % (ref 11.5–14.5)
ERYTHROCYTE [SEDIMENTATION RATE] IN BLOOD BY PHOTOMETRIC METHOD: 55 MM/HR (ref 0–23)
EST. GFR  (NO RACE VARIABLE): 30 ML/MIN/1.73 M^2
GLUCOSE SERPL-MCNC: 99 MG/DL (ref 70–110)
HCT VFR BLD AUTO: 37 % (ref 40–54)
HGB BLD-MCNC: 11.9 G/DL (ref 14–18)
IMM GRANULOCYTES # BLD AUTO: 0.05 K/UL (ref 0–0.04)
IMM GRANULOCYTES NFR BLD AUTO: 0.5 % (ref 0–0.5)
LYMPHOCYTES # BLD AUTO: 1.4 K/UL (ref 1–4.8)
LYMPHOCYTES NFR BLD: 15.6 % (ref 18–48)
MCH RBC QN AUTO: 30.8 PG (ref 27–31)
MCHC RBC AUTO-ENTMCNC: 32.2 G/DL (ref 32–36)
MCV RBC AUTO: 96 FL (ref 82–98)
MONOCYTES # BLD AUTO: 0.5 K/UL (ref 0.3–1)
MONOCYTES NFR BLD: 5.4 % (ref 4–15)
NEUTROPHILS # BLD AUTO: 7 K/UL (ref 1.8–7.7)
NEUTROPHILS NFR BLD: 76.6 % (ref 38–73)
NRBC BLD-RTO: 0 /100 WBC
PLATELET # BLD AUTO: 219 K/UL (ref 150–450)
PMV BLD AUTO: 10 FL (ref 9.2–12.9)
POTASSIUM SERPL-SCNC: 4.4 MMOL/L (ref 3.5–5.1)
PROT SERPL-MCNC: 8.1 G/DL (ref 6–8.4)
RBC # BLD AUTO: 3.86 M/UL (ref 4.6–6.2)
SODIUM SERPL-SCNC: 137 MMOL/L (ref 136–145)
URATE SERPL-MCNC: 7.7 MG/DL (ref 3.4–7)
WBC # BLD AUTO: 9.13 K/UL (ref 3.9–12.7)

## 2024-02-06 PROCEDURE — 86140 C-REACTIVE PROTEIN: CPT | Performed by: INTERNAL MEDICINE

## 2024-02-06 PROCEDURE — 80053 COMPREHEN METABOLIC PANEL: CPT | Performed by: INTERNAL MEDICINE

## 2024-02-06 PROCEDURE — 85025 COMPLETE CBC W/AUTO DIFF WBC: CPT | Performed by: INTERNAL MEDICINE

## 2024-02-06 PROCEDURE — 85652 RBC SED RATE AUTOMATED: CPT | Performed by: INTERNAL MEDICINE

## 2024-02-06 PROCEDURE — 84550 ASSAY OF BLOOD/URIC ACID: CPT | Performed by: INTERNAL MEDICINE

## 2024-02-06 PROCEDURE — 36415 COLL VENOUS BLD VENIPUNCTURE: CPT | Mod: PO | Performed by: INTERNAL MEDICINE

## 2024-02-07 ENCOUNTER — PROCEDURE VISIT (OUTPATIENT)
Dept: UROLOGY | Facility: CLINIC | Age: 69
End: 2024-02-07
Payer: MEDICARE

## 2024-02-07 VITALS
HEART RATE: 75 BPM | WEIGHT: 148.81 LBS | RESPIRATION RATE: 16 BRPM | DIASTOLIC BLOOD PRESSURE: 76 MMHG | SYSTOLIC BLOOD PRESSURE: 147 MMHG | BODY MASS INDEX: 24.76 KG/M2

## 2024-02-07 DIAGNOSIS — C68.9 UROTHELIAL CARCINOMA WITH HIGH RISK OF METASTASIS: Primary | ICD-10-CM

## 2024-02-07 PROCEDURE — 52000 CYSTOURETHROSCOPY: CPT | Mod: S$GLB,,, | Performed by: UROLOGY

## 2024-02-07 PROCEDURE — 88112 CYTOPATH CELL ENHANCE TECH: CPT | Performed by: PATHOLOGY

## 2024-02-07 PROCEDURE — 88112 CYTOPATH CELL ENHANCE TECH: CPT | Mod: 26,,, | Performed by: PATHOLOGY

## 2024-02-07 RX ORDER — LIDOCAINE HYDROCHLORIDE 20 MG/ML
JELLY TOPICAL
Status: COMPLETED | OUTPATIENT
Start: 2024-02-07 | End: 2024-02-07

## 2024-02-07 RX ADMIN — LIDOCAINE HYDROCHLORIDE: 20 JELLY TOPICAL at 12:02

## 2024-02-07 NOTE — PATIENT INSTRUCTIONS
What to Expect After a Cystoscopy  For the next 24-48 hours, you may feel a mild burning when you urinate. This burning is normal and expected. Drink plenty of water to dilute the urine to help relieve the burning sensation. You may also see a small amount of blood in your urine after the procedure.    Unless you are already taking antibiotics, you may be given an antibiotic after the test to prevent infection.    Signs and Symptoms to Report  Call the [facility] at [phone] if you develop any of the following:  Fever of 101 degrees or higher  Chills or persistent bleeding  Inability to urinate

## 2024-02-07 NOTE — PROCEDURES
Procedures:  Flexible cystourethroscopy      Pre Procedure Diagnosis:left upper tract urothelial CA  S/p left nephroureterectomy on 7/14/23; HG pT2 N0R0.        Patient Active Problem List    Diagnosis Date Noted    Adrenal nodule 01/29/2024    Centrilobular emphysema 01/29/2024    Anemia 07/05/2023    Chemotherapy induced neutropenia 04/11/2023    Urothelial carcinoma with high risk of metastasis 03/06/2023    Ureteral tumor 02/16/2023    Solitary pulmonary nodule (repeat due Aug 2023) 02/09/2023    Drug-induced immunodeficiency 11/14/2022    Stage 3a chronic kidney disease 11/14/2022    Venous insufficiency     AAA (abdominal aortic aneurysm)     Right inguinal hernia 09/06/2018    PVD (peripheral vascular disease)     Intermittent claudication 10/22/2015    PAD (peripheral artery disease) 10/28/2013    Hypercholesteremia 10/28/2013    Other hyperlipidemia 10/22/2013    Nuclear sclerosis - Both Eyes 10/22/2012    History of long-term treatment with high-risk medication 10/22/2012    Persistent insomnia 10/15/2012    Rheumatoid arthritis 10/14/2012    History of hepatitis C, s/p successful treatment w/ SVR12 - 7/2015 10/14/2012         Post Procedure Diagnosis:Normal cystoscopy    Surgeon: Kem Jefferson MD    Anesthesia: 2% uro-jet lidocaine jelly for local analgesia    Flexible cysto-urethroscopy was performed after consent was obtained.  The risks and benefits were explained.    2% lidocaine urojet was used for local analgesia.  The genitalia was prepped and draped in the sterile fashion with hibiclens.    The flexible scope was advanced into the urethra.  However, there was a fossa navicularis stricture that the scope was not able to traverse.  A wire under direct vision was passed into the proximal urethra and bladder.  Day dilators were then used to sequentially dilate from 12 Guamanian up to 18 Guamanian.  At this point, the flexible cystoscope was able to be passed into the urethra without difficulty.  No  posterior urethral strictures were noted.  The prostate showed Significant hypertrophy.  There was No median lobe present.  The left ureteral orifice was absent and the right ureteral orifice was evaluated and noted to be normal with clear efflux.  The bladder was completely surveyed in a systematic fashion.   No bladder tumors or lesions were seen.      The patient tolerated the procedure well without complication.  Per NCCN guidelines, recommend q 3month cystoscopy and cytology for 1 year then at longer intervals.   Upper tract and chest imaging ordered by Marquita Stoner.     They will follow up in 3 month(s) for repeat cystoscopy.  A urine cytology was ordered and collected today.     We will reach out to Dr. Wang about his cardura which is not optimal for BPH but I worry about treating him with concomitant flomax with dual alpha blocker therapy.

## 2024-02-08 ENCOUNTER — TELEPHONE (OUTPATIENT)
Dept: UROLOGY | Facility: HOSPITAL | Age: 69
End: 2024-02-08
Payer: MEDICARE

## 2024-02-08 ENCOUNTER — TELEPHONE (OUTPATIENT)
Dept: INTERNAL MEDICINE | Facility: CLINIC | Age: 69
End: 2024-02-08
Payer: MEDICARE

## 2024-02-08 LAB
FINAL PATHOLOGIC DIAGNOSIS: NORMAL
Lab: NORMAL

## 2024-02-08 RX ORDER — TAMSULOSIN HYDROCHLORIDE 0.4 MG/1
0.4 CAPSULE ORAL DAILY
Qty: 30 CAPSULE | Refills: 11 | Status: SHIPPED | OUTPATIENT
Start: 2024-02-08 | End: 2025-02-07

## 2024-02-08 NOTE — TELEPHONE ENCOUNTER
I need to see the pt in 2-3 weeks to follow up BP per Urology. If he can check a few BP readings leading up to the visit, it will help me to determine if any changes need to be made to his BP treatment.

## 2024-02-08 NOTE — TELEPHONE ENCOUNTER
----- Message from Kem Jefferson MD sent at 2/8/2024  7:00 AM CST -----  Regarding: RE: bph/hypertension medication  Thank you.  I just spoke with the patient and made the medication changes discussed.      Appreciate your quick response.     MM    ----- Message -----  From: Andrea Wang MD  Sent: 2/7/2024   5:47 PM CST  To: Kem Jefferson MD; Ronny Brownlee Staff  Subject: RE: bph/hypertension medication                  I would be fine with a swab from Cardura to Flomax.  I can either do that or if it is easy for you to do that I am fine with it.  And I can absolutely get him back in to see me in a week or so to reassess blood pressure and decide if we need to increase his amlodipine or add another medication.  I sometimes fine Flomax gives a little help with blood pressure so depending on how he is running I have some options.  Just let me know what is easiest and I can make sure to get him back into see me as well  ----- Message -----  From: Kem Jefferson MD  Sent: 2/7/2024   1:09 PM CST  To: Andrea Wang MD; Ronny Brownlee Staff  Subject: bph/hypertension medication                      Dr. Wang,  I saw this mutual patient today for a surveillance cystoscopy.  He is having significant lower urinary tract symptoms.  He did have some urethral stricture disease previously on cystoscopy.  Today his urethral stricture disease was minimal.  I suspect some component of BPH as he does have an enlarged prostate on cystoscopy.    He is on Cardura for hypertension which is an alpha-blocker.  He says his blood pressure is under good control.  Ideally, I would like to manage his BPH with Flomax which I think is more effective with fewer side effects than Cardura.  However, I do not feel comfortable treating him with a 2nd alpha blocker.  Wanted to reach out to get your opinion on the Cardura and potential alternatives for his blood pressure.  Please let me know what you think.    Thank  you    Kem Jefferson

## 2024-02-08 NOTE — TELEPHONE ENCOUNTER
Discontinuing the cardura and starting flomax.  Dr. Wang will reassess blood pressure in near future.     I spoke with the patient this AM and he is aware of the plan.  He will keep an eye on the med change and its effect on his BP.       ----- Message from Andrea Wang MD sent at 2/7/2024  5:46 PM CST -----  Regarding: RE: bph/hypertension medication  I would be fine with a swab from Cardura to Flomax.  I can either do that or if it is easy for you to do that I am fine with it.  And I can absolutely get him back in to see me in a week or so to reassess blood pressure and decide if we need to increase his amlodipine or add another medication.  I sometimes fine Flomax gives a little help with blood pressure so depending on how he is running I have some options.  Just let me know what is easiest and I can make sure to get him back into see me as well  ----- Message -----  From: Kem Jefferson MD  Sent: 2/7/2024   1:09 PM CST  To: Andrea Wang MD; Ronny Brownlee Staff  Subject: bph/hypertension medication                      Dr. Wang,  I saw this mutual patient today for a surveillance cystoscopy.  He is having significant lower urinary tract symptoms.  He did have some urethral stricture disease previously on cystoscopy.  Today his urethral stricture disease was minimal.  I suspect some component of BPH as he does have an enlarged prostate on cystoscopy.    He is on Cardura for hypertension which is an alpha-blocker.  He says his blood pressure is under good control.  Ideally, I would like to manage his BPH with Flomax which I think is more effective with fewer side effects than Cardura.  However, I do not feel comfortable treating him with a 2nd alpha blocker.  Wanted to reach out to get your opinion on the Cardura and potential alternatives for his blood pressure.  Please let me know what you think.    Thank you    Kem Jefferson

## 2024-02-10 ENCOUNTER — PATIENT MESSAGE (OUTPATIENT)
Dept: ADMINISTRATIVE | Facility: OTHER | Age: 69
End: 2024-02-10
Payer: MEDICARE

## 2024-02-13 ENCOUNTER — NURSE TRIAGE (OUTPATIENT)
Dept: ADMINISTRATIVE | Facility: CLINIC | Age: 69
End: 2024-02-13
Payer: MEDICARE

## 2024-02-13 NOTE — TELEPHONE ENCOUNTER
Andrea states he tested positive for Covid via home test today. Cancer this year in July and kidney removed. Current symptoms include congestion, occasional cough, slightly weak. Symptoms began within the past 2 days. Diagnosed with Bronchitis 2 weeks ago. Per triage protocol, call Provider within next 24 hours. Home care instructions provided for the mean time. V/u.  Reason for Disposition   [1] HIGH RISK patient (e.g., weak immune system, age > 64 years, obesity with BMI 30 or higher, pregnant, chronic lung disease or other chronic medical condition) AND [2] COVID symptoms (e.g., cough, fever)  (Exceptions: Already seen by PCP and no new or worsening symptoms.)    Additional Information   Negative: SEVERE difficulty breathing (e.g., struggling for each breath, speaks in single words)   Negative: Difficult to awaken or acting confused (e.g., disoriented, slurred speech)   Negative: Bluish (or gray) lips or face now   Negative: Shock suspected (e.g., cold/pale/clammy skin, too weak to stand, low BP, rapid pulse)   Negative: Sounds like a life-threatening emergency to the triager   Negative: SEVERE or constant chest pain or pressure  (Exception: Mild central chest pain, present only when coughing.)   Negative: MODERATE difficulty breathing (e.g., speaks in phrases, SOB even at rest, pulse 100-120)   Negative: [1] Headache AND [2] stiff neck (can't touch chin to chest)   Negative: Oxygen level (e.g., pulse oximetry) 90 percent or lower   Negative: Chest pain or pressure  (Exception: MILD central chest pain, present only when coughing.)   Negative: [1] Drinking very little AND [2] dehydration suspected (e.g., no urine > 12 hours, very dry mouth, very lightheaded)   Negative: Patient sounds very sick or weak to the triager   Negative: MILD difficulty breathing (e.g., minimal/no SOB at rest, SOB with walking, pulse <100)   Negative: Fever > 103 F (39.4 C)   Negative: [1] Fever > 101 F (38.3 C) AND [2] age > 60 years    Negative: [1] Fever > 100.0 F (37.8 C) AND [2] bedridden (e.g., CVA, chronic illness, recovering from surgery)   Negative: Oxygen level (e.g., pulse oximetry) 91 to 94 percent    Protocols used: Coronavirus (COVID-19) Diagnosed or [id number]

## 2024-02-14 ENCOUNTER — PATIENT MESSAGE (OUTPATIENT)
Dept: INTERNAL MEDICINE | Facility: CLINIC | Age: 69
End: 2024-02-14
Payer: MEDICARE

## 2024-02-19 ENCOUNTER — PATIENT MESSAGE (OUTPATIENT)
Dept: OPTOMETRY | Facility: CLINIC | Age: 69
End: 2024-02-19
Payer: MEDICARE

## 2024-02-20 ENCOUNTER — TELEPHONE (OUTPATIENT)
Dept: OPTOMETRY | Facility: CLINIC | Age: 69
End: 2024-02-20
Payer: MEDICARE

## 2024-02-20 DIAGNOSIS — J40 BRONCHITIS: ICD-10-CM

## 2024-02-20 RX ORDER — LEVOCETIRIZINE DIHYDROCHLORIDE 5 MG/1
5 TABLET, FILM COATED ORAL NIGHTLY
Qty: 90 TABLET | Refills: 1 | Status: SHIPPED | OUTPATIENT
Start: 2024-02-20

## 2024-03-08 ENCOUNTER — OFFICE VISIT (OUTPATIENT)
Dept: INTERNAL MEDICINE | Facility: CLINIC | Age: 69
End: 2024-03-08
Payer: MEDICARE

## 2024-03-08 VITALS
HEIGHT: 65 IN | BODY MASS INDEX: 25.64 KG/M2 | WEIGHT: 153.88 LBS | OXYGEN SATURATION: 97 % | SYSTOLIC BLOOD PRESSURE: 122 MMHG | HEART RATE: 71 BPM | DIASTOLIC BLOOD PRESSURE: 76 MMHG

## 2024-03-08 DIAGNOSIS — I10 HYPERTENSION, UNSPECIFIED TYPE: Primary | ICD-10-CM

## 2024-03-08 DIAGNOSIS — D49.59 URETERAL TUMOR: ICD-10-CM

## 2024-03-08 DIAGNOSIS — M05.79 RHEUMATOID ARTHRITIS INVOLVING MULTIPLE SITES WITH POSITIVE RHEUMATOID FACTOR: ICD-10-CM

## 2024-03-08 DIAGNOSIS — Z79.631 LONG TERM METHOTREXATE USER: ICD-10-CM

## 2024-03-08 DIAGNOSIS — Z86.16 HISTORY OF COVID-19: ICD-10-CM

## 2024-03-08 PROCEDURE — 99999 PR PBB SHADOW E&M-EST. PATIENT-LVL IV: CPT | Mod: PBBFAC,,, | Performed by: INTERNAL MEDICINE

## 2024-03-08 PROCEDURE — 99214 OFFICE O/P EST MOD 30 MIN: CPT | Mod: S$GLB,,, | Performed by: INTERNAL MEDICINE

## 2024-03-08 RX ORDER — METHOTREXATE 2.5 MG/1
TABLET ORAL
Qty: 120 TABLET | Refills: 0 | Status: SHIPPED | OUTPATIENT
Start: 2024-03-08

## 2024-03-08 NOTE — PROGRESS NOTES
Subjective:       Patient ID: Andrea Gant is a 69 y.o. male.    Chief Complaint: Follow-up (BP FU )    Patient here to follow-up blood pressure.  Patient with bladder cancer followed by Urology as well as BPH.  He comes in for blood pressure follow-up because urology messaged me stating they wanted to try changing his Cardura to Flomax or better BPH prostate treatment but was not sure if his blood pressure would go up off Cardura.  Patient has been doing fairly well and his blood pressure off of the medicine for a few weeks has remained very stable.  Good blood pressure reading at this time.  He will try to continue to monitor this and watch his salt but for now I do not think he needs medication adjustment or additional meds.  He does need a methotrexate refill for his chronic arthritis followed by Rheumatology.      Review of Systems   Constitutional:  Negative for fatigue and fever.   Respiratory:  Negative for chest tightness and shortness of breath.    Cardiovascular:  Negative for leg swelling.   Musculoskeletal:  Positive for arthralgias and joint swelling.           Past Medical History:   Diagnosis Date    Anemia     Cancer     Cataract     Colon polyp 2014    Depression     Disorder of kidney and ureter     History of hepatitis C, s/p successful treatment w/ SVR12 - 7/2015 10/14/2012    Kelsey 1a,  Liver biopsy 6/2014 - Stage 1 fibrosis;  Completed 8 weeks Harvoni w/ SVR12 - 7/2015      Hyperlipidemia     Hypertension 3/8/2024    Lipoma of arm     Lipoma of lower extremity     Nuclear sclerosis - Both Eyes 10/22/2012    Other and unspecified hyperlipidemia 10/22/2013    PAD (peripheral artery disease)     Peripheral vascular disease, unspecified     Plaquenil adverse reaction in therapeutic use 10/22/2012    Rheumatoid arthritis(714.0) 10/14/2012    Special screening for malignant neoplasms, colon 02/21/2014     Past Surgical History:   Procedure Laterality Date    BIOPSY OF URETER Left 02/16/2023     Procedure: BIOPSY, URETER/BRUSH;  Surgeon: Kem Jefferson MD;  Location: [facility] OR 1ST FLR;  Service: Urology;  Laterality: Left;    COLONOSCOPY N/A 11/29/2021    Procedure: COLONOSCOPY;  Surgeon: Kenrick Zimmer MD;  Location: [facility] ENDO (4TH FLR);  Service: Endoscopy;  Laterality: N/A;  blood thinner approval received, see telephone encounter 10/19/21-BB  fully vacc-inst email-tb    CYSTOSCOPY      CYSTOSCOPY  02/16/2023    Procedure: CYSTOSCOPY;  Surgeon: Kem Jefferson MD;  Location: [facility] OR 1ST FLR;  Service: Urology;;    HERNIA REPAIR      INSERTION OF TUNNELED CENTRAL VENOUS CATHETER (CVC) WITH SUBCUTANEOUS PORT Right 3/13/2023    Procedure: INSERTION, PORT-A-CATH;  Surgeon: Rene Cali MD;  Location: [facility] CATH LAB;  Service: Radiology;  Laterality: Right;    KNEE ARTHROPLASTY      LAPAROSCOPIC EXPLORATION OF GROIN Left 09/06/2018    Procedure: EXPLORATION, INGUINAL REGION, LAPAROSCOPIC;  Surgeon: Mingo De Guzman Jr., MD;  Location: [facility] OR 2ND FLR;  Service: General;  Laterality: Left;    MEDIPORT REMOVAL N/A 10/3/2023    Procedure: REMOVAL, CATHETER, CENTRAL VENOUS, TUNNELED, WITH PORT;  Surgeon: Yeimi Stanton MD;  Location: [facility] CATH LAB;  Service: Radiology;  Laterality: N/A;    PYELOSCOPY Left 02/16/2023    Procedure: PYELOSCOPY;  Surgeon: Kem Jefferson MD;  [facility];  Service: Urology;  Laterality: Left;    RETROGRADE PYELOGRAPHY Bilateral 02/16/2023    Procedure: PYELOGRAM, RETROGRADE;  Surgeon: Kem Jefferson MD;  [facility];  Service: Urology;  Laterality: Bilateral;    ROBOT-ASSISTED LAPAROSCOPIC SURGICAL REMOVAL OF KIDNEY AND URETER USING DA BRIJESH XI Left 7/14/2023    Procedure: XI ROBOTIC NEPHROURETERECTOMY;  Surgeon: Kem Jefferson MD;  Location: [facility] OR 2ND FLR;  Service: Urology;  Laterality: Left;  5 hours    TONSILLECTOMY      URETEROSCOPIC REMOVAL OF URETERIC CALCULUS Left 02/16/2023    Procedure: REMOVAL, CALCULUS, URETER,  URETEROSCOPIC;  Surgeon: Kem Jefferson MD;  Location: [facility] OR [address] [place] [address];  Service: Urology;  Laterality: Left;    URETEROSCOPY Left 02/16/2023    Procedure: URETEROSCOPY;  Surgeon: Kem Jefferson MD;  Location: [facility] OR [address] [place] [address];  Service: Urology;  Laterality: Left;      Patient Active Problem List   Diagnosis    Rheumatoid arthritis    History of hepatitis C, s/p successful treatment w/ SVR12 - 7/2015    Persistent insomnia    Nuclear sclerosis - Both Eyes    History of long-term treatment with high-risk medication    Other hyperlipidemia    PAD (peripheral artery disease)    Hypercholesteremia    Intermittent claudication    PVD (peripheral vascular disease)    Right inguinal hernia    AAA (abdominal aortic aneurysm)    Venous insufficiency    Drug-induced immunodeficiency    Stage 3a chronic kidney disease    Solitary pulmonary nodule (repeat due Aug 2023)    Ureteral tumor    Urothelial carcinoma with high risk of metastasis    Chemotherapy induced neutropenia    Anemia    Adrenal nodule    Centrilobular emphysema    Hypertension        Objective:      Physical Exam  Constitutional:       General: He is not in acute distress.     Appearance: He is well-developed.   HENT:      Head: Normocephalic and atraumatic.      Right Ear: Tympanic membrane, ear canal and external ear normal.      Left Ear: Tympanic membrane, ear canal and external ear normal.      Mouth/Throat:      Pharynx: No oropharyngeal exudate or posterior oropharyngeal erythema.   Eyes:      General: No scleral icterus.     Conjunctiva/sclera: Conjunctivae normal.      Pupils: Pupils are equal, round, and reactive to light.   Neck:      Thyroid: No thyromegaly.      Comments: No supraclavicular nodes palpated  Cardiovascular:      Rate and Rhythm: Normal rate and regular rhythm.      Pulses: Normal pulses.      Heart sounds: Normal heart sounds. No murmur heard.  Pulmonary:      Effort: Pulmonary effort is normal.      Breath sounds: Normal  "breath sounds. No wheezing.   Abdominal:      General: Bowel sounds are normal.      Palpations: Abdomen is soft. There is no mass.      Tenderness: There is no abdominal tenderness.   Musculoskeletal:         General: No tenderness.      Cervical back: Normal range of motion and neck supple.      Right lower leg: No edema.      Left lower leg: No edema.   Lymphadenopathy:      Cervical: No cervical adenopathy.   Skin:     Coloration: Skin is not jaundiced or pale.   Neurological:      General: No focal deficit present.      Mental Status: He is alert and oriented to person, place, and time.   Psychiatric:         Mood and Affect: Mood normal.         Behavior: Behavior normal.         Assessment:       Problem List Items Addressed This Visit          Cardiac/Vascular    Hypertension - Primary       Immunology/Multi System    Rheumatoid arthritis    Relevant Medications    methotrexate 2.5 MG Tab       Oncology    Ureteral tumor     Other Visit Diagnoses       History of COVID-19        Long term methotrexate user        Relevant Medications    methotrexate 2.5 MG Tab            Plan:         Andrea was seen today for follow-up.    Diagnoses and all orders for this visit:    Hypertension, unspecified type  Comments:  Doing well off Cardura in exchange for FLomax per Urology.    History of COVID-19    Ureteral tumor    Rheumatoid arthritis involving multiple sites with positive rheumatoid factor  -     methotrexate 2.5 MG Tab; TAKE 8 TABLETS BY MOUTH EVERY 7  DAYS THIS MEDICATION REQUIRES  PERIODIC LAB MONITORING    Long term methotrexate user  -     methotrexate 2.5 MG Tab; TAKE 8 TABLETS BY MOUTH EVERY 7  DAYS THIS MEDICATION REQUIRES  PERIODIC LAB MONITORING           Continue current plan.  Follow-up in 4 months          Portions of this note may have been created with voice recognition software. Occasional "wrong-word" or "sound-a-like" substitutions may have occurred due to the inherent limitations of voice " recognition software. Please, read the note carefully and recognize, using context, where substitutions have occurred.

## 2024-03-15 ENCOUNTER — HOSPITAL ENCOUNTER (OUTPATIENT)
Dept: RADIOLOGY | Facility: HOSPITAL | Age: 69
Discharge: HOME | End: 2024-03-15
Attending: INTERNAL MEDICINE
Payer: MEDICARE

## 2024-03-15 DIAGNOSIS — C64.2 UROTHELIAL CARCINOMA OF KIDNEY, LEFT: ICD-10-CM

## 2024-03-15 PROCEDURE — 74176 CT ABD & PELVIS W/O CONTRAST: CPT | Mod: TC

## 2024-03-15 PROCEDURE — A9698 NON-RAD CONTRAST MATERIALNOC: HCPCS | Performed by: INTERNAL MEDICINE

## 2024-03-15 PROCEDURE — 71250 CT THORAX DX C-: CPT | Mod: TC

## 2024-03-15 PROCEDURE — 25500020 PHARM REV CODE 255: Performed by: INTERNAL MEDICINE

## 2024-03-15 PROCEDURE — 71250 CT THORAX DX C-: CPT | Mod: 26,,, | Performed by: RADIOLOGY

## 2024-03-15 PROCEDURE — 74176 CT ABD & PELVIS W/O CONTRAST: CPT | Mod: 26,,, | Performed by: RADIOLOGY

## 2024-03-15 RX ADMIN — Medication 450 ML: at 12:03

## 2024-03-18 ENCOUNTER — OFFICE VISIT (OUTPATIENT)
Dept: HEMATOLOGY/ONCOLOGY | Facility: CLINIC | Age: 69
End: 2024-03-18
Payer: MEDICARE

## 2024-03-18 VITALS
HEIGHT: 65 IN | WEIGHT: 156.5 LBS | DIASTOLIC BLOOD PRESSURE: 78 MMHG | BODY MASS INDEX: 26.08 KG/M2 | RESPIRATION RATE: 17 BRPM | HEART RATE: 74 BPM | OXYGEN SATURATION: 98 % | SYSTOLIC BLOOD PRESSURE: 130 MMHG | TEMPERATURE: 98 F

## 2024-03-18 DIAGNOSIS — C64.2 UROTHELIAL CARCINOMA OF KIDNEY, LEFT: Primary | ICD-10-CM

## 2024-03-18 DIAGNOSIS — R91.1 PULMONARY NODULE: ICD-10-CM

## 2024-03-18 DIAGNOSIS — Z90.5 H/O NEPHROURETERECTOMY: ICD-10-CM

## 2024-03-18 DIAGNOSIS — Z90.6 H/O NEPHROURETERECTOMY: ICD-10-CM

## 2024-03-18 PROCEDURE — G2211 COMPLEX E/M VISIT ADD ON: HCPCS | Mod: S$GLB,,, | Performed by: INTERNAL MEDICINE

## 2024-03-18 PROCEDURE — 99214 OFFICE O/P EST MOD 30 MIN: CPT | Mod: S$GLB,,, | Performed by: INTERNAL MEDICINE

## 2024-03-18 PROCEDURE — 99999 PR PBB SHADOW E&M-EST. PATIENT-LVL IV: CPT | Mod: PBBFAC,,, | Performed by: INTERNAL MEDICINE

## 2024-03-18 NOTE — PROGRESS NOTES
MEDICAL ONCOLOGY - FOLLOW-UP VISIT    Reason for visit: Upper tract Urothelial carcinoma     Best Contact Phone Number(s): [phone] (home)      Cancer/Stage/TNM:    Cancer Staging   Ureteral tumor  Staging form: Renal Pelvis and Ureter, AJCC 8th Edition  - Clinical stage from 7/28/2023: Stage II (cT2, cN0, cM0) - Signed by Marquita Stoner NP on 8/28/2023       Oncology History   Ureteral tumor   2/16/2023 Initial Diagnosis    Ureteral tumor     7/28/2023 Cancer Staged    Staging form: Renal Pelvis and Ureter, AJCC 8th Edition  - Clinical stage from 7/28/2023: Stage II (cT2, cN0, cM0)     Urothelial carcinoma with high risk of metastasis   3/6/2023 Initial Diagnosis    Urothelial carcinoma with high risk of metastasis     3/14/2023 -  Chemotherapy    Treatment Summary   Plan Name: OP BLADDER GEMCITABINE CISPLATIN (SPLIT DOSE CISPLATIN) Q3W  Treatment Goal: Curative  Status: Active  Start Date: 3/14/2023  End Date: 6/29/2023  Provider: Issa Antony MD  Chemotherapy: CISplatin (Platinol) 35 mg/m2 = 64 mg in sodium chloride 0.9% 314 mL chemo infusion, 35 mg/m2 = 64 mg, Intravenous, Clinic/HOD 1 time, 4 of 4 cycles  Dose modification: 17.5 mg/m2 (original dose 35 mg/m2, Cycle 3, Reason: Other (see comments), Comment: renal function), 35 mg/m2 (original dose 35 mg/m2, Cycle 3, Reason: Other (see comments)), 17.5 mg/m2 (original dose 35 mg/m2, Cycle 2, Reason: Other (see comments), Comment: renal function)  Administration: 64 mg (3/14/2023), 64 mg (3/21/2023), 64 mg (4/19/2023), 64 mg (5/4/2023), 64 mg (5/16/2023), 32 mg (4/4/2023), 64 mg (6/1/2023), 64 mg (6/28/2023)  gemcitabine 1,800 mg in sodium chloride 0.9% SolP 332.34 mL chemo infusion, 1,840 mg, Intravenous, Clinic/HOD 1 time, 4 of 4 cycles  Administration: 1,800 mg (3/14/2023), 1,800 mg (3/21/2023), 1,800 mg (4/4/2023), 1,800 mg (4/19/2023), 1,800 mg (5/4/2023), 1,800 mg (5/16/2023), 1,800 mg (6/1/2023), 1,800 mg (6/28/2023)          HPI:   69 y.o. male  with PVD, PAD, HLD, CKD3, AAA, treated hep C, Rheumatoid arthritis on MTX (previously on humara), smoking (about 1/2 PPD) who presented with gross hematuria. He saw urology and underwent CT urogram which showed Few soft tissue masses within the left renal pelvis, the largest measures 2.5 x 1.3 cm. There was also a L adrenal nodule measuring 1.6 cm. Urine cytology was evident of atypical urothelial cells, and repeat sample showed high grade urothelial carcinoma. He underwent a flexible cystoscopy with normal bladder findings. L RPG showed Left RPG with mild hydronephrosis and filling defect ~2 cm in central renal pelvis.  Left ureteroscopy with was done and showed papillary tumors along the proximal ureter and renal pelvis. - Large ~2 cm nodular lesion in the renal pelvis consistent with filling defect. Biopsy of the mass came back as - High grade urothelial dysplasia/carcinoma in situ. - No definitive invasive carcinoma identified (outside report)    PET/CT done on 03/08 showed Left proximal ureteral lesion compatible with urothelial cancer.  No FDG PET evidence of metastatic disease.      Interval history:   s/p robotic nephroureterectomy on 7/14/23 with Dr. Jefferson with negative margins, high grade UTUC. Patient feeling well.    Review of Systems   Constitutional:  Positive for malaise/fatigue. Negative for chills, fever and weight loss.   HENT:  Positive for hearing loss. Negative for congestion. Ear discharge: r>L.   Eyes:  Negative for blurred vision.   Respiratory:  Negative for cough and shortness of breath.    Cardiovascular:  Negative for chest pain, palpitations and leg swelling.   Gastrointestinal:  Positive for constipation. Negative for abdominal pain, blood in stool, diarrhea, nausea and vomiting.   Genitourinary:  Negative for dysuria and frequency.   Musculoskeletal:  Negative for back pain and myalgias.   Skin:  Negative for itching and rash.   Neurological:  Negative for dizziness, tingling,  tremors, sensory change, focal weakness and headaches.   Endo/Heme/Allergies:  Does not bruise/bleed easily.   Psychiatric/Behavioral:  The patient is not nervous/anxious.        Past Medical History:   Past Medical History:   Diagnosis Date    Anemia     Cancer     Cataract     Colon polyp 2014    Depression     Disorder of kidney and ureter     History of hepatitis C, s/p successful treatment w/ SVR12 - 7/2015 10/14/2012    Kelsey 1a,  Liver biopsy 6/2014 - Stage 1 fibrosis;  Completed 8 weeks Harvoni w/ SVR12 - 7/2015      Hyperlipidemia     Hypertension 3/8/2024    Lipoma of arm     Lipoma of lower extremity     Nuclear sclerosis - Both Eyes 10/22/2012    Other and unspecified hyperlipidemia 10/22/2013    PAD (peripheral artery disease)     Peripheral vascular disease, unspecified     Plaquenil adverse reaction in therapeutic use 10/22/2012    Rheumatoid arthritis(714.0) 10/14/2012    Special screening for malignant neoplasms, colon 02/21/2014        Past Surgical History:   Past Surgical History:   Procedure Laterality Date    BIOPSY OF URETER Left 02/16/2023    Procedure: BIOPSY, URETER/BRUSH;  Surgeon: Kem Jefferson MD;  [facility];  Service: Urology;  Laterality: Left;    COLONOSCOPY N/A 11/29/2021    Procedure: COLONOSCOPY;  Surgeon: Kenrick Zimmer MD;  Location: [facility] (4TH FLR);  Service: Endoscopy;  Laterality: N/A;  blood thinner approval received, see telephone encounter 10/19/21-BB  fully vacc-inst email-tb    CYSTOSCOPY      CYSTOSCOPY  02/16/2023    Procedure: CYSTOSCOPY;  Surgeon: Kem Jefferson MD;  [facility];  Service: Urology;;    HERNIA REPAIR      INSERTION OF TUNNELED CENTRAL VENOUS CATHETER (CVC) WITH SUBCUTANEOUS PORT Right 3/13/2023    Procedure: INSERTION, PORT-A-CATH;  Surgeon: Rene Cali MD;  Location: [facility] [place] CATH LAB;  Service: Radiology;  Laterality: Right;    KNEE ARTHROPLASTY      LAPAROSCOPIC EXPLORATION OF GROIN Left 09/06/2018     Procedure: EXPLORATION, INGUINAL REGION, LAPAROSCOPIC;  Surgeon: Mingo De Guzman Jr., MD;  Location: [facility] OR 2ND FLR;  Service: General;  Laterality: Left;    MEDIPORT REMOVAL N/A 10/3/2023    Procedure: REMOVAL, CATHETER, CENTRAL VENOUS, TUNNELED, WITH PORT;  Surgeon: Yeimi Stanton MD;  Location: [facility] CATH LAB;  Service: Radiology;  Laterality: N/A;    PYELOSCOPY Left 02/16/2023    Procedure: PYELOSCOPY;  Surgeon: Kem Jefferson MD;  [facility];  Service: Urology;  Laterality: Left;    RETROGRADE PYELOGRAPHY Bilateral 02/16/2023    Procedure: PYELOGRAM, RETROGRADE;  Surgeon: Kem Jefferson MD;  Location: [facility] OR [address] [place] [address];  Service: Urology;  Laterality: Bilateral;    ROBOT-ASSISTED LAPAROSCOPIC SURGICAL REMOVAL OF KIDNEY AND URETER USING DA BRIJESH XI Left 7/14/2023    Procedure: XI ROBOTIC NEPHROURETERECTOMY;  Surgeon: Kem Jefferson MD;  [facility];  Service: Urology;  Laterality: Left;  5 hours    TONSILLECTOMY      URETEROSCOPIC REMOVAL OF URETERIC CALCULUS Left 02/16/2023    Procedure: REMOVAL, CALCULUS, URETER, URETEROSCOPIC;  Surgeon: Kem Jefferson MD;  Location: [facility] OR [address] [place] [address];  Service: Urology;  Laterality: Left;    URETEROSCOPY Left 02/16/2023    Procedure: URETEROSCOPY;  Surgeon: Kem Jefferson MD;  Location: [facility] OR [address] [place] [address];  Service: Urology;  Laterality: Left;        Family History:   Family History   Problem Relation Age of Onset    Heart disease Paternal Uncle     Dementia Mother     Heart disease Sister     Liver disease Neg Hx     Amblyopia Neg Hx     Blindness Neg Hx     Cancer Neg Hx     Cataracts Neg Hx     Diabetes Neg Hx     Glaucoma Neg Hx     Hypertension Neg Hx     Macular degeneration Neg Hx     Retinal detachment Neg Hx     Strabismus Neg Hx     Stroke Neg Hx     Thyroid disease Neg Hx         Social History:   Social History     Tobacco Use    Smoking status: Former     Current packs/day: 0.00     Types: Cigarettes     Quit  "date: 6/27/2020     Years since quitting: 3.7    Smokeless tobacco: Never   Substance Use Topics    Alcohol use: Yes     Comment: 2 drinks per week        I have reviewed and updated the patient's past medical, surgical, family and social histories.    Allergies:   Review of patient's allergies indicates:   Allergen Reactions    Iodinated contrast media      Shortness of breath        Medications:   Current Outpatient Medications   Medication Sig Dispense Refill    acetaminophen (TYLENOL) 650 MG TbSR Take 1 tablet (650 mg total) by mouth every 8 (eight) hours. 63 tablet 0    amLODIPine (NORVASC) 5 MG tablet TAKE 1 TABLET BY MOUTH ONCE  DAILY 90 tablet 3    aspirin 81 MG Chew Take 81 mg by mouth once daily.      bisacodyL (DULCOLAX) 5 mg EC tablet Take 5 mg by mouth daily as needed for Constipation.      cilostazoL (PLETAL) 50 MG Tab Take 1 tablet (50 mg total) by mouth 2 (two) times daily. 180 tablet 3    docusate sodium (COLACE) 100 MG capsule Take 100 mg by mouth 2 (two) times daily.      folic acid (FOLVITE) 1 MG tablet TAKE 1 TABLET BY MOUTH ONCE  DAILY 90 tablet 3    levocetirizine (XYZAL) 5 MG tablet TAKE 1 TABLET BY MOUTH EVERY DAY IN THE EVENING 90 tablet 1    methotrexate 2.5 MG Tab TAKE 8 TABLETS BY MOUTH EVERY 7  DAYS THIS MEDICATION REQUIRES  PERIODIC LAB MONITORING 120 tablet 0    multivitamin capsule Take 1 capsule by mouth once daily.      simvastatin (ZOCOR) 10 MG tablet Take 1 tablet (10 mg total) by mouth every evening. 90 tablet 9    tamsulosin (FLOMAX) 0.4 mg Cap Take 1 capsule (0.4 mg total) by mouth once daily. 30 capsule 11    traZODone (DESYREL) 50 MG tablet TAKE 3 TABLETS BY MOUTH AT  NIGHT AS NEEDED FOR  INSOMNIA 270 tablet 3     No current facility-administered medications for this visit.        Physical Exam:   /78 (BP Location: Right arm, Patient Position: Sitting, BP Method: Medium (Automatic))   Pulse 74   Temp 97.7 °F (36.5 °C) (Oral)   Resp 17   Ht 5' 5" (1.651 m)   Wt 71 " kg (156 lb 8.4 oz)   SpO2 98%   BMI 26.05 kg/m²      ECOG Performance status: 1         Physical Exam  Constitutional:       General: He is not in acute distress.     Appearance: Normal appearance.   HENT:      Head: Normocephalic and atraumatic.   Eyes:      Pupils: Pupils are equal, round, and reactive to light.   Cardiovascular:      Rate and Rhythm: Normal rate and regular rhythm.      Heart sounds: No murmur heard.  Pulmonary:      Effort: No respiratory distress.      Breath sounds: Normal breath sounds. No wheezing.   Abdominal:      General: Abdomen is flat. Bowel sounds are normal. There is no distension.      Palpations: Abdomen is soft. There is no mass.      Tenderness: There is no abdominal tenderness.   Musculoskeletal:         General: No swelling or deformity.   Skin:     Coloration: Skin is not jaundiced.   Neurological:      General: No focal deficit present.      Mental Status: He is alert and oriented to person, place, and time. Mental status is at baseline.         Labs:   WBC   Date Value Ref Range Status   03/15/2024 11.67 3.90 - 12.70 K/uL Final     Hemoglobin   Date Value Ref Range Status   03/15/2024 11.7 (L) 14.0 - 18.0 g/dL Final     Hematocrit   Date Value Ref Range Status   03/15/2024 36.2 (L) 40.0 - 54.0 % Final     Platelets   Date Value Ref Range Status   03/15/2024 205 150 - 450 K/uL Final       Chemistry        Component Value Date/Time     03/15/2024 1101    K 5.1 03/15/2024 1101     03/15/2024 1101    CO2 21 (L) 03/15/2024 1101    BUN 35 (H) 03/15/2024 1101    CREATININE 2.2 (H) 03/15/2024 1101    GLU 98 03/15/2024 1101        Component Value Date/Time    CALCIUM 10.1 03/15/2024 1101    ALKPHOS 98 03/15/2024 1101    AST 21 03/15/2024 1101    ALT 17 03/15/2024 1101    BILITOT 0.4 03/15/2024 1101    ESTGFRAFRICA >60.0 05/12/2022 1238    EGFRNONAA >60.0 05/12/2022 1238        CT Chest Abdomen Pelvis Without Contrast (XPD)  Narrative: EXAMINATION:  CT CHEST ABDOMEN  PELVIS WITHOUT CONTRAST(XPD)    CLINICAL HISTORY:  Bladder cancer, invasive, assess treatment response;Malignant neoplasm of left kidney, except renal pelvis    TECHNIQUE:  Low dose axial images, sagittal and coronal reformations were obtained from the thoracic inlet to the pubic symphyses.  450 mL oral barium Readi-Cat suspension administered. .    Maximum intensity projections of the chest obtained.    COMPARISON:  CT chest abdomen pelvis 12/12/2023    FINDINGS:  Thoracic soft tissues: No significant abnormality.    Aorta: Left-sided aortic arch with moderate calcific atherosclerosis. Fusiform dilatation of the ascending aorta measuring up to 3.8 cm similar to prior allowing for differences in technique (series 3, image 12)..    Heart: No cardiomegaly.  Trace pericardial fluid may be physiologic unchanged.  Multi-vessel coronary calcific atherosclerosis.    Anjana/Mediastinum: No significant lymphadenopathy    Lungs: Centrilobular and paraseptal emphysematous changes of the lungs similar to prior.  Persistent mild ground-glass attenuation in the left upper lobe near the emphysematous changes.  Right lung base pleural fluid, thickening and nodularity also involving the major fissure with subpleural reticulation similar to prior.    Interval increased size of right lower lobe posterior subpleural nodule measuring 8 mm (series 6, image 436, previously 6 mm.  Other few scattered calcified and noncalcified pulmonary micro nodules are similar to prior (noncalcified nodules for example series 6, image 299, 396, and 165).    Liver: Liver is normal in size and attenuation.  Subcentimeter right hepatic hypodensity unchanged (series 3, image 51).    Gallbladder: Decompressed limiting evaluation.  No calcified stone.  Benign Phrygian cap.    Bile Ducts: No evidence of dilated ducts.    Pancreas: No mass or peripancreatic fat stranding.    Spleen: Unremarkable.    Adrenals: Stable left adrenal nodular thickening with attenuation  suggestive of benign lipid rich adenoma.    Kidneys/ Ureters: Status post left nephrectomy.  Unchanged stranding in the left renal fossa may be postoperative.  Right renal stable 2.2 cm exophytic cyst and smaller hypodensities too small to characterize similar to prior.  Scattered nonobstructing right renal stones.  Right distal ureter is difficult to follow.    Bladder: Incompletely distended limiting characterization.    Reproductive organs: Dystrophic calcifications of the prostate.    GI Tract/Mesentery: No evidence of bowel obstruction or inflammation. Colonic diverticula without evidence of acute diverticulitis.    Peritoneal Space: No ascites. No free air.    Retroperitoneum: No significant adenopathy.    Abdominal wall: Ventral midline abdominal wall postsurgical changes.    Vasculature: Fusiform dilatation of the infrarenal abdominal aorta similar to prior difficult to measure due to tortuosity approximately measuring up to 4.3 cm (sagittal series 602, image 90) similar to prior per my measurement. Fusiform dilatation of the common iliac arteries up to 1.9 cm on the right unchanged (series 3, image 21).  Scattered vascular calcific atherosclerosis.    Bones: Demineralized bones.  Degenerative changes of the visualized spine.  No aggressive osseous lesion.  Impression: Postsurgical changes of left nephrectomy.  Trace stranding/fluid in the nephrectomy bed unchanged.    Enlarging lung base pulmonary nodule now 8 mm, previously 6 mm.  Findings are suspicious for metastasis.    Stable right pleural fluid, pleural thickening, and pulmonary reticulation.    Right nephrolithiasis.    Stable fusiform aneurysmal dilatation of the aorta and common iliac arteries.    This report was flagged in Epic as abnormal.    Electronically signed by resident: Justice Tracy  Date:    03/15/2024  Time:    13:04    Electronically signed by: Juan Rivera MD  Date:    03/15/2024  Time:    14:04        Assessment:       1. Urothelial  carcinoma of kidney, left    2. H/O nephroureterectomy          Plan:   1,2-  Cancer Staging   Ureteral tumor  Staging form: Renal Pelvis and Ureter, AJCC 8th Edition  - Clinical stage from 7/28/2023: Stage II (cT2, cN0, cM0) - Signed by Marquita Stoner NP on 8/28/2023    69 y.o. M patient presented with gross hematuria and was found to have multiple soft tissue masses in L renal pelvis. He is s/p L urethroscopy and biopsy which showed high grade urothelial dysplasia. On CT urogram, there was a 1.6 cm L adrenal nodule, as well as multiple pulmonary nodules, largest was 0.6 cm, and multiple hepatic nodules too small to characterize.   Overall picture is concerning for invasive upper tract urothelial carcinoma.   PET/CT showed no evidence of metastatic disease. There is B/L adrenal adenomas.     Now s/p neoadjuvant chemotherapy with Gemcitabine and Cisplatin. Split dose Cisplatin for borderline kidney functions.  Because of his RA (not very controlled), we avoided IO adjuvantely. Current scans show slight enlargement in the lungs but still less than 1 cm. Will monitor closely.        RTC in 3 mos with CT CAP and labs (CBC, CMP) and to see Dr. Antony.    Patient was also seen and examined by Dr. Antony. Patient is in agreement with the proposed treatment plan. All questions were answered to the patient's satisfaction. Pt knows to call clinic if anything is needed before the next clinic visit.      Marquita Stoner, MSN, APRN, FNP-C  Hematology and Medical Oncology  Manager- [facility]  Nurse Practitioner/Clinical Investigator, [place] [facility]  Nurse Practitioner to Dr. Issa Antony        I have reviewed the notes, assessments, and/or procedures performed by the nurse practitioner, as above.  I have personally interviewed the patient at the beside, and rounded with the nurse practitioner. I formulated the plan of care.  I concur with her documentation of  Andrea Gant.  I, Dr. Issa Antony, personally spent more than 30 mins during this encounter.     Issa Antony M.D., M.S., F.A.C.P.  Hematology/Oncology Attending  [facility]               Med Onc Chart Routing      Follow up with physician . RTC in 3 mos with CT CAP and labs (CBC, CMP) and to see Dr. Antony.   Follow up with DESIREE    Infusion scheduling note    Injection scheduling note    Labs    Imaging    Pharmacy appointment    Other referrals

## 2024-04-08 NOTE — PROGRESS NOTES
Subjective:       Patient ID: Andrea Gant is a 69 y.o. male with nodular sero+ccp+ RA, OA and Hepatitis C (cured with Harvoni) on MTX    Chief Complaint: No chief complaint on file.    Returns for follow-up. Last seen on 1/10/24    At his last visit MTX was dropped to 15 mg/wk (due to CKD 3b) & imaging was obtained which did not show any new changes.   He has not noticed any difference in his joint pain/swelling.   Also has OA of hands.   Overall RA doing well but still c/o SC nodules which now may be a bit larger on the left.   Otherwise, AM stiffness limited. No new swollen joints. PIP joint pain in hands stable. Foot pain & knee pain stable.   Has mild dry eyes. No true Raynaud's.      1/10/24  Was dx w L urothelial cancer, got chemo & underwent robotic nephroureterectomy on 7/14/23 Noticing hearing loss. Noticing weak stream. Denies dysuria.  Renal insufficiency has worsened.   However, despite this, he feels he is doing very well overall and is working.   He is currently taking 20 mg of MTX/wk + folic acid 1 mg/d. Takes it Wednesdays.  Has some joint pain--mostly feet, R knee which he says is inflamed & swollen. Also some PIP jt pain in hands.  Foot pain is worse in AM & dissipates; had 1 day hx of severe pain R toe that spontaneously resolved.  No known hx of gout.  He is c/o nodules on his thumbs & several fingers as well as over the elbows. Nodules hurt when he bangs his elbows.   Had SC nodules removed from R elbow; in past had them removed from L elbow. Pathology both times: rheumatoid nodules.  AM stiffness is 30 minutes or less; Denies dysphagia, tight skin, oral ulcers, pleurisy, pericarditis, photosensitivity, thromboses. Has dry eyes. No dry mouth. No true Raynaud's.       Current Outpatient Medications   Medication Sig Dispense Refill    acetaminophen (TYLENOL) 650 MG TbSR Take 1 tablet (650 mg total) by mouth every 8 (eight) hours. 63 tablet 0    amLODIPine (NORVASC) 5 MG tablet TAKE 1  TABLET BY MOUTH ONCE  DAILY 90 tablet 3    aspirin 81 MG Chew Take 81 mg by mouth once daily.      bisacodyL (DULCOLAX) 5 mg EC tablet Take 5 mg by mouth daily as needed for Constipation.      cilostazoL (PLETAL) 50 MG Tab Take 1 tablet (50 mg total) by mouth 2 (two) times daily. 180 tablet 3    docusate sodium (COLACE) 100 MG capsule Take 100 mg by mouth 2 (two) times daily.      folic acid (FOLVITE) 1 MG tablet TAKE 1 TABLET BY MOUTH ONCE  DAILY 90 tablet 3    levocetirizine (XYZAL) 5 MG tablet TAKE 1 TABLET BY MOUTH EVERY DAY IN THE EVENING 90 tablet 1    methotrexate 2.5 MG Tab TAKE 8 TABLETS BY MOUTH EVERY 7  DAYS THIS MEDICATION REQUIRES  PERIODIC LAB MONITORING 120 tablet 0    multivitamin capsule Take 1 capsule by mouth once daily.      simvastatin (ZOCOR) 10 MG tablet Take 1 tablet (10 mg total) by mouth every evening. 90 tablet 9    tamsulosin (FLOMAX) 0.4 mg Cap Take 1 capsule (0.4 mg total) by mouth once daily. 30 capsule 11    traZODone (DESYREL) 50 MG tablet TAKE 3 TABLETS BY MOUTH AT  NIGHT AS NEEDED FOR  INSOMNIA 270 tablet 3     No current facility-administered medications for this visit.     Probable Allergic to Enbrel (rash);     Past Medical History:   Diagnosis Date    Anemia     Cancer     Cataract     Colon polyp 2014    Depression     Disorder of kidney and ureter     History of hepatitis C, s/p successful treatment w/ SVR12 - 7/2015 10/14/2012    Kelsey 1a,  Liver biopsy 6/2014 - Stage 1 fibrosis;  Completed 8 weeks Harvoni w/ SVR12 - 7/2015      Hyperlipidemia     Hypertension 3/8/2024    Lipoma of arm     Lipoma of lower extremity     Nuclear sclerosis - Both Eyes 10/22/2012    Other and unspecified hyperlipidemia 10/22/2013    PAD (peripheral artery disease)     Peripheral vascular disease, unspecified     Plaquenil adverse reaction in therapeutic use 10/22/2012    Rheumatoid arthritis(714.0) 10/14/2012    Special screening for malignant neoplasms, colon 02/21/2014       Past Surgical  History:   Procedure Laterality Date    BIOPSY OF URETER Left 02/16/2023    Procedure: BIOPSY, URETER/BRUSH;  Surgeon: Kem Jefferson MD;  Location: [facility] OR 1ST FLR;  Service: Urology;  Laterality: Left;    COLONOSCOPY N/A 11/29/2021    Procedure: COLONOSCOPY;  Surgeon: Kenrick Zimmer MD;  Location: [facility] ENDO (4TH FLR);  Service: Endoscopy;  Laterality: N/A;  blood thinner approval received, see telephone encounter 10/19/21-BB  fully vacc-inst email-tb    CYSTOSCOPY      CYSTOSCOPY  02/16/2023    Procedure: CYSTOSCOPY;  Surgeon: Kem Jefferson MD;  Location: [facility] OR 1ST FLR;  Service: Urology;;    HERNIA REPAIR      INSERTION OF TUNNELED CENTRAL VENOUS CATHETER (CVC) WITH SUBCUTANEOUS PORT Right 3/13/2023    Procedure: INSERTION, PORT-A-CATH;  Surgeon: Rene Cali MD;  Location: [facility] CATH LAB;  Service: Radiology;  Laterality: Right;    KNEE ARTHROPLASTY      LAPAROSCOPIC EXPLORATION OF GROIN Left 09/06/2018    Procedure: EXPLORATION, INGUINAL REGION, LAPAROSCOPIC;  Surgeon: Mingo De Guzman Jr., MD;  Location: [facility] OR 2ND FLR;  Service: General;  Laterality: Left;    MEDIPORT REMOVAL N/A 10/3/2023    Procedure: REMOVAL, CATHETER, CENTRAL VENOUS, TUNNELED, WITH PORT;  Surgeon: Yeimi Stanton MD;  Location: [facility] CATH LAB;  Service: Radiology;  Laterality: N/A;    PYELOSCOPY Left 02/16/2023    Procedure: PYELOSCOPY;  Surgeon: Kem Jefferson MD;  Location: [facility] OR 1ST FLR;  Service: Urology;  Laterality: Left;    RETROGRADE PYELOGRAPHY Bilateral 02/16/2023    Procedure: PYELOGRAM, RETROGRADE;  Surgeon: Kem Jefferson MD;  [facility];  Service: Urology;  Laterality: Bilateral;    ROBOT-ASSISTED LAPAROSCOPIC SURGICAL REMOVAL OF KIDNEY AND URETER USING DA BRIJESH XI Left 7/14/2023    Procedure: XI ROBOTIC NEPHROURETERECTOMY;  Surgeon: Kem Jefferson MD;  Location: [facility] OR 2ND FLR;  Service: Urology;  Laterality: Left;  5 hours    TONSILLECTOMY      URETEROSCOPIC REMOVAL  "OF URETERIC CALCULUS Left 02/16/2023    Procedure: REMOVAL, CALCULUS, URETER, URETEROSCOPIC;  Surgeon: Kem Jefferson MD;  Location: [facility] OR [address] [place] [address];  Service: Urology;  Laterality: Left;    URETEROSCOPY Left 02/16/2023    Procedure: URETEROSCOPY;  Surgeon: Kem Jefferson MD;  Location: [facility] OR [address] [place] [address];  Service: Urology;  Laterality: Left;       Review of Systems   Constitutional:  Negative for fatigue, fever and unexpected weight change.        Initial weight loss attributed to dental issues.  Now maintaining weight loss;    HENT: Negative.  Negative for hearing loss, mouth sores, sore throat, tinnitus and trouble swallowing.    Eyes: Negative.  Negative for redness and visual disturbance.        Dry eyes.   Respiratory:  Negative for cough, choking, chest tightness and shortness of breath.    Cardiovascular: Negative.  Negative for chest pain, palpitations and leg swelling.   Gastrointestinal: Negative.  Negative for abdominal pain, blood in stool, constipation, diarrhea, nausea and vomiting.        Heartburn improved   Endocrine: Negative.    Genitourinary:  Positive for frequency. Negative for genital sores, hematuria and urgency.   Musculoskeletal:  Positive for arthralgias. Negative for back pain, neck pain and neck stiffness.   Skin: Negative.  Negative for rash.   Allergic/Immunologic: Negative.    Neurological: Negative.  Negative for dizziness, syncope, numbness and headaches.   Hematological: Negative.  Does not bruise/bleed easily.   Psychiatric/Behavioral:  Positive for sleep disturbance. Negative for dysphoric mood. The patient is not nervous/anxious.         Sleep helped by trazodone.          SH:Gave up smoking was former smoker.  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       Objective:     /76   Pulse 73   Ht 5' 5" (1.651 m)   Wt 70.2 kg (154 lb 12.2 oz)   BMI 25.75 kg/m²   Was 154 lb 1.6 oz on 1/10/24  Was 154 lb 15.7 lbs on 5/17/22  Was 158 lb 11.7 oz on 11/2/21  Was 173 lb 11.6 oz on " 8/8/19  Was 172 lbs 4.8 oz on 12/13/16;    Physical Exam   Constitutional: He is oriented to person, place, and time. No distress.   HENT:   Head: Normocephalic and atraumatic.   Mouth/Throat: Oropharynx is clear and moist. No oropharyngeal exudate.   No facial rashes  Parotids not enlarged     Eyes: Pupils are equal, round, and reactive to light. Conjunctivae are normal. Right eye exhibits no discharge. Left eye exhibits no discharge. No scleral icterus.   Neck: No JVD present. No tracheal deviation present. No thyromegaly present.   Cardiovascular: Normal rate and regular rhythm. Exam reveals no gallop and no friction rub.   Murmur heard.  Pulmonary/Chest: Effort normal and breath sounds normal. No respiratory distress. He has no wheezes. He has no rales. He exhibits no tenderness.   Abdominal: Soft. Bowel sounds are normal. He exhibits no distension and no mass. There is no splenomegaly or hepatomegaly. There is no abdominal tenderness. There is no rebound and no guarding.   Musculoskeletal:         General: No tenderness. Normal range of motion.      Cervical back: Neck supple.      Comments: Cspine FROM no tenderness  Tspine FROM no tenderness; mild kyposis  Lspine FROM no tenderness.  TMJ: unremarkable  Shoulders: FROM now; no synovitis; no tenderness  Elbows: FROM; bilateral surgical scars; R mild flexion contracture.  Wrists: FROM; no SHT & noTTP  MCPs: FROM; no SHT; no metacarpalgia;  ok;  PIPs: FROM; +Bouchards--servando 4th R PIP; no SHT; no TTP; makes good fists.  DIPs: FROM; + Heberden's; no synovitis  HIPS: FROM  Knees: FROM; no synovitis; no instability; no effusions;   Ankles: FROM: no synovitis   Toes: ok; no metatarsalgia; mild HV on L;   R Achilles: mild TTP w Yoon's deformity       Lymphadenopathy:     He has no cervical adenopathy.   Neurological: He is alert and oriented to person, place, and time. He has normal reflexes. No cranial nerve deficit. Gait normal.   Proximal and distal muscle  strength 5/5.   Skin: Skin is warm and dry. No rash noted. He is not diaphoretic.   Nodules bilateral elbows L>R;  L now is 2 cm in diameter  Small nodules over IP of each thumb & 3rd R DIP  Bilateral superficial varicosities LE;   Psychiatric: His behavior is normal. Mood, memory, affect and judgment normal.   Vitals reviewed.            LABS:     3/15/24: CBC Hg 11.7; Ht 36.2; CMP cnne 2.2; GFR 31.7; BUN 35; UA 7.7;   24: ESR 55 (23); CRP 12.0  23: CBC Hg 11.4; Ht 34; CMP cnne 2.3; GFR 30; BUN 47  4/3/23: CBC Hg 9.6; Ht 30; WC 3.44; CMP cnne 1.6; GFR 46.4; BUN 28  23: ESR 20; CRP 3.2  22: CBC Hg 13.6; BMP ok;  11/3/22: ESR 11; CRP 1.9; CMP: BUN 26; cnne 1.5; GFR 50.4; UA 1+ pr; >100 RBC; >100 WBC;   8/3/22: ESR 8; CRP 2.8; CBC Hg 13.1; Ht 39.5; CMP cnne 1.3; GFR 59.8; glu 154  22: Hg A1C 5.0  22: ESR 20 (23); CRP 2.0; CBC Hg 12.8; Ht 38.1; CMP glu 164;   22: ESR 9; CRP 5.4; CBC Hg 13.6; Na 134;   10/11/21: CBC ok; CMP ok; TSH ok; HgA1C 5.2;   3/24/21: ESR 19; CRP 3.5; CBC ok; CMP CMP cnne 1.3; GFR 56.5;   20: ESR 13 (23); CRP 2.5; CBC ok; CMP cnne 1.4; GFR 52; BUN 30; TP 8.7; ; CCP 30.4  19: ESR 9; CRP 2.5; CBC ok; CMP BUN 24; cnne 1.3; GFR 57.3;   8/1/19: ESR 24 (23); CRP 5.4; CBC ok; CMP cnne 1.3; GFR 57.3; BUN 27;   10/10/18; ESR 7; CRP 4.1; CBC ; CMP cnne 1.3; GFR 57.7;   18: ESR 29; CRP; CBC ok; CMP ok; ; CCP 44;   17: ESR 23; CRP 4.2; CBC ok; CMP cnne 1.3; GFR 58.1;   3/26/16: CBC ok; cnne 1.3  3/10/16: ESR 9; CRP 1.7; CBC, CMP ok;  9/15/15: ESR 20; CRP 0.9; CBC ok; CMP ok;  3/2/15: CMP BUN 28; cnne 1.1  1/12/15: Hg 13.9  10/21/14: ESR 8;   14: ESR 19; CRP 2.9; Hg 12.8; cnne 1.3; Vit D 18; ; CCP 44.3   Hepatitis B & HIV negative; HCV+;     IMAGIN/29/24: CXR: Opacity is noted in the base of the right hemithorax with loss of definition of the ipsilateral hemidiaphragm and costophrenic angle compatible pleural effusion/scar.   Additional parenchymal atelectasis/consolidation is suspected.  No left-sided pleural effusion is seen.  Cardiomediastinal silhouette is within normal limits.  Increase of the thoracic kyphosis.  Thoracic spondylosis.   1/16/24: Bilateral feet: mild DJD  1/16/24: Bilateral hands: personally viewed: unchanged;  1/16/24: Bilateral knees:unremarkable.  8/8/19: Bilateral hands: personally viewed: L: cystic changes at the distal aspect of the left ulna, also involving some of the carpal bones, as well as the distal aspects of the 1st and 3rd metacarpal bones.  Mild degenerative changes at some of the DIP joints; R: Cystic changes involving some of the carpal bones as well as the distal aspect of the 1st and 3rd metacarpals and proximal aspect of the proximal phalanx of the thumb.  Degenerative changes involving some of the DIP joints and also the interphalangeal joint of the right thumb.    9/29/16: US Dopplers: R:minimal peripheral arterial occlusive disease: L: moderate peripheral arterial occlusive disease; unchanged  4/2/15: Bilateral wrists: personal review: cystic lesion left lunate, possibly left ulnar notch. (not too different from previous)  4/2/15: R ankle personally reviewed: ok  1/20/14: Arthritis survey personally reviewed again: OA lower CS; min OA hands & feet;      Assessment:   Seropositive (), CCP (44) positive nodular (bilateral elbows removed-grew back & fingers) rheumatoid arthritis with no erosions--some activity   Enbrel effective but resulted in rash.    MTX and Leflunomide were contraindicated at the time due to HCV.   SSZ given ok by Dr. Moore, but never begun   Was on hydroxychloroquine 400 mg/d for a while   Humira 40 mg qow since 6/15~ 1/2021   On MTX 15 mg/wk (ok for use of MTX or Leflunomide if needed as per Dr. Moore) since 11/2020    MTX nodulosis    Vs Rheumatoid nodules.    Joint pain multiple sites  Osteoarthritis of hands.  Prob OA of feet & knees     Possible Raynaud's in  past.     L urothelial cancer S/P chemo & roboti nephroureterectomy 7/15/23.    CKD 3b  renal insufficiency worsened on meloxicam & ibuprofen & now w nephrectomy    S/P R Achilles tendinitis    Dermatitis c/w seborrheic keratoses & other skin lesions   S/P basal cell carcinoma    Bilateral adhesive capsulitis R>L--resolved    HTN    Hx of nephrolithiasis    Hepatitis C with normal liver function tests--genotype 1a   Completed Harvoni; cured    Recurrent hypovitaminosis D -treated in past    Peripheral Arterial Disease.    Compression deformity of the lower lumbar and thoracic vertebral bodies by x-ray.     Multiple lipomas of the arms and lumbar spine area.     Occupational injuries in the past.     Persistent insomnia.   Helped by trazodone.     Normal weight from overweight  Plan:   Discussed switching from MTX to leflunomide.  Side effects & toxicities of leflunomide reviewed.  Patient reluctant to take something that may cause soft BMs.  Handout provided in any event.   For time being will remain on MTX.   Keep MTX  to15 mg/wk + folic acid 1 mg/d.  Labs in June & q 3 months on MTX  RTC 3 months or prn to see Dr. Case.

## 2024-04-09 ENCOUNTER — OFFICE VISIT (OUTPATIENT)
Dept: RHEUMATOLOGY | Facility: CLINIC | Age: 69
End: 2024-04-09
Payer: MEDICARE

## 2024-04-09 VITALS
BODY MASS INDEX: 25.78 KG/M2 | WEIGHT: 154.75 LBS | DIASTOLIC BLOOD PRESSURE: 76 MMHG | HEART RATE: 73 BPM | SYSTOLIC BLOOD PRESSURE: 121 MMHG | HEIGHT: 65 IN

## 2024-04-09 DIAGNOSIS — N18.32 STAGE 3B CHRONIC KIDNEY DISEASE: ICD-10-CM

## 2024-04-09 DIAGNOSIS — M05.79 RHEUMATOID ARTHRITIS INVOLVING MULTIPLE SITES WITH POSITIVE RHEUMATOID FACTOR: Primary | ICD-10-CM

## 2024-04-09 DIAGNOSIS — R22.9 SUBCUTANEOUS NODULES: ICD-10-CM

## 2024-04-09 DIAGNOSIS — Z79.631 LONG TERM METHOTREXATE USER: ICD-10-CM

## 2024-04-09 PROCEDURE — 1159F MED LIST DOCD IN RCRD: CPT | Mod: CPTII,S$GLB,, | Performed by: INTERNAL MEDICINE

## 2024-04-09 PROCEDURE — 3074F SYST BP LT 130 MM HG: CPT | Mod: CPTII,S$GLB,, | Performed by: INTERNAL MEDICINE

## 2024-04-09 PROCEDURE — 1125F AMNT PAIN NOTED PAIN PRSNT: CPT | Mod: CPTII,S$GLB,, | Performed by: INTERNAL MEDICINE

## 2024-04-09 PROCEDURE — 1160F RVW MEDS BY RX/DR IN RCRD: CPT | Mod: CPTII,S$GLB,, | Performed by: INTERNAL MEDICINE

## 2024-04-09 PROCEDURE — 3078F DIAST BP <80 MM HG: CPT | Mod: CPTII,S$GLB,, | Performed by: INTERNAL MEDICINE

## 2024-04-09 PROCEDURE — 99999 PR PBB SHADOW E&M-EST. PATIENT-LVL V: CPT | Mod: PBBFAC,,, | Performed by: INTERNAL MEDICINE

## 2024-04-09 PROCEDURE — 3008F BODY MASS INDEX DOCD: CPT | Mod: CPTII,S$GLB,, | Performed by: INTERNAL MEDICINE

## 2024-04-09 PROCEDURE — 99214 OFFICE O/P EST MOD 30 MIN: CPT | Mod: S$GLB,,, | Performed by: INTERNAL MEDICINE

## 2024-04-22 DIAGNOSIS — G47.00 PERSISTENT INSOMNIA: ICD-10-CM

## 2024-04-22 RX ORDER — TRAZODONE HYDROCHLORIDE 50 MG/1
TABLET ORAL
Qty: 270 TABLET | Refills: 3 | Status: SHIPPED | OUTPATIENT
Start: 2024-04-22

## 2024-05-08 ENCOUNTER — PROCEDURE VISIT (OUTPATIENT)
Dept: UROLOGY | Facility: CLINIC | Age: 69
End: 2024-05-08
Payer: MEDICARE

## 2024-05-08 VITALS
BODY MASS INDEX: 25.48 KG/M2 | HEART RATE: 82 BPM | DIASTOLIC BLOOD PRESSURE: 72 MMHG | RESPIRATION RATE: 17 BRPM | TEMPERATURE: 98 F | WEIGHT: 153.13 LBS | SYSTOLIC BLOOD PRESSURE: 124 MMHG

## 2024-05-08 DIAGNOSIS — Z85.51 HX OF BLADDER CANCER: Primary | ICD-10-CM

## 2024-05-08 PROCEDURE — 88112 CYTOPATH CELL ENHANCE TECH: CPT | Mod: 26,,, | Performed by: STUDENT IN AN ORGANIZED HEALTH CARE EDUCATION/TRAINING PROGRAM

## 2024-05-08 PROCEDURE — 52000 CYSTOURETHROSCOPY: CPT | Mod: S$GLB,,, | Performed by: UROLOGY

## 2024-05-08 PROCEDURE — 88112 CYTOPATH CELL ENHANCE TECH: CPT | Performed by: STUDENT IN AN ORGANIZED HEALTH CARE EDUCATION/TRAINING PROGRAM

## 2024-05-08 RX ORDER — LIDOCAINE HYDROCHLORIDE 20 MG/ML
JELLY TOPICAL
Status: COMPLETED | OUTPATIENT
Start: 2024-05-08 | End: 2024-05-08

## 2024-05-08 RX ADMIN — LIDOCAINE HYDROCHLORIDE: 20 JELLY TOPICAL at 12:05

## 2024-05-08 NOTE — PROCEDURES
Procedures:  Flexible cystourethroscopy      Pre Procedure Diagnosis:left upper tract urothelial CA  S/p left nephroureterectomy on 7/14/23; HG pT2 N0R0.        Patient Active Problem List    Diagnosis Date Noted    Hypertension 03/08/2024    Adrenal nodule 01/29/2024    Centrilobular emphysema 01/29/2024    Anemia 07/05/2023    Chemotherapy induced neutropenia 04/11/2023    Urothelial carcinoma with high risk of metastasis 03/06/2023    Ureteral tumor 02/16/2023    Solitary pulmonary nodule (repeat due Aug 2023) 02/09/2023    Drug-induced immunodeficiency 11/14/2022    Stage 3a chronic kidney disease 11/14/2022    Venous insufficiency     AAA (abdominal aortic aneurysm)     Right inguinal hernia 09/06/2018    PVD (peripheral vascular disease)     Intermittent claudication 10/22/2015    PAD (peripheral artery disease) 10/28/2013    Hypercholesteremia 10/28/2013    Other hyperlipidemia 10/22/2013    Nuclear sclerosis - Both Eyes 10/22/2012    History of long-term treatment with high-risk medication 10/22/2012    Persistent insomnia 10/15/2012    Rheumatoid arthritis 10/14/2012    History of hepatitis C, s/p successful treatment w/ SVR12 - 7/2015 10/14/2012         Post Procedure Diagnosis:Normal cystoscopy    Surgeon: Kem Jefferson MD    Anesthesia: 2% uro-jet lidocaine jelly for local analgesia    Flexible cysto-urethroscopy was performed after consent was obtained.  The risks and benefits were explained.    2% lidocaine urojet was used for local analgesia.  The genitalia was prepped and draped in the sterile fashion with hibiclens.    The flexible scope was advanced into the urethra.  However, there was a fossa navicularis stricture that the scope was not able to traverse.  A wire under direct vision was passed into the proximal urethra and bladder.  Day dilators were then used to sequentially dilate from 12 Lao up to 18 Lao.  At this point, the flexible cystoscope was able to be passed into the urethra  without difficulty.  No posterior urethral strictures were noted.  The prostate showed Significant hypertrophy.  There was No median lobe present.  The left ureteral orifice was absent and the right ureteral orifice was evaluated and noted to be normal with clear efflux.  The bladder was completely surveyed in a systematic fashion.   No bladder tumors or lesions were seen.      The patient tolerated the procedure well without complication.  Per NCCN guidelines, recommend q 3month cystoscopy and cytology for 1 year then at longer intervals.   Upper tract and chest imaging ordered by Marquita Stoner.     They will follow up in 3 month(s) for repeat cystoscopy.  A urine cytology was ordered and collected today.

## 2024-05-08 NOTE — PATIENT INSTRUCTIONS
What to Expect After a Cystoscopy  For the next 24-48 hours, you may feel a mild burning when you urinate. This burning is normal and expected. Drink plenty of water to dilute the urine to help relieve the burning sensation. You may also see a small amount of blood in your urine after the procedure.    Unless you are already taking antibiotics, you may be given an antibiotic after the test to prevent infection.    Signs and Symptoms to Report  Call the [facility] at [phone] if you develop any of the following:  Fever of 101 degrees or higher  Chills or persistent bleeding  Inability to urinate

## 2024-05-09 LAB
FINAL PATHOLOGIC DIAGNOSIS: ABNORMAL
Lab: ABNORMAL

## 2024-06-10 ENCOUNTER — PATIENT MESSAGE (OUTPATIENT)
Dept: INTERNAL MEDICINE | Facility: CLINIC | Age: 69
End: 2024-06-10
Payer: MEDICARE

## 2024-06-19 ENCOUNTER — HOSPITAL ENCOUNTER (OUTPATIENT)
Dept: RADIOLOGY | Facility: HOSPITAL | Age: 69
Discharge: HOME | End: 2024-06-19
Attending: NURSE PRACTITIONER
Payer: MEDICARE

## 2024-06-19 DIAGNOSIS — Z90.6 H/O NEPHROURETERECTOMY: ICD-10-CM

## 2024-06-19 DIAGNOSIS — C64.2 UROTHELIAL CARCINOMA OF KIDNEY, LEFT: ICD-10-CM

## 2024-06-19 DIAGNOSIS — R91.1 PULMONARY NODULE: ICD-10-CM

## 2024-06-19 DIAGNOSIS — Z90.5 H/O NEPHROURETERECTOMY: ICD-10-CM

## 2024-06-19 PROCEDURE — 25500020 PHARM REV CODE 255: Performed by: NURSE PRACTITIONER

## 2024-06-19 PROCEDURE — 74176 CT ABD & PELVIS W/O CONTRAST: CPT | Mod: 26,,, | Performed by: STUDENT IN AN ORGANIZED HEALTH CARE EDUCATION/TRAINING PROGRAM

## 2024-06-19 PROCEDURE — 74176 CT ABD & PELVIS W/O CONTRAST: CPT | Mod: TC

## 2024-06-19 PROCEDURE — 71250 CT THORAX DX C-: CPT | Mod: 26,,, | Performed by: STUDENT IN AN ORGANIZED HEALTH CARE EDUCATION/TRAINING PROGRAM

## 2024-06-19 PROCEDURE — 71250 CT THORAX DX C-: CPT | Mod: TC

## 2024-06-19 PROCEDURE — A9698 NON-RAD CONTRAST MATERIALNOC: HCPCS | Performed by: NURSE PRACTITIONER

## 2024-06-19 RX ADMIN — BARIUM SULFATE 450 ML: 20 SUSPENSION ORAL at 02:06

## 2024-06-26 ENCOUNTER — OFFICE VISIT (OUTPATIENT)
Dept: HEMATOLOGY/ONCOLOGY | Facility: CLINIC | Age: 69
End: 2024-06-26
Payer: MEDICARE

## 2024-06-26 VITALS
RESPIRATION RATE: 16 BRPM | DIASTOLIC BLOOD PRESSURE: 76 MMHG | HEIGHT: 65 IN | BODY MASS INDEX: 25.42 KG/M2 | OXYGEN SATURATION: 98 % | SYSTOLIC BLOOD PRESSURE: 129 MMHG | WEIGHT: 152.56 LBS | HEART RATE: 75 BPM

## 2024-06-26 DIAGNOSIS — Z90.5 H/O NEPHROURETERECTOMY: ICD-10-CM

## 2024-06-26 DIAGNOSIS — C64.2 UROTHELIAL CARCINOMA OF KIDNEY, LEFT: Primary | ICD-10-CM

## 2024-06-26 DIAGNOSIS — Z90.6 H/O NEPHROURETERECTOMY: ICD-10-CM

## 2024-06-26 DIAGNOSIS — R91.1 PULMONARY NODULE: ICD-10-CM

## 2024-06-26 PROCEDURE — 1126F AMNT PAIN NOTED NONE PRSNT: CPT | Mod: CPTII,S$GLB,, | Performed by: INTERNAL MEDICINE

## 2024-06-26 PROCEDURE — 99214 OFFICE O/P EST MOD 30 MIN: CPT | Mod: S$GLB,,, | Performed by: INTERNAL MEDICINE

## 2024-06-26 PROCEDURE — 1159F MED LIST DOCD IN RCRD: CPT | Mod: CPTII,S$GLB,, | Performed by: INTERNAL MEDICINE

## 2024-06-26 PROCEDURE — 3288F FALL RISK ASSESSMENT DOCD: CPT | Mod: CPTII,S$GLB,, | Performed by: INTERNAL MEDICINE

## 2024-06-26 PROCEDURE — 3074F SYST BP LT 130 MM HG: CPT | Mod: CPTII,S$GLB,, | Performed by: INTERNAL MEDICINE

## 2024-06-26 PROCEDURE — G2211 COMPLEX E/M VISIT ADD ON: HCPCS | Mod: S$GLB,,, | Performed by: INTERNAL MEDICINE

## 2024-06-26 PROCEDURE — 3008F BODY MASS INDEX DOCD: CPT | Mod: CPTII,S$GLB,, | Performed by: INTERNAL MEDICINE

## 2024-06-26 PROCEDURE — 3078F DIAST BP <80 MM HG: CPT | Mod: CPTII,S$GLB,, | Performed by: INTERNAL MEDICINE

## 2024-06-26 PROCEDURE — 99999 PR PBB SHADOW E&M-EST. PATIENT-LVL IV: CPT | Mod: PBBFAC,,, | Performed by: INTERNAL MEDICINE

## 2024-06-26 PROCEDURE — 1101F PT FALLS ASSESS-DOCD LE1/YR: CPT | Mod: CPTII,S$GLB,, | Performed by: INTERNAL MEDICINE

## 2024-06-26 NOTE — PROGRESS NOTES
MEDICAL ONCOLOGY - FOLLOW-UP VISIT    Reason for visit: Upper tract Urothelial carcinoma     Best Contact Phone Number(s): [phone] (home)      Cancer/Stage/TNM:    Cancer Staging   Ureteral tumor  Staging form: Renal Pelvis and Ureter, AJCC 8th Edition  - Clinical stage from 7/28/2023: Stage II (cT2, cN0, cM0) - Signed by Marquita Stoner NP on 8/28/2023       Oncology History   Ureteral tumor   2/16/2023 Initial Diagnosis    Ureteral tumor     7/28/2023 Cancer Staged    Staging form: Renal Pelvis and Ureter, AJCC 8th Edition  - Clinical stage from 7/28/2023: Stage II (cT2, cN0, cM0)     Urothelial carcinoma with high risk of metastasis   3/6/2023 Initial Diagnosis    Urothelial carcinoma with high risk of metastasis     3/14/2023 -  Chemotherapy    Treatment Summary   Plan Name: OP BLADDER GEMCITABINE CISPLATIN (SPLIT DOSE CISPLATIN) Q3W  Treatment Goal: Curative  Status: Active  Start Date: 3/14/2023  End Date: 6/29/2023  Provider: Issa Antony MD  Chemotherapy: CISplatin (Platinol) 35 mg/m2 = 64 mg in sodium chloride 0.9% 314 mL chemo infusion, 35 mg/m2 = 64 mg, Intravenous, Clinic/HOD 1 time, 4 of 4 cycles  Dose modification: 17.5 mg/m2 (original dose 35 mg/m2, Cycle 3, Reason: Other (see comments), Comment: renal function), 35 mg/m2 (original dose 35 mg/m2, Cycle 3, Reason: Other (see comments)), 17.5 mg/m2 (original dose 35 mg/m2, Cycle 2, Reason: Other (see comments), Comment: renal function)  Administration: 64 mg (3/14/2023), 64 mg (3/21/2023), 64 mg (4/19/2023), 64 mg (5/4/2023), 64 mg (5/16/2023), 32 mg (4/4/2023), 64 mg (6/1/2023), 64 mg (6/28/2023)  gemcitabine 1,800 mg in sodium chloride 0.9% SolP 332.34 mL chemo infusion, 1,840 mg, Intravenous, Clinic/HOD 1 time, 4 of 4 cycles  Administration: 1,800 mg (3/14/2023), 1,800 mg (3/21/2023), 1,800 mg (4/4/2023), 1,800 mg (4/19/2023), 1,800 mg (5/4/2023), 1,800 mg (5/16/2023), 1,800 mg (6/1/2023), 1,800 mg (6/28/2023)          HPI:   69 y.o. male  with PVD, PAD, HLD, CKD3, AAA, treated hep C, Rheumatoid arthritis on MTX (previously on humara), smoking (about 1/2 PPD) who presented with gross hematuria. He saw urology and underwent CT urogram which showed Few soft tissue masses within the left renal pelvis, the largest measures 2.5 x 1.3 cm. There was also a L adrenal nodule measuring 1.6 cm. Urine cytology was evident of atypical urothelial cells, and repeat sample showed high grade urothelial carcinoma. He underwent a flexible cystoscopy with normal bladder findings. L RPG showed Left RPG with mild hydronephrosis and filling defect ~2 cm in central renal pelvis.  Left ureteroscopy with was done and showed papillary tumors along the proximal ureter and renal pelvis. - Large ~2 cm nodular lesion in the renal pelvis consistent with filling defect. Biopsy of the mass came back as - High grade urothelial dysplasia/carcinoma in situ. - No definitive invasive carcinoma identified (outside report)    PET/CT done on 03/08 showed Left proximal ureteral lesion compatible with urothelial cancer.  No FDG PET evidence of metastatic disease.      Interval history:   s/p neoadj GC and robotic nephroureterectomy on 7/14/23 with Dr. Jefferson with negative margins, high grade UTUC. Patient feeling well. No new issues.     Review of Systems   Constitutional:  Positive for malaise/fatigue. Negative for chills, fever and weight loss.   HENT:  Positive for hearing loss. Negative for congestion. Ear discharge: r>L.   Eyes:  Negative for blurred vision.   Respiratory:  Negative for cough and shortness of breath.    Cardiovascular:  Negative for chest pain, palpitations and leg swelling.   Gastrointestinal:  Positive for constipation. Negative for abdominal pain, blood in stool, diarrhea, nausea and vomiting.   Genitourinary:  Negative for dysuria and frequency.   Musculoskeletal:  Negative for back pain and myalgias.   Skin:  Negative for itching and rash.   Neurological:  Negative  for dizziness, tingling, tremors, sensory change, focal weakness and headaches.   Endo/Heme/Allergies:  Does not bruise/bleed easily.   Psychiatric/Behavioral:  The patient is not nervous/anxious.        Past Medical History:   Past Medical History:   Diagnosis Date    Anemia     Cancer     Cataract     Colon polyp 2014    Depression     Disorder of kidney and ureter     History of hepatitis C, s/p successful treatment w/ SVR12 - 7/2015 10/14/2012    Kelsey 1a,  Liver biopsy 6/2014 - Stage 1 fibrosis;  Completed 8 weeks Harvoni w/ SVR12 - 7/2015      Hyperlipidemia     Hypertension 3/8/2024    Lipoma of arm     Lipoma of lower extremity     Nuclear sclerosis - Both Eyes 10/22/2012    Other and unspecified hyperlipidemia 10/22/2013    PAD (peripheral artery disease)     Peripheral vascular disease, unspecified     Plaquenil adverse reaction in therapeutic use 10/22/2012    Rheumatoid arthritis(714.0) 10/14/2012    Special screening for malignant neoplasms, colon 02/21/2014        Past Surgical History:   Past Surgical History:   Procedure Laterality Date    BIOPSY OF URETER Left 02/16/2023    Procedure: BIOPSY, URETER/BRUSH;  Surgeon: Kem Jefferson MD;  Location: [facility] OR 1ST FLR;  Service: Urology;  Laterality: Left;    COLONOSCOPY N/A 11/29/2021    Procedure: COLONOSCOPY;  Surgeon: Kenrick Zimmer MD;  Location: [facility] (4TH FLR);  Service: Endoscopy;  Laterality: N/A;  blood thinner approval received, see telephone encounter 10/19/21-BB  fully vacc-inst email-tb    CYSTOSCOPY      CYSTOSCOPY  02/16/2023    Procedure: CYSTOSCOPY;  Surgeon: Kem Jefferson MD;  [facility];  Service: Urology;;    HERNIA REPAIR      INSERTION OF TUNNELED CENTRAL VENOUS CATHETER (CVC) WITH SUBCUTANEOUS PORT Right 3/13/2023    Procedure: INSERTION, PORT-A-CATH;  Surgeon: Rene Cali MD;  Location: [facility] CATH LAB;  Service: Radiology;  Laterality: Right;    KNEE ARTHROPLASTY      LAPAROSCOPIC EXPLORATION  OF GROIN Left 09/06/2018    Procedure: EXPLORATION, INGUINAL REGION, LAPAROSCOPIC;  Surgeon: Mingo De Guzman Jr., MD;  Location: [facility] OR 2ND FLR;  Service: General;  Laterality: Left;    MEDIPORT REMOVAL N/A 10/3/2023    Procedure: REMOVAL, CATHETER, CENTRAL VENOUS, TUNNELED, WITH PORT;  Surgeon: Yeimi Stanton MD;  Location: [facility] CATH LAB;  Service: Radiology;  Laterality: N/A;    PYELOSCOPY Left 02/16/2023    Procedure: PYELOSCOPY;  Surgeon: Kem Jefferson MD;  Location: [facility] OR [address] [place] [address];  Service: Urology;  Laterality: Left;    RETROGRADE PYELOGRAPHY Bilateral 02/16/2023    Procedure: PYELOGRAM, RETROGRADE;  Surgeon: Kem Jefferson MD;  Location: [facility] OR [address] [place] [address];  Service: Urology;  Laterality: Bilateral;    ROBOT-ASSISTED LAPAROSCOPIC SURGICAL REMOVAL OF KIDNEY AND URETER USING DA BRIJESH XI Left 7/14/2023    Procedure: XI ROBOTIC NEPHROURETERECTOMY;  Surgeon: Kem Jefferson MD;  Location: [facility] OR [address] [place] [address];  Service: Urology;  Laterality: Left;  5 hours    TONSILLECTOMY      URETEROSCOPIC REMOVAL OF URETERIC CALCULUS Left 02/16/2023    Procedure: REMOVAL, CALCULUS, URETER, URETEROSCOPIC;  Surgeon: Kem Jefferson MD;  Location: [facility] OR [address] [place] [address];  Service: Urology;  Laterality: Left;    URETEROSCOPY Left 02/16/2023    Procedure: URETEROSCOPY;  Surgeon: Kem Jefferson MD;  Location: [address];  Service: Urology;  Laterality: Left;        Family History:   Family History   Problem Relation Name Age of Onset    Heart disease Paternal Uncle      Dementia Mother      Heart disease Sister      Liver disease Neg Hx      Amblyopia Neg Hx      Blindness Neg Hx      Cancer Neg Hx      Cataracts Neg Hx      Diabetes Neg Hx      Glaucoma Neg Hx      Hypertension Neg Hx      Macular degeneration Neg Hx      Retinal detachment Neg Hx      Strabismus Neg Hx      Stroke Neg Hx      Thyroid disease Neg Hx          Social History:   Social History     Tobacco Use    Smoking status: Former     Current  packs/day: 0.00     Types: Cigarettes     Quit date: 2020     Years since quittin.0    Smokeless tobacco: Never   Substance Use Topics    Alcohol use: Yes     Comment: 2 drinks per week        I have reviewed and updated the patient's past medical, surgical, family and social histories.    Allergies:   Review of patient's allergies indicates:   Allergen Reactions    Iodinated contrast media      Shortness of breath        Medications:   Current Outpatient Medications   Medication Sig Dispense Refill    amLODIPine (NORVASC) 5 MG tablet TAKE 1 TABLET BY MOUTH ONCE  DAILY 90 tablet 3    aspirin 81 MG Chew Take 81 mg by mouth once daily.      bisacodyL (DULCOLAX) 5 mg EC tablet Take 5 mg by mouth daily as needed for Constipation.      cilostazoL (PLETAL) 50 MG Tab Take 1 tablet (50 mg total) by mouth 2 (two) times daily. 180 tablet 3    folic acid (FOLVITE) 1 MG tablet TAKE 1 TABLET BY MOUTH ONCE  DAILY 90 tablet 3    levocetirizine (XYZAL) 5 MG tablet TAKE 1 TABLET BY MOUTH EVERY DAY IN THE EVENING 90 tablet 1    methotrexate 2.5 MG Tab TAKE 8 TABLETS BY MOUTH EVERY 7  DAYS THIS MEDICATION REQUIRES  PERIODIC LAB MONITORING 120 tablet 0    multivitamin capsule Take 1 capsule by mouth once daily.      simvastatin (ZOCOR) 10 MG tablet Take 1 tablet (10 mg total) by mouth every evening. 90 tablet 9    tamsulosin (FLOMAX) 0.4 mg Cap Take 1 capsule (0.4 mg total) by mouth once daily. 30 capsule 11    traZODone (DESYREL) 50 MG tablet TAKE 3 TABLETS BY MOUTH AT NIGHT AS NEEDED FOR INSOMNIA 270 tablet 3    acetaminophen (TYLENOL) 650 MG TbSR Take 1 tablet (650 mg total) by mouth every 8 (eight) hours. (Patient not taking: Reported on 2024) 63 tablet 0    docusate sodium (COLACE) 100 MG capsule Take 100 mg by mouth 2 (two) times daily. (Patient not taking: Reported on 2024)       No current facility-administered medications for this visit.        Physical Exam:   /76 (BP Location: Right arm, Patient  "Position: Sitting, BP Method: Medium (Automatic))   Pulse 75   Resp 16   Ht 5' 5" (1.651 m)   Wt 69.2 kg (152 lb 8.9 oz)   SpO2 98%   BMI 25.39 kg/m²      ECOG Performance status: 1         Physical Exam  Constitutional:       General: He is not in acute distress.     Appearance: Normal appearance.   HENT:      Head: Normocephalic and atraumatic.   Eyes:      Pupils: Pupils are equal, round, and reactive to light.   Cardiovascular:      Rate and Rhythm: Normal rate and regular rhythm.      Heart sounds: No murmur heard.  Pulmonary:      Effort: No respiratory distress.      Breath sounds: Normal breath sounds. No wheezing.   Abdominal:      General: Abdomen is flat. Bowel sounds are normal. There is no distension.      Palpations: Abdomen is soft. There is no mass.      Tenderness: There is no abdominal tenderness.   Musculoskeletal:         General: No swelling or deformity.   Skin:     Coloration: Skin is not jaundiced.   Neurological:      General: No focal deficit present.      Mental Status: He is alert and oriented to person, place, and time. Mental status is at baseline.           Labs:   WBC   Date Value Ref Range Status   06/19/2024 10.21 3.90 - 12.70 K/uL Final     Hemoglobin   Date Value Ref Range Status   06/19/2024 12.1 (L) 14.0 - 18.0 g/dL Final     Hematocrit   Date Value Ref Range Status   06/19/2024 37.3 (L) 40.0 - 54.0 % Final     Platelets   Date Value Ref Range Status   06/19/2024 195 150 - 450 K/uL Final       Chemistry        Component Value Date/Time     06/19/2024 1141    K 4.8 06/19/2024 1141     06/19/2024 1141    CO2 22 (L) 06/19/2024 1141    BUN 27 (H) 06/19/2024 1141    CREATININE 2.3 (H) 06/19/2024 1141    GLU 93 06/19/2024 1141        Component Value Date/Time    CALCIUM 9.6 06/19/2024 1141    ALKPHOS 102 06/19/2024 1141    AST 17 06/19/2024 1141    ALT 11 06/19/2024 1141    BILITOT 0.4 06/19/2024 1141    ESTGFRAFRICA >60.0 05/12/2022 1238    EGFRNONAA >60.0 " 05/12/2022 1238        CT Chest Abdomen Pelvis Without Contrast (XPD)  Narrative: EXAMINATION:  CT CHEST ABDOMEN PELVIS WITHOUT CONTRAST(XPD)    CLINICAL HISTORY:  Bladder cancer, invasive, assess treatment response;Malignant neoplasm of left kidney, except renal pelvis    TECHNIQUE:  Low dose axial images, sagittal and coronal reformations were obtained from the thoracic inlet to the pubic synthesis without contrast.  No oral contrast.    COMPARISON:  CT chest abdomen pelvis without contrast 03/15/2024.    FINDINGS:  Thoracic soft tissues: No significant abnormality.    Thoracic aorta: Mild calcific atherosclerosis of the thoracic aorta.  Fusiform dilation of the ascending aorta measuring up to 3.9 cm.  Two vessel aortic arch.    Heart: Normal in size. No pericardial effusion. Multi-vessel calcific atherosclerosis of the coronary arteries.  Calcification of the aortic valve annulus and mitral valve annulus.    Anjana/Mediastinum: No significant lymphadenopathy    Lungs: No pneumothorax or consolidation.  No pleural fluid.  Centrilobular and paraseptal emphysematous changes, similar to prior.  Pleural thickening and focal calcification along the anterior aspect of the left chest wall.  Additional region of pleural thickening along the right lung base, similar prior.  Similar size of right lower lobe 8 mm pulmonary nodule (axial series 6, image 475), previously 8 mm.  Additional stable calcified and noncalcified pulmonary micro nodules (axial series 6, image 157, 435, and 331).  No new pulmonary nodules.  Bilateral bandlike opacifications in the lung bases favored to represent scarring or subsegmental atelectasis.    Liver: The liver is enlarged measuring 18.0 cm.  Subcentimeter hepatic hypodensity, too small to characterize.    Gallbladder: No calcified gallstones.    Bile Ducts: No evidence of dilated ducts.    Pancreas: No mass or peripancreatic fat stranding.    Spleen: Unremarkable.    Adrenals: Similar appearance  of nodular thickening of the left adrenal gland with density suggestive of benign lipid rich adenoma.  The right adrenal gland is unremarkable.    Kidneys/ Ureters: Postop change of left nephrectomy without imaging findings to suggest residual or recurrent disease within the nephrectomy bed.  Multiple calcific foci within the right renal renee, likely representing vascular calcifications or nonobstructive kidney stones.  Two right renal cysts.  Additional subcentimeter renal hypodensities, too small to characterize.  No hydronephrosis.  Normal course and caliber of the right ureter.    Bladder: No evidence of wall thickening.    Reproductive organs: Dystrophic calcifications of the prostate.    GI Tract/Mesentery: No evidence of bowel obstruction or inflammation. Scattered colonic diverticulosis without adjacent inflammatory changes to suggest acute diverticulitis.    Peritoneal Space: No ascites. No free air.    Retroperitoneum: No significant adenopathy.    Abdominal wall: Bilateral fat containing inguinal hernias.    Abdominal vasculature: Moderate-severe calcific atherosclerosis of the abdominal aorta and iliac branch vessels fusiform infrarenal abdominal aortic aneurysm measuring up to 4.3 cm on sagittal view difficult to measure due to tortuosity.  Previously 4.3 cm.  Fusiform aneurysmal dilation of the right common iliac artery measuring up to 2.0 cm, previously 1.8 cm.  Fusiform aneurysmal dilation of the left iliac artery measuring up to 1.7 cm, previously 1.7 cm.    Bones: No acute fracture or aggressive lesion.  Degenerative changes of the osseous structures.  Impression: 1. Postop change of left nephrectomy with no new findings to suggest residual or recurrent disease within the nephrectomy bed.  2. Stable 8 mm pulmonary nodule in the right lung base.  3. Similar appearance of pleural thickening, as detailed above.  4. Fusiform aneurysmal dilation of the aorta and bilateral iliac arteries as above.  5.  Additional findings, as above.    Electronically signed by resident: Beatrice Parekh  Date:    06/19/2024  Time:    15:04    Electronically signed by: Vinod Marcelino  Date:    06/19/2024  Time:    15:47        Assessment:       1. Urothelial carcinoma of kidney, left    2. H/O nephroureterectomy    3. Pulmonary nodule            Plan:   1,2-  Cancer Staging   Ureteral tumor  Staging form: Renal Pelvis and Ureter, AJCC 8th Edition  - Clinical stage from 7/28/2023: Stage II (cT2, cN0, cM0) - Signed by Marquita Stoner NP on 8/28/2023    69 y.o. M patient presented with gross hematuria and was found to have multiple soft tissue masses in L renal pelvis. He is s/p L urethroscopy and biopsy which showed high grade urothelial dysplasia. On CT urogram, there was a 1.6 cm L adrenal nodule, as well as multiple pulmonary nodules, largest was 0.6 cm, and multiple hepatic nodules too small to characterize.   Overall picture is concerning for invasive upper tract urothelial carcinoma.   PET/CT showed no evidence of metastatic disease. There is B/L adrenal adenomas.     Now s/p neoadjuvant chemotherapy with Gemcitabine and Cisplatin. Split dose Cisplatin for borderline kidney functions.    Because of his RA (not very controlled), we avoided IO adjuvantely. Current scans show stable pulmonary nodule in the lungs but still less than 1 cm. Stable pleural thickening.  Will monitor closely.      Aneurysmal dilations noted, stable.  Follows with Dr. Jensen.     RTC in 4 mos with CT CAP and labs (CBC, CMP) and to see Dr. Antony.     Patient is in agreement with the proposed treatment plan. All questions were answered to the patient's satisfaction. Pt knows to call clinic if anything is needed before the next clinic visit.    30 mins total time were spent during this encounter.      Issa Antony M.D., M.S., F.A.C.P.  Hematology/Oncology Attending  [facility]               Med Onc Chart Routing      Follow up with  physician 4 months. RTC in 4 mos with CT CAP and labs (CBC, CMP) and to see Dr. Antony.   Follow up with DESIREE    Infusion scheduling note    Injection scheduling note    Labs CBC and CMP   Scheduling:  Preferred lab:  Lab interval:     Imaging CT chest abdomen pelvis      Pharmacy appointment    Other referrals

## 2024-07-02 DIAGNOSIS — J40 BRONCHITIS: ICD-10-CM

## 2024-07-02 NOTE — TELEPHONE ENCOUNTER
Refill Routing Note   Medication(s) are not appropriate for processing by [facility] for the following reason(s):        Non-participating provider    [facility] action(s):  Route               Appointments  past 12m or future 3m with PCP    Date Provider   Last Visit   1/29/2024 Kelle Barnhart PA-C   Next Visit   Visit date not found Kelle Barnhart PA-C   ED visits in past 90 days: 0        Note composed:3:29 PM 07/02/2024

## 2024-07-03 RX ORDER — LEVOCETIRIZINE DIHYDROCHLORIDE 5 MG/1
5 TABLET, FILM COATED ORAL NIGHTLY
Qty: 90 TABLET | Refills: 1 | Status: SHIPPED | OUTPATIENT
Start: 2024-07-03

## 2024-07-25 DIAGNOSIS — Z79.631 LONG TERM METHOTREXATE USER: ICD-10-CM

## 2024-07-25 DIAGNOSIS — M05.79 RHEUMATOID ARTHRITIS INVOLVING MULTIPLE SITES WITH POSITIVE RHEUMATOID FACTOR: ICD-10-CM

## 2024-07-26 ENCOUNTER — TELEPHONE (OUTPATIENT)
Dept: VASCULAR SURGERY | Facility: CLINIC | Age: 69
End: 2024-07-26
Payer: MEDICARE

## 2024-07-26 DIAGNOSIS — I71.43 INFRARENAL ABDOMINAL AORTIC ANEURYSM (AAA) WITHOUT RUPTURE: Primary | ICD-10-CM

## 2024-07-26 DIAGNOSIS — I73.9 PAD (PERIPHERAL ARTERY DISEASE): Primary | ICD-10-CM

## 2024-07-26 RX ORDER — METHOTREXATE 2.5 MG/1
TABLET ORAL
Qty: 120 TABLET | Refills: 3 | Status: SHIPPED | OUTPATIENT
Start: 2024-07-26

## 2024-07-26 NOTE — TELEPHONE ENCOUNTER
Spoke with the pt and informed him of u/s needed for appt.Appt rescheduled and confirmed with the pt.

## 2024-07-26 NOTE — TELEPHONE ENCOUNTER
Refill Routing Note   Medication(s) are not appropriate for processing by [facility] for the following reason(s):        Outside of protocol    [facility] action(s):  Route   Requires appointment : Yes     Requires labs : Yes             Appointments  past 12m or future 3m with PCP    Date Provider   Last Visit   3/8/2024 Andrea Wang MD   Next Visit   Visit date not found Andrea Wang MD   ED visits in past 90 days: 0        Note composed:8:29 AM 07/26/2024

## 2024-07-26 NOTE — TELEPHONE ENCOUNTER
Care Due:                  Date            Visit Type   Department     Provider  --------------------------------------------------------------------------------                                EP -                              PRIMARY      [facility] INTERNAL  Last Visit: 03-      CARE ([facility])   MEDICINE       Andrea Wang  Next Visit: None Scheduled  None         None Found                                                            Last  Test          Frequency    Reason                     Performed    Due Date  --------------------------------------------------------------------------------    Office Visit  6 months...  methotrexate.............  03- 09-    CBC.........  3 months...  methotrexate.............  06- 09-    CMP.........  3 months...  methotrexate.............  06- 09-    [employer] Embedded Care Due Messages. Reference number: [id number].   7/25/2024 7:21:03 PM CDT

## 2024-08-07 ENCOUNTER — PROCEDURE VISIT (OUTPATIENT)
Dept: UROLOGY | Facility: CLINIC | Age: 69
End: 2024-08-07
Payer: MEDICARE

## 2024-08-07 VITALS
RESPIRATION RATE: 20 BRPM | DIASTOLIC BLOOD PRESSURE: 71 MMHG | HEART RATE: 81 BPM | BODY MASS INDEX: 25.68 KG/M2 | WEIGHT: 154.31 LBS | TEMPERATURE: 98 F | SYSTOLIC BLOOD PRESSURE: 122 MMHG

## 2024-08-07 DIAGNOSIS — Z85.51 HX OF BLADDER CANCER: Primary | ICD-10-CM

## 2024-08-07 PROCEDURE — 88112 CYTOPATH CELL ENHANCE TECH: CPT | Performed by: PATHOLOGY

## 2024-08-07 RX ORDER — LIDOCAINE HYDROCHLORIDE 20 MG/ML
JELLY TOPICAL
Status: COMPLETED | OUTPATIENT
Start: 2024-08-07 | End: 2024-08-07

## 2024-08-07 RX ADMIN — LIDOCAINE HYDROCHLORIDE: 20 JELLY TOPICAL at 12:08

## 2024-08-08 LAB
FINAL PATHOLOGIC DIAGNOSIS: NORMAL
Lab: NORMAL

## 2024-08-16 ENCOUNTER — TELEPHONE (OUTPATIENT)
Dept: OPTOMETRY | Facility: CLINIC | Age: 69
End: 2024-08-16
Payer: MEDICARE

## 2024-08-18 DIAGNOSIS — I73.9 PAD (PERIPHERAL ARTERY DISEASE): ICD-10-CM

## 2024-08-18 NOTE — TELEPHONE ENCOUNTER
Refill Routing Note   Medication(s) are not appropriate for processing by [facility] for the following reason(s):        Required labs abnormal    [facility] action(s):  Defer               Appointments  past 12m or future 3m with PCP    Date Provider   Last Visit   3/8/2024 Andrea Wang MD   Next Visit   Visit date not found Andrea Wang MD   ED visits in past 90 days: 0        Note composed:12:30 PM 08/18/2024

## 2024-08-18 NOTE — TELEPHONE ENCOUNTER
No care due was identified.  [facility] Embedded Care Due Messages. Reference number: [id number].   8/18/2024 4:23:45 AM CDT

## 2024-08-19 ENCOUNTER — OFFICE VISIT (OUTPATIENT)
Dept: OPTOMETRY | Facility: CLINIC | Age: 69
End: 2024-08-19
Payer: MEDICARE

## 2024-08-19 DIAGNOSIS — H52.4 ASTIGMATISM OF BOTH EYES WITH PRESBYOPIA: ICD-10-CM

## 2024-08-19 DIAGNOSIS — H52.203 ASTIGMATISM OF BOTH EYES WITH PRESBYOPIA: ICD-10-CM

## 2024-08-19 DIAGNOSIS — Z13.5 GLAUCOMA SCREENING: ICD-10-CM

## 2024-08-19 DIAGNOSIS — H25.13 NUCLEAR SCLEROSIS, BILATERAL: Primary | ICD-10-CM

## 2024-08-19 PROCEDURE — 3288F FALL RISK ASSESSMENT DOCD: CPT | Mod: CPTII,S$GLB,, | Performed by: OPTOMETRIST

## 2024-08-19 PROCEDURE — 92014 COMPRE OPH EXAM EST PT 1/>: CPT | Mod: S$GLB,,, | Performed by: OPTOMETRIST

## 2024-08-19 PROCEDURE — 99999 PR PBB SHADOW E&M-EST. PATIENT-LVL III: CPT | Mod: PBBFAC,,, | Performed by: OPTOMETRIST

## 2024-08-19 PROCEDURE — 1126F AMNT PAIN NOTED NONE PRSNT: CPT | Mod: CPTII,S$GLB,, | Performed by: OPTOMETRIST

## 2024-08-19 PROCEDURE — 1160F RVW MEDS BY RX/DR IN RCRD: CPT | Mod: CPTII,S$GLB,, | Performed by: OPTOMETRIST

## 2024-08-19 PROCEDURE — 1101F PT FALLS ASSESS-DOCD LE1/YR: CPT | Mod: CPTII,S$GLB,, | Performed by: OPTOMETRIST

## 2024-08-19 PROCEDURE — 1159F MED LIST DOCD IN RCRD: CPT | Mod: CPTII,S$GLB,, | Performed by: OPTOMETRIST

## 2024-08-19 PROCEDURE — 92015 DETERMINE REFRACTIVE STATE: CPT | Mod: S$GLB,,, | Performed by: OPTOMETRIST

## 2024-08-19 RX ORDER — CILOSTAZOL 50 MG/1
50 TABLET ORAL 2 TIMES DAILY
Qty: 180 TABLET | Refills: 3 | Status: SHIPPED | OUTPATIENT
Start: 2024-08-19

## 2024-08-19 NOTE — PROGRESS NOTES
HPI    70 Y/o male is here for routine eye exam with C/o pt say's his has been   fuzzy and a couple of weeks ago he felt pressure in the eye.   Pt denies pain and discomfort   No f/f    Eye med: Soothe Xp OU PRN   Last edited by Pelon Santa MA on 8/19/2024  2:42 PM.            Assessment /Plan     For exam results, see Encounter Report.    Nuclear sclerosis, bilateral    Glaucoma screening    Astigmatism of both eyes with presbyopia      Small cats OU--pt happy w current spex (rarely wears)    PLAN:    Rtc 1 yr

## 2024-09-04 ENCOUNTER — OFFICE VISIT (OUTPATIENT)
Dept: VASCULAR SURGERY | Facility: CLINIC | Age: 69
End: 2024-09-04
Payer: MEDICARE

## 2024-09-04 ENCOUNTER — HOSPITAL ENCOUNTER (OUTPATIENT)
Dept: VASCULAR SURGERY | Facility: CLINIC | Age: 69
Discharge: HOME | End: 2024-09-04
Attending: SURGERY
Payer: MEDICARE

## 2024-09-04 VITALS
TEMPERATURE: 98 F | WEIGHT: 152.13 LBS | HEIGHT: 69 IN | BODY MASS INDEX: 22.53 KG/M2 | DIASTOLIC BLOOD PRESSURE: 72 MMHG | HEART RATE: 61 BPM | SYSTOLIC BLOOD PRESSURE: 148 MMHG

## 2024-09-04 DIAGNOSIS — I71.43 INFRARENAL ABDOMINAL AORTIC ANEURYSM (AAA) WITHOUT RUPTURE: ICD-10-CM

## 2024-09-04 DIAGNOSIS — I73.9 PAD (PERIPHERAL ARTERY DISEASE): ICD-10-CM

## 2024-09-04 DIAGNOSIS — I71.43 INFRARENAL ABDOMINAL AORTIC ANEURYSM (AAA) WITHOUT RUPTURE: Primary | ICD-10-CM

## 2024-09-04 PROCEDURE — 99999 PR PBB SHADOW E&M-EST. PATIENT-LVL III: CPT | Mod: PBBFAC,,, | Performed by: SURGERY

## 2024-09-04 NOTE — PROGRESS NOTES
Andrea Cassidyjulitoashlyn  09/04/2024    HPI:  Patient is a 69 y.o. male with a h/o former heavy tobacco use, HLD for f/u of AAA and PAD.   Now, can walk > 1.5 or greater before experiencing claudication symptoms (in 2013 was 1 block). Since his last visit he reports no new symptoms. He is active and walks daily with intermittent claudication symptoms relieved by brief episodes of rest. Is otherwise feeling well.     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)     Significant family history of claudication/heart disease     PMD is Dr LUCINDA Wang    x for aneurysmal disease: -    Current Outpatient Medications:     acetaminophen (TYLENOL) 650 MG TbSR, Take 1 tablet (650 mg total) by mouth every 8 (eight) hours., Disp: 63 tablet, Rfl: 0    amLODIPine (NORVASC) 5 MG tablet, TAKE 1 TABLET BY MOUTH ONCE  DAILY, Disp: 90 tablet, Rfl: 3    aspirin 81 MG Chew, Take 81 mg by mouth once daily., Disp: , Rfl:     bisacodyL (DULCOLAX) 5 mg EC tablet, Take 5 mg by mouth daily as needed for Constipation., Disp: , Rfl:     cilostazoL (PLETAL) 50 MG Tab, TAKE 1 TABLET BY MOUTH TWICE  DAILY, Disp: 180 tablet, Rfl: 3    docusate sodium (COLACE) 100 MG capsule, Take 100 mg by mouth 2 (two) times daily., Disp: , Rfl:     folic acid (FOLVITE) 1 MG tablet, TAKE 1 TABLET BY MOUTH ONCE  DAILY, Disp: 90 tablet, Rfl: 3    levocetirizine (XYZAL) 5 MG tablet, TAKE 1 TABLET BY MOUTH EVERY DAY IN THE EVENING, Disp: 90 tablet, Rfl: 1    methotrexate 2.5 MG Tab, TAKE 8 TABLETS BY MOUTH EVERY 7  DAYS THIS MEDICATION REQUIRES  PERIODIC LAB MONITORING, Disp: 120 tablet, Rfl: 3    multivitamin capsule, Take 1 capsule by mouth once daily., Disp: , Rfl:     simvastatin (ZOCOR) 10 MG tablet, Take 1 tablet (10 mg total) by mouth every evening., Disp: 90 tablet, Rfl: 9    tamsulosin (FLOMAX) 0.4 mg Cap, Take 1 capsule (0.4 mg total) by mouth once daily., Disp: 30  capsule, Rfl: 11    traZODone (DESYREL) 50 MG tablet, TAKE 3 TABLETS BY MOUTH AT NIGHT AS NEEDED FOR INSOMNIA, Disp: 270 tablet, Rfl: 3    PHYSICAL EXAM:   Right Arm BP - Sittin/72 (24 1354)  Left Arm BP - Sittin/72 (24 1354)  Pulse: 61  Temp: 97.7 °F (36.5 °C)                   Neck: Supple, no significant adenopathy; thyroid is not enlarged                  No carotid bruit can be auscultated          Abdomen: Soft, nontender, nondistended, no masses or organomegaly     No rebound tenderness noted; bowel sounds normal     Pulsatile aortic mass is not palpable.     No groin adenopathy      Extremities:   2+ femoral pulses bilaterally     No L pedal pulse palpable.     No pedal edema     No ulcerations    LAB RESULTS:  Lab Results   Component Value Date    K 4.8 2024    K 5.1 03/15/2024    K 4.4 2024    CREATININE 2.3 (H) 2024    CREATININE 2.2 (H) 03/15/2024    CREATININE 2.3 (H) 2024     Lab Results   Component Value Date    WBC 10.21 2024    WBC 11.67 03/15/2024    WBC 9.13 2024    HCT 37.3 (L) 2024    HCT 36.2 (L) 03/15/2024    HCT 37.0 (L) 2024     2024     03/15/2024     2024     Lab Results   Component Value Date    HGBA1C 5.0 2022    HGBA1C 5.2 10/11/2021     IMAGING:  Aug 2024:  Aortic u/s: 4.2cm AAA  R CHAD mid- saccular aneurysm 1.9cm  L CHAD 1.4 cm    ABIs  R 1.12  L .077    L low thigh biphasic waveform, compared to triphasic in R low thigh    Prior:  , AAA U/S: 3.9cm  R CHAD 1.8cm  L CHAD 1.4cm    Aortic u/s (2020)  aneurysm with a maximum diameter of 3.6cm transversely. The right CHAD is1.6cm and left CHAD is 1.4cm  No popliteal aneurysms     ABIs (2020)  Right Leg: FAHEEM (1.06) and PVR waveforms indicate minimal peripheral arterial occlusive disease.   Left Leg: FAHEEM (.73) and PVR waveforms indicate moderate peripheral arterial occlusive disease.     ABIs  R 1.02 (previous 1.08,  1.12)  L 0.76 (previous 0.68, 0.61, 0.58)    Triphasic waveforms at R ankle  Biphasic waveforms at L ankle     CTA runoff  +atheroma in Aorta  Ectatic R distal CHAD   Long segment L SFA  30 cm  Reconstitutes in mid-L popliteal  3 tibals runoff    No R leg occlusive disease  + calcium throughout     Carotid u/s:  No ICA disease  Normal antegrade vert flow    IMP/PLAN:     69 y.o. male with a h/o former heavy tobacco use, HLD with Stem chronic limb ischemia class II : L calf claudication but well managed- has 4.2 cm AAA (3.6 cm in Nov 2020).  Has been off tobacco since 2013  PAD is stable and ambulating > 1.5mile  Cont statin, cilostazol; responding to pletal as can now ambulate 1 mil prior to L calf claudication  Cont ASA 81 po QD  Repeat AAA US in 1 yr as has been stable     Feroz Jensen MD DFSVS FACS   Vascular/Endovascular Surgery    Time spent personally reviewing the patient's chart, interpreting images and test, and conferring with physicians was 55 mins.

## 2024-09-09 ENCOUNTER — PATIENT MESSAGE (OUTPATIENT)
Dept: RHEUMATOLOGY | Facility: CLINIC | Age: 69
End: 2024-09-09
Payer: MEDICARE

## 2024-10-09 ENCOUNTER — OFFICE VISIT (OUTPATIENT)
Dept: RHEUMATOLOGY | Facility: CLINIC | Age: 69
End: 2024-10-09
Payer: MEDICARE

## 2024-10-09 ENCOUNTER — LAB VISIT (OUTPATIENT)
Dept: LAB | Facility: HOSPITAL | Age: 69
End: 2024-10-09
Attending: INTERNAL MEDICINE
Payer: MEDICARE

## 2024-10-09 VITALS
BODY MASS INDEX: 22.92 KG/M2 | DIASTOLIC BLOOD PRESSURE: 81 MMHG | SYSTOLIC BLOOD PRESSURE: 135 MMHG | HEART RATE: 73 BPM | WEIGHT: 155.19 LBS

## 2024-10-09 DIAGNOSIS — Z79.899 DRUG-INDUCED IMMUNODEFICIENCY: ICD-10-CM

## 2024-10-09 DIAGNOSIS — Z79.60 LONG-TERM USE OF IMMUNOSUPPRESSANT MEDICATION: ICD-10-CM

## 2024-10-09 DIAGNOSIS — M19.93 OTHER SECONDARY OSTEOARTHRITIS, UNSPECIFIED SITE: ICD-10-CM

## 2024-10-09 DIAGNOSIS — M06.30 RHEUMATOID NODULOSIS: ICD-10-CM

## 2024-10-09 DIAGNOSIS — M05.79 RHEUMATOID ARTHRITIS INVOLVING MULTIPLE SITES WITH POSITIVE RHEUMATOID FACTOR: ICD-10-CM

## 2024-10-09 DIAGNOSIS — D49.59 URETERAL TUMOR: ICD-10-CM

## 2024-10-09 DIAGNOSIS — N18.31 STAGE 3A CHRONIC KIDNEY DISEASE: ICD-10-CM

## 2024-10-09 DIAGNOSIS — I73.9 PAD (PERIPHERAL ARTERY DISEASE): ICD-10-CM

## 2024-10-09 DIAGNOSIS — D84.821 DRUG-INDUCED IMMUNODEFICIENCY: ICD-10-CM

## 2024-10-09 DIAGNOSIS — M05.79 RHEUMATOID ARTHRITIS INVOLVING MULTIPLE SITES WITH POSITIVE RHEUMATOID FACTOR: Primary | ICD-10-CM

## 2024-10-09 PROBLEM — M19.90 OSTEOARTHRITIS: Status: ACTIVE | Noted: 2024-10-09

## 2024-10-09 LAB
ALBUMIN SERPL BCP-MCNC: 3.9 G/DL (ref 3.5–5.2)
ALP SERPL-CCNC: 101 U/L (ref 55–135)
ALT SERPL W/O P-5'-P-CCNC: 8 U/L (ref 10–44)
ANION GAP SERPL CALC-SCNC: 11 MMOL/L (ref 8–16)
AST SERPL-CCNC: 17 U/L (ref 10–40)
BASOPHILS # BLD AUTO: 0.05 K/UL (ref 0–0.2)
BASOPHILS NFR BLD: 0.6 % (ref 0–1.9)
BILIRUB SERPL-MCNC: 0.4 MG/DL (ref 0.1–1)
BUN SERPL-MCNC: 24 MG/DL (ref 8–23)
CALCIUM SERPL-MCNC: 9.6 MG/DL (ref 8.7–10.5)
CHLORIDE SERPL-SCNC: 107 MMOL/L (ref 95–110)
CO2 SERPL-SCNC: 21 MMOL/L (ref 23–29)
CREAT SERPL-MCNC: 2 MG/DL (ref 0.5–1.4)
CRP SERPL-MCNC: 12.9 MG/L (ref 0–8.2)
DIFFERENTIAL METHOD BLD: ABNORMAL
EOSINOPHIL # BLD AUTO: 0.2 K/UL (ref 0–0.5)
EOSINOPHIL NFR BLD: 1.9 % (ref 0–8)
ERYTHROCYTE [DISTWIDTH] IN BLOOD BY AUTOMATED COUNT: 14.7 % (ref 11.5–14.5)
ERYTHROCYTE [SEDIMENTATION RATE] IN BLOOD BY PHOTOMETRIC METHOD: 17 MM/HR (ref 0–23)
EST. GFR  (NO RACE VARIABLE): 35.2 ML/MIN/1.73 M^2
GLUCOSE SERPL-MCNC: 92 MG/DL (ref 70–110)
HCT VFR BLD AUTO: 36.9 % (ref 40–54)
HGB BLD-MCNC: 12.2 G/DL (ref 14–18)
IMM GRANULOCYTES # BLD AUTO: 0.04 K/UL (ref 0–0.04)
IMM GRANULOCYTES NFR BLD AUTO: 0.4 % (ref 0–0.5)
LYMPHOCYTES # BLD AUTO: 1.1 K/UL (ref 1–4.8)
LYMPHOCYTES NFR BLD: 12.3 % (ref 18–48)
MCH RBC QN AUTO: 31.4 PG (ref 27–31)
MCHC RBC AUTO-ENTMCNC: 33.1 G/DL (ref 32–36)
MCV RBC AUTO: 95 FL (ref 82–98)
MONOCYTES # BLD AUTO: 0.7 K/UL (ref 0.3–1)
MONOCYTES NFR BLD: 7.8 % (ref 4–15)
NEUTROPHILS # BLD AUTO: 7 K/UL (ref 1.8–7.7)
NEUTROPHILS NFR BLD: 77 % (ref 38–73)
NRBC BLD-RTO: 0 /100 WBC
PLATELET # BLD AUTO: 203 K/UL (ref 150–450)
PMV BLD AUTO: 9.7 FL (ref 9.2–12.9)
POTASSIUM SERPL-SCNC: 4.4 MMOL/L (ref 3.5–5.1)
PROT SERPL-MCNC: 8 G/DL (ref 6–8.4)
RBC # BLD AUTO: 3.89 M/UL (ref 4.6–6.2)
SODIUM SERPL-SCNC: 139 MMOL/L (ref 136–145)
WBC # BLD AUTO: 9.07 K/UL (ref 3.9–12.7)

## 2024-10-09 PROCEDURE — 1160F RVW MEDS BY RX/DR IN RCRD: CPT | Mod: CPTII,S$GLB,, | Performed by: INTERNAL MEDICINE

## 2024-10-09 PROCEDURE — 3075F SYST BP GE 130 - 139MM HG: CPT | Mod: CPTII,S$GLB,, | Performed by: INTERNAL MEDICINE

## 2024-10-09 PROCEDURE — 80053 COMPREHEN METABOLIC PANEL: CPT | Performed by: INTERNAL MEDICINE

## 2024-10-09 PROCEDURE — 85652 RBC SED RATE AUTOMATED: CPT | Performed by: INTERNAL MEDICINE

## 2024-10-09 PROCEDURE — 85025 COMPLETE CBC W/AUTO DIFF WBC: CPT | Performed by: INTERNAL MEDICINE

## 2024-10-09 PROCEDURE — 1125F AMNT PAIN NOTED PAIN PRSNT: CPT | Mod: CPTII,S$GLB,, | Performed by: INTERNAL MEDICINE

## 2024-10-09 PROCEDURE — 1101F PT FALLS ASSESS-DOCD LE1/YR: CPT | Mod: CPTII,S$GLB,, | Performed by: INTERNAL MEDICINE

## 2024-10-09 PROCEDURE — 99999 PR PBB SHADOW E&M-EST. PATIENT-LVL III: CPT | Mod: PBBFAC,,, | Performed by: INTERNAL MEDICINE

## 2024-10-09 PROCEDURE — 86140 C-REACTIVE PROTEIN: CPT | Performed by: INTERNAL MEDICINE

## 2024-10-09 PROCEDURE — 1159F MED LIST DOCD IN RCRD: CPT | Mod: CPTII,S$GLB,, | Performed by: INTERNAL MEDICINE

## 2024-10-09 PROCEDURE — 3008F BODY MASS INDEX DOCD: CPT | Mod: CPTII,S$GLB,, | Performed by: INTERNAL MEDICINE

## 2024-10-09 PROCEDURE — 36415 COLL VENOUS BLD VENIPUNCTURE: CPT | Performed by: INTERNAL MEDICINE

## 2024-10-09 PROCEDURE — 3288F FALL RISK ASSESSMENT DOCD: CPT | Mod: CPTII,S$GLB,, | Performed by: INTERNAL MEDICINE

## 2024-10-09 PROCEDURE — 99214 OFFICE O/P EST MOD 30 MIN: CPT | Mod: S$GLB,,, | Performed by: INTERNAL MEDICINE

## 2024-10-09 PROCEDURE — 3079F DIAST BP 80-89 MM HG: CPT | Mod: CPTII,S$GLB,, | Performed by: INTERNAL MEDICINE

## 2024-10-09 RX ORDER — GABAPENTIN 300 MG/1
300 CAPSULE ORAL NIGHTLY
Qty: 30 CAPSULE | Refills: 11 | Status: SHIPPED | OUTPATIENT
Start: 2024-10-09 | End: 2025-10-09

## 2024-10-10 NOTE — PROGRESS NOTES
History of present illness:  69-year-old male with seropositive rheumatoid arthritis.  He has nodular disease.  She saw him initially in 2006.  He was last seen in April.  He is on methotrexate 15 mg weekly.  He had been as high as 20 mg.  He had previously taken hydroxychloroquine.  He had been on Humira from 2015 to 2021.  He has a history of urinary tract cancer.  He has renal insufficiency.    He feels his nodules are getting worse.  His arthritis has been stable.  He has had no joint swelling.  He has had some pain in his elbows.  Tylenol does help.  He also uses Voltaren gel.  He does have some nocturnal leg pain.  He has no morning stiffness.    Physical examination:   Musculoskeletal:  Cervical spine is unremarkable.    He has decreased range of motion of the shoulders bilaterally.  He has no pain on range of motion.    He has nodules in the olecranon bursa, worse on the left than on the right.  He does have tenderness of the right elbow but no swelling.  He has a splayed right wrist.  He has no synovitis in the wrists.    He has nodules on his thumb.  He has no synovitis in the hands.  He does have evidence of osteoarthritis.      Lumbar spine has good range of motion without pain on range of motion.  Hips, knees, ankles are unremarkable.    He is tender across the feet.    Assessment:  1.  He has no evidence of active rheumatoid arthritis   2.  He has multiple rheumatoid nodules.  I wonder if the methotrexate is playing a role    Plans:   1. Laboratory studies obtained   2. Continue Tylenol as needed   3. I placed him on gabapentin 300 mg daily for his nighttime leg pain   4. Continue Voltaren gel  5. Decrease methotrexate to 12.5 mg weekly for the next month.  If his arthritis gets worse, he is to go back to 15 mg.  If his arthritis does not worsen, he is to continue to decrease the methotrexate by 2.5 mg monthly.    4.  Return to see me in 4 months.

## 2024-10-21 NOTE — TELEPHONE ENCOUNTER
Care Due:                  Date            Visit Type   Department     Provider  --------------------------------------------------------------------------------                                EP -                              PRIMARY      [facility]  Last Visit: 03-      CARE ([facility])   MEDICINE       Andrea Wang  Next Visit: None Scheduled  None         None Found                                                            Last  Test          Frequency    Reason                     Performed    Due Date  --------------------------------------------------------------------------------    Office Visit  6 months...  methotrexate.............  03- 09-    CBC.........  3 months...  methotrexate.............  10-   01-    CMP.........  3 months...  methotrexate.............  10-   01-    [facility] Embedded Care Due Messages. Reference number: [id number].   10/21/2024 2:00:21 PM CDT

## 2024-10-21 NOTE — TELEPHONE ENCOUNTER
----- Message from Anel sent at 10/21/2024  1:39 PM CDT -----  Contact: [phone]  Requesting an RX refill or new RX.    Is this a refill or new RX: refill    RX name and strength (copy/paste from chart):    simvastatin (ZOCOR) 10 MG table      Is this a 30 day or 90 day RX: 90    Pharmacy name and phone # (copy/paste from chart):    [id number] - [place] - [address]  [address]  [place] [address]  Phone: [phone] Fax: [phone]      The doctors have asked that we provide their patients with the following 2 reminders -- prescription refills can take up to 72 hours, and a friendly reminder that in the future you can use your [facility] account to request refills: y

## 2024-10-22 RX ORDER — SIMVASTATIN 10 MG/1
10 TABLET, FILM COATED ORAL NIGHTLY
Qty: 90 TABLET | Refills: 0 | Status: SHIPPED | OUTPATIENT
Start: 2024-10-22

## 2024-10-22 NOTE — TELEPHONE ENCOUNTER
Refill Routing Note   Medication(s) are not appropriate for processing by [facility] for the following reason(s):        Responsible provider unclear    [facility] action(s):  Defer   Requires appointment : Yes      Medication Therapy Plan: FLOS. Last ordered: 5.30.23 by Shon Garcia MD. Recent OV but no curent order by PCP      Appointments  past 12m or future 3m with PCP    Date Provider   Last Visit   3/8/2024 Andrea Wang MD   Next Visit   Visit date not found Andrea Wang MD   ED visits in past 90 days: 0        Note composed:9:27 PM 10/21/2024

## 2024-10-24 ENCOUNTER — HOSPITAL ENCOUNTER (OUTPATIENT)
Dept: RADIOLOGY | Facility: HOSPITAL | Age: 69
Discharge: HOME | End: 2024-10-24
Attending: INTERNAL MEDICINE
Payer: MEDICARE

## 2024-10-24 DIAGNOSIS — Z90.6 H/O NEPHROURETERECTOMY: ICD-10-CM

## 2024-10-24 DIAGNOSIS — C64.2 UROTHELIAL CARCINOMA OF KIDNEY, LEFT: ICD-10-CM

## 2024-10-24 DIAGNOSIS — R91.1 PULMONARY NODULE: ICD-10-CM

## 2024-10-24 DIAGNOSIS — Z90.5 H/O NEPHROURETERECTOMY: ICD-10-CM

## 2024-10-24 PROCEDURE — A9698 NON-RAD CONTRAST MATERIALNOC: HCPCS | Performed by: INTERNAL MEDICINE

## 2024-10-24 PROCEDURE — 74176 CT ABD & PELVIS W/O CONTRAST: CPT | Mod: TC

## 2024-10-24 PROCEDURE — 25500020 PHARM REV CODE 255: Performed by: INTERNAL MEDICINE

## 2024-10-24 PROCEDURE — 71250 CT THORAX DX C-: CPT | Mod: 26,,, | Performed by: RADIOLOGY

## 2024-10-24 PROCEDURE — 74176 CT ABD & PELVIS W/O CONTRAST: CPT | Mod: 26,,, | Performed by: RADIOLOGY

## 2024-10-24 RX ADMIN — BARIUM SULFATE 450 ML: 20 SUSPENSION ORAL at 10:10

## 2024-10-28 ENCOUNTER — OFFICE VISIT (OUTPATIENT)
Dept: FAMILY MEDICINE | Facility: CLINIC | Age: 69
End: 2024-10-28
Payer: MEDICARE

## 2024-10-28 ENCOUNTER — OFFICE VISIT (OUTPATIENT)
Dept: HEMATOLOGY/ONCOLOGY | Facility: CLINIC | Age: 69
End: 2024-10-28
Payer: MEDICARE

## 2024-10-28 VITALS
SYSTOLIC BLOOD PRESSURE: 139 MMHG | OXYGEN SATURATION: 100 % | HEIGHT: 65 IN | WEIGHT: 155.44 LBS | BODY MASS INDEX: 25.9 KG/M2 | HEART RATE: 60 BPM | DIASTOLIC BLOOD PRESSURE: 78 MMHG | RESPIRATION RATE: 16 BRPM | TEMPERATURE: 98 F

## 2024-10-28 VITALS
BODY MASS INDEX: 25.83 KG/M2 | HEIGHT: 65 IN | OXYGEN SATURATION: 95 % | WEIGHT: 155 LBS | SYSTOLIC BLOOD PRESSURE: 122 MMHG | HEART RATE: 68 BPM | DIASTOLIC BLOOD PRESSURE: 74 MMHG

## 2024-10-28 DIAGNOSIS — E27.9 ADRENAL NODULE: ICD-10-CM

## 2024-10-28 DIAGNOSIS — J43.2 CENTRILOBULAR EMPHYSEMA: ICD-10-CM

## 2024-10-28 DIAGNOSIS — D84.821 DRUG-INDUCED IMMUNODEFICIENCY: ICD-10-CM

## 2024-10-28 DIAGNOSIS — Z00.00 ENCOUNTER FOR PREVENTIVE HEALTH EXAMINATION: Primary | ICD-10-CM

## 2024-10-28 DIAGNOSIS — I73.9 INTERMITTENT CLAUDICATION: ICD-10-CM

## 2024-10-28 DIAGNOSIS — I73.9 PVD (PERIPHERAL VASCULAR DISEASE): ICD-10-CM

## 2024-10-28 DIAGNOSIS — H91.90 HEARING LOSS, UNSPECIFIED HEARING LOSS TYPE, UNSPECIFIED LATERALITY: ICD-10-CM

## 2024-10-28 DIAGNOSIS — G47.00 PERSISTENT INSOMNIA: ICD-10-CM

## 2024-10-28 DIAGNOSIS — C64.2 UROTHELIAL CARCINOMA OF KIDNEY, LEFT: Primary | ICD-10-CM

## 2024-10-28 DIAGNOSIS — Z79.899 DRUG-INDUCED IMMUNODEFICIENCY: ICD-10-CM

## 2024-10-28 DIAGNOSIS — N18.32 STAGE 3B CHRONIC KIDNEY DISEASE: ICD-10-CM

## 2024-10-28 DIAGNOSIS — E78.49 OTHER HYPERLIPIDEMIA: ICD-10-CM

## 2024-10-28 DIAGNOSIS — M05.7A RHEUMATOID ARTHRITIS OF OTHER SITE WITH POSITIVE RHEUMATOID FACTOR: ICD-10-CM

## 2024-10-28 DIAGNOSIS — I71.40 ABDOMINAL AORTIC ANEURYSM (AAA) WITHOUT RUPTURE, UNSPECIFIED PART: ICD-10-CM

## 2024-10-28 DIAGNOSIS — I73.9 PAD (PERIPHERAL ARTERY DISEASE): ICD-10-CM

## 2024-10-28 DIAGNOSIS — I10 HYPERTENSION, UNSPECIFIED TYPE: ICD-10-CM

## 2024-10-28 PROBLEM — D70.1 CHEMOTHERAPY INDUCED NEUTROPENIA: Status: RESOLVED | Noted: 2023-04-11 | Resolved: 2024-10-28

## 2024-10-28 PROBLEM — T45.1X5A CHEMOTHERAPY INDUCED NEUTROPENIA: Status: RESOLVED | Noted: 2023-04-11 | Resolved: 2024-10-28

## 2024-10-28 PROCEDURE — 1124F ACP DISCUSS-NO DSCNMKR DOCD: CPT | Mod: CPTII,S$GLB,, | Performed by: NURSE PRACTITIONER

## 2024-10-28 PROCEDURE — 1170F FXNL STATUS ASSESSED: CPT | Mod: CPTII,S$GLB,, | Performed by: NURSE PRACTITIONER

## 2024-10-28 PROCEDURE — 3075F SYST BP GE 130 - 139MM HG: CPT | Mod: CPTII,S$GLB,, | Performed by: INTERNAL MEDICINE

## 2024-10-28 PROCEDURE — 1159F MED LIST DOCD IN RCRD: CPT | Mod: CPTII,S$GLB,, | Performed by: NURSE PRACTITIONER

## 2024-10-28 PROCEDURE — 3288F FALL RISK ASSESSMENT DOCD: CPT | Mod: CPTII,S$GLB,, | Performed by: NURSE PRACTITIONER

## 2024-10-28 PROCEDURE — 3078F DIAST BP <80 MM HG: CPT | Mod: CPTII,S$GLB,, | Performed by: INTERNAL MEDICINE

## 2024-10-28 PROCEDURE — 1101F PT FALLS ASSESS-DOCD LE1/YR: CPT | Mod: CPTII,S$GLB,, | Performed by: NURSE PRACTITIONER

## 2024-10-28 PROCEDURE — 1160F RVW MEDS BY RX/DR IN RCRD: CPT | Mod: CPTII,S$GLB,, | Performed by: NURSE PRACTITIONER

## 2024-10-28 PROCEDURE — 3288F FALL RISK ASSESSMENT DOCD: CPT | Mod: CPTII,S$GLB,, | Performed by: INTERNAL MEDICINE

## 2024-10-28 PROCEDURE — 3078F DIAST BP <80 MM HG: CPT | Mod: CPTII,S$GLB,, | Performed by: NURSE PRACTITIONER

## 2024-10-28 PROCEDURE — 1126F AMNT PAIN NOTED NONE PRSNT: CPT | Mod: CPTII,S$GLB,, | Performed by: INTERNAL MEDICINE

## 2024-10-28 PROCEDURE — G2211 COMPLEX E/M VISIT ADD ON: HCPCS | Mod: S$GLB,,, | Performed by: INTERNAL MEDICINE

## 2024-10-28 PROCEDURE — 99214 OFFICE O/P EST MOD 30 MIN: CPT | Mod: S$GLB,,, | Performed by: INTERNAL MEDICINE

## 2024-10-28 PROCEDURE — 1101F PT FALLS ASSESS-DOCD LE1/YR: CPT | Mod: CPTII,S$GLB,, | Performed by: INTERNAL MEDICINE

## 2024-10-28 PROCEDURE — G0439 PPPS, SUBSEQ VISIT: HCPCS | Mod: S$GLB,,, | Performed by: NURSE PRACTITIONER

## 2024-10-28 PROCEDURE — 99999 PR PBB SHADOW E&M-EST. PATIENT-LVL IV: CPT | Mod: PBBFAC,,, | Performed by: INTERNAL MEDICINE

## 2024-10-28 PROCEDURE — 3008F BODY MASS INDEX DOCD: CPT | Mod: CPTII,S$GLB,, | Performed by: INTERNAL MEDICINE

## 2024-10-28 PROCEDURE — 3074F SYST BP LT 130 MM HG: CPT | Mod: CPTII,S$GLB,, | Performed by: NURSE PRACTITIONER

## 2024-10-28 PROCEDURE — 99999 PR PBB SHADOW E&M-EST. PATIENT-LVL V: CPT | Mod: PBBFAC,,, | Performed by: NURSE PRACTITIONER

## 2024-10-28 PROCEDURE — 1126F AMNT PAIN NOTED NONE PRSNT: CPT | Mod: CPTII,S$GLB,, | Performed by: NURSE PRACTITIONER

## 2024-10-28 PROCEDURE — 1159F MED LIST DOCD IN RCRD: CPT | Mod: CPTII,S$GLB,, | Performed by: INTERNAL MEDICINE

## 2024-11-12 NOTE — TELEPHONE ENCOUNTER
Done already.  Thank you.     SB THOMSON MRN: [id number]   Date: 8/2/2023 Status: Kaylee   Appt Time: 10:20 AM Length: 20   Visit Type: POST-OP [2380] Copay: $0.00   Provider: Marylu Chaparro NP       ----- Message from Tarsha Herring MD sent at 7/15/2023 10:41 AM CDT -----  Hi,     This patient needs f/u in 2 weeks for a voiding trial.     Thanks!  Tarsha      Cell Phone/PDA (specify)/Clothing

## 2024-12-11 DIAGNOSIS — C64.2 UROTHELIAL CARCINOMA OF KIDNEY, LEFT: Primary | ICD-10-CM

## 2024-12-11 DIAGNOSIS — R91.1 PULMONARY NODULE: ICD-10-CM

## 2024-12-11 DIAGNOSIS — Z90.6 H/O NEPHROURETERECTOMY: ICD-10-CM

## 2024-12-11 DIAGNOSIS — Z90.5 H/O NEPHROURETERECTOMY: ICD-10-CM

## 2024-12-22 RX ORDER — AMLODIPINE BESYLATE 5 MG/1
TABLET ORAL
Qty: 90 TABLET | Refills: 0 | Status: SHIPPED | OUTPATIENT
Start: 2024-12-22

## 2024-12-22 NOTE — TELEPHONE ENCOUNTER
Care Due:                  Date            Visit Type   Department     Provider  --------------------------------------------------------------------------------                                EP -                              PRIMARY      [facility]  Last Visit: 03-      CARE ([facility])   MEDICINE       Andrea Wang  Next Visit: None Scheduled  None         None Found                                                            Last  Test          Frequency    Reason                     Performed    Due Date  --------------------------------------------------------------------------------    Office Visit  6 months...  methotrexate.............  03- 09-    CBC.........  3 months...  methotrexate.............  10-   01-    CMP.........  3 months...  methotrexate.............  10-   01-    Lipid Panel.  12 months..  simvastatin..............  10-   10-    [facility] Embedded Care Due Messages. Reference number: [id number].   12/22/2024 4:12:35 AM CST

## 2024-12-22 NOTE — TELEPHONE ENCOUNTER
Provider Staff:  Action required for this patient    Requires appointment   Requires labs      Please see care gap opportunities below in Care Due Message.    Thanks!  [facility]     Appointments      Date Provider   Last Visit   3/8/2024 Andrea Wang MD   Next Visit   Visit date not found Andrea Wang MD     Refill Decision Note   Andrea Alfreda  is requesting a refill authorization.  Brief Assessment and Rationale for Refill:  Approve     Medication Therapy Plan:         Comments:     Note composed:4:12 PM 12/22/2024

## 2024-12-23 RX ORDER — TAMSULOSIN HYDROCHLORIDE 0.4 MG/1
0.4 CAPSULE ORAL DAILY
Qty: 30 CAPSULE | Refills: 11 | Status: SHIPPED | OUTPATIENT
Start: 2024-12-23 | End: 2025-12-23

## 2024-12-24 NOTE — TELEPHONE ENCOUNTER
Refill Routing Note   Medication(s) are not appropriate for processing by [facility] for the following reason(s):        Required labs outdated    [facility] action(s):  Defer             Appointments  past 12m or future 3m with PCP    Date Provider   Last Visit   3/8/2024 Andrea Wang MD   Next Visit   Visit date not found Andrea Wang MD   ED visits in past 90 days: 0        Note composed:3:48 PM 12/24/2024

## 2024-12-24 NOTE — TELEPHONE ENCOUNTER
No care due was identified.  [facility] Embedded Care Due Messages. Reference number: [id number].   12/24/2024 12:30:18 PM CST

## 2024-12-26 RX ORDER — SIMVASTATIN 10 MG/1
10 TABLET, FILM COATED ORAL NIGHTLY
Qty: 90 TABLET | Refills: 0 | Status: SHIPPED | OUTPATIENT
Start: 2024-12-26

## 2024-12-30 ENCOUNTER — PATIENT MESSAGE (OUTPATIENT)
Dept: RHEUMATOLOGY | Facility: CLINIC | Age: 69
End: 2024-12-30
Payer: MEDICARE

## 2024-12-30 RX ORDER — GABAPENTIN 100 MG/1
100 CAPSULE ORAL NIGHTLY
Qty: 90 CAPSULE | Refills: 1 | Status: SHIPPED | OUTPATIENT
Start: 2024-12-30

## 2024-12-30 RX ORDER — GABAPENTIN 300 MG/1
300 CAPSULE ORAL NIGHTLY
Qty: 30 CAPSULE | Refills: 3 | Status: SHIPPED | OUTPATIENT
Start: 2024-12-30 | End: 2024-12-30 | Stop reason: SDUPTHER

## 2025-01-02 RX ORDER — METHYLPREDNISOLONE 4 MG/1
TABLET ORAL
Qty: 21 TABLET | Refills: 0 | Status: SHIPPED | OUTPATIENT
Start: 2025-01-02

## 2025-01-13 ENCOUNTER — TELEPHONE (OUTPATIENT)
Dept: HEMATOLOGY/ONCOLOGY | Facility: CLINIC | Age: 70
End: 2025-01-13
Payer: MEDICARE

## 2025-01-13 ENCOUNTER — PATIENT MESSAGE (OUTPATIENT)
Dept: HEMATOLOGY/ONCOLOGY | Facility: CLINIC | Age: 70
End: 2025-01-13
Payer: MEDICARE

## 2025-01-13 NOTE — TELEPHONE ENCOUNTER
Call placed to patient, no answer. Detailed message left on voicemail. Patient scheduled to see Dr. Antony on 2/13, but Dr. Antony has some scheduling conflicts. I have changed the appointment to 2/20, but if this doesn't work, please call and we will make adjustments. Left our call back number.

## 2025-01-21 ENCOUNTER — TELEPHONE (OUTPATIENT)
Dept: ORTHOPEDICS | Facility: CLINIC | Age: 70
End: 2025-01-21
Payer: MEDICARE

## 2025-01-30 ENCOUNTER — OFFICE VISIT (OUTPATIENT)
Dept: ORTHOPEDICS | Facility: CLINIC | Age: 70
End: 2025-01-30
Payer: MEDICARE

## 2025-01-30 ENCOUNTER — HOSPITAL ENCOUNTER (OUTPATIENT)
Dept: RADIOLOGY | Facility: HOSPITAL | Age: 70
Discharge: HOME | End: 2025-01-30
Payer: MEDICARE

## 2025-01-30 DIAGNOSIS — M79.642 LEFT HAND PAIN: ICD-10-CM

## 2025-01-30 DIAGNOSIS — M67.442 DIGITAL MUCOUS CYST OF FINGER OF LEFT HAND: Primary | ICD-10-CM

## 2025-01-30 PROCEDURE — 1101F PT FALLS ASSESS-DOCD LE1/YR: CPT | Mod: CPTII,S$GLB,,

## 2025-01-30 PROCEDURE — 99999 PR PBB SHADOW E&M-EST. PATIENT-LVL IV: CPT | Mod: PBBFAC,,,

## 2025-01-30 PROCEDURE — 73130 X-RAY EXAM OF HAND: CPT | Mod: TC,LT

## 2025-01-30 PROCEDURE — 1126F AMNT PAIN NOTED NONE PRSNT: CPT | Mod: CPTII,S$GLB,,

## 2025-01-30 PROCEDURE — 3288F FALL RISK ASSESSMENT DOCD: CPT | Mod: CPTII,S$GLB,,

## 2025-01-30 PROCEDURE — 73130 X-RAY EXAM OF HAND: CPT | Mod: 26,LT,, | Performed by: RADIOLOGY

## 2025-01-30 PROCEDURE — 1159F MED LIST DOCD IN RCRD: CPT | Mod: CPTII,S$GLB,,

## 2025-01-30 PROCEDURE — 99204 OFFICE O/P NEW MOD 45 MIN: CPT | Mod: S$GLB,,,

## 2025-01-30 RX ORDER — CEFAZOLIN SODIUM 2 G/50ML
2 SOLUTION INTRAVENOUS
OUTPATIENT
Start: 2025-01-30

## 2025-01-30 RX ORDER — MUPIROCIN 20 MG/G
OINTMENT TOPICAL
OUTPATIENT
Start: 2025-01-30

## 2025-01-30 RX ORDER — SODIUM CHLORIDE 9 MG/ML
INJECTION, SOLUTION INTRAVENOUS CONTINUOUS
OUTPATIENT
Start: 2025-01-30

## 2025-01-30 NOTE — PROGRESS NOTES
[facility]  History & Physical  Orthopedics    SUBJECTIVE:      Chief Complaint: Left middle finger cyst     Referring Provider: Salvatore Yeung Dr. is the supervising physician for this encounter/patient    History of Present Illness:  Patient is a 69 y.o. right-hand-dominant male with complaints of left middle finger dorsal DIP joint swelling since approximately last year.  The patient reports mild pain at rest, but significant pain with direct contact to the left middle finger DIP joint.  He has attempted conservative treatment with Tylenol.  He denies numbness and tingling. He does have a history of RA for which he sees Rheumatology. He does report he previously had RA nodules removed to both elbows by an unknown surgeon years ago.  He presents today for initial evaluation no further complaints.    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    Onset of symptoms/DOI was approximately 1 year.    Symptoms are aggravated by direct contact to the cyst on the left middle finger.    Symptoms are alleviated by rest and inactivity.    Symptoms consist of pain, swelling, deformity, and decreased ROM.    The patient rates their pain as a severe with direct contact to the left middle finger DIP joint.    Attempted treatment(s) and/or interventions include activity modifications, rest.     The patient denies any fevers, chills, N/V, D/C and presents for evaluation.       Past Medical History:   Diagnosis Date    Anemia     Cancer     Cataract     Chemotherapy induced neutropenia 04/11/2023    Colon polyp 2014    Depression     Disorder of kidney and ureter     History of hepatitis C, s/p successful treatment w/ SVR12 - 7/2015 10/14/2012    Kelsey 1a,  Liver biopsy 6/2014 - Stage 1 fibrosis;  Completed 8 weeks Harvoni w/ SVR12 - 7/2015      Hyperlipidemia     Hypertension 03/08/2024    Lipoma of arm     Lipoma of lower extremity     Nuclear sclerosis - Both Eyes 10/22/2012     Other and unspecified hyperlipidemia 10/22/2013    PAD (peripheral artery disease)     Peripheral vascular disease, unspecified     Plaquenil adverse reaction in therapeutic use 10/22/2012    Rheumatoid arthritis(714.0) 10/14/2012    Special screening for malignant neoplasms, colon 02/21/2014     Past Surgical History:   Procedure Laterality Date    BIOPSY OF URETER Left 02/16/2023    Procedure: BIOPSY, URETER/BRUSH;  Surgeon: Kem Jefferson MD;  Location: [facility] OR 1ST FLR;  Service: Urology;  Laterality: Left;    COLONOSCOPY N/A 11/29/2021    Procedure: COLONOSCOPY;  Surgeon: Kenrick Zimmer MD;  Location: [facility] ENDO (4TH FLR);  Service: Endoscopy;  Laterality: N/A;  blood thinner approval received, see telephone encounter 10/19/21-BB  fully vacc-inst email-tb    CYSTOSCOPY      CYSTOSCOPY  02/16/2023    Procedure: CYSTOSCOPY;  Surgeon: Kem Jefferson MD;  [facility];  Service: Urology;;    HERNIA REPAIR      INSERTION OF TUNNELED CENTRAL VENOUS CATHETER (CVC) WITH SUBCUTANEOUS PORT Right 3/13/2023    Procedure: INSERTION, PORT-A-CATH;  Surgeon: Rene Cali MD;  Location: [facility] CATH LAB;  Service: Radiology;  Laterality: Right;    KNEE ARTHROPLASTY      LAPAROSCOPIC EXPLORATION OF GROIN Left 09/06/2018    Procedure: EXPLORATION, INGUINAL REGION, LAPAROSCOPIC;  Surgeon: Mingo De Guzman Jr., MD;  Location: [facility] OR 2ND FLR;  Service: General;  Laterality: Left;    MEDIPORT REMOVAL N/A 10/3/2023    Procedure: REMOVAL, CATHETER, CENTRAL VENOUS, TUNNELED, WITH PORT;  Surgeon: Yeimi Stanton MD;  Location: [facility] CATH LAB;  Service: Radiology;  Laterality: N/A;    PYELOSCOPY Left 02/16/2023    Procedure: PYELOSCOPY;  Surgeon: Kem Jefferson MD;  Location: [facility] OR 1ST FLR;  Service: Urology;  Laterality: Left;    RETROGRADE PYELOGRAPHY Bilateral 02/16/2023    Procedure: PYELOGRAM, RETROGRADE;  Surgeon: Kem Jefferson MD;  [facility];  Service: Urology;  Laterality:  Bilateral;    ROBOT-ASSISTED LAPAROSCOPIC SURGICAL REMOVAL OF KIDNEY AND URETER USING DA BRIJESH XI Left 7/14/2023    Procedure: XI ROBOTIC NEPHROURETERECTOMY;  Surgeon: Kem Jefferson MD;  Location: [facility] OR 2ND FLR;  Service: Urology;  Laterality: Left;  5 hours    TONSILLECTOMY      URETEROSCOPIC REMOVAL OF URETERIC CALCULUS Left 02/16/2023    Procedure: REMOVAL, CALCULUS, URETER, URETEROSCOPIC;  Surgeon: Kem Jefferson MD;  Location: [facility] OR 1ST FLR;  Service: Urology;  Laterality: Left;    URETEROSCOPY Left 02/16/2023    Procedure: URETEROSCOPY;  Surgeon: Kem Jefferson MD;  Location: [facility] OR 1ST FLR;  Service: Urology;  Laterality: Left;     Review of patient's allergies indicates:   Allergen Reactions    Iodinated contrast media      Shortness of breath     Social History     Social History Narrative    Resides locally    [place] at [employer]     w/ 2 adult children     Family History   Problem Relation Name Age of Onset    Dementia Mother      Dementia Father      Heart disease Sister x1     Arthritis Sister x1     Kidney disease Sister x1     Seizures Sister x1     No Known Problems Daughter x2     Heart disease Paternal Uncle      Liver disease Neg Hx      Amblyopia Neg Hx      Blindness Neg Hx      Cancer Neg Hx      Cataracts Neg Hx      Diabetes Neg Hx      Glaucoma Neg Hx      Hypertension Neg Hx      Macular degeneration Neg Hx      Retinal detachment Neg Hx      Strabismus Neg Hx      Stroke Neg Hx      Thyroid disease Neg Hx           Current Outpatient Medications:     acetaminophen (TYLENOL) 650 MG TbSR, Take 1 tablet (650 mg total) by mouth every 8 (eight) hours., Disp: 63 tablet, Rfl: 0    amLODIPine (NORVASC) 5 MG tablet, TAKE 1 TABLET BY MOUTH ONCE  DAILY, Disp: 90 tablet, Rfl: 0    aspirin 81 MG Chew, Take 81 mg by mouth once daily., Disp: , Rfl:     bisacodyL (DULCOLAX) 5 mg EC tablet, Take 5 mg by mouth daily as needed for Constipation., Disp: , Rfl:      cilostazoL (PLETAL) 50 MG Tab, TAKE 1 TABLET BY MOUTH TWICE  DAILY, Disp: 180 tablet, Rfl: 3    folic acid (FOLVITE) 1 MG tablet, TAKE 1 TABLET BY MOUTH ONCE  DAILY, Disp: 90 tablet, Rfl: 3    gabapentin (NEURONTIN) 100 MG capsule, Take 1 capsule (100 mg total) by mouth every evening., Disp: 90 capsule, Rfl: 1    levocetirizine (XYZAL) 5 MG tablet, TAKE 1 TABLET BY MOUTH EVERY DAY IN THE EVENING, Disp: 90 tablet, Rfl: 1    methotrexate 2.5 MG Tab, TAKE 8 TABLETS BY MOUTH EVERY 7  DAYS THIS MEDICATION REQUIRES  PERIODIC LAB MONITORING, Disp: 120 tablet, Rfl: 3    methylPREDNISolone (MEDROL DOSEPACK) 4 mg tablet, use as directed, Disp: 21 tablet, Rfl: 0    multivitamin capsule, Take 1 capsule by mouth once daily., Disp: , Rfl:     simvastatin (ZOCOR) 10 MG tablet, Take 1 tablet (10 mg total) by mouth every evening., Disp: 90 tablet, Rfl: 0    tamsulosin (FLOMAX) 0.4 mg Cap, Take 1 capsule (0.4 mg total) by mouth once daily., Disp: 30 capsule, Rfl: 11    traZODone (DESYREL) 50 MG tablet, TAKE 3 TABLETS BY MOUTH AT NIGHT AS NEEDED FOR INSOMNIA, Disp: 270 tablet, Rfl: 3    docusate sodium (COLACE) 100 MG capsule, Take 100 mg by mouth 2 (two) times daily., Disp: , Rfl:     Review of Systems:  Constitutional: no fever or chills  Eyes: no visual changes  ENT: no nasal congestion or sore throat  Respiratory: no cough or shortness of breath  Cardiovascular: no chest pain  Gastrointestinal: no nausea or vomiting, tolerating diet  Musculoskeletal: arthralgias, myalgias, pain, soreness, and decreased ROM    OBJECTIVE:      Vital Signs (Most Recent):  There were no vitals filed for this visit.  There is no height or weight on file to calculate BMI.      Physical Exam:  Constitutional: The patient appears well-developed and well-nourished. No distress.   Skin: No lesions appreciated  Head: Normocephalic and atraumatic.   Nose: Nose normal.   Ears: No deformities seen  Eyes: Conjunctivae and EOM are normal.   Neck: No tracheal  deviation present.   Cardiovascular: Normal rate and intact distal pulses.    Pulmonary/Chest: Effort normal. No respiratory distress.   Abdominal: There is no guarding.   Neurological: The patient is alert.   Psychiatric: The patient has a normal mood and affect.     Left Hand/Wrist Examination:    Observation/Inspection:  Swelling  none    Deformity  Raised firm mass on dorsal left middle finger DIPJ, 1 cm wide and 0.5 cm high.  Discoloration  none     Scars   none    Atrophy  None    HAND/WRIST EXAMINATION:  Left middle finger TTP DIPJ    Left middle finger IP joint with a  papule nodule on the radial aspect of the PIP joint that is soft and non tender to palpate  Left small finger with palpable nodule overlying the metacarpal head of the 5th digit  Left thumb with palpable nodule overlying the volar aspect just distal to the thumb IP joint     Neurovascular Exam:  Digits WWP, brisk CR < 3s throughout  NVI motor/LTS to M/R/U nerves, radial pulse 2+  Tinel's Test - Carpal Tunnel  Neg  Tinel's Test - Cubital Tunnel  Neg  Phalen's Test    Neg  Median Nerve Compression Test Neg    ROM hand full, painless with mild stiffness to the left middle finger DIPJ upon finger flexion, but he is able to make a full composite fist with the left hand.    ROM wrist full, painless    ROM elbow full, painless    Abdomen not guarded  Respirations nonlabored  Perfusion intact    Diagnostic Results:     Imaging - I independently viewed the patient's imaging as well as the radiology report.  Xrays of the patient's left hand  demonstrate moderate CMC arthritis of the 1st carpometacarpal joint.  There is notable the DJD noted throughout most notable to the left middle finger DIPJ and left thumb IP joint.     FINDINGS:  Ring on the left ring finger.  Bones are slightly demineralized.  Some soft tissue swelling about the PIP joints.  Narrowing 1st carpometacarpal joint.  Narrowing of the IP joints.  Questionable erosions radial aspect  proximal phalanx of the index finger as well as about the PIP joint of the little finger.  Geode in the lunate.  No acute fractures.  Soft tissue prominence dorsal aspect D IP joint of the long finger as seen on the lateral.     Impression:  Findings concerning for inflammatory arthropathy.  There is some soft tissue swelling and possible erosive lesions as above.     Additional degenerative changes noted.    EMG - None    ASSESSMENT/PLAN:      69 y.o. yo male with left middle finger mass at the DIPJ suspected mucous cyst     Plan: The patient and I had a thorough discussion today.  We discussed the working diagnosis as well as several other potential alternative diagnoses.  Treatment options were discussed, both conservative and surgical.  Conservative treatment options would include things such as activity modifications, workplace modifications, a period of rest, oral vs topical OTC and prescription anti-inflammatory medications, occupational therapy, splinting/bracing, immobilization, corticosteroid injections, and others.  Surgical options were discussed as well.     At this time, the patient would like to proceed with an excision of the left middle finger mass with Dr. Summers on February 19th 2025.  Discussed observation versus surgical intervention. Discussed with patient due to the location adjacent to cuticle there is a risk of nail growth abnormality. He would like to proceed with surgical intervention.  Surgical consents signed in office today.  Preoperative and postoperative paperwork reviewed with the patient in office.  Preoperative orders placed for Radha.  Case request placed for Radha.  He does require preoperative clearance, so message sent to preoperative clearance.  He will follow up 2 weeks postoperatively or sooner for any problems.    Should the patient's symptoms worsen, persist, or fail to improve they should return for reevaluation and I would be happy to see them back anytime.    Please  do not hesitate to reach out to us via email, phone, or MyChart with any questions, concerns, or feedback.         Paz Jolley PA-C

## 2025-02-03 ENCOUNTER — PATIENT MESSAGE (OUTPATIENT)
Dept: RHEUMATOLOGY | Facility: CLINIC | Age: 70
End: 2025-02-03
Payer: MEDICARE

## 2025-02-04 ENCOUNTER — TELEPHONE (OUTPATIENT)
Dept: ORTHOPEDICS | Facility: CLINIC | Age: 70
End: 2025-02-04
Payer: MEDICARE

## 2025-02-04 ENCOUNTER — TELEPHONE (OUTPATIENT)
Dept: UROLOGY | Facility: CLINIC | Age: 70
End: 2025-02-04
Payer: MEDICARE

## 2025-02-04 RX ORDER — METHYLPREDNISOLONE 4 MG/1
TABLET ORAL
Qty: 21 TABLET | Refills: 0 | Status: SHIPPED | OUTPATIENT
Start: 2025-02-04 | End: 2025-02-26

## 2025-02-04 NOTE — TELEPHONE ENCOUNTER
Spoke with pt re: confirming procedure appt for tomorrow.   Discussed location & arrival time for 12:30.   Pt verbalized understanding

## 2025-02-05 ENCOUNTER — PROCEDURE VISIT (OUTPATIENT)
Dept: UROLOGY | Facility: CLINIC | Age: 70
End: 2025-02-05
Payer: MEDICARE

## 2025-02-05 VITALS
DIASTOLIC BLOOD PRESSURE: 72 MMHG | BODY MASS INDEX: 26.41 KG/M2 | SYSTOLIC BLOOD PRESSURE: 150 MMHG | HEART RATE: 87 BPM | WEIGHT: 158.75 LBS | TEMPERATURE: 98 F | RESPIRATION RATE: 20 BRPM

## 2025-02-05 DIAGNOSIS — Z85.51 HX OF BLADDER CANCER: ICD-10-CM

## 2025-02-05 PROCEDURE — 87088 URINE BACTERIA CULTURE: CPT | Performed by: UROLOGY

## 2025-02-05 PROCEDURE — 87086 URINE CULTURE/COLONY COUNT: CPT | Performed by: UROLOGY

## 2025-02-05 PROCEDURE — 52000 CYSTOURETHROSCOPY: CPT | Mod: S$GLB,,, | Performed by: UROLOGY

## 2025-02-05 PROCEDURE — 88112 CYTOPATH CELL ENHANCE TECH: CPT | Mod: 26,,, | Performed by: PATHOLOGY

## 2025-02-05 PROCEDURE — 87186 SC STD MICRODIL/AGAR DIL: CPT | Performed by: UROLOGY

## 2025-02-05 PROCEDURE — 87077 CULTURE AEROBIC IDENTIFY: CPT | Performed by: UROLOGY

## 2025-02-05 PROCEDURE — 88112 CYTOPATH CELL ENHANCE TECH: CPT | Performed by: PATHOLOGY

## 2025-02-05 RX ORDER — LIDOCAINE HYDROCHLORIDE 20 MG/ML
JELLY TOPICAL
Status: COMPLETED | OUTPATIENT
Start: 2025-02-05 | End: 2025-02-05

## 2025-02-05 RX ORDER — SULFAMETHOXAZOLE AND TRIMETHOPRIM 800; 160 MG/1; MG/1
1 TABLET ORAL 2 TIMES DAILY
Qty: 14 TABLET | Refills: 0 | Status: SHIPPED | OUTPATIENT
Start: 2025-02-05 | End: 2025-02-07 | Stop reason: ALTCHOICE

## 2025-02-05 RX ADMIN — LIDOCAINE HYDROCHLORIDE 5 ML: 20 JELLY TOPICAL at 12:02

## 2025-02-05 NOTE — PATIENT INSTRUCTIONS
What to Expect After a Cystoscopy  For the next 24-48 hours, you may feel a mild burning when you urinate. This burning is normal and expected. Drink plenty of water to dilute the urine to help relieve the burning sensation. You may also see a small amount of blood in your urine after the procedure.    Unless you are already taking antibiotics, you may be given an antibiotic after the test to prevent infection.    Signs and Symptoms to Report  Call the [facility] at [phone] if you develop any of the following:  Fever of 101 degrees or higher  Chills or persistent bleeding  Inability to urinate

## 2025-02-05 NOTE — PROCEDURES
ProceduresProcedures:  Flexible cystourethroscopy      Pre Procedure Diagnosis:left upper tract urothelial CA  S/p left nephroureterectomy on 7/14/23; HG pT2 N0R0.        Patient Active Problem List    Diagnosis Date Noted    Long-term use of immunosuppressant medication 10/09/2024    Osteoarthritis 10/09/2024    Rheumatoid nodulosis 10/09/2024    Hypertension 03/08/2024    Adrenal nodule 01/29/2024    Centrilobular emphysema 01/29/2024    Anemia 07/05/2023    Urothelial carcinoma with high risk of metastasis 03/06/2023    Ureteral tumor 02/16/2023    Solitary pulmonary nodule (repeat due Aug 2023) 02/09/2023    Drug-induced immunodeficiency 11/14/2022    Stage 3b chronic kidney disease 11/14/2022    Venous insufficiency     AAA (abdominal aortic aneurysm)     Right inguinal hernia 09/06/2018    PVD (peripheral vascular disease)     Intermittent claudication 10/22/2015    PAD (peripheral artery disease) 10/28/2013    Hypercholesteremia 10/28/2013    Other hyperlipidemia 10/22/2013    Nuclear sclerosis - Both Eyes 10/22/2012    History of long-term treatment with high-risk medication 10/22/2012    Persistent insomnia 10/15/2012    Rheumatoid arthritis 10/14/2012    History of hepatitis C, s/p successful treatment w/ SVR12 - 7/2015 10/14/2012         Post Procedure Diagnosis:Normal cystoscopy    Surgeon: Kem Jefferson MD    Anesthesia: 2% uro-jet lidocaine jelly for local analgesia    Flexible cysto-urethroscopy was performed after consent was obtained.  The risks and benefits were explained.    2% lidocaine urojet was used for local analgesia.  The genitalia was prepped and draped in the sterile fashion with hibiclens.    The flexible scope was advanced into the urethra.  However, there was a fossa navicularis stricture.  The scope was able to pass the distal fossa stricture without dilation.  At this point, the flexible cystoscope was able to be passed into the urethra without difficulty.  A wide caliber posterior  urethral stricture was noted but again easily traversed with the scope.  The prostate showed Significant hypertrophy.  There was No median lobe present.  The left ureteral orifice was absent and the right ureteral orifice was evaluated and noted to be normal with clear efflux.  The bladder was completely surveyed in a systematic fashion.   No bladder tumors or lesions were seen.      The patient tolerated the procedure well without complication.  Per NCCN guidelines, recommend q 3month cystoscopy and cytology for 1 year then at longer intervals.  He is >1 year out from surgery.       They will follow up in 6 month(s) for repeat cystoscopy.  A urine cytology was ordered and collected today as well as a urine culture.  Empiric antibiotics were prescribed.

## 2025-02-06 LAB
FINAL PATHOLOGIC DIAGNOSIS: NORMAL
Lab: NORMAL

## 2025-02-07 ENCOUNTER — TELEPHONE (OUTPATIENT)
Dept: UROLOGY | Facility: CLINIC | Age: 70
End: 2025-02-07
Payer: MEDICARE

## 2025-02-07 LAB — BACTERIA UR CULT: ABNORMAL

## 2025-02-07 RX ORDER — AMOXICILLIN AND CLAVULANATE POTASSIUM 875; 125 MG/1; MG/1
1 TABLET, FILM COATED ORAL 2 TIMES DAILY
Qty: 28 TABLET | Refills: 0 | Status: SHIPPED | OUTPATIENT
Start: 2025-02-07 | End: 2025-02-21

## 2025-02-07 NOTE — TELEPHONE ENCOUNTER
Spoke with patient.  Notified of prelimilary culture results.  Instructed to discontinue Bactrim and began taking Augmentin per Dr. Jefferson and that there is a possibility that the antibiotic could change again once the final culture comes back.  Patient verbalized understanding.

## 2025-02-10 ENCOUNTER — HOSPITAL ENCOUNTER (OUTPATIENT)
Dept: RADIOLOGY | Facility: HOSPITAL | Age: 70
Discharge: HOME | End: 2025-02-10
Attending: INTERNAL MEDICINE
Payer: MEDICARE

## 2025-02-10 DIAGNOSIS — Z90.6 H/O NEPHROURETERECTOMY: ICD-10-CM

## 2025-02-10 DIAGNOSIS — R91.1 PULMONARY NODULE: ICD-10-CM

## 2025-02-10 DIAGNOSIS — C64.2 UROTHELIAL CARCINOMA OF KIDNEY, LEFT: ICD-10-CM

## 2025-02-10 DIAGNOSIS — Z90.5 H/O NEPHROURETERECTOMY: ICD-10-CM

## 2025-02-10 PROCEDURE — 74176 CT ABD & PELVIS W/O CONTRAST: CPT | Mod: 26,,, | Performed by: RADIOLOGY

## 2025-02-10 PROCEDURE — A9698 NON-RAD CONTRAST MATERIALNOC: HCPCS | Performed by: INTERNAL MEDICINE

## 2025-02-10 PROCEDURE — 71250 CT THORAX DX C-: CPT | Mod: 26,,, | Performed by: RADIOLOGY

## 2025-02-10 PROCEDURE — 25500020 PHARM REV CODE 255: Performed by: INTERNAL MEDICINE

## 2025-02-10 PROCEDURE — 71250 CT THORAX DX C-: CPT | Mod: TC

## 2025-02-10 RX ADMIN — BARIUM SULFATE 450 ML: 20 SUSPENSION ORAL at 01:02

## 2025-02-17 DIAGNOSIS — I73.9 PAD (PERIPHERAL ARTERY DISEASE): Primary | ICD-10-CM

## 2025-02-18 ENCOUNTER — PATIENT MESSAGE (OUTPATIENT)
Dept: ORTHOPEDICS | Facility: CLINIC | Age: 70
End: 2025-02-18
Payer: MEDICARE

## 2025-02-19 ENCOUNTER — OFFICE VISIT (OUTPATIENT)
Dept: VASCULAR SURGERY | Facility: CLINIC | Age: 70
End: 2025-02-19
Payer: MEDICARE

## 2025-02-19 ENCOUNTER — HOSPITAL ENCOUNTER (OUTPATIENT)
Dept: VASCULAR SURGERY | Facility: CLINIC | Age: 70
Discharge: HOME | End: 2025-02-19
Attending: SURGERY
Payer: MEDICARE

## 2025-02-19 VITALS
WEIGHT: 154.31 LBS | BODY MASS INDEX: 22.85 KG/M2 | HEIGHT: 69 IN | DIASTOLIC BLOOD PRESSURE: 81 MMHG | HEART RATE: 77 BPM | TEMPERATURE: 98 F | SYSTOLIC BLOOD PRESSURE: 136 MMHG

## 2025-02-19 DIAGNOSIS — I73.9 PAD (PERIPHERAL ARTERY DISEASE): ICD-10-CM

## 2025-02-19 DIAGNOSIS — I73.9 PAD (PERIPHERAL ARTERY DISEASE): Primary | ICD-10-CM

## 2025-02-19 PROCEDURE — 99214 OFFICE O/P EST MOD 30 MIN: CPT | Mod: S$GLB,,, | Performed by: SURGERY

## 2025-02-19 NOTE — PROGRESS NOTES
Andrea Gant  02/19/2025    HPI:  Patient is a 69 y.o. male with a h/o former heavy tobacco use, HLD for f/u of AAA and PAD. Pt states he is no longer limited by claudication symptoms when ambulatory, denies any pain at rest. Pt endorses significant BLE joint pain he attributes to rheumatoid arthritis. Pt reports previous significant relief with oral steroids, and has not had oral steroids in over 1 month. Pt followed by rheumatologist, next appointment in April 2025.     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 for 14 days, Disp: 28 tablet, Rfl: 0    aspirin 81 MG Chew, Take 81 mg by mouth once daily., Disp: , Rfl:     bisacodyL (DULCOLAX) 5 mg EC tablet, Take 5 mg by mouth daily as needed for Constipation., Disp: , Rfl:     cilostazoL (PLETAL) 50 MG Tab, TAKE 1 TABLET BY MOUTH TWICE  DAILY, Disp: 180 tablet, Rfl: 3    folic acid (FOLVITE) 1 MG tablet, TAKE 1 TABLET BY MOUTH ONCE  DAILY, Disp: 90 tablet, Rfl: 3    gabapentin (NEURONTIN) 100 MG capsule, Take 1 capsule (100 mg total) by mouth every evening., Disp: 90 capsule, Rfl: 1    levocetirizine (XYZAL) 5 MG tablet, TAKE 1 TABLET BY MOUTH EVERY DAY IN THE EVENING, Disp: 90 tablet, Rfl: 1    methotrexate 2.5 MG Tab, TAKE 8 TABLETS BY MOUTH EVERY 7  DAYS THIS MEDICATION REQUIRES  PERIODIC LAB MONITORING, Disp: 120 tablet, Rfl: 3    methylPREDNISolone (MEDROL DOSEPACK)  4 mg tablet, use as directed, Disp: 21 tablet, Rfl: 0    methylPREDNISolone (MEDROL DOSEPACK) 4 mg tablet, use as directed, Disp: 21 tablet, Rfl: 0    multivitamin capsule, Take 1 capsule by mouth once daily., Disp: , Rfl:     simvastatin (ZOCOR) 10 MG tablet, Take 1 tablet (10 mg total) by mouth every evening., Disp: 90 tablet, Rfl: 0    tamsulosin (FLOMAX) 0.4 mg Cap, Take 1 capsule (0.4 mg total) by mouth once daily., Disp: 30 capsule, Rfl: 11    traZODone (DESYREL) 50 MG tablet, TAKE 3 TABLETS BY MOUTH AT NIGHT AS NEEDED FOR INSOMNIA, Disp: 270 tablet, Rfl: 3    docusate sodium (COLACE) 100 MG capsule, Take 100 mg by mouth 2 (two) times daily., Disp: , Rfl:     PHYSICAL EXAM:   Right Arm BP - Sittin/83 (25 1312)  Left Arm BP - Sittin/81 (25 1312)  Pulse: 77  Temp: 98 °F (36.7 °C)                   Neck: Supple, no significant adenopathy; thyroid is not enlarged                  No carotid bruit can be auscultated          Abdomen: Soft, nontender, nondistended, no masses or organomegaly     No rebound tenderness noted; bowel sounds normal     Pulsatile aortic mass is not palpable.     No groin adenopathy      Extremities:   2+ femoral pulses bilaterally     1+ R foot DP and PT     No L pedal pulse palpable     No pedal edema     No ulcerations    LAB RESULTS:  Lab Results   Component Value Date    K 5.1 02/10/2025    K 4.7 10/24/2024    K 4.4 10/09/2024    CREATININE 2.1 (H) 02/10/2025    CREATININE 2.2 (H) 10/24/2024    CREATININE 2.0 (H) 10/09/2024     Lab Results   Component Value Date    WBC 11.90 02/10/2025    WBC 11.04 10/24/2024    WBC 9.07 10/09/2024    HCT 45.2 02/10/2025    HCT 39.6 (L) 10/24/2024    HCT 36.9 (L) 10/09/2024     02/10/2025     10/24/2024     10/09/2024     Lab Results   Component Value Date    HGBA1C 5.0 2022    HGBA1C 5.2 10/11/2021     IMAGIN25 ABIs  R 0.77, no significant stenosis  L 0.35, moderate     Dec 2024:  Aortic u/s:  4.2cm AAA  R CHAD mid- saccular aneurysm 1.9cm  L CHAD 1.4 cm    Prior:  ABIs  R 1.12  L .77    L low thigh biphasic waveform, compared to triphasic in R low thigh    2023, AAA U/S: 3.9cm  R CHAD 1.8cm  L CHAD 1.4cm    Aortic u/s (11/16/2020)  aneurysm with a maximum diameter of 3.6cm transversely. The right CHAD is1.6cm and left CHAD is 1.4cm  No popliteal aneurysms     CTA runoff  +atheroma in Aorta  Ectatic R distal CHAD   Long segment L SFA  30 cm  Reconstitutes in mid-L popliteal  3 tibals runoff    No R leg occlusive disease  + calcium throughout     Carotid u/s:  No ICA disease  Normal antegrade vert flow    IMP/PLAN:     69 y.o. male with a h/o former heavy tobacco use, HLD with Wright chronic limb ischemia class II : L calf claudication but well managed- has 4.2 cm AAA (3.6 cm in Nov 2020)  Has been off tobacco since 2013  Main limit is R ankle inflammatory joint pain; he responded to steroids in the past and will see Rheum in April 2025    Has 4.0 cm thoracic aneurysm and 4.3cm AAA  PAD is stable and ambulating > 1.5mile  Cont statin, cilostazol; responding to pletal as can now ambulate 1 mil prior to L calf claudication  F/u with new CT ch/a/p (non-contrast) in 1 yr     Feroz Jensen MD DFS FACS   Vascular/Endovascular Surgery    Time spent personally reviewing the patient's chart, interpreting images and test, and conferring with physicians was 55 mins.

## 2025-02-20 ENCOUNTER — OFFICE VISIT (OUTPATIENT)
Dept: HEMATOLOGY/ONCOLOGY | Facility: CLINIC | Age: 70
End: 2025-02-20
Payer: MEDICARE

## 2025-02-20 VITALS
BODY MASS INDEX: 23.32 KG/M2 | HEART RATE: 92 BPM | HEIGHT: 69 IN | TEMPERATURE: 98 F | SYSTOLIC BLOOD PRESSURE: 132 MMHG | OXYGEN SATURATION: 97 % | RESPIRATION RATE: 16 BRPM | DIASTOLIC BLOOD PRESSURE: 85 MMHG | WEIGHT: 157.44 LBS

## 2025-02-20 DIAGNOSIS — C64.2 UROTHELIAL CARCINOMA OF KIDNEY, LEFT: ICD-10-CM

## 2025-02-20 NOTE — PROGRESS NOTES
MEDICAL ONCOLOGY - FOLLOW-UP VISIT    Reason for visit: Upper tract Urothelial carcinoma     Best Contact Phone Number(s): [phone] (home)      Cancer/Stage/TNM:    Cancer Staging   Ureteral tumor  Staging form: Renal Pelvis and Ureter, AJCC 8th Edition  - Clinical stage from 7/28/2023: Stage II (cT2, cN0, cM0) - Signed by Marquita Stoner NP on 8/28/2023       Oncology History   Ureteral tumor   2/16/2023 Initial Diagnosis    Ureteral tumor     7/28/2023 Cancer Staged    Staging form: Renal Pelvis and Ureter, AJCC 8th Edition  - Clinical stage from 7/28/2023: Stage II (cT2, cN0, cM0)     Urothelial carcinoma with high risk of metastasis   3/6/2023 Initial Diagnosis    Urothelial carcinoma with high risk of metastasis     3/14/2023 - 6/29/2023 Chemotherapy    Treatment Summary   Plan Name: OP BLADDER GEMCITABINE CISPLATIN (SPLIT DOSE CISPLATIN) Q3W  Treatment Goal: Curative  Status: Inactive  Start Date: 3/14/2023  End Date: 6/29/2023  Provider: Issa Antony MD  Chemotherapy: CISplatin (Platinol) 35 mg/m2 = 64 mg in sodium chloride 0.9% 314 mL chemo infusion, 35 mg/m2 = 64 mg, Intravenous, Clinic/HOD 1 time, 4 of 4 cycles  Dose modification: 17.5 mg/m2 (original dose 35 mg/m2, Cycle 3, Reason: Other (see comments), Comment: renal function), 35 mg/m2 (original dose 35 mg/m2, Cycle 3, Reason: Other (see comments)), 17.5 mg/m2 (original dose 35 mg/m2, Cycle 2, Reason: Other (see comments), Comment: renal function)  Administration: 64 mg (3/14/2023), 64 mg (3/21/2023), 64 mg (4/19/2023), 64 mg (5/4/2023), 64 mg (5/16/2023), 32 mg (4/4/2023), 64 mg (6/1/2023), 64 mg (6/28/2023)  gemcitabine 1,800 mg in sodium chloride 0.9% SolP 332.34 mL chemo infusion, 1,840 mg, Intravenous, Clinic/HOD 1 time, 4 of 4 cycles  Administration: 1,800 mg (3/14/2023), 1,800 mg (3/21/2023), 1,800 mg (4/4/2023), 1,800 mg (4/19/2023), 1,800 mg (5/4/2023), 1,800 mg (5/16/2023), 1,800 mg (6/1/2023), 1,800 mg (6/28/2023)          HPI:   69  y.o. male with PVD, PAD, HLD, CKD3, AAA, treated hep C, Rheumatoid arthritis on MTX (previously on humara), smoking (about 1/2 PPD) who presented with gross hematuria. He saw urology and underwent CT urogram which showed Few soft tissue masses within the left renal pelvis, the largest measures 2.5 x 1.3 cm. There was also a L adrenal nodule measuring 1.6 cm. Urine cytology was evident of atypical urothelial cells, and repeat sample showed high grade urothelial carcinoma. He underwent a flexible cystoscopy with normal bladder findings. L RPG showed Left RPG with mild hydronephrosis and filling defect ~2 cm in central renal pelvis.  Left ureteroscopy with was done and showed papillary tumors along the proximal ureter and renal pelvis. - Large ~2 cm nodular lesion in the renal pelvis consistent with filling defect. Biopsy of the mass came back as - High grade urothelial dysplasia/carcinoma in situ. - No definitive invasive carcinoma identified (outside report)    PET/CT done on 03/08 showed Left proximal ureteral lesion compatible with urothelial cancer.  No FDG PET evidence of metastatic disease.      Interval history:   s/p neoadj GC and robotic nephroureterectomy on 7/14/23 with Dr. Jefferson with negative margins, high grade UTUC. Patient feeling well. No new issues.     Review of Systems   Constitutional:  Positive for malaise/fatigue. Negative for chills, fever and weight loss.   HENT:  Positive for hearing loss. Negative for congestion. Ear discharge: r>L.   Eyes:  Negative for blurred vision.   Respiratory:  Negative for cough and shortness of breath.    Cardiovascular:  Negative for chest pain, palpitations and leg swelling.   Gastrointestinal:  Positive for constipation. Negative for abdominal pain, blood in stool, diarrhea, nausea and vomiting.   Genitourinary:  Negative for dysuria and frequency.   Musculoskeletal:  Negative for back pain and myalgias.   Skin:  Negative for itching and rash.   Neurological:   Negative for dizziness, tingling, tremors, sensory change, focal weakness and headaches.   Endo/Heme/Allergies:  Does not bruise/bleed easily.   Psychiatric/Behavioral:  The patient is not nervous/anxious.        Past Medical History:   Past Medical History:   Diagnosis Date    Anemia     Cancer     Cataract     Chemotherapy induced neutropenia 04/11/2023    Colon polyp 2014    Depression     Disorder of kidney and ureter     History of hepatitis C, s/p successful treatment w/ SVR12 - 7/2015 10/14/2012    Kelsey 1a,  Liver biopsy 6/2014 - Stage 1 fibrosis;  Completed 8 weeks Harvoni w/ SVR12 - 7/2015      Hyperlipidemia     Hypertension 03/08/2024    Lipoma of arm     Lipoma of lower extremity     Nuclear sclerosis - Both Eyes 10/22/2012    Other and unspecified hyperlipidemia 10/22/2013    PAD (peripheral artery disease)     Peripheral vascular disease, unspecified     Plaquenil adverse reaction in therapeutic use 10/22/2012    Rheumatoid arthritis(714.0) 10/14/2012    Special screening for malignant neoplasms, colon 02/21/2014        Past Surgical History:   Past Surgical History:   Procedure Laterality Date    BIOPSY OF URETER Left 02/16/2023    Procedure: BIOPSY, URETER/BRUSH;  Surgeon: Kem Jefferson MD;  Location: [facility] OR [address] [place] [address];  Service: Urology;  Laterality: Left;    COLONOSCOPY N/A 11/29/2021    Procedure: COLONOSCOPY;  Surgeon: Kenrick Zimmer MD;  Location: [facility] (4TH FLR);  Service: Endoscopy;  Laterality: N/A;  blood thinner approval received, see telephone encounter 10/19/21-BB  fully vacc-inst email-tb    CYSTOSCOPY      CYSTOSCOPY  02/16/2023    Procedure: CYSTOSCOPY;  Surgeon: Kem Jefferson MD;  Location: [facility] OR [address] [place] [address];  Service: Urology;;    HERNIA REPAIR      INSERTION OF TUNNELED CENTRAL VENOUS CATHETER (CVC) WITH SUBCUTANEOUS PORT Right 3/13/2023    Procedure: INSERTION, PORT-A-CATH;  Surgeon: Rene Cali MD;  Location: [facility] CATH LAB;  Service: Radiology;  Laterality:  Right;    KNEE ARTHROPLASTY      LAPAROSCOPIC EXPLORATION OF GROIN Left 09/06/2018    Procedure: EXPLORATION, INGUINAL REGION, LAPAROSCOPIC;  Surgeon: Mingo De Guzman Jr., MD;  [facility];  Service: General;  Laterality: Left;    MEDIPORT REMOVAL N/A 10/3/2023    Procedure: REMOVAL, CATHETER, CENTRAL VENOUS, TUNNELED, WITH PORT;  Surgeon: Yeimi Stanton MD;  Location: [facility] CATH LAB;  Service: Radiology;  Laterality: N/A;    PYELOSCOPY Left 02/16/2023    Procedure: PYELOSCOPY;  Surgeon: Kem Jefferson MD;  Location: [facility] OR [address] [place] [address];  Service: Urology;  Laterality: Left;    RETROGRADE PYELOGRAPHY Bilateral 02/16/2023    Procedure: PYELOGRAM, RETROGRADE;  Surgeon: Kem Jefferson MD;  Location: [facility] OR [address] [place] [address];  Service: Urology;  Laterality: Bilateral;    ROBOT-ASSISTED LAPAROSCOPIC SURGICAL REMOVAL OF KIDNEY AND URETER USING DA BRIJESH XI Left 7/14/2023    Procedure: XI ROBOTIC NEPHROURETERECTOMY;  Surgeon: Kem Jefferson MD;  Location: [facility] OR [address] [place] [address];  Service: Urology;  Laterality: Left;  5 hours    TONSILLECTOMY      URETEROSCOPIC REMOVAL OF URETERIC CALCULUS Left 02/16/2023    Procedure: REMOVAL, CALCULUS, URETER, URETEROSCOPIC;  Surgeon: Kem Jefferson MD;  Location: [address];  Service: Urology;  Laterality: Left;    URETEROSCOPY Left 02/16/2023    Procedure: URETEROSCOPY;  Surgeon: Kem Jefferson MD;  Location: [address];  Service: Urology;  Laterality: Left;        Family History:   Family History   Problem Relation Name Age of Onset    Dementia Mother      Dementia Father      Heart disease Sister x1     Arthritis Sister x1     Kidney disease Sister x1     Seizures Sister x1     No Known Problems Daughter x2     Heart disease Paternal Uncle      Liver disease Neg Hx      Amblyopia Neg Hx      Blindness Neg Hx      Cancer Neg Hx      Cataracts Neg Hx      Diabetes Neg Hx      Glaucoma Neg Hx      Hypertension Neg Hx      Macular degeneration Neg Hx       Retinal detachment Neg Hx      Strabismus Neg Hx      Stroke Neg Hx      Thyroid disease Neg Hx          Social History:   Social History     Tobacco Use    Smoking status: Former     Current packs/day: 0.00     Average packs/day: 0.5 packs/day for 47.5 years (23.7 ttl pk-yrs)     Types: Cigarettes     Start date:      Quit date: 2020     Years since quittin.6    Smokeless tobacco: Never   Substance Use Topics    Alcohol use: Yes     Comment: 2 drinks per week        I have reviewed and updated the patient's past medical, surgical, family and social histories.    Allergies:   Review of patient's allergies indicates:   Allergen Reactions    Iodinated contrast media      Shortness of breath        Medications:   Current Outpatient Medications   Medication Sig Dispense Refill    acetaminophen (TYLENOL) 650 MG TbSR Take 1 tablet (650 mg total) by mouth every 8 (eight) hours. 63 tablet 0    amLODIPine (NORVASC) 5 MG tablet TAKE 1 TABLET BY MOUTH ONCE  DAILY 90 tablet 0    amoxicillin-clavulanate 875-125mg (AUGMENTIN) 875-125 mg per tablet Take 1 tablet by mouth 2 (two) times daily. for 14 days 28 tablet 0    aspirin 81 MG Chew Take 81 mg by mouth once daily.      bisacodyL (DULCOLAX) 5 mg EC tablet Take 5 mg by mouth daily as needed for Constipation.      cilostazoL (PLETAL) 50 MG Tab TAKE 1 TABLET BY MOUTH TWICE  DAILY 180 tablet 3    docusate sodium (COLACE) 100 MG capsule Take 100 mg by mouth 2 (two) times daily.      folic acid (FOLVITE) 1 MG tablet TAKE 1 TABLET BY MOUTH ONCE  DAILY 90 tablet 3    gabapentin (NEURONTIN) 100 MG capsule Take 1 capsule (100 mg total) by mouth every evening. 90 capsule 1    levocetirizine (XYZAL) 5 MG tablet TAKE 1 TABLET BY MOUTH EVERY DAY IN THE EVENING 90 tablet 1    methotrexate 2.5 MG Tab TAKE 8 TABLETS BY MOUTH EVERY 7  DAYS THIS MEDICATION REQUIRES  PERIODIC LAB MONITORING 120 tablet 3    methylPREDNISolone (MEDROL DOSEPACK) 4 mg tablet use as directed 21 tablet 0     "methylPREDNISolone (MEDROL DOSEPACK) 4 mg tablet use as directed 21 tablet 0    multivitamin capsule Take 1 capsule by mouth once daily.      simvastatin (ZOCOR) 10 MG tablet Take 1 tablet (10 mg total) by mouth every evening. 90 tablet 0    tamsulosin (FLOMAX) 0.4 mg Cap Take 1 capsule (0.4 mg total) by mouth once daily. 30 capsule 11    traZODone (DESYREL) 50 MG tablet TAKE 3 TABLETS BY MOUTH AT NIGHT AS NEEDED FOR INSOMNIA 270 tablet 3     No current facility-administered medications for this visit.        Physical Exam:   Ht 5' 9" (1.753 m)   BMI 22.79 kg/m²      ECOG Performance status: 1         Physical Exam  Constitutional:       General: He is not in acute distress.     Appearance: Normal appearance.   HENT:      Head: Normocephalic and atraumatic.   Eyes:      Pupils: Pupils are equal, round, and reactive to light.   Cardiovascular:      Rate and Rhythm: Normal rate and regular rhythm.      Heart sounds: No murmur heard.  Pulmonary:      Effort: No respiratory distress.      Breath sounds: Normal breath sounds. No wheezing.   Abdominal:      General: Abdomen is flat. Bowel sounds are normal. There is no distension.      Palpations: Abdomen is soft. There is no mass.      Tenderness: There is no abdominal tenderness.   Musculoskeletal:         General: No swelling or deformity.   Skin:     Coloration: Skin is not jaundiced.   Neurological:      General: No focal deficit present.      Mental Status: He is alert and oriented to person, place, and time. Mental status is at baseline.           Labs:   WBC   Date Value Ref Range Status   02/10/2025 11.90 3.90 - 12.70 K/uL Final     Hemoglobin   Date Value Ref Range Status   02/10/2025 14.0 14.0 - 18.0 g/dL Final     Hematocrit   Date Value Ref Range Status   02/10/2025 45.2 40.0 - 54.0 % Final     Platelets   Date Value Ref Range Status   02/10/2025 253 150 - 450 K/uL Final       Chemistry        Component Value Date/Time     02/10/2025 1203    K 5.1 " 02/10/2025 1203     02/10/2025 1203    CO2 23 02/10/2025 1203    BUN 42 (H) 02/10/2025 1203    CREATININE 2.1 (H) 02/10/2025 1203     (H) 02/10/2025 1203        Component Value Date/Time    CALCIUM 9.8 02/10/2025 1203    ALKPHOS 100 02/10/2025 1203    AST 17 02/10/2025 1203    ALT 15 02/10/2025 1203    BILITOT 0.5 02/10/2025 1203    ESTGFRAFRICA >60.0 05/12/2022 1238    EGFRNONAA >60.0 05/12/2022 1238        VAS US Ankle Brachial Indices Resting  Indication  ========    PAD    Pressure Lower  ============    Rt brachial A syst BP  139 mmHg  Rt low thigh BP    159 mmHg  Rt calf  mmHg  Rt PTA BP  152 mmHg  Rt dors pedis A  mmHg  Rt toe BP  107 mmHg  Right FAHEEM ratio use:   post. tibial  Rt FAHEEM post tibial (rt post tib A BP / max brach A BP) 1.09  Rt FAHEEM (rt dors ped A BP / max brach A BP) 0.94  Rt ankle BP / max brach A BP   1.09  Rt TBI (rt toe BP / max brach A BP)    0.77  Lt brachial A syst BP  130 mmHg  Lt low thigh BP    95 mmHg  Lt calf BP 89 mmHg  Lt PTA BP  100 mmHg  Lt dors pedis A BP 95 mmHg  Lt toe BP  49 mmHg  Left FAHEEM ratio use:    post. tibial  Lt FAHEEM (lt post tibial A BP / max brach A BP)  0.72  Lt FAHEEM dors ped (lt dors ped A BP / max brach A BP)    0.68  Lt ankle BP / max brach A BP   0.72  Lt TBI (lt toe BP / max brach A BP)    0.35    PPG Lower  =========    Rt toe BP - PPG    107 mmHg  Rt toe digit waveform: normal  Lt toe BP - PPG    49 mmHg  Lt toe digit waveform: severely dampened    Impression  =========    Right Leg: Segmental pressures and PVR waveforms do not suggest any evidence of significant peripheral arterial occlusive disease.    Rt Great Toe: The PPG waveform as described above. - TBI of 0.77 with a great toe pressure of 107 mmHg is noted.    Left Leg: Segmental pressures and PVR waveforms suggest moderate peripheral arterial occlusive disease.  Lt Great Toe: The PPG waveform as described above. - TBI of 0.35 with a great toe pressure of 49 mmHg is  noted.    DATE OF SERVICE: 02/19/2025                                                      Sonographer: Anuradha Akbaryayopapi  Electronically Signed by: Feroz Jensen M.D. at 02/19/2025-13:17        Assessment:       1. Urothelial carcinoma of kidney, left            Plan:     Cancer Staging   Ureteral tumor  Staging form: Renal Pelvis and Ureter, AJCC 8th Edition  - Clinical stage from 7/28/2023: Stage II (cT2, cN0, cM0) - Signed by Marquita Stoner NP on 8/28/2023    69 y.o. M patient presented with gross hematuria and was found to have multiple soft tissue masses in L renal pelvis. He is s/p L urethroscopy and biopsy which showed high grade urothelial dysplasia. On CT urogram, there was a 1.6 cm L adrenal nodule, as well as multiple pulmonary nodules, largest was 0.6 cm, and multiple hepatic nodules too small to characterize.   Overall picture is concerning for invasive upper tract urothelial carcinoma.   PET/CT showed no evidence of metastatic disease. There is B/L adrenal adenomas.     Now s/p neoadjuvant chemotherapy with Gemcitabine and Cisplatin. Split dose Cisplatin for borderline kidney functions.    Because of his RA (not very controlled), we avoided IO adjuvantely. Current scans show stable pulmonary nodule in the lungs but still less than 1 cm. Stable pleural thickening.  Will monitor closely.      Restaging scans JU.     Aneurysmal dilations noted, stable.  Follows with Dr. Jensen.     RTC in 4 mos with CT CAP and labs (CBC, CMP) and to see Dr. Antony.  Will go to q6 mos scans after that.       Patient is in agreement with the proposed treatment plan. All questions were answered to the patient's satisfaction. Pt knows to call clinic if anything is needed before the next clinic visit.    30 mins total time were spent during this encounter.      Issa Antony M.D., M.S., F.A.C.P.  Hematology/Oncology Attending  [facility]               Med Onc Chart Routing      Follow up with  physician . RTC in 4 mos with CT CAP and labs (CBC, CMP) and to see Dr. Antony.   Follow up with [facility]    Infusion scheduling note    Injection scheduling note    Labs    Imaging    Pharmacy appointment    Other referrals                 Visit today included increased complexity associated with the care of the episodic problems addressed and managing the longitudinal care of the patient due to the serious and/or complex managed problem(s) discussed above.

## 2025-02-21 DIAGNOSIS — C64.2 UROTHELIAL CARCINOMA OF KIDNEY, LEFT: Primary | ICD-10-CM

## 2025-02-21 DIAGNOSIS — J40 BRONCHITIS: ICD-10-CM

## 2025-02-21 RX ORDER — LEVOCETIRIZINE DIHYDROCHLORIDE 5 MG/1
5 TABLET, FILM COATED ORAL NIGHTLY
Qty: 90 TABLET | Refills: 1 | Status: SHIPPED | OUTPATIENT
Start: 2025-02-21

## 2025-02-21 NOTE — TELEPHONE ENCOUNTER
Refill Routing Note   Medication(s) are not appropriate for processing by [facility] for the following reason(s):        Non-participating provider  Responsible provider unclear    [facility] action(s):  Route             Appointments  past 12m or future 3m with PCP    Date Provider   Last Visit   1/29/2024 Kelle Barnhart PA-C   Next Visit   Visit date not found Kelle Barnhart PA-C   ED visits in past 90 days: 0        Note composed:8:40 AM 02/21/2025

## 2025-02-26 ENCOUNTER — OFFICE VISIT (OUTPATIENT)
Dept: URGENT CARE | Facility: CLINIC | Age: 70
End: 2025-02-26
Payer: MEDICARE

## 2025-02-26 VITALS
DIASTOLIC BLOOD PRESSURE: 74 MMHG | BODY MASS INDEX: 23.25 KG/M2 | TEMPERATURE: 98 F | HEIGHT: 69 IN | WEIGHT: 157 LBS | OXYGEN SATURATION: 95 % | RESPIRATION RATE: 20 BRPM | HEART RATE: 85 BPM | SYSTOLIC BLOOD PRESSURE: 121 MMHG

## 2025-02-26 DIAGNOSIS — J02.9 SORE THROAT: ICD-10-CM

## 2025-02-26 DIAGNOSIS — J02.9 VIRAL PHARYNGITIS: Primary | ICD-10-CM

## 2025-02-26 LAB
CTP QC/QA: YES
CTP QC/QA: YES
MOLECULAR STREP A: NEGATIVE
POC MOLECULAR INFLUENZA A AGN: NEGATIVE
POC MOLECULAR INFLUENZA B AGN: NEGATIVE

## 2025-02-26 PROCEDURE — 87651 STREP A DNA AMP PROBE: CPT | Mod: QW,S$GLB,, | Performed by: NURSE PRACTITIONER

## 2025-02-26 PROCEDURE — 87502 INFLUENZA DNA AMP PROBE: CPT | Mod: QW,S$GLB,, | Performed by: NURSE PRACTITIONER

## 2025-02-26 PROCEDURE — 99214 OFFICE O/P EST MOD 30 MIN: CPT | Mod: S$GLB,,, | Performed by: NURSE PRACTITIONER

## 2025-02-26 RX ORDER — PREDNISONE 20 MG/1
20 TABLET ORAL DAILY
Qty: 5 TABLET | Refills: 0 | Status: SHIPPED | OUTPATIENT
Start: 2025-02-26 | End: 2025-03-03

## 2025-02-26 NOTE — PROGRESS NOTES
"Subjective:      Patient ID: Andrea Gant is a 69 y.o. male.    Vitals:  height is 5' 9" (1.753 m) and weight is 71.2 kg (157 lb). His oral temperature is 98.4 °F (36.9 °C). His blood pressure is 121/74 and his pulse is 85. His respiration is 20 and oxygen saturation is 95%.     Chief Complaint: Sore Throat    69 yr old male presents to the Urgent Care with complaint of sore throat associated with painful to swallow and mild congestion x 2 days. Patient reported sore throat and mouth irritation was possibly triggered from taking Amoxicillin 1 week ago. Patient denies any CP, SOB, abdominal pain or fever.      Sore Throat   This is a new problem. The current episode started in the past 7 days. The problem has been gradually worsening. The pain is at a severity of 9/10. The pain is severe. Associated symptoms include congestion and trouble swallowing. Pertinent negatives include no abdominal pain, coughing, diarrhea, drooling, ear discharge, ear pain, headaches, hoarse voice, plugged ear sensation, neck pain, shortness of breath, stridor, swollen glands or vomiting. He has had no exposure to strep or mono. Treatments tried: Warm Salt Water Rinse OTC- Mouth Spray. The treatment provided mild relief.       Constitution: Negative for chills, sweating, fatigue and fever.   HENT:  Positive for congestion, sore throat and trouble swallowing. Negative for ear pain, ear discharge and drooling.    Neck: Negative for neck pain.   Respiratory:  Negative for cough, shortness of breath and stridor.    Gastrointestinal:  Negative for abdominal pain, vomiting and diarrhea.   Neurological:  Negative for headaches.      Objective:     Physical Exam   Constitutional: He is oriented to person, place, and time. He appears well-developed. He is cooperative.  Non-toxic appearance. He does not appear ill. No distress.   HENT:   Head: Normocephalic and atraumatic.   Ears:   Right Ear: Hearing, tympanic membrane, external ear and ear " canal normal.   Left Ear: Hearing, tympanic membrane, external ear and ear canal normal.   Nose: Nose normal. No mucosal edema, rhinorrhea or nasal deformity. No epistaxis. Right sinus exhibits no maxillary sinus tenderness and no frontal sinus tenderness. Left sinus exhibits no maxillary sinus tenderness and no frontal sinus tenderness.   Mouth/Throat: Uvula is midline and mucous membranes are normal. No trismus in the jaw. Normal dentition. No uvula swelling. Posterior oropharyngeal erythema present. No oropharyngeal exudate, posterior oropharyngeal edema, tonsillar abscesses or cobblestoning. No tonsillar exudate.   Eyes: EOM are normal. Pupils are equal, round, and reactive to light.   Neck: Trachea normal and phonation normal. Neck supple. No edema present. No erythema present. No neck rigidity present.   Cardiovascular: Normal rate, regular rhythm and normal heart sounds.   Pulmonary/Chest: Effort normal and breath sounds normal. No accessory muscle usage or stridor. No respiratory distress. He has no decreased breath sounds. He has no wheezes. He has no rhonchi. He has no rales.   Musculoskeletal: Normal range of motion.         General: No deformity or edema. Normal range of motion.   Neurological: He is alert and oriented to person, place, and time. He exhibits normal muscle tone. Coordination and gait normal.   Skin: Skin is warm, dry, intact, not diaphoretic and not pale. Capillary refill takes less than 2 seconds.   Psychiatric: His speech is normal and behavior is normal. Judgment and thought content normal.   Nursing note and vitals reviewed.      Assessment:     1. Viral pharyngitis    2. Sore throat      Results for orders placed or performed in visit on 02/26/25   POCT Strep A, Molecular    Collection Time: 02/26/25  5:55 PM   Result Value Ref Range    Molecular Strep A, POC Negative Negative     Acceptable Yes    POCT Influenza A/B MOLECULAR    Collection Time: 02/26/25  6:00 PM    Result Value Ref Range    POC Molecular Influenza A Ag Negative Negative    POC Molecular Influenza B Ag Negative Negative     Acceptable Yes      *Note: Due to a large number of results and/or encounters for the requested time period, some results have not been displayed. A complete set of results can be found in Results Review.       Plan:   He has no trismus, uvula deviation, drooling, stridor, or respiratory distress. No sign of PTA. T Neck is soft and supple with no meningeal signs. Breath sounds clear and equal bilaterally.     Vital Signs Are Reassuring.   Rapid Strep Test: Negative    Given the above findings, my overall impression is Viral Pharyngitis. Given the above findings, I do not think the patient has OM, OE, peritonsillar abscess, retropharyngeal abscess, epiglotitis, meningitis, or airway compromise.    The patient will be discharged home with prednisone rx. Home care: OTC medications for symptomatic relief, sore throat self care DC instructions. The diagnosis, treatment plan, instructions for follow-up and reevaluation with Primary Care as well as ED return precautions have been discussed with the patient and understanding of the information was verbalized. All questions or concerns from the patient have been addressed.     Viral pharyngitis  -     predniSONE (DELTASONE) 20 MG tablet; Take 1 tablet (20 mg total) by mouth once daily. for 5 days  Dispense: 5 tablet; Refill: 0    Sore throat  -     POCT Influenza A/B MOLECULAR  -     POCT Strep A, Molecular  -     predniSONE (DELTASONE) 20 MG tablet; Take 1 tablet (20 mg total) by mouth once daily. for 5 days  Dispense: 5 tablet; Refill: 0      Patient Instructions   If you were prescribed a narcotic or controlled medication, do not drive or operate heavy equipment or machinery while taking these medications.  You must understand that you've received an Urgent Care treatment only and that you may be released before all your medical  problems are known or treated. You, the patient, will arrange for follow up care as instructed.  Follow up with your PCP or specialty clinic as directed within 2-5 days if not improved or as needed.  You can call [phone] to schedule an appointment with the appropriate provider.  If your condition worsens we recommend that you receive another evaluation at the emergency room immediately or contact your primary medical clinics after hours call service to discuss your concerns.  Please return here or go to the Emergency Department for any concerns or worsening of condition.

## 2025-02-27 ENCOUNTER — PATIENT MESSAGE (OUTPATIENT)
Dept: ORTHOPEDICS | Facility: CLINIC | Age: 70
End: 2025-02-27
Payer: MEDICARE

## 2025-02-27 NOTE — PATIENT INSTRUCTIONS

## 2025-03-03 DIAGNOSIS — Z79.60 LONG-TERM USE OF IMMUNOSUPPRESSANT MEDICATION: ICD-10-CM

## 2025-03-03 DIAGNOSIS — M05.79 RHEUMATOID ARTHRITIS INVOLVING MULTIPLE SITES WITH POSITIVE RHEUMATOID FACTOR: ICD-10-CM

## 2025-03-03 RX ORDER — FOLIC ACID 1 MG/1
1000 TABLET ORAL DAILY
Qty: 90 TABLET | Refills: 3 | Status: ON HOLD | OUTPATIENT
Start: 2025-03-03

## 2025-03-03 NOTE — TELEPHONE ENCOUNTER
Medication refill requested for folic acid-1 mg tabs.  Last office visit on 10/9/24 with labs.  2/10/25-CMP and CBC auto/diff done.  Order pended.

## 2025-03-06 ENCOUNTER — HOSPITAL ENCOUNTER (INPATIENT)
Facility: HOSPITAL | Age: 70
LOS: 8 days | Discharge: HOME | DRG: 917 | End: 2025-03-15
Attending: EMERGENCY MEDICINE | Admitting: HOSPITALIST
Payer: MEDICARE

## 2025-03-06 DIAGNOSIS — R13.10 ODYNOPHAGIA: ICD-10-CM

## 2025-03-06 DIAGNOSIS — Z79.899 HISTORY OF LONG-TERM TREATMENT WITH HIGH-RISK MEDICATION: ICD-10-CM

## 2025-03-06 DIAGNOSIS — R07.9 CHEST PAIN: ICD-10-CM

## 2025-03-06 DIAGNOSIS — R50.81 NEUTROPENIC FEVER: ICD-10-CM

## 2025-03-06 DIAGNOSIS — C68.9 UROTHELIAL CARCINOMA WITH HIGH RISK OF METASTASIS: ICD-10-CM

## 2025-03-06 DIAGNOSIS — R50.9 FEVER: ICD-10-CM

## 2025-03-06 DIAGNOSIS — B37.0 ORAL THRUSH: ICD-10-CM

## 2025-03-06 DIAGNOSIS — D61.818 PANCYTOPENIA: Primary | ICD-10-CM

## 2025-03-06 DIAGNOSIS — D70.9 NEUTROPENIC FEVER: ICD-10-CM

## 2025-03-06 DIAGNOSIS — B37.0 ORAL CANDIDIASIS: ICD-10-CM

## 2025-03-06 DIAGNOSIS — N18.32 STAGE 3B CHRONIC KIDNEY DISEASE: ICD-10-CM

## 2025-03-06 DIAGNOSIS — R94.31 QT PROLONGATION: ICD-10-CM

## 2025-03-06 DIAGNOSIS — Z79.60 LONG-TERM USE OF IMMUNOSUPPRESSANT MEDICATION: ICD-10-CM

## 2025-03-06 DIAGNOSIS — K12.1 ORAL ULCERATION: ICD-10-CM

## 2025-03-06 DIAGNOSIS — M05.79 RHEUMATOID ARTHRITIS INVOLVING MULTIPLE SITES WITH POSITIVE RHEUMATOID FACTOR: ICD-10-CM

## 2025-03-06 PROBLEM — D72.819 LEUCOPENIA: Status: ACTIVE | Noted: 2025-03-06

## 2025-03-06 LAB
ABO + RH BLD: NORMAL
ALBUMIN SERPL BCP-MCNC: 3.4 G/DL (ref 3.5–5.2)
ALP SERPL-CCNC: 85 U/L (ref 40–150)
ALT SERPL W/O P-5'-P-CCNC: 67 U/L (ref 10–44)
ANION GAP SERPL CALC-SCNC: 11 MMOL/L (ref 8–16)
ANISOCYTOSIS BLD QL SMEAR: SLIGHT
APTT PPP: 28 SEC (ref 21–32)
AST SERPL-CCNC: 40 U/L (ref 10–40)
BASOPHILS # BLD AUTO: 0.01 K/UL (ref 0–0.2)
BASOPHILS NFR BLD: 0 % (ref 0–1.9)
BASOPHILS NFR BLD: 0.6 % (ref 0–1.9)
BILIRUB SERPL-MCNC: 0.7 MG/DL (ref 0.1–1)
BLD GP AB SCN CELLS X3 SERPL QL: NORMAL
BUN SERPL-MCNC: 31 MG/DL (ref 6–30)
BUN SERPL-MCNC: 32 MG/DL (ref 8–23)
CALCIUM SERPL-MCNC: 9.2 MG/DL (ref 8.7–10.5)
CHLORIDE SERPL-SCNC: 107 MMOL/L (ref 95–110)
CHLORIDE SERPL-SCNC: 107 MMOL/L (ref 95–110)
CO2 SERPL-SCNC: 21 MMOL/L (ref 23–29)
CREAT SERPL-MCNC: 2 MG/DL (ref 0.5–1.4)
CREAT SERPL-MCNC: 2.4 MG/DL (ref 0.5–1.4)
DIFFERENTIAL METHOD BLD: ABNORMAL
DIFFERENTIAL METHOD BLD: ABNORMAL
EOSINOPHIL # BLD AUTO: 0.1 K/UL (ref 0–0.5)
EOSINOPHIL NFR BLD: 7 % (ref 0–8)
EOSINOPHIL NFR BLD: 7.7 % (ref 0–8)
ERYTHROCYTE [DISTWIDTH] IN BLOOD BY AUTOMATED COUNT: 13.6 % (ref 11.5–14.5)
ERYTHROCYTE [DISTWIDTH] IN BLOOD BY AUTOMATED COUNT: 14 % (ref 11.5–14.5)
EST. GFR  (NO RACE VARIABLE): 35 ML/MIN/1.73 M^2
FOLATE SERPL-MCNC: >40 NG/ML (ref 4–24)
GLUCOSE SERPL-MCNC: 108 MG/DL (ref 70–110)
GLUCOSE SERPL-MCNC: 112 MG/DL (ref 70–110)
HAPTOGLOB SERPL-MCNC: 266 MG/DL (ref 30–250)
HCT VFR BLD AUTO: 24 % (ref 40–54)
HCT VFR BLD AUTO: 26.6 % (ref 40–54)
HCT VFR BLD CALC: 24 %PCV (ref 36–54)
HGB BLD-MCNC: 7.9 G/DL (ref 14–18)
HGB BLD-MCNC: 8.5 G/DL (ref 14–18)
HIV 1+2 AB+HIV1 P24 AG SERPL QL IA: NORMAL
IMM GRANULOCYTES # BLD AUTO: 0.04 K/UL (ref 0–0.04)
IMM GRANULOCYTES # BLD AUTO: ABNORMAL K/UL (ref 0–0.04)
IMM GRANULOCYTES NFR BLD AUTO: 2.6 % (ref 0–0.5)
IMM GRANULOCYTES NFR BLD AUTO: ABNORMAL % (ref 0–0.5)
INR PPP: 1 (ref 0.8–1.2)
LDH SERPL L TO P-CCNC: 226 U/L (ref 110–260)
LYMPHOCYTES # BLD AUTO: 0.4 K/UL (ref 1–4.8)
LYMPHOCYTES NFR BLD: 27.1 % (ref 18–48)
LYMPHOCYTES NFR BLD: 29 % (ref 18–48)
MCH RBC QN AUTO: 28.6 PG (ref 27–31)
MCH RBC QN AUTO: 28.9 PG (ref 27–31)
MCHC RBC AUTO-ENTMCNC: 32 G/DL (ref 32–36)
MCHC RBC AUTO-ENTMCNC: 32.9 G/DL (ref 32–36)
MCV RBC AUTO: 88 FL (ref 82–98)
MCV RBC AUTO: 90 FL (ref 82–98)
MONOCYTES # BLD AUTO: 0.1 K/UL (ref 0.3–1)
MONOCYTES NFR BLD: 2 % (ref 4–15)
MONOCYTES NFR BLD: 3.2 % (ref 4–15)
NEUTROPHILS # BLD AUTO: 0.9 K/UL (ref 1.8–7.7)
NEUTROPHILS NFR BLD: 58.8 % (ref 38–73)
NEUTROPHILS NFR BLD: 61 % (ref 38–73)
NEUTS BAND NFR BLD MANUAL: 1 %
NRBC BLD-RTO: 0 /100 WBC
NRBC BLD-RTO: 0 /100 WBC
PLATELET # BLD AUTO: 142 K/UL (ref 150–450)
PLATELET # BLD AUTO: 162 K/UL (ref 150–450)
PLATELET BLD QL SMEAR: ABNORMAL
PMV BLD AUTO: 9.6 FL (ref 9.2–12.9)
PMV BLD AUTO: 9.8 FL (ref 9.2–12.9)
POC IONIZED CALCIUM: 1.16 MMOL/L (ref 1.06–1.42)
POC TCO2 (MEASURED): 22 MMOL/L (ref 23–29)
POTASSIUM BLD-SCNC: 4.6 MMOL/L (ref 3.5–5.1)
POTASSIUM SERPL-SCNC: 4.3 MMOL/L (ref 3.5–5.1)
PROT SERPL-MCNC: 7.8 G/DL (ref 6–8.4)
PROTHROMBIN TIME: 11.4 SEC (ref 9–12.5)
RBC # BLD AUTO: 2.73 M/UL (ref 4.6–6.2)
RBC # BLD AUTO: 2.97 M/UL (ref 4.6–6.2)
SAMPLE: ABNORMAL
SODIUM BLD-SCNC: 138 MMOL/L (ref 136–145)
SODIUM SERPL-SCNC: 139 MMOL/L (ref 136–145)
SPECIMEN OUTDATE: NORMAL
TOXIC GRANULES BLD QL SMEAR: PRESENT
VIT B12 SERPL-MCNC: 614 PG/ML (ref 210–950)
WBC # BLD AUTO: 1.55 K/UL (ref 3.9–12.7)
WBC # BLD AUTO: 1.83 K/UL (ref 3.9–12.7)

## 2025-03-06 PROCEDURE — 82746 ASSAY OF FOLIC ACID SERUM: CPT | Performed by: PHYSICIAN ASSISTANT

## 2025-03-06 PROCEDURE — 93005 ELECTROCARDIOGRAM TRACING: CPT

## 2025-03-06 PROCEDURE — 86850 RBC ANTIBODY SCREEN: CPT | Performed by: PHYSICIAN ASSISTANT

## 2025-03-06 PROCEDURE — 25000003 PHARM REV CODE 250: Performed by: STUDENT IN AN ORGANIZED HEALTH CARE EDUCATION/TRAINING PROGRAM

## 2025-03-06 PROCEDURE — 99285 EMERGENCY DEPT VISIT HI MDM: CPT | Mod: 25

## 2025-03-06 PROCEDURE — 86747 PARVOVIRUS ANTIBODY: CPT | Performed by: PHYSICIAN ASSISTANT

## 2025-03-06 PROCEDURE — 85007 BL SMEAR W/DIFF WBC COUNT: CPT | Performed by: EMERGENCY MEDICINE

## 2025-03-06 PROCEDURE — 63700000 PHARM REV CODE 250 ALT 637 W/O HCPCS: Performed by: PHYSICIAN ASSISTANT

## 2025-03-06 PROCEDURE — 83615 LACTATE (LD) (LDH) ENZYME: CPT | Performed by: PHYSICIAN ASSISTANT

## 2025-03-06 PROCEDURE — 85730 THROMBOPLASTIN TIME PARTIAL: CPT | Performed by: PHYSICIAN ASSISTANT

## 2025-03-06 PROCEDURE — 83010 ASSAY OF HAPTOGLOBIN QUANT: CPT | Performed by: PHYSICIAN ASSISTANT

## 2025-03-06 PROCEDURE — G0378 HOSPITAL OBSERVATION PER HR: HCPCS

## 2025-03-06 PROCEDURE — 93010 ELECTROCARDIOGRAM REPORT: CPT | Mod: ,,, | Performed by: STUDENT IN AN ORGANIZED HEALTH CARE EDUCATION/TRAINING PROGRAM

## 2025-03-06 PROCEDURE — 85027 COMPLETE CBC AUTOMATED: CPT | Performed by: EMERGENCY MEDICINE

## 2025-03-06 PROCEDURE — 25000003 PHARM REV CODE 250: Performed by: PHYSICIAN ASSISTANT

## 2025-03-06 PROCEDURE — 96372 THER/PROPH/DIAG INJ SC/IM: CPT | Performed by: HOSPITALIST

## 2025-03-06 PROCEDURE — 63600175 PHARM REV CODE 636 W HCPCS: Performed by: HOSPITALIST

## 2025-03-06 PROCEDURE — 82607 VITAMIN B-12: CPT | Performed by: PHYSICIAN ASSISTANT

## 2025-03-06 PROCEDURE — 87389 HIV-1 AG W/HIV-1&-2 AB AG IA: CPT | Performed by: PHYSICIAN ASSISTANT

## 2025-03-06 PROCEDURE — 80053 COMPREHEN METABOLIC PANEL: CPT | Performed by: PHYSICIAN ASSISTANT

## 2025-03-06 PROCEDURE — 85025 COMPLETE CBC W/AUTO DIFF WBC: CPT | Performed by: PHYSICIAN ASSISTANT

## 2025-03-06 PROCEDURE — 85610 PROTHROMBIN TIME: CPT | Performed by: PHYSICIAN ASSISTANT

## 2025-03-06 RX ORDER — AMLODIPINE BESYLATE 5 MG/1
5 TABLET ORAL DAILY
Status: DISCONTINUED | OUTPATIENT
Start: 2025-03-07 | End: 2025-03-06

## 2025-03-06 RX ORDER — ENOXAPARIN SODIUM 100 MG/ML
40 INJECTION SUBCUTANEOUS EVERY 24 HOURS
Status: DISCONTINUED | OUTPATIENT
Start: 2025-03-06 | End: 2025-03-11

## 2025-03-06 RX ORDER — GLUCAGON 1 MG
1 KIT INJECTION
Status: DISCONTINUED | OUTPATIENT
Start: 2025-03-06 | End: 2025-03-15 | Stop reason: HOSPADM

## 2025-03-06 RX ORDER — PRAVASTATIN SODIUM 20 MG/1
20 TABLET ORAL DAILY
Status: DISCONTINUED | OUTPATIENT
Start: 2025-03-07 | End: 2025-03-15 | Stop reason: HOSPADM

## 2025-03-06 RX ORDER — FLUCONAZOLE 100 MG/1
200 TABLET ORAL
Status: DISCONTINUED | OUTPATIENT
Start: 2025-03-06 | End: 2025-03-06

## 2025-03-06 RX ORDER — NALOXONE HCL 0.4 MG/ML
0.02 VIAL (ML) INJECTION
Status: DISCONTINUED | OUTPATIENT
Start: 2025-03-06 | End: 2025-03-15 | Stop reason: HOSPADM

## 2025-03-06 RX ORDER — NYSTATIN 100000 [USP'U]/ML
SUSPENSION ORAL 4 TIMES DAILY
Status: ON HOLD | COMMUNITY
End: 2025-03-15 | Stop reason: HOSPADM

## 2025-03-06 RX ORDER — SODIUM CHLORIDE 0.9 % (FLUSH) 0.9 %
10 SYRINGE (ML) INJECTION EVERY 12 HOURS PRN
Status: DISCONTINUED | OUTPATIENT
Start: 2025-03-06 | End: 2025-03-15 | Stop reason: HOSPADM

## 2025-03-06 RX ORDER — IBUPROFEN 200 MG
24 TABLET ORAL
Status: DISCONTINUED | OUTPATIENT
Start: 2025-03-06 | End: 2025-03-15 | Stop reason: HOSPADM

## 2025-03-06 RX ORDER — FLUCONAZOLE 100 MG/1
100 TABLET ORAL DAILY
Status: DISCONTINUED | OUTPATIENT
Start: 2025-03-07 | End: 2025-03-11

## 2025-03-06 RX ORDER — TAMSULOSIN HYDROCHLORIDE 0.4 MG/1
0.4 CAPSULE ORAL DAILY
Status: DISCONTINUED | OUTPATIENT
Start: 2025-03-07 | End: 2025-03-15 | Stop reason: HOSPADM

## 2025-03-06 RX ORDER — FLUCONAZOLE 100 MG/1
100 TABLET ORAL
Status: COMPLETED | OUTPATIENT
Start: 2025-03-06 | End: 2025-03-06

## 2025-03-06 RX ORDER — FOLIC ACID 1 MG/1
1000 TABLET ORAL DAILY
Status: DISCONTINUED | OUTPATIENT
Start: 2025-03-07 | End: 2025-03-08

## 2025-03-06 RX ORDER — TRAZODONE HYDROCHLORIDE 50 MG/1
50 TABLET ORAL NIGHTLY PRN
Status: DISCONTINUED | OUTPATIENT
Start: 2025-03-06 | End: 2025-03-15 | Stop reason: HOSPADM

## 2025-03-06 RX ORDER — IBUPROFEN 200 MG
16 TABLET ORAL
Status: DISCONTINUED | OUTPATIENT
Start: 2025-03-06 | End: 2025-03-15 | Stop reason: HOSPADM

## 2025-03-06 RX ADMIN — ALUMINUM HYDROXIDE, MAGNESIUM HYDROXIDE, AND DIMETHICONE 10 ML: 400; 400; 40 SUSPENSION ORAL at 02:03

## 2025-03-06 RX ADMIN — ENOXAPARIN SODIUM 40 MG: 40 INJECTION SUBCUTANEOUS at 06:03

## 2025-03-06 RX ADMIN — TRAZODONE HYDROCHLORIDE 50 MG: 50 TABLET ORAL at 08:03

## 2025-03-06 RX ADMIN — FLUCONAZOLE 100 MG: 100 TABLET ORAL at 02:03

## 2025-03-06 RX ADMIN — ALUMINUM HYDROXIDE, MAGNESIUM HYDROXIDE, AND DIMETHICONE 10 ML: 400; 400; 40 SUSPENSION ORAL at 08:03

## 2025-03-06 NOTE — ASSESSMENT & PLAN NOTE
Anemia is likely due to chronic disease due to RA. Most recent hemoglobin and hematocrit are listed below.  Recent Labs     03/06/25  1246 03/06/25  1249 03/06/25  1514   HGB 8.5*  --  7.9*   HCT 26.6* 24* 24.0*     Plan  - Monitor serial CBC: Daily  - Transfuse PRBC if patient becomes hemodynamically unstable, symptomatic or H/H drops below 7/21.

## 2025-03-06 NOTE — ASSESSMENT & PLAN NOTE
Creatine stable for now. BMP reviewed- noted Estimated Creatinine Clearance: 33.9 mL/min (A) (based on SCr of 2 mg/dL (H)). according to latest data. Monitor UOP and serial BMP and adjust therapy as needed. Renally dose meds.    Recent Labs   Lab 03/06/25  1431   BUN 32*   CREATININE 2.0*       I & O   No intake or output data in the 24 hours ending 03/06/25 1880

## 2025-03-06 NOTE — ASSESSMENT & PLAN NOTE
Patient's blood pressure range in the last 24 hours was: BP  Min: 112/65  Max: 131/58.The patient's inpatient anti-hypertensive regimen is listed below:  Current Antihypertensives       Plan  - BP is controlled, no changes needed to their regimen  - continue amlodipine 5mg daily

## 2025-03-06 NOTE — ED PROVIDER NOTES
Encounter Date: 3/6/2025       History     Chief Complaint   Patient presents with    Mouth Lesions     States has thursh for 10 d and no better     70-year-old male with rheumatoid arthritis on methotrexate, history of urothelial carcinoma in remission, anemia, hepatitis-C s/p SVR, PAD presents for thrush.  Reports painful whitish lesions in his mouth over the past 10 days.  He was seen at urgent care clinic 02/26/2025 represcribed prednisone for viral pharyngitis, states that this significantly improved his sore throat and body aches but not the thrush.  He is also prescribed amoxicillin prior to the onset of his symptoms.  It is painful to swallow, however he is able to do so.  He denies fever, drooling or prior similar episodes.  No history of diabetes or HIV.      Review of patient's allergies indicates:   Allergen Reactions    Iodinated contrast media      Shortness of breath     Past Medical History:   Diagnosis Date    Anemia     Cancer     Cataract     Chemotherapy induced neutropenia 04/11/2023    Colon polyp 2014    Depression     Disorder of kidney and ureter     History of hepatitis C, s/p successful treatment w/ SVR12 - 7/2015 10/14/2012    Kelsey 1a,  Liver biopsy 6/2014 - Stage 1 fibrosis;  Completed 8 weeks Harvoni w/ SVR12 - 7/2015      Hyperlipidemia     Hypertension 03/08/2024    Lipoma of arm     Lipoma of lower extremity     Nuclear sclerosis - Both Eyes 10/22/2012    Other and unspecified hyperlipidemia 10/22/2013    PAD (peripheral artery disease)     Peripheral vascular disease, unspecified     Plaquenil adverse reaction in therapeutic use 10/22/2012    Rheumatoid arthritis(714.0) 10/14/2012    Special screening for malignant neoplasms, colon 02/21/2014     Past Surgical History:   Procedure Laterality Date    BIOPSY OF URETER Left 02/16/2023    Procedure: BIOPSY, URETER/BRUSH;  Surgeon: Kem Jefferson MD;  Location: [facility] OR [address] [place] [address];  Service: Urology;  Laterality: Left;    COLONOSCOPY N/A  11/29/2021    Procedure: COLONOSCOPY;  Surgeon: Kenrick Zimmer MD;  Location: [facility] ENDO (4TH FLR);  Service: Endoscopy;  Laterality: N/A;  blood thinner approval received, see telephone encounter 10/19/21-BB  fully vacc-inst email-tb    CYSTOSCOPY      CYSTOSCOPY  02/16/2023    Procedure: CYSTOSCOPY;  Surgeon: Kem Jefferson MD;  Location: [facility] OR 1ST FLR;  Service: Urology;;    HERNIA REPAIR      INSERTION OF TUNNELED CENTRAL VENOUS CATHETER (CVC) WITH SUBCUTANEOUS PORT Right 3/13/2023    Procedure: INSERTION, PORT-A-CATH;  Surgeon: Rene Cali MD;  Location: [facility] CATH LAB;  Service: Radiology;  Laterality: Right;    KNEE ARTHROPLASTY      LAPAROSCOPIC EXPLORATION OF GROIN Left 09/06/2018    Procedure: EXPLORATION, INGUINAL REGION, LAPAROSCOPIC;  Surgeon: iMngo De Guzman Jr., MD;  Location: [facility] OR 2ND FLR;  Service: General;  Laterality: Left;    MEDIPORT REMOVAL N/A 10/3/2023    Procedure: REMOVAL, CATHETER, CENTRAL VENOUS, TUNNELED, WITH PORT;  Surgeon: Yeimi Stanton MD;  Location: [facility] CATH LAB;  Service: Radiology;  Laterality: N/A;    PYELOSCOPY Left 02/16/2023    Procedure: PYELOSCOPY;  Surgeon: Kem Jefferson MD;  Location: [facility] OR 1ST FLR;  Service: Urology;  Laterality: Left;    RETROGRADE PYELOGRAPHY Bilateral 02/16/2023    Procedure: PYELOGRAM, RETROGRADE;  Surgeon: Kem Jefferson MD;  [facility];  Service: Urology;  Laterality: Bilateral;    ROBOT-ASSISTED LAPAROSCOPIC SURGICAL REMOVAL OF KIDNEY AND URETER USING DA BRIJESH XI Left 7/14/2023    Procedure: XI ROBOTIC NEPHROURETERECTOMY;  Surgeon: Kem Jefferson MD;  Location: [facility] OR 2ND FLR;  Service: Urology;  Laterality: Left;  5 hours    TONSILLECTOMY      URETEROSCOPIC REMOVAL OF URETERIC CALCULUS Left 02/16/2023    Procedure: REMOVAL, CALCULUS, URETER, URETEROSCOPIC;  Surgeon: Kem Jefferson MD;  [facility];  Service: Urology;  Laterality: Left;    URETEROSCOPY Left 02/16/2023     Procedure: URETEROSCOPY;  Surgeon: Kem Jefferson MD;  Location: [facility] OR [address] [place] [address];  Service: Urology;  Laterality: Left;     Family History   Problem Relation Name Age of Onset    Dementia Mother      Dementia Father      Heart disease Sister x1     Arthritis Sister x1     Kidney disease Sister x1     Seizures Sister x1     No Known Problems Daughter x2     Heart disease Paternal Uncle      Liver disease Neg Hx      Amblyopia Neg Hx      Blindness Neg Hx      Cancer Neg Hx      Cataracts Neg Hx      Diabetes Neg Hx      Glaucoma Neg Hx      Hypertension Neg Hx      Macular degeneration Neg Hx      Retinal detachment Neg Hx      Strabismus Neg Hx      Stroke Neg Hx      Thyroid disease Neg Hx       Social History[1]  Review of Systems    Physical Exam     Initial Vitals [03/06/25 1140]   BP Pulse Resp Temp SpO2   (!) 131/58 82 16 98.3 °F (36.8 °C) 98 %      MAP       --         Physical Exam    Nursing note and vitals reviewed.  Constitutional: He appears well-developed and well-nourished.   HENT: Mouth/Throat: Uvula is midline. Posterior oropharyngeal edema and posterior oropharyngeal erythema present.   Thick whitish discharge on the tongue   Cardiovascular:  Normal rate, regular rhythm, normal heart sounds and intact distal pulses.           Pulmonary/Chest: Breath sounds normal.     Neurological: He is alert and oriented to person, place, and time.   Skin: There is pallor.         ED Course   Procedures  Labs Reviewed   CBC W/ AUTO DIFFERENTIAL - Abnormal       Result Value    WBC 1.83 (*)     RBC 2.97 (*)     Hemoglobin 8.5 (*)     Hematocrit 26.6 (*)     MCV 90      MCH 28.6      MCHC 32.0      RDW 14.0      Platelets 162      MPV 9.6      Immature Granulocytes CANCELED      Immature Grans (Abs) CANCELED      nRBC 0      Gran % 61.0      Lymph % 29.0      Mono % 2.0 (*)     Eosinophil % 7.0      Basophil % 0.0      Bands 1.0      Differential Method Manual      Narrative:     WBC   critical result(s) called  and verbal readback obtained from Juan Dimas by PETER 03/06/2025 13:20   COMPREHENSIVE METABOLIC PANEL - Abnormal    Sodium 139      Potassium 4.3      Chloride 107      CO2 21 (*)     Glucose 112 (*)     BUN 32 (*)     Creatinine 2.0 (*)     Calcium 9.2      Total Protein 7.8      Albumin 3.4 (*)     Total Bilirubin 0.7      Alkaline Phosphatase 85      AST 40      ALT 67 (*)     eGFR 35.0 (*)     Anion Gap 11     CBC W/ AUTO DIFFERENTIAL - Abnormal    WBC 1.55 (*)     RBC 2.73 (*)     Hemoglobin 7.9 (*)     Hematocrit 24.0 (*)     MCV 88      MCH 28.9      MCHC 32.9      RDW 13.6      Platelets 142 (*)     MPV 9.8      Narrative:     WBC critical result(s) called and verbal readback obtained via Secure   Chat in [facility] from Shanda Falcon RN by MICHELLE 03/06/2025 16:13   ISTAT PROCEDURE - Abnormal    POC Glucose 108      POC BUN 31 (*)     POC Creatinine 2.4 (*)     POC Sodium 138      POC Potassium 4.6      POC Chloride 107      POC TCO2 (MEASURED) 22 (*)     POC Ionized Calcium 1.16      POC Hematocrit 24 (*)     Sample SOY     APTT    aPTT 28.0     PROTIME-INR    Prothrombin Time 11.4      INR 1.0     LACTATE DEHYDROGENASE         HIV 1 / 2 ANTIBODY   PARVOVIRUS B19 ANTIBODY, IGG AND IGM   HAPTOGLOBIN   VITAMIN B12   FOLATE   PERIPHERAL SMEAR REVIEW FOR HEMOLYSIS OR LOW PLATELETS   CBC W/ AUTO DIFFERENTIAL   TYPE & SCREEN   ISTAT CHEM8          Imaging Results    None          Medications   fluconazole tablet 100 mg (100 mg Oral Given 3/6/25 1430)   duke's soln (benadryl 30 mL, mylanta 30 mL, LIDOcaine 30 mL, nystatin 30 mL) 120 mL (10 mLs Oral Given 3/6/25 1430)     Medical Decision Making  70-year-old male presenting for thrush.  /58 with otherwise normal vitals.  Nontoxic appearing.    Differential diagnosis:  Oral candidiasis   Unclear why the patient has thrush, he did receive prednisone recently pharyngitis, hyperglycemia is a possibility.  No history of HIV.  He is not currently on  chemotherapy, no obvious reasons for immune suppression other than methotrexate    Will check labs, give analgesics reassess.    Workup is notable for new pancytopenia.  Case discussed with hematology, likely due to MTX however he has been on this medication for a long time and these findings are new.  He will be admitted to Hospital Medicine for further management.  Patient comfortable with admission.  I discussed this patient with my supervising physician.    Amount and/or Complexity of Data Reviewed  Labs: ordered. Decision-making details documented in ED Course.    Risk  Prescription drug management.  Decision regarding hospitalization.               ED Course as of 25 1622   Thu Mar 06, 2025   125 Creatinine 2.4 which is roughly at the patient's baseline.  Will renal dose fluconazole.  Glucose 108. [CC]   1322 WBC(!!): 1.83 [CC]   1322 Hemoglobin(!): 8.5 [CC]   1407 Platelet Count: 162 [CC]   1433 Case discussed with hematology fellow, recommends repeating CBC to see if this is actually accurate, PT/PTT, haptoglobin, LDH, B12, folate [CC]   1556 BILIRUBIN TOTAL: 0.7 [CC]   1614 Platelet Count(!): 142 [CC]      ED Course User Index  [CC] Jannet Mott PA-C                           Clinical Impression:  Final diagnoses:  [D61.818] Pancytopenia (Primary)  [B37.0] Oral candidiasis          ED Disposition Condition    Observation Stable                  [1]   Social History  Tobacco Use    Smoking status: Former     Current packs/day: 0.00     Average packs/day: 0.5 packs/day for 47.5 years (23.7 ttl pk-yrs)     Types: Cigarettes     Start date:      Quit date: 2020     Years since quittin.6    Smokeless tobacco: Never   Substance Use Topics    Alcohol use: Yes     Comment: 2 drinks per week    Drug use: Yes     Types: Marijuana     Comment: marajuana used on Saturday        Jannet Mott PA-C  [phone]

## 2025-03-06 NOTE — Clinical Note
Diagnosis: Pancytopenia [[id number]]   Future Attending Provider: ANNE DORSEY [[id number]]   Is the patient being sent to ED Observation?: No

## 2025-03-06 NOTE — ASSESSMENT & PLAN NOTE
CT CAP 2/25 - aneurysmal dilatation of the abdominal aorta measuring up to 4.5 cm AP with extension into the common iliac arteries, not significantly changed from prior study. Extensive vascular calcification noted.

## 2025-03-06 NOTE — ED NOTES
Patient identifiers verified and correct for  Mr Gant  C/C:  Oral pain, thrush SEE NN  APPEARANCE: awake and alert in NAD. PAIN  10/10  SKIN: warm, dry and intact. No breakdown or bruising. White area to amada, reports sore throat and difficulty swallowing   MUSCULOSKELETAL: Patient moving all extremities spontaneously, no obvious swelling or deformities noted. Ambulates independently.  RESPIRATORY: Denies shortness of breath.Respirations unlabored.   CARDIAC: Denies CP, 2+ distal pulses; no peripheral edema  ABDOMEN: S/ND/NT, Denies nausea  : voids spontaneously, denies difficulty  Neurologic: AAO x 4; follows commands equal strength in all extremities; denies numbness/tingling. Denies dizziness Denies new weakness

## 2025-03-06 NOTE — ASSESSMENT & PLAN NOTE
Thick whitish discharge on the tongue consistent with oral candidiasis . Workup is notable for new pancytopenia. Hb 7.9, leucopenia 1.55 and .  discussed with hematology, likely due to MTX however he has been on this medication for a long time.     started on dukes solution and fluconazole.

## 2025-03-06 NOTE — HPI
Andrea Winter is a 70-year-old male with rheumatoid arthritis on methotrexate, history of urothelial carcinoma in remission, anemia, hepatitis-C s/p SVR, PAD presents for thrush.       AAO x3. Patient was prescribed augmentin 2/7 -2/21 prior to the onset of his symptoms post cystoscopy. c/o pain with  swallowing after 4-5 days after augmentin.  denies fever, drooling or prior similar episodes. denies history of diabetes or HIV.   Reports painful whitish lesions in his mouth x  10 days. Patient was seen at urgent care clinic 02/26/2025 with congestion, sore throat and trouble swallowing.  Flu swab negative on 2/26.   prescribed prednisone 20mg x 5 days for viral pharyngitis, states that this significantly improved his sore throat and body aches but not the thrush.     ER course - Thick whitish discharge on the tongue consistent with oral candidiasis . Workup is notable for new pancytopenia. Hb 7.9, leucopenia 1.55 and .  discussed with hematology, likely due to MTX however he has been on this medication for a long time. admitted for further work up

## 2025-03-06 NOTE — H&P
[facility] - Emergency Dept  [facility] Medicine  History & Physical    Patient Name: Andrea Gant  MRN: [id number]  Patient Class: OP- Observation  Admission Date: 3/6/2025  Attending Physician: Jarocho Frazier MD   Primary Care Provider: Andrea Wang MD         Patient information was obtained from patient, past medical records, and ER records.     Subjective:     Principal Problem:Thrush    Chief Complaint:   Chief Complaint   Patient presents with    Mouth Lesions     States has thursh for 10 d and no better        HPI: Andrea Winter is a 70-year-old male with rheumatoid arthritis on methotrexate, history of urothelial carcinoma in remission, anemia, hepatitis-C s/p SVR, PAD presents for thrush.       AAO x3. Patient waws prescribed augmentin 2/7 -2/21 prior to the onset of his symptoms post cystoscopy. c/o pain with  swallowing after 4-5 days after augmentin.  denies fever, drooling or prior similar episodes. denies history of diabetes or HIV.   Reports painful whitish lesions in his mouth x  10 days. Patient was seen at urgent care clinic 02/26/2025 with congestion, sore throat and trouble swallowing.  Flu swab negative on 2/26.   prescribed prednisone 20mg x 5 days for viral pharyngitis, states that this significantly improved his sore throat and body aches but not the thrush.     ER course - Thick whitish discharge on the tongue consistent with oral candidiasis . Workup is notable for new pancytopenia. Hb 7.9, leucopenia 1.55 and .  discussed with hematology, likely due to MTX however he has been on this medication for a long time. admitted for further work up             Review of patient's allergies indicates:   Allergen Reactions    Iodinated contrast media         Shortness of breath           Past Medical History:   Diagnosis Date    Anemia      Cancer      Cataract      Chemotherapy induced neutropenia 04/11/2023    Colon polyp 2014    Depression      Disorder of kidney and  ureter      History of hepatitis C, s/p successful treatment w/ SVR12 - 7/2015 10/14/2012     Kelsey 1a,  Liver biopsy 6/2014 - Stage 1 fibrosis;  Completed 8 weeks Harvoni w/ SVR12 - 7/2015      Hyperlipidemia      Hypertension 03/08/2024    Lipoma of arm      Lipoma of lower extremity      Nuclear sclerosis - Both Eyes 10/22/2012    Other and unspecified hyperlipidemia 10/22/2013    PAD (peripheral artery disease)      Peripheral vascular disease, unspecified      Plaquenil adverse reaction in therapeutic use 10/22/2012    Rheumatoid arthritis(714.0) 10/14/2012    Special screening for malignant neoplasms, colon 02/21/2014            Past Surgical History:   Procedure Laterality Date    BIOPSY OF URETER Left 02/16/2023     Procedure: BIOPSY, URETER/BRUSH;  Surgeon: Kem Jefferson MD;  Location: [facility] OR 1ST FLR;  Service: Urology;  Laterality: Left;    COLONOSCOPY N/A 11/29/2021     Procedure: COLONOSCOPY;  Surgeon: Kenrick Zimmer MD;  Location: [facility] ENDO (4TH FLR);  Service: Endoscopy;  Laterality: N/A;  blood thinner approval received, see telephone encounter 10/19/21-BB  fully vacc-inst email-tb    CYSTOSCOPY        CYSTOSCOPY   02/16/2023     Procedure: CYSTOSCOPY;  Surgeon: Kem Jefferson MD;  Location: [facility] OR 1ST FLR;  Service: Urology;;    HERNIA REPAIR        INSERTION OF TUNNELED CENTRAL VENOUS CATHETER (CVC) WITH SUBCUTANEOUS PORT Right 3/13/2023     Procedure: INSERTION, PORT-A-CATH;  Surgeon: Rene Cali MD;  Location: [facility] CATH LAB;  Service: Radiology;  Laterality: Right;    KNEE ARTHROPLASTY        LAPAROSCOPIC EXPLORATION OF GROIN Left 09/06/2018     Procedure: EXPLORATION, INGUINAL REGION, LAPAROSCOPIC;  Surgeon: Mingo De Guzman Jr., MD;  Location: [facility] OR 2ND FLR;  Service: General;  Laterality: Left;    MEDIPORT REMOVAL N/A 10/3/2023     Procedure: REMOVAL, CATHETER, CENTRAL VENOUS, TUNNELED, WITH PORT;  Surgeon: Yeimi Stanton MD;  Location: [facility] CATH LAB;  Service:  Radiology;  Laterality: N/A;    PYELOSCOPY Left 02/16/2023     Procedure: PYELOSCOPY;  Surgeon: Kem Jefferson MD;  [facility];  Service: Urology;  Laterality: Left;    RETROGRADE PYELOGRAPHY Bilateral 02/16/2023     Procedure: PYELOGRAM, RETROGRADE;  Surgeon: Kem Jefferson MD;  [facility];  Service: Urology;  Laterality: Bilateral;    ROBOT-ASSISTED LAPAROSCOPIC SURGICAL REMOVAL OF KIDNEY AND URETER USING DA BRIJESH XI Left 7/14/2023     Procedure: XI ROBOTIC NEPHROURETERECTOMY;  Surgeon: Kem Jefferson MD;  Location: [facility] OR 2ND FLR;  Service: Urology;  Laterality: Left;  5 hours    TONSILLECTOMY        URETEROSCOPIC REMOVAL OF URETERIC CALCULUS Left 02/16/2023     Procedure: REMOVAL, CALCULUS, URETER, URETEROSCOPIC;  Surgeon: Kem Jefferson MD;  Location: [facility] OR [address] [place] [address];  Service: Urology;  Laterality: Left;    URETEROSCOPY Left 02/16/2023     Procedure: URETEROSCOPY;  Surgeon: Kem Jefferson MD;  Location: [facility] OR [address] [place] [address];  Service: Urology;  Laterality: Left;             Family History   Problem Relation Name Age of Onset    Dementia Mother        Dementia Father        Heart disease Sister x1      Arthritis Sister x1      Kidney disease Sister x1      Seizures Sister x1      No Known Problems Daughter x2      Heart disease Paternal Uncle        Liver disease Neg Hx        Amblyopia Neg Hx        Blindness Neg Hx        Cancer Neg Hx        Cataracts Neg Hx        Diabetes Neg Hx        Glaucoma Neg Hx        Hypertension Neg Hx        Macular degeneration Neg Hx        Retinal detachment Neg Hx        Strabismus Neg Hx        Stroke Neg Hx        Thyroid disease Neg Hx          [Social History]    [Social History]        Tobacco Use    Smoking status: Former       Current packs/day: 0.00       Average packs/day: 0.5 packs/day for 47.5 years (23.7 ttl pk-yrs)       Types: Cigarettes       Start date: 1973       Quit date: 6/27/2020       Years since quitting:  4.6    Smokeless tobacco: Never   Substance Use Topics    Alcohol use: Yes       Comment: 2 drinks per week    Drug use: Yes       Types: Marijuana       Comment: marajuana used on Saturday         Review of Systems:   Pain scale: 9/10   Constitutional:  fever,  chills, headache, vision loss, hearing loss, weight loss, Generalized weakness, falls, loss of smell, loss of taste, poor appetite,  sore throat, immunocompromised  Respiratory: cough, shortness of breath.   Cardiovascular: chest pain, dizziness, palpitations, orthopnea, swelling of feet, syncope  Gastrointestinal: nausea, vomiting, abdominal pain, diarrhea, black stool,  blood in stool, change in bowel habits, constipation, difficulty swallowing  Genitourinary: hematuria, dysuria, urgency, frequency  Integument/Breast: rash,  pruritis  Hematologic/Lymphatic: easy bruising, lymphadenopathy  Musculoskeletal: arthralgias , myalgias, back pain, neck pain, knee pain  Neurological: confusion, seizures, tremors, slurred speech  Behavioral/Psych:  depression, anxiety, auditory or visual hallucinations     OBJECTIVE:     Physical Exam:  Body mass index is 23.63 kg/m².    Constitutional: Appears well-developed and well-nourished.   Head: Normocephalic and atraumatic.  dentures   oral cavity - Thick white discharge on the tongue   poserior pharyngeal erythema  Neck: Normal range of motion. Neck supple.   Cardiovascular: Normal heart rate.  Regular heart rhythm.  Pulmonary/Chest: Effort normal.   Abdominal: No distension.  No tenderness  Musculoskeletal: Normal range of motion. No edema.   Neurological: Alert and oriented to person, place, and time.   Skin: Skin is warm and dry.   Psychiatric: Normal mood and affect. Behavior is normal.                  Vital Signs  Temp: 99.7 °F (37.6 °C) (03/06/25 1849)  Pulse: 86 (03/06/25 1849)  Resp: 18 (03/06/25 1849)  BP: 121/84 (03/06/25 1849)  SpO2: 98 % (03/06/25 1849)     24 Hour VS Range    Temp:  [98.3 °F (36.8 °C)-99.7 °F  "(37.6 °C)]   Pulse:  [75-86]   Resp:  [16-18]   BP: (112-131)/(58-84)   SpO2:  [98 %]   No intake or output data in the 24 hours ending 03/06/25 1853      I/O This Shift:  No intake/output data recorded.    Wt Readings from Last 3 Encounters:   03/06/25 72.6 kg (160 lb)   02/26/25 71.2 kg (157 lb)   02/20/25 71.4 kg (157 lb 6.5 oz)       I have personally reviewed the vitals and recorded Intake/Output     Laboratory/Diagnostic Data:    CBC/Anemia Labs: Coags:    Recent Labs   Lab 03/06/25  1246 03/06/25  1249 03/06/25  1514   WBC 1.83*  --  1.55*   HGB 8.5*  --  7.9*   HCT 26.6* 24* 24.0*     --  142*   MCV 90  --  88   RDW 14.0  --  13.6   FOLATE  --   --  >40.0*   [id number]  --   --  614    Recent Labs   Lab 03/06/25  1514   INR 1.0   APTT 28.0        Chemistries: ABG:   Recent Labs   Lab 03/06/25  1431      K 4.3      CO2 21*   BUN 32*   CREATININE 2.0*   CALCIUM 9.2   PROT 7.8   BILITOT 0.7   ALKPHOS 85   ALT 67*   AST 40    No results for input(s): "PH", "PCO2", "PO2", "HCO3", "POCSATURATED", "BE" in the last 168 hours.     POCT Glucose: HbA1c:    No results for input(s): "POCTGLUCOSE" in the last 168 hours. Hemoglobin A1C   Date Value Ref Range Status   05/17/2022 5.0 4.0 - 5.6 % Final     Comment:     ADA Screening Guidelines:  5.7-6.4%  Consistent with prediabetes  >or=6.5%  Consistent with diabetes    High levels of fetal hemoglobin interfere with the HbA1C  assay. Heterozygous hemoglobin variants (HbS, HgC, etc)do  not significantly interfere with this assay.   However, presence of multiple variants may affect accuracy.     10/11/2021 5.2 4.0 - 5.6 % Final     Comment:     ADA Screening Guidelines:  5.7-6.4%  Consistent with prediabetes  >or=6.5%  Consistent with diabetes    High levels of fetal hemoglobin interfere with the HbA1C  assay. Heterozygous hemoglobin variants (HbS, HgC, etc)do  not significantly interfere with this assay.   However, presence of multiple variants may affect " "accuracy.          Cardiac Enzymes: Ejection Fractions:    No results for input(s): "CPK", "CPKMB", "MB", "TROPONINI" in the last 72 hours. No results found for: "EF"       No results for input(s): "COLORU", "APPEARANCEUA", "PHUR", "SPECGRAV", "PROTEINUA", "GLUCUA", "KETONESU", "BILIRUBINUA", "OCCULTUA", "NITRITE", "UROBILINOGEN", "LEUKOCYTESUR", "RBCUA", "WBCUA", "BACTERIA", "SQUAMEPITHEL", "HYALINECASTS" in the last 48 hours.    Invalid input(s): "WRIGHTSUR"    No results found for: "PROCAL", "LACTATE"  BNP (pg/mL)   Date Value   03/26/2016 112 (H)     CRP (mg/L)   Date Value   10/09/2024 12.9 (H)   02/06/2024 12.0 (H)   02/08/2023 3.2   11/03/2022 1.9   08/03/2022 2.8   05/12/2022 2.0   02/08/2022 5.4   03/24/2021 3.5   11/18/2020 1.1   10/05/2020 2     Sed Rate   Date Value   10/09/2024 17 mm/Hr   02/06/2024 55 mm/Hr (H)   02/08/2023 20 mm/Hr   11/03/2022 11 mm/Hr   08/03/2022 8 mm/Hr   05/12/2022 20 mm/Hr   02/08/2022 9 mm/Hr   03/24/2021 19 mm/Hr   11/18/2020 14 mm/Hr   10/05/2020 48 mm/hr (H)     No results found for: "DDIMER"  No results found for: "FERRITIN"  LD (U/L)   Date Value   03/06/2025 226     Troponin I (ng/mL)   Date Value   08/25/2023 0.023   03/26/2016 0.047 (H)     CPK (U/L)   Date Value   02/29/2004 188     No results found. However, due to the size of the patient record, not all encounters were searched. Please check Results Review for a complete set of results.  SARS-CoV2 (COVID-19) Qualitative PCR (no units)   Date Value   08/25/2023 Not Detected     POC Rapid COVID (no units)   Date Value   12/04/2020 Negative       Microbiology labs for the last week  Microbiology Results (last 7 days)       ** No results found for the last 168 hours. **            Reviewed and noted in plan where applicable- Please see chart for full lab data.    Lines/Drains:       Peripheral IV - Single Lumen 03/06/25 1244 20 G Anterior;Distal;Left Upper Arm (Active)   Site Assessment Clean;Dry;Intact 03/06/25 1244 "   Dressing Status Clean;Dry;Intact 03/06/25 1244   Number of days: 0       Imaging      No results found for this or any previous visit.      VAS US Ankle Brachial Indices Resting  Indication  ========    PAD    Pressure Lower  ============    Rt brachial A syst BP  139 mmHg  Rt low thigh BP    159 mmHg  Rt calf  mmHg  Rt PTA BP  152 mmHg  Rt dors pedis A  mmHg  Rt toe BP  107 mmHg  Right FAHEEM ratio use:   post. tibial  Rt FAHEEM post tibial (rt post tib A BP / max brach A BP) 1.09  Rt FAHEEM (rt dors ped A BP / max brach A BP) 0.94  Rt ankle BP / max brach A BP   1.09  Rt TBI (rt toe BP / max brach A BP)    0.77  Lt brachial A syst BP  130 mmHg  Lt low thigh BP    95 mmHg  Lt calf BP 89 mmHg  Lt PTA BP  100 mmHg  Lt dors pedis A BP 95 mmHg  Lt toe BP  49 mmHg  Left FAHEEM ratio use:    post. tibial  Lt FAHEEM (lt post tibial A BP / max brach A BP)  0.72  Lt FAHEEM dors ped (lt dors ped A BP / max brach A BP)    0.68  Lt ankle BP / max brach A BP   0.72  Lt TBI (lt toe BP / max brach A BP)    0.35    PPG Lower  =========    Rt toe BP - PPG    107 mmHg  Rt toe digit waveform: normal  Lt toe BP - PPG    49 mmHg  Lt toe digit waveform: severely dampened    Impression  =========    Right Leg: Segmental pressures and PVR waveforms do not suggest any evidence of significant peripheral arterial occlusive disease.    Rt Great Toe: The PPG waveform as described above. - TBI of 0.77 with a great toe pressure of 107 mmHg is noted.    Left Leg: Segmental pressures and PVR waveforms suggest moderate peripheral arterial occlusive disease.  Lt Great Toe: The PPG waveform as described above. - TBI of 0.35 with a great toe pressure of 49 mmHg is noted.    DATE OF SERVICE: 02/19/2025                                                      Sonographer: Anuradha Wang  Electronically Signed by: Feroz Jensen M.D. at 02/19/2025-13:17      Labs, Imaging, EKG and Diagnostic results from 3/6/2025 were reviewed.    Medications:  Medication list was  reviewed and changes noted under Assessment/Plan.  Medications Ordered Prior to Encounter[1]  Scheduled Medications:  Current Facility-Administered Medications   Medication Dose Route Frequency    duke's soln (benadryl 30 mL, mylanta 30 mL, LIDOcaine 30 mL, nystatin 30 mL) 120 mL  10 mL Oral QID    enoxparin  40 mg Subcutaneous Daily    [START ON 3/7/2025] fluconazole  100 mg Oral Daily     PRN:   Current Facility-Administered Medications:     dextrose 50%, 12.5 g, Intravenous, PRN    dextrose 50%, 25 g, Intravenous, PRN    glucagon (human recombinant), 1 mg, Intramuscular, PRN    glucose, 16 g, Oral, PRN    glucose, 24 g, Oral, PRN    naloxone, 0.02 mg, Intravenous, PRN    sodium chloride 0.9%, 10 mL, Intravenous, Q12H PRN  Infusions:   Estimated Creatinine Clearance: 33.9 mL/min (A) (based on SCr of 2 mg/dL (H)).             Assessment/Plan:     Assessment & Plan  Rheumatoid arthritis   rheumatoid arthritis on methotrexate -weekly . rheumatology eval   History of hepatitis C, s/p successful treatment w/ SVR12 - 7/2015  noted     PAD (peripheral artery disease)  continue ASA and cilastazol     Hypercholesteremia  continue simvastatin    AAA (abdominal aortic aneurysm)  CT CAP 2/25 - aneurysmal dilatation of the abdominal aorta measuring up to 4.5 cm AP with extension into the common iliac arteries, not significantly changed from prior study. Extensive vascular calcification noted.     Stage 3b chronic kidney disease  Creatine stable for now. BMP reviewed- noted Estimated Creatinine Clearance: 33.9 mL/min (A) (based on SCr of 2 mg/dL (H)). according to latest data. Monitor UOP and serial BMP and adjust therapy as needed. Renally dose meds.    Recent Labs   Lab 03/06/25  1431   BUN 32*   CREATININE 2.0*       I & O   No intake or output data in the 24 hours ending 03/06/25 1853   Ureteral tumor  history of urothelial carcinoma in remission  Anemia  Anemia is likely due to chronic disease due to RA . Most recent  hemoglobin and hematocrit are listed below.  Recent Labs     03/06/25  1246 03/06/25  1249 03/06/25  1514   HGB 8.5*  --  7.9*   HCT 26.6* 24* 24.0*     Plan  - Monitor serial CBC: Daily  - Transfuse PRBC if patient becomes hemodynamically unstable, symptomatic or H/H drops below 7/21.    Hypertension  Patient's blood pressure range in the last 24 hours was: BP  Min: 112/65  Max: 131/58.The patient's inpatient anti-hypertensive regimen is listed below:  Current Antihypertensives       Plan  - BP is controlled, no changes needed to their regimen  - continue amlodipine 5mg daily  Leucopenia     Recent Labs   Lab 03/06/25  1246 03/06/25  1514   WBC 1.83* 1.55*   leucopenia 1.55 and .  discussed with hematology, likely due to MTX however he has been on this medication for a long time. admitted for further work up     Thrush  Thick whitish discharge on the tongue consistent with oral candidiasis . Workup is notable for new pancytopenia. Hb 7.9, leucopenia 1.55 and .  discussed with hematology, likely due to MTX however he has been on this medication for a long time.     started on dukes solution and fluconazole.       Odynophagia  secondary to above. continue dukes solution.       VTE Risk Mitigation (From admission, onward)           Ordered     enoxaparin injection 40 mg  Daily         03/06/25 1633     IP VTE HIGH RISK PATIENT  Once         03/06/25 1633     Place sequential compression device  Until discontinued         03/06/25 1633                       On 03/06/2025, patient should be placed in hospital observation services under my care.             Jarocho Frazier MD  Department of Hospital Medicine  [facility] - Emergency Dept               [1]   No current facility-administered medications on file prior to encounter.     Current Outpatient Medications on File Prior to Encounter   Medication Sig Dispense Refill    amLODIPine (NORVASC) 5 MG tablet TAKE 1 TABLET BY MOUTH ONCE  DAILY 90 tablet 0     aspirin 81 MG Chew Take 81 mg by mouth once daily.      cilostazoL (PLETAL) 50 MG Tab TAKE 1 TABLET BY MOUTH TWICE  DAILY 180 tablet 3    gabapentin (NEURONTIN) 100 MG capsule Take 1 capsule (100 mg total) by mouth every evening. 90 capsule 1    methotrexate 2.5 MG Tab TAKE 8 TABLETS BY MOUTH EVERY 7  DAYS THIS MEDICATION REQUIRES  PERIODIC LAB MONITORING 120 tablet 3    nystatin (MYCOSTATIN) 100,000 unit/mL suspension Take by mouth 4 (four) times daily.      simvastatin (ZOCOR) 10 MG tablet Take 1 tablet (10 mg total) by mouth every evening. 90 tablet 0    tamsulosin (FLOMAX) 0.4 mg Cap Take 1 capsule (0.4 mg total) by mouth once daily. 30 capsule 11    acetaminophen (TYLENOL) 650 MG TbSR Take 1 tablet (650 mg total) by mouth every 8 (eight) hours. 63 tablet 0    bisacodyL (DULCOLAX) 5 mg EC tablet Take 5 mg by mouth daily as needed for Constipation.      docusate sodium (COLACE) 100 MG capsule Take 100 mg by mouth 2 (two) times daily.      folic acid (FOLVITE) 1 MG tablet Take 1 tablet (1,000 mcg total) by mouth once daily. 90 tablet 3    levocetirizine (XYZAL) 5 MG tablet TAKE 1 TABLET BY MOUTH EVERY DAY IN THE EVENING 90 tablet 1    multivitamin capsule Take 1 capsule by mouth once daily.      traZODone (DESYREL) 50 MG tablet TAKE 3 TABLETS BY MOUTH AT NIGHT AS NEEDED FOR INSOMNIA 270 tablet 3

## 2025-03-06 NOTE — ASSESSMENT & PLAN NOTE
Recent Labs   Lab 03/06/25  1246 03/06/25  1514   WBC 1.83* 1.55*   leucopenia 1.55 and .  discussed with hematology, likely due to MTX however he has been on this medication for a long time. admitted for further work up

## 2025-03-06 NOTE — ED NOTES
I-STAT Chem-8+ Results:   Value Reference Range   Sodium 138 136-145 mmol/L   Potassium  4.6 3.5-5.1 mmol/L   Chloride 107  mmol/L   Ionized Calcium 1.16 1.06-1.42 mmol/L   CO2 (measured) 22 23-29 mmol/L   Glucose 108  mg/dL   BUN 31 6-30 mg/dL   Creatinine 2.4 0.5-1.4 mg/dL   Hematocrit 24 36-54%

## 2025-03-07 LAB
ALBUMIN SERPL BCP-MCNC: 2.8 G/DL (ref 3.5–5.2)
ALP SERPL-CCNC: 71 U/L (ref 40–150)
ALT SERPL W/O P-5'-P-CCNC: 47 U/L (ref 10–44)
ANION GAP SERPL CALC-SCNC: 7 MMOL/L (ref 8–16)
ANISOCYTOSIS BLD QL SMEAR: ABNORMAL
ANISOCYTOSIS BLD QL SMEAR: ABNORMAL
AST SERPL-CCNC: 23 U/L (ref 10–40)
BASOPHILS # BLD AUTO: 0 K/UL (ref 0–0.2)
BASOPHILS # BLD AUTO: 0 K/UL (ref 0–0.2)
BASOPHILS NFR BLD: 0 % (ref 0–1.9)
BASOPHILS NFR BLD: 0 % (ref 0–1.9)
BILIRUB SERPL-MCNC: 0.6 MG/DL (ref 0.1–1)
BLD PROD TYP BPU: NORMAL
BLOOD UNIT EXPIRATION DATE: NORMAL
BLOOD UNIT TYPE CODE: 6200
BLOOD UNIT TYPE: NORMAL
BUN SERPL-MCNC: 33 MG/DL (ref 8–23)
CALCIUM SERPL-MCNC: 8.4 MG/DL (ref 8.7–10.5)
CHLORIDE SERPL-SCNC: 109 MMOL/L (ref 95–110)
CO2 SERPL-SCNC: 21 MMOL/L (ref 23–29)
CODING SYSTEM: NORMAL
CREAT SERPL-MCNC: 2.1 MG/DL (ref 0.5–1.4)
CROSSMATCH INTERPRETATION: NORMAL
DIFFERENTIAL METHOD BLD: ABNORMAL
DIFFERENTIAL METHOD BLD: ABNORMAL
DISPENSE STATUS: NORMAL
EOSINOPHIL # BLD AUTO: 0.1 K/UL (ref 0–0.5)
EOSINOPHIL # BLD AUTO: 0.2 K/UL (ref 0–0.5)
EOSINOPHIL NFR BLD: 7.4 % (ref 0–8)
EOSINOPHIL NFR BLD: 9.9 % (ref 0–8)
ERYTHROCYTE [DISTWIDTH] IN BLOOD BY AUTOMATED COUNT: 13.5 % (ref 11.5–14.5)
ERYTHROCYTE [DISTWIDTH] IN BLOOD BY AUTOMATED COUNT: 14.4 % (ref 11.5–14.5)
EST. GFR  (NO RACE VARIABLE): 33 ML/MIN/1.73 M^2
GLUCOSE SERPL-MCNC: 106 MG/DL (ref 70–110)
HCT VFR BLD AUTO: 21.3 % (ref 40–54)
HCT VFR BLD AUTO: 26.3 % (ref 40–54)
HGB BLD-MCNC: 6.9 G/DL (ref 14–18)
HGB BLD-MCNC: 8.5 G/DL (ref 14–18)
HYPOCHROMIA BLD QL SMEAR: ABNORMAL
IMM GRANULOCYTES # BLD AUTO: 0.01 K/UL (ref 0–0.04)
IMM GRANULOCYTES # BLD AUTO: 0.03 K/UL (ref 0–0.04)
IMM GRANULOCYTES NFR BLD AUTO: 0.6 % (ref 0–0.5)
IMM GRANULOCYTES NFR BLD AUTO: 1.6 % (ref 0–0.5)
IRON SERPL-MCNC: 90 UG/DL (ref 45–160)
LYMPHOCYTES # BLD AUTO: 0.5 K/UL (ref 1–4.8)
LYMPHOCYTES # BLD AUTO: 0.5 K/UL (ref 1–4.8)
LYMPHOCYTES NFR BLD: 24.2 % (ref 18–48)
LYMPHOCYTES NFR BLD: 33.5 % (ref 18–48)
MAGNESIUM SERPL-MCNC: 2 MG/DL (ref 1.6–2.6)
MCH RBC QN AUTO: 28.1 PG (ref 27–31)
MCH RBC QN AUTO: 28.6 PG (ref 27–31)
MCHC RBC AUTO-ENTMCNC: 32.3 G/DL (ref 32–36)
MCHC RBC AUTO-ENTMCNC: 32.4 G/DL (ref 32–36)
MCV RBC AUTO: 87 FL (ref 82–98)
MCV RBC AUTO: 88 FL (ref 82–98)
MONOCYTES # BLD AUTO: 0.1 K/UL (ref 0.3–1)
MONOCYTES # BLD AUTO: 0.1 K/UL (ref 0.3–1)
MONOCYTES NFR BLD: 3.7 % (ref 4–15)
MONOCYTES NFR BLD: 5.3 % (ref 4–15)
MTX SERPL-SCNC: <0.04 UMOL/L (ref 0.5–5)
NEUTROPHILS # BLD AUTO: 0.8 K/UL (ref 1.8–7.7)
NEUTROPHILS # BLD AUTO: 1.2 K/UL (ref 1.8–7.7)
NEUTROPHILS NFR BLD: 52.3 % (ref 38–73)
NEUTROPHILS NFR BLD: 61.5 % (ref 38–73)
NRBC BLD-RTO: 0 /100 WBC
NRBC BLD-RTO: 0 /100 WBC
OHS QRS DURATION: 80 MS
OHS QTC CALCULATION: 418 MS
PHOSPHATE SERPL-MCNC: 2.4 MG/DL (ref 2.7–4.5)
PLATELET # BLD AUTO: 140 K/UL (ref 150–450)
PLATELET # BLD AUTO: 144 K/UL (ref 150–450)
PLATELET BLD QL SMEAR: ABNORMAL
PMV BLD AUTO: 9.5 FL (ref 9.2–12.9)
PMV BLD AUTO: 9.6 FL (ref 9.2–12.9)
POIKILOCYTOSIS BLD QL SMEAR: SLIGHT
POLYCHROMASIA BLD QL SMEAR: ABNORMAL
POTASSIUM SERPL-SCNC: 4.4 MMOL/L (ref 3.5–5.1)
PROT SERPL-MCNC: 6.6 G/DL (ref 6–8.4)
RBC # BLD AUTO: 2.41 M/UL (ref 4.6–6.2)
RBC # BLD AUTO: 3.02 M/UL (ref 4.6–6.2)
RETICS/RBC NFR AUTO: 0.5 % (ref 0.4–2)
SATURATED IRON: 35 % (ref 20–50)
SODIUM SERPL-SCNC: 137 MMOL/L (ref 136–145)
TOTAL IRON BINDING CAPACITY: 255 UG/DL (ref 250–450)
TOXIC GRANULES BLD QL SMEAR: PRESENT
TRANS ERYTHROCYTES VOL PATIENT: NORMAL ML
TRANSFERRIN SERPL-MCNC: 172 MG/DL (ref 200–375)
WBC # BLD AUTO: 1.61 K/UL (ref 3.9–12.7)
WBC # BLD AUTO: 1.9 K/UL (ref 3.9–12.7)

## 2025-03-07 PROCEDURE — 86920 COMPATIBILITY TEST SPIN: CPT | Performed by: HOSPITALIST

## 2025-03-07 PROCEDURE — 85045 AUTOMATED RETICULOCYTE COUNT: CPT | Performed by: HOSPITALIST

## 2025-03-07 PROCEDURE — 80204 DRUG ASSAY METHOTREXATE: CPT | Performed by: HOSPITALIST

## 2025-03-07 PROCEDURE — P9021 RED BLOOD CELLS UNIT: HCPCS | Performed by: HOSPITALIST

## 2025-03-07 PROCEDURE — 82728 ASSAY OF FERRITIN: CPT | Performed by: HOSPITALIST

## 2025-03-07 PROCEDURE — 25000003 PHARM REV CODE 250: Performed by: HOSPITALIST

## 2025-03-07 PROCEDURE — 85025 COMPLETE CBC W/AUTO DIFF WBC: CPT

## 2025-03-07 PROCEDURE — 85025 COMPLETE CBC W/AUTO DIFF WBC: CPT | Mod: 91 | Performed by: HOSPITALIST

## 2025-03-07 PROCEDURE — 82525 ASSAY OF COPPER: CPT | Performed by: HOSPITALIST

## 2025-03-07 PROCEDURE — 36430 TRANSFUSION BLD/BLD COMPNT: CPT

## 2025-03-07 PROCEDURE — 83735 ASSAY OF MAGNESIUM: CPT

## 2025-03-07 PROCEDURE — 27000207 HC ISOLATION

## 2025-03-07 PROCEDURE — 99222 1ST HOSP IP/OBS MODERATE 55: CPT | Mod: ,,, | Performed by: INTERNAL MEDICINE

## 2025-03-07 PROCEDURE — 99223 1ST HOSP IP/OBS HIGH 75: CPT | Mod: GC,,, | Performed by: INTERNAL MEDICINE

## 2025-03-07 PROCEDURE — 80053 COMPREHEN METABOLIC PANEL: CPT

## 2025-03-07 PROCEDURE — 63700000 PHARM REV CODE 250 ALT 637 W/O HCPCS: Performed by: HOSPITALIST

## 2025-03-07 PROCEDURE — 63600175 PHARM REV CODE 636 W HCPCS: Performed by: HOSPITALIST

## 2025-03-07 PROCEDURE — 30233N1 TRANSFUSION OF NONAUTOLOGOUS RED BLOOD CELLS INTO PERIPHERAL VEIN, PERCUTANEOUS APPROACH: ICD-10-PCS | Performed by: HOSPITALIST

## 2025-03-07 PROCEDURE — 11000001 HC ACUTE MED/SURG PRIVATE ROOM

## 2025-03-07 PROCEDURE — 83540 ASSAY OF IRON: CPT | Performed by: HOSPITALIST

## 2025-03-07 PROCEDURE — 84100 ASSAY OF PHOSPHORUS: CPT

## 2025-03-07 RX ORDER — LEUCOVORIN CALCIUM 5 MG/1
5 TABLET ORAL DAILY
Status: CANCELLED | OUTPATIENT
Start: 2025-03-07

## 2025-03-07 RX ORDER — POLYETHYLENE GLYCOL 3350 17 G/17G
17 POWDER, FOR SOLUTION ORAL DAILY PRN
Status: DISCONTINUED | OUTPATIENT
Start: 2025-03-07 | End: 2025-03-15 | Stop reason: HOSPADM

## 2025-03-07 RX ORDER — OXYCODONE HYDROCHLORIDE 5 MG/1
5 TABLET ORAL EVERY 6 HOURS PRN
Refills: 0 | Status: DISCONTINUED | OUTPATIENT
Start: 2025-03-07 | End: 2025-03-09

## 2025-03-07 RX ORDER — SODIUM,POTASSIUM PHOSPHATES 280-250MG
2 POWDER IN PACKET (EA) ORAL ONCE
Status: COMPLETED | OUTPATIENT
Start: 2025-03-07 | End: 2025-03-07

## 2025-03-07 RX ORDER — DEXTROSE, SODIUM CHLORIDE, SODIUM LACTATE, POTASSIUM CHLORIDE, AND CALCIUM CHLORIDE 5; .6; .31; .03; .02 G/100ML; G/100ML; G/100ML; G/100ML; G/100ML
INJECTION, SOLUTION INTRAVENOUS CONTINUOUS
Status: DISCONTINUED | OUTPATIENT
Start: 2025-03-07 | End: 2025-03-08

## 2025-03-07 RX ORDER — HYDROCODONE BITARTRATE AND ACETAMINOPHEN 500; 5 MG/1; MG/1
TABLET ORAL
Status: DISCONTINUED | OUTPATIENT
Start: 2025-03-07 | End: 2025-03-15

## 2025-03-07 RX ADMIN — OXYCODONE 5 MG: 5 TABLET ORAL at 04:03

## 2025-03-07 RX ADMIN — ALUMINUM HYDROXIDE, MAGNESIUM HYDROXIDE, AND DIMETHICONE 10 ML: 400; 400; 40 SUSPENSION ORAL at 08:03

## 2025-03-07 RX ADMIN — ALUMINUM HYDROXIDE, MAGNESIUM HYDROXIDE, AND DIMETHICONE 10 ML: 400; 400; 40 SUSPENSION ORAL at 04:03

## 2025-03-07 RX ADMIN — PRAVASTATIN SODIUM 20 MG: 20 TABLET ORAL at 08:03

## 2025-03-07 RX ADMIN — FLUCONAZOLE 100 MG: 100 TABLET ORAL at 08:03

## 2025-03-07 RX ADMIN — ALUMINUM HYDROXIDE, MAGNESIUM HYDROXIDE, AND DIMETHICONE 10 ML: 400; 400; 40 SUSPENSION ORAL at 12:03

## 2025-03-07 RX ADMIN — TAMSULOSIN HYDROCHLORIDE 0.4 MG: 0.4 CAPSULE ORAL at 08:03

## 2025-03-07 RX ADMIN — OXYCODONE 5 MG: 5 TABLET ORAL at 10:03

## 2025-03-07 RX ADMIN — ENOXAPARIN SODIUM 40 MG: 40 INJECTION SUBCUTANEOUS at 04:03

## 2025-03-07 RX ADMIN — OXYCODONE 5 MG: 5 TABLET ORAL at 08:03

## 2025-03-07 RX ADMIN — POTASSIUM & SODIUM PHOSPHATES POWDER PACK 280-160-250 MG 2 PACKET: 280-160-250 PACK at 08:03

## 2025-03-07 RX ADMIN — FOLIC ACID 1000 MCG: 1 TABLET ORAL at 08:03

## 2025-03-07 NOTE — PROGRESS NOTES
[facility] - Emergency Dept  [facility]  Progress Note    Patient Name: Andrea Gant  MRN: [id number]  Patient Class: OP- Observation   Admission Date: 3/6/2025  Length of Stay: 0 days  Attending Physician: Jarocho Frazier MD  Primary Care Provider: Andrea Wang MD        Subjective     Principal Problem:Thrush        HPI:  Andrea Winter is a 70-year-old male with rheumatoid arthritis on methotrexate, history of urothelial carcinoma in remission, anemia, hepatitis-C s/p SVR, PAD presents for thrush.       AAO x3. Patient waws prescribed augmentin 2/7 -2/21 prior to the onset of his symptoms post cystoscopy. c/o pain with  swallowing after 4-5 days after augmentin.  denies fever, drooling or prior similar episodes. denies history of diabetes or HIV.   Reports painful whitish lesions in his mouth x  10 days. Patient was seen at urgent care clinic 02/26/2025 with congestion, sore throat and trouble swallowing.  Flu swab negative on 2/26.   prescribed prednisone 20mg x 5 days for viral pharyngitis, states that this significantly improved his sore throat and body aches but not the thrush.     ER course - Thick whitish discharge on the tongue consistent with oral candidiasis . Workup is notable for new pancytopenia. Hb 7.9, leucopenia 1.55 and .  discussed with hematology, likely due to MTX however he has been on this medication for a long time. admitted for further work up     Overview/Hospital Course:  3/7 HB trended down to 6.9 . Transfusion with 1 unit of PRBC . Haptoglobin elevated.  P replaced. . Neutropenic precautions          Review of Systems:   Pain scale:    9/10   Constitutional:  fever,  chills, headache, vision loss, hearing loss, weight loss, Generalized weakness, falls, loss of smell, loss of taste, poor appetite,  sore throat, immunocompromised  Respiratory: cough, shortness of breath.   Cardiovascular: chest pain, dizziness, palpitations, orthopnea, swelling of  feet, syncope  Gastrointestinal: nausea, vomiting, abdominal pain, diarrhea, black stool,  blood in stool, change in bowel habits, constipation, difficulty swallowing  Genitourinary: hematuria, dysuria, urgency, frequency  Integument/Breast: rash,  pruritis  Hematologic/Lymphatic: easy bruising, lymphadenopathy  Musculoskeletal: arthralgias , myalgias, back pain, neck pain, knee pain  Neurological: confusion, seizures, tremors, slurred speech  Behavioral/Psych:  depression, anxiety, auditory or visual hallucinations     OBJECTIVE:     Physical Exam:  Body mass index is 23.63 kg/m².    Constitutional: Appears well-developed and well-nourished.   Head: Normocephalic and atraumatic.  dentures   oral cavity - Thick white discharge on the tongue   poserior pharyngeal erythema  Neck: Normal range of motion. Neck supple.   Cardiovascular: Normal heart rate.  Regular heart rhythm.  Pulmonary/Chest: Effort normal.   Abdominal: No distension.  No tenderness  Musculoskeletal: Normal range of motion. No edema.   Neurological: Alert and oriented to person, place, and time.   Skin: Skin is warm and dry.   Psychiatric: Normal mood and affect. Behavior is normal.       Vital Signs  Temp: 98.3 °F (36.8 °C) (03/07/25 0719)  Pulse: 85 (03/07/25 0719)  Resp: 18 (03/07/25 0719)  BP: 113/70 (03/07/25 0719)  SpO2: 99 % (03/07/25 0719)     24 Hour VS Range    Temp:  [98.3 °F (36.8 °C)-99.9 °F (37.7 °C)]   Pulse:  [75-95]   Resp:  [16-18]   BP: (102-131)/(54-84)   SpO2:  [96 %-99 %]     Intake/Output Summary (Last 24 hours) at 3/7/2025 0724  Last data filed at 3/6/2025 2329  Gross per 24 hour   Intake 120 ml   Output --   Net 120 ml         I/O This Shift:  No intake/output data recorded.    Wt Readings from Last 3 Encounters:   03/06/25 72.6 kg (160 lb)   02/26/25 71.2 kg (157 lb)   02/20/25 71.4 kg (157 lb 6.5 oz)       I have personally reviewed the vitals and recorded Intake/Output     Laboratory/Diagnostic Data:    CBC/Anemia Labs:  "Coags:    Recent Labs   Lab 03/06/25  1246 03/06/25  1249 03/06/25  1514 03/07/25  0618   WBC 1.83*  --  1.55* 1.61*   HGB 8.5*  --  7.9* 6.9*   HCT 26.6* 24* 24.0* 21.3*     --  142* 140*   MCV 90  --  88 88   RDW 14.0  --  13.6 13.5   FOLATE  --   --  >40.0*  --    [id number]  --   --  614  --     Recent Labs   Lab 03/06/25  1514   INR 1.0   APTT 28.0        Chemistries: ABG:   Recent Labs   Lab 03/06/25  1431 03/07/25  0618    137   K 4.3 4.4    109   CO2 21* 21*   BUN 32* 33*   CREATININE 2.0* 2.1*   CALCIUM 9.2 8.4*   PROT 7.8 6.6   BILITOT 0.7 0.6   ALKPHOS 85 71   ALT 67* 47*   AST 40 23   MG  --  2.0   PHOS  --  2.4*    No results for input(s): "PH", "PCO2", "PO2", "HCO3", "POCSATURATED", "BE" in the last 168 hours.     POCT Glucose: HbA1c:    No results for input(s): "POCTGLUCOSE" in the last 168 hours. Hemoglobin A1C   Date Value Ref Range Status   05/17/2022 5.0 4.0 - 5.6 % Final     Comment:     ADA Screening Guidelines:  5.7-6.4%  Consistent with prediabetes  >or=6.5%  Consistent with diabetes    High levels of fetal hemoglobin interfere with the HbA1C  assay. Heterozygous hemoglobin variants (HbS, HgC, etc)do  not significantly interfere with this assay.   However, presence of multiple variants may affect accuracy.     10/11/2021 5.2 4.0 - 5.6 % Final     Comment:     ADA Screening Guidelines:  5.7-6.4%  Consistent with prediabetes  >or=6.5%  Consistent with diabetes    High levels of fetal hemoglobin interfere with the HbA1C  assay. Heterozygous hemoglobin variants (HbS, HgC, etc)do  not significantly interfere with this assay.   However, presence of multiple variants may affect accuracy.          Cardiac Enzymes: Ejection Fractions:    No results for input(s): "CPK", "CPKMB", "MB", "TROPONINI" in the last 72 hours. No results found for: "EF"       No results for input(s): "COLORU", "APPEARANCEUA", "PHUR", "SPECGRAV", "PROTEINUA", "GLUCUA", "KETONESU", "BILIRUBINUA", "OCCULTUA", " ""NITRITE", "UROBILINOGEN", "LEUKOCYTESUR", "RBCUA", "WBCUA", "BACTERIA", "SQUAMEPITHEL", "HYALINECASTS" in the last 48 hours.    Invalid input(s): "WRIGHTSUR"    No results found for: "PROCAL", "LACTATE"  BNP (pg/mL)   Date Value   03/26/2016 112 (H)     CRP (mg/L)   Date Value   10/09/2024 12.9 (H)   02/06/2024 12.0 (H)   02/08/2023 3.2   11/03/2022 1.9   08/03/2022 2.8   05/12/2022 2.0   02/08/2022 5.4   03/24/2021 3.5   11/18/2020 1.1   10/05/2020 2     Sed Rate   Date Value   10/09/2024 17 mm/Hr   02/06/2024 55 mm/Hr (H)   02/08/2023 20 mm/Hr   11/03/2022 11 mm/Hr   08/03/2022 8 mm/Hr   05/12/2022 20 mm/Hr   02/08/2022 9 mm/Hr   03/24/2021 19 mm/Hr   11/18/2020 14 mm/Hr   10/05/2020 48 mm/hr (H)     No results found for: "DDIMER"  No results found for: "FERRITIN"  LD (U/L)   Date Value   03/06/2025 226     Troponin I (ng/mL)   Date Value   08/25/2023 0.023   03/26/2016 0.047 (H)     CPK (U/L)   Date Value   02/29/2004 188     No results found. However, due to the size of the patient record, not all encounters were searched. Please check Results Review for a complete set of results.  SARS-CoV2 (COVID-19) Qualitative PCR (no units)   Date Value   08/25/2023 Not Detected     POC Rapid COVID (no units)   Date Value   12/04/2020 Negative       Microbiology labs for the last week  Microbiology Results (last 7 days)       ** No results found for the last 168 hours. **            Reviewed and noted in plan where applicable- Please see chart for full lab data.    Lines/Drains:       Peripheral IV - Single Lumen 03/06/25 1244 20 G Anterior;Distal;Left Upper Arm (Active)   Site Assessment Clean;Dry;Intact;No redness;No swelling 03/06/25 1957   Extremity Assessment Distal to IV No abnormal discoloration;No redness;No swelling;No warmth 03/06/25 1957   Line Status Flushed;Saline locked 03/06/25 1957   Dressing Status Clean;Dry;Intact 03/06/25 1957   Dressing Intervention Integrity maintained 03/06/25 1957   Reason Not " Rotated Not due 03/06/25 1957   Number of days: 0       Imaging      No results found for this or any previous visit.      VAS US Ankle Brachial Indices Resting  Indication  ========    PAD    Pressure Lower  ============    Rt brachial A syst BP  139 mmHg  Rt low thigh BP    159 mmHg  Rt calf  mmHg  Rt PTA BP  152 mmHg  Rt dors pedis A  mmHg  Rt toe BP  107 mmHg  Right FAHEEM ratio use:   post. tibial  Rt FAHEEM post tibial (rt post tib A BP / max brach A BP) 1.09  Rt FAHEEM (rt dors ped A BP / max brach A BP) 0.94  Rt ankle BP / max brach A BP   1.09  Rt TBI (rt toe BP / max brach A BP)    0.77  Lt brachial A syst BP  130 mmHg  Lt low thigh BP    95 mmHg  Lt calf BP 89 mmHg  Lt PTA BP  100 mmHg  Lt dors pedis A BP 95 mmHg  Lt toe BP  49 mmHg  Left FAHEEM ratio use:    post. tibial  Lt FAHEEM (lt post tibial A BP / max brach A BP)  0.72  Lt FAHEEM dors ped (lt dors ped A BP / max brach A BP)    0.68  Lt ankle BP / max brach A BP   0.72  Lt TBI (lt toe BP / max brach A BP)    0.35    PPG Lower  =========    Rt toe BP - PPG    107 mmHg  Rt toe digit waveform: normal  Lt toe BP - PPG    49 mmHg  Lt toe digit waveform: severely dampened    Impression  =========    Right Leg: Segmental pressures and PVR waveforms do not suggest any evidence of significant peripheral arterial occlusive disease.    Rt Great Toe: The PPG waveform as described above. - TBI of 0.77 with a great toe pressure of 107 mmHg is noted.    Left Leg: Segmental pressures and PVR waveforms suggest moderate peripheral arterial occlusive disease.  Lt Great Toe: The PPG waveform as described above. - TBI of 0.35 with a great toe pressure of 49 mmHg is noted.    DATE OF SERVICE: 02/19/2025                                                      Sonographer: Anuradha Wang  Electronically Signed by: Feroz Jensen M.D. at 02/19/2025-13:17      Labs, Imaging, EKG and Diagnostic results from 3/7/2025 were reviewed.    Medications:  Medication list was reviewed and  changes noted under Assessment/Plan.  Medications Ordered Prior to Encounter[1]  Scheduled Medications:  Current Facility-Administered Medications   Medication Dose Route Frequency    duke's soln (benadryl 30 mL, mylanta 30 mL, LIDOcaine 30 mL, nystatin 30 mL) 120 mL  10 mL Oral QID    enoxparin  40 mg Subcutaneous Daily    fluconazole  100 mg Oral Daily    folic acid  1,000 mcg Oral Daily    potassium, sodium phosphates  2 packet Oral Once    pravastatin  20 mg Oral Daily    tamsulosin  0.4 mg Oral Daily     PRN:   Current Facility-Administered Medications:     0.9%  NaCl infusion (for blood administration), , Intravenous, Q24H PRN    dextrose 50%, 12.5 g, Intravenous, PRN    dextrose 50%, 25 g, Intravenous, PRN    glucagon (human recombinant), 1 mg, Intramuscular, PRN    glucose, 16 g, Oral, PRN    glucose, 24 g, Oral, PRN    naloxone, 0.02 mg, Intravenous, PRN    sodium chloride 0.9%, 10 mL, Intravenous, Q12H PRN    traZODone, 50 mg, Oral, Nightly PRN  Infusions:   Estimated Creatinine Clearance: 32.3 mL/min (A) (based on SCr of 2.1 mg/dL (H)).               Assessment & Plan  Rheumatoid arthritis   rheumatoid arthritis on methotrexate -weekly . rheumatology eval   History of hepatitis C, s/p successful treatment w/ SVR12 - 7/2015  noted     PAD (peripheral artery disease)  continue ASA and cilastazol     Hypercholesteremia  continue simvastatin    AAA (abdominal aortic aneurysm)  CT CAP 2/25 - aneurysmal dilatation of the abdominal aorta measuring up to 4.5 cm AP with extension into the common iliac arteries, not significantly changed from prior study. Extensive vascular calcification noted.     Stage 3b chronic kidney disease  Creatine stable for now. BMP reviewed- noted Estimated Creatinine Clearance: 32.3 mL/min (A) (based on SCr of 2.1 mg/dL (H)). according to latest data. Monitor UOP and serial BMP and adjust therapy as needed. Renally dose meds.    Recent Labs   Lab 03/06/25  1431 03/07/25  0618   BUN 32*  33*   CREATININE 2.0* 2.1*       I & O     Intake/Output Summary (Last 24 hours) at 3/7/2025 0724  Last data filed at 3/6/2025 2329  Gross per 24 hour   Intake 120 ml   Output --   Net 120 ml      Ureteral tumor  history of urothelial carcinoma in remission.  s/p neoadjuvant chemotherapy with Gemcitabine and Cisplatin. hem/onc consulted     Anemia  Anemia is likely due to chronic disease due to RA . Most recent hemoglobin and hematocrit are listed below.  Recent Labs     03/06/25  1246 03/06/25  1249 03/06/25  1514 03/07/25  0618   HGB 8.5*  --  7.9* 6.9*   HCT 26.6* 24* 24.0* 21.3*     Plan  - Monitor serial CBC: Daily  - Transfuse PRBC if patient becomes hemodynamically unstable, symptomatic or H/H drops below 7/21.  3/7 HB trended down to 6.9 . Transfusion with 1 unit of PRBC . Haptoglobin elevated.    Hypertension  Patient's blood pressure range in the last 24 hours was: BP  Min: 102/54  Max: 131/58.The patient's inpatient anti-hypertensive regimen is listed below:  Current Antihypertensives       Plan  - BP is controlled, no changes needed to their regimen  - continue amlodipine 5mg daily  Leucopenia     Recent Labs   Lab 03/06/25  1246 03/06/25  1514 03/07/25  0618   WBC 1.83* 1.55* 1.61*   leucopenia 1.55 and .  discussed with hematology, likely due to MTX however he has been on this medication for a long time. admitted for further work up   Hematology consulted - had been taking MTX daily incorrectly instead of weekly.   and  MTX toxicity given incorrect daily dosing and CKD. MTX levels < 0.04. No leucovorin for now per heme onc .  parvo serologies, Reticulocytes, Iron panel, Hep C PCR ordered     Thrush  Thick whitish discharge on the tongue consistent with oral candidiasis .Workup is notable for new pancytopenia. Hb 7.9, leucopenia 1.55 and .  discussed with hematology, likely due to MTX however he has been on this medication for a long time.     started on dukes solution and fluconazole.    GI consulted for odynophagia   Rheumatology consulted due to recent methotrexation resumption few weeks back    Odynophagia  secondary to above. continue dukes solution. GI consulted       VTE Risk Mitigation (From admission, onward)           Ordered     enoxaparin injection 40 mg  Daily         03/06/25 1633     IP VTE HIGH RISK PATIENT  Once         03/06/25 1633     Place sequential compression device  Until discontinued         03/06/25 1633                    Discharge Planning   NED: 3/8/2025     Code Status: Full Code   Medical Readiness for Discharge Date:                            Jarocho Frazier MD  Department of Hospital Medicine   [facility] - Emergency Dept         [1]   No current facility-administered medications on file prior to encounter.     Current Outpatient Medications on File Prior to Encounter   Medication Sig Dispense Refill    amLODIPine (NORVASC) 5 MG tablet TAKE 1 TABLET BY MOUTH ONCE  DAILY 90 tablet 0    aspirin 81 MG Chew Take 81 mg by mouth once daily.      cilostazoL (PLETAL) 50 MG Tab TAKE 1 TABLET BY MOUTH TWICE  DAILY 180 tablet 3    gabapentin (NEURONTIN) 100 MG capsule Take 1 capsule (100 mg total) by mouth every evening. 90 capsule 1    methotrexate 2.5 MG Tab TAKE 8 TABLETS BY MOUTH EVERY 7  DAYS THIS MEDICATION REQUIRES  PERIODIC LAB MONITORING 120 tablet 3    nystatin (MYCOSTATIN) 100,000 unit/mL suspension Take by mouth 4 (four) times daily.      simvastatin (ZOCOR) 10 MG tablet Take 1 tablet (10 mg total) by mouth every evening. 90 tablet 0    tamsulosin (FLOMAX) 0.4 mg Cap Take 1 capsule (0.4 mg total) by mouth once daily. 30 capsule 11    acetaminophen (TYLENOL) 650 MG TbSR Take 1 tablet (650 mg total) by mouth every 8 (eight) hours. 63 tablet 0    bisacodyL (DULCOLAX) 5 mg EC tablet Take 5 mg by mouth daily as needed for Constipation.      docusate sodium (COLACE) 100 MG capsule Take 100 mg by mouth 2 (two) times daily.      folic acid (FOLVITE) 1 MG tablet Take 1  tablet (1,000 mcg total) by mouth once daily. 90 tablet 3    levocetirizine (XYZAL) 5 MG tablet TAKE 1 TABLET BY MOUTH EVERY DAY IN THE EVENING 90 tablet 1    multivitamin capsule Take 1 capsule by mouth once daily.      traZODone (DESYREL) 50 MG tablet TAKE 3 TABLETS BY MOUTH AT NIGHT AS NEEDED FOR INSOMNIA 270 tablet 3

## 2025-03-07 NOTE — ASSESSMENT & PLAN NOTE
Recent Labs   Lab 03/06/25  1246 03/06/25  1514 03/07/25  0618   WBC 1.83* 1.55* 1.61*   leucopenia 1.55 and .  discussed with hematology, likely due to MTX however he has been on this medication for a long time. admitted for further work up   Hematology consulted - had been taking MTX daily incorrectly instead of weekly.   and  MTX toxicity given incorrect daily dosing and CKD. MTX levels < 0.04. No leucovorin for now per heme onc .  parvo serologies, Reticulocytes, Iron panel, Hep C PCR ordered

## 2025-03-07 NOTE — ASSESSMENT & PLAN NOTE
Thick whitish discharge on the tongue consistent with oral candidiasis .Workup is notable for new pancytopenia. Hb 7.9, leucopenia 1.55 and .  discussed with hematology, likely due to MTX however he has been on this medication for a long time.     started on dukes solution and fluconazole.   GI consulted for odynophagia   Rheumatology consulted due to recent methotrexation resumption few weeks back

## 2025-03-07 NOTE — CONSULTS
[facility]  Gastroenterology  Consult Note    Patient Name: Andrea Gant  MRN: [id number]  Admission Date: 3/6/2025  Hospital Length of Stay: 0 days  Code Status: Full Code   Attending Provider: Jarocho Frazier MD   Consulting Provider: Romi Carr MD  Primary Care Physician: Andrea Wang MD  Principal Problem:Oral thrush    Inpatient consult to Gastroenterology  Consult performed by: Romi Carr MD  Consult ordered by: Jarocho Frazier MD        Subjective:     HPI:  Andrea Winter is a 70-year-old male with rheumatoid arthritis on methotrexate, history of urothelial carcinoma in remission, anemia, hepatitis-C s/p SVR, PAD presents for thrush that started a few days ago. Patient says his whole mouth hurts and so does his throat. It was associated with odynophagia and patient denied dysphagia. Trial of nystatin as outpatient was unsuccessful. He was found to also be pancytopenic. He was admitted under  and was found to be HIV negative. Patient was started on on dukes solution and fluconazole. Patient expresses that he is hungry and desires to have food    Upon admission:  BP: 113/70 pulse 85  temp 36.8  spo2 99% RA  NO FEVER SPIKES  Labs: WBC 1.55 >> 1.61  ANC 0.9 (NEUTROPENIA) hgb 6.9 plt 140, folate is normal >40.0 >> pancytopenia  NA/K 137/4.4, BUN/CRT 33/2.1  AST/ALT 23/47  PRBC transfusion is ongoing    Reason for consult: Thrush/ odynophagia/ immunocompromised     Gi procedures colonoscopy 2014  - One 6 mm polyp in the proximal ascending colon  - Two 4 mm polyps in the sigmoid colon  - Two 6 mm polyps in the rectum    No endoscopy history.    Past Medical History:   Diagnosis Date    Anemia     Cancer     Cataract     Chemotherapy induced neutropenia 04/11/2023    Colon polyp 2014    Depression     Disorder of kidney and ureter     History of hepatitis C, s/p successful treatment w/ SVR12 - 7/2015 10/14/2012    Kelsey 1a,  Liver biopsy 6/2014 - Stage 1 fibrosis;   Completed 8 weeks Pippa nelson/ SVR12 - 7/2015      Hyperlipidemia     Hypertension 03/08/2024    Lipoma of arm     Lipoma of lower extremity     Nuclear sclerosis - Both Eyes 10/22/2012    Other and unspecified hyperlipidemia 10/22/2013    PAD (peripheral artery disease)     Peripheral vascular disease, unspecified     Plaquenil adverse reaction in therapeutic use 10/22/2012    Rheumatoid arthritis(714.0) 10/14/2012    Special screening for malignant neoplasms, colon 02/21/2014       Past Surgical History:   Procedure Laterality Date    BIOPSY OF URETER Left 02/16/2023    Procedure: BIOPSY, URETER/BRUSH;  Surgeon: Kem Jefferson MD;  Location: [facility] OR 1ST FLR;  Service: Urology;  Laterality: Left;    COLONOSCOPY N/A 11/29/2021    Procedure: COLONOSCOPY;  Surgeon: Kenrick Zimmer MD;  Location: [facility] ENDO (4TH FLR);  Service: Endoscopy;  Laterality: N/A;  blood thinner approval received, see telephone encounter 10/19/21-BB  fully vacc-inst email-tb    CYSTOSCOPY      CYSTOSCOPY  02/16/2023    Procedure: CYSTOSCOPY;  Surgeon: Kem Jefferson MD;  Location: [facility] OR 1ST FLR;  Service: Urology;;    HERNIA REPAIR      INSERTION OF TUNNELED CENTRAL VENOUS CATHETER (CVC) WITH SUBCUTANEOUS PORT Right 3/13/2023    Procedure: INSERTION, PORT-A-CATH;  Surgeon: Rene Cali MD;  Location: [facility] CATH LAB;  Service: Radiology;  Laterality: Right;    KNEE ARTHROPLASTY      LAPAROSCOPIC EXPLORATION OF GROIN Left 09/06/2018    Procedure: EXPLORATION, INGUINAL REGION, LAPAROSCOPIC;  Surgeon: Mingo De Guzman Jr., MD;  Location: [facility] OR 2ND FLR;  Service: General;  Laterality: Left;    MEDIPORT REMOVAL N/A 10/3/2023    Procedure: REMOVAL, CATHETER, CENTRAL VENOUS, TUNNELED, WITH PORT;  Surgeon: Yeimi Stanton MD;  Location: [facility] CATH LAB;  Service: Radiology;  Laterality: N/A;    PYELOSCOPY Left 02/16/2023    Procedure: PYELOSCOPY;  Surgeon: Kem Jefferson MD;  Location: [facility] OR 1ST FLR;  Service: Urology;   Laterality: Left;    RETROGRADE PYELOGRAPHY Bilateral 2023    Procedure: PYELOGRAM, RETROGRADE;  Surgeon: Kem Jefferson MD;  Location: [facility] OR 1ST FLR;  Service: Urology;  Laterality: Bilateral;    ROBOT-ASSISTED LAPAROSCOPIC SURGICAL REMOVAL OF KIDNEY AND URETER USING DA BRIJESH XI Left 2023    Procedure: XI ROBOTIC NEPHROURETERECTOMY;  Surgeon: Kem Jefferson MD;  Location: [facility] OR 2ND FLR;  Service: Urology;  Laterality: Left;  5 hours    TONSILLECTOMY      URETEROSCOPIC REMOVAL OF URETERIC CALCULUS Left 2023    Procedure: REMOVAL, CALCULUS, URETER, URETEROSCOPIC;  Surgeon: Kem Jefferson MD;  Location: [facility] OR 1ST FLR;  Service: Urology;  Laterality: Left;    URETEROSCOPY Left 2023    Procedure: URETEROSCOPY;  Surgeon: Kem Jefferson MD;  Location: [facility] OR 1ST FLR;  Service: Urology;  Laterality: Left;       Review of patient's allergies indicates:   Allergen Reactions    Iodinated contrast media      Shortness of breath     Family History       Problem Relation (Age of Onset)    Arthritis Sister    Dementia Mother, Father    Heart disease Sister, Paternal Uncle    Kidney disease Sister    No Known Problems Daughter    Seizures Sister          Tobacco Use    Smoking status: Former     Current packs/day: 0.00     Average packs/day: 0.5 packs/day for 47.5 years (23.7 ttl pk-yrs)     Types: Cigarettes     Start date:      Quit date: 2020     Years since quittin.6    Smokeless tobacco: Never   Substance and Sexual Activity    Alcohol use: Yes     Comment: 2 drinks per week    Drug use: Yes     Types: Marijuana     Comment: marajuana used on Saturday    Sexual activity: Yes     Partners: Female     Review of Systems  Objective:     Vital Signs (Most Recent):  Temp: 98.1 °F (36.7 °C) (25)  Pulse: 85 (25)  Resp: 18 (25)  BP: 116/74 (25)  SpO2: 100 % (25) Vital Signs (24h Range):  Temp:  [97.6 °F (36.4 °C)-99.9 °F  (37.7 °C)] 98.1 °F (36.7 °C)  Pulse:  [75-95] 85  Resp:  [16-20] 18  SpO2:  [96 %-100 %] 100 %  BP: (102-131)/(6-84) 116/74     Weight: 72.6 kg (160 lb) (03/06/25 1140)  Body mass index is 23.63 kg/m².      Intake/Output Summary (Last 24 hours) at 3/7/2025 0935  Last data filed at 3/6/2025 2329  Gross per 24 hour   Intake 120 ml   Output --   Net 120 ml       Lines/Drains/Airways       Peripheral Intravenous Line  Duration                  Peripheral IV - Single Lumen 03/06/25 1244 20 G Anterior;Distal;Left Upper Arm <1 day                     Physical Exam  Constitutional:       Appearance: He is not ill-appearing.      Comments: Patient expresses he is hungry     HENT:      Head: Normocephalic.      Mouth/Throat:      Comments: White plaques on tongue  Erythema of buccal mucosa and palates and back of his throat  Eyes:      Pupils: Pupils are equal, round, and reactive to light.   Abdominal:      General: Bowel sounds are normal. There is no distension.      Palpations: Abdomen is soft.      Tenderness: There is no abdominal tenderness. There is no guarding or rebound.   Musculoskeletal:      Right lower leg: No edema.      Left lower leg: No edema.   Skin:     Capillary Refill: Capillary refill takes less than 2 seconds.   Neurological:      Mental Status: He is alert and oriented to person, place, and time. Mental status is at baseline.   Psychiatric:         Mood and Affect: Mood normal.         Behavior: Behavior normal.         Thought Content: Thought content normal.         Judgment: Judgment normal.          Significant Labs:  All pertinent lab results from the last 24 hours have been reviewed.    Significant Imaging:  Imaging results within the past 24 hours have been reviewed.  Assessment/Plan:     ID  * Oral thrush  Andrea Winter is a 70-year-old male with rheumatoid arthritis on methotrexate, history of urothelial carcinoma in remission, anemia, hepatitis-C s/p SVR, PAD presents for thrush that  started a few days ago. Patient says his whole mouth hurts and so does his throat. It was associated with odynophagia and patient denied dysphagia. Trial of nystatin as outpatient was unsuccessful. He was found to also be pancytopenic. He was admitted under  and was found to be HIV negative. Patient was started on on dukes solution and fluconazole. Patient expresses that he is hungry and desires to have food      Plan:  -- allow trial of fluconazole to commence, if not improvement we will consider other causes of his symptoms and may opt for an EGD  -- allow patient to eat as he tolerates.  -- treat underlying pancytopenia        Thank you for your consult.     Romi Carr MD  Gastroenterology  [facility]

## 2025-03-07 NOTE — SUBJECTIVE & OBJECTIVE
Past Medical History:   Diagnosis Date    Anemia     Cancer     Cataract     Chemotherapy induced neutropenia 04/11/2023    Colon polyp 2014    Depression     Disorder of kidney and ureter     History of hepatitis C, s/p successful treatment w/ SVR12 - 7/2015 10/14/2012    Kelsey 1a,  Liver biopsy 6/2014 - Stage 1 fibrosis;  Completed 8 weeks Harvoni w/ SVR12 - 7/2015      Hyperlipidemia     Hypertension 03/08/2024    Lipoma of arm     Lipoma of lower extremity     Nuclear sclerosis - Both Eyes 10/22/2012    Other and unspecified hyperlipidemia 10/22/2013    PAD (peripheral artery disease)     Peripheral vascular disease, unspecified     Plaquenil adverse reaction in therapeutic use 10/22/2012    Rheumatoid arthritis(714.0) 10/14/2012    Special screening for malignant neoplasms, colon 02/21/2014       Past Surgical History:   Procedure Laterality Date    BIOPSY OF URETER Left 02/16/2023    Procedure: BIOPSY, URETER/BRUSH;  Surgeon: Kem Jefferson MD;  Location: [facility] OR 1ST FLR;  Service: Urology;  Laterality: Left;    COLONOSCOPY N/A 11/29/2021    Procedure: COLONOSCOPY;  Surgeon: Kenrick Zimmer MD;  Location: [facility] (4TH FLR);  Service: Endoscopy;  Laterality: N/A;  blood thinner approval received, see telephone encounter 10/19/21-BB  fully vacc-inst email-tb    CYSTOSCOPY      CYSTOSCOPY  02/16/2023    Procedure: CYSTOSCOPY;  Surgeon: Kem Jefferson MD;  Location: [facility] OR 1ST FLR;  Service: Urology;;    HERNIA REPAIR      INSERTION OF TUNNELED CENTRAL VENOUS CATHETER (CVC) WITH SUBCUTANEOUS PORT Right 3/13/2023    Procedure: INSERTION, PORT-A-CATH;  Surgeon: Rene Cali MD;  Location: [facility] CATH LAB;  Service: Radiology;  Laterality: Right;    KNEE ARTHROPLASTY      LAPAROSCOPIC EXPLORATION OF GROIN Left 09/06/2018    Procedure: EXPLORATION, INGUINAL REGION, LAPAROSCOPIC;  Surgeon: Mingo De Guzman Jr., MD;  Location: [facility] OR 2ND FLR;  Service: General;  Laterality: Left;    [facility]  REMOVAL N/A 10/3/2023    Procedure: REMOVAL, CATHETER, CENTRAL VENOUS, TUNNELED, WITH PORT;  Surgeon: Yeimi Stanton MD;  Location: [facility] CATH LAB;  Service: Radiology;  Laterality: N/A;    PYELOSCOPY Left 2023    Procedure: PYELOSCOPY;  Surgeon: Kem Jefferson MD;  Location: [facility] OR 1ST FLR;  Service: Urology;  Laterality: Left;    RETROGRADE PYELOGRAPHY Bilateral 2023    Procedure: PYELOGRAM, RETROGRADE;  Surgeon: Kem Jefferson MD;  Location: [facility] OR 1ST FLR;  Service: Urology;  Laterality: Bilateral;    ROBOT-ASSISTED LAPAROSCOPIC SURGICAL REMOVAL OF KIDNEY AND URETER USING DA BRIJESH XI Left 2023    Procedure: XI ROBOTIC NEPHROURETERECTOMY;  Surgeon: Kem Jefferson MD;  Location: [facility] OR 2ND FLR;  Service: Urology;  Laterality: Left;  5 hours    TONSILLECTOMY      URETEROSCOPIC REMOVAL OF URETERIC CALCULUS Left 2023    Procedure: REMOVAL, CALCULUS, URETER, URETEROSCOPIC;  Surgeon: Kem Jefferson MD;  Location: [facility] OR 1ST FLR;  Service: Urology;  Laterality: Left;    URETEROSCOPY Left 2023    Procedure: URETEROSCOPY;  Surgeon: Kem Jefferson MD;  Location: [facility] OR 1ST FLR;  Service: Urology;  Laterality: Left;       Review of patient's allergies indicates:   Allergen Reactions    Iodinated contrast media      Shortness of breath     Family History       Problem Relation (Age of Onset)    Arthritis Sister    Dementia Mother, Father    Heart disease Sister, Paternal Uncle    Kidney disease Sister    No Known Problems Daughter    Seizures Sister          Tobacco Use    Smoking status: Former     Current packs/day: 0.00     Average packs/day: 0.5 packs/day for 47.5 years (23.7 ttl pk-yrs)     Types: Cigarettes     Start date:      Quit date: 2020     Years since quittin.6    Smokeless tobacco: Never   Substance and Sexual Activity    Alcohol use: Yes     Comment: 2 drinks per week    Drug use: Yes     Types: Marijuana     Comment: marajuana used on  Saturday    Sexual activity: Yes     Partners: Female     Review of Systems  Objective:     Vital Signs (Most Recent):  Temp: 98.1 °F (36.7 °C) (03/07/25 0907)  Pulse: 85 (03/07/25 0907)  Resp: 18 (03/07/25 0832)  BP: 116/74 (03/07/25 0907)  SpO2: 100 % (03/07/25 0907) Vital Signs (24h Range):  Temp:  [97.6 °F (36.4 °C)-99.9 °F (37.7 °C)] 98.1 °F (36.7 °C)  Pulse:  [75-95] 85  Resp:  [16-20] 18  SpO2:  [96 %-100 %] 100 %  BP: (102-131)/(6-84) 116/74     Weight: 72.6 kg (160 lb) (03/06/25 1140)  Body mass index is 23.63 kg/m².      Intake/Output Summary (Last 24 hours) at 3/7/2025 0935  Last data filed at 3/6/2025 2329  Gross per 24 hour   Intake 120 ml   Output --   Net 120 ml       Lines/Drains/Airways       Peripheral Intravenous Line  Duration                  Peripheral IV - Single Lumen 03/06/25 1244 20 G Anterior;Distal;Left Upper Arm <1 day                     Physical Exam  Constitutional:       Appearance: He is not ill-appearing.      Comments: Patient expresses he is hungry     HENT:      Head: Normocephalic.      Mouth/Throat:      Comments: White plaques on tongue  Erythema of buccal mucosa and palates and back of his throat  Eyes:      Pupils: Pupils are equal, round, and reactive to light.   Abdominal:      General: Bowel sounds are normal. There is no distension.      Palpations: Abdomen is soft.      Tenderness: There is no abdominal tenderness. There is no guarding or rebound.   Musculoskeletal:      Right lower leg: No edema.      Left lower leg: No edema.   Skin:     Capillary Refill: Capillary refill takes less than 2 seconds.   Neurological:      Mental Status: He is alert and oriented to person, place, and time. Mental status is at baseline.   Psychiatric:         Mood and Affect: Mood normal.         Behavior: Behavior normal.         Thought Content: Thought content normal.         Judgment: Judgment normal.          Significant Labs:  All pertinent lab results from the last 24 hours have  been reviewed.    Significant Imaging:  Imaging results within the past 24 hours have been reviewed.

## 2025-03-07 NOTE — HPI
Andrea Winter is a 70-year-old male with rheumatoid arthritis on methotrexate, history of urothelial carcinoma in remission, anemia, hepatitis-C s/p SVR, PAD presents for thrush that started a few days ago. Patient says his whole mouth hurts and so does his throat. It was associated with odynophagia and patient denied dysphagia. Trial of nystatin as outpatient was unsuccessful. He was found to also be pancytopenic. He was admitted under  and was found to be HIV negative. Patient was started on on dukes solution and fluconazole. Patient expresses that he is hungry and desires to have food    Upon admission:  BP: 113/70 pulse 85  temp 36.8  spo2 99% RA  NO FEVER SPIKES  Labs: WBC 1.55 >> 1.61  ANC 0.9 (NEUTROPENIA) hgb 6.9 plt 140, folate is normal >40.0 >> pancytopenia  NA/K 137/4.4, BUN/CRT 33/2.1  AST/ALT 23/47  PRBC transfusion is ongoing    Reason for consult: Thrush/ odynophagia/ immunocompromised     Gi procedures colonoscopy 2014  - One 6 mm polyp in the proximal ascending colon  - Two 4 mm polyps in the sigmoid colon  - Two 6 mm polyps in the rectum    No endoscopy history.

## 2025-03-07 NOTE — CONSULTS
Hematology Oncology Consult Note    Inpatient consult to Hematology  Consult performed by: Donte Ko DO  Consult ordered by: Jannet Mott PA-C        SUBJECTIVE:     History of Present Illness:  70-year-old male with rheumatoid arthritis on methotrexate, history of stage II urothelial carcinoma s/p NA chemo with [place]/Cis, anemia, hepatitis-C s/p SVR, PAD presents for thrush. Reports painful whitish lesions in his mouth over the past 10 days. He was seen at urgent care clinic 02/26/2025 represcribed prednisone for viral pharyngitis, states that this significantly improved his sore throat and body aches but not the thrush. He is also prescribed amoxicillin prior to the onset of his symptoms, which was started after cystoscopy.  No history of diabetes or HIV. History of Hep C, treated.    HIV negative. Hgb 6.9, WBC 1.55, plt 142, , coags, hemolysis labs, B12, and folate unremarkable. sCr 2.0 (at baseline).      Review of patient's allergies indicates:   Allergen Reactions    Iodinated contrast media      Shortness of breath     Past Medical History:   Diagnosis Date    Anemia     Cancer     Cataract     Chemotherapy induced neutropenia 04/11/2023    Colon polyp 2014    Depression     Disorder of kidney and ureter     History of hepatitis C, s/p successful treatment w/ SVR12 - 7/2015 10/14/2012    Kelsey 1a,  Liver biopsy 6/2014 - Stage 1 fibrosis;  Completed 8 weeks Harvoni w/ SVR12 - 7/2015      Hyperlipidemia     Hypertension 03/08/2024    Lipoma of arm     Lipoma of lower extremity     Nuclear sclerosis - Both Eyes 10/22/2012    Other and unspecified hyperlipidemia 10/22/2013    PAD (peripheral artery disease)     Peripheral vascular disease, unspecified     Plaquenil adverse reaction in therapeutic use 10/22/2012    Rheumatoid arthritis(714.0) 10/14/2012    Special screening for malignant neoplasms, colon 02/21/2014     Past Surgical History:   Procedure Laterality Date    BIOPSY OF URETER Left  02/16/2023    Procedure: BIOPSY, URETER/BRUSH;  Surgeon: Kem Jefferson MD;  Location: [facility] OR 1ST FLR;  Service: Urology;  Laterality: Left;    COLONOSCOPY N/A 11/29/2021    Procedure: COLONOSCOPY;  Surgeon: Kenrick Zimmer MD;  Location: [facility] ENDO (4TH FLR);  Service: Endoscopy;  Laterality: N/A;  blood thinner approval received, see telephone encounter 10/19/21-BB  fully vacc-inst email-tb    CYSTOSCOPY      CYSTOSCOPY  02/16/2023    Procedure: CYSTOSCOPY;  Surgeon: Kem Jefferson MD;  Location: [facility] OR 1ST FLR;  Service: Urology;;    HERNIA REPAIR      INSERTION OF TUNNELED CENTRAL VENOUS CATHETER (CVC) WITH SUBCUTANEOUS PORT Right 3/13/2023    Procedure: INSERTION, PORT-A-CATH;  Surgeon: Rene Cali MD;  Location: [facility] CATH LAB;  Service: Radiology;  Laterality: Right;    KNEE ARTHROPLASTY      LAPAROSCOPIC EXPLORATION OF GROIN Left 09/06/2018    Procedure: EXPLORATION, INGUINAL REGION, LAPAROSCOPIC;  Surgeon: Mingo De Guzman Jr., MD;  Location: [facility] OR 2ND FLR;  Service: General;  Laterality: Left;    MEDIPORT REMOVAL N/A 10/3/2023    Procedure: REMOVAL, CATHETER, CENTRAL VENOUS, TUNNELED, WITH PORT;  Surgeon: Yeimi Stanton MD;  Location: [facility] CATH LAB;  Service: Radiology;  Laterality: N/A;    PYELOSCOPY Left 02/16/2023    Procedure: PYELOSCOPY;  Surgeon: Kem Jefferson MD;  [facility];  Service: Urology;  Laterality: Left;    RETROGRADE PYELOGRAPHY Bilateral 02/16/2023    Procedure: PYELOGRAM, RETROGRADE;  Surgeon: Kem Jefferson MD;  [facility];  Service: Urology;  Laterality: Bilateral;    ROBOT-ASSISTED LAPAROSCOPIC SURGICAL REMOVAL OF KIDNEY AND URETER USING DA BRIJESH XI Left 7/14/2023    Procedure: XI ROBOTIC NEPHROURETERECTOMY;  Surgeon: Kem Jefferson MD;  Location: [facility] OR 2ND FLR;  Service: Urology;  Laterality: Left;  5 hours    TONSILLECTOMY      URETEROSCOPIC REMOVAL OF URETERIC CALCULUS Left 02/16/2023    Procedure: REMOVAL,  CALCULUS, URETER, URETEROSCOPIC;  Surgeon: Kem Jefferson MD;  Location: [facility] OR [address] [place] [address];  Service: Urology;  Laterality: Left;    URETEROSCOPY Left 02/16/2023    Procedure: URETEROSCOPY;  Surgeon: Kem Jefferson MD;  Location: [facility] OR [address] [place] [address];  Service: Urology;  Laterality: Left;     Family History   Problem Relation Name Age of Onset    Dementia Mother      Dementia Father      Heart disease Sister x1     Arthritis Sister x1     Kidney disease Sister x1     Seizures Sister x1     No Known Problems Daughter x2     Heart disease Paternal Uncle      Liver disease Neg Hx      Amblyopia Neg Hx      Blindness Neg Hx      Cancer Neg Hx      Cataracts Neg Hx      Diabetes Neg Hx      Glaucoma Neg Hx      Hypertension Neg Hx      Macular degeneration Neg Hx      Retinal detachment Neg Hx      Strabismus Neg Hx      Stroke Neg Hx      Thyroid disease Neg Hx       Social History[1]  Review of Systems   Constitutional:  Negative for chills, fever and weight loss.   HENT:  Negative for congestion and nosebleeds.    Eyes:  Negative for blurred vision and double vision.   Respiratory:  Negative for hemoptysis and shortness of breath.    Cardiovascular:  Negative for chest pain and palpitations.   Gastrointestinal:  Negative for nausea and vomiting.        Odynophagia   Genitourinary:  Negative for dysuria and urgency.   Musculoskeletal:  Negative for myalgias and neck pain.   Skin:  Negative for itching and rash.   Neurological:  Negative for dizziness and headaches.   Psychiatric/Behavioral:  Negative for memory loss. The patient does not have insomnia.      OBJECTIVE:     Vital Signs:  Temp:  [97.6 °F (36.4 °C)-99.9 °F (37.7 °C)]   Pulse:  [80-86]   Resp:  [16-20]   BP: (102-127)/(6-80)   SpO2:  [96 %-100 %]     Physical Exam  Constitutional:       Appearance: He is ill-appearing.   HENT:      Head: Normocephalic and atraumatic.      Mouth/Throat:      Pharynx: Posterior oropharyngeal erythema present.      Comments:  White plaques  Eyes:      Extraocular Movements: Extraocular movements intact.      Pupils: Pupils are equal, round, and reactive to light.   Cardiovascular:      Rate and Rhythm: Normal rate and regular rhythm.   Pulmonary:      Effort: Pulmonary effort is normal.      Breath sounds: Normal breath sounds.   Abdominal:      General: Abdomen is flat.      Palpations: Abdomen is soft. There is no mass.   Musculoskeletal:         General: No swelling. Normal range of motion.   Skin:     General: Skin is warm and dry.   Neurological:      General: No focal deficit present.      Mental Status: He is alert and oriented to person, place, and time.   Psychiatric:         Mood and Affect: Mood normal.         Behavior: Behavior normal.       Laboratory:  CBC:   Recent Labs   Lab 03/07/25  0618   WBC 1.61*   RBC 2.41*   HGB 6.9*   HCT 21.3*   *   MCV 88   MCH 28.6   MCHC 32.4     CMP:   Recent Labs   Lab 03/07/25  0618      CALCIUM 8.4*   ALBUMIN 2.8*   PROT 6.6      K 4.4   CO2 21*      BUN 33*   CREATININE 2.1*   ALKPHOS 71   ALT 47*   AST 23   BILITOT 0.6     Coagulation:   Recent Labs   Lab 03/06/25  1514   LABPROT 11.4   INR 1.0   APTT 28.0         Diagnostic Results:  X-Ray: Reviewed      ASSESSMENT/PLAN:     #Pancytopenia  Patient with history of stage II urothelial carcinoma s/p nephrectomy and [place]/Cis now with new onset pancytopenia requiring RBC transfusion. He has not had chemotherapy in over a year, and although he restarted methotrexate several months ago, he had been on this for years without cytopenias. He is on the same dose as prior. He did take amoxicillin for presumed UTI after cystoscopy about one month ago, which is when he states his symptoms of difficulty swallowing started. Also has history of treated Hep C. Concern for MTX vs. Primary bone marrow process.       Recommendations:   - Hold MTX  - Obtain MTX level  - f/u parvo serologies  - Obtain Reticulocytes, Iron panel, Hep C  pcr, and Copper level      Discussed with Dr. Hamilton.  We will continue to follow. Let us know if any questions.      Donte Ko DO  Hematology/Oncology Fellow, PGY-IV   [facility]         [1]   Social History  Tobacco Use    Smoking status: Former     Current packs/day: 0.00     Average packs/day: 0.5 packs/day for 47.5 years (23.7 ttl pk-yrs)     Types: Cigarettes     Start date:      Quit date: 2020     Years since quittin.6    Smokeless tobacco: Never   Substance Use Topics    Alcohol use: Yes     Comment: 2 drinks per week    Drug use: Yes     Types: Marijuana     Comment: marajuana used on Saturday

## 2025-03-07 NOTE — PLAN OF CARE
[facility] Dept  Initial Discharge Assessment       Primary Care Provider: Andrea Wang MD    Admission Diagnosis: Pancytopenia [D61.818]    Admission Date: 3/6/2025    Pt is independent with their ADL's and ambulation, does not require assistance.     Post acute was discussed with pt. Pt d/c home with no needs at the moment. Pt to provide transportation home.     Expected Discharge Date: 3/8/2025    Transition of Care Barriers: (P) None    Payor: [employer] [facility] / Plan: [employer] CHOICES / Product Type: Medicare Advantage /     Extended Emergency Contact Information  Primary Emergency Contact: Rufina Gant  Address: [address] [place] [address]           [place] [address] [place]  Mobile Phone: [phone]  Relation: Spouse    Discharge Plan A: (P) Home, Home with family  Discharge Plan B: (P) Home with family, Home      [id number] [place] - [address]  [address]  [place] [address]  Phone: [phone] Fax: [phone]    [facility]  [address] [facility] [address]  Phone: [phone] Fax: [phone]    [facility] ([facility] - [address]  [address] [address]  Phone: [phone] Fax: [phone]    [facility] - [place] [address]  [address] [address]  Phone: [phone] Fax: [phone]      Initial Assessment (most recent)       Adult Discharge Assessment - 03/07/25 1020          Discharge Assessment    Assessment Type Discharge Planning Assessment (P)      Confirmed/corrected address, phone number and insurance Yes (P)      Confirmed Demographics Correct on Facesheet (P)      Source of Information patient (P)      Does patient/caregiver understand observation status Yes (P)      Reason For Admission Oral thrush (P)      People in  Home spouse (P)      Do you expect to return to your current living situation? Yes (P)      Do you have help at home or someone to help you manage your care at home? No (P)      Prior to hospitilization cognitive status: Alert/Oriented (P)      Current cognitive status: Alert/Oriented (P)      Walking or Climbing Stairs Difficulty no (P)      Dressing/Bathing Difficulty no (P)      Home Accessibility stairs to enter home (P)      Number of Stairs, Main Entrance one (P)      Home Layout Able to live on 1st floor (P)      Equipment Currently Used at Home none (P)      Readmission within 30 days? No (P)      Patient currently being followed by outpatient case management? No (P)      Do you currently have service(s) that help you manage your care at home? No (P)      Do you take prescription medications? Yes (P)      Do you have prescription coverage? Yes (P)      Coverage Payor: [employer] [facility] CHOICES - (P)      Do you have any problems affording any of your prescribed medications? No (P)      Is the patient taking medications as prescribed? yes (P)      Who is going to help you get home at discharge? family/friend (P)      How do you get to doctors appointments? car, drives self (P)      Are you on dialysis? No (P)      Do you take coumadin? No (P)      Discharge Plan A Home;Home with family (P)      Discharge Plan B Home with family;Home (P)      DME Needed Upon Discharge  none (P)      Discharge Plan discussed with: Patient (P)      Transition of Care Barriers None (P)         Physical Activity    On average, how many days per week do you engage in moderate to strenuous exercise (like a brisk walk)? 0 days (P)      On average, how many minutes do you engage in exercise at this level? 0 min (P)         Financial Resource Strain    How hard is it for you to pay for the very basics like food, housing, medical care, and heating? Not very hard (P)         Housing Stability    In the last 12  months, was there a time when you were not able to pay the mortgage or rent on time? No (P)      At any time in the past 12 months, were you homeless or living in a shelter (including now)? No (P)         Transportation Needs    Has the lack of transportation kept you from medical appointments, meetings, work or from getting things needed for daily living? No (P)         Food Insecurity    Within the past 12 months, you worried that your food would run out before you got the money to buy more. Never true (P)      Within the past 12 months, the food you bought just didn't last and you didn't have money to get more. Never true (P)         Stress    Do you feel stress - tense, restless, nervous, or anxious, or unable to sleep at night because your mind is troubled all the time - these days? Not at all (P)         Social Isolation    How often do you feel lonely or isolated from those around you?  Never (P)         Alcohol Use    Q1: How often do you have a drink containing alcohol? Never (P)      Q2: How many drinks containing alcohol do you have on a typical day when you are drinking? Patient does not drink (P)      Q3: How often do you have six or more drinks on one occasion? Never (P)         Utilities    In the past 12 months has the electric, gas, oil, or water company threatened to shut off services in your home? No (P)         Health Literacy    How often do you need to have someone help you when you read instructions, pamphlets, or other written material from your doctor or pharmacy? Never (P)         OTHER    Name(s) of People in Home spouse (P)                    TIBURCIO Marte, CSW.   Case Management  [facility]  Email: [email or web address]

## 2025-03-07 NOTE — SUBJECTIVE & OBJECTIVE
Past Medical History:   Diagnosis Date    Anemia     Cancer     Cataract     Chemotherapy induced neutropenia 04/11/2023    Colon polyp 2014    Depression     Disorder of kidney and ureter     History of hepatitis C, s/p successful treatment w/ SVR12 - 7/2015 10/14/2012    Kelsey 1a,  Liver biopsy 6/2014 - Stage 1 fibrosis;  Completed 8 weeks Harvoni w/ SVR12 - 7/2015      Hyperlipidemia     Hypertension 03/08/2024    Lipoma of arm     Lipoma of lower extremity     Nuclear sclerosis - Both Eyes 10/22/2012    Other and unspecified hyperlipidemia 10/22/2013    PAD (peripheral artery disease)     Peripheral vascular disease, unspecified     Plaquenil adverse reaction in therapeutic use 10/22/2012    Rheumatoid arthritis(714.0) 10/14/2012    Special screening for malignant neoplasms, colon 02/21/2014       Past Surgical History:   Procedure Laterality Date    BIOPSY OF URETER Left 02/16/2023    Procedure: BIOPSY, URETER/BRUSH;  Surgeon: Kem Jefferson MD;  Location: [facility] OR 1ST FLR;  Service: Urology;  Laterality: Left;    COLONOSCOPY N/A 11/29/2021    Procedure: COLONOSCOPY;  Surgeon: Kenrick Zimmer MD;  Location: [facility] (4TH FLR);  Service: Endoscopy;  Laterality: N/A;  blood thinner approval received, see telephone encounter 10/19/21-BB  fully vacc-inst email-tb    CYSTOSCOPY      CYSTOSCOPY  02/16/2023    Procedure: CYSTOSCOPY;  Surgeon: Kem Jefferson MD;  Location: [facility] OR 1ST FLR;  Service: Urology;;    HERNIA REPAIR      INSERTION OF TUNNELED CENTRAL VENOUS CATHETER (CVC) WITH SUBCUTANEOUS PORT Right 3/13/2023    Procedure: INSERTION, PORT-A-CATH;  Surgeon: Rene Cali MD;  Location: [facility] CATH LAB;  Service: Radiology;  Laterality: Right;    KNEE ARTHROPLASTY      LAPAROSCOPIC EXPLORATION OF GROIN Left 09/06/2018    Procedure: EXPLORATION, INGUINAL REGION, LAPAROSCOPIC;  Surgeon: Mingo De Guzman Jr., MD;  Location: [facility] OR 2ND FLR;  Service: General;  Laterality: Left;    [facility]  REMOVAL N/A 10/3/2023    Procedure: REMOVAL, CATHETER, CENTRAL VENOUS, TUNNELED, WITH PORT;  Surgeon: Yeimi Stanton MD;  Location: [facility] CATH LAB;  Service: Radiology;  Laterality: N/A;    PYELOSCOPY Left 02/16/2023    Procedure: PYELOSCOPY;  Surgeon: Kem Jefferson MD;  Location: [facility] OR 1ST FLR;  Service: Urology;  Laterality: Left;    RETROGRADE PYELOGRAPHY Bilateral 02/16/2023    Procedure: PYELOGRAM, RETROGRADE;  Surgeon: Kem Jefferson MD;  Location: [facility] OR 1ST FLR;  Service: Urology;  Laterality: Bilateral;    ROBOT-ASSISTED LAPAROSCOPIC SURGICAL REMOVAL OF KIDNEY AND URETER USING DA BRIJESH XI Left 7/14/2023    Procedure: XI ROBOTIC NEPHROURETERECTOMY;  Surgeon: Kem Jefferson MD;  Location: [facility] OR 2ND FLR;  Service: Urology;  Laterality: Left;  5 hours    TONSILLECTOMY      URETEROSCOPIC REMOVAL OF URETERIC CALCULUS Left 02/16/2023    Procedure: REMOVAL, CALCULUS, URETER, URETEROSCOPIC;  Surgeon: Kem Jefferson MD;  Location: [facility] OR 1ST FLR;  Service: Urology;  Laterality: Left;    URETEROSCOPY Left 02/16/2023    Procedure: URETEROSCOPY;  Surgeon: Kem Jefferson MD;  Location: [facility] OR 1ST FLR;  Service: Urology;  Laterality: Left;       Immunization History   Administered Date(s) Administered    COVID-19 MRNA, LN-S PF (MODERNA HALF 0.25 ML DOSE) 10/26/2021, 06/09/2022    COVID-19, MRNA, LN-S, PF (MODERNA FULL 0.5 ML DOSE) 02/18/2021, 03/18/2021    COVID-19, mRNA, LNP-S, PF (Moderna) Ages 12+ 10/25/2023    COVID-19, mRNA, LNP-S, PF, malia-sucrose, 30 mcg/0.3 mL (Pfizer Ages 12+) 09/08/2024    COVID-19, mRNA, LNP-S, bivalent booster, PF (Moderna Omicron)12 + YEARS 10/11/2022    Hepatitis A / Hepatitis B 12/02/2020, 04/07/2021, 10/11/2021    Influenza 01/27/2014, 10/10/2018, 09/26/2019    Influenza (FLUAD) - Quadrivalent - Adjuvanted - PF *Preferred* (65+) 09/27/2023    Influenza - Quadrivalent - High Dose - PF (65 years and older) 08/18/2021, 10/11/2022    Influenza - Quadrivalent - MDCK  - PF 10/16/2017, 10/22/2018    Influenza - Quadrivalent - PF *Preferred* (6 months and older) 11/18/2015, 10/15/2016    Influenza - Trivalent - Fluzone High Dose - PF (65 years and older) 09/25/2019, 09/08/2024    Influenza Split 01/27/2014, 01/27/2014    Pneumococcal Conjugate - 13 Valent 10/14/2020    Pneumococcal Polysaccharide - 23 Valent 01/27/2014    Tdap 01/27/2014    Zoster 03/12/2014    Zoster Recombinant 10/14/2020, 01/06/2021       Review of patient's allergies indicates:   Allergen Reactions    Iodinated contrast media      Shortness of breath     Current Facility-Administered Medications   Medication Frequency    0.9%  NaCl infusion (for blood administration) Q24H PRN    dextrose 5 % in lactated ringers infusion Continuous    dextrose 50% injection 12.5 g PRN    dextrose 50% injection 25 g PRN    duke's soln (benadryl 30 mL, mylanta 30 mL, LIDOcaine 30 mL, nystatin 30 mL) 120 mL QID    enoxaparin injection 40 mg Daily    fluconazole tablet 100 mg Daily    folic acid tablet 1,000 mcg Daily    glucagon (human recombinant) injection 1 mg PRN    glucose chewable tablet 16 g PRN    glucose chewable tablet 24 g PRN    naloxone 0.4 mg/mL injection 0.02 mg PRN    oxyCODONE immediate release tablet 5 mg Q6H PRN    polyethylene glycol packet 17 g Daily PRN    pravastatin tablet 20 mg Daily    sodium chloride 0.9% flush 10 mL Q12H PRN    tamsulosin 24 hr capsule 0.4 mg Daily    traZODone tablet 50 mg Nightly PRN     Current Outpatient Medications   Medication    amLODIPine (NORVASC) 5 MG tablet    aspirin 81 MG Chew    cilostazoL (PLETAL) 50 MG Tab    gabapentin (NEURONTIN) 100 MG capsule    methotrexate 2.5 MG Tab    nystatin (MYCOSTATIN) 100,000 unit/mL suspension    simvastatin (ZOCOR) 10 MG tablet    tamsulosin (FLOMAX) 0.4 mg Cap    acetaminophen (TYLENOL) 650 MG TbSR    bisacodyL (DULCOLAX) 5 mg EC tablet    docusate sodium (COLACE) 100 MG capsule    folic acid (FOLVITE) 1 MG tablet    levocetirizine (XYZAL)  5 MG tablet    multivitamin capsule    traZODone (DESYREL) 50 MG tablet     Family History       Problem Relation (Age of Onset)    Arthritis Sister    Dementia Mother, Father    Heart disease Sister, Paternal Uncle    Kidney disease Sister    No Known Problems Daughter    Seizures Sister          Tobacco Use    Smoking status: Former     Current packs/day: 0.00     Average packs/day: 0.5 packs/day for 47.5 years (23.7 ttl pk-yrs)     Types: Cigarettes     Start date:      Quit date: 2020     Years since quittin.6    Smokeless tobacco: Never   Substance and Sexual Activity    Alcohol use: Yes     Comment: 2 drinks per week    Drug use: Yes     Types: Marijuana     Comment: marajuana used on Saturday    Sexual activity: Yes     Partners: Female     Review of Systems   Constitutional:  Negative for fatigue, fever and unexpected weight change.   HENT:  Negative for facial swelling.    Eyes:  Negative for visual disturbance.   Respiratory:  Negative for cough and shortness of breath.    Cardiovascular:  Negative for chest pain.   Gastrointestinal:  Positive for diarrhea (Only with candida medicine). Negative for constipation.   Genitourinary:  Negative for dysuria.   Skin:  Negative for rash.   Neurological:  Negative for weakness and headaches.   Hematological:  Negative for adenopathy.   Psychiatric/Behavioral:  Negative for behavioral problems.      Objective:     Vital Signs (Most Recent):  Temp: 98.8 °F (37.1 °C) (25 1211)  Pulse: 77 (25 1211)  Resp: 18 (25 1211)  BP: 131/77 (25 1211)  SpO2: 100 % (25 1211) Vital Signs (24h Range):  Temp:  [97.6 °F (36.4 °C)-99.9 °F (37.7 °C)] 98.8 °F (37.1 °C)  Pulse:  [75-95] 77  Resp:  [16-20] 18  SpO2:  [96 %-100 %] 100 %  BP: (102-131)/(6-84) 131/77     Weight: 72.6 kg (160 lb) (25 1140)  Body mass index is 23.63 kg/m².  Body surface area is 1.88 meters squared.      Intake/Output Summary (Last 24 hours) at 3/7/2025 1454  Last  data filed at 3/6/2025 8773  Gross per 24 hour   Intake 120 ml   Output --   Net 120 ml         Physical Exam   Constitutional: He is oriented to person, place, and time. No distress.   HENT:   Head: Normocephalic and atraumatic.   Mouth/Throat: Does have thrush as well as numerous ulcers particularly at the gum line.   Cardiovascular: Normal rate, regular rhythm and normal heart sounds.   Pulmonary/Chest: Effort normal and breath sounds normal. No respiratory distress.   Abdominal: Soft.   Musculoskeletal:         General: Swelling (Does have synovitis of the MCP/PIP joints, minimal tenderness) present. No tenderness. Normal range of motion.      Comments: Multiple rheumatoid nodules noted at elbows, forearms, fingers   Neurological: He is alert and oriented to person, place, and time.   Skin: Skin is warm and dry. No rash noted.   Psychiatric: His behavior is normal. Judgment and thought content normal.        Significant Labs:  All pertinent lab results from the last 24 hours have been reviewed.    Significant Imaging:  Imaging results within the past 24 hours have been reviewed.

## 2025-03-07 NOTE — ASSESSMENT & PLAN NOTE
Patient's blood pressure range in the last 24 hours was: BP  Min: 102/54  Max: 131/58.The patient's inpatient anti-hypertensive regimen is listed below:  Current Antihypertensives       Plan  - BP is controlled, no changes needed to their regimen  - continue amlodipine 5mg daily

## 2025-03-07 NOTE — ASSESSMENT & PLAN NOTE
history of urothelial carcinoma in remission.  s/p neoadjuvant chemotherapy with Gemcitabine and Cisplatin. hem/onc consulted

## 2025-03-07 NOTE — ASSESSMENT & PLAN NOTE
Creatine stable for now. BMP reviewed- noted Estimated Creatinine Clearance: 32.3 mL/min (A) (based on SCr of 2.1 mg/dL (H)). according to latest data. Monitor UOP and serial BMP and adjust therapy as needed. Renally dose meds.    Recent Labs   Lab 03/06/25  1431 03/07/25  0618   BUN 32* 33*   CREATININE 2.0* 2.1*       I & O     Intake/Output Summary (Last 24 hours) at 3/7/2025 0724  Last data filed at 3/6/2025 9781  Gross per 24 hour   Intake 120 ml   Output --   Net 120 ml

## 2025-03-07 NOTE — ASSESSMENT & PLAN NOTE
Andrea Winter is a 70-year-old male with rheumatoid arthritis on methotrexate, history of urothelial carcinoma in remission, anemia, hepatitis-C s/p SVR, PAD presents for thrush that started a few days ago. Patient says his whole mouth hurts and so does his throat. It was associated with odynophagia and patient denied dysphagia. Trial of nystatin as outpatient was unsuccessful. He was found to also be pancytopenic. He was admitted under  and was found to be HIV negative. Patient was started on on dukes solution and fluconazole. Patient expresses that he is hungry and desires to have food      Plan:  -- allow trial of fluconazole to commence, if not improvement we will consider other causes of his symptoms and may opt for an EGD  -- allow patient to eat as he tolerates.  -- treat underlying pancytopenia

## 2025-03-07 NOTE — ASSESSMENT & PLAN NOTE
Anemia is likely due to chronic disease due to RA. Most recent hemoglobin and hematocrit are listed below.  Recent Labs     03/06/25  1246 03/06/25  1249 03/06/25  1514 03/07/25  0618   HGB 8.5*  --  7.9* 6.9*   HCT 26.6* 24* 24.0* 21.3*     Plan  - Monitor serial CBC: Daily  - Transfuse PRBC if patient becomes hemodynamically unstable, symptomatic or H/H drops below 7/21.  3/7 HB trended down to 6.9 . Transfusion with 1 unit of PRBC . Haptoglobin elevated.

## 2025-03-07 NOTE — HOSPITAL COURSE
3/7 HB trended down to 6.9 . Transfusion with 1 unit of PRBC . Haptoglobin elevated.  P replaced. . Neutropenic precautions. had been taking MTX daily incorrectly instead of weekly and  MTX toxicity given incorrect daily dosing and CKD. MTX levels < 0.04. No leucovorin for now per heme onc .  parvo serologies, Reticulocytes, Iron panel, Hep C PCR ordered   3/8 low grade temperature of 100.5. CX ray -Continued pleural fluid or thickening at the right base now with some fluid or thickening at the left base.  Some patchy parenchymal opacities though no confluent consolidation.  No pneumothorax. COVID, Flu, RSV negative. BC x2, UA and procalcitonin  0.27 . UA negative. ANC 1500. folic acid increased to 4mg daily.  ID consulted for neutropenic fever . advanced to soft diet and started on boost   3/9 ANC 1900. WBC improving. fever of 100.9F overnight. MRSA PCR negative . P replaced. requests full liquid diet.   3/10 WBC and ANC normalized. platelets trended down to 93. Low grade temp of 100.6F this AM. ID eval - continue renally dosed fluconazole for presumed oral and esophageal candidiasis x 14-21 days depending on clinical improvement. CT chest, abdomen, pelvis ordered.SBP 90s improved with NS bolus 250ml x1.   3/11 fever of 102F. BCx2 and echo orderd. pancultures. Platelets trended down to 84. CTCAP -Fusiform aneurysm of the infrarenal abdominal aorta, greatest diameter 5 cm previously 4.4 cm. Greatest diameter of the descending thoracic aorta 4.2 cm. Bilateral pleural effusions, stable compared to 02/10/2025. Status post left nephrectomy, no evidence of recurrence or distant metastases within the constraints of noncontrast study. Bilateral emphysema. ID, Hematology  and GI follow up - recs Fluconazole to finish before EGD is considered    3/13 persistent fever 101.7F. UA, RIP and BC x2 NGTD . Hb 7--> 6.8. Transfusion with 1 unit of PRBC      H/H stable today.       Had discussion with patient and wife (on the  phone). Went over hospital course and current treatment plan. Patient very anxious to get home, given his long hospital stay and ongoing treatment. Plan is to complete IV micafungin today. Continue with oral valcyclovir and leucovorin at home. Defer any GI intervention to the outpatient setting. Patients labs have improved and outside the oral ulcerations he is feeling better.     Plans for PCP follow up in the next week. Will need rheumatology follow up, GI follow up, nephrology follow up.    Patient discharged in stable conditions, had discussion about risks and benefits from going home and outpatient follow up vs staying for continued work up. Patient agrees with discharge home and OP follow up     Plan of care reviewed, questions answered. Patient in agreement with discharge plan    PE   No acute distress  Heart rrr  Lungs cta  Abdomen soft  Soft palate ulcerations present, some early signs of healing

## 2025-03-07 NOTE — HPI
"Patient is a 71 yo M w/ a hx of RCC w/ ureteral tumor s/p chemotherapy (3/2023 - 6/2023), PVD, HLD, CKD, hep C (treated) and RA on MTX here for thrush.     Rheum History:  - Hx of RA on MTX monotherapy. Had been followed by Dr. Jimenez since 2012, transitioned to Dr. Case 10/2024  - Initially was doing well on HCQ and meloxicam. Has hx of nodular disease and arthritis  - Has been on and off MTX - restarted this in 2021 while on Humira to try and substitute and was successful  - Last visit they had reduced his MTX from 15mg weekly to 12.5, but had slowly increased back up to 8 tablets per week     Prior Treatments:  - HCQ  - Humira ([id number])     Current Treatments:  - MTX 12.5mg weekly + folic acid    Current Hospitalization:  - [place] [facility] [place] painful white lesions in his mouth for 10 days. He had been to  2/26 and was given prednisone 20mg x5 days for viral pharyngitis  - Was found to have new pancytopenia w/ a WBC of 1.8 (baseline ~10), hgb ~7 (baseline 12), plt 140 ()    Initial evaluation -     "Pt being seen for increasing oral/throat pain to the point of him being unable to eat/drink. He has had issues with thrush per him since he was given amoxicillin for a cystoscopy procedure (appears to be back in 8/2024). He has tried numerous thrush treatments and does not believe any of them have been effective. He also has a lot of oral ulcers.    Pt is on MTX for RA. He has been for a long time. However, the last month or so, he has been taking a tablet of MTX (2.5mg) every day rather than 8 tablets once a week. He stated he had been doing this for >1 year, per his wife it has only been the last month or so and he had been taking the MTX correctly before. He had continued to take daily folic acid. He feels his arthritis is fairly well controlled and the nodules may be improved after lowering the MTX dose. He still has multiple nodules on his arms/elbows/fingers.    He denies any illness " "prior to the low blood counts. He has not had low blood counts before. He had chemotherapy back in 2023 and a L nephrectomy but otherwise no radiation therapy.     He is still working and was able to work throughout chemotherapy. He denies any substance use." From initial consult 3/7    3/14 he feels that his lesions are no better. He is still getting fevers but he does not feel them. He feels that his joints are fine and don't cause him any trouble. He is getting frustrated that he is still here and that the swishes and anti-fungals have not made much improvement.    No hx of fever syndromes. No hx of eye symptoms. No rashes. No nerve symptoms. No hair loss/other lupus symptoms. Has CKD - has been referred to nephrology in 2023 and 2024 but refused both times. No known etiology of kidney disease, possibly related to RCC from 2023.   "

## 2025-03-07 NOTE — ASSESSMENT & PLAN NOTE
Home regimen: MTX 20mg qweek   Resulted workup:   Labs   Pancytopenia - WBC of 1.8 (baseline ~10), hgb ~7 (baseline 12), plt 140 ()   HIV negative   Folate high   Strep/flu negative  Pending workup:    Peripheral smear   Parvo B19  Current treatments:  - None    Assessment  Pt presenting with new pancytopenia. Had been taking his MTX incorrectly (one tablet daily rather than multiple once a week). It sounds like he was previously taking the tablets every Monday. This is most likely acute toxicity from the methotrexate.     There is a discordance between his serum level and the severity of his blood counts, however there are case studies of pt on chronic MTX for RA/non-onc conditions where they had toxicity without an elevated serum level (citations below).     Recommendations:  - Hold MTX   - increase folic acid to 4mg daily  - Appreciate H/O input on whether to give folinic acid  - Will need follow up OP for resumption of RA meds    We will sign off and arrange for outpatient follow up in clinic. Please reach out if there are any further questions/concerns.    CAN METHOTREXATE TOXICITY OCCUR DESPITE A LOW-DOSE REGIMEN AND NORMAL THERAPEUTIC LEVELS?  OLY MC et al.  CHEST, Volume 166, Issue 4,  -   2. JOSE RAMON Vuong., GINNA Castro & KENDRA Jerome. Potential contributors to low dose methotrexate toxicity in a patient with rheumatoid arthritis and pernicious anemia: case report. BMC Rheumatol 5, 5 (2021). [email or web address]/[email or web address]

## 2025-03-07 NOTE — CONSULTS
[facility] Dept  Rheumatology  Consult Note    Patient Name: Andrea Gant  MRN: [id number]  Admission Date: 3/6/2025  Hospital Length of Stay: 0 days  Code Status: Full Code   Attending Provider: Jarocho Frazier MD  Primary Care Physician: Andrea Wang MD  Principal Problem:Oral thrush    Inpatient consult to Rheumatology  Consult performed by: Ysabel Cordero MD  Consult ordered by: Jarocho Frazier MD        Subjective:     HPI: Patient is a 69 yo M w/ a hx of RCC w/ ureteral tumor s/p chemotherapy (3/2023 - 6/2023), PVD, HLD, CKD, hep C (treated) and RA on MTX here for thrush.     Rheum History:  - Hx of RA on MTX monotherapy. Had been followed by Dr. Jiemnez since 2012, transitioned to Dr. Case 10/2024  - Initially was doing well on HCQ and meloxicam. Has hx of nodular disease and arthritis  - Has been on and off MTX - restarted this in 2021 while on Humira to try and substitute and was successful  - Last visit they had reduced his MTX from 15mg weekly to 12.5     Prior Treatments:  - HCQ  - Humira ([id number])     Current Treatments:  - MTX 12.5mg weekly + folic acid    Current Hospitalization:  - [place] [facility] [place] painful white lesions in his mouth for 10 days. He had been to  2/26 and was given prednisone 20mg x5 days for viral pharyngitis  - Was found to have new pancytopenia w/ a WBC of 1.8 (baseline ~10), hgb ~7 (baseline 12), plt 140 ()    Initial evaluation -     Pt being seen for increasing oral/throat pain to the point of him being unable to eat/drink. He has had issues with thrush per him since he was given amoxicillin for a cystoscopy procedure (appears to be back in 8/2024). He has tried numerous thrush treatments and does not believe any of them have been effective. He also has a lot of oral ulcers.    Pt is on MTX for RA. He has been for a long time. However, the last month or so, he has been taking a tablet of MTX (2.5mg) every day rather than  8 tablets once a week. He stated he had been doing this for >1 year, per his wife it has only been the last month or so and he had been taking the MTX correctly before. He had continued to take daily folic acid. He feels his arthritis is fairly well controlled and the nodules may be improved after lowering the MTX dose. He still has multiple nodules on his arms/elbows/fingers.    He denies any illness prior to the low blood counts. He has not had low blood counts before. He had chemotherapy back in 2023 and a L nephrectomy but otherwise no radiation therapy.     He is still working and was able to work throughout chemotherapy. He denies any substance use.     Past Medical History:   Diagnosis Date    Anemia     Cancer     Cataract     Chemotherapy induced neutropenia 04/11/2023    Colon polyp 2014    Depression     Disorder of kidney and ureter     History of hepatitis C, s/p successful treatment w/ SVR12 - 7/2015 10/14/2012    Kelsey 1a,  Liver biopsy 6/2014 - Stage 1 fibrosis;  Completed 8 weeks Harvoni w/ SVR12 - 7/2015      Hyperlipidemia     Hypertension 03/08/2024    Lipoma of arm     Lipoma of lower extremity     Nuclear sclerosis - Both Eyes 10/22/2012    Other and unspecified hyperlipidemia 10/22/2013    PAD (peripheral artery disease)     Peripheral vascular disease, unspecified     Plaquenil adverse reaction in therapeutic use 10/22/2012    Rheumatoid arthritis(714.0) 10/14/2012    Special screening for malignant neoplasms, colon 02/21/2014       Past Surgical History:   Procedure Laterality Date    BIOPSY OF URETER Left 02/16/2023    Procedure: BIOPSY, URETER/BRUSH;  Surgeon: Kem Jefferson MD;  Location: [facility] OR 1ST FLR;  Service: Urology;  Laterality: Left;    COLONOSCOPY N/A 11/29/2021    Procedure: COLONOSCOPY;  Surgeon: Kenrick Zimmer MD;  Location: [facility] ENDO (4TH FLR);  Service: Endoscopy;  Laterality: N/A;  blood thinner approval received, see telephone encounter 10/19/21-BB  fully  vacc-inst email-tb    CYSTOSCOPY      CYSTOSCOPY  02/16/2023    Procedure: CYSTOSCOPY;  Surgeon: Kem Jefferson MD;  [facility];  Service: Urology;;    HERNIA REPAIR      INSERTION OF TUNNELED CENTRAL VENOUS CATHETER (CVC) WITH SUBCUTANEOUS PORT Right 3/13/2023    Procedure: INSERTION, PORT-A-CATH;  Surgeon: Rene Cali MD;  Location: [facility] CATH LAB;  Service: Radiology;  Laterality: Right;    KNEE ARTHROPLASTY      LAPAROSCOPIC EXPLORATION OF GROIN Left 09/06/2018    Procedure: EXPLORATION, INGUINAL REGION, LAPAROSCOPIC;  Surgeon: Mingo De Guzman Jr., MD;  Location: [facility] OR 2ND FLR;  Service: General;  Laterality: Left;    MEDIPORT REMOVAL N/A 10/3/2023    Procedure: REMOVAL, CATHETER, CENTRAL VENOUS, TUNNELED, WITH PORT;  Surgeon: Yeimi Stanton MD;  Location: [facility] CATH LAB;  Service: Radiology;  Laterality: N/A;    PYELOSCOPY Left 02/16/2023    Procedure: PYELOSCOPY;  Surgeon: Kem Jefferson MD;  Location: [facility] OR [address] [place] [address];  Service: Urology;  Laterality: Left;    RETROGRADE PYELOGRAPHY Bilateral 02/16/2023    Procedure: PYELOGRAM, RETROGRADE;  Surgeon: Kem Jefferson MD;  Location: [facility] OR [address] [place] [address];  Service: Urology;  Laterality: Bilateral;    ROBOT-ASSISTED LAPAROSCOPIC SURGICAL REMOVAL OF KIDNEY AND URETER USING DA BRIJESH XI Left 7/14/2023    Procedure: XI ROBOTIC NEPHROURETERECTOMY;  Surgeon: Kem Jefferson MD;  Location: [facility] OR [address] [place] [address];  Service: Urology;  Laterality: Left;  5 hours    TONSILLECTOMY      URETEROSCOPIC REMOVAL OF URETERIC CALCULUS Left 02/16/2023    Procedure: REMOVAL, CALCULUS, URETER, URETEROSCOPIC;  Surgeon: Kem Jefferson MD;  Location: [facility] OR [address] [place] [address];  Service: Urology;  Laterality: Left;    URETEROSCOPY Left 02/16/2023    Procedure: URETEROSCOPY;  Surgeon: Kem Jefferson MD;  Location: [facility] OR [address] [place] [address];  Service: Urology;  Laterality: Left;       Immunization History   Administered Date(s) Administered    COVID-19 MRNA, LN-S PF (MODERNA  HALF 0.25 ML DOSE) 10/26/2021, 06/09/2022    COVID-19, MRNA, LN-S, PF (MODERNA FULL 0.5 ML DOSE) 02/18/2021, 03/18/2021    COVID-19, mRNA, LNP-S, PF (Moderna) Ages 12+ 10/25/2023    COVID-19, mRNA, LNP-S, PF, malia-sucrose, 30 mcg/0.3 mL (Pfizer Ages 12+) 09/08/2024    COVID-19, mRNA, LNP-S, bivalent booster, PF (Moderna Omicron)12 + YEARS 10/11/2022    Hepatitis A / Hepatitis B 12/02/2020, 04/07/2021, 10/11/2021    Influenza 01/27/2014, 10/10/2018, 09/26/2019    Influenza (FLUAD) - Quadrivalent - Adjuvanted - PF *Preferred* (65+) 09/27/2023    Influenza - Quadrivalent - High Dose - PF (65 years and older) 08/18/2021, 10/11/2022    Influenza - Quadrivalent - MDCK - PF 10/16/2017, 10/22/2018    Influenza - Quadrivalent - PF *Preferred* (6 months and older) 11/18/2015, 10/15/2016    Influenza - Trivalent - Fluzone High Dose - PF (65 years and older) 09/25/2019, 09/08/2024    Influenza Split 01/27/2014, 01/27/2014    Pneumococcal Conjugate - 13 Valent 10/14/2020    Pneumococcal Polysaccharide - 23 Valent 01/27/2014    Tdap 01/27/2014    Zoster 03/12/2014    Zoster Recombinant 10/14/2020, 01/06/2021       Review of patient's allergies indicates:   Allergen Reactions    Iodinated contrast media      Shortness of breath     Current Facility-Administered Medications   Medication Frequency    0.9%  NaCl infusion (for blood administration) Q24H PRN    dextrose 5 % in lactated ringers infusion Continuous    dextrose 50% injection 12.5 g PRN    dextrose 50% injection 25 g PRN    duke's soln (benadryl 30 mL, mylanta 30 mL, LIDOcaine 30 mL, nystatin 30 mL) 120 mL QID    enoxaparin injection 40 mg Daily    fluconazole tablet 100 mg Daily    folic acid tablet 1,000 mcg Daily    glucagon (human recombinant) injection 1 mg PRN    glucose chewable tablet 16 g PRN    glucose chewable tablet 24 g PRN    naloxone 0.4 mg/mL injection 0.02 mg PRN    oxyCODONE immediate release tablet 5 mg Q6H PRN    polyethylene glycol packet 17 g Daily  PRN    pravastatin tablet 20 mg Daily    sodium chloride 0.9% flush 10 mL Q12H PRN    tamsulosin 24 hr capsule 0.4 mg Daily    traZODone tablet 50 mg Nightly PRN     Current Outpatient Medications   Medication    amLODIPine (NORVASC) 5 MG tablet    aspirin 81 MG Chew    cilostazoL (PLETAL) 50 MG Tab    gabapentin (NEURONTIN) 100 MG capsule    methotrexate 2.5 MG Tab    nystatin (MYCOSTATIN) 100,000 unit/mL suspension    simvastatin (ZOCOR) 10 MG tablet    tamsulosin (FLOMAX) 0.4 mg Cap    acetaminophen (TYLENOL) 650 MG TbSR    bisacodyL (DULCOLAX) 5 mg EC tablet    docusate sodium (COLACE) 100 MG capsule    folic acid (FOLVITE) 1 MG tablet    levocetirizine (XYZAL) 5 MG tablet    multivitamin capsule    traZODone (DESYREL) 50 MG tablet     Family History       Problem Relation (Age of Onset)    Arthritis Sister    Dementia Mother, Father    Heart disease Sister, Paternal Uncle    Kidney disease Sister    No Known Problems Daughter    Seizures Sister          Tobacco Use    Smoking status: Former     Current packs/day: 0.00     Average packs/day: 0.5 packs/day for 47.5 years (23.7 ttl pk-yrs)     Types: Cigarettes     Start date:      Quit date: 2020     Years since quittin.6    Smokeless tobacco: Never   Substance and Sexual Activity    Alcohol use: Yes     Comment: 2 drinks per week    Drug use: Yes     Types: Marijuana     Comment: marajuana used on Saturday    Sexual activity: Yes     Partners: Female     Review of Systems   Constitutional:  Negative for fatigue, fever and unexpected weight change.   HENT:  Negative for facial swelling.    Eyes:  Negative for visual disturbance.   Respiratory:  Negative for cough and shortness of breath.    Cardiovascular:  Negative for chest pain.   Gastrointestinal:  Positive for diarrhea (Only with candida medicine). Negative for constipation.   Genitourinary:  Negative for dysuria.   Skin:  Negative for rash.   Neurological:  Negative for weakness and headaches.    Hematological:  Negative for adenopathy.   Psychiatric/Behavioral:  Negative for behavioral problems.      Objective:     Vital Signs (Most Recent):  Temp: 98.8 °F (37.1 °C) (03/07/25 1211)  Pulse: 77 (03/07/25 1211)  Resp: 18 (03/07/25 1211)  BP: 131/77 (03/07/25 1211)  SpO2: 100 % (03/07/25 1211) Vital Signs (24h Range):  Temp:  [97.6 °F (36.4 °C)-99.9 °F (37.7 °C)] 98.8 °F (37.1 °C)  Pulse:  [75-95] 77  Resp:  [16-20] 18  SpO2:  [96 %-100 %] 100 %  BP: (102-131)/(6-84) 131/77     Weight: 72.6 kg (160 lb) (03/06/25 1140)  Body mass index is 23.63 kg/m².  Body surface area is 1.88 meters squared.      Intake/Output Summary (Last 24 hours) at 3/7/2025 1454  Last data filed at 3/6/2025 2329  Gross per 24 hour   Intake 120 ml   Output --   Net 120 ml         Physical Exam   Constitutional: He is oriented to person, place, and time. No distress.   HENT:   Head: Normocephalic and atraumatic.   Mouth/Throat: Does have thrush as well as numerous ulcers particularly at the gum line.   Cardiovascular: Normal rate, regular rhythm and normal heart sounds.   Pulmonary/Chest: Effort normal and breath sounds normal. No respiratory distress.   Abdominal: Soft.   Musculoskeletal:         General: Swelling (Does have synovitis of the MCP/PIP joints, minimal tenderness) present. No tenderness. Normal range of motion.      Comments: Multiple rheumatoid nodules noted at elbows, forearms, fingers   Neurological: He is alert and oriented to person, place, and time.   Skin: Skin is warm and dry. No rash noted.   Psychiatric: His behavior is normal. Judgment and thought content normal.        Significant Labs:  All pertinent lab results from the last 24 hours have been reviewed.    Significant Imaging:  Imaging results within the past 24 hours have been reviewed.  Assessment/Plan:     Oncology  Leucopenia  Home regimen: MTX 12.5mg qweek (had been reduced by 2.5 10/2024)  Resulted workup:   Labs   Pancytopenia - WBC of 1.8 (baseline  ~10), hgb ~7 (baseline 12), plt 140 ()   HIV negative   Folate high   Strep/flu negative  Pending workup:    Peripheral smear   Parvo B19  Current treatments:  - None    Assessment  Pt presenting with new pancytopenia. Had been taking his MTX incorrectly (one tablet daily rather than multiple once a week). It sounds like he was previously taking the tablets every Monday. This is most likely acute toxicity from the methotrexate.    Recommendations:  - Hold MTX   - continue folic acid  - Appreciate H/O input   Will need rescue therapy  - Will need follow up OP for resumption of RA meds        Thank you for your consult. I will follow-up with patient. Please contact us if you have any additional questions.    Ysabel Cordero MD  Rheumatology  [facility]

## 2025-03-08 PROBLEM — D70.9 NEUTROPENIC FEVER: Status: ACTIVE | Noted: 2025-03-08

## 2025-03-08 PROBLEM — R50.81 NEUTROPENIC FEVER: Status: ACTIVE | Noted: 2025-03-08

## 2025-03-08 PROBLEM — R50.9 FEVER: Status: ACTIVE | Noted: 2025-03-08

## 2025-03-08 LAB
ALBUMIN SERPL BCP-MCNC: 3 G/DL (ref 3.5–5.2)
ALP SERPL-CCNC: 79 U/L (ref 40–150)
ALT SERPL W/O P-5'-P-CCNC: 34 U/L (ref 10–44)
ANION GAP SERPL CALC-SCNC: 9 MMOL/L (ref 8–16)
AST SERPL-CCNC: 21 U/L (ref 10–40)
BASOPHILS # BLD AUTO: 0.01 K/UL (ref 0–0.2)
BASOPHILS NFR BLD: 0.4 % (ref 0–1.9)
BILIRUB SERPL-MCNC: 0.7 MG/DL (ref 0.1–1)
BILIRUB UR QL STRIP: NEGATIVE
BUN SERPL-MCNC: 32 MG/DL (ref 8–23)
CALCIUM SERPL-MCNC: 8.8 MG/DL (ref 8.7–10.5)
CHLORIDE SERPL-SCNC: 107 MMOL/L (ref 95–110)
CLARITY UR REFRACT.AUTO: CLEAR
CO2 SERPL-SCNC: 22 MMOL/L (ref 23–29)
COLOR UR AUTO: YELLOW
CREAT SERPL-MCNC: 2 MG/DL (ref 0.5–1.4)
DIFFERENTIAL METHOD BLD: ABNORMAL
EOSINOPHIL # BLD AUTO: 0.3 K/UL (ref 0–0.5)
EOSINOPHIL NFR BLD: 9.2 % (ref 0–8)
ERYTHROCYTE [DISTWIDTH] IN BLOOD BY AUTOMATED COUNT: 14.7 % (ref 11.5–14.5)
EST. GFR  (NO RACE VARIABLE): 35 ML/MIN/1.73 M^2
FERRITIN SERPL-MCNC: 1474 NG/ML (ref 20–300)
GLUCOSE SERPL-MCNC: 106 MG/DL (ref 70–110)
GLUCOSE UR QL STRIP: NEGATIVE
HCT VFR BLD AUTO: 26.3 % (ref 40–54)
HGB BLD-MCNC: 8.7 G/DL (ref 14–18)
HGB UR QL STRIP: ABNORMAL
IMM GRANULOCYTES # BLD AUTO: 0.03 K/UL (ref 0–0.04)
IMM GRANULOCYTES NFR BLD AUTO: 1.1 % (ref 0–0.5)
INFLUENZA A, MOLECULAR: NOT DETECTED
INFLUENZA B, MOLECULAR: NOT DETECTED
KETONES UR QL STRIP: NEGATIVE
LEUKOCYTE ESTERASE UR QL STRIP: NEGATIVE
LYMPHOCYTES # BLD AUTO: 0.9 K/UL (ref 1–4.8)
LYMPHOCYTES NFR BLD: 31.4 % (ref 18–48)
MAGNESIUM SERPL-MCNC: 2.1 MG/DL (ref 1.6–2.6)
MCH RBC QN AUTO: 28.8 PG (ref 27–31)
MCHC RBC AUTO-ENTMCNC: 33.1 G/DL (ref 32–36)
MCV RBC AUTO: 87 FL (ref 82–98)
MICROSCOPIC COMMENT: NORMAL
MONOCYTES # BLD AUTO: 0.1 K/UL (ref 0.3–1)
MONOCYTES NFR BLD: 4.4 % (ref 4–15)
MRSA SCREEN BY PCR: NOT DETECTED
NEUTROPHILS # BLD AUTO: 1.5 K/UL (ref 1.8–7.7)
NEUTROPHILS NFR BLD: 53.5 % (ref 38–73)
NITRITE UR QL STRIP: NEGATIVE
NRBC BLD-RTO: 1 /100 WBC
PH UR STRIP: 6 [PH] (ref 5–8)
PHOSPHATE SERPL-MCNC: 2.3 MG/DL (ref 2.7–4.5)
PLATELET # BLD AUTO: 137 K/UL (ref 150–450)
PMV BLD AUTO: 9.8 FL (ref 9.2–12.9)
POTASSIUM SERPL-SCNC: 4.2 MMOL/L (ref 3.5–5.1)
PROCALCITONIN SERPL IA-MCNC: 0.27 NG/ML
PROT SERPL-MCNC: 6.9 G/DL (ref 6–8.4)
PROT UR QL STRIP: ABNORMAL
RBC # BLD AUTO: 3.02 M/UL (ref 4.6–6.2)
RBC #/AREA URNS AUTO: 2 /HPF (ref 0–4)
RSV AG BY MOLECULAR METHOD: NOT DETECTED
SARS-COV-2 RNA RESP QL NAA+PROBE: NOT DETECTED
SODIUM SERPL-SCNC: 138 MMOL/L (ref 136–145)
SP GR UR STRIP: 1.02 (ref 1–1.03)
SQUAMOUS #/AREA URNS AUTO: 0 /HPF
URN SPEC COLLECT METH UR: ABNORMAL
WBC # BLD AUTO: 2.71 K/UL (ref 3.9–12.7)
WBC #/AREA URNS AUTO: 1 /HPF (ref 0–5)

## 2025-03-08 PROCEDURE — 11000001 HC ACUTE MED/SURG PRIVATE ROOM

## 2025-03-08 PROCEDURE — 84145 PROCALCITONIN (PCT): CPT | Performed by: HOSPITALIST

## 2025-03-08 PROCEDURE — 84100 ASSAY OF PHOSPHORUS: CPT

## 2025-03-08 PROCEDURE — 87522 HEPATITIS C REVRS TRNSCRPJ: CPT | Mod: 91 | Performed by: HOSPITALIST

## 2025-03-08 PROCEDURE — 87070 CULTURE OTHR SPECIMN AEROBIC: CPT | Performed by: HOSPITALIST

## 2025-03-08 PROCEDURE — 27000207 HC ISOLATION

## 2025-03-08 PROCEDURE — 0241U SARS-COV2 (COVID) WITH FLU/RSV BY PCR: CPT | Performed by: HOSPITALIST

## 2025-03-08 PROCEDURE — 87641 MR-STAPH DNA AMP PROBE: CPT | Performed by: HOSPITALIST

## 2025-03-08 PROCEDURE — 87040 BLOOD CULTURE FOR BACTERIA: CPT | Performed by: HOSPITALIST

## 2025-03-08 PROCEDURE — 87522 HEPATITIS C REVRS TRNSCRPJ: CPT | Mod: 91

## 2025-03-08 PROCEDURE — 36415 COLL VENOUS BLD VENIPUNCTURE: CPT | Performed by: HOSPITALIST

## 2025-03-08 PROCEDURE — 85025 COMPLETE CBC W/AUTO DIFF WBC: CPT

## 2025-03-08 PROCEDURE — 25000003 PHARM REV CODE 250: Performed by: HOSPITALIST

## 2025-03-08 PROCEDURE — 83735 ASSAY OF MAGNESIUM: CPT

## 2025-03-08 PROCEDURE — 87205 SMEAR GRAM STAIN: CPT | Performed by: HOSPITALIST

## 2025-03-08 PROCEDURE — 36415 COLL VENOUS BLD VENIPUNCTURE: CPT

## 2025-03-08 PROCEDURE — 81001 URINALYSIS AUTO W/SCOPE: CPT | Performed by: HOSPITALIST

## 2025-03-08 PROCEDURE — 80053 COMPREHEN METABOLIC PANEL: CPT

## 2025-03-08 PROCEDURE — 63700000 PHARM REV CODE 250 ALT 637 W/O HCPCS: Performed by: HOSPITALIST

## 2025-03-08 PROCEDURE — 63600175 PHARM REV CODE 636 W HCPCS: Performed by: HOSPITALIST

## 2025-03-08 RX ORDER — FOLIC ACID 1 MG/1
1 TABLET ORAL DAILY
Status: DISCONTINUED | OUTPATIENT
Start: 2025-03-08 | End: 2025-03-08

## 2025-03-08 RX ORDER — FOLIC ACID 1 MG/1
4000 TABLET ORAL DAILY
Status: DISCONTINUED | OUTPATIENT
Start: 2025-03-09 | End: 2025-03-08

## 2025-03-08 RX ORDER — FOLIC ACID 1 MG/1
3 TABLET ORAL ONCE
Status: COMPLETED | OUTPATIENT
Start: 2025-03-08 | End: 2025-03-08

## 2025-03-08 RX ORDER — CEFTRIAXONE 1 G/1
1 INJECTION, POWDER, FOR SOLUTION INTRAMUSCULAR; INTRAVENOUS
Status: CANCELLED | OUTPATIENT
Start: 2025-03-08

## 2025-03-08 RX ORDER — FOLIC ACID 1 MG/1
4 TABLET ORAL DAILY
Status: DISCONTINUED | OUTPATIENT
Start: 2025-03-09 | End: 2025-03-15 | Stop reason: HOSPADM

## 2025-03-08 RX ORDER — ACETAMINOPHEN 325 MG/1
650 TABLET ORAL EVERY 8 HOURS PRN
Status: DISCONTINUED | OUTPATIENT
Start: 2025-03-08 | End: 2025-03-15 | Stop reason: HOSPADM

## 2025-03-08 RX ORDER — SODIUM,POTASSIUM PHOSPHATES 280-250MG
2 POWDER IN PACKET (EA) ORAL ONCE
Status: COMPLETED | OUTPATIENT
Start: 2025-03-08 | End: 2025-03-08

## 2025-03-08 RX ADMIN — ACETAMINOPHEN 650 MG: 325 TABLET ORAL at 05:03

## 2025-03-08 RX ADMIN — FOLIC ACID 1000 MCG: 1 TABLET ORAL at 08:03

## 2025-03-08 RX ADMIN — OXYCODONE 5 MG: 5 TABLET ORAL at 08:03

## 2025-03-08 RX ADMIN — OXYCODONE 5 MG: 5 TABLET ORAL at 02:03

## 2025-03-08 RX ADMIN — PRAVASTATIN SODIUM 20 MG: 20 TABLET ORAL at 08:03

## 2025-03-08 RX ADMIN — FOLIC ACID 3 MG: 1 TABLET ORAL at 02:03

## 2025-03-08 RX ADMIN — POTASSIUM & SODIUM PHOSPHATES POWDER PACK 280-160-250 MG 2 PACKET: 280-160-250 PACK at 06:03

## 2025-03-08 RX ADMIN — FLUCONAZOLE 100 MG: 100 TABLET ORAL at 08:03

## 2025-03-08 RX ADMIN — ALUMINUM HYDROXIDE, MAGNESIUM HYDROXIDE, AND DIMETHICONE 10 ML: 400; 400; 40 SUSPENSION ORAL at 12:03

## 2025-03-08 RX ADMIN — TAMSULOSIN HYDROCHLORIDE 0.4 MG: 0.4 CAPSULE ORAL at 08:03

## 2025-03-08 RX ADMIN — ENOXAPARIN SODIUM 40 MG: 40 INJECTION SUBCUTANEOUS at 04:03

## 2025-03-08 RX ADMIN — ALUMINUM HYDROXIDE, MAGNESIUM HYDROXIDE, AND DIMETHICONE 10 ML: 400; 400; 40 SUSPENSION ORAL at 04:03

## 2025-03-08 RX ADMIN — ALUMINUM HYDROXIDE, MAGNESIUM HYDROXIDE, AND DIMETHICONE 10 ML: 400; 400; 40 SUSPENSION ORAL at 08:03

## 2025-03-08 RX ADMIN — OXYCODONE 5 MG: 5 TABLET ORAL at 07:03

## 2025-03-08 NOTE — ASSESSMENT & PLAN NOTE
Thick whitish discharge on the tongue consistent with oral candidiasis .Workup is notable for new pancytopenia. Hb 7.9, leucopenia 1.55 and .  discussed with hematology, likely due to MTX however he has been on this medication for a long time.     started on dukes solution and fluconazole.   GI consulted for odynophagia   Rheumatology consulted due to recent methotrexation resumption    3/7  had been taking MTX daily incorrectly instead of weekly and  MTX toxicity given incorrect daily dosing and CKD. MTX levels < 0.04. No leucovorin for now per heme onc .

## 2025-03-08 NOTE — PLAN OF CARE
Rheumatology Plan of Care Note    Interval Hx:  - Pt blood counts improved  - Folinic acid not deemed appropriate to be given per hematology d/t low serum level    Assessment/Plan:    Leukopenia  RA  Home regimen: MTX 20mg qweek   Resulted workup:   Labs              Pancytopenia - WBC of 1.8 (baseline ~10), hgb ~7 (baseline 12), plt 140 ()              HIV negative              Folate high              Strep/flu negative   Ferritin high, MARIA DE JESUS labs nl/high  Pending workup:               Peripheral smear              Parvo B19   Hep C quant  Current treatments:  - None     Assessment  Pt presenting with new pancytopenia. Had been taking his MTX incorrectly (one tablet daily rather than multiple once a week). It sounds like he was previously taking the tablets every Monday. This is most likely acute toxicity from the methotrexate.      There is a discordance between his serum level and the severity of his blood counts, however there are case studies of pt on chronic MTX for RA/non-onc conditions where they had toxicity without an elevated serum level (citations below).      Recommendations:  - Hold MTX   - increase folic acid to 4mg daily  - Appreciate H/O input on whether to give folinic acid as well as further workup of potential etiologies  - Will arrange follow up OP for resumption/change of RA meds     CAN METHOTREXATE TOXICITY OCCUR DESPITE A LOW-DOSE REGIMEN AND NORMAL THERAPEUTIC LEVELS?  CHERABUDDI, OLY et al.  CHEST, Volume 166, Issue 4,  -   2. JOSE RAMON Vuong., GINNA Castro & KENDRA Jerome. Potential contributors to low dose methotrexate toxicity in a patient with rheumatoid arthritis and pernicious anemia: case report. BMC Rheumatol 5, 5 (2021). [email or web address]/[email or web address]     This patient encounter was staffed and the plan was formulated with rheumatology attending Dr. Dahl. We will sign off and arrange for outpatient follow up in clinic. Please reach out if there are any  further questions/concerns.    Ysabel Cordero MD  Rheumatology Fellow, PGY-4

## 2025-03-08 NOTE — ASSESSMENT & PLAN NOTE
Recent Labs   Lab 03/07/25  0618 03/07/25  1651 03/08/25  0641   WBC 1.61* 1.90* 2.71*   leucopenia 1.55 and .  discussed with hematology, likely due to MTX however he has been on this medication for a long time. admitted for further work up   Hematology consulted - had been taking MTX daily incorrectly instead of weekly.   and  MTX toxicity given incorrect daily dosing and CKD. MTX levels < 0.04. No leucovorin for now per heme onc .  parvo serologies, Reticulocytes, Iron panel, Hep C PCR ordered

## 2025-03-08 NOTE — ASSESSMENT & PLAN NOTE
Patient's blood pressure range in the last 24 hours was: BP  Min: 109/72  Max: 126/79.The patient's inpatient anti-hypertensive regimen is listed below:  Current Antihypertensives       Plan  - BP is controlled, no changes needed to their regimen  - continue amlodipine 5mg daily

## 2025-03-08 NOTE — PROGRESS NOTES
[facility] - Emergency Dept  Hospital Medicine  Progress Note    Patient Name: Andrea Gant  MRN: [id number]  Patient Class: IP- Inpatient   Admission Date: 3/6/2025  Length of Stay: 1 days  Attending Physician: Jarocho Frazier MD  Primary Care Provider: Andrea Wang MD        Subjective     Principal Problem:Oral thrush        HPI:  Andrea Winter is a 70-year-old male with rheumatoid arthritis on methotrexate, history of urothelial carcinoma in remission, anemia, hepatitis-C s/p SVR, PAD presents for thrush.       AAO x3. Patient waws prescribed augmentin 2/7 -2/21 prior to the onset of his symptoms post cystoscopy. c/o pain with  swallowing after 4-5 days after augmentin.  denies fever, drooling or prior similar episodes. denies history of diabetes or HIV.   Reports painful whitish lesions in his mouth x  10 days. Patient was seen at urgent care clinic 02/26/2025 with congestion, sore throat and trouble swallowing.  Flu swab negative on 2/26.   prescribed prednisone 20mg x 5 days for viral pharyngitis, states that this significantly improved his sore throat and body aches but not the thrush.     ER course - Thick whitish discharge on the tongue consistent with oral candidiasis . Workup is notable for new pancytopenia. Hb 7.9, leucopenia 1.55 and .  discussed with hematology, likely due to MTX however he has been on this medication for a long time. admitted for further work up     Overview/Hospital Course:  3/7 HB trended down to 6.9 . Transfusion with 1 unit of PRBC . Haptoglobin elevated.  P replaced. . Neutropenic precautions. had been taking MTX daily incorrectly instead of weekly and  MTX toxicity given incorrect daily dosing and CKD. MTX levels < 0.04. No leucovorin for now per heme onc .  parvo serologies, Reticulocytes, Iron panel, Hep C PCR ordered   3/8 low grade temperature of 100.5. CX ray -Continued pleural fluid or thickening at the right base now with some fluid or  thickening at the left base.  Some patchy parenchymal opacities though no confluent consolidation.  No pneumothorax. COVID, Flu, RSV negative. BC x2, UA and procalcitonin  0.27 . UA negative. ANC 1500. ID consulted for neutropenic fever . advanced to soft diet and started on boost                 Review of Systems:   Pain scale:    9/10   Constitutional:  fever,  chills, headache, vision loss, hearing loss, weight loss, Generalized weakness, falls, loss of smell, loss of taste, poor appetite,  sore throat, immunocompromised  Respiratory: cough, shortness of breath.   Cardiovascular: chest pain, dizziness, palpitations, orthopnea, swelling of feet, syncope  Gastrointestinal: nausea, vomiting, abdominal pain, diarrhea, black stool,  blood in stool, change in bowel habits, constipation, difficulty swallowing  Genitourinary: hematuria, dysuria, urgency, frequency  Integument/Breast: rash,  pruritis  Hematologic/Lymphatic: easy bruising, lymphadenopathy  Musculoskeletal: arthralgias , myalgias, back pain, neck pain, knee pain  Neurological: confusion, seizures, tremors, slurred speech  Behavioral/Psych:  depression, anxiety, auditory or visual hallucinations     OBJECTIVE:     Physical Exam:  Body mass index is 23.63 kg/m².    Constitutional: Appears well-developed and well-nourished.   Head: Normocephalic and atraumatic.  dentures   oral cavity - Thick white discharge on the tongue   poserior pharyngeal erythema  Neck: Normal range of motion. Neck supple.   Cardiovascular: Normal heart rate.  Regular heart rhythm.  Pulmonary/Chest: Effort normal.   Abdominal: No distension.  No tenderness  Musculoskeletal: Normal range of motion. No edema.   Neurological: Alert and oriented to person, place, and time.   Skin: Skin is warm and dry.   Psychiatric: Normal mood and affect. Behavior is normal.       Vital Signs  Temp: 99.8 °F (37.7 °C) (03/08/25 1131)  Pulse: 75 (03/08/25 1131)  Resp: 18 (03/08/25 1131)  BP: 109/72 (03/08/25  "1131)  SpO2: 99 % (03/08/25 1131)     24 Hour VS Range    Temp:  [98.3 °F (36.8 °C)-100.5 °F (38.1 °C)]   Pulse:  [75-91]   Resp:  [16-18]   BP: (109-126)/(66-79)   SpO2:  [98 %-100 %]   No intake or output data in the 24 hours ending 03/08/25 1329        I/O This Shift:  No intake/output data recorded.    Wt Readings from Last 3 Encounters:   03/06/25 72.6 kg (160 lb)   02/26/25 71.2 kg (157 lb)   02/20/25 71.4 kg (157 lb 6.5 oz)       I have personally reviewed the vitals and recorded Intake/Output     Laboratory/Diagnostic Data:    CBC/Anemia Labs: Coags:    Recent Labs   Lab 03/06/25  1514 03/07/25  0618 03/07/25  1651 03/08/25  0641   WBC 1.55* 1.61* 1.90* 2.71*   HGB 7.9* 6.9* 8.5* 8.7*   HCT 24.0* 21.3* 26.3* 26.3*   * 140* 144* 137*   MCV 88 88 87 87   RDW 13.6 13.5 14.4 14.7*   IRON  --   --  90  --    FERRITIN  --   --  1,474*  --    RETIC  --   --  0.5  --    FOLATE >40.0*  --   --   --    [id number] 614  --   --   --     Recent Labs   Lab 03/06/25  1514   INR 1.0   APTT 28.0        Chemistries: ABG:   Recent Labs   Lab 03/06/25  1431 03/07/25  0618 03/08/25  0641    137 138   K 4.3 4.4 4.2    109 107   CO2 21* 21* 22*   BUN 32* 33* 32*   CREATININE 2.0* 2.1* 2.0*   CALCIUM 9.2 8.4* 8.8   PROT 7.8 6.6 6.9   BILITOT 0.7 0.6 0.7   ALKPHOS 85 71 79   ALT 67* 47* 34   AST 40 23 21   MG  --  2.0 2.1   PHOS  --  2.4* 2.3*    No results for input(s): "PH", "PCO2", "PO2", "HCO3", "POCSATURATED", "BE" in the last 168 hours.     POCT Glucose: HbA1c:    No results for input(s): "POCTGLUCOSE" in the last 168 hours. Hemoglobin A1C   Date Value Ref Range Status   05/17/2022 5.0 4.0 - 5.6 % Final     Comment:     ADA Screening Guidelines:  5.7-6.4%  Consistent with prediabetes  >or=6.5%  Consistent with diabetes    High levels of fetal hemoglobin interfere with the HbA1C  assay. Heterozygous hemoglobin variants (HbS, HgC, etc)do  not significantly interfere with this assay.   However, presence of " "multiple variants may affect accuracy.     10/11/2021 5.2 4.0 - 5.6 % Final     Comment:     ADA Screening Guidelines:  5.7-6.4%  Consistent with prediabetes  >or=6.5%  Consistent with diabetes    High levels of fetal hemoglobin interfere with the HbA1C  assay. Heterozygous hemoglobin variants (HbS, HgC, etc)do  not significantly interfere with this assay.   However, presence of multiple variants may affect accuracy.          Cardiac Enzymes: Ejection Fractions:    No results for input(s): "CPK", "CPKMB", "MB", "TROPONINI" in the last 72 hours. No results found for: "EF"       Recent Labs   Lab 03/08/25  0844   COLORU Yellow   APPEARANCEUA Clear   PHUR 6.0   SPECGRAV 1.020   PROTEINUA Trace*   GLUCUA Negative   KETONESU Negative   BILIRUBINUA Negative   OCCULTUA 1+*   NITRITE Negative   LEUKOCYTESUR Negative   RBCUA 2   WBCUA 1   SQUAMEPITHEL 0       Procalcitonin (ng/mL)   Date Value   03/08/2025 0.27 (H)     BNP (pg/mL)   Date Value   03/26/2016 112 (H)     CRP (mg/L)   Date Value   10/09/2024 12.9 (H)   02/06/2024 12.0 (H)   02/08/2023 3.2   11/03/2022 1.9   08/03/2022 2.8   05/12/2022 2.0   02/08/2022 5.4   03/24/2021 3.5   11/18/2020 1.1   10/05/2020 2     Sed Rate   Date Value   10/09/2024 17 mm/Hr   02/06/2024 55 mm/Hr (H)   02/08/2023 20 mm/Hr   11/03/2022 11 mm/Hr   08/03/2022 8 mm/Hr   05/12/2022 20 mm/Hr   02/08/2022 9 mm/Hr   03/24/2021 19 mm/Hr   11/18/2020 14 mm/Hr   10/05/2020 48 mm/hr (H)     No results found for: "DDIMER"  Ferritin (ng/mL)   Date Value   03/07/2025 1,474 (H)     LD (U/L)   Date Value   03/06/2025 226     Troponin I (ng/mL)   Date Value   08/25/2023 0.023   03/26/2016 0.047 (H)     CPK (U/L)   Date Value   02/29/2004 188     No results found. However, due to the size of the patient record, not all encounters were searched. Please check Results Review for a complete set of results.  SARS-CoV2 (COVID-19) Qualitative PCR (no units)   Date Value   03/08/2025 Not Detected   08/25/2023 Not " Detected     POC Rapid COVID (no units)   Date Value   12/04/2020 Negative       Microbiology labs for the last week  Microbiology Results (last 7 days)       Procedure Component Value Units Date/Time    MRSA Screen by PCR [[id number]]     Order Status: No result Specimen: Nasal Swab     Culture, Respiratory with Gram Stain [[id number]]     Order Status: No result Specimen: Respiratory     Blood culture [[id number]] Collected: 03/08/25 1018    Order Status: Sent Specimen: Blood Updated: 03/08/25 1026    Blood culture [[id number]] Collected: 03/08/25 1018    Order Status: Sent Specimen: Blood             Reviewed and noted in plan where applicable- Please see chart for full lab data.    Lines/Drains:       Peripheral IV - Single Lumen 03/06/25 1244 20 G Anterior;Distal;Left Upper Arm (Active)   Site Assessment Clean;Dry;Intact;No redness;No swelling 03/06/25 1957   Extremity Assessment Distal to IV No abnormal discoloration;No redness;No swelling;No warmth 03/06/25 1957   Line Status Flushed;Saline locked 03/06/25 1957   Dressing Status Clean;Dry;Intact 03/06/25 1957   Dressing Intervention Integrity maintained 03/06/25 1957   Reason Not Rotated Not due 03/06/25 1957   Number of days: 0       Imaging  ECG Results              EKG 12-lead (Final result)        Collection Time Result Time QRS Duration OHS QTC Calculation    03/06/25 20:21:25 03/07/25 13:42:35 80 418                     Final result by Interface, Lab In [facility] (03/07/25 13:42:39)                   Narrative:    Test Reason : R94.31,    Vent. Rate :  89 BPM     Atrial Rate : 241 BPM     P-R Int :    ms          QRS Dur :  80 ms      QT Int : 344 ms       P-R-T Axes :     94 -15 degrees    QTcB Int : 418 ms    Normal sinus rhythm  Rightward axis  Inferior infarct ,age undetermined  Abnormal ECG  When compared with ECG of 25-Aug-2023 19:12,  No significant change was found    Confirmed by José Cortez ([id number]) on 3/7/2025 1:42:34 PM    Referred By:  AAAREFERRAL SELF           Confirmed By: José Cortez                                    No results found for this or any previous visit.      X-Ray Chest 1 View  Narrative: EXAMINATION:  XR CHEST 1 VIEW    CLINICAL HISTORY:  r/o pneumonia;    TECHNIQUE:  Single frontal view of the chest was performed.    COMPARISON:  January 29, 2024    FINDINGS:  Heart size and pulmonary vessels are similar.  Continued pleural fluid or thickening at the right base now with some fluid or thickening at the left base.  Some patchy parenchymal opacities though no confluent consolidation.  No pneumothorax.  Impression: Bilateral pleural effusions or thickening new on the left.    Electronically signed by: Juan Rivera MD  Date:    03/07/2025  Time:    07:53      Labs, Imaging, EKG and Diagnostic results from 3/8/2025 were reviewed.    Medications:  Medication list was reviewed and changes noted under Assessment/Plan.  Medications Ordered Prior to Encounter[1]  Scheduled Medications:  Current Facility-Administered Medications   Medication Dose Route Frequency    duke's soln (benadryl 30 mL, mylanta 30 mL, LIDOcaine 30 mL, nystatin 30 mL) 120 mL  10 mL Oral QID    enoxparin  40 mg Subcutaneous Daily    fluconazole  100 mg Oral Daily    folic acid  1,000 mcg Oral Daily    pravastatin  20 mg Oral Daily    tamsulosin  0.4 mg Oral Daily     PRN:   Current Facility-Administered Medications:     0.9%  NaCl infusion (for blood administration), , Intravenous, Q24H PRN    dextrose 50%, 12.5 g, Intravenous, PRN    dextrose 50%, 25 g, Intravenous, PRN    glucagon (human recombinant), 1 mg, Intramuscular, PRN    glucose, 16 g, Oral, PRN    glucose, 24 g, Oral, PRN    naloxone, 0.02 mg, Intravenous, PRN    oxyCODONE, 5 mg, Oral, Q6H PRN    polyethylene glycol, 17 g, Oral, Daily PRN    sodium chloride 0.9%, 10 mL, Intravenous, Q12H PRN    traZODone, 50 mg, Oral, Nightly PRN  Infusions:       Estimated Creatinine Clearance: 33.9 mL/min (A)  (based on SCr of 2 mg/dL (H)).               Assessment & Plan  Rheumatoid arthritis   rheumatoid arthritis on methotrexate -weekly . rheumatology eval   History of hepatitis C, s/p successful treatment w/ SVR12 - 7/2015  noted     PAD (peripheral artery disease)  continue ASA and cilastazol     Hypercholesteremia  continue simvastatin    AAA (abdominal aortic aneurysm)  CT CAP 2/25 - aneurysmal dilatation of the abdominal aorta measuring up to 4.5 cm AP with extension into the common iliac arteries, not significantly changed from prior study. Extensive vascular calcification noted.     Stage 3b chronic kidney disease  Creatine stable for now. BMP reviewed- noted Estimated Creatinine Clearance: 33.9 mL/min (A) (based on SCr of 2 mg/dL (H)). according to latest data. Monitor UOP and serial BMP and adjust therapy as needed. Renally dose meds.    Recent Labs   Lab 03/06/25  1431 03/07/25  0618 03/08/25  0641   BUN 32* 33* 32*   CREATININE 2.0* 2.1* 2.0*       I & O   No intake or output data in the 24 hours ending 03/08/25 1329     Ureteral tumor  history of urothelial carcinoma in remission.  s/p neoadjuvant chemotherapy with Gemcitabine and Cisplatin. hem/onc consulted     Anemia  Anemia is likely due to chronic disease due to RA . Most recent hemoglobin and hematocrit are listed below.  Recent Labs     03/07/25  0618 03/07/25  1651 03/08/25  0641   HGB 6.9* 8.5* 8.7*   HCT 21.3* 26.3* 26.3*     Plan  - Monitor serial CBC: Daily  - Transfuse PRBC if patient becomes hemodynamically unstable, symptomatic or H/H drops below 7/21.  3/7 HB trended down to 6.9 . Transfusion with 1 unit of PRBC . Haptoglobin elevated.    Hypertension  Patient's blood pressure range in the last 24 hours was: BP  Min: 109/72  Max: 126/79.The patient's inpatient anti-hypertensive regimen is listed below:  Current Antihypertensives       Plan  - BP is controlled, no changes needed to their regimen  - continue amlodipine 5mg daily  Leucopenia      Recent Labs   Lab 03/07/25  0618 03/07/25  1651 03/08/25  0641   WBC 1.61* 1.90* 2.71*   leucopenia 1.55 and .  discussed with hematology, likely due to MTX however he has been on this medication for a long time. admitted for further work up   Hematology consulted - had been taking MTX daily incorrectly instead of weekly.   and  MTX toxicity given incorrect daily dosing and CKD. MTX levels < 0.04. No leucovorin for now per heme onc .  parvo serologies, Reticulocytes, Iron panel, Hep C PCR ordered     Oral thrush  Thick whitish discharge on the tongue consistent with oral candidiasis .Workup is notable for new pancytopenia. Hb 7.9, leucopenia 1.55 and .  discussed with hematology, likely due to MTX however he has been on this medication for a long time.     started on dukes solution and fluconazole.   GI consulted for odynophagia   Rheumatology consulted due to recent methotrexation resumption    3/7  had been taking MTX daily incorrectly instead of weekly and  MTX toxicity given incorrect daily dosing and CKD. MTX levels < 0.04. No leucovorin for now per heme onc .       Odynophagia  secondary to above. continue dukes solution. GI consulted     Fever  3/8 low grade temperature of 100.4. reports cough. CX ray -Continued pleural fluid or thickening at the right base now with some fluid or thickening at the left base.  Some patchy parenchymal opacities though no confluent consolidation.  No pneumothorax.BC x2, UA and procalcitonin        Neutropenic fever  3/8 low grade temperature of 100.5. CX ray -Continued pleural fluid or thickening at the right base now with some fluid or thickening at the left base.  Some patchy parenchymal opacities though no confluent consolidation.  No pneumothorax. COVID, Flu, RSV negative. BC x2, UA and procalcitonin  0.27 . ANC 1500. ID consulted for neutropenic fever .       VTE Risk Mitigation (From admission, onward)           Ordered     enoxaparin injection 40 mg  Daily          03/06/25 1633     IP VTE HIGH RISK PATIENT  Once         03/06/25 1633     Place sequential compression device  Until discontinued         03/06/25 1633                    Discharge Planning   NED: 3/10/2025     Code Status: Full Code   Medical Readiness for Discharge Date:   Discharge Plan A: Home, Home with family                        Jarocho Frazier MD  Department of Hospital Medicine   [facility] - Emergency Dept         [1]   No current facility-administered medications on file prior to encounter.     Current Outpatient Medications on File Prior to Encounter   Medication Sig Dispense Refill    amLODIPine (NORVASC) 5 MG tablet TAKE 1 TABLET BY MOUTH ONCE  DAILY 90 tablet 0    aspirin 81 MG Chew Take 81 mg by mouth once daily.      cilostazoL (PLETAL) 50 MG Tab TAKE 1 TABLET BY MOUTH TWICE  DAILY 180 tablet 3    gabapentin (NEURONTIN) 100 MG capsule Take 1 capsule (100 mg total) by mouth every evening. 90 capsule 1    methotrexate 2.5 MG Tab TAKE 8 TABLETS BY MOUTH EVERY 7  DAYS THIS MEDICATION REQUIRES  PERIODIC LAB MONITORING 120 tablet 3    nystatin (MYCOSTATIN) 100,000 unit/mL suspension Take by mouth 4 (four) times daily.      simvastatin (ZOCOR) 10 MG tablet Take 1 tablet (10 mg total) by mouth every evening. 90 tablet 0    tamsulosin (FLOMAX) 0.4 mg Cap Take 1 capsule (0.4 mg total) by mouth once daily. 30 capsule 11    acetaminophen (TYLENOL) 650 MG TbSR Take 1 tablet (650 mg total) by mouth every 8 (eight) hours. 63 tablet 0    bisacodyL (DULCOLAX) 5 mg EC tablet Take 5 mg by mouth daily as needed for Constipation.      docusate sodium (COLACE) 100 MG capsule Take 100 mg by mouth 2 (two) times daily.      folic acid (FOLVITE) 1 MG tablet Take 1 tablet (1,000 mcg total) by mouth once daily. 90 tablet 3    levocetirizine (XYZAL) 5 MG tablet TAKE 1 TABLET BY MOUTH EVERY DAY IN THE EVENING 90 tablet 1    multivitamin capsule Take 1 capsule by mouth once daily.      traZODone (DESYREL) 50 MG tablet  TAKE 3 TABLETS BY MOUTH AT NIGHT AS NEEDED FOR INSOMNIA 270 tablet 3

## 2025-03-08 NOTE — ASSESSMENT & PLAN NOTE
Creatine stable for now. BMP reviewed- noted Estimated Creatinine Clearance: 33.9 mL/min (A) (based on SCr of 2 mg/dL (H)). according to latest data. Monitor UOP and serial BMP and adjust therapy as needed. Renally dose meds.    Recent Labs   Lab 03/06/25  1431 03/07/25  0618 03/08/25  0641   BUN 32* 33* 32*   CREATININE 2.0* 2.1* 2.0*       I & O   No intake or output data in the 24 hours ending 03/08/25 5709

## 2025-03-08 NOTE — ASSESSMENT & PLAN NOTE
Anemia is likely due to chronic disease due to RA. Most recent hemoglobin and hematocrit are listed below.  Recent Labs     03/07/25  0618 03/07/25  1651 03/08/25  0641   HGB 6.9* 8.5* 8.7*   HCT 21.3* 26.3* 26.3*     Plan  - Monitor serial CBC: Daily  - Transfuse PRBC if patient becomes hemodynamically unstable, symptomatic or H/H drops below 7/21.  3/7 HB trended down to 6.9 . Transfusion with 1 unit of PRBC . Haptoglobin elevated.

## 2025-03-08 NOTE — NURSING
Pt is AOx4. Arrived to unit via wheelchair and one assist. Upon arrival vs stable. Pt has been oriented to room. Bed is low, locked, with all personal items in reach.

## 2025-03-08 NOTE — ASSESSMENT & PLAN NOTE
3/8 low grade temperature of 100.5. CX ray -Continued pleural fluid or thickening at the right base now with some fluid or thickening at the left base.  Some patchy parenchymal opacities though no confluent consolidation.  No pneumothorax. COVID, Flu, RSV negative. BC x2, UA and procalcitonin  0.27 . ANC 1500. ID consulted for neutropenic fever .

## 2025-03-08 NOTE — ASSESSMENT & PLAN NOTE
3/8 low grade temperature of 100.4. reports cough. CX ray -Continued pleural fluid or thickening at the right base now with some fluid or thickening at the left base.  Some patchy parenchymal opacities though no confluent consolidation.  No pneumothorax.BC x2, UA and procalcitonin

## 2025-03-09 LAB
ALBUMIN SERPL BCP-MCNC: 2.8 G/DL (ref 3.5–5.2)
ALP SERPL-CCNC: 70 U/L (ref 40–150)
ALT SERPL W/O P-5'-P-CCNC: 26 U/L (ref 10–44)
ANION GAP SERPL CALC-SCNC: 7 MMOL/L (ref 8–16)
AST SERPL-CCNC: 17 U/L (ref 10–40)
BASOPHILS # BLD AUTO: 0 K/UL (ref 0–0.2)
BASOPHILS NFR BLD: 0 % (ref 0–1.9)
BILIRUB SERPL-MCNC: 0.4 MG/DL (ref 0.1–1)
BUN SERPL-MCNC: 32 MG/DL (ref 8–23)
CALCIUM SERPL-MCNC: 8.5 MG/DL (ref 8.7–10.5)
CHLORIDE SERPL-SCNC: 105 MMOL/L (ref 95–110)
CO2 SERPL-SCNC: 21 MMOL/L (ref 23–29)
CREAT SERPL-MCNC: 2.2 MG/DL (ref 0.5–1.4)
DIFFERENTIAL METHOD BLD: ABNORMAL
EOSINOPHIL # BLD AUTO: 0.2 K/UL (ref 0–0.5)
EOSINOPHIL NFR BLD: 5.5 % (ref 0–8)
ERYTHROCYTE [DISTWIDTH] IN BLOOD BY AUTOMATED COUNT: 14.5 % (ref 11.5–14.5)
EST. GFR  (NO RACE VARIABLE): 31.2 ML/MIN/1.73 M^2
GLUCOSE SERPL-MCNC: 120 MG/DL (ref 70–110)
HCT VFR BLD AUTO: 24.3 % (ref 40–54)
HGB BLD-MCNC: 7.8 G/DL (ref 14–18)
IMM GRANULOCYTES # BLD AUTO: 0.03 K/UL (ref 0–0.04)
IMM GRANULOCYTES NFR BLD AUTO: 1 % (ref 0–0.5)
LYMPHOCYTES # BLD AUTO: 0.7 K/UL (ref 1–4.8)
LYMPHOCYTES NFR BLD: 22.8 % (ref 18–48)
MAGNESIUM SERPL-MCNC: 2 MG/DL (ref 1.6–2.6)
MCH RBC QN AUTO: 28.3 PG (ref 27–31)
MCHC RBC AUTO-ENTMCNC: 32.1 G/DL (ref 32–36)
MCV RBC AUTO: 88 FL (ref 82–98)
MONOCYTES # BLD AUTO: 0.2 K/UL (ref 0.3–1)
MONOCYTES NFR BLD: 6.6 % (ref 4–15)
NEUTROPHILS # BLD AUTO: 1.9 K/UL (ref 1.8–7.7)
NEUTROPHILS NFR BLD: 64.1 % (ref 38–73)
NRBC BLD-RTO: 0 /100 WBC
PARVOVIRUS B19 ABS IGG & IGM: ABNORMAL
PARVOVIRUS B19 IGG ANTIBODY: POSITIVE
PARVOVIRUS B19 IGM ANTIBODY: NEGATIVE
PHOSPHATE SERPL-MCNC: 2.4 MG/DL (ref 2.7–4.5)
PLATELET # BLD AUTO: 117 K/UL (ref 150–450)
PMV BLD AUTO: 10.1 FL (ref 9.2–12.9)
POTASSIUM SERPL-SCNC: 4.2 MMOL/L (ref 3.5–5.1)
PROT SERPL-MCNC: 6.5 G/DL (ref 6–8.4)
RBC # BLD AUTO: 2.76 M/UL (ref 4.6–6.2)
SODIUM SERPL-SCNC: 133 MMOL/L (ref 136–145)
WBC # BLD AUTO: 2.89 K/UL (ref 3.9–12.7)

## 2025-03-09 PROCEDURE — 11000001 HC ACUTE MED/SURG PRIVATE ROOM

## 2025-03-09 PROCEDURE — 85025 COMPLETE CBC W/AUTO DIFF WBC: CPT

## 2025-03-09 PROCEDURE — 36415 COLL VENOUS BLD VENIPUNCTURE: CPT

## 2025-03-09 PROCEDURE — 63700000 PHARM REV CODE 250 ALT 637 W/O HCPCS: Performed by: HOSPITALIST

## 2025-03-09 PROCEDURE — 63600175 PHARM REV CODE 636 W HCPCS: Performed by: HOSPITALIST

## 2025-03-09 PROCEDURE — 83735 ASSAY OF MAGNESIUM: CPT

## 2025-03-09 PROCEDURE — 27000207 HC ISOLATION

## 2025-03-09 PROCEDURE — 84100 ASSAY OF PHOSPHORUS: CPT

## 2025-03-09 PROCEDURE — G0545 PR VISIT INHERENT TO INPT OR OBS CARE, INFECTIOUS DISEASE: HCPCS | Mod: ,,, | Performed by: STUDENT IN AN ORGANIZED HEALTH CARE EDUCATION/TRAINING PROGRAM

## 2025-03-09 PROCEDURE — 25000003 PHARM REV CODE 250: Performed by: HOSPITALIST

## 2025-03-09 PROCEDURE — 80053 COMPREHEN METABOLIC PANEL: CPT

## 2025-03-09 PROCEDURE — 99223 1ST HOSP IP/OBS HIGH 75: CPT | Mod: ,,, | Performed by: STUDENT IN AN ORGANIZED HEALTH CARE EDUCATION/TRAINING PROGRAM

## 2025-03-09 RX ORDER — SODIUM,POTASSIUM PHOSPHATES 280-250MG
2 POWDER IN PACKET (EA) ORAL ONCE
Status: DISCONTINUED | OUTPATIENT
Start: 2025-03-09 | End: 2025-03-09

## 2025-03-09 RX ORDER — OXYCODONE HYDROCHLORIDE 5 MG/1
5 TABLET ORAL EVERY 4 HOURS PRN
Status: DISCONTINUED | OUTPATIENT
Start: 2025-03-09 | End: 2025-03-15 | Stop reason: HOSPADM

## 2025-03-09 RX ADMIN — ENOXAPARIN SODIUM 40 MG: 40 INJECTION SUBCUTANEOUS at 05:03

## 2025-03-09 RX ADMIN — FOLIC ACID 4 MG: 1 TABLET ORAL at 09:03

## 2025-03-09 RX ADMIN — ALUMINUM HYDROXIDE, MAGNESIUM HYDROXIDE, AND DIMETHICONE 10 ML: 400; 400; 40 SUSPENSION ORAL at 08:03

## 2025-03-09 RX ADMIN — ACETAMINOPHEN 650 MG: 325 TABLET ORAL at 05:03

## 2025-03-09 RX ADMIN — ALUMINUM HYDROXIDE, MAGNESIUM HYDROXIDE, AND DIMETHICONE 10 ML: 400; 400; 40 SUSPENSION ORAL at 09:03

## 2025-03-09 RX ADMIN — OXYCODONE 5 MG: 5 TABLET ORAL at 09:03

## 2025-03-09 RX ADMIN — OXYCODONE 5 MG: 5 TABLET ORAL at 03:03

## 2025-03-09 RX ADMIN — FLUCONAZOLE 100 MG: 100 TABLET ORAL at 09:03

## 2025-03-09 RX ADMIN — TAMSULOSIN HYDROCHLORIDE 0.4 MG: 0.4 CAPSULE ORAL at 09:03

## 2025-03-09 RX ADMIN — ALUMINUM HYDROXIDE, MAGNESIUM HYDROXIDE, AND DIMETHICONE 10 ML: 400; 400; 40 SUSPENSION ORAL at 05:03

## 2025-03-09 RX ADMIN — ACETAMINOPHEN 650 MG: 325 TABLET ORAL at 09:03

## 2025-03-09 RX ADMIN — OXYCODONE 5 MG: 5 TABLET ORAL at 05:03

## 2025-03-09 RX ADMIN — ALUMINUM HYDROXIDE, MAGNESIUM HYDROXIDE, AND DIMETHICONE 10 ML: 400; 400; 40 SUSPENSION ORAL at 01:03

## 2025-03-09 RX ADMIN — PRAVASTATIN SODIUM 20 MG: 20 TABLET ORAL at 09:03

## 2025-03-09 RX ADMIN — SODIUM PHOSPHATE, MONOBASIC, MONOHYDRATE AND SODIUM PHOSPHATE, DIBASIC, ANHYDROUS 15 MMOL: 142; 276 INJECTION, SOLUTION INTRAVENOUS at 04:03

## 2025-03-09 NOTE — PROGRESS NOTES
Immediate Brief Procedure Note    Patient: Kaden Mccoy    Pre-op Dx: ascites    Post-op Dx: same    Procedure: paracentesis    Proceduralist: Everton Youssef MD    Assistants: none    Anesthesia Type: local    Findings: 6600 mL of clear straw-colored fluid from the LLQ     Estimated Blood Loss: minimal    Complications: None      Specimens Removed:Fluid sent     [facility] Emergency Dept  Hospital Medicine  Progress Note    Patient Name: Andrea Gant  MRN: [id number]  Patient Class: IP- Inpatient   Admission Date: 3/6/2025  Length of Stay: 2 days  Attending Physician: Jarocho Frazier MD  Primary Care Provider: Andrea Wang MD        Subjective     Principal Problem:Oral thrush        HPI:  Andrea Winter is a 70-year-old male with rheumatoid arthritis on methotrexate, history of urothelial carcinoma in remission, anemia, hepatitis-C s/p SVR, PAD presents for thrush.       AAO x3. Patient waws prescribed augmentin 2/7 -2/21 prior to the onset of his symptoms post cystoscopy. c/o pain with  swallowing after 4-5 days after augmentin.  denies fever, drooling or prior similar episodes. denies history of diabetes or HIV.   Reports painful whitish lesions in his mouth x  10 days. Patient was seen at urgent care clinic 02/26/2025 with congestion, sore throat and trouble swallowing.  Flu swab negative on 2/26.   prescribed prednisone 20mg x 5 days for viral pharyngitis, states that this significantly improved his sore throat and body aches but not the thrush.     ER course - Thick whitish discharge on the tongue consistent with oral candidiasis . Workup is notable for new pancytopenia. Hb 7.9, leucopenia 1.55 and .  discussed with hematology, likely due to MTX however he has been on this medication for a long time. admitted for further work up     Overview/Hospital Course:  3/7 HB trended down to 6.9 . Transfusion with 1 unit of PRBC . Haptoglobin elevated.  P replaced. . Neutropenic precautions. had been taking MTX daily incorrectly instead of weekly and  MTX toxicity given incorrect daily dosing and CKD. MTX levels < 0.04. No leucovorin for now per heme onc .  parvo serologies, Reticulocytes, Iron panel, Hep C PCR ordered   3/8 low grade temperature of 100.5. CX ray -Continued pleural fluid or thickening at the right base now with some fluid or  thickening at the left base.  Some patchy parenchymal opacities though no confluent consolidation.  No pneumothorax. COVID, Flu, RSV negative. BC x2, UA and procalcitonin  0.27 . UA negative. ANC 1500. folic acid increased to 4mg daily.  ID consulted for neutropenic fever . advanced to soft diet and started on boost   3/9 ANC 1900. WBC improving. fever of 100.9F overnight. MRSA PCR negative                 Review of Systems:   Pain scale:    8/10   Constitutional:  fever,  chills, headache, vision loss, hearing loss, weight loss, Generalized weakness, falls, loss of smell, loss of taste, poor appetite,  sore throat, immunocompromised  Respiratory: cough, shortness of breath.   Cardiovascular: chest pain, dizziness, palpitations, orthopnea, swelling of feet, syncope  Gastrointestinal: nausea, vomiting, abdominal pain, diarrhea, black stool,  blood in stool, change in bowel habits, constipation, difficulty swallowing  Genitourinary: hematuria, dysuria, urgency, frequency  Integument/Breast: rash,  pruritis  Hematologic/Lymphatic: easy bruising, lymphadenopathy  Musculoskeletal: arthralgias , myalgias, back pain, neck pain, knee pain  Neurological: confusion, seizures, tremors, slurred speech  Behavioral/Psych:  depression, anxiety, auditory or visual hallucinations     OBJECTIVE:     Physical Exam:  Body mass index is 23.63 kg/m².    Constitutional: Appears well-developed and well-nourished.   Head: Normocephalic and atraumatic.  dentures   oral cavity - Thick white discharge on the tongue   poserior pharyngeal erythema  Neck: Normal range of motion. Neck supple.   Cardiovascular: Normal heart rate.  Regular heart rhythm.  Pulmonary/Chest: Effort normal.   Abdominal: No distension.  No tenderness  Musculoskeletal: Normal range of motion. No edema.   Neurological: Alert and oriented to person, place, and time.   Skin: Skin is warm and dry.   Psychiatric: Normal mood and affect. Behavior is normal.       Vital  "Signs  Temp: 100.1 °F (37.8 °C) (03/09/25 0828)  Pulse: 96 (03/09/25 0828)  Resp: 18 (03/09/25 0828)  BP: 104/61 (03/09/25 0828)  SpO2: 98 % (03/09/25 0828)     24 Hour VS Range    Temp:  [98.3 °F (36.8 °C)-100.9 °F (38.3 °C)]   Pulse:  [75-96]   Resp:  [16-18]   BP: (103-119)/(61-72)   SpO2:  [97 %-99 %]     Intake/Output Summary (Last 24 hours) at 3/9/2025 0840  Last data filed at 3/8/2025 1200  Gross per 24 hour   Intake 480 ml   Output --   Net 480 ml           I/O This Shift:  No intake/output data recorded.    Wt Readings from Last 3 Encounters:   03/06/25 72.6 kg (160 lb)   02/26/25 71.2 kg (157 lb)   02/20/25 71.4 kg (157 lb 6.5 oz)       I have personally reviewed the vitals and recorded Intake/Output     Laboratory/Diagnostic Data:    CBC/Anemia Labs: Coags:    Recent Labs   Lab 03/06/25  1514 03/07/25  0618 03/07/25  1651 03/08/25  0641 03/09/25  0509   WBC 1.55*   < > 1.90* 2.71* 2.89*   HGB 7.9*   < > 8.5* 8.7* 7.8*   HCT 24.0*   < > 26.3* 26.3* 24.3*   *   < > 144* 137* 117*   MCV 88   < > 87 87 88   RDW 13.6   < > 14.4 14.7* 14.5   IRON  --   --  90  --   --    FERRITIN  --   --  1,474*  --   --    RETIC  --   --  0.5  --   --    FOLATE >40.0*  --   --   --   --    [id number] 614  --   --   --   --     < > = values in this interval not displayed.    Recent Labs   Lab 03/06/25  1514   INR 1.0   APTT 28.0        Chemistries: ABG:   Recent Labs   Lab 03/07/25  0618 03/08/25  0641 03/09/25  0509    138 133*   K 4.4 4.2 4.2    107 105   CO2 21* 22* 21*   BUN 33* 32* 32*   CREATININE 2.1* 2.0* 2.2*   CALCIUM 8.4* 8.8 8.5*   PROT 6.6 6.9 6.5   BILITOT 0.6 0.7 0.4   ALKPHOS 71 79 70   ALT 47* 34 26   AST 23 21 17   MG 2.0 2.1 2.0   PHOS 2.4* 2.3* 2.4*    No results for input(s): "PH", "PCO2", "PO2", "HCO3", "POCSATURATED", "BE" in the last 168 hours.     POCT Glucose: HbA1c:    No results for input(s): "POCTGLUCOSE" in the last 168 hours. Hemoglobin A1C   Date Value Ref Range Status " "  05/17/2022 5.0 4.0 - 5.6 % Final     Comment:     ADA Screening Guidelines:  5.7-6.4%  Consistent with prediabetes  >or=6.5%  Consistent with diabetes    High levels of fetal hemoglobin interfere with the HbA1C  assay. Heterozygous hemoglobin variants (HbS, HgC, etc)do  not significantly interfere with this assay.   However, presence of multiple variants may affect accuracy.     10/11/2021 5.2 4.0 - 5.6 % Final     Comment:     ADA Screening Guidelines:  5.7-6.4%  Consistent with prediabetes  >or=6.5%  Consistent with diabetes    High levels of fetal hemoglobin interfere with the HbA1C  assay. Heterozygous hemoglobin variants (HbS, HgC, etc)do  not significantly interfere with this assay.   However, presence of multiple variants may affect accuracy.          Cardiac Enzymes: Ejection Fractions:    No results for input(s): "CPK", "CPKMB", "MB", "TROPONINI" in the last 72 hours. No results found for: "EF"       Recent Labs   Lab 03/08/25  0844   COLORU Yellow   APPEARANCEUA Clear   PHUR 6.0   SPECGRAV 1.020   PROTEINUA Trace*   GLUCUA Negative   KETONESU Negative   BILIRUBINUA Negative   OCCULTUA 1+*   NITRITE Negative   LEUKOCYTESUR Negative   RBCUA 2   WBCUA 1   SQUAMEPITHEL 0       Procalcitonin (ng/mL)   Date Value   03/08/2025 0.27 (H)     BNP (pg/mL)   Date Value   03/26/2016 112 (H)     CRP (mg/L)   Date Value   10/09/2024 12.9 (H)   02/06/2024 12.0 (H)   02/08/2023 3.2   11/03/2022 1.9   08/03/2022 2.8   05/12/2022 2.0   02/08/2022 5.4   03/24/2021 3.5   11/18/2020 1.1   10/05/2020 2     Sed Rate   Date Value   10/09/2024 17 mm/Hr   02/06/2024 55 mm/Hr (H)   02/08/2023 20 mm/Hr   11/03/2022 11 mm/Hr   08/03/2022 8 mm/Hr   05/12/2022 20 mm/Hr   02/08/2022 9 mm/Hr   03/24/2021 19 mm/Hr   11/18/2020 14 mm/Hr   10/05/2020 48 mm/hr (H)     No results found for: "DDIMER"  Ferritin (ng/mL)   Date Value   03/07/2025 1,474 (H)     LD (U/L)   Date Value   03/06/2025 226     Troponin I (ng/mL)   Date Value   08/25/2023 " 0.023   03/26/2016 0.047 (H)     CPK (U/L)   Date Value   02/29/2004 188     No results found. However, due to the size of the patient record, not all encounters were searched. Please check Results Review for a complete set of results.  SARS-CoV2 (COVID-19) Qualitative PCR (no units)   Date Value   03/08/2025 Not Detected   08/25/2023 Not Detected     POC Rapid COVID (no units)   Date Value   12/04/2020 Negative       Microbiology labs for the last week  Microbiology Results (last 7 days)       Procedure Component Value Units Date/Time    Culture, Respiratory with Gram Stain [[id number]] Collected: 03/08/25 1600    Order Status: Completed Specimen: Respiratory from Sputum Updated: 03/09/25 0839     Respiratory Culture Normal respiratory judah     Gram Stain (Respiratory) <10 epithelial cells per low power field.     Gram Stain (Respiratory) Few WBC's     Gram Stain (Respiratory) Many Gram positive cocci     Gram Stain (Respiratory) Rare Gram negative rods    MRSA Screen by PCR [[id number]] Collected: 03/08/25 1430    Order Status: Completed Specimen: Nasal Swab Updated: 03/08/25 1758     MRSA SCREEN BY PCR Not Detected    Blood culture [[id number]] Collected: 03/08/25 1018    Order Status: Completed Specimen: Blood Updated: 03/08/25 1715     Blood Culture, Routine No Growth to date    Blood culture [[id number]] Collected: 03/08/25 1018    Order Status: Sent Specimen: Blood             Reviewed and noted in plan where applicable- Please see chart for full lab data.    Lines/Drains:       Peripheral IV - Single Lumen 03/06/25 1244 20 G Anterior;Distal;Left Upper Arm (Active)   Site Assessment Clean;Dry;Intact;No redness;No swelling 03/06/25 1957   Extremity Assessment Distal to IV No abnormal discoloration;No redness;No swelling;No warmth 03/06/25 1957   Line Status Flushed;Saline locked 03/06/25 1957   Dressing Status Clean;Dry;Intact 03/06/25 1957   Dressing Intervention Integrity maintained 03/06/25 1957   Reason  Not Rotated Not due 03/06/25 1957   Number of days: 0       Imaging  ECG Results              EKG 12-lead (Final result)        Collection Time Result Time QRS Duration OHS QTC Calculation    03/06/25 20:21:25 03/07/25 13:42:35 80 418                     Final result by Interface, Lab In [facility] (03/07/25 13:42:39)                   Narrative:    Test Reason : R94.31,    Vent. Rate :  89 BPM     Atrial Rate : 241 BPM     P-R Int :    ms          QRS Dur :  80 ms      QT Int : 344 ms       P-R-T Axes :     94 -15 degrees    QTcB Int : 418 ms    Normal sinus rhythm  Rightward axis  Inferior infarct ,age undetermined  Abnormal ECG  When compared with ECG of 25-Aug-2023 19:12,  No significant change was found    Confirmed by José Cortez ([id number]) on 3/7/2025 1:42:34 PM    Referred By: AAAREFERRAL SELF           Confirmed By: José Cortez                                    No results found for this or any previous visit.      X-Ray Chest 1 View  Narrative: EXAMINATION:  XR CHEST 1 VIEW    CLINICAL HISTORY:  r/o pneumonia;    TECHNIQUE:  Single frontal view of the chest was performed.    COMPARISON:  January 29, 2024    FINDINGS:  Heart size and pulmonary vessels are similar.  Continued pleural fluid or thickening at the right base now with some fluid or thickening at the left base.  Some patchy parenchymal opacities though no confluent consolidation.  No pneumothorax.  Impression: Bilateral pleural effusions or thickening new on the left.    Electronically signed by: Juan Rivera MD  Date:    03/07/2025  Time:    07:53      Labs, Imaging, EKG and Diagnostic results from 3/9/2025 were reviewed.    Medications:  Medication list was reviewed and changes noted under Assessment/Plan.  Medications Ordered Prior to Encounter[1]  Scheduled Medications:  Current Facility-Administered Medications   Medication Dose Route Frequency    duke's soln (benadryl 30 mL, mylanta 30 mL, LIDOcaine 30 mL, nystatin 30 mL) 120 mL   10 mL Oral QID    enoxparin  40 mg Subcutaneous Daily    fluconazole  100 mg Oral Daily    folic acid  4 mg Oral Daily    potassium, sodium phosphates  2 packet Oral Once    pravastatin  20 mg Oral Daily    tamsulosin  0.4 mg Oral Daily     PRN:   Current Facility-Administered Medications:     0.9%  NaCl infusion (for blood administration), , Intravenous, Q24H PRN    acetaminophen, 650 mg, Oral, Q8H PRN    dextrose 50%, 12.5 g, Intravenous, PRN    dextrose 50%, 25 g, Intravenous, PRN    glucagon (human recombinant), 1 mg, Intramuscular, PRN    glucose, 16 g, Oral, PRN    glucose, 24 g, Oral, PRN    naloxone, 0.02 mg, Intravenous, PRN    oxyCODONE, 5 mg, Oral, Q6H PRN    polyethylene glycol, 17 g, Oral, Daily PRN    sodium chloride 0.9%, 10 mL, Intravenous, Q12H PRN    traZODone, 50 mg, Oral, Nightly PRN  Infusions:       Estimated Creatinine Clearance: 30.8 mL/min (A) (based on SCr of 2.2 mg/dL (H)).               Assessment & Plan  Rheumatoid arthritis   rheumatoid arthritis on methotrexate -weekly . rheumatology eval   History of hepatitis C, s/p successful treatment w/ SVR12 - 7/2015  noted     PAD (peripheral artery disease)  continue ASA and cilastazol     Hypercholesteremia  continue simvastatin    AAA (abdominal aortic aneurysm)  CT CAP 2/25 - aneurysmal dilatation of the abdominal aorta measuring up to 4.5 cm AP with extension into the common iliac arteries, not significantly changed from prior study. Extensive vascular calcification noted.     Stage 3b chronic kidney disease  Creatine stable for now. BMP reviewed- noted Estimated Creatinine Clearance: 30.8 mL/min (A) (based on SCr of 2.2 mg/dL (H)). according to latest data. Monitor UOP and serial BMP and adjust therapy as needed. Renally dose meds.    Recent Labs   Lab 03/07/25  0618 03/08/25  0641 03/09/25  0509   BUN 33* 32* 32*   CREATININE 2.1* 2.0* 2.2*       I & O     Intake/Output Summary (Last 24 hours) at 3/9/2025 0840  Last data filed at 3/8/2025  1200  Gross per 24 hour   Intake 480 ml   Output --   Net 480 ml        Ureteral tumor  history of urothelial carcinoma in remission.  s/p neoadjuvant chemotherapy with Gemcitabine and Cisplatin. hem/onc consulted     Anemia  Anemia is likely due to chronic disease due to RA . Most recent hemoglobin and hematocrit are listed below.  Recent Labs     03/07/25  1651 03/08/25  0641 03/09/25  0509   HGB 8.5* 8.7* 7.8*   HCT 26.3* 26.3* 24.3*     Plan  - Monitor serial CBC: Daily  - Transfuse PRBC if patient becomes hemodynamically unstable, symptomatic or H/H drops below 7/21.  3/7 HB trended down to 6.9 . Transfusion with 1 unit of PRBC . Haptoglobin elevated.    Hypertension  Patient's blood pressure range in the last 24 hours was: BP  Min: 103/72  Max: 119/70.The patient's inpatient anti-hypertensive regimen is listed below:  Current Antihypertensives       Plan  - BP is controlled, no changes needed to their regimen  - continue amlodipine 5mg daily  Leucopenia     Recent Labs   Lab 03/07/25  1651 03/08/25  0641 03/09/25  0509   WBC 1.90* 2.71* 2.89*   leucopenia 1.55 and .  discussed with hematology, likely due to MTX however he has been on this medication for a long time. admitted for further work up   Hematology consulted - had been taking MTX daily incorrectly instead of weekly.   and  MTX toxicity given incorrect daily dosing and CKD. MTX levels < 0.04. No leucovorin for now per heme onc .  parvo serologies, Reticulocytes, Iron panel, Hep C PCR ordered     Oral thrush  Thick whitish discharge on the tongue consistent with oral candidiasis .Workup is notable for new pancytopenia. Hb 7.9, leucopenia 1.55 and .  discussed with hematology, likely due to MTX however he has been on this medication for a long time.     started on dukes solution and fluconazole.   GI consulted for odynophagia   Rheumatology consulted due to recent methotrexation resumption    3/7  had been taking MTX daily incorrectly  instead of weekly and  MTX toxicity given incorrect daily dosing and CKD. MTX levels < 0.04. No leucovorin for now per heme onc .       Odynophagia  secondary to above. continue dukes solution. GI consulted     Fever  3/8 low grade temperature of 100.4. reports cough. CX ray -Continued pleural fluid or thickening at the right base now with some fluid or thickening at the left base.  Some patchy parenchymal opacities though no confluent consolidation.  No pneumothorax.BC x2, UA  negative and procalcitonin   3.9 ID consulted for neutropenic fever . advanced to soft diet and started on boost.  ANC 1900. WBC improving. fever of 100.9F overnight. MRSA PCR negative           Neutropenic fever  3/8 low grade temperature of 100.5. CX ray -Continued pleural fluid or thickening at the right base now with some fluid or thickening at the left base.  Some patchy parenchymal opacities though no confluent consolidation.  No pneumothorax. COVID, Flu, RSV negative. BC x2, UA and procalcitonin  0.27 . ANC 1500. ID consulted for neutropenic fever .       VTE Risk Mitigation (From admission, onward)           Ordered     enoxaparin injection 40 mg  Daily         03/06/25 1633     IP VTE HIGH RISK PATIENT  Once         03/06/25 1633     Place sequential compression device  Until discontinued         03/06/25 1633                    Discharge Planning   NED: 3/10/2025     Code Status: Full Code   Medical Readiness for Discharge Date:   Discharge Plan A: Home, Home with family                        Jarocho Frazier MD  Department of Hospital Medicine   [facility] - Emergency Dept         [1]   No current facility-administered medications on file prior to encounter.     Current Outpatient Medications on File Prior to Encounter   Medication Sig Dispense Refill    amLODIPine (NORVASC) 5 MG tablet TAKE 1 TABLET BY MOUTH ONCE  DAILY 90 tablet 0    aspirin 81 MG Chew Take 81 mg by mouth once daily.      cilostazoL (PLETAL) 50 MG Tab TAKE 1  TABLET BY MOUTH TWICE  DAILY 180 tablet 3    gabapentin (NEURONTIN) 100 MG capsule Take 1 capsule (100 mg total) by mouth every evening. 90 capsule 1    methotrexate 2.5 MG Tab TAKE 8 TABLETS BY MOUTH EVERY 7  DAYS THIS MEDICATION REQUIRES  PERIODIC LAB MONITORING 120 tablet 3    nystatin (MYCOSTATIN) 100,000 unit/mL suspension Take by mouth 4 (four) times daily.      simvastatin (ZOCOR) 10 MG tablet Take 1 tablet (10 mg total) by mouth every evening. 90 tablet 0    tamsulosin (FLOMAX) 0.4 mg Cap Take 1 capsule (0.4 mg total) by mouth once daily. 30 capsule 11    acetaminophen (TYLENOL) 650 MG TbSR Take 1 tablet (650 mg total) by mouth every 8 (eight) hours. 63 tablet 0    bisacodyL (DULCOLAX) 5 mg EC tablet Take 5 mg by mouth daily as needed for Constipation.      docusate sodium (COLACE) 100 MG capsule Take 100 mg by mouth 2 (two) times daily.      folic acid (FOLVITE) 1 MG tablet Take 1 tablet (1,000 mcg total) by mouth once daily. 90 tablet 3    levocetirizine (XYZAL) 5 MG tablet TAKE 1 TABLET BY MOUTH EVERY DAY IN THE EVENING 90 tablet 1    multivitamin capsule Take 1 capsule by mouth once daily.      traZODone (DESYREL) 50 MG tablet TAKE 3 TABLETS BY MOUTH AT NIGHT AS NEEDED FOR INSOMNIA 270 tablet 3

## 2025-03-09 NOTE — ASSESSMENT & PLAN NOTE
Recent Labs   Lab 03/07/25  1651 03/08/25  0641 03/09/25  0509   WBC 1.90* 2.71* 2.89*   leucopenia 1.55 and .  discussed with hematology, likely due to MTX however he has been on this medication for a long time. admitted for further work up   Hematology consulted - had been taking MTX daily incorrectly instead of weekly.   and  MTX toxicity given incorrect daily dosing and CKD. MTX levels < 0.04. No leucovorin for now per heme onc .  parvo serologies, Reticulocytes, Iron panel, Hep C PCR ordered

## 2025-03-09 NOTE — CONSULTS
"[facility] - Internal Medicine Telemetry  Infectious Disease  Consult Note    Patient Name: Andrea Gant  MRN: [id number]  Admission Date: 3/6/2025  Hospital Length of Stay: 2 days  Attending Physician: Jarocho Frazier MD  Primary Care Provider: Andrea Wang MD     Isolation Status: Contact and Droplet and Enhanced Respiratory    Patient information was obtained from patient and ER records.      Inpatient consult to Infectious Diseases  Consult performed by: Татьяна Alvarez MD  Consult ordered by: Jarocho Frazier MD        Assessment/Plan:     Other  Fever  Andrea Gant is a 70 year old man with RA on methotrexate, urothelial carcinoma in remission, hepatitis C with SVR who was admitted 3/6 due to painful oral lesions. He was recently prescribed PO amox-clav after cystoscopy from 2/7-2/21 with subsequent development of painful oral lesions. He was found to be pancytopenic, now with concern for methotrexate toxicity after taking more frequently than prescribed. Tmax up to 100.9. ID consulted for "neutropenic fever". Chest x-ray with pleural thickening and increased pleural effusions. Blood cultures no growth to date. Respiratory culture with normal respiratory judah. ANC 1116->1762, no longer neutropenic. Currently on fluconazole alone. No signs/symptoms of other infectious etiology.     Recommendations  - continue renally dosed fluconazole for presumed oral and esophageal candidiasis x 14-21 days depending on clinical improvement   - if fevers persist, can get CT chest, abdomen, pelvis   - methotrexate can also cause oral ulcers, so likely contributing to discomfort   - if no continued improvement in symptoms, can re-engage GI for discussion of EGD       Above discussed with primary team.     Time: 75 minutes   50% of time spent on face-to-face counseling and coordination of care. Counseling included review of test results, diagnosis, and treatment plan with patient and/or family.  I have " "reviewed hospital notes from  service and other specialty providers as well as outside medical records. I have also reviewed CBC, CMP/BMP,  cultures and imaging with my interpretation as documented. Patient is high risk of morbidity, on antibiotics requiring intensive monitoring for toxicity.       Thank you for your consult. I will sign off. Please contact us if you have any additional questions.    Татьяна Alvarez MD  Infectious Disease  [facility] - Internal Medicine Telemetry    Subjective:     Principal Problem: Oral thrush    HPI: Andrea Gant is a 70 year old man with RA on methotrexate, urothelial carcinoma in remission, hepatitis C with SVR who was admitted 3/6 due to painful oral lesions. He was recently prescribed PO amox-clav after cystoscopy from 2/7-2/21 with subsequent development of painful oral lesions. He was found to be pancytopenic, now with concern for methotrexate toxicity after taking more frequently than prescribed (daily dosing rather than once weekly for last few weeks). He reports that the lesions are improved and he is able to tolerate some PO today. He denies visual changes, cough, dyspnea, abdominal pain, diarrhea, rash, history of cold sores, dysuria.     Tmax up to 100.9. ID consulted for "neutropenic fever". Chest x-ray with pleural thickening and increased pleural effusions. Blood cultures no growth to date. Respiratory culture with normal respiratory judah. ANC 1116->1762, no longer neutropenic. Currently on fluconazole alone.     Past Medical History:   Diagnosis Date    Anemia     Cancer     Cataract     Chemotherapy induced neutropenia 04/11/2023    Colon polyp 2014    Depression     Disorder of kidney and ureter     History of hepatitis C, s/p successful treatment w/ SVR12 - 7/2015 10/14/2012    Kelsey 1a,  Liver biopsy 6/2014 - Stage 1 fibrosis;  Completed 8 weeks Harvoni w/ SVR12 - 7/2015      Hyperlipidemia     Hypertension 03/08/2024    Lipoma of arm     Lipoma of " lower extremity     Nuclear sclerosis - Both Eyes 10/22/2012    Other and unspecified hyperlipidemia 10/22/2013    PAD (peripheral artery disease)     Peripheral vascular disease, unspecified     Plaquenil adverse reaction in therapeutic use 10/22/2012    Rheumatoid arthritis(714.0) 10/14/2012    Special screening for malignant neoplasms, colon 02/21/2014       Past Surgical History:   Procedure Laterality Date    BIOPSY OF URETER Left 02/16/2023    Procedure: BIOPSY, URETER/BRUSH;  Surgeon: Kem Jefferson MD;  Location: [facility] OR 1ST FLR;  Service: Urology;  Laterality: Left;    COLONOSCOPY N/A 11/29/2021    Procedure: COLONOSCOPY;  Surgeon: Kenrick Zimmer MD;  Location: [facility] ENDO (4TH FLR);  Service: Endoscopy;  Laterality: N/A;  blood thinner approval received, see telephone encounter 10/19/21-BB  fully vacc-inst email-tb    CYSTOSCOPY      CYSTOSCOPY  02/16/2023    Procedure: CYSTOSCOPY;  Surgeon: Kem Jefferson MD;  Location: [facility] OR 1ST FLR;  Service: Urology;;    HERNIA REPAIR      INSERTION OF TUNNELED CENTRAL VENOUS CATHETER (CVC) WITH SUBCUTANEOUS PORT Right 3/13/2023    Procedure: INSERTION, PORT-A-CATH;  Surgeon: Rene Cail MD;  Location: [facility] CATH LAB;  Service: Radiology;  Laterality: Right;    KNEE ARTHROPLASTY      LAPAROSCOPIC EXPLORATION OF GROIN Left 09/06/2018    Procedure: EXPLORATION, INGUINAL REGION, LAPAROSCOPIC;  Surgeon: Mingo De Guzman Jr., MD;  Location: [facility] OR 2ND FLR;  Service: General;  Laterality: Left;    MEDIPORT REMOVAL N/A 10/3/2023    Procedure: REMOVAL, CATHETER, CENTRAL VENOUS, TUNNELED, WITH PORT;  Surgeon: Yeimi Stanton MD;  Location: [facility] CATH LAB;  Service: Radiology;  Laterality: N/A;    PYELOSCOPY Left 02/16/2023    Procedure: PYELOSCOPY;  Surgeon: Kem Jefferson MD;  Location: [facility] OR 1ST FLR;  Service: Urology;  Laterality: Left;    RETROGRADE PYELOGRAPHY Bilateral 02/16/2023    Procedure: PYELOGRAM, RETROGRADE;  Surgeon: Kem LEWIS  MD Ronny;  Location: [facility] OR 1ST FLR;  Service: Urology;  Laterality: Bilateral;    ROBOT-ASSISTED LAPAROSCOPIC SURGICAL REMOVAL OF KIDNEY AND URETER USING DA BRIJESH XI Left 7/14/2023    Procedure: XI ROBOTIC NEPHROURETERECTOMY;  Surgeon: Kem Jefferson MD;  Location: [facility] OR 2ND FLR;  Service: Urology;  Laterality: Left;  5 hours    TONSILLECTOMY      URETEROSCOPIC REMOVAL OF URETERIC CALCULUS Left 02/16/2023    Procedure: REMOVAL, CALCULUS, URETER, URETEROSCOPIC;  Surgeon: Kem Jefferson MD;  Location: [facility] OR 1ST FLR;  Service: Urology;  Laterality: Left;    URETEROSCOPY Left 02/16/2023    Procedure: URETEROSCOPY;  Surgeon: Kem Jefferson MD;  [facility];  Service: Urology;  Laterality: Left;       Review of patient's allergies indicates:   Allergen Reactions    Iodinated contrast media      Shortness of breath       Medications:  Medications Prior to Admission   Medication Sig    amLODIPine (NORVASC) 5 MG tablet TAKE 1 TABLET BY MOUTH ONCE  DAILY    aspirin 81 MG Chew Take 81 mg by mouth once daily.    cilostazoL (PLETAL) 50 MG Tab TAKE 1 TABLET BY MOUTH TWICE  DAILY    gabapentin (NEURONTIN) 100 MG capsule Take 1 capsule (100 mg total) by mouth every evening.    methotrexate 2.5 MG Tab TAKE 8 TABLETS BY MOUTH EVERY 7  DAYS THIS MEDICATION REQUIRES  PERIODIC LAB MONITORING    nystatin (MYCOSTATIN) 100,000 unit/mL suspension Take by mouth 4 (four) times daily.    simvastatin (ZOCOR) 10 MG tablet Take 1 tablet (10 mg total) by mouth every evening.    tamsulosin (FLOMAX) 0.4 mg Cap Take 1 capsule (0.4 mg total) by mouth once daily.    acetaminophen (TYLENOL) 650 MG TbSR Take 1 tablet (650 mg total) by mouth every 8 (eight) hours.    bisacodyL (DULCOLAX) 5 mg EC tablet Take 5 mg by mouth daily as needed for Constipation.    docusate sodium (COLACE) 100 MG capsule Take 100 mg by mouth 2 (two) times daily.    folic acid (FOLVITE) 1 MG tablet Take 1 tablet (1,000 mcg total) by mouth once  daily.    levocetirizine (XYZAL) 5 MG tablet TAKE 1 TABLET BY MOUTH EVERY DAY IN THE EVENING    multivitamin capsule Take 1 capsule by mouth once daily.    traZODone (DESYREL) 50 MG tablet TAKE 3 TABLETS BY MOUTH AT NIGHT AS NEEDED FOR INSOMNIA     Antibiotics (From admission, onward)      None          Antifungals (From admission, onward)      Start     Stop Route Frequency Ordered    03/07/25 0900  fluconazole tablet 100 mg         -- Oral Daily 03/06/25 1829 03/06/25 2100  duke's soln (benadryl 30 mL, mylanta 30 mL, LIDOcaine 30 mL, nystatin 30 mL) 120 mL         -- Oral 4 times daily 03/06/25 1829          Antivirals (From admission, onward)      None             Immunization History   Administered Date(s) Administered    COVID-19 MRNA, LN-S PF (MODERNA HALF 0.25 ML DOSE) 10/26/2021, 06/09/2022    COVID-19, MRNA, LN-S, PF (MODERNA FULL 0.5 ML DOSE) 02/18/2021, 03/18/2021    COVID-19, mRNA, LNP-S, PF (Moderna) Ages 12+ 10/25/2023    COVID-19, mRNA, LNP-S, PF, malia-sucrose, 30 mcg/0.3 mL (Pfizer Ages 12+) 09/08/2024    COVID-19, mRNA, LNP-S, bivalent booster, PF (Moderna Omicron)12 + YEARS 10/11/2022    Hepatitis A / Hepatitis B 12/02/2020, 04/07/2021, 10/11/2021    Influenza 01/27/2014, 10/10/2018, 09/26/2019    Influenza (FLUAD) - Quadrivalent - Adjuvanted - PF *Preferred* (65+) 09/27/2023    Influenza - Quadrivalent - High Dose - PF (65 years and older) 08/18/2021, 10/11/2022    Influenza - Quadrivalent - MDCK - PF 10/16/2017, 10/22/2018    Influenza - Quadrivalent - PF *Preferred* (6 months and older) 11/18/2015, 10/15/2016    Influenza - Trivalent - Fluzone High Dose - PF (65 years and older) 09/25/2019, 09/08/2024    Influenza Split 01/27/2014, 01/27/2014    Pneumococcal Conjugate - 13 Valent 10/14/2020    Pneumococcal Polysaccharide - 23 Valent 01/27/2014    Tdap 01/27/2014    Zoster 03/12/2014    Zoster Recombinant 10/14/2020, 01/06/2021       Family History       Problem Relation (Age of Onset)     Arthritis Sister    Dementia Mother, Father    Heart disease Sister, Paternal Uncle    Kidney disease Sister    No Known Problems Daughter    Seizures Sister          Social History     Socioeconomic History    Marital status:      Spouse name: Rufina   Occupational History     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[employer]   Tobacco Use    Smoking status: Former     Current packs/day: 0.00     Average packs/day: 0.5 packs/day for 47.5 years (23.7 ttl pk-yrs)     Types: Cigarettes     Start date:      Quit date: 2020     Years since quittin.7    Smokeless tobacco: Never   Substance and Sexual Activity    Alcohol use: Yes     Comment: 2 drinks per week    Drug use: Yes     Types: Marijuana     Comment: marajuana used on Saturday    Sexual activity: Yes     Partners: Female   Social History Narrative    Resides locally    [place] [facility] [employer]     w/ 2 adult children     Social Drivers of Health     Financial Resource Strain: Low Risk  (3/7/2025)    Overall Financial Resource Strain (CARDIA)     Difficulty of Paying Living Expenses: Not very hard   Food Insecurity: No Food Insecurity (3/7/2025)    Hunger Vital Sign     Worried About Running Out of Food in the Last Year: Never true     Ran Out of Food in the Last Year: Never true   Transportation Needs: No Transportation Needs (10/28/2024)    PRAPARE - Transportation     Lack of Transportation (Medical): No     Lack of Transportation (Non-Medical): No   Physical Activity: Inactive (3/7/2025)    Exercise Vital Sign     Days of Exercise per Week: 0 days     Minutes of Exercise per Session: 0 min   Stress: No Stress Concern Present (3/7/2025)    [place] [facility] [employer] - Occupational Stress Questionnaire     Feeling of Stress : Not at all   Housing Stability: Low Risk  (3/7/2025)    Housing Stability Vital Sign     Unable to Pay for Housing in the Last Year: No     Homeless in the Last Year: No     Review of Systems   All  other systems reviewed and are negative.    Objective:     Vital Signs (Most Recent):  Temp: 100 °F (37.8 °C) (03/09/25 1528)  Pulse: 83 (03/09/25 1528)  Resp: 18 (03/09/25 1528)  BP: 124/73 (03/09/25 1528)  SpO2: 98 % (03/09/25 1528) Vital Signs (24h Range):  Temp:  [98.7 °F (37.1 °C)-100.9 °F (38.3 °C)] 100 °F (37.8 °C)  Pulse:  [73-96] 83  Resp:  [16-18] 18  SpO2:  [97 %-99 %] 98 %  BP: (103-124)/(61-73) 124/73     Weight: 72.6 kg (160 lb)  Body mass index is 23.63 kg/m².    Estimated Creatinine Clearance: 30.8 mL/min (A) (based on SCr of 2.2 mg/dL (H)).     Physical Exam  Vitals reviewed.   Constitutional:       Appearance: Normal appearance. He is not ill-appearing.   HENT:      Mouth/Throat:      Pharynx: Oropharyngeal exudate (white exudate on tongue) present.      Comments: Erythematous lesions to inside upper lip, no hemorrhagic appearing lesions  Pulmonary:      Effort: Pulmonary effort is normal. No respiratory distress.   Abdominal:      General: There is no distension.      Palpations: Abdomen is soft.      Tenderness: There is no abdominal tenderness.   Skin:     General: Skin is warm.      Findings: No rash.   Neurological:      Mental Status: He is alert.   Psychiatric:         Mood and Affect: Mood normal.         Behavior: Behavior normal.          Significant Labs:   Microbiology Results (last 7 days)       Procedure Component Value Units Date/Time    Blood culture [[id number]] Collected: 03/08/25 0918    Order Status: Completed Specimen: Blood Updated: 03/09/25 1212     Blood Culture, Routine No Growth to date      No Growth to date    Culture, Respiratory with Gram Stain [[id number]] Collected: 03/08/25 1600    Order Status: Completed Specimen: Respiratory from Sputum Updated: 03/09/25 0839     Respiratory Culture Normal respiratory judah     Gram Stain (Respiratory) <10 epithelial cells per low power field.     Gram Stain (Respiratory) Few WBC's     Gram Stain (Respiratory) Many Gram positive  cocci     Gram Stain (Respiratory) Rare Gram negative rods    MRSA Screen by PCR [[id number]] Collected: 03/08/25 1430    Order Status: Completed Specimen: Nasal Swab Updated: 03/08/25 1758     MRSA SCREEN BY PCR Not Detected    Blood culture [[id number]] Collected: 03/08/25 1018    Order Status: Sent Specimen: Blood             Significant Imaging: I have reviewed all pertinent imaging results/findings within the past 24 hours.

## 2025-03-09 NOTE — PLAN OF CARE
Problem: Adult Inpatient Plan of Care  Goal: Plan of Care Review  3/9/2025 0546 by Pau Erickson LPN  Outcome: Progressing  3/9/2025 0545 by Pau Erickson LPN  Outcome: Progressing     Problem: Anemia  Goal: Anemia Symptom Improvement  3/9/2025 0546 by Pau Erickson LPN  Outcome: Progressing  3/9/2025 0545 by Pau Erickson LPN  Outcome: Progressing     Pt AOx4. No acute events noted during shift. Vitals remained stable. Had c/o pain and discomfort, scheduled meds given per MD orders. Q1-2hr safety checks completed. Bed remained low, locked, with all personal items in reach.

## 2025-03-09 NOTE — SUBJECTIVE & OBJECTIVE
Past Medical History:   Diagnosis Date    Anemia     Cancer     Cataract     Chemotherapy induced neutropenia 04/11/2023    Colon polyp 2014    Depression     Disorder of kidney and ureter     History of hepatitis C, s/p successful treatment w/ SVR12 - 7/2015 10/14/2012    Kelsey 1a,  Liver biopsy 6/2014 - Stage 1 fibrosis;  Completed 8 weeks Harvoni w/ SVR12 - 7/2015      Hyperlipidemia     Hypertension 03/08/2024    Lipoma of arm     Lipoma of lower extremity     Nuclear sclerosis - Both Eyes 10/22/2012    Other and unspecified hyperlipidemia 10/22/2013    PAD (peripheral artery disease)     Peripheral vascular disease, unspecified     Plaquenil adverse reaction in therapeutic use 10/22/2012    Rheumatoid arthritis(714.0) 10/14/2012    Special screening for malignant neoplasms, colon 02/21/2014       Past Surgical History:   Procedure Laterality Date    BIOPSY OF URETER Left 02/16/2023    Procedure: BIOPSY, URETER/BRUSH;  Surgeon: Kem Jefferson MD;  Location: [facility] OR 1ST FLR;  Service: Urology;  Laterality: Left;    COLONOSCOPY N/A 11/29/2021    Procedure: COLONOSCOPY;  Surgeon: Kenrick Zimmer MD;  Location: [facility] (4TH FLR);  Service: Endoscopy;  Laterality: N/A;  blood thinner approval received, see telephone encounter 10/19/21-BB  fully vacc-inst email-tb    CYSTOSCOPY      CYSTOSCOPY  02/16/2023    Procedure: CYSTOSCOPY;  Surgeon: Kem Jefferson MD;  Location: [facility] OR 1ST FLR;  Service: Urology;;    HERNIA REPAIR      INSERTION OF TUNNELED CENTRAL VENOUS CATHETER (CVC) WITH SUBCUTANEOUS PORT Right 3/13/2023    Procedure: INSERTION, PORT-A-CATH;  Surgeon: Rene Cali MD;  Location: [facility] CATH LAB;  Service: Radiology;  Laterality: Right;    KNEE ARTHROPLASTY      LAPAROSCOPIC EXPLORATION OF GROIN Left 09/06/2018    Procedure: EXPLORATION, INGUINAL REGION, LAPAROSCOPIC;  Surgeon: Mingo De Guzman Jr., MD;  Location: [facility] OR 2ND FLR;  Service: General;  Laterality: Left;    [facility]  REMOVAL N/A 10/3/2023    Procedure: REMOVAL, CATHETER, CENTRAL VENOUS, TUNNELED, WITH PORT;  Surgeon: Yeimi Stanton MD;  Location: [facility] CATH LAB;  Service: Radiology;  Laterality: N/A;    PYELOSCOPY Left 02/16/2023    Procedure: PYELOSCOPY;  Surgeon: Kem Jefferson MD;  Location: [facility] OR 1ST FLR;  Service: Urology;  Laterality: Left;    RETROGRADE PYELOGRAPHY Bilateral 02/16/2023    Procedure: PYELOGRAM, RETROGRADE;  Surgeon: Kem Jefferson MD;  Location: [facility] OR 1ST FLR;  Service: Urology;  Laterality: Bilateral;    ROBOT-ASSISTED LAPAROSCOPIC SURGICAL REMOVAL OF KIDNEY AND URETER USING DA BRIJESH XI Left 7/14/2023    Procedure: XI ROBOTIC NEPHROURETERECTOMY;  Surgeon: Kem Jefferson MD;  Location: [facility] OR 2ND FLR;  Service: Urology;  Laterality: Left;  5 hours    TONSILLECTOMY      URETEROSCOPIC REMOVAL OF URETERIC CALCULUS Left 02/16/2023    Procedure: REMOVAL, CALCULUS, URETER, URETEROSCOPIC;  Surgeon: Kem Jefferson MD;  Location: [facility] OR 1ST FLR;  Service: Urology;  Laterality: Left;    URETEROSCOPY Left 02/16/2023    Procedure: URETEROSCOPY;  Surgeon: Kem Jefferson MD;  Location: [facility] OR 1ST FLR;  Service: Urology;  Laterality: Left;       Review of patient's allergies indicates:   Allergen Reactions    Iodinated contrast media      Shortness of breath       Medications:  Medications Prior to Admission   Medication Sig    amLODIPine (NORVASC) 5 MG tablet TAKE 1 TABLET BY MOUTH ONCE  DAILY    aspirin 81 MG Chew Take 81 mg by mouth once daily.    cilostazoL (PLETAL) 50 MG Tab TAKE 1 TABLET BY MOUTH TWICE  DAILY    gabapentin (NEURONTIN) 100 MG capsule Take 1 capsule (100 mg total) by mouth every evening.    methotrexate 2.5 MG Tab TAKE 8 TABLETS BY MOUTH EVERY 7  DAYS THIS MEDICATION REQUIRES  PERIODIC LAB MONITORING    nystatin (MYCOSTATIN) 100,000 unit/mL suspension Take by mouth 4 (four) times daily.    simvastatin (ZOCOR) 10 MG tablet Take 1 tablet (10 mg total) by mouth every evening.     tamsulosin (FLOMAX) 0.4 mg Cap Take 1 capsule (0.4 mg total) by mouth once daily.    acetaminophen (TYLENOL) 650 MG TbSR Take 1 tablet (650 mg total) by mouth every 8 (eight) hours.    bisacodyL (DULCOLAX) 5 mg EC tablet Take 5 mg by mouth daily as needed for Constipation.    docusate sodium (COLACE) 100 MG capsule Take 100 mg by mouth 2 (two) times daily.    folic acid (FOLVITE) 1 MG tablet Take 1 tablet (1,000 mcg total) by mouth once daily.    levocetirizine (XYZAL) 5 MG tablet TAKE 1 TABLET BY MOUTH EVERY DAY IN THE EVENING    multivitamin capsule Take 1 capsule by mouth once daily.    traZODone (DESYREL) 50 MG tablet TAKE 3 TABLETS BY MOUTH AT NIGHT AS NEEDED FOR INSOMNIA     Antibiotics (From admission, onward)      None          Antifungals (From admission, onward)      Start     Stop Route Frequency Ordered    03/07/25 0900  fluconazole tablet 100 mg         -- Oral Daily 03/06/25 1829 03/06/25 2100  duke's soln (benadryl 30 mL, mylanta 30 mL, LIDOcaine 30 mL, nystatin 30 mL) 120 mL         -- Oral 4 times daily 03/06/25 1829          Antivirals (From admission, onward)      None             Immunization History   Administered Date(s) Administered    COVID-19 MRNA, LN-S PF (MODERNA HALF 0.25 ML DOSE) 10/26/2021, 06/09/2022    COVID-19, MRNA, LN-S, PF (MODERNA FULL 0.5 ML DOSE) 02/18/2021, 03/18/2021    COVID-19, mRNA, LNP-S, PF (Moderna) Ages 12+ 10/25/2023    COVID-19, mRNA, LNP-S, PF, malia-sucrose, 30 mcg/0.3 mL (Pfizer Ages 12+) 09/08/2024    COVID-19, mRNA, LNP-S, bivalent booster, PF (Moderna Omicron)12 + YEARS 10/11/2022    Hepatitis A / Hepatitis B 12/02/2020, 04/07/2021, 10/11/2021    Influenza 01/27/2014, 10/10/2018, 09/26/2019    Influenza (FLUAD) - Quadrivalent - Adjuvanted - PF *Preferred* (65+) 09/27/2023    Influenza - Quadrivalent - High Dose - PF (65 years and older) 08/18/2021, 10/11/2022    Influenza - Quadrivalent - MDCK - PF 10/16/2017, 10/22/2018    Influenza - Quadrivalent - PF  *Preferred* (6 months and older) 2015, 10/15/2016    Influenza - Trivalent - Fluzone High Dose - PF (65 years and older) 2019, 2024    Influenza Split 2014, 2014    Pneumococcal Conjugate - 13 Valent 10/14/2020    Pneumococcal Polysaccharide - 23 Valent 2014    Tdap 2014    Zoster 2014    Zoster Recombinant 10/14/2020, 2021       Family History       Problem Relation (Age of Onset)    Arthritis Sister    Dementia Mother, Father    Heart disease Sister, Paternal Uncle    Kidney disease Sister    No Known Problems Daughter    Seizures Sister          Social History     Socioeconomic History    Marital status:      Spouse name: Rufina   Occupational History     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[employer]   Tobacco Use    Smoking status: Former     Current packs/day: 0.00     Average packs/day: 0.5 packs/day for 47.5 years (23.7 ttl pk-yrs)     Types: Cigarettes     Start date:      Quit date: 2020     Years since quittin.7    Smokeless tobacco: Never   Substance and Sexual Activity    Alcohol use: Yes     Comment: 2 drinks per week    Drug use: Yes     Types: Marijuana     Comment: marajuana used on Saturday    Sexual activity: Yes     Partners: Female   Social History Narrative    Resides locally    [place] at [employer]     w/ 2 adult children     Social Drivers of Health     Financial Resource Strain: Low Risk  (3/7/2025)    Overall Financial Resource Strain (CARDIA)     Difficulty of Paying Living Expenses: Not very hard   Food Insecurity: No Food Insecurity (3/7/2025)    Hunger Vital Sign     Worried About Running Out of Food in the Last Year: Never true     Ran Out of Food in the Last Year: Never true   Transportation Needs: No Transportation Needs (10/28/2024)    PRAPARE - Transportation     Lack of Transportation (Medical): No     Lack of Transportation (Non-Medical): No   Physical Activity: Inactive (3/7/2025)    Exercise  Vital Sign     Days of Exercise per Week: 0 days     Minutes of Exercise per Session: 0 min   Stress: No Stress Concern Present (3/7/2025)    [employer] [place] [employer] - Occupational Stress Questionnaire     Feeling of Stress : Not at all   Housing Stability: Low Risk  (3/7/2025)    Housing Stability Vital Sign     Unable to Pay for Housing in the Last Year: No     Homeless in the Last Year: No     Review of Systems   All other systems reviewed and are negative.    Objective:     Vital Signs (Most Recent):  Temp: 100 °F (37.8 °C) (03/09/25 1528)  Pulse: 83 (03/09/25 1528)  Resp: 18 (03/09/25 1528)  BP: 124/73 (03/09/25 1528)  SpO2: 98 % (03/09/25 1528) Vital Signs (24h Range):  Temp:  [98.7 °F (37.1 °C)-100.9 °F (38.3 °C)] 100 °F (37.8 °C)  Pulse:  [73-96] 83  Resp:  [16-18] 18  SpO2:  [97 %-99 %] 98 %  BP: (103-124)/(61-73) 124/73     Weight: 72.6 kg (160 lb)  Body mass index is 23.63 kg/m².    Estimated Creatinine Clearance: 30.8 mL/min (A) (based on SCr of 2.2 mg/dL (H)).     Physical Exam  Vitals reviewed.   Constitutional:       Appearance: Normal appearance. He is not ill-appearing.   HENT:      Mouth/Throat:      Pharynx: Oropharyngeal exudate (white exudate on tongue) present.      Comments: Erythematous lesions to inside upper lip, no hemorrhagic appearing lesions  Pulmonary:      Effort: Pulmonary effort is normal. No respiratory distress.   Abdominal:      General: There is no distension.      Palpations: Abdomen is soft.      Tenderness: There is no abdominal tenderness.   Skin:     General: Skin is warm.      Findings: No rash.   Neurological:      Mental Status: He is alert.   Psychiatric:         Mood and Affect: Mood normal.         Behavior: Behavior normal.          Significant Labs:   Microbiology Results (last 7 days)       Procedure Component Value Units Date/Time    Blood culture [[id number]] Collected: 03/08/25 0918    Order Status: Completed Specimen: Blood Updated: 03/09/25  1212     Blood Culture, Routine No Growth to date      No Growth to date    Culture, Respiratory with Gram Stain [[id number]] Collected: 03/08/25 1600    Order Status: Completed Specimen: Respiratory from Sputum Updated: 03/09/25 0839     Respiratory Culture Normal respiratory judah     Gram Stain (Respiratory) <10 epithelial cells per low power field.     Gram Stain (Respiratory) Few WBC's     Gram Stain (Respiratory) Many Gram positive cocci     Gram Stain (Respiratory) Rare Gram negative rods    MRSA Screen by PCR [[id number]] Collected: 03/08/25 1430    Order Status: Completed Specimen: Nasal Swab Updated: 03/08/25 1758     MRSA SCREEN BY PCR Not Detected    Blood culture [[id number]] Collected: 03/08/25 1018    Order Status: Sent Specimen: Blood             Significant Imaging: I have reviewed all pertinent imaging results/findings within the past 24 hours.

## 2025-03-09 NOTE — ASSESSMENT & PLAN NOTE
Patient's blood pressure range in the last 24 hours was: BP  Min: 103/72  Max: 119/70.The patient's inpatient anti-hypertensive regimen is listed below:  Current Antihypertensives       Plan  - BP is controlled, no changes needed to their regimen  - continue amlodipine 5mg daily

## 2025-03-09 NOTE — ASSESSMENT & PLAN NOTE
rheumatoid arthritis on methotrexate -weekly . rheumatology eval    ----- Message from Esthela Morales [facility]    Pharmacy listed in chart? (yes/no):Yes  Pharmacy phone number:[phone]      Details to clarify the request:Pharmacy has faxed over a request       Message from [facility]

## 2025-03-09 NOTE — HPI
"Andrea Gant is a 70 year old man with RA on methotrexate, urothelial carcinoma in remission, hepatitis C with SVR who was admitted 3/6 due to painful oral lesions. He was recently prescribed PO amox-clav after cystoscopy from 2/7-2/21 with subsequent development of painful oral lesions. He was found to be pancytopenic, now with concern for methotrexate toxicity after taking more frequently than prescribed (daily dosing rather than once weekly for last few weeks). He reports that the lesions are improved and he is able to tolerate some PO today. He denies visual changes, cough, dyspnea, abdominal pain, diarrhea, rash, history of cold sores, dysuria.     Tmax up to 100.9. ID consulted for "neutropenic fever". Chest x-ray with pleural thickening and increased pleural effusions. Blood cultures no growth to date. Respiratory culture with normal respiratory judah. ANC 1116->1762, no longer neutropenic. Currently on fluconazole alone.   "

## 2025-03-09 NOTE — ASSESSMENT & PLAN NOTE
3/8 low grade temperature of 100.4. reports cough. CX ray -Continued pleural fluid or thickening at the right base now with some fluid or thickening at the left base.  Some patchy parenchymal opacities though no confluent consolidation.  No pneumothorax.BC x2, UA  negative and procalcitonin   3.9 ID consulted for neutropenic fever . advanced to soft diet and started on boost.  ANC 1900. WBC improving. fever of 100.9F overnight. MRSA PCR negative

## 2025-03-09 NOTE — ASSESSMENT & PLAN NOTE
"Andrea Gant is a 70 year old man with RA on methotrexate, urothelial carcinoma in remission, hepatitis C with SVR who was admitted 3/6 due to painful oral lesions. He was recently prescribed PO amox-clav after cystoscopy from 2/7-2/21 with subsequent development of painful oral lesions. He was found to be pancytopenic, now with concern for methotrexate toxicity after taking more frequently than prescribed. Tmax up to 100.9. ID consulted for "neutropenic fever". Chest x-ray with pleural thickening and increased pleural effusions. Blood cultures no growth to date. Respiratory culture with normal respiratory judah. ANC 1116->1762, no longer neutropenic. Currently on fluconazole alone. No signs/symptoms of other infectious etiology.     Recommendations  - continue renally dosed fluconazole for presumed oral and esophageal candidiasis x 14-21 days depending on clinical improvement   - if fevers persist, can get CT chest, abdomen, pelvis   - methotrexate can also cause oral ulcers, so likely contributing to discomfort   - if no continued improvement in symptoms, can re-engage GI for discussion of EGD   "

## 2025-03-09 NOTE — ASSESSMENT & PLAN NOTE
Anemia is likely due to chronic disease due to RA. Most recent hemoglobin and hematocrit are listed below.  Recent Labs     03/07/25  1651 03/08/25  0641 03/09/25  0509   HGB 8.5* 8.7* 7.8*   HCT 26.3* 26.3* 24.3*     Plan  - Monitor serial CBC: Daily  - Transfuse PRBC if patient becomes hemodynamically unstable, symptomatic or H/H drops below 7/21.  3/7 HB trended down to 6.9 . Transfusion with 1 unit of PRBC . Haptoglobin elevated.

## 2025-03-09 NOTE — ASSESSMENT & PLAN NOTE
Creatine stable for now. BMP reviewed- noted Estimated Creatinine Clearance: 30.8 mL/min (A) (based on SCr of 2.2 mg/dL (H)). according to latest data. Monitor UOP and serial BMP and adjust therapy as needed. Renally dose meds.    Recent Labs   Lab 03/07/25  0618 03/08/25  0641 03/09/25  0509   BUN 33* 32* 32*   CREATININE 2.1* 2.0* 2.2*       I & O     Intake/Output Summary (Last 24 hours) at 3/9/2025 0840  Last data filed at 3/8/2025 1200  Gross per 24 hour   Intake 480 ml   Output --   Net 480 ml

## 2025-03-10 PROBLEM — D61.811 DRUG-INDUCED PANCYTOPENIA: Status: ACTIVE | Noted: 2025-03-10

## 2025-03-10 LAB
ALBUMIN SERPL BCP-MCNC: 2.8 G/DL (ref 3.5–5.2)
ALP SERPL-CCNC: 72 U/L (ref 40–150)
ALT SERPL W/O P-5'-P-CCNC: 20 U/L (ref 10–44)
ANION GAP SERPL CALC-SCNC: 11 MMOL/L (ref 8–16)
AST SERPL-CCNC: 18 U/L (ref 10–40)
BACTERIA SPEC AEROBE CULT: NORMAL
BACTERIA SPEC AEROBE CULT: NORMAL
BASOPHILS # BLD AUTO: 0.01 K/UL (ref 0–0.2)
BASOPHILS NFR BLD: 0.2 % (ref 0–1.9)
BILIRUB SERPL-MCNC: 0.6 MG/DL (ref 0.1–1)
BUN SERPL-MCNC: 31 MG/DL (ref 8–23)
CALCIUM SERPL-MCNC: 8.5 MG/DL (ref 8.7–10.5)
CHLORIDE SERPL-SCNC: 105 MMOL/L (ref 95–110)
CO2 SERPL-SCNC: 21 MMOL/L (ref 23–29)
CREAT SERPL-MCNC: 2.3 MG/DL (ref 0.5–1.4)
DIFFERENTIAL METHOD BLD: ABNORMAL
EOSINOPHIL # BLD AUTO: 0.1 K/UL (ref 0–0.5)
EOSINOPHIL NFR BLD: 1.5 % (ref 0–8)
ERYTHROCYTE [DISTWIDTH] IN BLOOD BY AUTOMATED COUNT: 14.6 % (ref 11.5–14.5)
EST. GFR  (NO RACE VARIABLE): 29.6 ML/MIN/1.73 M^2
GLUCOSE SERPL-MCNC: 114 MG/DL (ref 70–110)
GRAM STN SPEC: NORMAL
HCT VFR BLD AUTO: 25.1 % (ref 40–54)
HCV RNA SERPL QL NAA+PROBE: NOT DETECTED
HCV RNA SPEC NAA+PROBE-ACNC: NOT DETECTED IU/ML
HGB BLD-MCNC: 8 G/DL (ref 14–18)
IMM GRANULOCYTES # BLD AUTO: 0.05 K/UL (ref 0–0.04)
IMM GRANULOCYTES NFR BLD AUTO: 0.8 % (ref 0–0.5)
LACTATE SERPL-SCNC: 0.8 MMOL/L (ref 0.5–2.2)
LYMPHOCYTES # BLD AUTO: 0.8 K/UL (ref 1–4.8)
LYMPHOCYTES NFR BLD: 12.4 % (ref 18–48)
MAGNESIUM SERPL-MCNC: 2 MG/DL (ref 1.6–2.6)
MCH RBC QN AUTO: 28.3 PG (ref 27–31)
MCHC RBC AUTO-ENTMCNC: 31.9 G/DL (ref 32–36)
MCV RBC AUTO: 89 FL (ref 82–98)
MONOCYTES # BLD AUTO: 0.3 K/UL (ref 0.3–1)
MONOCYTES NFR BLD: 5.4 % (ref 4–15)
NEUTROPHILS # BLD AUTO: 4.8 K/UL (ref 1.8–7.7)
NEUTROPHILS NFR BLD: 79.7 % (ref 38–73)
NRBC BLD-RTO: 0 /100 WBC
PATH REV BLD -IMP: NORMAL
PHOSPHATE SERPL-MCNC: 3.2 MG/DL (ref 2.7–4.5)
PLATELET # BLD AUTO: 94 K/UL (ref 150–450)
PMV BLD AUTO: 10.5 FL (ref 9.2–12.9)
POTASSIUM SERPL-SCNC: 4.6 MMOL/L (ref 3.5–5.1)
PROT SERPL-MCNC: 6.7 G/DL (ref 6–8.4)
RBC # BLD AUTO: 2.83 M/UL (ref 4.6–6.2)
SODIUM SERPL-SCNC: 137 MMOL/L (ref 136–145)
WBC # BLD AUTO: 6.06 K/UL (ref 3.9–12.7)

## 2025-03-10 PROCEDURE — 63600175 PHARM REV CODE 636 W HCPCS: Performed by: HOSPITALIST

## 2025-03-10 PROCEDURE — 85025 COMPLETE CBC W/AUTO DIFF WBC: CPT

## 2025-03-10 PROCEDURE — 25000003 PHARM REV CODE 250: Performed by: HOSPITALIST

## 2025-03-10 PROCEDURE — 63700000 PHARM REV CODE 250 ALT 637 W/O HCPCS: Performed by: HOSPITALIST

## 2025-03-10 PROCEDURE — 83735 ASSAY OF MAGNESIUM: CPT

## 2025-03-10 PROCEDURE — 85060 BLOOD SMEAR INTERPRETATION: CPT | Mod: ,,, | Performed by: PATHOLOGY

## 2025-03-10 PROCEDURE — 36415 COLL VENOUS BLD VENIPUNCTURE: CPT

## 2025-03-10 PROCEDURE — 84100 ASSAY OF PHOSPHORUS: CPT

## 2025-03-10 PROCEDURE — 80053 COMPREHEN METABOLIC PANEL: CPT

## 2025-03-10 PROCEDURE — 83605 ASSAY OF LACTIC ACID: CPT | Performed by: HOSPITALIST

## 2025-03-10 PROCEDURE — 11000001 HC ACUTE MED/SURG PRIVATE ROOM

## 2025-03-10 PROCEDURE — 27000207 HC ISOLATION

## 2025-03-10 PROCEDURE — 36415 COLL VENOUS BLD VENIPUNCTURE: CPT | Performed by: HOSPITALIST

## 2025-03-10 RX ADMIN — OXYCODONE 5 MG: 5 TABLET ORAL at 01:03

## 2025-03-10 RX ADMIN — OXYCODONE 5 MG: 5 TABLET ORAL at 05:03

## 2025-03-10 RX ADMIN — OXYCODONE 5 MG: 5 TABLET ORAL at 10:03

## 2025-03-10 RX ADMIN — OXYCODONE 5 MG: 5 TABLET ORAL at 09:03

## 2025-03-10 RX ADMIN — OXYCODONE 5 MG: 5 TABLET ORAL at 06:03

## 2025-03-10 RX ADMIN — FOLIC ACID 4 MG: 1 TABLET ORAL at 09:03

## 2025-03-10 RX ADMIN — FLUCONAZOLE 100 MG: 100 TABLET ORAL at 09:03

## 2025-03-10 RX ADMIN — ALUMINUM HYDROXIDE, MAGNESIUM HYDROXIDE, AND DIMETHICONE 10 ML: 400; 400; 40 SUSPENSION ORAL at 08:03

## 2025-03-10 RX ADMIN — PRAVASTATIN SODIUM 20 MG: 20 TABLET ORAL at 09:03

## 2025-03-10 RX ADMIN — SODIUM CHLORIDE 250 ML: 9 INJECTION, SOLUTION INTRAVENOUS at 10:03

## 2025-03-10 RX ADMIN — ACETAMINOPHEN 650 MG: 325 TABLET ORAL at 04:03

## 2025-03-10 RX ADMIN — ACETAMINOPHEN 650 MG: 325 TABLET ORAL at 08:03

## 2025-03-10 RX ADMIN — ENOXAPARIN SODIUM 40 MG: 40 INJECTION SUBCUTANEOUS at 04:03

## 2025-03-10 RX ADMIN — TAMSULOSIN HYDROCHLORIDE 0.4 MG: 0.4 CAPSULE ORAL at 09:03

## 2025-03-10 RX ADMIN — ALUMINUM HYDROXIDE, MAGNESIUM HYDROXIDE, AND DIMETHICONE 10 ML: 400; 400; 40 SUSPENSION ORAL at 04:03

## 2025-03-10 RX ADMIN — ALUMINUM HYDROXIDE, MAGNESIUM HYDROXIDE, AND DIMETHICONE 10 ML: 400; 400; 40 SUSPENSION ORAL at 01:03

## 2025-03-10 RX ADMIN — OXYCODONE 5 MG: 5 TABLET ORAL at 02:03

## 2025-03-10 RX ADMIN — ALUMINUM HYDROXIDE, MAGNESIUM HYDROXIDE, AND DIMETHICONE 10 ML: 400; 400; 40 SUSPENSION ORAL at 09:03

## 2025-03-10 NOTE — ASSESSMENT & PLAN NOTE
3/8 low grade temperature of 100.4. reports cough. CX ray -Continued pleural fluid or thickening at the right base now with some fluid or thickening at the left base.  Some patchy parenchymal opacities though no confluent consolidation.  No pneumothorax.BC x2, UA  negative and procalcitonin   3.9 ID consulted for neutropenic fever . advanced to soft diet and started on boost.  ANC 1900. WBC improving. fever of 100.9F overnight. MRSA PCR negative   3/10  ID eval - continue renally dosed fluconazole for presumed oral and esophageal candidiasis x 14-21 days depending on clinical improvement. CT chest, abdomen, pelvis ordered

## 2025-03-10 NOTE — ASSESSMENT & PLAN NOTE
Anemia is likely due to chronic disease due to RA. Most recent hemoglobin and hematocrit are listed below.  Recent Labs     03/08/25  0641 03/09/25  0509 03/10/25  0400   HGB 8.7* 7.8* 8.0*   HCT 26.3* 24.3* 25.1*     Plan  - Monitor serial CBC: Daily  - Transfuse PRBC if patient becomes hemodynamically unstable, symptomatic or H/H drops below 7/21.  3/7 HB trended down to 6.9 . Transfusion with 1 unit of PRBC . Haptoglobin elevated.

## 2025-03-10 NOTE — PROGRESS NOTES
[facility] - Emergency Dept  Hospital Medicine  Progress Note    Patient Name: Andrea Gant  MRN: [id number]  Patient Class: IP- Inpatient   Admission Date: 3/6/2025  Length of Stay: 3 days  Attending Physician: Jarocho Frazier MD  Primary Care Provider: Andrea Wang MD        Subjective     Principal Problem:Oral thrush        HPI:  Andrea Winter is a 70-year-old male with rheumatoid arthritis on methotrexate, history of urothelial carcinoma in remission, anemia, hepatitis-C s/p SVR, PAD presents for thrush.       AAO x3. Patient waws prescribed augmentin 2/7 -2/21 prior to the onset of his symptoms post cystoscopy. c/o pain with  swallowing after 4-5 days after augmentin.  denies fever, drooling or prior similar episodes. denies history of diabetes or HIV.   Reports painful whitish lesions in his mouth x  10 days. Patient was seen at urgent care clinic 02/26/2025 with congestion, sore throat and trouble swallowing.  Flu swab negative on 2/26.   prescribed prednisone 20mg x 5 days for viral pharyngitis, states that this significantly improved his sore throat and body aches but not the thrush.     ER course - Thick whitish discharge on the tongue consistent with oral candidiasis . Workup is notable for new pancytopenia. Hb 7.9, leucopenia 1.55 and .  discussed with hematology, likely due to MTX however he has been on this medication for a long time. admitted for further work up     Overview/Hospital Course:  3/7 HB trended down to 6.9 . Transfusion with 1 unit of PRBC . Haptoglobin elevated.  P replaced. . Neutropenic precautions. had been taking MTX daily incorrectly instead of weekly and  MTX toxicity given incorrect daily dosing and CKD. MTX levels < 0.04. No leucovorin for now per heme onc .  parvo serologies, Reticulocytes, Iron panel, Hep C PCR ordered   3/8 low grade temperature of 100.5. CX ray -Continued pleural fluid or thickening at the right base now with some fluid or  thickening at the left base.  Some patchy parenchymal opacities though no confluent consolidation.  No pneumothorax. COVID, Flu, RSV negative. BC x2, UA and procalcitonin  0.27 . UA negative. ANC 1500. folic acid increased to 4mg daily.  ID consulted for neutropenic fever . advanced to soft diet and started on boost   3/9 ANC 1900. WBC improving. fever of 100.9F overnight. MRSA PCR negative . P replaced. requests full liquid diet.   3/10 WBC and ANC normalized. platelets trended down to 93. Low grade temp of 100.6F this AM. ID eval - continue renally dosed fluconazole for presumed oral and esophageal candidiasis x 14-21 days depending on clinical improvement. CT chest, abdomen, pelvis ordered.SBP 90s improved with NS bolus 250ml x1. GI follow up today                Review of Systems:   Pain scale:    8/10   Constitutional:  fever,  chills, headache, vision loss, hearing loss, weight loss, Generalized weakness, falls, loss of smell, loss of taste, poor appetite,  sore throat, immunocompromised  Respiratory: cough, shortness of breath.   Cardiovascular: chest pain, dizziness, palpitations, orthopnea, swelling of feet, syncope  Gastrointestinal: nausea, vomiting, abdominal pain, diarrhea, black stool,  blood in stool, change in bowel habits, constipation, difficulty swallowing  Genitourinary: hematuria, dysuria, urgency, frequency  Integument/Breast: rash,  pruritis  Hematologic/Lymphatic: easy bruising, lymphadenopathy  Musculoskeletal: arthralgias , myalgias, back pain, neck pain, knee pain  Neurological: confusion, seizures, tremors, slurred speech  Behavioral/Psych:  depression, anxiety, auditory or visual hallucinations     OBJECTIVE:     Physical Exam:  Body mass index is 23.63 kg/m².    Constitutional: Appears well-developed and well-nourished.   Head: Normocephalic and atraumatic.  dentures   oral cavity - Thick white discharge on the tongue   poserior pharyngeal erythema  Neck: Normal range of motion. Neck  supple.   Cardiovascular: Normal heart rate.  Regular heart rhythm.  Pulmonary/Chest: Effort normal.   Abdominal: No distension.  No tenderness  Musculoskeletal: Normal range of motion. No edema.   Neurological: Alert and oriented to person, place, and time.   Skin: Skin is warm and dry.   Psychiatric: Normal mood and affect. Behavior is normal.       Vital Signs  Temp: (!) 100.7 °F (38.2 °C) (03/10/25 1125)  Pulse: 94 (03/10/25 1325)  Resp: 18 (03/10/25 1125)  BP: 131/78 (03/10/25 1125)  SpO2: 100 % (03/10/25 1125)     24 Hour VS Range    Temp:  [98.8 °F (37.1 °C)-100.7 °F (38.2 °C)]   Pulse:  []   Resp:  [16-18]   BP: ()/(52-78)   SpO2:  [96 %-100 %]     Intake/Output Summary (Last 24 hours) at 3/10/2025 1339  Last data filed at 3/9/2025 1526  Gross per 24 hour   Intake 240 ml   Output --   Net 240 ml           I/O This Shift:  No intake/output data recorded.    Wt Readings from Last 3 Encounters:   03/06/25 72.6 kg (160 lb)   02/26/25 71.2 kg (157 lb)   02/20/25 71.4 kg (157 lb 6.5 oz)       I have personally reviewed the vitals and recorded Intake/Output     Laboratory/Diagnostic Data:    CBC/Anemia Labs: Coags:    Recent Labs   Lab 03/06/25  1514 03/07/25  0618 03/07/25  1651 03/08/25  0641 03/09/25  0509 03/10/25  0400   WBC 1.55*   < > 1.90* 2.71* 2.89* 6.06   HGB 7.9*   < > 8.5* 8.7* 7.8* 8.0*   HCT 24.0*   < > 26.3* 26.3* 24.3* 25.1*   *   < > 144* 137* 117* 94*   MCV 88   < > 87 87 88 89   RDW 13.6   < > 14.4 14.7* 14.5 14.6*   IRON  --   --  90  --   --   --    FERRITIN  --   --  1,474*  --   --   --    RETIC  --   --  0.5  --   --   --    FOLATE >40.0*  --   --   --   --   --    SOHAMTZD25 614  --   --   --   --   --     < > = values in this interval not displayed.    Recent Labs   Lab 03/06/25  1514   INR 1.0   APTT 28.0        Chemistries: ABG:   Recent Labs   Lab 03/08/25  0641 03/09/25  0509 03/10/25  0400    133* 137   K 4.2 4.2 4.6    105 105   CO2 22* 21* 21*   BUN  "32* 32* 31*   CREATININE 2.0* 2.2* 2.3*   CALCIUM 8.8 8.5* 8.5*   PROT 6.9 6.5 6.7   BILITOT 0.7 0.4 0.6   ALKPHOS 79 70 72   ALT 34 26 20   AST 21 17 18   MG 2.1 2.0 2.0   PHOS 2.3* 2.4* 3.2    No results for input(s): "PH", "PCO2", "PO2", "HCO3", "POCSATURATED", "BE" in the last 168 hours.     POCT Glucose: HbA1c:    No results for input(s): "POCTGLUCOSE" in the last 168 hours. Hemoglobin A1C   Date Value Ref Range Status   05/17/2022 5.0 4.0 - 5.6 % Final     Comment:     ADA Screening Guidelines:  5.7-6.4%  Consistent with prediabetes  >or=6.5%  Consistent with diabetes    High levels of fetal hemoglobin interfere with the HbA1C  assay. Heterozygous hemoglobin variants (HbS, HgC, etc)do  not significantly interfere with this assay.   However, presence of multiple variants may affect accuracy.     10/11/2021 5.2 4.0 - 5.6 % Final     Comment:     ADA Screening Guidelines:  5.7-6.4%  Consistent with prediabetes  >or=6.5%  Consistent with diabetes    High levels of fetal hemoglobin interfere with the HbA1C  assay. Heterozygous hemoglobin variants (HbS, HgC, etc)do  not significantly interfere with this assay.   However, presence of multiple variants may affect accuracy.          Cardiac Enzymes: Ejection Fractions:    No results for input(s): "CPK", "CPKMB", "MB", "TROPONINI" in the last 72 hours. No results found for: "EF"       No results for input(s): "COLORU", "APPEARANCEUA", "PHUR", "SPECGRAV", "PROTEINUA", "GLUCUA", "KETONESU", "BILIRUBINUA", "OCCULTUA", "NITRITE", "UROBILINOGEN", "LEUKOCYTESUR", "RBCUA", "WBCUA", "BACTERIA", "SQUAMEPITHEL", "HYALINECASTS" in the last 48 hours.    Invalid input(s): "WRIGHTSUR"      Procalcitonin (ng/mL)   Date Value   03/08/2025 0.27 (H)     Lactate (Lactic Acid) (mmol/L)   Date Value   03/10/2025 0.8     BNP (pg/mL)   Date Value   03/26/2016 112 (H)     CRP (mg/L)   Date Value   10/09/2024 12.9 (H)   02/06/2024 12.0 (H)   02/08/2023 3.2   11/03/2022 1.9   08/03/2022 2.8 " "  05/12/2022 2.0   02/08/2022 5.4   03/24/2021 3.5   11/18/2020 1.1   10/05/2020 2     Sed Rate   Date Value   10/09/2024 17 mm/Hr   02/06/2024 55 mm/Hr (H)   02/08/2023 20 mm/Hr   11/03/2022 11 mm/Hr   08/03/2022 8 mm/Hr   05/12/2022 20 mm/Hr   02/08/2022 9 mm/Hr   03/24/2021 19 mm/Hr   11/18/2020 14 mm/Hr   10/05/2020 48 mm/hr (H)     No results found for: "DDIMER"  Ferritin (ng/mL)   Date Value   03/07/2025 1,474 (H)     LD (U/L)   Date Value   03/06/2025 226     Troponin I (ng/mL)   Date Value   08/25/2023 0.023   03/26/2016 0.047 (H)     CPK (U/L)   Date Value   02/29/2004 188     No results found. However, due to the size of the patient record, not all encounters were searched. Please check Results Review for a complete set of results.  SARS-CoV2 (COVID-19) Qualitative PCR (no units)   Date Value   03/08/2025 Not Detected   08/25/2023 Not Detected     POC Rapid COVID (no units)   Date Value   12/04/2020 Negative       Microbiology labs for the last week  Microbiology Results (last 7 days)       Procedure Component Value Units Date/Time    Blood culture [[id number]] Collected: 03/08/25 0918    Order Status: Completed Specimen: Blood Updated: 03/10/25 1212     Blood Culture, Routine No Growth to date      No Growth to date      No Growth to date    Culture, Respiratory with Gram Stain [[id number]] Collected: 03/08/25 1600    Order Status: Completed Specimen: Respiratory from Sputum Updated: 03/10/25 1115     Respiratory Culture Normal respiratory judah      No S aureus or Pseudomonas isolated.     Gram Stain (Respiratory) <10 epithelial cells per low power field.     Gram Stain (Respiratory) Few WBC's     Gram Stain (Respiratory) Many Gram positive cocci     Gram Stain (Respiratory) Rare Gram negative rods    MRSA Screen by PCR [[id number]] Collected: 03/08/25 1430    Order Status: Completed Specimen: Nasal Swab Updated: 03/08/25 1758     MRSA SCREEN BY PCR Not Detected    Blood culture [[id number]] Collected: " 03/08/25 1018    Order Status: Sent Specimen: Blood             Reviewed and noted in plan where applicable- Please see chart for full lab data.    Lines/Drains:       Peripheral IV - Single Lumen 03/06/25 1244 20 G Anterior;Distal;Left Upper Arm (Active)   Site Assessment Clean;Dry;Intact;No redness;No swelling 03/06/25 1957   Extremity Assessment Distal to IV No abnormal discoloration;No redness;No swelling;No warmth 03/06/25 1957   Line Status Flushed;Saline locked 03/06/25 1957   Dressing Status Clean;Dry;Intact 03/06/25 1957   Dressing Intervention Integrity maintained 03/06/25 1957   Reason Not Rotated Not due 03/06/25 1957   Number of days: 0       Imaging  ECG Results              EKG 12-lead (Final result)        Collection Time Result Time QRS Duration OHS QTC Calculation    03/06/25 20:21:25 03/07/25 13:42:35 80 418                     Final result by Interface, Lab In [facility] (03/07/25 13:42:39)                   Narrative:    Test Reason : R94.31,    Vent. Rate :  89 BPM     Atrial Rate : 241 BPM     P-R Int :    ms          QRS Dur :  80 ms      QT Int : 344 ms       P-R-T Axes :     94 -15 degrees    QTcB Int : 418 ms    Normal sinus rhythm  Rightward axis  Inferior infarct ,age undetermined  Abnormal ECG  When compared with ECG of 25-Aug-2023 19:12,  No significant change was found    Confirmed by José Cortez ([id number]) on 3/7/2025 1:42:34 PM    Referred By: AAAREFERRAL SELF           Confirmed By: José Cortez                                    No results found for this or any previous visit.      X-Ray Chest 1 View  Narrative: EXAMINATION:  XR CHEST 1 VIEW    CLINICAL HISTORY:  r/o pneumonia;    TECHNIQUE:  Single frontal view of the chest was performed.    COMPARISON:  January 29, 2024    FINDINGS:  Heart size and pulmonary vessels are similar.  Continued pleural fluid or thickening at the right base now with some fluid or thickening at the left base.  Some patchy parenchymal  opacities though no confluent consolidation.  No pneumothorax.  Impression: Bilateral pleural effusions or thickening new on the left.    Electronically signed by: Juan Rivera MD  Date:    03/07/2025  Time:    07:53      Labs, Imaging, EKG and Diagnostic results from 3/10/2025 were reviewed.    Medications:  Medication list was reviewed and changes noted under Assessment/Plan.  Medications Ordered Prior to Encounter[1]  Scheduled Medications:  Current Facility-Administered Medications   Medication Dose Route Frequency    duke's soln (benadryl 30 mL, mylanta 30 mL, LIDOcaine 30 mL, nystatin 30 mL) 120 mL  10 mL Oral QID    enoxparin  40 mg Subcutaneous Daily    fluconazole  100 mg Oral Daily    folic acid  4 mg Oral Daily    pravastatin  20 mg Oral Daily    tamsulosin  0.4 mg Oral Daily     PRN:   Current Facility-Administered Medications:     0.9%  NaCl infusion (for blood administration), , Intravenous, Q24H PRN    acetaminophen, 650 mg, Oral, Q8H PRN    dextrose 50%, 12.5 g, Intravenous, PRN    dextrose 50%, 25 g, Intravenous, PRN    glucagon (human recombinant), 1 mg, Intramuscular, PRN    glucose, 16 g, Oral, PRN    glucose, 24 g, Oral, PRN    naloxone, 0.02 mg, Intravenous, PRN    oxyCODONE, 5 mg, Oral, Q4H PRN    polyethylene glycol, 17 g, Oral, Daily PRN    sodium chloride 0.9%, 10 mL, Intravenous, Q12H PRN    traZODone, 50 mg, Oral, Nightly PRN  Infusions:       Estimated Creatinine Clearance: 29.5 mL/min (A) (based on SCr of 2.3 mg/dL (H)).               Assessment & Plan  Rheumatoid arthritis   rheumatoid arthritis on methotrexate -weekly . rheumatology eval   History of hepatitis C, s/p successful treatment w/ SVR12 - 7/2015  noted     PAD (peripheral artery disease)  continue ASA and cilastazol     Hypercholesteremia  continue simvastatin    AAA (abdominal aortic aneurysm)  CT CAP 2/25 - aneurysmal dilatation of the abdominal aorta measuring up to 4.5 cm AP with extension into the common iliac  arteries, not significantly changed from prior study. Extensive vascular calcification noted.     Stage 3b chronic kidney disease  Creatine stable for now. BMP reviewed- noted Estimated Creatinine Clearance: 29.5 mL/min (A) (based on SCr of 2.3 mg/dL (H)). according to latest data. Monitor UOP and serial BMP and adjust therapy as needed. Renally dose meds.    Recent Labs   Lab 03/08/25  0641 03/09/25  0509 03/10/25  0400   BUN 32* 32* 31*   CREATININE 2.0* 2.2* 2.3*       I & O     Intake/Output Summary (Last 24 hours) at 3/10/2025 1339  Last data filed at 3/9/2025 1526  Gross per 24 hour   Intake 240 ml   Output --   Net 240 ml        Ureteral tumor  history of urothelial carcinoma in remission.  s/p neoadjuvant chemotherapy with Gemcitabine and Cisplatin. hem/onc consulted     Anemia  Anemia is likely due to chronic disease due to RA . Most recent hemoglobin and hematocrit are listed below.  Recent Labs     03/08/25  0641 03/09/25  0509 03/10/25  0400   HGB 8.7* 7.8* 8.0*   HCT 26.3* 24.3* 25.1*     Plan  - Monitor serial CBC: Daily  - Transfuse PRBC if patient becomes hemodynamically unstable, symptomatic or H/H drops below 7/21.  3/7 HB trended down to 6.9 . Transfusion with 1 unit of PRBC . Haptoglobin elevated.    Hypertension  Patient's blood pressure range in the last 24 hours was: BP  Min: 88/54  Max: 131/78.The patient's inpatient anti-hypertensive regimen is listed below:  Current Antihypertensives       Plan  - BP is controlled, no changes needed to their regimen  - continue amlodipine 5mg daily  Leucopenia     Recent Labs   Lab 03/08/25  0641 03/09/25  0509 03/10/25  0400   WBC 2.71* 2.89* 6.06   leucopenia 1.55 and .  discussed with hematology, likely due to MTX however he has been on this medication for a long time. admitted for further work up   Hematology consulted - had been taking MTX daily incorrectly instead of weekly.   and  MTX toxicity given incorrect daily dosing and CKD. MTX levels <  0.04. No leucovorin for now per heme onc .  parvo serologies Ig G+, Ig M negative , Reticulocytes, Iron panel, Hep C PCR ordered     Oral thrush  Thick whitish discharge on the tongue consistent with oral candidiasis .Workup is notable for new pancytopenia. Hb 7.9, leucopenia 1.55 and .  discussed with hematology, likely due to MTX however he has been on this medication for a long time.     started on dukes solution and fluconazole.   GI consulted for odynophagia   Rheumatology consulted due to recent methotrexation resumption    3/7  had been taking MTX daily incorrectly instead of weekly and  MTX toxicity given incorrect daily dosing and CKD. MTX levels < 0.04. No leucovorin for now per heme onc .       Odynophagia  secondary to above. continue dukes solution. GI consulted     Fever  3/8 low grade temperature of 100.4. reports cough. CX ray -Continued pleural fluid or thickening at the right base now with some fluid or thickening at the left base.  Some patchy parenchymal opacities though no confluent consolidation.  No pneumothorax.BC x2, UA  negative and procalcitonin   3.9 ID consulted for neutropenic fever . advanced to soft diet and started on boost.  ANC 1900. WBC improving. fever of 100.9F overnight. MRSA PCR negative   3/10  ID eval - continue renally dosed fluconazole for presumed oral and esophageal candidiasis x 14-21 days depending on clinical improvement. CT chest, abdomen, pelvis ordered             Neutropenic fever  3/8 low grade temperature of 100.5. CX ray -Continued pleural fluid or thickening at the right base now with some fluid or thickening at the left base.  Some patchy parenchymal opacities though no confluent consolidation.  No pneumothorax. COVID, Flu, RSV negative. BC x2, UA and procalcitonin  0.27 . ANC 1500. ID consulted for neutropenic fever .     Drug-induced pancytopenia    Recent Labs   Lab 03/10/25  0400   HGB 8.0*   WBC 6.06   PLT 94*   3/10 WBC and ANC normalized.  platelets trended down to 93. peripheral smear. Hematology f/u            VTE Risk Mitigation (From admission, onward)           Ordered     enoxaparin injection 40 mg  Daily         03/06/25 1633     IP VTE HIGH RISK PATIENT  Once         03/06/25 1633     Place sequential compression device  Until discontinued         03/06/25 1633                    Discharge Planning   NED: 3/12/2025     Code Status: Full Code   Medical Readiness for Discharge Date:   Discharge Plan A: Home, Home with family                        Jarocho Frazier MD  Department of Hospital Medicine   [facility] - Emergency Dept         [1]   No current facility-administered medications on file prior to encounter.     Current Outpatient Medications on File Prior to Encounter   Medication Sig Dispense Refill    amLODIPine (NORVASC) 5 MG tablet TAKE 1 TABLET BY MOUTH ONCE  DAILY 90 tablet 0    aspirin 81 MG Chew Take 81 mg by mouth once daily.      cilostazoL (PLETAL) 50 MG Tab TAKE 1 TABLET BY MOUTH TWICE  DAILY 180 tablet 3    gabapentin (NEURONTIN) 100 MG capsule Take 1 capsule (100 mg total) by mouth every evening. 90 capsule 1    methotrexate 2.5 MG Tab TAKE 8 TABLETS BY MOUTH EVERY 7  DAYS THIS MEDICATION REQUIRES  PERIODIC LAB MONITORING 120 tablet 3    nystatin (MYCOSTATIN) 100,000 unit/mL suspension Take by mouth 4 (four) times daily.      simvastatin (ZOCOR) 10 MG tablet Take 1 tablet (10 mg total) by mouth every evening. 90 tablet 0    tamsulosin (FLOMAX) 0.4 mg Cap Take 1 capsule (0.4 mg total) by mouth once daily. 30 capsule 11    acetaminophen (TYLENOL) 650 MG TbSR Take 1 tablet (650 mg total) by mouth every 8 (eight) hours. 63 tablet 0    bisacodyL (DULCOLAX) 5 mg EC tablet Take 5 mg by mouth daily as needed for Constipation.      docusate sodium (COLACE) 100 MG capsule Take 100 mg by mouth 2 (two) times daily.      folic acid (FOLVITE) 1 MG tablet Take 1 tablet (1,000 mcg total) by mouth once daily. 90 tablet 3    levocetirizine  (XYZAL) 5 MG tablet TAKE 1 TABLET BY MOUTH EVERY DAY IN THE EVENING 90 tablet 1    multivitamin capsule Take 1 capsule by mouth once daily.      traZODone (DESYREL) 50 MG tablet TAKE 3 TABLETS BY MOUTH AT NIGHT AS NEEDED FOR INSOMNIA 270 tablet 3

## 2025-03-10 NOTE — ASSESSMENT & PLAN NOTE
Recent Labs   Lab 03/08/25  0641 03/09/25  0509 03/10/25  0400   WBC 2.71* 2.89* 6.06   leucopenia 1.55 and .  discussed with hematology, likely due to MTX however he has been on this medication for a long time. admitted for further work up   Hematology consulted - had been taking MTX daily incorrectly instead of weekly.   and  MTX toxicity given incorrect daily dosing and CKD. MTX levels < 0.04. No leucovorin for now per heme onc .  parvo serologies Ig G+, Ig M negative , Reticulocytes, Iron panel, Hep C PCR ordered

## 2025-03-10 NOTE — ASSESSMENT & PLAN NOTE
Creatine stable for now. BMP reviewed- noted Estimated Creatinine Clearance: 29.5 mL/min (A) (based on SCr of 2.3 mg/dL (H)). according to latest data. Monitor UOP and serial BMP and adjust therapy as needed. Renally dose meds.    Recent Labs   Lab 03/08/25  0641 03/09/25  0509 03/10/25  0400   BUN 32* 32* 31*   CREATININE 2.0* 2.2* 2.3*       I & O     Intake/Output Summary (Last 24 hours) at 3/10/2025 1339  Last data filed at 3/9/2025 1526  Gross per 24 hour   Intake 240 ml   Output --   Net 240 ml

## 2025-03-10 NOTE — ASSESSMENT & PLAN NOTE
Patient's blood pressure range in the last 24 hours was: BP  Min: 88/54  Max: 131/78.The patient's inpatient anti-hypertensive regimen is listed below:  Current Antihypertensives       Plan  - BP is controlled, no changes needed to their regimen  - continue amlodipine 5mg daily

## 2025-03-10 NOTE — ASSESSMENT & PLAN NOTE
Recent Labs   Lab 03/10/25  0400   HGB 8.0*   WBC 6.06   PLT 94*   3/10 WBC and ANC normalized. platelets trended down to 93. peripheral smear. Hematology f/u

## 2025-03-10 NOTE — PLAN OF CARE
Problem: Adult Inpatient Plan of Care  Goal: Plan of Care Review  Outcome: Progressing  Goal: Patient-Specific Goal (Individualized)  Outcome: Progressing  Goal: Absence of Hospital-Acquired Illness or Injury  Outcome: Progressing  Goal: Optimal Comfort and Wellbeing  Outcome: Progressing  Goal: Readiness for Transition of Care  Outcome: Progressing     Pt in room with c/o pain throughout the night. Prn medication given as ordered.

## 2025-03-11 PROBLEM — K12.1 ORAL ULCERATION: Status: ACTIVE | Noted: 2025-03-11

## 2025-03-11 LAB
ADENOVIRUS: NOT DETECTED
ALBUMIN SERPL BCP-MCNC: 2.6 G/DL (ref 3.5–5.2)
ALP SERPL-CCNC: 73 U/L (ref 40–150)
ALT SERPL W/O P-5'-P-CCNC: 17 U/L (ref 10–44)
ANION GAP SERPL CALC-SCNC: 8 MMOL/L (ref 8–16)
AST SERPL-CCNC: 18 U/L (ref 10–40)
BACTERIA #/AREA URNS AUTO: NORMAL /HPF
BASOPHILS # BLD AUTO: 0.01 K/UL (ref 0–0.2)
BASOPHILS NFR BLD: 0.1 % (ref 0–1.9)
BILIRUB SERPL-MCNC: 0.6 MG/DL (ref 0.1–1)
BILIRUB UR QL STRIP: NEGATIVE
BORDETELLA PARAPERTUSSIS (IS1001): NOT DETECTED
BORDETELLA PERTUSSIS (PTXP): NOT DETECTED
BUN SERPL-MCNC: 33 MG/DL (ref 8–23)
CALCIUM SERPL-MCNC: 8.6 MG/DL (ref 8.7–10.5)
CHLAMYDIA PNEUMONIAE: NOT DETECTED
CHLORIDE SERPL-SCNC: 104 MMOL/L (ref 95–110)
CLARITY UR REFRACT.AUTO: CLEAR
CO2 SERPL-SCNC: 21 MMOL/L (ref 23–29)
COLOR UR AUTO: YELLOW
COPPER SERPL-MCNC: 1493 UG/L (ref 665–1480)
CORONAVIRUS 229E, COMMON COLD VIRUS: NOT DETECTED
CORONAVIRUS HKU1, COMMON COLD VIRUS: NOT DETECTED
CORONAVIRUS NL63, COMMON COLD VIRUS: NOT DETECTED
CORONAVIRUS OC43, COMMON COLD VIRUS: NOT DETECTED
CREAT SERPL-MCNC: 2.4 MG/DL (ref 0.5–1.4)
DIFFERENTIAL METHOD BLD: ABNORMAL
EOSINOPHIL # BLD AUTO: 0.1 K/UL (ref 0–0.5)
EOSINOPHIL NFR BLD: 1.3 % (ref 0–8)
ERYTHROCYTE [DISTWIDTH] IN BLOOD BY AUTOMATED COUNT: 15.2 % (ref 11.5–14.5)
EST. GFR  (NO RACE VARIABLE): 28.1 ML/MIN/1.73 M^2
FLUBV RNA NPH QL NAA+NON-PROBE: NOT DETECTED
GLUCOSE SERPL-MCNC: 97 MG/DL (ref 70–110)
GLUCOSE UR QL STRIP: NEGATIVE
HCT VFR BLD AUTO: 23.9 % (ref 40–54)
HGB BLD-MCNC: 7.5 G/DL (ref 14–18)
HGB UR QL STRIP: ABNORMAL
HPIV1 RNA NPH QL NAA+NON-PROBE: NOT DETECTED
HPIV2 RNA NPH QL NAA+NON-PROBE: NOT DETECTED
HPIV3 RNA NPH QL NAA+NON-PROBE: NOT DETECTED
HPIV4 RNA NPH QL NAA+NON-PROBE: NOT DETECTED
HUMAN METAPNEUMOVIRUS: NOT DETECTED
HYALINE CASTS UR QL AUTO: 0 /LPF
IMM GRANULOCYTES # BLD AUTO: 0.06 K/UL (ref 0–0.04)
IMM GRANULOCYTES NFR BLD AUTO: 0.7 % (ref 0–0.5)
INFLUENZA A: NOT DETECTED
KETONES UR QL STRIP: NEGATIVE
LEUKOCYTE ESTERASE UR QL STRIP: NEGATIVE
LYMPHOCYTES # BLD AUTO: 0.8 K/UL (ref 1–4.8)
LYMPHOCYTES NFR BLD: 10.2 % (ref 18–48)
MAGNESIUM SERPL-MCNC: 2 MG/DL (ref 1.6–2.6)
MCH RBC QN AUTO: 28.3 PG (ref 27–31)
MCHC RBC AUTO-ENTMCNC: 31.4 G/DL (ref 32–36)
MCV RBC AUTO: 90 FL (ref 82–98)
MICROSCOPIC COMMENT: NORMAL
MONOCYTES # BLD AUTO: 0.4 K/UL (ref 0.3–1)
MONOCYTES NFR BLD: 5.1 % (ref 4–15)
MYCOPLASMA PNEUMONIAE: NOT DETECTED
NEUTROPHILS # BLD AUTO: 6.8 K/UL (ref 1.8–7.7)
NEUTROPHILS NFR BLD: 82.6 % (ref 38–73)
NITRITE UR QL STRIP: NEGATIVE
NRBC BLD-RTO: 0 /100 WBC
PATH REV BLD -IMP: NORMAL
PH UR STRIP: 6 [PH] (ref 5–8)
PHOSPHATE SERPL-MCNC: 2.9 MG/DL (ref 2.7–4.5)
PLATELET # BLD AUTO: 84 K/UL (ref 150–450)
PMV BLD AUTO: 11.2 FL (ref 9.2–12.9)
POTASSIUM SERPL-SCNC: 4.3 MMOL/L (ref 3.5–5.1)
PROCALCITONIN SERPL IA-MCNC: 0.64 NG/ML
PROT SERPL-MCNC: 6.4 G/DL (ref 6–8.4)
PROT UR QL STRIP: ABNORMAL
RBC # BLD AUTO: 2.65 M/UL (ref 4.6–6.2)
RBC #/AREA URNS AUTO: 4 /HPF (ref 0–4)
RESPIRATORY INFECTION PANEL SOURCE: NORMAL
RSV RNA NPH QL NAA+NON-PROBE: NOT DETECTED
RV+EV RNA NPH QL NAA+NON-PROBE: NOT DETECTED
SARS-COV-2 RNA RESP QL NAA+PROBE: NOT DETECTED
SODIUM SERPL-SCNC: 133 MMOL/L (ref 136–145)
SP GR UR STRIP: 1.02 (ref 1–1.03)
SQUAMOUS #/AREA URNS AUTO: 0 /HPF
URN SPEC COLLECT METH UR: ABNORMAL
WBC # BLD AUTO: 8.26 K/UL (ref 3.9–12.7)
WBC #/AREA URNS AUTO: 3 /HPF (ref 0–5)

## 2025-03-11 PROCEDURE — 36415 COLL VENOUS BLD VENIPUNCTURE: CPT

## 2025-03-11 PROCEDURE — 63600175 PHARM REV CODE 636 W HCPCS: Performed by: INTERNAL MEDICINE

## 2025-03-11 PROCEDURE — 99233 SBSQ HOSP IP/OBS HIGH 50: CPT | Mod: ,,, | Performed by: INTERNAL MEDICINE

## 2025-03-11 PROCEDURE — 36415 COLL VENOUS BLD VENIPUNCTURE: CPT | Performed by: HOSPITALIST

## 2025-03-11 PROCEDURE — 84145 PROCALCITONIN (PCT): CPT | Performed by: HOSPITALIST

## 2025-03-11 PROCEDURE — 0202U NFCT DS 22 TRGT SARS-COV-2: CPT | Performed by: STUDENT IN AN ORGANIZED HEALTH CARE EDUCATION/TRAINING PROGRAM

## 2025-03-11 PROCEDURE — 63700000 PHARM REV CODE 250 ALT 637 W/O HCPCS: Performed by: HOSPITALIST

## 2025-03-11 PROCEDURE — 11000001 HC ACUTE MED/SURG PRIVATE ROOM

## 2025-03-11 PROCEDURE — 63600175 PHARM REV CODE 636 W HCPCS: Performed by: HOSPITALIST

## 2025-03-11 PROCEDURE — 81001 URINALYSIS AUTO W/SCOPE: CPT | Performed by: HOSPITALIST

## 2025-03-11 PROCEDURE — 80053 COMPREHEN METABOLIC PANEL: CPT

## 2025-03-11 PROCEDURE — 25000003 PHARM REV CODE 250: Performed by: INTERNAL MEDICINE

## 2025-03-11 PROCEDURE — 85025 COMPLETE CBC W/AUTO DIFF WBC: CPT

## 2025-03-11 PROCEDURE — 25000003 PHARM REV CODE 250: Performed by: HOSPITALIST

## 2025-03-11 PROCEDURE — 87040 BLOOD CULTURE FOR BACTERIA: CPT | Performed by: HOSPITALIST

## 2025-03-11 PROCEDURE — 84100 ASSAY OF PHOSPHORUS: CPT

## 2025-03-11 PROCEDURE — 83735 ASSAY OF MAGNESIUM: CPT

## 2025-03-11 RX ORDER — HYDROMORPHONE HYDROCHLORIDE 1 MG/ML
0.2 INJECTION, SOLUTION INTRAMUSCULAR; INTRAVENOUS; SUBCUTANEOUS EVERY 6 HOURS PRN
Status: DISCONTINUED | OUTPATIENT
Start: 2025-03-11 | End: 2025-03-15 | Stop reason: HOSPADM

## 2025-03-11 RX ORDER — ENOXAPARIN SODIUM 100 MG/ML
30 INJECTION SUBCUTANEOUS EVERY 24 HOURS
Status: DISCONTINUED | OUTPATIENT
Start: 2025-03-11 | End: 2025-03-14

## 2025-03-11 RX ORDER — VALACYCLOVIR HYDROCHLORIDE 500 MG/1
1000 TABLET, FILM COATED ORAL DAILY
Status: DISCONTINUED | OUTPATIENT
Start: 2025-03-11 | End: 2025-03-14

## 2025-03-11 RX ORDER — CALCIUM CARBONATE 200(500)MG
500 TABLET,CHEWABLE ORAL DAILY PRN
Status: DISCONTINUED | OUTPATIENT
Start: 2025-03-11 | End: 2025-03-15 | Stop reason: HOSPADM

## 2025-03-11 RX ADMIN — ALUMINUM HYDROXIDE, MAGNESIUM HYDROXIDE, AND DIMETHICONE 10 ML: 400; 400; 40 SUSPENSION ORAL at 08:03

## 2025-03-11 RX ADMIN — OXYCODONE 5 MG: 5 TABLET ORAL at 05:03

## 2025-03-11 RX ADMIN — ENOXAPARIN SODIUM 30 MG: 40 INJECTION SUBCUTANEOUS at 05:03

## 2025-03-11 RX ADMIN — FLUCONAZOLE 100 MG: 100 TABLET ORAL at 08:03

## 2025-03-11 RX ADMIN — SODIUM CHLORIDE 100 MG: 9 INJECTION, SOLUTION INTRAVENOUS at 07:03

## 2025-03-11 RX ADMIN — HYDROMORPHONE HYDROCHLORIDE 0.2 MG: 1 INJECTION, SOLUTION INTRAMUSCULAR; INTRAVENOUS; SUBCUTANEOUS at 02:03

## 2025-03-11 RX ADMIN — CALCIUM CARBONATE (ANTACID) CHEW TAB 500 MG 500 MG: 500 CHEW TAB at 06:03

## 2025-03-11 RX ADMIN — FOLIC ACID 4 MG: 1 TABLET ORAL at 08:03

## 2025-03-11 RX ADMIN — PRAVASTATIN SODIUM 20 MG: 20 TABLET ORAL at 08:03

## 2025-03-11 RX ADMIN — OXYCODONE 5 MG: 5 TABLET ORAL at 02:03

## 2025-03-11 RX ADMIN — VALACYCLOVIR HYDROCHLORIDE 1000 MG: 500 TABLET, FILM COATED ORAL at 06:03

## 2025-03-11 RX ADMIN — HYDROMORPHONE HYDROCHLORIDE 0.2 MG: 1 INJECTION, SOLUTION INTRAMUSCULAR; INTRAVENOUS; SUBCUTANEOUS at 08:03

## 2025-03-11 RX ADMIN — ALUMINUM HYDROXIDE, MAGNESIUM HYDROXIDE, AND DIMETHICONE 10 ML: 400; 400; 40 SUSPENSION ORAL at 12:03

## 2025-03-11 RX ADMIN — OXYCODONE 5 MG: 5 TABLET ORAL at 12:03

## 2025-03-11 RX ADMIN — OXYCODONE 5 MG: 5 TABLET ORAL at 09:03

## 2025-03-11 RX ADMIN — OXYCODONE 5 MG: 5 TABLET ORAL at 06:03

## 2025-03-11 RX ADMIN — TAMSULOSIN HYDROCHLORIDE 0.4 MG: 0.4 CAPSULE ORAL at 08:03

## 2025-03-11 RX ADMIN — ALUMINUM HYDROXIDE, MAGNESIUM HYDROXIDE, AND DIMETHICONE 10 ML: 400; 400; 40 SUSPENSION ORAL at 05:03

## 2025-03-11 RX ADMIN — ACETAMINOPHEN 650 MG: 325 TABLET ORAL at 03:03

## 2025-03-11 NOTE — ASSESSMENT & PLAN NOTE
Recent Labs   Lab 03/11/25  0512   HGB 7.5*   WBC 8.26   PLT 84*   3/11 WBC and ANC normalized. platelets trended down to 93. peripheral smear. Hematology f/u

## 2025-03-11 NOTE — PROGRESS NOTES
"[facility] - [facility] Medicine Telemetry  Adult Nutrition  Progress Note    SUMMARY   Recommendations  Continue FLD  Continue Boost BID  Nursing, continue documenting PO intake via flowsheets  Encourage PO intake  Monitor labs, meds, weight    Goals: Meet % een/epn by next RD f/u  Nutrition Goal Status: new  Communication of RD Recs: other (comment) (poc)    Nutrition Discharge Planning    Nutrition Discharge Planning: General healthy diet, Other (comments)  Other (comments): texture pending clinical course    Assessment and Plan    Nutrition Problem  Inadequate energy intake    Related to (etiology):   Inability to consume sufficient energy     Signs and Symptoms (as evidenced by):   Oral lesions, FLD    Interventions/Recommendations (treatment strategy):  Collaboration with other providers  Commercial beverages     Nutrition Diagnosis Status:   New     Reason for Assessment    Reason For Assessment: identified at risk by screening criteria  Diagnosis: other (see comments) (oral thrush)  General Information Comments: 70-year-old male with rheumatoid arthritis on methotrexate, history of urothelial carcinoma in remission, anemia, hepatitis-C s/p SVR, PAD presents for thrush. RD assessment for MST. Admitted for painful oral lesions. 25 % intake of FLD per RN flowsheets. No significant wounds. UBW appears to be 155#, no wt loss noted. RD to f/u.    Nutrition Related Social Determinants of Health: SDOH: None Identified  Food Insecurity: No Food Insecurity (3/7/2025)    Hunger Vital Sign     Worried About Running Out of Food in the Last Year: Never true     Ran Out of Food in the Last Year: Never true     Nutrition/Diet History    Spiritual, Cultural Beliefs, Sabianist Practices, Values that Affect Care: no  Food Allergies: NKFA    Anthropometrics    Height: 5' 9" (175.3 cm)  Height (inches): 69 in  Height Method: Stated  Weight: 72.6 kg (160 lb)  Weight (lb): 160 lb  Weight Method: Bed Scale  Ideal Body Weight " (IBW), Male: 160 lb  % Ideal Body Weight, Male (lb): 100 %  BMI (Calculated): 23.6  BMI Grade: 18.5-24.9 - normal       Lab/Procedures/Meds    Pertinent Labs Reviewed: reviewed  Pertinent Labs Comments: BUN: 31, creatinine: 2.3, gfr: 29.6, glu: 114  Pertinent Medications Reviewed: reviewed  Pertinent Medications Comments: Enoxaparin, folic acid, tamsulosin    Estimated/Assessed Needs    Weight Used For Calorie Calculations: 72.6 kg (160 lb 0.9 oz)  Energy Calorie Requirements (kcal): 1912 (msj * 1.3 PAL)  Energy Need Method: Kcal/kg  Protein Requirements: 72 (1g/kg)  Weight Used For Protein Calculations: 72.6 kg (160 lb 0.9 oz)     Estimated Fluid Requirement Method: RDA Method  RDA Method (mL): 1912         Nutrition Prescription Ordered    Current Diet Order: FLD  Oral Nutrition Supplement: Boost BID    Evaluation of Received Nutrient/Fluid Intake    I/O: +1.3 L since admit  Comments: LBM 3/6  % Intake of Estimated Energy Needs: 0 - 25 %  % Meal Intake: 0 - 25 %    Nutrition Risk    Level of Risk/Frequency of Follow-up: moderate (f/u 1-2x/week)     Monitor and Evaluation    Monitor and Evaluation: Energy intake, Food and beverage intake, Diet order, Food and nutrition knowledge, Weight, Electrolyte and renal panel, Gastrointestinal profile, Glucose/endocrine profile, Inflammatory profile, Lipid profile     Nutrition Follow-Up    RD Follow-up?: Yes

## 2025-03-11 NOTE — ASSESSMENT & PLAN NOTE
Patient's blood pressure range in the last 24 hours was: BP  Min: 90/44  Max: 123/75.The patient's inpatient anti-hypertensive regimen is listed below:  Current Antihypertensives       Plan  - BP is controlled, no changes needed to their regimen  - continue amlodipine 5mg daily

## 2025-03-11 NOTE — PLAN OF CARE
[facility] Tele[facility]  Discharge Reassessment    Primary Care Provider: Andrea Wang MD    Expected Discharge Date: 3/13/2025    Reassessment (most recent)       Discharge Reassessment - 03/10/25 1610          Discharge Reassessment    Assessment Type Discharge Planning Reassessment     Did the patient's condition or plan change since previous assessment? No     Discharge Plan discussed with: Patient     Communicated NED with patient/caregiver Yes     Discharge Plan A Home with family     Discharge Plan B Home     DME Needed Upon Discharge  none     Transition of Care Barriers None     Why the patient remains in the hospital Requires continued medical care (P)         Post-Acute Status    Coverage [facility] CHOICES - (P)      Hospital Resources/Appts/Education Provided Provided education on problems/symptoms using teachback (P)                  Discharge Plan A and Plan B have been determined by review of patient's clinical status, future medical and therapeutic needs, and coverage/benefits for post-acute care in coordination with multidisciplinary team members.           Patient confirmed plan of dc home w/ family once medically ready.    No post-acute needs identified at this time.                 Luis Silvestre, TIBURCIO, LMSW  [facility]  Case Management  Ext. [phone]

## 2025-03-11 NOTE — PLAN OF CARE
Recommendations  Continue FLD  Continue Boost BID  Nursing, continue documenting PO intake via flowsheets  Encourage PO intake  Monitor labs, meds, weight    Goals: Meet % een/epn by next RD f/u  Nutrition Goal Status: new  Communication of RD Recs: other (comment) (poc)

## 2025-03-11 NOTE — PLAN OF CARE
Problem: Adult Inpatient Plan of Care  Goal: Plan of Care Review  Outcome: Progressing  Goal: Patient-Specific Goal (Individualized)  Outcome: Progressing  Goal: Absence of Hospital-Acquired Illness or Injury  Outcome: Progressing  Goal: Optimal Comfort and Wellbeing  Outcome: Progressing  Goal: Readiness for Transition of Care  Outcome: Progressing     Problem: Anemia  Goal: Anemia Symptom Improvement  Outcome: Progressing     Pt pain managed with PRN pain meds.   Fever of 100.6, MD notified, tylenol given. Respiratory panel collected. Pt temp 102 at 1652, MD notified.    Bed locked, in lowest position, call light in reach.

## 2025-03-11 NOTE — ASSESSMENT & PLAN NOTE
Recent Labs   Lab 03/09/25  0509 03/10/25  0400 03/11/25  0512   WBC 2.89* 6.06 8.26   leucopenia 1.55 and .  discussed with hematology, likely due to MTX however he has been on this medication for a long time. admitted for further work up   Hematology consulted - had been taking MTX daily incorrectly instead of weekly.   and  MTX toxicity given incorrect daily dosing and CKD. MTX levels < 0.04. No leucovorin for now per heme onc .  parvo serologies Ig G+, Ig M negative , Reticulocytes, Iron panel, Hep C PCR ordered

## 2025-03-11 NOTE — ASSESSMENT & PLAN NOTE
Creatine stable for now. BMP reviewed- noted Estimated Creatinine Clearance: 28.2 mL/min (A) (based on SCr of 2.4 mg/dL (H)). according to latest data. Monitor UOP and serial BMP and adjust therapy as needed. Renally dose meds.    Recent Labs   Lab 03/09/25  0509 03/10/25  0400 03/11/25  0512   BUN 32* 31* 33*   CREATININE 2.2* 2.3* 2.4*       I & O   No intake or output data in the 24 hours ending 03/11/25 4448

## 2025-03-11 NOTE — ASSESSMENT & PLAN NOTE
70 year old with RA on methotrexate admitted for painful oral lesions.  He was neutropenic and lymphopenic at the time of admission.  He has had a fever over the last 24 hours.  He is at risk for infection.  Agree with checking blood cultures.  Agree with checking respiratory infection panel.  Recommend checking urine legionella as well.

## 2025-03-11 NOTE — PROGRESS NOTES
Bedside and Verbal shift change report given to Dolly Marques RN (oncoming nurse) by Ayala Vazquze RN  (offgoing nurse). Report included the following information Procedure Summary, Intake/Output and Recent Results.      [facility] assumes care of pt Kendrick Sutton - Emergency Dept  Hospital Medicine  Progress Note    Patient Name: Andrea Gant  MRN: [id number]  Patient Class: IP- Inpatient   Admission Date: 3/6/2025  Length of Stay: 4 days  Attending Physician: Jarocho Frazier MD  Primary Care Provider: Andrea Wang MD        Subjective     Principal Problem:Oral thrush        HPI:  Andrea Winter is a 70-year-old male with rheumatoid arthritis on methotrexate, history of urothelial carcinoma in remission, anemia, hepatitis-C s/p SVR, PAD presents for thrush.       AAO x3. Patient was prescribed augmentin 2/7 -2/21 prior to the onset of his symptoms post cystoscopy. c/o pain with  swallowing after 4-5 days after augmentin.  denies fever, drooling or prior similar episodes. denies history of diabetes or HIV.   Reports painful whitish lesions in his mouth x  10 days. Patient was seen at urgent care clinic 02/26/2025 with congestion, sore throat and trouble swallowing.  Flu swab negative on 2/26.   prescribed prednisone 20mg x 5 days for viral pharyngitis, states that this significantly improved his sore throat and body aches but not the thrush.     ER course - Thick whitish discharge on the tongue consistent with oral candidiasis . Workup is notable for new pancytopenia. Hb 7.9, leucopenia 1.55 and .  discussed with hematology, likely due to MTX however he has been on this medication for a long time. admitted for further work up     Overview/Hospital Course:  3/7 HB trended down to 6.9 . Transfusion with 1 unit of PRBC . Haptoglobin elevated.  P replaced. . Neutropenic precautions. had been taking MTX daily incorrectly instead of weekly and  MTX toxicity given incorrect daily dosing and CKD. MTX levels < 0.04. No leucovorin for now per heme onc .  parvo serologies, Reticulocytes, Iron panel, Hep C PCR ordered   3/8 low grade temperature of 100.5. CX ray -Continued pleural fluid or thickening at the right base now with some fluid or  thickening at the left base.  Some patchy parenchymal opacities though no confluent consolidation.  No pneumothorax. COVID, Flu, RSV negative. BC x2, UA and procalcitonin  0.27 . UA negative. ANC 1500. folic acid increased to 4mg daily.  ID consulted for neutropenic fever . advanced to soft diet and started on boost   3/9 ANC 1900. WBC improving. fever of 100.9F overnight. MRSA PCR negative . P replaced. requests full liquid diet.   3/10 WBC and ANC normalized. platelets trended down to 93. Low grade temp of 100.6F this AM. ID eval - continue renally dosed fluconazole for presumed oral and esophageal candidiasis x 14-21 days depending on clinical improvement. CT chest, abdomen, pelvis ordered.SBP 90s improved with NS bolus 250ml x1.   3/12 fever of 102F. BCx2 and echo orderd. pancultures. Platelets trended down to 84. CTCAP -Fusiform aneurysm of the infrarenal abdominal aorta, greatest diameter 5 cm previously 4.4 cm. Greatest diameter of the descending thoracic aorta 4.2 cm. Bilateral pleural effusions, stable compared to 02/10/2025. Status post left nephrectomy, no evidence of recurrence or distant metastases within the constraints of noncontrast study. Bilateral emphysema. ID, Hematology  and GI follow up - recs Fluconazole to finish before EGD is considered                  Review of Systems:   Pain scale:    8/10   Constitutional:  fever,  chills, headache, vision loss, hearing loss, weight loss, Generalized weakness, falls, loss of smell, loss of taste, poor appetite,  sore throat, immunocompromised  Respiratory: cough, shortness of breath.   Cardiovascular: chest pain, dizziness, palpitations, orthopnea, swelling of feet, syncope  Gastrointestinal: nausea, vomiting, abdominal pain, diarrhea, black stool,  blood in stool, change in bowel habits, constipation, difficulty swallowing  Genitourinary: hematuria, dysuria, urgency, frequency  Integument/Breast: rash,  pruritis  Hematologic/Lymphatic: easy bruising,  lymphadenopathy  Musculoskeletal: arthralgias , myalgias, back pain, neck pain, knee pain  Neurological: confusion, seizures, tremors, slurred speech  Behavioral/Psych:  depression, anxiety, auditory or visual hallucinations     OBJECTIVE:     Physical Exam:  Body mass index is 23.63 kg/m².    Constitutional: Appears well-developed and well-nourished.   Head: Normocephalic and atraumatic.  dentures   oral cavity - Thick white discharge on the tongue   poserior pharyngeal erythema  Neck: Normal range of motion. Neck supple.   Cardiovascular: Normal heart rate.  Regular heart rhythm.  Pulmonary/Chest: Effort normal.   Abdominal: No distension.  No tenderness  Musculoskeletal: Normal range of motion. No edema.   Neurological: Alert and oriented to person, place, and time.   Skin: Skin is warm and dry.   Psychiatric: Normal mood and affect. Behavior is normal.       Vital Signs  Temp: 100 °F (37.8 °C) (03/11/25 1123)  Pulse: 96 (03/11/25 1123)  Resp: 18 (03/11/25 1241)  BP: 110/64 (03/11/25 1123)  SpO2: 98 % (03/11/25 1123)     24 Hour VS Range    Temp:  [98.2 °F (36.8 °C)-102 °F (38.9 °C)]   Pulse:  [60-96]   Resp:  [16-20]   BP: ()/(44-75)   SpO2:  [95 %-99 %]   No intake or output data in the 24 hours ending 03/11/25 1355          I/O This Shift:  No intake/output data recorded.    Wt Readings from Last 3 Encounters:   03/06/25 72.6 kg (160 lb)   02/26/25 71.2 kg (157 lb)   02/20/25 71.4 kg (157 lb 6.5 oz)       I have personally reviewed the vitals and recorded Intake/Output     Laboratory/Diagnostic Data:    CBC/Anemia Labs: Coags:    Recent Labs   Lab 03/06/25  1514 03/07/25  0618 03/07/25  1651 03/08/25  0641 03/09/25  0509 03/10/25  0400 03/11/25  0512   WBC 1.55*   < > 1.90*   < > 2.89* 6.06 8.26   HGB 7.9*   < > 8.5*   < > 7.8* 8.0* 7.5*   HCT 24.0*   < > 26.3*   < > 24.3* 25.1* 23.9*   *   < > 144*   < > 117* 94* 84*   MCV 88   < > 87   < > 88 89 90   RDW 13.6   < > 14.4   < > 14.5 14.6* 15.2*  "  IRON  --   --  90  --   --   --   --    FERRITIN  --   --  1,474*  --   --   --   --    RETIC  --   --  0.5  --   --   --   --    FOLATE >40.0*  --   --   --   --   --   --    [id number]  --   --   --   --   --   --     < > = values in this interval not displayed.    Recent Labs   Lab 03/06/25  1514   INR 1.0   APTT 28.0        Chemistries: ABG:   Recent Labs   Lab 03/09/25  0509 03/10/25  0400 03/11/25  0512   * 137 133*   K 4.2 4.6 4.3    105 104   CO2 21* 21* 21*   BUN 32* 31* 33*   CREATININE 2.2* 2.3* 2.4*   CALCIUM 8.5* 8.5* 8.6*   PROT 6.5 6.7 6.4   BILITOT 0.4 0.6 0.6   ALKPHOS 70 72 73   ALT 26 20 17   AST 17 18 18   MG 2.0 2.0 2.0   PHOS 2.4* 3.2 2.9    No results for input(s): "PH", "PCO2", "PO2", "HCO3", "POCSATURATED", "BE" in the last 168 hours.     POCT Glucose: HbA1c:    No results for input(s): "POCTGLUCOSE" in the last 168 hours. Hemoglobin A1C   Date Value Ref Range Status   05/17/2022 5.0 4.0 - 5.6 % Final     Comment:     ADA Screening Guidelines:  5.7-6.4%  Consistent with prediabetes  >or=6.5%  Consistent with diabetes    High levels of fetal hemoglobin interfere with the HbA1C  assay. Heterozygous hemoglobin variants (HbS, HgC, etc)do  not significantly interfere with this assay.   However, presence of multiple variants may affect accuracy.     10/11/2021 5.2 4.0 - 5.6 % Final     Comment:     ADA Screening Guidelines:  5.7-6.4%  Consistent with prediabetes  >or=6.5%  Consistent with diabetes    High levels of fetal hemoglobin interfere with the HbA1C  assay. Heterozygous hemoglobin variants (HbS, HgC, etc)do  not significantly interfere with this assay.   However, presence of multiple variants may affect accuracy.          Cardiac Enzymes: Ejection Fractions:    No results for input(s): "CPK", "CPKMB", "MB", "TROPONINI" in the last 72 hours. No results found for: "EF"       No results for input(s): "COLORU", "APPEARANCEUA", "PHUR", "SPECGRAV", "PROTEINUA", "GLUCUA", " ""KETONESU", "BILIRUBINUA", "OCCULTUA", "NITRITE", "UROBILINOGEN", "LEUKOCYTESUR", "RBCUA", "WBCUA", "BACTERIA", "SQUAMEPITHEL", "HYALINECASTS" in the last 48 hours.    Invalid input(s): "WRIGHTSUR"      Procalcitonin (ng/mL)   Date Value   03/11/2025 0.64 (H)   03/08/2025 0.27 (H)     Lactate (Lactic Acid) (mmol/L)   Date Value   03/10/2025 0.8     BNP (pg/mL)   Date Value   03/26/2016 112 (H)     CRP (mg/L)   Date Value   10/09/2024 12.9 (H)   02/06/2024 12.0 (H)   02/08/2023 3.2   11/03/2022 1.9   08/03/2022 2.8   05/12/2022 2.0   02/08/2022 5.4   03/24/2021 3.5   11/18/2020 1.1   10/05/2020 2     Sed Rate   Date Value   10/09/2024 17 mm/Hr   02/06/2024 55 mm/Hr (H)   02/08/2023 20 mm/Hr   11/03/2022 11 mm/Hr   08/03/2022 8 mm/Hr   05/12/2022 20 mm/Hr   02/08/2022 9 mm/Hr   03/24/2021 19 mm/Hr   11/18/2020 14 mm/Hr   10/05/2020 48 mm/hr (H)     No results found for: "DDIMER"  Ferritin (ng/mL)   Date Value   03/07/2025 1,474 (H)     LD (U/L)   Date Value   03/06/2025 226     Troponin I (ng/mL)   Date Value   08/25/2023 0.023   03/26/2016 0.047 (H)     CPK (U/L)   Date Value   02/29/2004 188     No results found. However, due to the size of the patient record, not all encounters were searched. Please check Results Review for a complete set of results.  SARS-CoV2 (COVID-19) Qualitative PCR (no units)   Date Value   03/08/2025 Not Detected   08/25/2023 Not Detected     POC Rapid COVID (no units)   Date Value   12/04/2020 Negative       Microbiology labs for the last week  Microbiology Results (last 7 days)       Procedure Component Value Units Date/Time    Blood culture [[id number]] Collected: 03/11/25 0832    Order Status: Sent Specimen: Blood from Peripheral, Antecubital, Left Updated: 03/11/25 1239    Blood culture [[id number]] Collected: 03/11/25 0838    Order Status: Sent Specimen: Blood from Peripheral, Antecubital, Left Updated: 03/11/25 1239    Blood culture [[id number]] Collected: 03/08/25 0918    Order " Status: Completed Specimen: Blood Updated: 03/11/25 1212     Blood Culture, Routine No Growth to date      No Growth to date      No Growth to date      No Growth to date    Culture, Respiratory with Gram Stain [[id number]] Collected: 03/08/25 1600    Order Status: Completed Specimen: Respiratory from Sputum Updated: 03/10/25 1115     Respiratory Culture Normal respiratory judah      No S aureus or Pseudomonas isolated.     Gram Stain (Respiratory) <10 epithelial cells per low power field.     Gram Stain (Respiratory) Few WBC's     Gram Stain (Respiratory) Many Gram positive cocci     Gram Stain (Respiratory) Rare Gram negative rods    MRSA Screen by PCR [[id number]] Collected: 03/08/25 1430    Order Status: Completed Specimen: Nasal Swab Updated: 03/08/25 1758     MRSA SCREEN BY PCR Not Detected    Blood culture [[id number]] Collected: 03/08/25 1018    Order Status: Sent Specimen: Blood             Reviewed and noted in plan where applicable- Please see chart for full lab data.    Lines/Drains:       Peripheral IV - Single Lumen 03/06/25 1244 20 G Anterior;Distal;Left Upper Arm (Active)   Site Assessment Clean;Dry;Intact;No redness;No swelling 03/06/25 1957   Extremity Assessment Distal to IV No abnormal discoloration;No redness;No swelling;No warmth 03/06/25 1957   Line Status Flushed;Saline locked 03/06/25 1957   Dressing Status Clean;Dry;Intact 03/06/25 1957   Dressing Intervention Integrity maintained 03/06/25 1957   Reason Not Rotated Not due 03/06/25 1957   Number of days: 0       Imaging  ECG Results              EKG 12-lead (Final result)        Collection Time Result Time QRS Duration OHS QTC Calculation    03/06/25 20:21:25 03/07/25 13:42:35 80 418                     Final result by Interface, Lab In [facility] (03/07/25 13:42:39)                   Narrative:    Test Reason : R94.31,    Vent. Rate :  89 BPM     Atrial Rate : 241 BPM     P-R Int :    ms          QRS Dur :  80 ms      QT Int : 344 ms        P-R-T Axes :     94 -15 degrees    QTcB Int : 418 ms    Normal sinus rhythm  Rightward axis  Inferior infarct ,age undetermined  Abnormal ECG  When compared with ECG of 25-Aug-2023 19:12,  No significant change was found    Confirmed by José Cortez ([id number]) on 3/7/2025 1:42:34 PM    Referred By: AAAREFERRAL SELF           Confirmed By: José Cortez                                    No results found for this or any previous visit.      CT Chest Abdomen Pelvis Without Contrast (XPD)  Narrative: EXAMINATION:  CT CHEST ABDOMEN PELVIS WITHOUT CONTRAST(XPD)    CLINICAL HISTORY:  fever;    TECHNIQUE:  Low dose axial images, sagittal and coronal reformations were obtained from the thoracic inlet to the pubic symphysis without the administration of contrast.    COMPARISON:  CT chest abdomen pelvis without 02/10/2025, 10/24/2024 additional others    FINDINGS:  Evaluation of solid organs is limited due to the absence of intravenous contrast.    CHEST:    Neck and thoracic soft tissues: No significant abnormality.    Vasculature: Left sided aortic arch. Greatest diameter descending thoracic aorta 4.2 cm.  Prominent calcified atherosclerosis of the thoracic aorta .    Heart: Normal size. Significant multivessel calcified atherosclerosis of the coronary arteries. No pericardial effusion.    Airways: Central airways are clear.    Lungs/Pleura: Paraseptal and centrilobular emphysematous changes.  Bilateral bandlike opacities in the lung bases.  Unchanged 6 mm pulmonary nodule within the left lower lobe (image 367, series 303).  No significant change in size of 0.8 cm right lower lobe nodule dating back to 03/15/2024, however this nodule now demonstrates central cavitation.  Bilateral pleural effusions.    Hilum/Mediastinum: No hilar or mediastinal lymph node enlargement.    Esophagus: No significant abnormality.    ABDOMEN/PELVIS:    Liver: Normal size. No focal abnormality.    Gallbladder: No pericholecystic  inflammatory changes.    Bile ducts: No intrahepatic or extrahepatic biliary ductal dilatation.    Spleen: Normal size.    Pancreas: No peripancreatic fat stranding.    Adrenals: No significant abnormality    Renal/Ureters: Status post left nephrectomy.  Multiple unchanged right renal cysts, largest 2.4 cm.  No hydroureteronephrosis or stones.  The urinary bladder is unremarkable.    Reproductive: Dystrophic prostate calcifications.    Stomach/Bowel: No evidence of obstruction or inflammatory changes.  Colonic diverticuli without diverticulitis.  Appendix is unremarkable.    Peritoneum: No free fluid. No intraperitoneal free air.    Lymph Nodes: No enlarged lymph nodes within the abdomen or pelvis.    Vasculature: Fusiform aneurysm of the infrarenal abdominal aorta, greatest diameter 5 cm just above the bifurcation.  Severe calcifications of the aorta and branches.    Bones: Degenerative changes of the spine.  No osseous destructive lesions.    Soft Tissues: No significant abnormality.  Impression: Fusiform aneurysm of the infrarenal abdominal aorta, greatest diameter 5 cm previously 4.4 cm.    Greatest diameter of the descending thoracic aorta 4.2 cm.    Bilateral pleural effusions, stable compared to 02/10/2025.    Status post left nephrectomy, no evidence of recurrence or distant metastases within the constraints of noncontrast study.    Bilateral emphysema.    This report was flagged in Epic as abnormal.    Electronically signed by resident: Pee Boyle  Date:    03/11/2025  Time:    08:25    Electronically signed by: Huan Schilling MD  Date:    03/11/2025  Time:    09:42  X-Ray Chest 1 View  Narrative: EXAMINATION:  XR CHEST 1 VIEW    CLINICAL HISTORY:  fever;    TECHNIQUE:  One view    COMPARISON:  Comparison is made to 03/06/2025 at 17:12.    FINDINGS:  Heart size and the appearance of the cardiomediastinal silhouette and pulmonary vascularity appear unchanged since the prior examination referenced above.   Lung zones are stable as well, and are free of significant airspace consolidation or volume loss.  Modest volumes of pleural fluid are again seen bilaterally, right greater than left.  The volume of pleural fluid on the right appears slightly greater on this examination than on the prior study, with no change in the tiny volume of left pleural fluid.  No pneumothorax.  Impression: Minimal increase in the volume of right pleural fluid since 03/06/2025 at 17:12 is appreciated.  Appearance of the chest is otherwise unchanged since that time.    Electronically signed by: Justice Markham MD  Date:    03/11/2025  Time:    09:32      Labs, Imaging, EKG and Diagnostic results from 3/11/2025 were reviewed.    Medications:  Medication list was reviewed and changes noted under Assessment/Plan.  Medications Ordered Prior to Encounter[1]  Scheduled Medications:  Current Facility-Administered Medications   Medication Dose Route Frequency    duke's soln (benadryl 30 mL, mylanta 30 mL, LIDOcaine 30 mL, nystatin 30 mL) 120 mL  10 mL Oral QID    enoxparin  30 mg Subcutaneous Daily    fluconazole  100 mg Oral Daily    folic acid  4 mg Oral Daily    pravastatin  20 mg Oral Daily    tamsulosin  0.4 mg Oral Daily     PRN:   Current Facility-Administered Medications:     0.9%  NaCl infusion (for blood administration), , Intravenous, Q24H PRN    acetaminophen, 650 mg, Oral, Q8H PRN    dextrose 50%, 12.5 g, Intravenous, PRN    dextrose 50%, 25 g, Intravenous, PRN    glucagon (human recombinant), 1 mg, Intramuscular, PRN    glucose, 16 g, Oral, PRN    glucose, 24 g, Oral, PRN    HYDROmorphone, 0.2 mg, Intravenous, Q6H PRN    naloxone, 0.02 mg, Intravenous, PRN    oxyCODONE, 5 mg, Oral, Q4H PRN    polyethylene glycol, 17 g, Oral, Daily PRN    sodium chloride 0.9%, 10 mL, Intravenous, Q12H PRN    traZODone, 50 mg, Oral, Nightly PRN  Infusions:       Estimated Creatinine Clearance: 28.2 mL/min (A) (based on SCr of 2.4 mg/dL (H)).                Assessment & Plan  Rheumatoid arthritis   rheumatoid arthritis on methotrexate -weekly . rheumatology eval   History of hepatitis C, s/p successful treatment w/ SVR12 - 7/2015  noted     PAD (peripheral artery disease)  continue ASA and cilastazol     Hypercholesteremia  continue simvastatin    AAA (abdominal aortic aneurysm)  CT CAP 2/25 - aneurysmal dilatation of the abdominal aorta measuring up to 4.5 cm AP with extension into the common iliac arteries, not significantly changed from prior study. Extensive vascular calcification noted.   3/12   CTCAP -Fusiform aneurysm of the infrarenal abdominal aorta, greatest diameter 5 cm previously 4.4 cm. Greatest diameter of the descending thoracic aorta 4.2 cm. follows with vascular surgery - Dr. Jensen   Stage 3b chronic kidney disease  Creatine stable for now. BMP reviewed- noted Estimated Creatinine Clearance: 28.2 mL/min (A) (based on SCr of 2.4 mg/dL (H)). according to latest data. Monitor UOP and serial BMP and adjust therapy as needed. Renally dose meds.    Recent Labs   Lab 03/09/25  0509 03/10/25  0400 03/11/25  0512   BUN 32* 31* 33*   CREATININE 2.2* 2.3* 2.4*       I & O   No intake or output data in the 24 hours ending 03/11/25 1355     Ureteral tumor  history of urothelial carcinoma in remission.  s/p neoadjuvant chemotherapy with Gemcitabine and Cisplatin. hem/onc consulted     Anemia  Anemia is likely due to chronic disease due to RA . Most recent hemoglobin and hematocrit are listed below.  Recent Labs     03/09/25  0509 03/10/25  0400 03/11/25  0512   HGB 7.8* 8.0* 7.5*   HCT 24.3* 25.1* 23.9*     Plan  - Monitor serial CBC: Daily  - Transfuse PRBC if patient becomes hemodynamically unstable, symptomatic or H/H drops below 7/21.  3/7 HB trended down to 6.9 . Transfusion with 1 unit of PRBC . Haptoglobin elevated.    Hypertension  Patient's blood pressure range in the last 24 hours was: BP  Min: 90/44  Max: 123/75.The patient's inpatient  anti-hypertensive regimen is listed below:  Current Antihypertensives       Plan  - BP is controlled, no changes needed to their regimen  - continue amlodipine 5mg daily  Leucopenia     Recent Labs   Lab 03/09/25  0509 03/10/25  0400 03/11/25  0512   WBC 2.89* 6.06 8.26   leucopenia 1.55 and .  discussed with hematology, likely due to MTX however he has been on this medication for a long time. admitted for further work up   Hematology consulted - had been taking MTX daily incorrectly instead of weekly.   and  MTX toxicity given incorrect daily dosing and CKD. MTX levels < 0.04. No leucovorin for now per heme onc .  parvo serologies Ig G+, Ig M negative , Reticulocytes, Iron panel, Hep C PCR ordered     Oral thrush  Thick whitish discharge on the tongue consistent with oral candidiasis .Workup is notable for new pancytopenia. Hb 7.9, leucopenia 1.55 and .  discussed with hematology, likely due to MTX however he has been on this medication for a long time.     started on dukes solution and fluconazole.   GI consulted for odynophagia   Rheumatology consulted due to recent methotrexation resumption    3/7  had been taking MTX daily incorrectly instead of weekly and  MTX toxicity given incorrect daily dosing and CKD. MTX levels < 0.04. No leucovorin for now per heme onc .       Odynophagia  secondary to above. continue dukes solution. GI consulted     Fever  3/8 low grade temperature of 100.4. reports cough. CX ray -Continued pleural fluid or thickening at the right base now with some fluid or thickening at the left base.  Some patchy parenchymal opacities though no confluent consolidation.  No pneumothorax.BC x2, UA  negative and procalcitonin   3.9 ID consulted for neutropenic fever . advanced to soft diet and started on boost.  ANC 1900. WBC improving. fever of 100.9F overnight. MRSA PCR negative   3/10  ID eval - continue renally dosed fluconazole for presumed oral and esophageal candidiasis x 14-21  days depending on clinical improvement. CT chest, abdomen, pelvis ordered  3/12 fever of 102F. BCx2 and echo orderd. pancultures. Platelets trended down to 84. CTCAP -Fusiform aneurysm of the infrarenal abdominal aorta, greatest diameter 5 cm previously 4.4 cm. Greatest diameter of the descending thoracic aorta 4.2 cm. Bilateral pleural effusions, stable compared to 02/10/2025. Status post left nephrectomy, no evidence of recurrence or distant metastases within the constraints of noncontrast study. Bilateral emphysema. GI follow up - recs Fluconazole to finish before EGD is considered                  Neutropenic fever  3/8 low grade temperature of 100.5. CX ray -Continued pleural fluid or thickening at the right base now with some fluid or thickening at the left base.  Some patchy parenchymal opacities though no confluent consolidation.  No pneumothorax. COVID, Flu, RSV negative. BC x2, UA and procalcitonin  0.27 . ANC 1500. ID consulted for neutropenic fever .     Drug-induced pancytopenia    Recent Labs   Lab 03/11/25  0512   HGB 7.5*   WBC 8.26   PLT 84*   3/10 WBC and ANC normalized. platelets trended down to 93. peripheral smear. Hematology f/u            VTE Risk Mitigation (From admission, onward)           Ordered     enoxaparin injection 30 mg  Daily         03/11/25 0749     IP VTE HIGH RISK PATIENT  Once         03/06/25 1633     Place sequential compression device  Until discontinued         03/06/25 1633                    Discharge Planning   NED: 3/13/2025     Code Status: Full Code   Medical Readiness for Discharge Date:   Discharge Plan A: Home with family                        Jarocho Frazier MD  Department of Hospital Medicine   [facility] - Emergency Dept         [1]   No current facility-administered medications on file prior to encounter.     Current Outpatient Medications on File Prior to Encounter   Medication Sig Dispense Refill    amLODIPine (NORVASC) 5 MG tablet TAKE 1 TABLET BY MOUTH  ONCE  DAILY 90 tablet 0    aspirin 81 MG Chew Take 81 mg by mouth once daily.      cilostazoL (PLETAL) 50 MG Tab TAKE 1 TABLET BY MOUTH TWICE  DAILY 180 tablet 3    gabapentin (NEURONTIN) 100 MG capsule Take 1 capsule (100 mg total) by mouth every evening. 90 capsule 1    methotrexate 2.5 MG Tab TAKE 8 TABLETS BY MOUTH EVERY 7  DAYS THIS MEDICATION REQUIRES  PERIODIC LAB MONITORING 120 tablet 3    nystatin (MYCOSTATIN) 100,000 unit/mL suspension Take by mouth 4 (four) times daily.      simvastatin (ZOCOR) 10 MG tablet Take 1 tablet (10 mg total) by mouth every evening. 90 tablet 0    tamsulosin (FLOMAX) 0.4 mg Cap Take 1 capsule (0.4 mg total) by mouth once daily. 30 capsule 11    acetaminophen (TYLENOL) 650 MG TbSR Take 1 tablet (650 mg total) by mouth every 8 (eight) hours. 63 tablet 0    bisacodyL (DULCOLAX) 5 mg EC tablet Take 5 mg by mouth daily as needed for Constipation.      docusate sodium (COLACE) 100 MG capsule Take 100 mg by mouth 2 (two) times daily.      folic acid (FOLVITE) 1 MG tablet Take 1 tablet (1,000 mcg total) by mouth once daily. 90 tablet 3    levocetirizine (XYZAL) 5 MG tablet TAKE 1 TABLET BY MOUTH EVERY DAY IN THE EVENING 90 tablet 1    multivitamin capsule Take 1 capsule by mouth once daily.      traZODone (DESYREL) 50 MG tablet TAKE 3 TABLETS BY MOUTH AT NIGHT AS NEEDED FOR INSOMNIA 270 tablet 3

## 2025-03-11 NOTE — ASSESSMENT & PLAN NOTE
70 year old male with a history of rheumatoid arthritis and chronic kidney disease.  His RA is managed with methotrexate.  He admits to not taking the methotrexate as was prescribed.  He was recently given amoxicillin for a UTI and subsequently developed severe oral pain with ulcers on his hard palate.  He presented to the ER for evaluation of this and was noted to be leukopenic, neutropenic and lymphopenic.  He has been on fluconazole presumptively for thrush with no resolution of his symptoms.  ID is re-consulted due to fevers yesterday.  Highest on the differential is methotrexate toxicity.  Methotrexate toxicity can manifest as mucositis and oral ulcers.  Methotrexate toxicity can also be associated with leukopenia.  Methotrexate toxicity occurs more frequently in patients with chronic kidney disease - Rheumatol Int. 2020 May;40(5):765-770.  Penicillins like amoxicillin are also associated with an increase in methotrexate levels - Mialna CL, Rodrick T, Ijeoma JE, et al. Pharmacokinetic interaction between high dose methotrexate and amoxycillin. Ther Drug Monit. 1993;15:375-379.    Less likely but also on the differential would include thrush due to resistant candida species such as Candida glabrata and oral herpes simplex infection.    Plan  Will change fluconazole to micafungin for the possibility of a resistant candida species.  Will empirically start valacyclovir for possible oral herpes.  If patient fails to respond, then consider rheumatology consult to evaluate for possible methotrexate toxicity vs autoimmune oral ulcers.

## 2025-03-11 NOTE — ASSESSMENT & PLAN NOTE
CT CAP 2/25 - aneurysmal dilatation of the abdominal aorta measuring up to 4.5 cm AP with extension into the common iliac arteries, not significantly changed from prior study. Extensive vascular calcification noted.   3/12   CTCAP -Fusiform aneurysm of the infrarenal abdominal aorta, greatest diameter 5 cm previously 4.4 cm. Greatest diameter of the descending thoracic aorta 4.2 cm. follows with vascular surgery - Dr. Jensen

## 2025-03-11 NOTE — PROGRESS NOTES
Pharmacist Renal Dose Adjustment Note    Andrea Gant is a 70 y.o. male being treated with the medication lovenox    Patient Data:    Vital Signs (Most Recent):  Temp: (!) 100.4 °F (38 °C) (03/11/25 0540)  Pulse: 83 (03/11/25 0540)  Resp: 16 (03/11/25 0624)  BP: 112/73 (03/11/25 0540)  SpO2: 97 % (03/11/25 0540) Vital Signs (72h Range):  Temp:  [98.3 °F (36.8 °C)-102 °F (38.9 °C)]   Pulse:  []   Resp:  [16-20]   BP: ()/(44-78)   SpO2:  [96 %-100 %]      Recent Labs   Lab 03/09/25  0509 03/10/25  0400 03/11/25  0512   CREATININE 2.2* 2.3* 2.4*     Serum creatinine: 2.4 mg/dL (H) 03/11/25 0512  Estimated creatinine clearance: 28.2 mL/min (A)    Lovenox 40 mg SQ q24h will be changed to 30 mg SQ q24h    Pharmacist's Name: Remington Peña, PharmD, BCPS   Pharmacist's Extension: [phone]

## 2025-03-11 NOTE — ASSESSMENT & PLAN NOTE
3/8 low grade temperature of 100.4. reports cough. CX ray -Continued pleural fluid or thickening at the right base now with some fluid or thickening at the left base.  Some patchy parenchymal opacities though no confluent consolidation.  No pneumothorax.BC x2, UA  negative and procalcitonin   3.9 ID consulted for neutropenic fever . advanced to soft diet and started on boost.  ANC 1900. WBC improving. fever of 100.9F overnight. MRSA PCR negative   3/10  ID eval - continue renally dosed fluconazole for presumed oral and esophageal candidiasis x 14-21 days depending on clinical improvement. CT chest, abdomen, pelvis ordered  3/12 fever of 102F. BCx2 and echo orderd. pancultures.

## 2025-03-11 NOTE — PLAN OF CARE
Hematology Plan of Care    70-year-old male with rheumatoid arthritis on methotrexate, history of stage II urothelial carcinoma s/p NA chemo with [place]/Cis, anemia, hepatitis-C s/p SVR, PAD presents for thrush. Recently had viral pharyngitis and treated with steroids and amoxcillin. Found to be pancytopenic in the setting of MTX toxicity. His WBC has now normalized after holding his medication.     PLTs worsening to 84k from 160k on admission, he developed a fever on 3/10 and is on fluconazole. His HIT score is 2-3 indicating a low probability of HIT (<5%). His smear is clinically unremarkable, salve for expected macrocytes hypersegmented neutrophils. No hepatosplenomegaly on imaging. No nutritional deficiencies on labs.     His thrombocytopenia is likely due to acute illness (sepsis?) and is expected to improve as his condition improves.     Bryan Martinez MD  Hematology/Oncology PGY-V

## 2025-03-11 NOTE — ASSESSMENT & PLAN NOTE
Thick whitish discharge on the tongue consistent with oral candidiasis .Workup is notable for new pancytopenia. Hb 7.9, leucopenia 1.55 and .  discussed with hematology, likely due to MTX however he has been on this medication for a long time.     started on dukes solution and fluconazole.   GI consulted for odynophagia   Rheumatology consulted due to recent methotrexation resumption    3/7  had been taking MTX daily incorrectly instead of weekly and  MTX toxicity given incorrect daily dosing and CKD. MTX levels < 0.04. No leucovorin for now per heme onc .  3/11 ID eval  thrush due to resistant candida species such as Candida glabrata and oral herpes simplex infection. fluconazole changed to micafungin for the possibility of a resistant candida species.  started  valacyclovir for possible oral herpes.

## 2025-03-11 NOTE — SUBJECTIVE & OBJECTIVE
Past Medical History:   Diagnosis Date    Anemia     Cancer     Cataract     Chemotherapy induced neutropenia 04/11/2023    Colon polyp 2014    Depression     Disorder of kidney and ureter     History of hepatitis C, s/p successful treatment w/ SVR12 - 7/2015 10/14/2012    Kelsey 1a,  Liver biopsy 6/2014 - Stage 1 fibrosis;  Completed 8 weeks Harvoni w/ SVR12 - 7/2015      Hyperlipidemia     Hypertension 03/08/2024    Lipoma of arm     Lipoma of lower extremity     Nuclear sclerosis - Both Eyes 10/22/2012    Other and unspecified hyperlipidemia 10/22/2013    PAD (peripheral artery disease)     Peripheral vascular disease, unspecified     Plaquenil adverse reaction in therapeutic use 10/22/2012    Rheumatoid arthritis(714.0) 10/14/2012    Special screening for malignant neoplasms, colon 02/21/2014       Past Surgical History:   Procedure Laterality Date    BIOPSY OF URETER Left 02/16/2023    Procedure: BIOPSY, URETER/BRUSH;  Surgeon: Kem Jefferson MD;  Location: [facility] OR 1ST FLR;  Service: Urology;  Laterality: Left;    COLONOSCOPY N/A 11/29/2021    Procedure: COLONOSCOPY;  Surgeon: Kenrick Zimmer MD;  Location: [facility] (4TH FLR);  Service: Endoscopy;  Laterality: N/A;  blood thinner approval received, see telephone encounter 10/19/21-BB  fully vacc-inst email-tb    CYSTOSCOPY      CYSTOSCOPY  02/16/2023    Procedure: CYSTOSCOPY;  Surgeon: Kem Jefferson MD;  Location: [facility] OR 1ST FLR;  Service: Urology;;    HERNIA REPAIR      INSERTION OF TUNNELED CENTRAL VENOUS CATHETER (CVC) WITH SUBCUTANEOUS PORT Right 3/13/2023    Procedure: INSERTION, PORT-A-CATH;  Surgeon: Rene Cali MD;  Location: [facility] CATH LAB;  Service: Radiology;  Laterality: Right;    KNEE ARTHROPLASTY      LAPAROSCOPIC EXPLORATION OF GROIN Left 09/06/2018    Procedure: EXPLORATION, INGUINAL REGION, LAPAROSCOPIC;  Surgeon: Mingo De Guzman Jr., MD;  Location: [facility] OR 2ND FLR;  Service: General;  Laterality: Left;    [facility]  REMOVAL N/A 10/3/2023    Procedure: REMOVAL, CATHETER, CENTRAL VENOUS, TUNNELED, WITH PORT;  Surgeon: Yeimi Stanton MD;  Location: [facility] CATH LAB;  Service: Radiology;  Laterality: N/A;    PYELOSCOPY Left 02/16/2023    Procedure: PYELOSCOPY;  Surgeon: Kem Jefferson MD;  Location: [facility] OR 1ST FLR;  Service: Urology;  Laterality: Left;    RETROGRADE PYELOGRAPHY Bilateral 02/16/2023    Procedure: PYELOGRAM, RETROGRADE;  Surgeon: Kem Jefferson MD;  Location: [facility] OR 1ST FLR;  Service: Urology;  Laterality: Bilateral;    ROBOT-ASSISTED LAPAROSCOPIC SURGICAL REMOVAL OF KIDNEY AND URETER USING DA BRIJESH XI Left 7/14/2023    Procedure: XI ROBOTIC NEPHROURETERECTOMY;  Surgeon: Kem Jefferson MD;  Location: [facility] OR 2ND FLR;  Service: Urology;  Laterality: Left;  5 hours    TONSILLECTOMY      URETEROSCOPIC REMOVAL OF URETERIC CALCULUS Left 02/16/2023    Procedure: REMOVAL, CALCULUS, URETER, URETEROSCOPIC;  Surgeon: Kem Jefferson MD;  Location: [facility] OR 1ST FLR;  Service: Urology;  Laterality: Left;    URETEROSCOPY Left 02/16/2023    Procedure: URETEROSCOPY;  Surgeon: Kem Jefferson MD;  Location: [facility] OR 1ST FLR;  Service: Urology;  Laterality: Left;       Review of patient's allergies indicates:   Allergen Reactions    Iodinated contrast media      Shortness of breath       Medications:  Medications Prior to Admission   Medication Sig    amLODIPine (NORVASC) 5 MG tablet TAKE 1 TABLET BY MOUTH ONCE  DAILY    aspirin 81 MG Chew Take 81 mg by mouth once daily.    cilostazoL (PLETAL) 50 MG Tab TAKE 1 TABLET BY MOUTH TWICE  DAILY    gabapentin (NEURONTIN) 100 MG capsule Take 1 capsule (100 mg total) by mouth every evening.    methotrexate 2.5 MG Tab TAKE 8 TABLETS BY MOUTH EVERY 7  DAYS THIS MEDICATION REQUIRES  PERIODIC LAB MONITORING    nystatin (MYCOSTATIN) 100,000 unit/mL suspension Take by mouth 4 (four) times daily.    simvastatin (ZOCOR) 10 MG tablet Take 1 tablet (10 mg total) by mouth every evening.     tamsulosin (FLOMAX) 0.4 mg Cap Take 1 capsule (0.4 mg total) by mouth once daily.    acetaminophen (TYLENOL) 650 MG TbSR Take 1 tablet (650 mg total) by mouth every 8 (eight) hours.    bisacodyL (DULCOLAX) 5 mg EC tablet Take 5 mg by mouth daily as needed for Constipation.    docusate sodium (COLACE) 100 MG capsule Take 100 mg by mouth 2 (two) times daily.    folic acid (FOLVITE) 1 MG tablet Take 1 tablet (1,000 mcg total) by mouth once daily.    levocetirizine (XYZAL) 5 MG tablet TAKE 1 TABLET BY MOUTH EVERY DAY IN THE EVENING    multivitamin capsule Take 1 capsule by mouth once daily.    traZODone (DESYREL) 50 MG tablet TAKE 3 TABLETS BY MOUTH AT NIGHT AS NEEDED FOR INSOMNIA     Antibiotics (From admission, onward)      None          Antifungals (From admission, onward)      Start     Stop Route Frequency Ordered    03/11/25 1845  micafungin 100 mg in 0.9% NaCl 100 mL IVPB (MB+)         -- IV Every 24 hours (non-standard times) 03/11/25 1732    03/06/25 2100  duke's soln (benadryl 30 mL, mylanta 30 mL, LIDOcaine 30 mL, nystatin 30 mL) 120 mL         -- Oral 4 times daily 03/06/25 1829          Antivirals (From admission, onward)          Stop Route Frequency     valACYclovir         -- Oral Daily             Immunization History   Administered Date(s) Administered    COVID-19 MRNA, LN-S PF (MODERNA HALF 0.25 ML DOSE) 10/26/2021, 06/09/2022    COVID-19, MRNA, LN-S, PF (MODERNA FULL 0.5 ML DOSE) 02/18/2021, 03/18/2021    COVID-19, mRNA, LNP-S, PF (Moderna) Ages 12+ 10/25/2023    COVID-19, mRNA, LNP-S, PF, malia-sucrose, 30 mcg/0.3 mL (Pfizer Ages 12+) 09/08/2024    COVID-19, mRNA, LNP-S, bivalent booster, PF (Moderna Omicron)12 + YEARS 10/11/2022    Hepatitis A / Hepatitis B 12/02/2020, 04/07/2021, 10/11/2021    Influenza 01/27/2014, 10/10/2018, 09/26/2019    Influenza (FLUAD) - Quadrivalent - Adjuvanted - PF *Preferred* (65+) 09/27/2023    Influenza - Quadrivalent - High Dose - PF (65 years and older) 08/18/2021,  10/11/2022    Influenza - Quadrivalent - MDCK - PF 10/16/2017, 10/22/2018    Influenza - Quadrivalent - PF *Preferred* (6 months and older) 2015, 10/15/2016    Influenza - Trivalent - Fluzone High Dose - PF (65 years and older) 2019, 2024    Influenza Split 2014, 2014    Pneumococcal Conjugate - 13 Valent 10/14/2020    Pneumococcal Polysaccharide - 23 Valent 2014    Tdap 2014    Zoster 2014    Zoster Recombinant 10/14/2020, 2021       Family History       Problem Relation (Age of Onset)    Arthritis Sister    Dementia Mother, Father    Heart disease Sister, Paternal Uncle    Kidney disease Sister    No Known Problems Daughter    Seizures Sister          Social History     Socioeconomic History    Marital status:      Spouse name: Rufina   Occupational History     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[employer]   Tobacco Use    Smoking status: Former     Current packs/day: 0.00     Average packs/day: 0.5 packs/day for 47.5 years (23.7 ttl pk-yrs)     Types: Cigarettes     Start date:      Quit date: 2020     Years since quittin.7    Smokeless tobacco: Never   Substance and Sexual Activity    Alcohol use: Yes     Comment: 2 drinks per week    Drug use: Yes     Types: Marijuana     Comment: marajuana used on Saturday    Sexual activity: Yes     Partners: Female   Social History Narrative    Resides locally    [place] [employer]     w/ 2 adult children     Social Drivers of Health     Financial Resource Strain: Low Risk  (3/7/2025)    Overall Financial Resource Strain (CARDIA)     Difficulty of Paying Living Expenses: Not very hard   Food Insecurity: No Food Insecurity (3/7/2025)    Hunger Vital Sign     Worried About Running Out of Food in the Last Year: Never true     Ran Out of Food in the Last Year: Never true   Transportation Needs: No Transportation Needs (10/28/2024)    PRAPARE - Transportation     Lack of Transportation  (Medical): No     Lack of Transportation (Non-Medical): No   Physical Activity: Inactive (3/7/2025)    Exercise Vital Sign     Days of Exercise per Week: 0 days     Minutes of Exercise per Session: 0 min   Stress: No Stress Concern Present (3/7/2025)    [employer] [place] [employer] - Occupational Stress Questionnaire     Feeling of Stress : Not at all   Housing Stability: Low Risk  (3/7/2025)    Housing Stability Vital Sign     Unable to Pay for Housing in the Last Year: No     Homeless in the Last Year: No     Review of Systems   HENT:  Positive for mouth sores.    All other systems reviewed and are negative.    Objective:     Vital Signs (Most Recent):  Temp: (!) 102 °F (38.9 °C) (03/11/25 1652)  Pulse: 91 (03/11/25 1538)  Resp: 18 (03/11/25 1714)  BP: 102/64 (03/11/25 1538)  SpO2: 98 % (03/11/25 1538) Vital Signs (24h Range):  Temp:  [98.2 °F (36.8 °C)-102 °F (38.9 °C)] 102 °F (38.9 °C)  Pulse:  [60-96] 91  Resp:  [16-20] 18  SpO2:  [95 %-99 %] 98 %  BP: ()/(44-75) 102/64     Weight: 72.6 kg (160 lb)  Body mass index is 23.63 kg/m².    Estimated Creatinine Clearance: 28.2 mL/min (A) (based on SCr of 2.4 mg/dL (H)).     Physical Exam  Vitals and nursing note reviewed.   Constitutional:       General: He is not in acute distress.     Appearance: He is well-developed. He is not diaphoretic.   HENT:      Head: Normocephalic and atraumatic.      Right Ear: External ear normal.      Left Ear: External ear normal.      Nose: Nose normal.      Mouth/Throat:      Pharynx: No oropharyngeal exudate.      Comments: Erythematous areas on the hard palate.  Eyes:      General: No scleral icterus.        Right eye: No discharge.         Left eye: No discharge.      Conjunctiva/sclera: Conjunctivae normal.      Pupils: Pupils are equal, round, and reactive to light.   Neck:      Thyroid: No thyromegaly.      Vascular: No JVD.      Trachea: No tracheal deviation.   Cardiovascular:      Rate and Rhythm: Normal  rate and regular rhythm.      Heart sounds: No murmur heard.     No friction rub. No gallop.   Pulmonary:      Effort: Pulmonary effort is normal. No respiratory distress.      Breath sounds: Normal breath sounds. No stridor. No wheezing or rales.   Chest:      Chest wall: No tenderness.   Abdominal:      General: Bowel sounds are normal. There is no distension.      Palpations: Abdomen is soft. There is no mass.      Tenderness: There is no abdominal tenderness. There is no guarding or rebound.   Musculoskeletal:         General: No tenderness. Normal range of motion.      Cervical back: Normal range of motion and neck supple.   Lymphadenopathy:      Cervical: No cervical adenopathy.   Skin:     General: Skin is warm.      Coloration: Skin is not pale.      Findings: No erythema or rash.   Neurological:      Mental Status: He is alert and oriented to person, place, and time.      Cranial Nerves: No cranial nerve deficit.      Motor: No abnormal muscle tone.      Coordination: Coordination normal.      Deep Tendon Reflexes: Reflexes are normal and symmetric. Reflexes normal.   Psychiatric:         Behavior: Behavior normal.         Thought Content: Thought content normal.         Judgment: Judgment normal.          Significant Labs:   Microbiology Results (last 7 days)       Procedure Component Value Units Date/Time    Respiratory Infection Panel (PCR), Nasopharyngeal [[id number]] Collected: 03/11/25 1656    Order Status: Completed Specimen: Nasopharyngeal Swab Updated: 03/11/25 1704     Respiratory Infection Panel Source NP Swab    Narrative:      Assay not valid for lower respiratory specimens, alternate  testing required.    Blood culture [[id number]] Collected: 03/11/25 0832    Order Status: Sent Specimen: Blood from Peripheral, Antecubital, Left Updated: 03/11/25 1239    Blood culture [[id number]] Collected: 03/11/25 0838    Order Status: Sent Specimen: Blood from Peripheral, Antecubital, Left Updated: 03/11/25  1239    Blood culture [[id number]] Collected: 03/08/25 0918    Order Status: Completed Specimen: Blood Updated: 03/11/25 1212     Blood Culture, Routine No Growth to date      No Growth to date      No Growth to date      No Growth to date    Culture, Respiratory with Gram Stain [[id number]] Collected: 03/08/25 1600    Order Status: Completed Specimen: Respiratory from Sputum Updated: 03/10/25 1115     Respiratory Culture Normal respiratory judah      No S aureus or Pseudomonas isolated.     Gram Stain (Respiratory) <10 epithelial cells per low power field.     Gram Stain (Respiratory) Few WBC's     Gram Stain (Respiratory) Many Gram positive cocci     Gram Stain (Respiratory) Rare Gram negative rods    MRSA Screen by PCR [[id number]] Collected: 03/08/25 1430    Order Status: Completed Specimen: Nasal Swab Updated: 03/08/25 1758     MRSA SCREEN BY PCR Not Detected    Blood culture [[id number]] Collected: 03/08/25 1018    Order Status: Sent Specimen: Blood             Significant Imaging: I have reviewed all pertinent imaging results/findings within the past 24 hours.

## 2025-03-11 NOTE — ASSESSMENT & PLAN NOTE
3/8 low grade temperature of 100.4. reports cough. CX ray -Continued pleural fluid or thickening at the right base now with some fluid or thickening at the left base.  Some patchy parenchymal opacities though no confluent consolidation.  No pneumothorax.BC x2, UA  negative and procalcitonin   3.9 ID consulted for neutropenic fever . advanced to soft diet and started on boost.  ANC 1900. WBC improving. fever of 100.9F overnight. MRSA PCR negative   3/10  ID eval - continue renally dosed fluconazole for presumed oral and esophageal candidiasis x 14-21 days depending on clinical improvement. CT chest, abdomen, pelvis ordered  3/12 fever of 102F. BCx2 and echo orderd. pancultures. Platelets trended down to 84. CTCAP -Fusiform aneurysm of the infrarenal abdominal aorta, greatest diameter 5 cm previously 4.4 cm. Greatest diameter of the descending thoracic aorta 4.2 cm. Bilateral pleural effusions, stable compared to 02/10/2025. Status post left nephrectomy, no evidence of recurrence or distant metastases within the constraints of noncontrast study. Bilateral emphysema. GI follow up - recs Fluconazole to finish before EGD is considered

## 2025-03-11 NOTE — PLAN OF CARE
Important Message from Medicare  Important Message from Medicare regarding Discharge Appeal Rights: Signed/date by patient/caregiver, Explained to patient/caregiver, Given to patient/caregiver     Date IMM was signed: 03/10/25  Time IMM was signed: 3539                 TIBURCIO Serrano, LMSW  [facility]  Case Management  Ext. [phone]

## 2025-03-11 NOTE — ASSESSMENT & PLAN NOTE
Creatine stable for now. BMP reviewed- noted Estimated Creatinine Clearance: 28.2 mL/min (A) (based on SCr of 2.4 mg/dL (H)). according to latest data. Monitor UOP and serial BMP and adjust therapy as needed. Renally dose meds.    Recent Labs   Lab 03/09/25  0509 03/10/25  0400 03/11/25  0512   BUN 32* 31* 33*   CREATININE 2.2* 2.3* 2.4*       I & O   No intake or output data in the 24 hours ending 03/11/25 8919

## 2025-03-11 NOTE — CONSULTS
[facility] - Internal Medicine Telemetry  Infectious Disease  Consult Note    Patient Name: Andrea Gant  MRN: [id number]  Admission Date: 3/6/2025  Hospital Length of Stay: 4 days  Attending Physician: Jarocho Frazier MD  Primary Care Provider: Andrea Wang MD     Isolation Status: No active isolations    Patient information was obtained from patient, parent, past medical records, and ER records.      Consults  Assessment/Plan:     ENT  Oral ulceration  70 year old male with a history of rheumatoid arthritis and chronic kidney disease.  His RA is managed with methotrexate.  He admits to not taking the methotrexate as was prescribed.  He was recently given amoxicillin for a UTI and subsequently developed severe oral pain with ulcers on his hard palate.  He presented to the ER for evaluation of this and was noted to be leukopenic, neutropenic and lymphopenic.  He has been on fluconazole presumptively for thrush with no resolution of his symptoms.  ID is re-consulted due to fevers yesterday.  Highest on the differential is methotrexate toxicity.  Methotrexate toxicity can manifest as mucositis and oral ulcers.  Methotrexate toxicity can also be associated with leukopenia.  Methotrexate toxicity occurs more frequently in patients with chronic kidney disease - Rheumatol Int. 2020 May;40(5):765-770.  Penicillins like amoxicillin are also associated with an increase in methotrexate levels - Milana CL, Rodrick T, Peris JE, et al. Pharmacokinetic interaction between high dose methotrexate and amoxycillin. Ther Drug Monit. 1993;15:375-379.    Less likely but also on the differential would include thrush due to resistant candida species such as Candida glabrata and oral herpes simplex infection.    Plan  Will change fluconazole to micafungin for the possibility of a resistant candida species.  Will empirically start valacyclovir for possible oral herpes.  If patient fails to respond, then consider rheumatology  "consult to evaluate for possible methotrexate toxicity vs autoimmune oral ulcers.    Other  Fever  70 year old with RA on methotrexate admitted for painful oral lesions.  He was neutropenic and lymphopenic at the time of admission.  He has had a fever over the last 24 hours.  He is at risk for infection.  Agree with checking blood cultures.  Agree with checking respiratory infection panel.  Recommend checking urine legionella as well.        Thank you for your consult. I will follow-up with patient. Please contact us if you have any additional questions.    Wilian Smith MD  Infectious Disease  [facility] - Internal Medicine Telemetry    Time: 50 minutes   50% of time spent on face-to-face counseling and coordination of care. Counseling included review of test results, diagnosis, and treatment plan with patient and/or family.  I have reviewed hospital notes from surgical service and other specialty providers as well as outside medical records. I have also reviewed CBC, CMP/BMP,  cultures and imaging with my interpretation as documented. Patient is high risk of morbidity, on antibiotics requiring intensive monitoring for toxicity.       Subjective:     Principal Problem: Oral thrush    HPI: Andrea Gant is a 70 year old man with RA on methotrexate, urothelial carcinoma in remission, hepatitis C with SVR who was admitted 3/6 due to painful oral lesions. He was recently prescribed PO amox-clav after cystoscopy from 2/7-2/21 with subsequent development of painful oral lesions. He was found to be pancytopenic, now with concern for methotrexate toxicity after taking more frequently than prescribed (daily dosing rather than once weekly for last few weeks). He reports that the lesions are improved and he is able to tolerate some PO today. He denies visual changes, cough, dyspnea, abdominal pain, diarrhea, rash, history of cold sores, dysuria.     Tmax up to 100.9. ID consulted for "neutropenic fever". Chest x-ray with " pleural thickening and increased pleural effusions. Blood cultures no growth to date. Respiratory culture with normal respiratory judah. ANC 1116->1762, no longer neutropenic. Currently on fluconazole alone.     Past Medical History:   Diagnosis Date    Anemia     Cancer     Cataract     Chemotherapy induced neutropenia 04/11/2023    Colon polyp 2014    Depression     Disorder of kidney and ureter     History of hepatitis C, s/p successful treatment w/ SVR12 - 7/2015 10/14/2012    Kelsey 1a,  Liver biopsy 6/2014 - Stage 1 fibrosis;  Completed 8 weeks Harvoni w/ SVR12 - 7/2015      Hyperlipidemia     Hypertension 03/08/2024    Lipoma of arm     Lipoma of lower extremity     Nuclear sclerosis - Both Eyes 10/22/2012    Other and unspecified hyperlipidemia 10/22/2013    PAD (peripheral artery disease)     Peripheral vascular disease, unspecified     Plaquenil adverse reaction in therapeutic use 10/22/2012    Rheumatoid arthritis(714.0) 10/14/2012    Special screening for malignant neoplasms, colon 02/21/2014       Past Surgical History:   Procedure Laterality Date    BIOPSY OF URETER Left 02/16/2023    Procedure: BIOPSY, URETER/BRUSH;  Surgeon: Kem Jefferson MD;  [facility];  Service: Urology;  Laterality: Left;    COLONOSCOPY N/A 11/29/2021    Procedure: COLONOSCOPY;  Surgeon: Kenrick Zimmer MD;  Location: [facility] (4TH FLR);  Service: Endoscopy;  Laterality: N/A;  blood thinner approval received, see telephone encounter 10/19/21-BB  fully vacc-inst email-tb    CYSTOSCOPY      CYSTOSCOPY  02/16/2023    Procedure: CYSTOSCOPY;  Surgeon: Kem Jefferson MD;  [facility];  Service: Urology;;    HERNIA REPAIR      INSERTION OF TUNNELED CENTRAL VENOUS CATHETER (CVC) WITH SUBCUTANEOUS PORT Right 3/13/2023    Procedure: INSERTION, PORT-A-CATH;  Surgeon: Rene Cali MD;  Location: [facility] [place], operated by [facility] CATH LAB;  Service: Radiology;  Laterality: Right;    KNEE ARTHROPLASTY      LAPAROSCOPIC  EXPLORATION OF GROIN Left 09/06/2018    Procedure: EXPLORATION, INGUINAL REGION, LAPAROSCOPIC;  Surgeon: Mingo De Guzman Jr., MD;  [facility];  Service: General;  Laterality: Left;    MEDIPORT REMOVAL N/A 10/3/2023    Procedure: REMOVAL, CATHETER, CENTRAL VENOUS, TUNNELED, WITH PORT;  Surgeon: Yeimi Stanton MD;  Location: [facility] CATH LAB;  Service: Radiology;  Laterality: N/A;    PYELOSCOPY Left 02/16/2023    Procedure: PYELOSCOPY;  Surgeon: Kem Jefferson MD;  Location: [facility] OR [address] [place] [address];  Service: Urology;  Laterality: Left;    RETROGRADE PYELOGRAPHY Bilateral 02/16/2023    Procedure: PYELOGRAM, RETROGRADE;  Surgeon: Kem Jefferson MD;  Location: [facility] OR [address] [place] [address];  Service: Urology;  Laterality: Bilateral;    ROBOT-ASSISTED LAPAROSCOPIC SURGICAL REMOVAL OF KIDNEY AND URETER USING DA BRIJESH XI Left 7/14/2023    Procedure: XI ROBOTIC NEPHROURETERECTOMY;  Surgeon: Kem Jefferson MD;  Location: [facility] OR [address] [place] [address];  Service: Urology;  Laterality: Left;  5 hours    TONSILLECTOMY      URETEROSCOPIC REMOVAL OF URETERIC CALCULUS Left 02/16/2023    Procedure: REMOVAL, CALCULUS, URETER, URETEROSCOPIC;  Surgeon: Kem Jefferson MD;  Location: [facility] OR [address] [place] [address];  Service: Urology;  Laterality: Left;    URETEROSCOPY Left 02/16/2023    Procedure: URETEROSCOPY;  Surgeon: Kem Jefferson MD;  Location: [address];  Service: Urology;  Laterality: Left;       Review of patient's allergies indicates:   Allergen Reactions    Iodinated contrast media      Shortness of breath       Medications:  Medications Prior to Admission   Medication Sig    amLODIPine (NORVASC) 5 MG tablet TAKE 1 TABLET BY MOUTH ONCE  DAILY    aspirin 81 MG Chew Take 81 mg by mouth once daily.    cilostazoL (PLETAL) 50 MG Tab TAKE 1 TABLET BY MOUTH TWICE  DAILY    gabapentin (NEURONTIN) 100 MG capsule Take 1 capsule (100 mg total) by mouth every evening.    methotrexate 2.5 MG Tab TAKE 8 TABLETS BY MOUTH EVERY 7  DAYS THIS  MEDICATION REQUIRES  PERIODIC LAB MONITORING    nystatin (MYCOSTATIN) 100,000 unit/mL suspension Take by mouth 4 (four) times daily.    simvastatin (ZOCOR) 10 MG tablet Take 1 tablet (10 mg total) by mouth every evening.    tamsulosin (FLOMAX) 0.4 mg Cap Take 1 capsule (0.4 mg total) by mouth once daily.    acetaminophen (TYLENOL) 650 MG TbSR Take 1 tablet (650 mg total) by mouth every 8 (eight) hours.    bisacodyL (DULCOLAX) 5 mg EC tablet Take 5 mg by mouth daily as needed for Constipation.    docusate sodium (COLACE) 100 MG capsule Take 100 mg by mouth 2 (two) times daily.    folic acid (FOLVITE) 1 MG tablet Take 1 tablet (1,000 mcg total) by mouth once daily.    levocetirizine (XYZAL) 5 MG tablet TAKE 1 TABLET BY MOUTH EVERY DAY IN THE EVENING    multivitamin capsule Take 1 capsule by mouth once daily.    traZODone (DESYREL) 50 MG tablet TAKE 3 TABLETS BY MOUTH AT NIGHT AS NEEDED FOR INSOMNIA     Antibiotics (From admission, onward)      None          Antifungals (From admission, onward)      Start     Stop Route Frequency Ordered    03/11/25 1845  micafungin 100 mg in 0.9% NaCl 100 mL IVPB (MB+)         -- IV Every 24 hours (non-standard times) 03/11/25 1732    03/06/25 2100  duke's soln (benadryl 30 mL, mylanta 30 mL, LIDOcaine 30 mL, nystatin 30 mL) 120 mL         -- Oral 4 times daily 03/06/25 1829          Antivirals (From admission, onward)          Stop Route Frequency     valACYclovir         -- Oral Daily             Immunization History   Administered Date(s) Administered    COVID-19 MRNA, LN-S PF (MODERNA HALF 0.25 ML DOSE) 10/26/2021, 06/09/2022    COVID-19, MRNA, LN-S, PF (MODERNA FULL 0.5 ML DOSE) 02/18/2021, 03/18/2021    COVID-19, mRNA, LNP-S, PF (Moderna) Ages 12+ 10/25/2023    COVID-19, mRNA, LNP-S, PF, malia-sucrose, 30 mcg/0.3 mL (Pfizer Ages 12+) 09/08/2024    COVID-19, mRNA, LNP-S, bivalent booster, PF (Moderna Omicron)12 + YEARS 10/11/2022    Hepatitis A / Hepatitis B 12/02/2020,  2021, 10/11/2021    Influenza 2014, 10/10/2018, 2019    Influenza (FLUAD) - Quadrivalent - Adjuvanted - PF *Preferred* (65+) 2023    Influenza - Quadrivalent - High Dose - PF (65 years and older) 2021, 10/11/2022    Influenza - Quadrivalent - MDCK - PF 10/16/2017, 10/22/2018    Influenza - Quadrivalent - PF *Preferred* (6 months and older) 2015, 10/15/2016    Influenza - Trivalent - Fluzone High Dose - PF (65 years and older) 2019, 2024    Influenza Split 2014, 2014    Pneumococcal Conjugate - 13 Valent 10/14/2020    Pneumococcal Polysaccharide - 23 Valent 2014    Tdap 2014    Zoster 2014    Zoster Recombinant 10/14/2020, 2021       Family History       Problem Relation (Age of Onset)    Arthritis Sister    Dementia Mother, Father    Heart disease Sister, Paternal Uncle    Kidney disease Sister    No Known Problems Daughter    Seizures Sister          Social History     Socioeconomic History    Marital status:      Spouse name: Rufina   Occupational History     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[employer]   Tobacco Use    Smoking status: Former     Current packs/day: 0.00     Average packs/day: 0.5 packs/day for 47.5 years (23.7 ttl pk-yrs)     Types: Cigarettes     Start date:      Quit date: 2020     Years since quittin.7    Smokeless tobacco: Never   Substance and Sexual Activity    Alcohol use: Yes     Comment: 2 drinks per week    Drug use: Yes     Types: Marijuana     Comment: marajuana used on Saturday    Sexual activity: Yes     Partners: Female   Social History Narrative    Resides locally    [place] at [employer]     w/ 2 adult children     Social Drivers of Health     Financial Resource Strain: Low Risk  (3/7/2025)    Overall Financial Resource Strain (CARDIA)     Difficulty of Paying Living Expenses: Not very hard   Food Insecurity: No Food Insecurity (3/7/2025)    Hunger Vital Sign      Worried About Running Out of Food in the Last Year: Never true     Ran Out of Food in the Last Year: Never true   Transportation Needs: No Transportation Needs (10/28/2024)    PRAPARE - Transportation     Lack of Transportation (Medical): No     Lack of Transportation (Non-Medical): No   Physical Activity: Inactive (3/7/2025)    Exercise Vital Sign     Days of Exercise per Week: 0 days     Minutes of Exercise per Session: 0 min   Stress: No Stress Concern Present (3/7/2025)    [employer] [place] [place] [employer] - Occupational Stress Questionnaire     Feeling of Stress : Not at all   Housing Stability: Low Risk  (3/7/2025)    Housing Stability Vital Sign     Unable to Pay for Housing in the Last Year: No     Homeless in the Last Year: No     Review of Systems   HENT:  Positive for mouth sores.    All other systems reviewed and are negative.    Objective:     Vital Signs (Most Recent):  Temp: (!) 102 °F (38.9 °C) (03/11/25 1652)  Pulse: 91 (03/11/25 1538)  Resp: 18 (03/11/25 1714)  BP: 102/64 (03/11/25 1538)  SpO2: 98 % (03/11/25 1538) Vital Signs (24h Range):  Temp:  [98.2 °F (36.8 °C)-102 °F (38.9 °C)] 102 °F (38.9 °C)  Pulse:  [60-96] 91  Resp:  [16-20] 18  SpO2:  [95 %-99 %] 98 %  BP: ()/(44-75) 102/64     Weight: 72.6 kg (160 lb)  Body mass index is 23.63 kg/m².    Estimated Creatinine Clearance: 28.2 mL/min (A) (based on SCr of 2.4 mg/dL (H)).     Physical Exam  Vitals and nursing note reviewed.   Constitutional:       General: He is not in acute distress.     Appearance: He is well-developed. He is not diaphoretic.   HENT:      Head: Normocephalic and atraumatic.      Right Ear: External ear normal.      Left Ear: External ear normal.      Nose: Nose normal.      Mouth/Throat:      Pharynx: No oropharyngeal exudate.      Comments: Erythematous areas on the hard palate.  Eyes:      General: No scleral icterus.        Right eye: No discharge.         Left eye: No discharge.      Conjunctiva/sclera:  Conjunctivae normal.      Pupils: Pupils are equal, round, and reactive to light.   Neck:      Thyroid: No thyromegaly.      Vascular: No JVD.      Trachea: No tracheal deviation.   Cardiovascular:      Rate and Rhythm: Normal rate and regular rhythm.      Heart sounds: No murmur heard.     No friction rub. No gallop.   Pulmonary:      Effort: Pulmonary effort is normal. No respiratory distress.      Breath sounds: Normal breath sounds. No stridor. No wheezing or rales.   Chest:      Chest wall: No tenderness.   Abdominal:      General: Bowel sounds are normal. There is no distension.      Palpations: Abdomen is soft. There is no mass.      Tenderness: There is no abdominal tenderness. There is no guarding or rebound.   Musculoskeletal:         General: No tenderness. Normal range of motion.      Cervical back: Normal range of motion and neck supple.   Lymphadenopathy:      Cervical: No cervical adenopathy.   Skin:     General: Skin is warm.      Coloration: Skin is not pale.      Findings: No erythema or rash.   Neurological:      Mental Status: He is alert and oriented to person, place, and time.      Cranial Nerves: No cranial nerve deficit.      Motor: No abnormal muscle tone.      Coordination: Coordination normal.      Deep Tendon Reflexes: Reflexes are normal and symmetric. Reflexes normal.   Psychiatric:         Behavior: Behavior normal.         Thought Content: Thought content normal.         Judgment: Judgment normal.          Significant Labs:   Microbiology Results (last 7 days)       Procedure Component Value Units Date/Time    Respiratory Infection Panel (PCR), Nasopharyngeal [[id number]] Collected: 03/11/25 1656    Order Status: Completed Specimen: Nasopharyngeal Swab Updated: 03/11/25 1704     Respiratory Infection Panel Source NP Swab    Narrative:      Assay not valid for lower respiratory specimens, alternate  testing required.    Blood culture [[id number]] Collected: 03/11/25 0832    Order  Status: Sent Specimen: Blood from Peripheral, Antecubital, Left Updated: 03/11/25 1239    Blood culture [[id number]] Collected: 03/11/25 0838    Order Status: Sent Specimen: Blood from Peripheral, Antecubital, Left Updated: 03/11/25 1239    Blood culture [[id number]] Collected: 03/08/25 0918    Order Status: Completed Specimen: Blood Updated: 03/11/25 1212     Blood Culture, Routine No Growth to date      No Growth to date      No Growth to date      No Growth to date    Culture, Respiratory with Gram Stain [[id number]] Collected: 03/08/25 1600    Order Status: Completed Specimen: Respiratory from Sputum Updated: 03/10/25 1115     Respiratory Culture Normal respiratory judah      No S aureus or Pseudomonas isolated.     Gram Stain (Respiratory) <10 epithelial cells per low power field.     Gram Stain (Respiratory) Few WBC's     Gram Stain (Respiratory) Many Gram positive cocci     Gram Stain (Respiratory) Rare Gram negative rods    MRSA Screen by PCR [[id number]] Collected: 03/08/25 1430    Order Status: Completed Specimen: Nasal Swab Updated: 03/08/25 1758     MRSA SCREEN BY PCR Not Detected    Blood culture [[id number]] Collected: 03/08/25 1018    Order Status: Sent Specimen: Blood             Significant Imaging: I have reviewed all pertinent imaging results/findings within the past 24 hours.

## 2025-03-11 NOTE — ASSESSMENT & PLAN NOTE
Recent Labs   Lab 03/09/25  0509 03/10/25  0400 03/11/25  0512   WBC 2.89* 6.06 8.26   leucopenia 1.55 and .  discussed with hematology, likely due to MTX however he has been on this medication for a long time. admitted for further work up   Hematology consulted - had been taking MTX daily incorrectly instead of weekly.   and  MTX toxicity given incorrect daily dosing and CKD. MTX levels < 0.04. No leucovorin for now per heme onc .  parvo serologies Ig G+, Ig M negative ,  Hep C PCR negative  Reticulocytes, Iron panel

## 2025-03-11 NOTE — ASSESSMENT & PLAN NOTE
Anemia is likely due to chronic disease due to RA. Most recent hemoglobin and hematocrit are listed below.  Recent Labs     03/09/25  0509 03/10/25  0400 03/11/25  0512   HGB 7.8* 8.0* 7.5*   HCT 24.3* 25.1* 23.9*     Plan  - Monitor serial CBC: Daily  - Transfuse PRBC if patient becomes hemodynamically unstable, symptomatic or H/H drops below 7/21.  3/7 HB trended down to 6.9 . Transfusion with 1 unit of PRBC . Haptoglobin elevated.

## 2025-03-11 NOTE — ASSESSMENT & PLAN NOTE
Recent Labs   Lab 03/11/25  0512   HGB 7.5*   WBC 8.26   PLT 84*   3/10 WBC and ANC normalized. platelets trended down to 93. peripheral smear. Hematology f/u

## 2025-03-11 NOTE — ASSESSMENT & PLAN NOTE
Recent Labs   Lab 03/12/25  1408   HGB 6.8*   WBC 8.16      3/10 WBC and ANC normalized. platelets trended down to 93. peripheral smear. Hematology f/u    HIT score is 2-3 indicating a low probability of HIT (<5%).  smear is clinically unremarkable - expected macrocytes hypersegmented neutrophils. No hepatosplenomegal . thrombocytopenia is likely due to acute illness

## 2025-03-12 ENCOUNTER — PATIENT MESSAGE (OUTPATIENT)
Dept: UROLOGY | Facility: CLINIC | Age: 70
End: 2025-03-12
Payer: MEDICARE

## 2025-03-12 LAB
ABO + RH BLD: NORMAL
ALBUMIN SERPL BCP-MCNC: 2.4 G/DL (ref 3.5–5.2)
ALP SERPL-CCNC: 75 U/L (ref 40–150)
ALT SERPL W/O P-5'-P-CCNC: 15 U/L (ref 10–44)
ANION GAP SERPL CALC-SCNC: 7 MMOL/L (ref 8–16)
AST SERPL-CCNC: 18 U/L (ref 10–40)
AV AREA BY CONTINUOUS VTI: 3 CM2
AV INDEX (PROSTH): 0.88
AV LVOT MEAN GRADIENT: 2 MMHG
AV LVOT PEAK GRADIENT: 4 MMHG
AV MEAN GRADIENT: 3 MMHG
AV PEAK GRADIENT: 6 MMHG
AV VALVE AREA BY VELOCITY RATIO: 3.2 CM²
AV VALVE AREA: 3 CM2
AV VELOCITY RATIO: 0.92
BASOPHILS # BLD AUTO: 0.02 K/UL (ref 0–0.2)
BASOPHILS # BLD AUTO: 0.02 K/UL (ref 0–0.2)
BASOPHILS NFR BLD: 0.2 % (ref 0–1.9)
BASOPHILS NFR BLD: 0.2 % (ref 0–1.9)
BILIRUB SERPL-MCNC: 0.5 MG/DL (ref 0.1–1)
BLD GP AB SCN CELLS X3 SERPL QL: NORMAL
BLD PROD TYP BPU: NORMAL
BLOOD UNIT EXPIRATION DATE: NORMAL
BLOOD UNIT TYPE CODE: 6200
BLOOD UNIT TYPE: NORMAL
BSA FOR ECHO PROCEDURE: 1.88 M2
BUN SERPL-MCNC: 37 MG/DL (ref 8–23)
CALCIUM SERPL-MCNC: 8.2 MG/DL (ref 8.7–10.5)
CHLORIDE SERPL-SCNC: 108 MMOL/L (ref 95–110)
CO2 SERPL-SCNC: 22 MMOL/L (ref 23–29)
CODING SYSTEM: NORMAL
CREAT SERPL-MCNC: 2.5 MG/DL (ref 0.5–1.4)
CROSSMATCH INTERPRETATION: NORMAL
CV ECHO LV RWT: 0.42 CM
DIFFERENTIAL METHOD BLD: ABNORMAL
DIFFERENTIAL METHOD BLD: ABNORMAL
DISPENSE STATUS: NORMAL
DOP CALC AO PEAK VEL: 1.2 M/S
DOP CALC AO VTI: 24.2 CM
DOP CALC LVOT AREA: 3.5 CM2
DOP CALC LVOT DIAMETER: 2.1 CM
DOP CALC LVOT PEAK VEL: 1.1 M/S
DOP CALC LVOT STROKE VOLUME: 73.4 CM3
DOP CALCLVOT PEAK VEL VTI: 21.2 CM
E WAVE DECELERATION TIME: 224 MS
E WAVE DECELERATION TIME: 225 MS
E/A RATIO: 0.82
E/E' RATIO: 6 M/S
ECHO EF ESTIMATED: 72 %
ECHO LV POSTERIOR WALL: 1.1 CM (ref 0.6–1.1)
EJECTION FRACTION: 55 %
EOSINOPHIL # BLD AUTO: 0.1 K/UL (ref 0–0.5)
EOSINOPHIL # BLD AUTO: 0.1 K/UL (ref 0–0.5)
EOSINOPHIL NFR BLD: 1.3 % (ref 0–8)
EOSINOPHIL NFR BLD: 1.3 % (ref 0–8)
ERYTHROCYTE [DISTWIDTH] IN BLOOD BY AUTOMATED COUNT: 15.1 % (ref 11.5–14.5)
ERYTHROCYTE [DISTWIDTH] IN BLOOD BY AUTOMATED COUNT: 15.3 % (ref 11.5–14.5)
EST. GFR  (NO RACE VARIABLE): 26.8 ML/MIN/1.73 M^2
FRACTIONAL SHORTENING: 42.3 % (ref 28–44)
GLUCOSE SERPL-MCNC: 121 MG/DL (ref 70–110)
HCT VFR BLD AUTO: 21.2 % (ref 40–54)
HCT VFR BLD AUTO: 21.8 % (ref 40–54)
HGB BLD-MCNC: 6.8 G/DL (ref 14–18)
HGB BLD-MCNC: 7 G/DL (ref 14–18)
IMM GRANULOCYTES # BLD AUTO: 0.06 K/UL (ref 0–0.04)
IMM GRANULOCYTES # BLD AUTO: 0.07 K/UL (ref 0–0.04)
IMM GRANULOCYTES NFR BLD AUTO: 0.7 % (ref 0–0.5)
IMM GRANULOCYTES NFR BLD AUTO: 0.9 % (ref 0–0.5)
INTERVENTRICULAR SEPTUM: 1 CM (ref 0.6–1.1)
LEFT ATRIUM SIZE: 3.7 CM
LEFT INTERNAL DIMENSION IN SYSTOLE: 3 CM (ref 2.1–4)
LEFT VENTRICLE DIASTOLIC VOLUME INDEX: 68.09 ML/M2
LEFT VENTRICLE DIASTOLIC VOLUME: 128 ML
LEFT VENTRICLE MASS INDEX: 110.3 G/M2
LEFT VENTRICLE SYSTOLIC VOLUME INDEX: 19.1 ML/M2
LEFT VENTRICLE SYSTOLIC VOLUME: 36 ML
LEFT VENTRICULAR INTERNAL DIMENSION IN DIASTOLE: 5.2 CM (ref 3.5–6)
LEFT VENTRICULAR MASS: 207.3 G
LV LATERAL E/E' RATIO: 4.9
LV SEPTAL E/E' RATIO: 8.5
LYMPHOCYTES # BLD AUTO: 0.7 K/UL (ref 1–4.8)
LYMPHOCYTES # BLD AUTO: 0.7 K/UL (ref 1–4.8)
LYMPHOCYTES NFR BLD: 8.6 % (ref 18–48)
LYMPHOCYTES NFR BLD: 8.7 % (ref 18–48)
MAGNESIUM SERPL-MCNC: 2.2 MG/DL (ref 1.6–2.6)
MCH RBC QN AUTO: 28.8 PG (ref 27–31)
MCH RBC QN AUTO: 29.1 PG (ref 27–31)
MCHC RBC AUTO-ENTMCNC: 32.1 G/DL (ref 32–36)
MCHC RBC AUTO-ENTMCNC: 32.1 G/DL (ref 32–36)
MCV RBC AUTO: 90 FL (ref 82–98)
MCV RBC AUTO: 91 FL (ref 82–98)
MONOCYTES # BLD AUTO: 0.8 K/UL (ref 0.3–1)
MONOCYTES # BLD AUTO: 0.8 K/UL (ref 0.3–1)
MONOCYTES NFR BLD: 9 % (ref 4–15)
MONOCYTES NFR BLD: 9.9 % (ref 4–15)
MV PEAK A VEL: 0.83 M/S
MV PEAK E VEL: 0.68 M/S
NEUTROPHILS # BLD AUTO: 6.5 K/UL (ref 1.8–7.7)
NEUTROPHILS # BLD AUTO: 6.8 K/UL (ref 1.8–7.7)
NEUTROPHILS NFR BLD: 79.1 % (ref 38–73)
NEUTROPHILS NFR BLD: 80.1 % (ref 38–73)
NRBC BLD-RTO: 0 /100 WBC
NRBC BLD-RTO: 0 /100 WBC
NUM UNITS TRANS PACKED RBC: NORMAL
PHOSPHATE SERPL-MCNC: 2.8 MG/DL (ref 2.7–4.5)
PLATELET # BLD AUTO: 148 K/UL (ref 150–450)
PLATELET # BLD AUTO: 175 K/UL (ref 150–450)
PMV BLD AUTO: 10.5 FL (ref 9.2–12.9)
PMV BLD AUTO: 10.6 FL (ref 9.2–12.9)
POTASSIUM SERPL-SCNC: 4.3 MMOL/L (ref 3.5–5.1)
PROT SERPL-MCNC: 6 G/DL (ref 6–8.4)
RA PRESSURE ESTIMATED: 3 MMHG
RBC # BLD AUTO: 2.34 M/UL (ref 4.6–6.2)
RBC # BLD AUTO: 2.43 M/UL (ref 4.6–6.2)
SINUS: 3.16 CM
SODIUM SERPL-SCNC: 137 MMOL/L (ref 136–145)
SPECIMEN OUTDATE: NORMAL
STJ: 3.05 CM
TDI LATERAL: 0.14 M/S
TDI SEPTAL: 0.08 M/S
TDI: 0.11 M/S
TRICUSPID ANNULAR PLANE SYSTOLIC EXCURSION: 2.08 CM
WBC # BLD AUTO: 8.16 K/UL (ref 3.9–12.7)
WBC # BLD AUTO: 8.52 K/UL (ref 3.9–12.7)
Z-SCORE OF LEFT VENTRICULAR DIMENSION IN END DIASTOLE: -0.1
Z-SCORE OF LEFT VENTRICULAR DIMENSION IN END SYSTOLE: -0.6

## 2025-03-12 PROCEDURE — 63600175 PHARM REV CODE 636 W HCPCS: Performed by: INTERNAL MEDICINE

## 2025-03-12 PROCEDURE — 86920 COMPATIBILITY TEST SPIN: CPT | Performed by: HOSPITALIST

## 2025-03-12 PROCEDURE — 25000003 PHARM REV CODE 250: Performed by: INTERNAL MEDICINE

## 2025-03-12 PROCEDURE — P9016 RBC LEUKOCYTES REDUCED: HCPCS | Performed by: HOSPITALIST

## 2025-03-12 PROCEDURE — 11000001 HC ACUTE MED/SURG PRIVATE ROOM

## 2025-03-12 PROCEDURE — 85025 COMPLETE CBC W/AUTO DIFF WBC: CPT | Performed by: HOSPITALIST

## 2025-03-12 PROCEDURE — 84100 ASSAY OF PHOSPHORUS: CPT

## 2025-03-12 PROCEDURE — 63600175 PHARM REV CODE 636 W HCPCS: Mod: JZ,TB | Performed by: HOSPITALIST

## 2025-03-12 PROCEDURE — 36415 COLL VENOUS BLD VENIPUNCTURE: CPT

## 2025-03-12 PROCEDURE — 99233 SBSQ HOSP IP/OBS HIGH 50: CPT | Mod: ,,, | Performed by: INTERNAL MEDICINE

## 2025-03-12 PROCEDURE — 86900 BLOOD TYPING SEROLOGIC ABO: CPT | Performed by: HOSPITALIST

## 2025-03-12 PROCEDURE — 80053 COMPREHEN METABOLIC PANEL: CPT

## 2025-03-12 PROCEDURE — 25000003 PHARM REV CODE 250: Performed by: HOSPITALIST

## 2025-03-12 PROCEDURE — 83735 ASSAY OF MAGNESIUM: CPT

## 2025-03-12 PROCEDURE — 85025 COMPLETE CBC W/AUTO DIFF WBC: CPT | Mod: 91

## 2025-03-12 RX ORDER — HYDROCODONE BITARTRATE AND ACETAMINOPHEN 500; 5 MG/1; MG/1
TABLET ORAL
Status: DISCONTINUED | OUTPATIENT
Start: 2025-03-12 | End: 2025-03-15

## 2025-03-12 RX ORDER — SUCRALFATE 1 G/10ML
1 SUSPENSION ORAL EVERY 6 HOURS
Status: DISCONTINUED | OUTPATIENT
Start: 2025-03-12 | End: 2025-03-15 | Stop reason: HOSPADM

## 2025-03-12 RX ADMIN — ALUMINUM HYDROXIDE, MAGNESIUM HYDROXIDE, AND DIMETHICONE 10 ML: 400; 400; 40 SUSPENSION ORAL at 09:03

## 2025-03-12 RX ADMIN — ACETAMINOPHEN 650 MG: 325 TABLET ORAL at 12:03

## 2025-03-12 RX ADMIN — HYDROMORPHONE HYDROCHLORIDE 0.2 MG: 1 INJECTION, SOLUTION INTRAMUSCULAR; INTRAVENOUS; SUBCUTANEOUS at 05:03

## 2025-03-12 RX ADMIN — ACETAMINOPHEN 650 MG: 325 TABLET ORAL at 06:03

## 2025-03-12 RX ADMIN — OXYCODONE 5 MG: 5 TABLET ORAL at 04:03

## 2025-03-12 RX ADMIN — FOLIC ACID 4 MG: 1 TABLET ORAL at 09:03

## 2025-03-12 RX ADMIN — HYDROMORPHONE HYDROCHLORIDE 0.2 MG: 1 INJECTION, SOLUTION INTRAMUSCULAR; INTRAVENOUS; SUBCUTANEOUS at 03:03

## 2025-03-12 RX ADMIN — HYDROMORPHONE HYDROCHLORIDE 0.2 MG: 1 INJECTION, SOLUTION INTRAMUSCULAR; INTRAVENOUS; SUBCUTANEOUS at 10:03

## 2025-03-12 RX ADMIN — SODIUM CHLORIDE 100 MG: 9 INJECTION, SOLUTION INTRAVENOUS at 05:03

## 2025-03-12 RX ADMIN — PRAVASTATIN SODIUM 20 MG: 20 TABLET ORAL at 09:03

## 2025-03-12 RX ADMIN — SUCRALFATE 1 G: 1 SUSPENSION ORAL at 09:03

## 2025-03-12 RX ADMIN — OXYCODONE 5 MG: 5 TABLET ORAL at 09:03

## 2025-03-12 RX ADMIN — VALACYCLOVIR HYDROCHLORIDE 1000 MG: 500 TABLET, FILM COATED ORAL at 10:03

## 2025-03-12 RX ADMIN — ENOXAPARIN SODIUM 30 MG: 40 INJECTION SUBCUTANEOUS at 05:03

## 2025-03-12 RX ADMIN — ALUMINUM HYDROXIDE, MAGNESIUM HYDROXIDE, AND DIMETHICONE 10 ML: 400; 400; 40 SUSPENSION ORAL at 12:03

## 2025-03-12 RX ADMIN — OXYCODONE 5 MG: 5 TABLET ORAL at 06:03

## 2025-03-12 RX ADMIN — ALUMINUM HYDROXIDE, MAGNESIUM HYDROXIDE, AND DIMETHICONE 10 ML: 400; 400; 40 SUSPENSION ORAL at 05:03

## 2025-03-12 RX ADMIN — ACETAMINOPHEN 650 MG: 325 TABLET ORAL at 04:03

## 2025-03-12 RX ADMIN — TAMSULOSIN HYDROCHLORIDE 0.4 MG: 0.4 CAPSULE ORAL at 09:03

## 2025-03-12 RX ADMIN — OXYCODONE 5 MG: 5 TABLET ORAL at 01:03

## 2025-03-12 NOTE — ASSESSMENT & PLAN NOTE
Recent Labs   Lab 03/11/25  0512 03/12/25  0639 03/12/25  1408   WBC 8.26 8.52 8.16   leucopenia 1.55 and .  discussed with hematology, likely due to MTX however he has been on this medication for a long time. admitted for further work up   Hematology consulted - had been taking MTX daily incorrectly instead of weekly.   and  MTX toxicity given incorrect daily dosing and CKD. MTX levels < 0.04. No leucovorin for now per heme onc .  parvo serologies Ig G+, Ig M negative , Reticulocytes, Iron panel, Hep C PCR ordered

## 2025-03-12 NOTE — ASSESSMENT & PLAN NOTE
Patient's blood pressure range in the last 24 hours was: BP  Min: 95/43  Max: 129/65.The patient's inpatient anti-hypertensive regimen is listed below:  Current Antihypertensives       Plan  - BP is controlled, no changes needed to their regimen  - continue amlodipine 5mg daily

## 2025-03-12 NOTE — ASSESSMENT & PLAN NOTE
Creatine stable for now. BMP reviewed- noted Estimated Creatinine Clearance: 27.1 mL/min (A) (based on SCr of 2.5 mg/dL (H)). according to latest data. Monitor UOP and serial BMP and adjust therapy as needed. Renally dose meds.    Recent Labs   Lab 03/10/25  0400 03/11/25  0512 03/12/25  0639   BUN 31* 33* 37*   CREATININE 2.3* 2.4* 2.5*       I & O   No intake or output data in the 24 hours ending 03/12/25 4189

## 2025-03-12 NOTE — ASSESSMENT & PLAN NOTE
Anemia is likely due to chronic disease due to RA. Most recent hemoglobin and hematocrit are listed below.  Recent Labs     03/11/25  0512 03/12/25  0639 03/12/25  1408   HGB 7.5* 7.0* 6.8*   HCT 23.9* 21.8* 21.2*     Plan  - Monitor serial CBC: Daily  - Transfuse PRBC if patient becomes hemodynamically unstable, symptomatic or H/H drops below 7/21.  3/7 HB trended down to 6.9 . Transfusion with 1 unit of PRBC . Haptoglobin elevated.

## 2025-03-12 NOTE — PROGRESS NOTES
[facility] Emergency Dept  Hospital Medicine  Progress Note    Patient Name: Andrea Gant  MRN: [id number]  Patient Class: IP- Inpatient   Admission Date: 3/6/2025  Length of Stay: 5 days  Attending Physician: Jarocho Frazier MD  Primary Care Provider: Andrea Wang MD        Subjective     Principal Problem:Oral thrush        HPI:  Andrea Winter is a 70-year-old male with rheumatoid arthritis on methotrexate, history of urothelial carcinoma in remission, anemia, hepatitis-C s/p SVR, PAD presents for thrush.       AAO x3. Patient was prescribed augmentin 2/7 -2/21 prior to the onset of his symptoms post cystoscopy. c/o pain with  swallowing after 4-5 days after augmentin.  denies fever, drooling or prior similar episodes. denies history of diabetes or HIV.   Reports painful whitish lesions in his mouth x  10 days. Patient was seen at urgent care clinic 02/26/2025 with congestion, sore throat and trouble swallowing.  Flu swab negative on 2/26.   prescribed prednisone 20mg x 5 days for viral pharyngitis, states that this significantly improved his sore throat and body aches but not the thrush.     ER course - Thick whitish discharge on the tongue consistent with oral candidiasis . Workup is notable for new pancytopenia. Hb 7.9, leucopenia 1.55 and .  discussed with hematology, likely due to MTX however he has been on this medication for a long time. admitted for further work up     Overview/Hospital Course:  3/7 HB trended down to 6.9 . Transfusion with 1 unit of PRBC . Haptoglobin elevated.  P replaced. . Neutropenic precautions. had been taking MTX daily incorrectly instead of weekly and  MTX toxicity given incorrect daily dosing and CKD. MTX levels < 0.04. No leucovorin for now per heme onc .  parvo serologies, Reticulocytes, Iron panel, Hep C PCR ordered   3/8 low grade temperature of 100.5. CX ray -Continued pleural fluid or thickening at the right base now with some fluid or  thickening at the left base.  Some patchy parenchymal opacities though no confluent consolidation.  No pneumothorax. COVID, Flu, RSV negative. BC x2, UA and procalcitonin  0.27 . UA negative. ANC 1500. folic acid increased to 4mg daily.  ID consulted for neutropenic fever . advanced to soft diet and started on boost   3/9 ANC 1900. WBC improving. fever of 100.9F overnight. MRSA PCR negative . P replaced. requests full liquid diet.   3/10 WBC and ANC normalized. platelets trended down to 93. Low grade temp of 100.6F this AM. ID eval - continue renally dosed fluconazole for presumed oral and esophageal candidiasis x 14-21 days depending on clinical improvement. CT chest, abdomen, pelvis ordered.SBP 90s improved with NS bolus 250ml x1.   3/11 fever of 102F. BCx2 and echo orderd. pancultures. Platelets trended down to 84. CTCAP -Fusiform aneurysm of the infrarenal abdominal aorta, greatest diameter 5 cm previously 4.4 cm. Greatest diameter of the descending thoracic aorta 4.2 cm. Bilateral pleural effusions, stable compared to 02/10/2025. Status post left nephrectomy, no evidence of recurrence or distant metastases within the constraints of noncontrast study. Bilateral emphysema. ID, Hematology  and GI follow up - recs Fluconazole to finish before EGD is considered    3/13 persistent fever 101.7F. UA, RIP and BC x2 NGTD . Hb 7--> 6.8. Transfusion with 1 unit of PRBC                 Review of Systems:   Pain scale:    8/10   Constitutional:  fever,  chills, headache, vision loss, hearing loss, weight loss, Generalized weakness, falls, loss of smell, loss of taste, poor appetite,  sore throat, immunocompromised  Respiratory: cough, shortness of breath.   Cardiovascular: chest pain, dizziness, palpitations, orthopnea, swelling of feet, syncope  Gastrointestinal: nausea, vomiting, abdominal pain, diarrhea, black stool,  blood in stool, change in bowel habits, constipation, difficulty swallowing  Genitourinary: hematuria,  dysuria, urgency, frequency  Integument/Breast: rash,  pruritis  Hematologic/Lymphatic: easy bruising, lymphadenopathy  Musculoskeletal: arthralgias , myalgias, back pain, neck pain, knee pain  Neurological: confusion, seizures, tremors, slurred speech  Behavioral/Psych:  depression, anxiety, auditory or visual hallucinations     OBJECTIVE:     Physical Exam:  Body mass index is 23.63 kg/m².    Constitutional: Appears well-developed and well-nourished.   Head: Normocephalic and atraumatic.  dentures   oral cavity - Thick white discharge on the tongue   poserior pharyngeal erythema  Neck: Normal range of motion. Neck supple.   Cardiovascular: Normal heart rate.  Regular heart rhythm.  Pulmonary/Chest: Effort normal.   Abdominal: No distension.  No tenderness  Musculoskeletal: Normal range of motion. No edema.   Neurological: Alert and oriented to person, place, and time.   Skin: Skin is warm and dry.   Psychiatric: Normal mood and affect. Behavior is normal.       Vital Signs  Temp: 99 °F (37.2 °C) (03/12/25 1456)  Pulse: 90 (03/12/25 1456)  Resp: 17 (03/12/25 1456)  BP: 129/65 (03/12/25 1456)  SpO2: 97 % (03/12/25 1456)     24 Hour VS Range    Temp:  [98.4 °F (36.9 °C)-102 °F (38.9 °C)]   Pulse:  [78-90]   Resp:  [16-18]   BP: ()/(43-65)   SpO2:  [96 %-99 %]   No intake or output data in the 24 hours ending 03/12/25 1643          I/O This Shift:  No intake/output data recorded.    Wt Readings from Last 3 Encounters:   03/12/25 72.6 kg (160 lb)   02/26/25 71.2 kg (157 lb)   02/20/25 71.4 kg (157 lb 6.5 oz)       I have personally reviewed the vitals and recorded Intake/Output     Laboratory/Diagnostic Data:    CBC/Anemia Labs: Coags:    Recent Labs   Lab 03/06/25  1514 03/07/25  0618 03/07/25  1651 03/08/25  0641 03/11/25  0512 03/12/25  0639 03/12/25  1408   WBC 1.55*   < > 1.90*   < > 8.26 8.52 8.16   HGB 7.9*   < > 8.5*   < > 7.5* 7.0* 6.8*   HCT 24.0*   < > 26.3*   < > 23.9* 21.8* 21.2*   *   < >  "144*   < > 84* 148* 175   MCV 88   < > 87   < > 90 90 91   RDW 13.6   < > 14.4   < > 15.2* 15.1* 15.3*   IRON  --   --  90  --   --   --   --    FERRITIN  --   --  1,474*  --   --   --   --    RETIC  --   --  0.5  --   --   --   --    FOLATE >40.0*  --   --   --   --   --   --    [id number] 614  --   --   --   --   --   --     < > = values in this interval not displayed.    Recent Labs   Lab 03/06/25  1514   INR 1.0   APTT 28.0        Chemistries: ABG:   Recent Labs   Lab 03/10/25  0400 03/11/25  0512 03/12/25  0639    133* 137   K 4.6 4.3 4.3    104 108   CO2 21* 21* 22*   BUN 31* 33* 37*   CREATININE 2.3* 2.4* 2.5*   CALCIUM 8.5* 8.6* 8.2*   PROT 6.7 6.4 6.0   BILITOT 0.6 0.6 0.5   ALKPHOS 72 73 75   ALT 20 17 15   AST 18 18 18   MG 2.0 2.0 2.2   PHOS 3.2 2.9 2.8    No results for input(s): "PH", "PCO2", "PO2", "HCO3", "POCSATURATED", "BE" in the last 168 hours.     POCT Glucose: HbA1c:    No results for input(s): "POCTGLUCOSE" in the last 168 hours. Hemoglobin A1C   Date Value Ref Range Status   05/17/2022 5.0 4.0 - 5.6 % Final     Comment:     ADA Screening Guidelines:  5.7-6.4%  Consistent with prediabetes  >or=6.5%  Consistent with diabetes    High levels of fetal hemoglobin interfere with the HbA1C  assay. Heterozygous hemoglobin variants (HbS, HgC, etc)do  not significantly interfere with this assay.   However, presence of multiple variants may affect accuracy.     10/11/2021 5.2 4.0 - 5.6 % Final     Comment:     ADA Screening Guidelines:  5.7-6.4%  Consistent with prediabetes  >or=6.5%  Consistent with diabetes    High levels of fetal hemoglobin interfere with the HbA1C  assay. Heterozygous hemoglobin variants (HbS, HgC, etc)do  not significantly interfere with this assay.   However, presence of multiple variants may affect accuracy.          Cardiac Enzymes: Ejection Fractions:    No results for input(s): "CPK", "CPKMB", "MB", "TROPONINI" in the last 72 hours. EF   Date Value Ref Range Status " "  03/12/2025 55 % Final          Recent Labs   Lab 03/11/25  1444   COLORU Yellow   APPEARANCEUA Clear   PHUR 6.0   SPECGRAV 1.020   PROTEINUA 1+*   GLUCUA Negative   KETONESU Negative   BILIRUBINUA Negative   OCCULTUA 1+*   NITRITE Negative   LEUKOCYTESUR Negative   RBCUA 4   WBCUA 3   BACTERIA Occasional   SQUAMEPITHEL 0   HYALINECASTS 0         Procalcitonin (ng/mL)   Date Value   03/11/2025 0.64 (H)   03/08/2025 0.27 (H)     Lactate (Lactic Acid) (mmol/L)   Date Value   03/10/2025 0.8     BNP (pg/mL)   Date Value   03/26/2016 112 (H)     CRP (mg/L)   Date Value   10/09/2024 12.9 (H)   02/06/2024 12.0 (H)   02/08/2023 3.2   11/03/2022 1.9   08/03/2022 2.8   05/12/2022 2.0   02/08/2022 5.4   03/24/2021 3.5   11/18/2020 1.1   10/05/2020 2     Sed Rate   Date Value   10/09/2024 17 mm/Hr   02/06/2024 55 mm/Hr (H)   02/08/2023 20 mm/Hr   11/03/2022 11 mm/Hr   08/03/2022 8 mm/Hr   05/12/2022 20 mm/Hr   02/08/2022 9 mm/Hr   03/24/2021 19 mm/Hr   11/18/2020 14 mm/Hr   10/05/2020 48 mm/hr (H)     No results found for: "DDIMER"  Ferritin (ng/mL)   Date Value   03/07/2025 1,474 (H)     LD (U/L)   Date Value   03/06/2025 226     Troponin I (ng/mL)   Date Value   08/25/2023 0.023   03/26/2016 0.047 (H)     CPK (U/L)   Date Value   02/29/2004 188     No results found. However, due to the size of the patient record, not all encounters were searched. Please check Results Review for a complete set of results.  SARS-CoV2 (COVID-19) Qualitative PCR (no units)   Date Value   03/11/2025 Not Detected   03/08/2025 Not Detected   08/25/2023 Not Detected     POC Rapid COVID (no units)   Date Value   12/04/2020 Negative       Microbiology labs for the last week  Microbiology Results (last 7 days)       Procedure Component Value Units Date/Time    Blood culture [[id number]] Collected: 03/11/25 0832    Order Status: Completed Specimen: Blood from Peripheral, Antecubital, Left Updated: 03/12/25 1412     Blood Culture, Routine No Growth to " date      No Growth to date    Blood culture [[id number]] Collected: 03/11/25 0838    Order Status: Completed Specimen: Blood from Peripheral, Antecubital, Left Updated: 03/12/25 1412     Blood Culture, Routine No Growth to date      No Growth to date    Blood culture [[id number]] Collected: 03/08/25 0918    Order Status: Completed Specimen: Blood Updated: 03/12/25 1212     Blood Culture, Routine No Growth to date      No Growth to date      No Growth to date      No Growth to date      No Growth to date    Respiratory Infection Panel (PCR), Nasopharyngeal [[id number]] Collected: 03/11/25 1656    Order Status: Completed Specimen: Nasopharyngeal Swab Updated: 03/11/25 1834     Respiratory Infection Panel Source NP Swab     Adenovirus Not Detected     Coronavirus 229E, Common Cold Virus Not Detected     Coronavirus HKU1, Common Cold Virus Not Detected     Coronavirus NL63, Common Cold Virus Not Detected     Coronavirus OC43, Common Cold Virus Not Detected     Comment: The Coronavirus strains detected in this test cause the common cold.  These strains are not the COVID-19 (novel Coronavirus)strain   associated with the respiratory disease outbreak.          SARS-CoV2 (COVID-19) Qualitative PCR Not Detected     Human Metapneumovirus Not Detected     Human Rhinovirus/Enterovirus Not Detected     Influenza A Not Detected     Influenza B Not Detected     Parainfluenza Virus 1 Not Detected     Parainfluenza Virus 2 Not Detected     Parainfluenza Virus 3 Not Detected     Parainfluenza Virus 4 Not Detected     Respiratory Syncytial Virus Not Detected     Bordetella Parapertussis (OJ9923) Not Detected     Bordetella pertussis (ptxP) Not Detected     Chlamydia pneumoniae Not Detected     Mycoplasma pneumoniae Not Detected    Narrative:      Assay not valid for lower respiratory specimens, alternate  testing required.    Culture, Respiratory with Gram Stain [[id number]] Collected: 03/08/25 1600    Order Status: Completed  Specimen: Respiratory from Sputum Updated: 03/10/25 1115     Respiratory Culture Normal respiratory judah      No S aureus or Pseudomonas isolated.     Gram Stain (Respiratory) <10 epithelial cells per low power field.     Gram Stain (Respiratory) Few WBC's     Gram Stain (Respiratory) Many Gram positive cocci     Gram Stain (Respiratory) Rare Gram negative rods    MRSA Screen by PCR [[id number]] Collected: 03/08/25 1430    Order Status: Completed Specimen: Nasal Swab Updated: 03/08/25 1758     MRSA SCREEN BY PCR Not Detected    Blood culture [[id number]] Collected: 03/08/25 1018    Order Status: Sent Specimen: Blood             Reviewed and noted in plan where applicable- Please see chart for full lab data.    Lines/Drains:       Peripheral IV - Single Lumen 03/06/25 1244 20 G Anterior;Distal;Left Upper Arm (Active)   Site Assessment Clean;Dry;Intact;No redness;No swelling 03/06/25 1957   Extremity Assessment Distal to IV No abnormal discoloration;No redness;No swelling;No warmth 03/06/25 1957   Line Status Flushed;Saline locked 03/06/25 1957   Dressing Status Clean;Dry;Intact 03/06/25 1957   Dressing Intervention Integrity maintained 03/06/25 1957   Reason Not Rotated Not due 03/06/25 1957   Number of days: 0       Imaging  ECG Results              EKG 12-lead (Final result)        Collection Time Result Time QRS Duration OHS QTC Calculation    03/06/25 20:21:25 03/07/25 13:42:35 80 418                     Final result by Interface, Lab In [facility] (03/07/25 13:42:39)                   Narrative:    Test Reason : R94.31,    Vent. Rate :  89 BPM     Atrial Rate : 241 BPM     P-R Int :    ms          QRS Dur :  80 ms      QT Int : 344 ms       P-R-T Axes :     94 -15 degrees    QTcB Int : 418 ms    Normal sinus rhythm  Rightward axis  Inferior infarct ,age undetermined  Abnormal ECG  When compared with ECG of 25-Aug-2023 19:12,  No significant change was found    Confirmed by José Cortez ([id number]) on 3/7/2025  1:42:34 PM    Referred By: COLT SELF           Confirmed By: José Cortez                                    No results found for this or any previous visit.      Echo    Left Ventricle: The left ventricle is normal in size. Ventricular mass   is normal. Mildly increased wall thickness. Normal wall motion. There is   normal systolic function with a visually estimated ejection fraction of 55   - 60%. Ejection fraction is approximately 55%. There is normal diastolic   function.    Right Ventricle: The right ventricle is normal in size. Wall thickness   is normal. Systolic function is normal.    Aortic Valve: There is moderate aortic valve sclerosis.    IVC/SVC: Normal venous pressure at 3 mmHg.      Labs, Imaging, EKG and Diagnostic results from 3/12/2025 were reviewed.    Medications:  Medication list was reviewed and changes noted under Assessment/Plan.  Medications Ordered Prior to Encounter[1]  Scheduled Medications:  Current Facility-Administered Medications   Medication Dose Route Frequency    duke's soln (benadryl 30 mL, mylanta 30 mL, LIDOcaine 30 mL, nystatin 30 mL) 120 mL  10 mL Oral QID    enoxparin  30 mg Subcutaneous Daily    folic acid  4 mg Oral Daily    micafungin  100 mg Intravenous Q24H    pravastatin  20 mg Oral Daily    tamsulosin  0.4 mg Oral Daily    valACYclovir  1,000 mg Oral Daily     PRN:   Current Facility-Administered Medications:     0.9%  NaCl infusion (for blood administration), , Intravenous, Q24H PRN    0.9%  NaCl infusion (for blood administration), , Intravenous, Q24H PRN    acetaminophen, 650 mg, Oral, Q8H PRN    calcium carbonate, 500 mg, Oral, Daily PRN    dextrose 50%, 12.5 g, Intravenous, PRN    dextrose 50%, 25 g, Intravenous, PRN    glucagon (human recombinant), 1 mg, Intramuscular, PRN    glucose, 16 g, Oral, PRN    glucose, 24 g, Oral, PRN    HYDROmorphone, 0.2 mg, Intravenous, Q6H PRN    naloxone, 0.02 mg, Intravenous, PRN    oxyCODONE, 5 mg, Oral, Q4H PRN     polyethylene glycol, 17 g, Oral, Daily PRN    sodium chloride 0.9%, 10 mL, Intravenous, Q12H PRN    traZODone, 50 mg, Oral, Nightly PRN  Infusions:       Estimated Creatinine Clearance: 27.1 mL/min (A) (based on SCr of 2.5 mg/dL (H)).               Assessment & Plan  Rheumatoid arthritis   rheumatoid arthritis on methotrexate -weekly . rheumatology eval   History of hepatitis C, s/p successful treatment w/ SVR12 - 7/2015  noted     PAD (peripheral artery disease)  continue ASA and cilastazol     Hypercholesteremia  continue simvastatin    AAA (abdominal aortic aneurysm)  CT CAP 2/25 - aneurysmal dilatation of the abdominal aorta measuring up to 4.5 cm AP with extension into the common iliac arteries, not significantly changed from prior study. Extensive vascular calcification noted.   3/12   CTCAP -Fusiform aneurysm of the infrarenal abdominal aorta, greatest diameter 5 cm previously 4.4 cm. Greatest diameter of the descending thoracic aorta 4.2 cm. follows with vascular surgery - Dr. Jensen   Stage 3b chronic kidney disease  Creatine stable for now. BMP reviewed- noted Estimated Creatinine Clearance: 27.1 mL/min (A) (based on SCr of 2.5 mg/dL (H)). according to latest data. Monitor UOP and serial BMP and adjust therapy as needed. Renally dose meds.    Recent Labs   Lab 03/10/25  0400 03/11/25  0512 03/12/25  0639   BUN 31* 33* 37*   CREATININE 2.3* 2.4* 2.5*       I & O   No intake or output data in the 24 hours ending 03/12/25 1643     Ureteral tumor  history of urothelial carcinoma in remission.  s/p neoadjuvant chemotherapy with Gemcitabine and Cisplatin. hem/onc consulted     Anemia  Anemia is likely due to chronic disease due to RA . Most recent hemoglobin and hematocrit are listed below.  Recent Labs     03/11/25  0512 03/12/25  0639 03/12/25  1408   HGB 7.5* 7.0* 6.8*   HCT 23.9* 21.8* 21.2*     Plan  - Monitor serial CBC: Daily  - Transfuse PRBC if patient becomes hemodynamically unstable, symptomatic or H/H  drops below 7/21.  3/7 HB trended down to 6.9 . Transfusion with 1 unit of PRBC . Haptoglobin elevated.    Hypertension  Patient's blood pressure range in the last 24 hours was: BP  Min: 95/43  Max: 129/65.The patient's inpatient anti-hypertensive regimen is listed below:  Current Antihypertensives       Plan  - BP is controlled, no changes needed to their regimen  - continue amlodipine 5mg daily  Leucopenia     Recent Labs   Lab 03/11/25  0512 03/12/25  0639 03/12/25  1408   WBC 8.26 8.52 8.16   leucopenia 1.55 and .  discussed with hematology, likely due to MTX however he has been on this medication for a long time. admitted for further work up   Hematology consulted - had been taking MTX daily incorrectly instead of weekly.   and  MTX toxicity given incorrect daily dosing and CKD. MTX levels < 0.04. No leucovorin for now per heme onc .  parvo serologies Ig G+, Ig M negative , Reticulocytes, Iron panel, Hep C PCR ordered     Oral thrush  Thick whitish discharge on the tongue consistent with oral candidiasis .Workup is notable for new pancytopenia. Hb 7.9, leucopenia 1.55 and .  discussed with hematology, likely due to MTX however he has been on this medication for a long time.     started on dukes solution and fluconazole.   GI consulted for odynophagia   Rheumatology consulted due to recent methotrexation resumption    3/7  had been taking MTX daily incorrectly instead of weekly and  MTX toxicity given incorrect daily dosing and CKD. MTX levels < 0.04. No leucovorin for now per heme onc .  3/11 ID eval  thrush due to resistant candida species such as Candida glabrata and oral herpes simplex infection. fluconazole changed to micafungin for the possibility of a resistant candida species.  started  valacyclovir for possible oral herpes.       Odynophagia  secondary to above. continue dukes solution. GI consulted     Fever  3/8 low grade temperature of 100.4. reports cough. CX ray -Continued pleural  fluid or thickening at the right base now with some fluid or thickening at the left base.  Some patchy parenchymal opacities though no confluent consolidation.  No pneumothorax.BC x2, UA  negative and procalcitonin   3.9 ID consulted for neutropenic fever . advanced to soft diet and started on boost.  ANC 1900. WBC improving. fever of 100.9F overnight. MRSA PCR negative   3/10  ID eval - continue renally dosed fluconazole for presumed oral and esophageal candidiasis x 14-21 days depending on clinical improvement. CT chest, abdomen, pelvis ordered  3/12 fever of 102F. BCx2 and echo orderd. pancultures. Platelets trended down to 84. CTCAP -Fusiform aneurysm of the infrarenal abdominal aorta, greatest diameter 5 cm previously 4.4 cm. Greatest diameter of the descending thoracic aorta 4.2 cm. Bilateral pleural effusions, stable compared to 02/10/2025. Status post left nephrectomy, no evidence of recurrence or distant metastases within the constraints of noncontrast study. Bilateral emphysema. GI follow up - recs Fluconazole to finish before EGD is considered                  Neutropenic fever  3/8 low grade temperature of 100.5. CX ray -Continued pleural fluid or thickening at the right base now with some fluid or thickening at the left base.  Some patchy parenchymal opacities though no confluent consolidation.  No pneumothorax. COVID, Flu, RSV negative. BC x2, UA and procalcitonin  0.27 . ANC 1500. ID consulted for neutropenic fever .     Drug-induced pancytopenia    Recent Labs   Lab 03/12/25  1408   HGB 6.8*   WBC 8.16      3/10 WBC and ANC normalized. platelets trended down to 93. peripheral smear. Hematology f/u    HIT score is 2-3 indicating a low probability of HIT (<5%).  smear is clinically unremarkable - expected macrocytes hypersegmented neutrophils. No hepatosplenomegal . thrombocytopenia is likely due to acute illness         Oral ulceration  secondary to MTX toxicity. on folic acid       VTE Risk  Mitigation (From admission, onward)           Ordered     enoxaparin injection 30 mg  Daily         03/11/25 0749     IP VTE HIGH RISK PATIENT  Once         03/06/25 1633     Place sequential compression device  Until discontinued         03/06/25 1633                    Discharge Planning   NED: 3/14/2025     Code Status: Full Code   Medical Readiness for Discharge Date:   Discharge Plan A: Home with family                        Jarocho K MD Kar[place]  Department of Hospital Medicine   [facility] - Emergency Dept         [1]   No current facility-administered medications on file prior to encounter.     Current Outpatient Medications on File Prior to Encounter   Medication Sig Dispense Refill    amLODIPine (NORVASC) 5 MG tablet TAKE 1 TABLET BY MOUTH ONCE  DAILY 90 tablet 0    aspirin 81 MG Chew Take 81 mg by mouth once daily.      cilostazoL (PLETAL) 50 MG Tab TAKE 1 TABLET BY MOUTH TWICE  DAILY 180 tablet 3    gabapentin (NEURONTIN) 100 MG capsule Take 1 capsule (100 mg total) by mouth every evening. 90 capsule 1    methotrexate 2.5 MG Tab TAKE 8 TABLETS BY MOUTH EVERY 7  DAYS THIS MEDICATION REQUIRES  PERIODIC LAB MONITORING 120 tablet 3    nystatin (MYCOSTATIN) 100,000 unit/mL suspension Take by mouth 4 (four) times daily.      simvastatin (ZOCOR) 10 MG tablet Take 1 tablet (10 mg total) by mouth every evening. 90 tablet 0    tamsulosin (FLOMAX) 0.4 mg Cap Take 1 capsule (0.4 mg total) by mouth once daily. 30 capsule 11    acetaminophen (TYLENOL) 650 MG TbSR Take 1 tablet (650 mg total) by mouth every 8 (eight) hours. 63 tablet 0    bisacodyL (DULCOLAX) 5 mg EC tablet Take 5 mg by mouth daily as needed for Constipation.      docusate sodium (COLACE) 100 MG capsule Take 100 mg by mouth 2 (two) times daily.      folic acid (FOLVITE) 1 MG tablet Take 1 tablet (1,000 mcg total) by mouth once daily. 90 tablet 3    levocetirizine (XYZAL) 5 MG tablet TAKE 1 TABLET BY MOUTH EVERY DAY IN THE EVENING 90 tablet 1     multivitamin capsule Take 1 capsule by mouth once daily.      traZODone (DESYREL) 50 MG tablet TAKE 3 TABLETS BY MOUTH AT NIGHT AS NEEDED FOR INSOMNIA 270 tablet 3

## 2025-03-12 NOTE — ASSESSMENT & PLAN NOTE
Anemia is likely due to chronic disease due to RA. Most recent hemoglobin and hematocrit are listed below.  Recent Labs     03/12/25  0639 03/12/25  1408 03/13/25  0445   HGB 7.0* 6.8* 7.4*   HCT 21.8* 21.2* 23.4*     Plan  - Monitor serial CBC: Daily  - Transfuse PRBC if patient becomes hemodynamically unstable, symptomatic or H/H drops below 7/21.  3/7 HB trended down to 6.9 . Transfusion with 1 unit of PRBC . Haptoglobin elevated.   3/8 Hb 7--> 6.8. Transfusion with 1 unit of PRBC

## 2025-03-13 PROBLEM — D72.819 LEUCOPENIA: Status: RESOLVED | Noted: 2025-03-06 | Resolved: 2025-03-13

## 2025-03-13 LAB
ALBUMIN SERPL BCP-MCNC: 2.4 G/DL (ref 3.5–5.2)
ALP SERPL-CCNC: 91 U/L (ref 40–150)
ALT SERPL W/O P-5'-P-CCNC: 18 U/L (ref 10–44)
ANION GAP SERPL CALC-SCNC: 10 MMOL/L (ref 8–16)
AST SERPL-CCNC: 16 U/L (ref 10–40)
BACTERIA BLD CULT: NORMAL
BASOPHILS # BLD AUTO: 0.03 K/UL (ref 0–0.2)
BASOPHILS NFR BLD: 0.3 % (ref 0–1.9)
BILIRUB SERPL-MCNC: 0.7 MG/DL (ref 0.1–1)
BUN SERPL-MCNC: 39 MG/DL (ref 8–23)
CALCIUM SERPL-MCNC: 8.3 MG/DL (ref 8.7–10.5)
CHLORIDE SERPL-SCNC: 109 MMOL/L (ref 95–110)
CO2 SERPL-SCNC: 18 MMOL/L (ref 23–29)
CREAT SERPL-MCNC: 2.2 MG/DL (ref 0.5–1.4)
DIFFERENTIAL METHOD BLD: ABNORMAL
EOSINOPHIL # BLD AUTO: 0.1 K/UL (ref 0–0.5)
EOSINOPHIL NFR BLD: 1.1 % (ref 0–8)
ERYTHROCYTE [DISTWIDTH] IN BLOOD BY AUTOMATED COUNT: 15 % (ref 11.5–14.5)
EST. GFR  (NO RACE VARIABLE): 31.2 ML/MIN/1.73 M^2
GLUCOSE SERPL-MCNC: 97 MG/DL (ref 70–110)
HCT VFR BLD AUTO: 23.4 % (ref 40–54)
HGB BLD-MCNC: 7.4 G/DL (ref 14–18)
IMM GRANULOCYTES # BLD AUTO: 0.06 K/UL (ref 0–0.04)
IMM GRANULOCYTES NFR BLD AUTO: 0.7 % (ref 0–0.5)
LYMPHOCYTES # BLD AUTO: 0.8 K/UL (ref 1–4.8)
LYMPHOCYTES NFR BLD: 8.3 % (ref 18–48)
MAGNESIUM SERPL-MCNC: 2.2 MG/DL (ref 1.6–2.6)
MCH RBC QN AUTO: 28.4 PG (ref 27–31)
MCHC RBC AUTO-ENTMCNC: 31.6 G/DL (ref 32–36)
MCV RBC AUTO: 90 FL (ref 82–98)
MONOCYTES # BLD AUTO: 1.4 K/UL (ref 0.3–1)
MONOCYTES NFR BLD: 14.9 % (ref 4–15)
MTX SERPL-SCNC: <0.04 UMOL/L (ref 0.5–5)
NEUTROPHILS # BLD AUTO: 6.8 K/UL (ref 1.8–7.7)
NEUTROPHILS NFR BLD: 74.7 % (ref 38–73)
NRBC BLD-RTO: 0 /100 WBC
PHOSPHATE SERPL-MCNC: 2.7 MG/DL (ref 2.7–4.5)
PLATELET # BLD AUTO: 302 K/UL (ref 150–450)
PMV BLD AUTO: 9.7 FL (ref 9.2–12.9)
POTASSIUM SERPL-SCNC: 4.6 MMOL/L (ref 3.5–5.1)
PROT SERPL-MCNC: 6.2 G/DL (ref 6–8.4)
RBC # BLD AUTO: 2.61 M/UL (ref 4.6–6.2)
SODIUM SERPL-SCNC: 137 MMOL/L (ref 136–145)
WBC # BLD AUTO: 9.12 K/UL (ref 3.9–12.7)

## 2025-03-13 PROCEDURE — 36415 COLL VENOUS BLD VENIPUNCTURE: CPT

## 2025-03-13 PROCEDURE — 63600175 PHARM REV CODE 636 W HCPCS: Performed by: INTERNAL MEDICINE

## 2025-03-13 PROCEDURE — 99233 SBSQ HOSP IP/OBS HIGH 50: CPT | Mod: GC,,, | Performed by: INTERNAL MEDICINE

## 2025-03-13 PROCEDURE — 25000003 PHARM REV CODE 250: Performed by: INTERNAL MEDICINE

## 2025-03-13 PROCEDURE — 63600175 PHARM REV CODE 636 W HCPCS: Performed by: HOSPITALIST

## 2025-03-13 PROCEDURE — 84100 ASSAY OF PHOSPHORUS: CPT

## 2025-03-13 PROCEDURE — 80053 COMPREHEN METABOLIC PANEL: CPT

## 2025-03-13 PROCEDURE — 85025 COMPLETE CBC W/AUTO DIFF WBC: CPT

## 2025-03-13 PROCEDURE — 25000003 PHARM REV CODE 250: Performed by: HOSPITALIST

## 2025-03-13 PROCEDURE — 83735 ASSAY OF MAGNESIUM: CPT

## 2025-03-13 PROCEDURE — 25000003 PHARM REV CODE 250

## 2025-03-13 PROCEDURE — 80204 DRUG ASSAY METHOTREXATE: CPT | Performed by: INTERNAL MEDICINE

## 2025-03-13 PROCEDURE — 11000001 HC ACUTE MED/SURG PRIVATE ROOM

## 2025-03-13 RX ORDER — LIDOCAINE HYDROCHLORIDE 20 MG/ML
10 SOLUTION OROPHARYNGEAL EVERY 6 HOURS
Status: DISCONTINUED | OUTPATIENT
Start: 2025-03-13 | End: 2025-03-15 | Stop reason: HOSPADM

## 2025-03-13 RX ADMIN — SUCRALFATE 1 G: 1 SUSPENSION ORAL at 05:03

## 2025-03-13 RX ADMIN — ALUMINUM HYDROXIDE, MAGNESIUM HYDROXIDE, AND DIMETHICONE 10 ML: 400; 400; 40 SUSPENSION ORAL at 12:03

## 2025-03-13 RX ADMIN — LIDOCAINE HYDROCHLORIDE 10 ML: 20 SOLUTION ORAL at 04:03

## 2025-03-13 RX ADMIN — OXYCODONE 5 MG: 5 TABLET ORAL at 04:03

## 2025-03-13 RX ADMIN — ALUMINUM HYDROXIDE, MAGNESIUM HYDROXIDE, AND DIMETHICONE 10 ML: 400; 400; 40 SUSPENSION ORAL at 04:03

## 2025-03-13 RX ADMIN — VALACYCLOVIR HYDROCHLORIDE 1000 MG: 500 TABLET, FILM COATED ORAL at 09:03

## 2025-03-13 RX ADMIN — OXYCODONE 5 MG: 5 TABLET ORAL at 09:03

## 2025-03-13 RX ADMIN — PRAVASTATIN SODIUM 20 MG: 20 TABLET ORAL at 09:03

## 2025-03-13 RX ADMIN — ALUMINUM HYDROXIDE, MAGNESIUM HYDROXIDE, AND DIMETHICONE 10 ML: 400; 400; 40 SUSPENSION ORAL at 09:03

## 2025-03-13 RX ADMIN — HYDROMORPHONE HYDROCHLORIDE 0.2 MG: 1 INJECTION, SOLUTION INTRAMUSCULAR; INTRAVENOUS; SUBCUTANEOUS at 01:03

## 2025-03-13 RX ADMIN — ACETAMINOPHEN 650 MG: 325 TABLET ORAL at 04:03

## 2025-03-13 RX ADMIN — SUCRALFATE 1 G: 1 SUSPENSION ORAL at 12:03

## 2025-03-13 RX ADMIN — TAMSULOSIN HYDROCHLORIDE 0.4 MG: 0.4 CAPSULE ORAL at 09:03

## 2025-03-13 RX ADMIN — ENOXAPARIN SODIUM 30 MG: 40 INJECTION SUBCUTANEOUS at 04:03

## 2025-03-13 RX ADMIN — HYDROMORPHONE HYDROCHLORIDE 0.2 MG: 1 INJECTION, SOLUTION INTRAMUSCULAR; INTRAVENOUS; SUBCUTANEOUS at 08:03

## 2025-03-13 RX ADMIN — FOLIC ACID 4 MG: 1 TABLET ORAL at 09:03

## 2025-03-13 RX ADMIN — SODIUM CHLORIDE 100 MG: 9 INJECTION, SOLUTION INTRAVENOUS at 05:03

## 2025-03-13 RX ADMIN — ALUMINUM HYDROXIDE, MAGNESIUM HYDROXIDE, AND DIMETHICONE 10 ML: 400; 400; 40 SUSPENSION ORAL at 08:03

## 2025-03-13 NOTE — ASSESSMENT & PLAN NOTE
Recent Labs   Lab 03/13/25  0445   HGB 7.4*   WBC 9.12      3/10 WBC and ANC normalized. platelets trended down to 93. peripheral smear. Hematology f/u    HIT score is 2-3 indicating a low probability of HIT (<5%).  smear is clinically unremarkable - expected macrocytes hypersegmented neutrophils. No hepatosplenomegal . thrombocytopenia is likely due to acute illness

## 2025-03-13 NOTE — PROGRESS NOTES
[facility] - Internal Medicine [facility] Medicine  Progress Note    Patient Name: Andrea Gant  MRN: [id number]  Patient Class: IP- Inpatient   Admission Date: 3/6/2025  Length of Stay: 6 days  Attending Physician: Osmar Ely DO  Primary Care Provider: Andrea Wang MD        Subjective     Principal Problem:Oral thrush        HPI:  Andrea Winter is a 70-year-old male with rheumatoid arthritis on methotrexate, history of urothelial carcinoma in remission, anemia, hepatitis-C s/p SVR, PAD presents for thrush.       AAO x3. Patient was prescribed augmentin 2/7 -2/21 prior to the onset of his symptoms post cystoscopy. c/o pain with  swallowing after 4-5 days after augmentin.  denies fever, drooling or prior similar episodes. denies history of diabetes or HIV.   Reports painful whitish lesions in his mouth x  10 days. Patient was seen at urgent care clinic 02/26/2025 with congestion, sore throat and trouble swallowing.  Flu swab negative on 2/26.   prescribed prednisone 20mg x 5 days for viral pharyngitis, states that this significantly improved his sore throat and body aches but not the thrush.     ER course - Thick whitish discharge on the tongue consistent with oral candidiasis . Workup is notable for new pancytopenia. Hb 7.9, leucopenia 1.55 and .  discussed with hematology, likely due to MTX however he has been on this medication for a long time. admitted for further work up     Overview/Hospital Course:  3/7 HB trended down to 6.9 . Transfusion with 1 unit of PRBC . Haptoglobin elevated.  P replaced. . Neutropenic precautions. had been taking MTX daily incorrectly instead of weekly and  MTX toxicity given incorrect daily dosing and CKD. MTX levels < 0.04. No leucovorin for now per heme onc .  parvo serologies, Reticulocytes, Iron panel, Hep C PCR ordered   3/8 low grade temperature of 100.5. CX ray -Continued pleural fluid or thickening at the right base now with some  fluid or thickening at the left base.  Some patchy parenchymal opacities though no confluent consolidation.  No pneumothorax. COVID, Flu, RSV negative. BC x2, UA and procalcitonin  0.27 . UA negative. ANC 1500. folic acid increased to 4mg daily.  ID consulted for neutropenic fever . advanced to soft diet and started on boost   3/9 ANC 1900. WBC improving. fever of 100.9F overnight. MRSA PCR negative . P replaced. requests full liquid diet.   3/10 WBC and ANC normalized. platelets trended down to 93. Low grade temp of 100.6F this AM. ID eval - continue renally dosed fluconazole for presumed oral and esophageal candidiasis x 14-21 days depending on clinical improvement. CT chest, abdomen, pelvis ordered.SBP 90s improved with NS bolus 250ml x1.   3/11 fever of 102F. BCx2 and echo orderd. pancultures. Platelets trended down to 84. CTCAP -Fusiform aneurysm of the infrarenal abdominal aorta, greatest diameter 5 cm previously 4.4 cm. Greatest diameter of the descending thoracic aorta 4.2 cm. Bilateral pleural effusions, stable compared to 02/10/2025. Status post left nephrectomy, no evidence of recurrence or distant metastases within the constraints of noncontrast study. Bilateral emphysema. ID, Hematology  and GI follow up - recs Fluconazole to finish before EGD is considered    3/13 persistent fever 101.7F. UA, RIP and BC x2 NGTD . Hb 7--> 6.8. Transfusion with 1 unit of PRBC      H/H stable today. Starting on oral lidocaine.    Interval History: NAOE, maybe some improvement in oral symptoms overtime. Still having significant pain and irritation. Overall feels some better    Review of Systems   Constitutional:  Positive for chills, fatigue and fever.   HENT:  Positive for mouth sores.    Respiratory:  Negative for cough and shortness of breath.    Cardiovascular:  Negative for chest pain.   Gastrointestinal:  Negative for abdominal pain, constipation, diarrhea, nausea and vomiting.     Objective:     Vital Signs (Most  Recent):  Temp: 99.6 °F (37.6 °C) (03/13/25 1209)  Pulse: 83 (03/13/25 1210)  Resp: 16 (03/13/25 1316)  BP: (!) 98/53 (03/13/25 1210)  SpO2: 98 % (03/13/25 1210) Vital Signs (24h Range):  Temp:  [99.1 °F (37.3 °C)-100.9 °F (38.3 °C)] 99.6 °F (37.6 °C)  Pulse:  [82-98] 83  Resp:  [16-19] 16  SpO2:  [96 %-98 %] 98 %  BP: ()/(46-72) 98/53     Weight: 72.6 kg (160 lb)  Body mass index is 23.63 kg/m².    Intake/Output Summary (Last 24 hours) at 3/13/2025 1559  Last data filed at 3/12/2025 2150  Gross per 24 hour   Intake 379.17 ml   Output --   Net 379.17 ml      Physical Exam  Constitutional:       Appearance: Normal appearance.   HENT:      Head: Normocephalic and atraumatic.      Mouth/Throat:      Comments: Oral ulcerations noted. No bleeding   Cardiovascular:      Rate and Rhythm: Normal rate.      Pulses: Normal pulses.      Heart sounds: No murmur heard.  Pulmonary:      Effort: Pulmonary effort is normal. No respiratory distress.   Abdominal:      General: Abdomen is flat. Bowel sounds are normal. There is no distension.   Skin:     General: Skin is warm and dry.   Neurological:      General: No focal deficit present.      Mental Status: He is alert and oriented to person, place, and time.   Psychiatric:         Mood and Affect: Mood normal.         Behavior: Behavior normal.         MELD 3.0: 14 at 3/8/2025  6:41 AM  MELD-Na: 13 at 3/8/2025  6:41 AM  Calculated from:  Serum Creatinine: 2 mg/dL at 3/8/2025  6:41 AM  Serum Sodium: 138 mmol/L (Using max of 137 mmol/L) at 3/8/2025  6:41 AM  Total Bilirubin: 0.7 mg/dL (Using min of 1 mg/dL) at 3/8/2025  6:41 AM  Serum Albumin: 3 g/dL at 3/8/2025  6:41 AM  INR(ratio): 1 at 3/6/2025  3:14 PM  Age at listing (hypothetical): 70 years  Sex: Male at 3/8/2025  6:41 AM      Significant Labs:  CBC:  Recent Labs   Lab 03/12/25  0639 03/12/25  1408 03/13/25  0445   WBC 8.52 8.16 9.12   HGB 7.0* 6.8* 7.4*   HCT 21.8* 21.2* 23.4*   * 175 302     CMP:  Recent Labs  "  Lab 03/12/25  0639 03/13/25  0444    137   K 4.3 4.6    109   CO2 22* 18*   * 97   BUN 37* 39*   CREATININE 2.5* 2.2*   CALCIUM 8.2* 8.3*   PROT 6.0 6.2   ALBUMIN 2.4* 2.4*   BILITOT 0.5 0.7   ALKPHOS 75 91   AST 18 16   ALT 15 18   ANIONGAP 7* 10     PTINR:  No results for input(s): "INR" in the last 48 hours.    Significant Procedures:   Dobutamine Stress Test with Color Flow: No results found. However, due to the size of the patient record, not all encounters were searched. Please check Results Review for a complete set of results.    None      Assessment & Plan  Rheumatoid arthritis   rheumatoid arthritis on methotrexate -weekly . rheumatology eval. Holding mtx  History of hepatitis C, s/p successful treatment w/ SVR12 - 7/2015  noted     PAD (peripheral artery disease)  continue ASA and cilastazol     Hypercholesteremia  continue simvastatin    AAA (abdominal aortic aneurysm)  CT CAP 2/25 - aneurysmal dilatation of the abdominal aorta measuring up to 4.5 cm AP with extension into the common iliac arteries, not significantly changed from prior study. Extensive vascular calcification noted.   3/12   CTCAP -Fusiform aneurysm of the infrarenal abdominal aorta, greatest diameter 5 cm previously 4.4 cm. Greatest diameter of the descending thoracic aorta 4.2 cm. follows with vascular surgery - Dr. Jensen   Stage 3b chronic kidney disease  Creatine stable for now. BMP reviewed- noted Estimated Creatinine Clearance: 30.8 mL/min (A) (based on SCr of 2.2 mg/dL (H)). according to latest data. Monitor UOP and serial BMP and adjust therapy as needed. Renally dose meds.    Recent Labs   Lab 03/11/25  0512 03/12/25  0639 03/13/25  0444   BUN 33* 37* 39*   CREATININE 2.4* 2.5* 2.2*       I & O     Intake/Output Summary (Last 24 hours) at 3/13/2025 1601  Last data filed at 3/12/2025 2150  Gross per 24 hour   Intake 379.17 ml   Output --   Net 379.17 ml        Ureteral tumor  history of urothelial carcinoma in " remission.  s/p neoadjuvant chemotherapy with Gemcitabine and Cisplatin. hem/onc consulted     Anemia  Anemia is likely due to chronic disease due to RA . Most recent hemoglobin and hematocrit are listed below.  Recent Labs     03/12/25  0639 03/12/25  1408 03/13/25  0445   HGB 7.0* 6.8* 7.4*   HCT 21.8* 21.2* 23.4*     Plan  - Monitor serial CBC: Daily  - Transfuse PRBC if patient becomes hemodynamically unstable, symptomatic or H/H drops below 7/21.  3/7 HB trended down to 6.9 . Transfusion with 1 unit of PRBC . Haptoglobin elevated.   3/8 Hb 7--> 6.8. Transfusion with 1 unit of PRBC     Hypertension  Patient's blood pressure range in the last 24 hours was: BP  Min: 91/46  Max: 112/57.The patient's inpatient anti-hypertensive regimen is listed below:  Current Antihypertensives       Plan  - BP is controlled, no changes needed to their regimen  - continue amlodipine 5mg daily  Leucopenia (Resolved: 3/13/2025)     Recent Labs   Lab 03/12/25  0639 03/12/25  1408 03/13/25  0445   WBC 8.52 8.16 9.12   leucopenia 1.55 and .  discussed with hematology, likely due to MTX however he has been on this medication for a long time. admitted for further work up   Hematology consulted - had been taking MTX daily incorrectly instead of weekly.   and  MTX toxicity given incorrect daily dosing and CKD. MTX levels < 0.04. No leucovorin for now per heme onc .  parvo serologies Ig G+, Ig M negative , Reticulocytes, Iron panel, Hep C PCR ordered   RESOLVED    Oral thrush  Thick whitish discharge on the tongue consistent with oral candidiasis .Workup is notable for new pancytopenia. Hb 7.9, leucopenia 1.55 and .  discussed with hematology, likely due to MTX however he has been on this medication for a long time.     started on dukes solution and fluconazole.   GI consulted for odynophagia   Rheumatology consulted due to recent methotrexation resumption    3/7  had been taking MTX daily incorrectly instead of weekly and  MTX  toxicity given incorrect daily dosing and CKD. MTX levels < 0.04. No leucovorin for now per heme onc .  3/11 ID eval  thrush due to resistant candida species such as Candida glabrata and oral herpes simplex infection. fluconazole changed to micafungin for the possibility of a resistant candida species.  started  valacyclovir for possible oral herpes.  Started oral lidocaine for symptom management       Odynophagia  secondary to above. continue dukes solution. GI consulted     Fever  3/8 low grade temperature of 100.4. reports cough. CX ray -Continued pleural fluid or thickening at the right base now with some fluid or thickening at the left base.  Some patchy parenchymal opacities though no confluent consolidation.  No pneumothorax.BC x2, UA  negative and procalcitonin   3.9 ID consulted for neutropenic fever . advanced to soft diet and started on boost.  ANC 1900. WBC improving. fever of 100.9F overnight. MRSA PCR negative   3/10  ID eval - continue renally dosed fluconazole for presumed oral and esophageal candidiasis x 14-21 days depending on clinical improvement. CT chest, abdomen, pelvis ordered  3/12 fever of 102F. BCx2 and echo orderd. pancultures. Platelets trended down to 84. CTCAP -Fusiform aneurysm of the infrarenal abdominal aorta, greatest diameter 5 cm previously 4.4 cm. Greatest diameter of the descending thoracic aorta 4.2 cm. Bilateral pleural effusions, stable compared to 02/10/2025. Status post left nephrectomy, no evidence of recurrence or distant metastases within the constraints of noncontrast study. Bilateral emphysema. GI follow up - recs Fluconazole to finish before EGD is considered                  Neutropenic fever  3/8 low grade temperature of 100.5. CX ray -Continued pleural fluid or thickening at the right base now with some fluid or thickening at the left base.  Some patchy parenchymal opacities though no confluent consolidation.  No pneumothorax. COVID, Flu, RSV negative. BC x2, UA  and procalcitonin  0.27 . ANC 1500. ID consulted for neutropenic fever .     Drug-induced pancytopenia    Recent Labs   Lab 03/13/25  0445   HGB 7.4*   WBC 9.12      3/10 WBC and ANC normalized. platelets trended down to 93. peripheral smear. Hematology f/u    HIT score is 2-3 indicating a low probability of HIT (<5%).  smear is clinically unremarkable - expected macrocytes hypersegmented neutrophils. No hepatosplenomegal . thrombocytopenia is likely due to acute illness         Oral ulceration  secondary to MTX toxicity. on folic acid. Maybe some slow improvement    VTE Risk Mitigation (From admission, onward)           Ordered     enoxaparin injection 30 mg  Daily         03/11/25 0749     IP VTE HIGH RISK PATIENT  Once         03/06/25 1633     Place sequential compression device  Until discontinued         03/06/25 1633                    Discharge Planning   NED: 3/15/2025     Code Status: Full Code   Medical Readiness for Discharge Date:   Discharge Plan A: Home with family              Osmar Ely DO  Department of Hospital Medicine   [facility] - Internal Medicine Telemetry

## 2025-03-13 NOTE — ASSESSMENT & PLAN NOTE
70 year old with RA on methotrexate admitted for painful oral lesions.  He was neutropenic and lymphopenic at the time of admission.  He has had a fever over the last 24 hours.  He is at risk for infection.  Blood cultures negative so far.  Respiratory infection panel is negative so far.

## 2025-03-13 NOTE — PLAN OF CARE
POC reviewed with patient this shift.  A/O x4.  Respirations unlabored on RA.  Skin w/d.  Continent of b/b.  Ambulates to restroom without difficulty.  1 unit of PRBC's completed this shift.  Tolerated well.  VSS.  Continues with low grade temp.  Tmax this swckt=595.9.  Pain appears to be managed well.  No PRN pain meds administered this shift.  See flowsheet for full assessment.  Able to verbalize wants/needs.  No s/s of distress.  Fall/safety precautions maintained.      Problem: Adult Inpatient Plan of Care  Goal: Plan of Care Review  Outcome: Progressing     Problem: Anemia  Goal: Anemia Symptom Improvement  Outcome: Progressing

## 2025-03-13 NOTE — ASSESSMENT & PLAN NOTE
Creatine stable for now. BMP reviewed- noted Estimated Creatinine Clearance: 30.8 mL/min (A) (based on SCr of 2.2 mg/dL (H)). according to latest data. Monitor UOP and serial BMP and adjust therapy as needed. Renally dose meds.    Recent Labs   Lab 03/11/25  0512 03/12/25  0639 03/13/25  0444   BUN 33* 37* 39*   CREATININE 2.4* 2.5* 2.2*       I & O     Intake/Output Summary (Last 24 hours) at 3/13/2025 1601  Last data filed at 3/12/2025 2150  Gross per 24 hour   Intake 379.17 ml   Output --   Net 379.17 ml

## 2025-03-13 NOTE — ASSESSMENT & PLAN NOTE
Patient's blood pressure range in the last 24 hours was: BP  Min: 91/46  Max: 112/57.The patient's inpatient anti-hypertensive regimen is listed below:  Current Antihypertensives       Plan  - BP is controlled, no changes needed to their regimen  - continue amlodipine 5mg daily

## 2025-03-13 NOTE — ASSESSMENT & PLAN NOTE
Recent Labs   Lab 03/12/25  0639 03/12/25  1408 03/13/25  0445   WBC 8.52 8.16 9.12   leucopenia 1.55 and .  discussed with hematology, likely due to MTX however he has been on this medication for a long time. admitted for further work up   Hematology consulted - had been taking MTX daily incorrectly instead of weekly.   and  MTX toxicity given incorrect daily dosing and CKD. MTX levels < 0.04. No leucovorin for now per heme onc .  parvo serologies Ig G+, Ig M negative , Reticulocytes, Iron panel, Hep C PCR ordered   RESOLVED

## 2025-03-13 NOTE — ASSESSMENT & PLAN NOTE
70 year old male with a history of rheumatoid arthritis and chronic kidney disease.  His RA is managed with methotrexate.  He admits to not taking the methotrexate as was prescribed.  He was recently given amoxicillin for a UTI and subsequently developed severe oral pain with ulcers on his hard palate.  He presented to the ER for evaluation of this and was noted to be leukopenic, neutropenic and lymphopenic.  He has been on fluconazole presumptively for thrush with no resolution of his symptoms.  ID is re-consulted due to fevers yesterday.  Highest on the differential is methotrexate toxicity.  Methotrexate toxicity can manifest as mucositis and oral ulcers.  Methotrexate toxicity can also be associated with leukopenia.  Methotrexate toxicity occurs more frequently in patients with chronic kidney disease - Rheumatol Int. 2020 May;40(5):765-770.  Penicillins like amoxicillin are also associated with an increase in methotrexate levels - Milana CL, Rodrick T, Ijeoma JE, et al. Pharmacokinetic interaction between high dose methotrexate and amoxycillin. Ther Drug Monit. 1993;15:375-379.    After 24 hours of IV micafungin and oral valacyclovir, patient is still not responding.  While still early, I am very concerned for methotrexate toxicity which is managed very differently.    Plan  Continue micafungin and valacyclovir for now.  Will check methotrexate level in the morning.  Optimize patient's pain manage if possible.  Started sucralfate to see if it will help ease some of the mouth pain.

## 2025-03-13 NOTE — SUBJECTIVE & OBJECTIVE
Interval History: NAOE, maybe some improvement in oral symptoms overtime. Still having significant pain and irritation. Overall feels some better    Review of Systems   Constitutional:  Positive for chills, fatigue and fever.   HENT:  Positive for mouth sores.    Respiratory:  Negative for cough and shortness of breath.    Cardiovascular:  Negative for chest pain.   Gastrointestinal:  Negative for abdominal pain, constipation, diarrhea, nausea and vomiting.     Objective:     Vital Signs (Most Recent):  Temp: 99.6 °F (37.6 °C) (03/13/25 1209)  Pulse: 83 (03/13/25 1210)  Resp: 16 (03/13/25 1316)  BP: (!) 98/53 (03/13/25 1210)  SpO2: 98 % (03/13/25 1210) Vital Signs (24h Range):  Temp:  [99.1 °F (37.3 °C)-100.9 °F (38.3 °C)] 99.6 °F (37.6 °C)  Pulse:  [82-98] 83  Resp:  [16-19] 16  SpO2:  [96 %-98 %] 98 %  BP: ()/(46-72) 98/53     Weight: 72.6 kg (160 lb)  Body mass index is 23.63 kg/m².    Intake/Output Summary (Last 24 hours) at 3/13/2025 1559  Last data filed at 3/12/2025 2150  Gross per 24 hour   Intake 379.17 ml   Output --   Net 379.17 ml      Physical Exam  Constitutional:       Appearance: Normal appearance.   HENT:      Head: Normocephalic and atraumatic.      Mouth/Throat:      Comments: Oral ulcerations noted. No bleeding   Cardiovascular:      Rate and Rhythm: Normal rate.      Pulses: Normal pulses.      Heart sounds: No murmur heard.  Pulmonary:      Effort: Pulmonary effort is normal. No respiratory distress.   Abdominal:      General: Abdomen is flat. Bowel sounds are normal. There is no distension.   Skin:     General: Skin is warm and dry.   Neurological:      General: No focal deficit present.      Mental Status: He is alert and oriented to person, place, and time.   Psychiatric:         Mood and Affect: Mood normal.         Behavior: Behavior normal.         MELD 3.0: 14 at 3/8/2025  6:41 AM  MELD-Na: 13 at 3/8/2025  6:41 AM  Calculated from:  Serum Creatinine: 2 mg/dL at 3/8/2025  6:41  "AM  Serum Sodium: 138 mmol/L (Using max of 137 mmol/L) at 3/8/2025  6:41 AM  Total Bilirubin: 0.7 mg/dL (Using min of 1 mg/dL) at 3/8/2025  6:41 AM  Serum Albumin: 3 g/dL at 3/8/2025  6:41 AM  INR(ratio): 1 at 3/6/2025  3:14 PM  Age at listing (hypothetical): 70 years  Sex: Male at 3/8/2025  6:41 AM      Significant Labs:  CBC:  Recent Labs   Lab 03/12/25  0639 03/12/25  1408 03/13/25  0445   WBC 8.52 8.16 9.12   HGB 7.0* 6.8* 7.4*   HCT 21.8* 21.2* 23.4*   * 175 302     CMP:  Recent Labs   Lab 03/12/25  0639 03/13/25  0444    137   K 4.3 4.6    109   CO2 22* 18*   * 97   BUN 37* 39*   CREATININE 2.5* 2.2*   CALCIUM 8.2* 8.3*   PROT 6.0 6.2   ALBUMIN 2.4* 2.4*   BILITOT 0.5 0.7   ALKPHOS 75 91   AST 18 16   ALT 15 18   ANIONGAP 7* 10     PTINR:  No results for input(s): "INR" in the last 48 hours.    Significant Procedures:   Dobutamine Stress Test with Color Flow: No results found. However, due to the size of the patient record, not all encounters were searched. Please check Results Review for a complete set of results.    None  "

## 2025-03-13 NOTE — ASSESSMENT & PLAN NOTE
Thick whitish discharge on the tongue consistent with oral candidiasis .Workup is notable for new pancytopenia. Hb 7.9, leucopenia 1.55 and .  discussed with hematology, likely due to MTX however he has been on this medication for a long time.     started on dukes solution and fluconazole.   GI consulted for odynophagia   Rheumatology consulted due to recent methotrexation resumption    3/7  had been taking MTX daily incorrectly instead of weekly and  MTX toxicity given incorrect daily dosing and CKD. MTX levels < 0.04. No leucovorin for now per heme onc .  3/11 ID eval  thrush due to resistant candida species such as Candida glabrata and oral herpes simplex infection. fluconazole changed to micafungin for the possibility of a resistant candida species.  started  valacyclovir for possible oral herpes.  Started oral lidocaine for symptom management

## 2025-03-13 NOTE — PROGRESS NOTES
[facility] - Internal Medicine Telemetry  Infectious Disease  Progress Note    Patient Name: Andrea Gant  MRN: [id number]  Admission Date: 3/6/2025  Length of Stay: 5 days  Attending Physician: Jarocho Frazier MD  Primary Care Provider: Andrea Wang MD    Isolation Status: No active isolations  Assessment/Plan:      ENT  Oral ulceration  70 year old male with a history of rheumatoid arthritis and chronic kidney disease.  His RA is managed with methotrexate.  He admits to not taking the methotrexate as was prescribed.  He was recently given amoxicillin for a UTI and subsequently developed severe oral pain with ulcers on his hard palate.  He presented to the ER for evaluation of this and was noted to be leukopenic, neutropenic and lymphopenic.  He has been on fluconazole presumptively for thrush with no resolution of his symptoms.  ID is re-consulted due to fevers yesterday.  Highest on the differential is methotrexate toxicity.  Methotrexate toxicity can manifest as mucositis and oral ulcers.  Methotrexate toxicity can also be associated with leukopenia.  Methotrexate toxicity occurs more frequently in patients with chronic kidney disease - Rheumatol Int. 2020 May;40(5):765-770.  Penicillins like amoxicillin are also associated with an increase in methotrexate levels - Milana CL, Rodrick T, Peris JE, et al. Pharmacokinetic interaction between high dose methotrexate and amoxycillin. Ther Drug Monit. 1993;15:375-379.    After 24 hours of IV micafungin and oral valacyclovir, patient is still not responding.  While still early, I am very concerned for methotrexate toxicity which is managed very differently.    Plan  Continue micafungin and valacyclovir for now.  Will check methotrexate level in the morning.  Optimize patient's pain manage if possible.  Started sucralfate to see if it will help ease some of the mouth pain.    Other  Fever  70 year old with RA on methotrexate admitted for painful oral  "lesions.  He was neutropenic and lymphopenic at the time of admission.  He has had a fever over the last 24 hours.  He is at risk for infection.  Blood cultures negative so far.  Respiratory infection panel is negative so far.        Anticipated Disposition: TBD    Thank you for your consult. I will follow-up with patient. Please contact us if you have any additional questions.    Wilian Smith MD  Infectious Disease  [facility] - Internal Medicine Telemetry    Time: 50 minutes   50% of time spent on face-to-face counseling and coordination of care. Counseling included review of test results, diagnosis, and treatment plan with patient and/or family.  I have reviewed hospital notes from hospital medicine and other specialty providers as well as outside medical records. I have also reviewed CBC, CMP/BMP,  cultures and imaging with my interpretation as documented. Patient is high risk of morbidity, on antibiotics requiring intensive monitoring for toxicity.       Subjective:     Principal Problem:Oral thrush    HPI: Andrea Gant is a 70 year old man with RA on methotrexate, urothelial carcinoma in remission, hepatitis C with SVR who was admitted 3/6 due to painful oral lesions. He was recently prescribed PO amox-clav after cystoscopy from 2/7-2/21 with subsequent development of painful oral lesions. He was found to be pancytopenic, now with concern for methotrexate toxicity after taking more frequently than prescribed (daily dosing rather than once weekly for last few weeks). He reports that the lesions are improved and he is able to tolerate some PO today. He denies visual changes, cough, dyspnea, abdominal pain, diarrhea, rash, history of cold sores, dysuria.     Tmax up to 100.9. ID consulted for "neutropenic fever". Chest x-ray with pleural thickening and increased pleural effusions. Blood cultures no growth to date. Respiratory culture with normal respiratory judah. ANC 1116->1762, no longer neutropenic. " Currently on fluconazole alone.   No new subjective & objective note has been filed under this hospital service since the last note was generated.

## 2025-03-13 NOTE — PLAN OF CARE
[facility] - Internal Medicine Telemetry  Discharge Reassessment    Primary Care Provider: Andrea Wang MD    Expected Discharge Date: 3/15/2025    Reassessment (most recent)       Discharge Reassessment - 03/13/25 1412          Discharge Reassessment    Assessment Type Discharge Planning Reassessment (P)      Did the patient's condition or plan change since previous assessment? No (P)      Discharge Plan discussed with: Patient;Spouse/sig other (P)      Name(s) and Number(s) Rufina Gant (Spouse)  [phone] (P)      Communicated NED with patient/caregiver Yes (P)      Discharge Plan A Home with family (P)      Discharge Plan B Home (P)      DME Needed Upon Discharge  none (P)      Transition of Care Barriers None (P)      Why the patient remains in the hospital Requires continued medical care (P)         Post-Acute Status    [facility]age [facility] CHOICES - (P)      Hospital Resources/Appts/Education Provided Provided education on problems/symptoms using teachback (P)                  Discharge Plan A and Plan B have been determined by review of patient's clinical status, future medical and therapeutic needs, and coverage/benefits for post-acute care in coordination with multidisciplinary team members.                     TIBURCIO Serrano, LMSW  [facility]  Case Management  Ext. [phone]

## 2025-03-14 PROBLEM — E83.39 HYPOPHOSPHATEMIA: Status: ACTIVE | Noted: 2025-03-14

## 2025-03-14 LAB
ALBUMIN SERPL BCP-MCNC: 2.3 G/DL (ref 3.5–5.2)
ALP SERPL-CCNC: 81 U/L (ref 40–150)
ALT SERPL W/O P-5'-P-CCNC: 15 U/L (ref 10–44)
ANION GAP SERPL CALC-SCNC: 9 MMOL/L (ref 8–16)
AST SERPL-CCNC: 16 U/L (ref 10–40)
BASOPHILS # BLD AUTO: 0.03 K/UL (ref 0–0.2)
BASOPHILS NFR BLD: 0.4 % (ref 0–1.9)
BILIRUB SERPL-MCNC: 0.4 MG/DL (ref 0.1–1)
BUN SERPL-MCNC: 31 MG/DL (ref 8–23)
CALCIUM SERPL-MCNC: 8.5 MG/DL (ref 8.7–10.5)
CHLORIDE SERPL-SCNC: 110 MMOL/L (ref 95–110)
CO2 SERPL-SCNC: 17 MMOL/L (ref 23–29)
CREAT SERPL-MCNC: 2.1 MG/DL (ref 0.5–1.4)
CREAT UR-MCNC: 125 MG/DL (ref 23–375)
DIFFERENTIAL METHOD BLD: ABNORMAL
EOSINOPHIL # BLD AUTO: 0.1 K/UL (ref 0–0.5)
EOSINOPHIL NFR BLD: 1.1 % (ref 0–8)
ERYTHROCYTE [DISTWIDTH] IN BLOOD BY AUTOMATED COUNT: 15.6 % (ref 11.5–14.5)
EST. GFR  (NO RACE VARIABLE): 33 ML/MIN/1.73 M^2
GLUCOSE SERPL-MCNC: 106 MG/DL (ref 70–110)
HCT VFR BLD AUTO: 25.9 % (ref 40–54)
HGB BLD-MCNC: 8.3 G/DL (ref 14–18)
IMM GRANULOCYTES # BLD AUTO: 0.05 K/UL (ref 0–0.04)
IMM GRANULOCYTES NFR BLD AUTO: 0.7 % (ref 0–0.5)
LYMPHOCYTES # BLD AUTO: 0.7 K/UL (ref 1–4.8)
LYMPHOCYTES NFR BLD: 10.4 % (ref 18–48)
MAGNESIUM SERPL-MCNC: 2.2 MG/DL (ref 1.6–2.6)
MCH RBC QN AUTO: 28.3 PG (ref 27–31)
MCHC RBC AUTO-ENTMCNC: 32 G/DL (ref 32–36)
MCV RBC AUTO: 88 FL (ref 82–98)
MONOCYTES # BLD AUTO: 1.6 K/UL (ref 0.3–1)
MONOCYTES NFR BLD: 21.8 % (ref 4–15)
NEUTROPHILS # BLD AUTO: 4.7 K/UL (ref 1.8–7.7)
NEUTROPHILS NFR BLD: 65.6 % (ref 38–73)
NRBC BLD-RTO: 0 /100 WBC
PHOSPHATE SERPL-MCNC: 2.3 MG/DL (ref 2.7–4.5)
PLATELET # BLD AUTO: 490 K/UL (ref 150–450)
PMV BLD AUTO: 9.6 FL (ref 9.2–12.9)
POTASSIUM SERPL-SCNC: 4.3 MMOL/L (ref 3.5–5.1)
PROT SERPL-MCNC: 6.3 G/DL (ref 6–8.4)
PROT UR-MCNC: 42 MG/DL (ref 0–15)
PROT/CREAT UR: 0.34 MG/G{CREAT} (ref 0–0.2)
RBC # BLD AUTO: 2.93 M/UL (ref 4.6–6.2)
SODIUM SERPL-SCNC: 136 MMOL/L (ref 136–145)
WBC # BLD AUTO: 8.03 K/UL (ref 3.9–12.7)

## 2025-03-14 PROCEDURE — 25000003 PHARM REV CODE 250: Performed by: STUDENT IN AN ORGANIZED HEALTH CARE EDUCATION/TRAINING PROGRAM

## 2025-03-14 PROCEDURE — 85025 COMPLETE CBC W/AUTO DIFF WBC: CPT

## 2025-03-14 PROCEDURE — 25000003 PHARM REV CODE 250: Performed by: HOSPITALIST

## 2025-03-14 PROCEDURE — 11000001 HC ACUTE MED/SURG PRIVATE ROOM

## 2025-03-14 PROCEDURE — 25000003 PHARM REV CODE 250

## 2025-03-14 PROCEDURE — 99233 SBSQ HOSP IP/OBS HIGH 50: CPT | Mod: GC,,, | Performed by: INTERNAL MEDICINE

## 2025-03-14 PROCEDURE — 36415 COLL VENOUS BLD VENIPUNCTURE: CPT

## 2025-03-14 PROCEDURE — 84100 ASSAY OF PHOSPHORUS: CPT

## 2025-03-14 PROCEDURE — 80053 COMPREHEN METABOLIC PANEL: CPT

## 2025-03-14 PROCEDURE — 63600175 PHARM REV CODE 636 W HCPCS

## 2025-03-14 PROCEDURE — 84156 ASSAY OF PROTEIN URINE: CPT | Performed by: STUDENT IN AN ORGANIZED HEALTH CARE EDUCATION/TRAINING PROGRAM

## 2025-03-14 PROCEDURE — 83735 ASSAY OF MAGNESIUM: CPT

## 2025-03-14 PROCEDURE — 25000003 PHARM REV CODE 250: Performed by: INTERNAL MEDICINE

## 2025-03-14 RX ORDER — LIDOCAINE 50 MG/G
1 PATCH TOPICAL
Status: DISCONTINUED | OUTPATIENT
Start: 2025-03-14 | End: 2025-03-15 | Stop reason: HOSPADM

## 2025-03-14 RX ORDER — METHYLPREDNISOLONE SOD SUCC 125 MG
100 VIAL (EA) INJECTION
OUTPATIENT
Start: 2025-03-14

## 2025-03-14 RX ORDER — VALACYCLOVIR HYDROCHLORIDE 500 MG/1
1000 TABLET, FILM COATED ORAL 2 TIMES DAILY
Status: DISCONTINUED | OUTPATIENT
Start: 2025-03-14 | End: 2025-03-15 | Stop reason: HOSPADM

## 2025-03-14 RX ORDER — HEPARIN 100 UNIT/ML
500 SYRINGE INTRAVENOUS
OUTPATIENT
Start: 2025-03-14

## 2025-03-14 RX ORDER — VALACYCLOVIR HYDROCHLORIDE 500 MG/1
1000 TABLET, FILM COATED ORAL 2 TIMES DAILY
Status: DISCONTINUED | OUTPATIENT
Start: 2025-03-14 | End: 2025-03-14

## 2025-03-14 RX ORDER — EPINEPHRINE 0.3 MG/.3ML
0.3 INJECTION SUBCUTANEOUS ONCE AS NEEDED
OUTPATIENT
Start: 2025-03-14

## 2025-03-14 RX ORDER — LEUCOVORIN CALCIUM 5 MG/1
15 TABLET ORAL EVERY 6 HOURS
Status: DISCONTINUED | OUTPATIENT
Start: 2025-03-14 | End: 2025-03-15 | Stop reason: HOSPADM

## 2025-03-14 RX ORDER — SODIUM CHLORIDE 0.9 % (FLUSH) 0.9 %
10 SYRINGE (ML) INJECTION
OUTPATIENT
Start: 2025-03-14

## 2025-03-14 RX ORDER — LEUCOVORIN CALCIUM 5 MG/1
15 TABLET ORAL EVERY 6 HOURS
Status: DISCONTINUED | OUTPATIENT
Start: 2025-03-14 | End: 2025-03-14

## 2025-03-14 RX ORDER — DIPHENHYDRAMINE HYDROCHLORIDE 50 MG/ML
50 INJECTION, SOLUTION INTRAMUSCULAR; INTRAVENOUS
OUTPATIENT
Start: 2025-03-14

## 2025-03-14 RX ORDER — ACETAMINOPHEN 325 MG/1
650 TABLET ORAL
OUTPATIENT
Start: 2025-03-14

## 2025-03-14 RX ORDER — DIPHENHYDRAMINE HYDROCHLORIDE 50 MG/ML
50 INJECTION, SOLUTION INTRAMUSCULAR; INTRAVENOUS ONCE AS NEEDED
OUTPATIENT
Start: 2025-03-14

## 2025-03-14 RX ORDER — ENOXAPARIN SODIUM 100 MG/ML
40 INJECTION SUBCUTANEOUS EVERY 24 HOURS
Status: DISCONTINUED | OUTPATIENT
Start: 2025-03-14 | End: 2025-03-14

## 2025-03-14 RX ORDER — HEPARIN SODIUM 5000 [USP'U]/ML
5000 INJECTION, SOLUTION INTRAVENOUS; SUBCUTANEOUS EVERY 12 HOURS
Status: DISCONTINUED | OUTPATIENT
Start: 2025-03-14 | End: 2025-03-15 | Stop reason: HOSPADM

## 2025-03-14 RX ORDER — SODIUM,POTASSIUM PHOSPHATES 280-250MG
2 POWDER IN PACKET (EA) ORAL ONCE
Status: COMPLETED | OUTPATIENT
Start: 2025-03-14 | End: 2025-03-14

## 2025-03-14 RX ADMIN — ALUMINUM HYDROXIDE, MAGNESIUM HYDROXIDE, AND DIMETHICONE 10 ML: 400; 400; 40 SUSPENSION ORAL at 01:03

## 2025-03-14 RX ADMIN — LIDOCAINE HYDROCHLORIDE 10 ML: 20 SOLUTION ORAL at 12:03

## 2025-03-14 RX ADMIN — LEUCOVORIN CALCIUM 15 MG: 5 TABLET ORAL at 05:03

## 2025-03-14 RX ADMIN — LEUCOVORIN CALCIUM 15 MG: 5 TABLET ORAL at 02:03

## 2025-03-14 RX ADMIN — LIDOCAINE 1 PATCH: 50 PATCH CUTANEOUS at 11:03

## 2025-03-14 RX ADMIN — PRAVASTATIN SODIUM 20 MG: 20 TABLET ORAL at 08:03

## 2025-03-14 RX ADMIN — ALUMINUM HYDROXIDE, MAGNESIUM HYDROXIDE, AND DIMETHICONE 10 ML: 400; 400; 40 SUSPENSION ORAL at 08:03

## 2025-03-14 RX ADMIN — HEPARIN SODIUM 5000 UNITS: 5000 INJECTION INTRAVENOUS; SUBCUTANEOUS at 08:03

## 2025-03-14 RX ADMIN — SUCRALFATE 1 G: 1 SUSPENSION ORAL at 05:03

## 2025-03-14 RX ADMIN — VALACYCLOVIR HYDROCHLORIDE 1000 MG: 500 TABLET, FILM COATED ORAL at 08:03

## 2025-03-14 RX ADMIN — OXYCODONE 5 MG: 5 TABLET ORAL at 08:03

## 2025-03-14 RX ADMIN — FOLIC ACID 4 MG: 1 TABLET ORAL at 08:03

## 2025-03-14 RX ADMIN — SUCRALFATE 1 G: 1 SUSPENSION ORAL at 11:03

## 2025-03-14 RX ADMIN — MICAFUNGIN SODIUM 100 MG: 100 INJECTION, POWDER, LYOPHILIZED, FOR SOLUTION INTRAVENOUS at 05:03

## 2025-03-14 RX ADMIN — POTASSIUM & SODIUM PHOSPHATES POWDER PACK 280-160-250 MG 2 PACKET: 280-160-250 PACK at 11:03

## 2025-03-14 RX ADMIN — SUCRALFATE 1 G: 1 SUSPENSION ORAL at 12:03

## 2025-03-14 RX ADMIN — OXYCODONE 5 MG: 5 TABLET ORAL at 05:03

## 2025-03-14 RX ADMIN — LIDOCAINE HYDROCHLORIDE 10 ML: 20 SOLUTION ORAL at 05:03

## 2025-03-14 RX ADMIN — OXYCODONE 5 MG: 5 TABLET ORAL at 11:03

## 2025-03-14 RX ADMIN — ACETAMINOPHEN 650 MG: 325 TABLET ORAL at 07:03

## 2025-03-14 RX ADMIN — TAMSULOSIN HYDROCHLORIDE 0.4 MG: 0.4 CAPSULE ORAL at 08:03

## 2025-03-14 RX ADMIN — OXYCODONE 5 MG: 5 TABLET ORAL at 04:03

## 2025-03-14 RX ADMIN — LIDOCAINE HYDROCHLORIDE 10 ML: 20 SOLUTION ORAL at 11:03

## 2025-03-14 RX ADMIN — ALUMINUM HYDROXIDE, MAGNESIUM HYDROXIDE, AND DIMETHICONE 10 ML: 400; 400; 40 SUSPENSION ORAL at 04:03

## 2025-03-14 NOTE — PROGRESS NOTES
Pharmacist Renal Dose Adjustment Note    Andrea Gant is a 70 y.o. male being treated with the medication valacyclovir    Patient Data:    Vital Signs (Most Recent):  Temp: 99.2 °F (37.3 °C) (03/14/25 0751)  Pulse: 74 (03/14/25 0751)  Resp: 18 (03/14/25 0548)  BP: 111/70 (03/14/25 0751)  SpO2: 96 % (03/14/25 0437) Vital Signs (72h Range):  Temp:  [98.1 °F (36.7 °C)-102 °F (38.9 °C)]   Pulse:  [60-98]   Resp:  [16-19]   BP: ()/(43-75)   SpO2:  [95 %-99 %]      Recent Labs   Lab 03/12/25  0639 03/13/25  0444 03/14/25  0528   CREATININE 2.5* 2.2* 2.1*     Serum creatinine: 2.1 mg/dL (H) 03/14/25 0528  Estimated creatinine clearance: 32.3 mL/min (A)    Valacyclovir 1 g PO QD will be changed to 1 g PO q12h    Pharmacist's Name: Remington Peña, PharmD, BCPS   Pharmacist's Extension: [phone]

## 2025-03-14 NOTE — ASSESSMENT & PLAN NOTE
secondary to above. continue dukes solution. GI consulted   - GI re consulted. Plans for possible EGD on Monday. Will plan for NPO on Sunday night

## 2025-03-14 NOTE — SUBJECTIVE & OBJECTIVE
Interval History: NAEO, feeling some better but ulcerations still present. Complaining of some back pain today after sleeping in hospital bed    Review of Systems   Constitutional:  Positive for fatigue. Negative for fever.   HENT:  Positive for mouth sores and sore throat.    Respiratory:  Negative for cough and shortness of breath.    Cardiovascular:  Negative for chest pain.   Gastrointestinal:  Negative for abdominal pain, diarrhea, nausea and vomiting.     Objective:     Vital Signs (Most Recent):  Temp: 99.5 °F (37.5 °C) (03/14/25 1124)  Pulse: 88 (03/14/25 1124)  Resp: 18 (03/14/25 1154)  BP: (!) 90/49 (03/14/25 1124)  SpO2: (!) 94 % (03/14/25 1200) Vital Signs (24h Range):  Temp:  [98.1 °F (36.7 °C)-100.4 °F (38 °C)] 99.5 °F (37.5 °C)  Pulse:  [74-88] 88  Resp:  [18] 18  SpO2:  [94 %-98 %] 94 %  BP: ()/(49-70) 90/49     Weight: 72.6 kg (160 lb)  Body mass index is 23.63 kg/m².    Intake/Output Summary (Last 24 hours) at 3/14/2025 1410  Last data filed at 3/14/2025 0550  Gross per 24 hour   Intake 360 ml   Output --   Net 360 ml      Physical Exam  Constitutional:       Appearance: Normal appearance.   HENT:      Head: Normocephalic and atraumatic.      Mouth/Throat:      Comments: Still with ulcerations present. Maybe some healing noted  Cardiovascular:      Rate and Rhythm: Normal rate.      Pulses: Normal pulses.      Heart sounds: No murmur heard.  Pulmonary:      Effort: Pulmonary effort is normal. No respiratory distress.   Abdominal:      General: Abdomen is flat. Bowel sounds are normal. There is no distension.   Neurological:      Mental Status: He is alert.         MELD 3.0: 14 at 3/8/2025  6:41 AM  MELD-Na: 13 at 3/8/2025  6:41 AM  Calculated from:  Serum Creatinine: 2 mg/dL at 3/8/2025  6:41 AM  Serum Sodium: 138 mmol/L (Using max of 137 mmol/L) at 3/8/2025  6:41 AM  Total Bilirubin: 0.7 mg/dL (Using min of 1 mg/dL) at 3/8/2025  6:41 AM  Serum Albumin: 3 g/dL at 3/8/2025  6:41  "AM  INR(ratio): 1 at 3/6/2025  3:14 PM  Age at listing (hypothetical): 70 years  Sex: Male at 3/8/2025  6:41 AM      Significant Labs:  CBC:  Recent Labs   Lab 03/13/25  0445 03/14/25  0528   WBC 9.12 8.03   HGB 7.4* 8.3*   HCT 23.4* 25.9*    490*     CMP:  Recent Labs   Lab 03/13/25 0444 03/14/25  0528    136   K 4.6 4.3    110   CO2 18* 17*   GLU 97 106   BUN 39* 31*   CREATININE 2.2* 2.1*   CALCIUM 8.3* 8.5*   PROT 6.2 6.3   ALBUMIN 2.4* 2.3*   BILITOT 0.7 0.4   ALKPHOS 91 81   AST 16 16   ALT 18 15   ANIONGAP 10 9     PTINR:  No results for input(s): "INR" in the last 48 hours.    Significant Procedures:   Dobutamine Stress Test with Color Flow: No results found. However, due to the size of the patient record, not all encounters were searched. Please check Results Review for a complete set of results.    None  "

## 2025-03-14 NOTE — ASSESSMENT & PLAN NOTE
rheumatoid arthritis on methotrexate -weekly . rheumatology eval. Holding mtx  - follow up OP to restart or adjust medications.

## 2025-03-14 NOTE — ASSESSMENT & PLAN NOTE
Thick whitish discharge on the tongue consistent with oral candidiasis .Workup is notable for new pancytopenia. Hb 7.9, leucopenia 1.55 and .  discussed with hematology, likely due to MTX however he has been on this medication for a long time.     started on dukes solution and fluconazole.   GI consulted for odynophagia   Rheumatology consulted due to recent methotrexation resumption    3/7  had been taking MTX daily incorrectly instead of weekly and  MTX toxicity given incorrect daily dosing and CKD. MTX levels < 0.04. No leucovorin for now per heme onc .  3/11 ID eval  thrush due to resistant candida species such as Candida glabrata and oral herpes simplex infection. fluconazole changed to micafungin for the possibility of a resistant candida species.  started  valacyclovir for possible oral herpes.  Started oral lidocaine for symptom management  - finishing micafungin on 3/15  - rheum evaluating again for other interventions

## 2025-03-14 NOTE — PROGRESS NOTES
Pharmacist Renal Dose Adjustment Note    Andrea Gant is a 70 y.o. male being treated with the medication lovenox    Patient Data:    Vital Signs (Most Recent):  Temp: 99.2 °F (37.3 °C) (03/14/25 0751)  Pulse: 74 (03/14/25 0751)  Resp: 18 (03/14/25 0548)  BP: 111/70 (03/14/25 0751)  SpO2: 96 % (03/14/25 0437) Vital Signs (72h Range):  Temp:  [98.1 °F (36.7 °C)-102 °F (38.9 °C)]   Pulse:  [60-98]   Resp:  [16-19]   BP: ()/(43-75)   SpO2:  [95 %-99 %]      Recent Labs   Lab 03/12/25  0639 03/13/25  0444 03/14/25  0528   CREATININE 2.5* 2.2* 2.1*     Serum creatinine: 2.1 mg/dL (H) 03/14/25 0528  Estimated creatinine clearance: 32.3 mL/min (A)    Lovenox 30 mg SQ q24h will be changed to 40 mg SQ q24h    Pharmacist's Name: Remington Peña, PharmD, BCPS   Pharmacist's Extension: [phone]

## 2025-03-14 NOTE — ASSESSMENT & PLAN NOTE
Home regimen: MTX 20mg qweek   Resulted workup:   Labs              Pancytopenia - WBC of 1.8 (baseline ~10), hgb ~7 (baseline 12), plt 140 () --> WBC 8, Hgb stable, plt 430              HIV negative              Folate high              Strep/flu negative              Ferritin high, MARIA DE JESUS labs nl/high   B12 >600  Pending workup:               ESR/CRP  Current treatments:  - Valacyclovir  - Sucralfate     Assessment  Pt presented with new pancytopenia. Had been taking his MTX incorrectly (one tablet daily rather than multiple once a week). It sounds like he was previously taking the tablets every Monday. Thought to be most likely acute toxicity from the methotrexate. Blood counts have recovered, however his oral ulcers still persist. Does not seem to have any other symptoms that could be consistent with Bechet's or lupus. Has been having fevers consistently despite appropriate oral ulcer treatment per ID. EMI negative x2 in the past. He has a baseline creatinine of ~2, hx of RCC tx with chemotherapy (2023, Gemcitabine, cisplatin) but otherwise unclear etiology. Stable, but does have some protein on UA - will quantify. Bechet's unlikely with lack of fever hx or other symptoms, but would be confirmed/ruled out with biopsy.     RA fairly well controlled -CDAI 6 (0 tender joints, 4 swollen joints - MCPs and R elbow).     Recommendations:  - continue to hold MTX  - Recommend GI re-consult for consideration of IP endoscopy/biopsy  - ESR/CRP  - Repeat EMI  - UPCR (mild hematuria and proteinuria on UA)  - continue folic acid to 4mg daily  - Will start leucovorin 15mg q6h x10 doses  - Will arrange follow up OP for resumption/change of RA meds   Considering [facility] as an OP

## 2025-03-14 NOTE — ASSESSMENT & PLAN NOTE
Recent Labs   Lab 03/14/25  0528   HGB 8.3*   WBC 8.03   *   3/10 WBC and ANC normalized. platelets trended down to 93. peripheral smear. Hematology f/u    HIT score is 2-3 indicating a low probability of HIT (<5%).  smear is clinically unremarkable - expected macrocytes hypersegmented neutrophils. No hepatosplenomegal . thrombocytopenia is likely due to acute illness     improved     Never smoker

## 2025-03-14 NOTE — ASSESSMENT & PLAN NOTE
70 year old male with a history of rheumatoid arthritis and chronic kidney disease.  His RA is managed with methotrexate.  He admits to not taking the methotrexate as was prescribed.  He was recently given amoxicillin for a UTI and subsequently developed severe oral pain with ulcers on his hard palate.  He presented to the ER for evaluation of this and was noted to be leukopenic, neutropenic and lymphopenic.  He was on fluconazole with no improvement.  He was transitioned to micafungin and oral valacyclovir was started he is still not improved.  Concerned for autoimmune process leading to mouth ulceration.  Methotrexate toxicity is possible but his level this a.m. is low.    Plan  Consult rheumatology for evaluation.    Consider re-consulting GI for evaluation and consideration of upper endoscopy.  Today is day # 8 of antifungal therapy with no impact.  Consider giving micafungin for 2 more days (10 days total) to see if any change.  Suggest stopping at 10 days total of therapy.  Recommend 7 days total of valacyclovir empirically.      ID will sign off for now.  Call back with questions.

## 2025-03-14 NOTE — ASSESSMENT & PLAN NOTE
Patient's most recent phosphorus results are listed below.   Recent Labs     03/12/25  0639 03/13/25  0444 03/14/25  0528   PHOS 2.8 2.7 2.3*     Plan  - oral replacement

## 2025-03-14 NOTE — PLAN OF CARE
POC reviewed with patient this shift.  Remains A/O x4.  Respirations unlabored on RA.  Skin w/d.  Continent of b/b.  Ambulates to restroom without difficulty.  Pt continues with low grade temp.  Tmax this shift=99.0 oral.  Oral soreness continues put appears to be managed well.  PRN Oxy and Dilaudid given x1 thus far in shift.   Tolerates meds whole with water without difficulty.  VSS.  See flowsheet for full assessment.  Able to verbalize wants/needs.  No s/s of distress.  Fall/safety precautions maintained.      Problem: Adult Inpatient Plan of Care  Goal: Plan of Care Review  Outcome: Progressing     Problem: Anemia  Goal: Anemia Symptom Improvement  Outcome: Progressing

## 2025-03-14 NOTE — ASSESSMENT & PLAN NOTE
Anemia is likely due to chronic disease due to RA. Most recent hemoglobin and hematocrit are listed below.  Recent Labs     03/12/25  1408 03/13/25  0445 03/14/25  0528   HGB 6.8* 7.4* 8.3*   HCT 21.2* 23.4* 25.9*     Plan  - Monitor serial CBC: Daily  - Transfuse PRBC if patient becomes hemodynamically unstable, symptomatic or H/H drops below 7/21.  3/7 HB trended down to 6.9 . Transfusion with 1 unit of PRBC . Haptoglobin elevated.   3/8 Hb 7--> 6.8. Transfusion with 1 unit of PRBC   - hb stable, monitor and transfuse as needed

## 2025-03-14 NOTE — ASSESSMENT & PLAN NOTE
Creatine stable for now. BMP reviewed- noted Estimated Creatinine Clearance: 32.3 mL/min (A) (based on SCr of 2.1 mg/dL (H)). according to latest data. Monitor UOP and serial BMP and adjust therapy as needed. Renally dose meds.    Recent Labs   Lab 03/12/25  0639 03/13/25  0444 03/14/25  0528   BUN 37* 39* 31*   CREATININE 2.5* 2.2* 2.1*       I & O     Intake/Output Summary (Last 24 hours) at 3/14/2025 1414  Last data filed at 3/14/2025 0550  Gross per 24 hour   Intake 360 ml   Output --   Net 360 ml

## 2025-03-14 NOTE — SUBJECTIVE & OBJECTIVE
Interval History: Mouth is still painful.    Review of Systems   HENT:  Positive for mouth sores.    All other systems reviewed and are negative.    Objective:     Vital Signs (Most Recent):  Temp: (!) 100.4 °F (38 °C) (03/13/25 1606)  Pulse: 75 (03/13/25 1606)  Resp: 18 (03/13/25 1606)  BP: 100/61 (03/13/25 1606)  SpO2: 98 % (03/13/25 1606) Vital Signs (24h Range):  Temp:  [99.1 °F (37.3 °C)-100.9 °F (38.3 °C)] 100.4 °F (38 °C)  Pulse:  [75-98] 75  Resp:  [16-19] 18  SpO2:  [96 %-98 %] 98 %  BP: ()/(46-72) 100/61     Weight: 72.6 kg (160 lb)  Body mass index is 23.63 kg/m².    Estimated Creatinine Clearance: 30.8 mL/min (A) (based on SCr of 2.2 mg/dL (H)).     Physical Exam  Vitals and nursing note reviewed.   Constitutional:       General: He is not in acute distress.     Appearance: He is well-developed. He is not diaphoretic.   HENT:      Head: Normocephalic and atraumatic.      Right Ear: External ear normal.      Left Ear: External ear normal.      Nose: Nose normal.      Mouth/Throat:      Pharynx: No oropharyngeal exudate.      Comments: Erythematous areas on the hard palate.  Eyes:      General: No scleral icterus.        Right eye: No discharge.         Left eye: No discharge.      Conjunctiva/sclera: Conjunctivae normal.      Pupils: Pupils are equal, round, and reactive to light.   Neck:      Thyroid: No thyromegaly.      Vascular: No JVD.      Trachea: No tracheal deviation.   Cardiovascular:      Rate and Rhythm: Normal rate and regular rhythm.      Heart sounds: No murmur heard.     No friction rub. No gallop.   Pulmonary:      Effort: Pulmonary effort is normal. No respiratory distress.      Breath sounds: Normal breath sounds. No stridor. No wheezing or rales.   Chest:      Chest wall: No tenderness.   Abdominal:      General: Bowel sounds are normal. There is no distension.      Palpations: Abdomen is soft. There is no mass.      Tenderness: There is no abdominal tenderness. There is no  guarding or rebound.   Musculoskeletal:         General: No tenderness. Normal range of motion.      Cervical back: Normal range of motion and neck supple.   Lymphadenopathy:      Cervical: No cervical adenopathy.   Skin:     General: Skin is warm.      Coloration: Skin is not pale.      Findings: No erythema or rash.   Neurological:      Mental Status: He is alert and oriented to person, place, and time.      Cranial Nerves: No cranial nerve deficit.      Motor: No abnormal muscle tone.      Coordination: Coordination normal.      Deep Tendon Reflexes: Reflexes are normal and symmetric. Reflexes normal.   Psychiatric:         Behavior: Behavior normal.         Thought Content: Thought content normal.         Judgment: Judgment normal.          Significant Labs:   Microbiology Results (last 7 days)       Procedure Component Value Units Date/Time    Blood culture [[id number]] Collected: 03/11/25 0832    Order Status: Completed Specimen: Blood from Peripheral, Antecubital, Left Updated: 03/13/25 1412     Blood Culture, Routine No Growth to date      No Growth to date      No Growth to date    Blood culture [[id number]] Collected: 03/11/25 0838    Order Status: Completed Specimen: Blood from Peripheral, Antecubital, Left Updated: 03/13/25 1412     Blood Culture, Routine No Growth to date      No Growth to date      No Growth to date    Blood culture [[id number]] Collected: 03/08/25 0918    Order Status: Completed Specimen: Blood Updated: 03/13/25 1212     Blood Culture, Routine No growth after 5 days.    Respiratory Infection Panel (PCR), Nasopharyngeal [[id number]] Collected: 03/11/25 1656    Order Status: Completed Specimen: Nasopharyngeal Swab Updated: 03/11/25 1834     Respiratory Infection Panel Source NP Swab     Adenovirus Not Detected     Coronavirus 229E, Common Cold Virus Not Detected     Coronavirus HKU1, Common Cold Virus Not Detected     Coronavirus NL63, Common Cold Virus Not Detected     Coronavirus  OC43, Common Cold Virus Not Detected     Comment: The Coronavirus strains detected in this test cause the common cold.  These strains are not the COVID-19 (novel Coronavirus)strain   associated with the respiratory disease outbreak.          SARS-CoV2 (COVID-19) Qualitative PCR Not Detected     Human Metapneumovirus Not Detected     Human Rhinovirus/Enterovirus Not Detected     Influenza A Not Detected     Influenza B Not Detected     Parainfluenza Virus 1 Not Detected     Parainfluenza Virus 2 Not Detected     Parainfluenza Virus 3 Not Detected     Parainfluenza Virus 4 Not Detected     Respiratory Syncytial Virus Not Detected     Bordetella Parapertussis (YV6030) Not Detected     Bordetella pertussis (ptxP) Not Detected     Chlamydia pneumoniae Not Detected     Mycoplasma pneumoniae Not Detected    Narrative:      Assay not valid for lower respiratory specimens, alternate  testing required.    Culture, Respiratory with Gram Stain [[id number]] Collected: 03/08/25 1600    Order Status: Completed Specimen: Respiratory from Sputum Updated: 03/10/25 1115     Respiratory Culture Normal respiratory judah      No S aureus or Pseudomonas isolated.     Gram Stain (Respiratory) <10 epithelial cells per low power field.     Gram Stain (Respiratory) Few WBC's     Gram Stain (Respiratory) Many Gram positive cocci     Gram Stain (Respiratory) Rare Gram negative rods    MRSA Screen by PCR [[id number]] Collected: 03/08/25 1430    Order Status: Completed Specimen: Nasal Swab Updated: 03/08/25 1758     MRSA SCREEN BY PCR Not Detected    Blood culture [[id number]] Collected: 03/08/25 1018    Order Status: Sent Specimen: Blood             Significant Imaging: I have reviewed all pertinent imaging results/findings within the past 24 hours.

## 2025-03-14 NOTE — PROGRESS NOTES
[facility] - Internal Medicine Telemetry  Infectious Disease  Progress Note    Patient Name: Andrea Gant  MRN: [id number]  Admission Date: 3/6/2025  Length of Stay: 6 days  Attending Physician: Osmar Ely DO  Primary Care Provider: Andrea Wang MD    Isolation Status: No active isolations  Assessment/Plan:      ENT  Oral ulceration  70 year old male with a history of rheumatoid arthritis and chronic kidney disease.  His RA is managed with methotrexate.  He admits to not taking the methotrexate as was prescribed.  He was recently given amoxicillin for a UTI and subsequently developed severe oral pain with ulcers on his hard palate.  He presented to the ER for evaluation of this and was noted to be leukopenic, neutropenic and lymphopenic.  He was on fluconazole with no improvement.  He was transitioned to micafungin and oral valacyclovir was started he is still not improved.  Concerned for autoimmune process leading to mouth ulceration.  Methotrexate toxicity is possible but his level this a.m. is low.    Plan  Consult rheumatology for evaluation.    Consider re-consulting GI for evaluation and consideration of upper endoscopy.  Today is day # 8 of antifungal therapy with no impact.  Consider giving micafungin for 2 more days (10 days total) to see if any change.  Suggest stopping at 10 days total of therapy.  Recommend 7 days total of valacyclovir empirically.      ID will sign off for now.  Call back with questions.        Anticipated Disposition: TBD    Thank you for your consult. I will sign off. Please contact us if you have any additional questions.    Wilian Smith MD  Infectious Disease  [facility] - Internal Medicine Telemetry    Time: 50 minutes   50% of time spent on face-to-face counseling and coordination of care. Counseling included review of test results, diagnosis, and treatment plan with patient and/or family.  I have reviewed hospital notes from hospital medicine service and other  "specialty providers as well as outside medical records. I have also reviewed CBC, CMP/BMP,  cultures and imaging with my interpretation as documented. Patient is high risk of morbidity, on antibiotics requiring intensive monitoring for toxicity.       Subjective:     Principal Problem:Oral thrush    HPI: Andrea Gant is a 70 year old man with RA on methotrexate, urothelial carcinoma in remission, hepatitis C with SVR who was admitted 3/6 due to painful oral lesions. He was recently prescribed PO amox-clav after cystoscopy from 2/7-2/21 with subsequent development of painful oral lesions. He was found to be pancytopenic, now with concern for methotrexate toxicity after taking more frequently than prescribed (daily dosing rather than once weekly for last few weeks). He reports that the lesions are improved and he is able to tolerate some PO today. He denies visual changes, cough, dyspnea, abdominal pain, diarrhea, rash, history of cold sores, dysuria.     Tmax up to 100.9. ID consulted for "neutropenic fever". Chest x-ray with pleural thickening and increased pleural effusions. Blood cultures no growth to date. Respiratory culture with normal respiratory judah. ANC 1116->1762, no longer neutropenic. Currently on fluconazole alone.   Interval History: Mouth is still painful.    Review of Systems   HENT:  Positive for mouth sores.    All other systems reviewed and are negative.    Objective:     Vital Signs (Most Recent):  Temp: (!) 100.4 °F (38 °C) (03/13/25 1606)  Pulse: 75 (03/13/25 1606)  Resp: 18 (03/13/25 1606)  BP: 100/61 (03/13/25 1606)  SpO2: 98 % (03/13/25 1606) Vital Signs (24h Range):  Temp:  [99.1 °F (37.3 °C)-100.9 °F (38.3 °C)] 100.4 °F (38 °C)  Pulse:  [75-98] 75  Resp:  [16-19] 18  SpO2:  [96 %-98 %] 98 %  BP: ()/(46-72) 100/61     Weight: 72.6 kg (160 lb)  Body mass index is 23.63 kg/m².    Estimated Creatinine Clearance: 30.8 mL/min (A) (based on SCr of 2.2 mg/dL (H)).     Physical " Exam  Vitals and nursing note reviewed.   Constitutional:       General: He is not in acute distress.     Appearance: He is well-developed. He is not diaphoretic.   HENT:      Head: Normocephalic and atraumatic.      Right Ear: External ear normal.      Left Ear: External ear normal.      Nose: Nose normal.      Mouth/Throat:      Pharynx: No oropharyngeal exudate.      Comments: Erythematous areas on the hard palate.  Eyes:      General: No scleral icterus.        Right eye: No discharge.         Left eye: No discharge.      Conjunctiva/sclera: Conjunctivae normal.      Pupils: Pupils are equal, round, and reactive to light.   Neck:      Thyroid: No thyromegaly.      Vascular: No JVD.      Trachea: No tracheal deviation.   Cardiovascular:      Rate and Rhythm: Normal rate and regular rhythm.      Heart sounds: No murmur heard.     No friction rub. No gallop.   Pulmonary:      Effort: Pulmonary effort is normal. No respiratory distress.      Breath sounds: Normal breath sounds. No stridor. No wheezing or rales.   Chest:      Chest wall: No tenderness.   Abdominal:      General: Bowel sounds are normal. There is no distension.      Palpations: Abdomen is soft. There is no mass.      Tenderness: There is no abdominal tenderness. There is no guarding or rebound.   Musculoskeletal:         General: No tenderness. Normal range of motion.      Cervical back: Normal range of motion and neck supple.   Lymphadenopathy:      Cervical: No cervical adenopathy.   Skin:     General: Skin is warm.      Coloration: Skin is not pale.      Findings: No erythema or rash.   Neurological:      Mental Status: He is alert and oriented to person, place, and time.      Cranial Nerves: No cranial nerve deficit.      Motor: No abnormal muscle tone.      Coordination: Coordination normal.      Deep Tendon Reflexes: Reflexes are normal and symmetric. Reflexes normal.   Psychiatric:         Behavior: Behavior normal.         Thought Content:  Thought content normal.         Judgment: Judgment normal.          Significant Labs:   Microbiology Results (last 7 days)       Procedure Component Value Units Date/Time    Blood culture [[id number]] Collected: 03/11/25 0832    Order Status: Completed Specimen: Blood from Peripheral, Antecubital, Left Updated: 03/13/25 1412     Blood Culture, Routine No Growth to date      No Growth to date      No Growth to date    Blood culture [[id number]] Collected: 03/11/25 0838    Order Status: Completed Specimen: Blood from Peripheral, Antecubital, Left Updated: 03/13/25 1412     Blood Culture, Routine No Growth to date      No Growth to date      No Growth to date    Blood culture [[id number]] Collected: 03/08/25 0918    Order Status: Completed Specimen: Blood Updated: 03/13/25 1212     Blood Culture, Routine No growth after 5 days.    Respiratory Infection Panel (PCR), Nasopharyngeal [[id number]] Collected: 03/11/25 1656    Order Status: Completed Specimen: Nasopharyngeal Swab Updated: 03/11/25 1834     Respiratory Infection Panel Source NP Swab     Adenovirus Not Detected     Coronavirus 229E, Common Cold Virus Not Detected     Coronavirus HKU1, Common Cold Virus Not Detected     Coronavirus NL63, Common Cold Virus Not Detected     Coronavirus OC43, Common Cold Virus Not Detected     Comment: The Coronavirus strains detected in this test cause the common cold.  These strains are not the COVID-19 (novel Coronavirus)strain   associated with the respiratory disease outbreak.          SARS-CoV2 (COVID-19) Qualitative PCR Not Detected     Human Metapneumovirus Not Detected     Human Rhinovirus/Enterovirus Not Detected     Influenza A Not Detected     Influenza B Not Detected     Parainfluenza Virus 1 Not Detected     Parainfluenza Virus 2 Not Detected     Parainfluenza Virus 3 Not Detected     Parainfluenza Virus 4 Not Detected     Respiratory Syncytial Virus Not Detected     Bordetella Parapertussis (RM6786) Not Detected      Bordetella pertussis (ptxP) Not Detected     Chlamydia pneumoniae Not Detected     Mycoplasma pneumoniae Not Detected    Narrative:      Assay not valid for lower respiratory specimens, alternate  testing required.    Culture, Respiratory with Gram Stain [[id number]] Collected: 03/08/25 1600    Order Status: Completed Specimen: Respiratory from Sputum Updated: 03/10/25 1115     Respiratory Culture Normal respiratory judah      No S aureus or Pseudomonas isolated.     Gram Stain (Respiratory) <10 epithelial cells per low power field.     Gram Stain (Respiratory) Few WBC's     Gram Stain (Respiratory) Many Gram positive cocci     Gram Stain (Respiratory) Rare Gram negative rods    MRSA Screen by PCR [[id number]] Collected: 03/08/25 1430    Order Status: Completed Specimen: Nasal Swab Updated: 03/08/25 1758     MRSA SCREEN BY PCR Not Detected    Blood culture [[id number]] Collected: 03/08/25 1018    Order Status: Sent Specimen: Blood             Significant Imaging: I have reviewed all pertinent imaging results/findings within the past 24 hours.

## 2025-03-14 NOTE — PROGRESS NOTES
[facility] - Internal Medicine [facility] Medicine  Progress Note    Patient Name: Andrea Gant  MRN: [id number]  Patient Class: IP- Inpatient   Admission Date: 3/6/2025  Length of Stay: 7 days  Attending Physician: Osmar Ely DO  Primary Care Provider: Andrea Wang MD        Subjective     Principal Problem:Oral thrush        HPI:  Andrea Winter is a 70-year-old male with rheumatoid arthritis on methotrexate, history of urothelial carcinoma in remission, anemia, hepatitis-C s/p SVR, PAD presents for thrush.       AAO x3. Patient was prescribed augmentin 2/7 -2/21 prior to the onset of his symptoms post cystoscopy. c/o pain with  swallowing after 4-5 days after augmentin.  denies fever, drooling or prior similar episodes. denies history of diabetes or HIV.   Reports painful whitish lesions in his mouth x  10 days. Patient was seen at urgent care clinic 02/26/2025 with congestion, sore throat and trouble swallowing.  Flu swab negative on 2/26.   prescribed prednisone 20mg x 5 days for viral pharyngitis, states that this significantly improved his sore throat and body aches but not the thrush.     ER course - Thick whitish discharge on the tongue consistent with oral candidiasis . Workup is notable for new pancytopenia. Hb 7.9, leucopenia 1.55 and .  discussed with hematology, likely due to MTX however he has been on this medication for a long time. admitted for further work up     Overview/Hospital Course:  3/7 HB trended down to 6.9 . Transfusion with 1 unit of PRBC . Haptoglobin elevated.  P replaced. . Neutropenic precautions. had been taking MTX daily incorrectly instead of weekly and  MTX toxicity given incorrect daily dosing and CKD. MTX levels < 0.04. No leucovorin for now per heme onc .  parvo serologies, Reticulocytes, Iron panel, Hep C PCR ordered   3/8 low grade temperature of 100.5. CX ray -Continued pleural fluid or thickening at the right base now with some  fluid or thickening at the left base.  Some patchy parenchymal opacities though no confluent consolidation.  No pneumothorax. COVID, Flu, RSV negative. BC x2, UA and procalcitonin  0.27 . UA negative. ANC 1500. folic acid increased to 4mg daily.  ID consulted for neutropenic fever . advanced to soft diet and started on boost   3/9 ANC 1900. WBC improving. fever of 100.9F overnight. MRSA PCR negative . P replaced. requests full liquid diet.   3/10 WBC and ANC normalized. platelets trended down to 93. Low grade temp of 100.6F this AM. ID eval - continue renally dosed fluconazole for presumed oral and esophageal candidiasis x 14-21 days depending on clinical improvement. CT chest, abdomen, pelvis ordered.SBP 90s improved with NS bolus 250ml x1.   3/11 fever of 102F. BCx2 and echo orderd. pancultures. Platelets trended down to 84. CTCAP -Fusiform aneurysm of the infrarenal abdominal aorta, greatest diameter 5 cm previously 4.4 cm. Greatest diameter of the descending thoracic aorta 4.2 cm. Bilateral pleural effusions, stable compared to 02/10/2025. Status post left nephrectomy, no evidence of recurrence or distant metastases within the constraints of noncontrast study. Bilateral emphysema. ID, Hematology  and GI follow up - recs Fluconazole to finish before EGD is considered    3/13 persistent fever 101.7F. UA, RIP and BC x2 NGTD . Hb 7--> 6.8. Transfusion with 1 unit of PRBC      H/H stable today. Starting on oral lidocaine, maybe some improvement   - will ask rheumatology to see again for continued ulcerations  - will also ask GI to see again to consider EGD while he's admitted.     Interval History: NAEO, feeling some better but ulcerations still present. Complaining of some back pain today after sleeping in hospital bed    Review of Systems   Constitutional:  Positive for fatigue. Negative for fever.   HENT:  Positive for mouth sores and sore throat.    Respiratory:  Negative for cough and shortness of breath.     Cardiovascular:  Negative for chest pain.   Gastrointestinal:  Negative for abdominal pain, diarrhea, nausea and vomiting.     Objective:     Vital Signs (Most Recent):  Temp: 99.5 °F (37.5 °C) (03/14/25 1124)  Pulse: 88 (03/14/25 1124)  Resp: 18 (03/14/25 1154)  BP: (!) 90/49 (03/14/25 1124)  SpO2: (!) 94 % (03/14/25 1200) Vital Signs (24h Range):  Temp:  [98.1 °F (36.7 °C)-100.4 °F (38 °C)] 99.5 °F (37.5 °C)  Pulse:  [74-88] 88  Resp:  [18] 18  SpO2:  [94 %-98 %] 94 %  BP: ()/(49-70) 90/49     Weight: 72.6 kg (160 lb)  Body mass index is 23.63 kg/m².    Intake/Output Summary (Last 24 hours) at 3/14/2025 1410  Last data filed at 3/14/2025 0550  Gross per 24 hour   Intake 360 ml   Output --   Net 360 ml      Physical Exam  Constitutional:       Appearance: Normal appearance.   HENT:      Head: Normocephalic and atraumatic.      Mouth/Throat:      Comments: Still with ulcerations present. Maybe some healing noted  Cardiovascular:      Rate and Rhythm: Normal rate.      Pulses: Normal pulses.      Heart sounds: No murmur heard.  Pulmonary:      Effort: Pulmonary effort is normal. No respiratory distress.   Abdominal:      General: Abdomen is flat. Bowel sounds are normal. There is no distension.   Neurological:      Mental Status: He is alert.         MELD 3.0: 14 at 3/8/2025  6:41 AM  MELD-Na: 13 at 3/8/2025  6:41 AM  Calculated from:  Serum Creatinine: 2 mg/dL at 3/8/2025  6:41 AM  Serum Sodium: 138 mmol/L (Using max of 137 mmol/L) at 3/8/2025  6:41 AM  Total Bilirubin: 0.7 mg/dL (Using min of 1 mg/dL) at 3/8/2025  6:41 AM  Serum Albumin: 3 g/dL at 3/8/2025  6:41 AM  INR(ratio): 1 at 3/6/2025  3:14 PM  Age at listing (hypothetical): 70 years  Sex: Male at 3/8/2025  6:41 AM      Significant Labs:  CBC:  Recent Labs   Lab 03/13/25 0445 03/14/25  0528   WBC 9.12 8.03   HGB 7.4* 8.3*   HCT 23.4* 25.9*    490*     CMP:  Recent Labs   Lab 03/13/25 0444 03/14/25 0528    136   K 4.6 4.3    110  "  CO2 18* 17*   GLU 97 106   BUN 39* 31*   CREATININE 2.2* 2.1*   CALCIUM 8.3* 8.5*   PROT 6.2 6.3   ALBUMIN 2.4* 2.3*   BILITOT 0.7 0.4   ALKPHOS 91 81   AST 16 16   ALT 18 15   ANIONGAP 10 9     PTINR:  No results for input(s): "INR" in the last 48 hours.    Significant Procedures:   Dobutamine Stress Test with Color Flow: No results found. However, due to the size of the patient record, not all encounters were searched. Please check Results Review for a complete set of results.    None      Assessment & Plan  Rheumatoid arthritis   rheumatoid arthritis on methotrexate -weekly . rheumatology eval. Holding mtx  - follow up OP to restart or adjust medications.   History of hepatitis C, s/p successful treatment w/ SVR12 - 7/2015  noted     PAD (peripheral artery disease)  continue ASA and cilastazol     Hypercholesteremia  continue simvastatin    AAA (abdominal aortic aneurysm)  CT CAP 2/25 - aneurysmal dilatation of the abdominal aorta measuring up to 4.5 cm AP with extension into the common iliac arteries, not significantly changed from prior study. Extensive vascular calcification noted.   3/12   CTCAP -Fusiform aneurysm of the infrarenal abdominal aorta, greatest diameter 5 cm previously 4.4 cm. Greatest diameter of the descending thoracic aorta 4.2 cm. follows with vascular surgery - Dr. Jensen   Stage 3b chronic kidney disease  Creatine stable for now. BMP reviewed- noted Estimated Creatinine Clearance: 32.3 mL/min (A) (based on SCr of 2.1 mg/dL (H)). according to latest data. Monitor UOP and serial BMP and adjust therapy as needed. Renally dose meds.    Recent Labs   Lab 03/12/25  0639 03/13/25  0444 03/14/25  0528   BUN 37* 39* 31*   CREATININE 2.5* 2.2* 2.1*       I & O     Intake/Output Summary (Last 24 hours) at 3/14/2025 1414  Last data filed at 3/14/2025 0550  Gross per 24 hour   Intake 360 ml   Output --   Net 360 ml        Ureteral tumor  history of urothelial carcinoma in remission.  s/p neoadjuvant " chemotherapy with Gemcitabine and Cisplatin. hem/onc consulted     Anemia  Anemia is likely due to chronic disease due to RA . Most recent hemoglobin and hematocrit are listed below.  Recent Labs     03/12/25  1408 03/13/25  0445 03/14/25  0528   HGB 6.8* 7.4* 8.3*   HCT 21.2* 23.4* 25.9*     Plan  - Monitor serial CBC: Daily  - Transfuse PRBC if patient becomes hemodynamically unstable, symptomatic or H/H drops below 7/21.  3/7 HB trended down to 6.9 . Transfusion with 1 unit of PRBC . Haptoglobin elevated.   3/8 Hb 7--> 6.8. Transfusion with 1 unit of PRBC   - hb stable, monitor and transfuse as needed    Hypertension  Patient's blood pressure range in the last 24 hours was: BP  Min: 90/49  Max: 111/70.The patient's inpatient anti-hypertensive regimen is listed below:  Current Antihypertensives       Plan  - BP is controlled, no changes needed to their regimen  - holding home amlodipine   Oral thrush  Thick whitish discharge on the tongue consistent with oral candidiasis .Workup is notable for new pancytopenia. Hb 7.9, leucopenia 1.55 and .  discussed with hematology, likely due to MTX however he has been on this medication for a long time.     started on dukes solution and fluconazole.   GI consulted for odynophagia   Rheumatology consulted due to recent methotrexation resumption    3/7  had been taking MTX daily incorrectly instead of weekly and  MTX toxicity given incorrect daily dosing and CKD. MTX levels < 0.04. No leucovorin for now per heme onc .  3/11 ID eval  thrush due to resistant candida species such as Candida glabrata and oral herpes simplex infection. fluconazole changed to micafungin for the possibility of a resistant candida species.  started  valacyclovir for possible oral herpes.  Started oral lidocaine for symptom management  - finishing micafungin on 3/15  - rheum evaluating again for other interventions    Odynophagia  secondary to above. continue dukes solution. GI consulted   - GI re  consulted. Plans for possible EGD on Monday. Will plan for NPO on Sunday night  Fever  3/8 low grade temperature of 100.4. reports cough. CX ray -Continued pleural fluid or thickening at the right base now with some fluid or thickening at the left base.  Some patchy parenchymal opacities though no confluent consolidation.  No pneumothorax.BC x2, UA  negative and procalcitonin   3.9 ID consulted for neutropenic fever . advanced to soft diet and started on boost.  ANC 1900. WBC improving. fever of 100.9F overnight. MRSA PCR negative   3/10  ID eval - continue renally dosed fluconazole for presumed oral and esophageal candidiasis x 14-21 days depending on clinical improvement. CT chest, abdomen, pelvis ordered  3/12 fever of 102F. BCx2 and echo orderd. pancultures. Platelets trended down to 84. CTCAP -Fusiform aneurysm of the infrarenal abdominal aorta, greatest diameter 5 cm previously 4.4 cm. Greatest diameter of the descending thoracic aorta 4.2 cm. Bilateral pleural effusions, stable compared to 02/10/2025. Status post left nephrectomy, no evidence of recurrence or distant metastases within the constraints of noncontrast study. Bilateral emphysema. GI follow up - recs Fluconazole to finish before EGD is considered        Neutropenic fever  3/8 low grade temperature of 100.5. CX ray -Continued pleural fluid or thickening at the right base now with some fluid or thickening at the left base.  Some patchy parenchymal opacities though no confluent consolidation.  No pneumothorax. COVID, Flu, RSV negative. BC x2, UA and procalcitonin  0.27 . ANC 1500. ID consulted for neutropenic fever .     Drug-induced pancytopenia    Recent Labs   Lab 03/14/25  0528   HGB 8.3*   WBC 8.03   *   3/10 WBC and ANC normalized. platelets trended down to 93. peripheral smear. Hematology f/u    HIT score is 2-3 indicating a low probability of HIT (<5%).  smear is clinically unremarkable - expected macrocytes hypersegmented neutrophils.  No hepatosplenomegal . thrombocytopenia is likely due to acute illness     improved    Oral ulceration  secondary to MTX toxicity. on folic acid. Maybe some slow improvement    Hypophosphatemia  Patient's most recent phosphorus results are listed below.   Recent Labs     03/12/25  0639 03/13/25  0444 03/14/25  0528   PHOS 2.8 2.7 2.3*     Plan  - oral replacement  VTE Risk Mitigation (From admission, onward)           Ordered     heparin (porcine) injection 5,000 Units  Every 12 hours         03/14/25 1121     IP VTE HIGH RISK PATIENT  Once         03/06/25 1633     Place sequential compression device  Until discontinued         03/06/25 1633                    Discharge Planning   NED: 3/15/2025     Code Status: Full Code   Medical Readiness for Discharge Date:   Discharge Plan A: Home with family              Osmar Ely DO  Department of Hospital Medicine   [facility] - Internal Medicine Telemetry

## 2025-03-14 NOTE — ASSESSMENT & PLAN NOTE
Patient's blood pressure range in the last 24 hours was: BP  Min: 90/49  Max: 111/70.The patient's inpatient anti-hypertensive regimen is listed below:  Current Antihypertensives       Plan  - BP is controlled, no changes needed to their regimen  - holding home amlodipine

## 2025-03-14 NOTE — CONSULTS
"[facility] - Internal Medicine Telemetry  Rheumatology  Consult Note    Patient Name: Andrea Gant  MRN: [id number]  Admission Date: 3/6/2025  Hospital Length of Stay: 7 days  Code Status: Full Code   Attending Provider: Osmar Ely DO  Primary Care Physician: Andrea Wang MD  Principal Problem:Oral thrush    Inpatient consult to Rheumatology  Consult performed by: Ysabel Cordero MD  Consult ordered by: Osmar Ely DO        Subjective:     HPI: Patient is a 69 yo M w/ a hx of RCC w/ ureteral tumor s/p chemotherapy (3/2023 - 6/2023), PVD, HLD, CKD, hep C (treated) and RA on MTX here for thrush.     Rheum History:  - Hx of RA on MTX monotherapy. Had been followed by Dr. Jimenez since 2012, transitioned to Dr. Case 10/2024  - Initially was doing well on HCQ and meloxicam. Has hx of nodular disease and arthritis  - Has been on and off MTX - restarted this in 2021 while on Humira to try and substitute and was successful  - Last visit they had reduced his MTX from 15mg weekly to 12.5, but had slowly increased back up to 8 tablets per week     Prior Treatments:  - HCQ  - Humira ([id number])     Current Treatments:  - MTX 12.5mg weekly + folic acid    Current Hospitalization:  - Pt had presented to [facility] 3/6 for painful white lesions in his mouth for 10 days. He had been to  2/26 and was given prednisone 20mg x5 days for viral pharyngitis  - Was found to have new pancytopenia w/ a WBC of 1.8 (baseline ~10), hgb ~7 (baseline 12), plt 140 ()    Initial evaluation -     "Pt being seen for increasing oral/throat pain to the point of him being unable to eat/drink. He has had issues with thrush per him since he was given amoxicillin for a cystoscopy procedure (appears to be back in 8/2024). He has tried numerous thrush treatments and does not believe any of them have been effective. He also has a lot of oral ulcers.    Pt is on MTX for RA. He has been for a long time. However, the last month or " "so, he has been taking a tablet of MTX (2.5mg) every day rather than 8 tablets once a week. He stated he had been doing this for >1 year, per his wife it has only been the last month or so and he had been taking the MTX correctly before. He had continued to take daily folic acid. He feels his arthritis is fairly well controlled and the nodules may be improved after lowering the MTX dose. He still has multiple nodules on his arms/elbows/fingers.    He denies any illness prior to the low blood counts. He has not had low blood counts before. He had chemotherapy back in 2023 and a L nephrectomy but otherwise no radiation therapy.     He is still working and was able to work throughout chemotherapy. He denies any substance use." From initial consult 3/7    3/14 he feels that his lesions are no better. He is still getting fevers but he does not feel them. He feels that his joints are fine and don't cause him any trouble. He is getting frustrated that he is still here and that the swishes and anti-fungals have not made much improvement.    No hx of fever syndromes. No hx of eye symptoms. No rashes. No nerve symptoms. No hair loss/other lupus symptoms. Has CKD - has been referred to nephrology in 2023 and 2024 but refused both times. No known etiology of kidney disease, possibly related to RCC from 2023.     Past Medical History:   Diagnosis Date    Anemia     Cancer     Cataract     Chemotherapy induced neutropenia 04/11/2023    Colon polyp 2014    Depression     Disorder of kidney and ureter     History of hepatitis C, s/p successful treatment w/ SVR12 - 7/2015 10/14/2012    Kelsey 1a,  Liver biopsy 6/2014 - Stage 1 fibrosis;  Completed 8 weeks Harvoni w/ SVR12 - 7/2015      Hyperlipidemia     Hypertension 03/08/2024    Lipoma of arm     Lipoma of lower extremity     Nuclear sclerosis - Both Eyes 10/22/2012    Other and unspecified hyperlipidemia 10/22/2013    PAD (peripheral artery disease)     Peripheral vascular disease, " unspecified     Plaquenil adverse reaction in therapeutic use 10/22/2012    Rheumatoid arthritis(714.0) 10/14/2012    Special screening for malignant neoplasms, colon 02/21/2014       Past Surgical History:   Procedure Laterality Date    BIOPSY OF URETER Left 02/16/2023    Procedure: BIOPSY, URETER/BRUSH;  Surgeon: Kem Jefferson MD;  Location: [facility] OR 1ST FLR;  Service: Urology;  Laterality: Left;    COLONOSCOPY N/A 11/29/2021    Procedure: COLONOSCOPY;  Surgeon: Kenrick Zimmer MD;  Location: [facility] ENDO (4TH FLR);  Service: Endoscopy;  Laterality: N/A;  blood thinner approval received, see telephone encounter 10/19/21-BB  fully vacc-inst email-tb    CYSTOSCOPY      CYSTOSCOPY  02/16/2023    Procedure: CYSTOSCOPY;  Surgeon: Kem Jefferson MD;  Location: [facility] OR 1ST FLR;  Service: Urology;;    HERNIA REPAIR      INSERTION OF TUNNELED CENTRAL VENOUS CATHETER (CVC) WITH SUBCUTANEOUS PORT Right 3/13/2023    Procedure: INSERTION, PORT-A-CATH;  Surgeon: Rene Cali MD;  Location: [facility] CATH LAB;  Service: Radiology;  Laterality: Right;    KNEE ARTHROPLASTY      LAPAROSCOPIC EXPLORATION OF GROIN Left 09/06/2018    Procedure: EXPLORATION, INGUINAL REGION, LAPAROSCOPIC;  Surgeon: Mingo De Guzman Jr., MD;  Location: [facility] OR 2ND FLR;  Service: General;  Laterality: Left;    MEDIPORT REMOVAL N/A 10/3/2023    Procedure: REMOVAL, CATHETER, CENTRAL VENOUS, TUNNELED, WITH PORT;  Surgeon: Yeimi Stanton MD;  Location: [facility] CATH LAB;  Service: Radiology;  Laterality: N/A;    PYELOSCOPY Left 02/16/2023    Procedure: PYELOSCOPY;  Surgeon: Kem Jefferson MD;  Location: [facility] OR 1ST FLR;  Service: Urology;  Laterality: Left;    RETROGRADE PYELOGRAPHY Bilateral 02/16/2023    Procedure: PYELOGRAM, RETROGRADE;  Surgeon: Kem Jefferson MD;  Location: [facility] OR 1ST FLR;  Service: Urology;  Laterality: Bilateral;    ROBOT-ASSISTED LAPAROSCOPIC SURGICAL REMOVAL OF KIDNEY AND URETER USING DA BRIJESH XI Left 7/14/2023     Procedure: XI ROBOTIC NEPHROURETERECTOMY;  Surgeon: Kem Jefferson MD;  Location: [facility] OR 2ND FLR;  Service: Urology;  Laterality: Left;  5 hours    TONSILLECTOMY      URETEROSCOPIC REMOVAL OF URETERIC CALCULUS Left 02/16/2023    Procedure: REMOVAL, CALCULUS, URETER, URETEROSCOPIC;  Surgeon: Kem Jefferson MD;  Location: [facility] OR 1ST FLR;  Service: Urology;  Laterality: Left;    URETEROSCOPY Left 02/16/2023    Procedure: URETEROSCOPY;  Surgeon: Kem Jefferson MD;  [facility];  Service: Urology;  Laterality: Left;       Immunization History   Administered Date(s) Administered    COVID-19 MRNA, LN-S PF (MODERNA HALF 0.25 ML DOSE) 10/26/2021, 06/09/2022    COVID-19, MRNA, LN-S, PF (MODERNA FULL 0.5 ML DOSE) 02/18/2021, 03/18/2021    COVID-19, mRNA, LNP-S, PF (Moderna) Ages 12+ 10/25/2023    COVID-19, mRNA, LNP-S, PF, malia-sucrose, 30 mcg/0.3 mL (Pfizer Ages 12+) 09/08/2024    COVID-19, mRNA, LNP-S, bivalent booster, PF (Moderna Omicron)12 + YEARS 10/11/2022    Hepatitis A / Hepatitis B 12/02/2020, 04/07/2021, 10/11/2021    Influenza 01/27/2014, 10/10/2018, 09/26/2019    Influenza (FLUAD) - Quadrivalent - Adjuvanted - PF *Preferred* (65+) 09/27/2023    Influenza - Quadrivalent - High Dose - PF (65 years and older) 08/18/2021, 10/11/2022    Influenza - Quadrivalent - MDCK - PF 10/16/2017, 10/22/2018    Influenza - Quadrivalent - PF *Preferred* (6 months and older) 11/18/2015, 10/15/2016    Influenza - Trivalent - Fluzone High Dose - PF (65 years and older) 09/25/2019, 09/08/2024    Influenza Split 01/27/2014, 01/27/2014    Pneumococcal Conjugate - 13 Valent 10/14/2020    Pneumococcal Polysaccharide - 23 Valent 01/27/2014    Tdap 01/27/2014    Zoster 03/12/2014    Zoster Recombinant 10/14/2020, 01/06/2021       Review of patient's allergies indicates:   Allergen Reactions    Iodinated contrast media      Shortness of breath     Current Facility-Administered Medications   Medication Frequency     0.9%  NaCl infusion (for blood administration) Q24H PRN    dextrose 5 % in lactated ringers infusion Continuous    dextrose 50% injection 12.5 g PRN    dextrose 50% injection 25 g PRN    duke's soln (benadryl 30 mL, mylanta 30 mL, LIDOcaine 30 mL, nystatin 30 mL) 120 mL QID    enoxaparin injection 40 mg Daily    fluconazole tablet 100 mg Daily    folic acid tablet 1,000 mcg Daily    glucagon (human recombinant) injection 1 mg PRN    glucose chewable tablet 16 g PRN    glucose chewable tablet 24 g PRN    naloxone 0.4 mg/mL injection 0.02 mg PRN    oxyCODONE immediate release tablet 5 mg Q6H PRN    polyethylene glycol packet 17 g Daily PRN    pravastatin tablet 20 mg Daily    sodium chloride 0.9% flush 10 mL Q12H PRN    tamsulosin 24 hr capsule 0.4 mg Daily    traZODone tablet 50 mg Nightly PRN     Current Outpatient Medications   Medication    amLODIPine (NORVASC) 5 MG tablet    aspirin 81 MG Chew    cilostazoL (PLETAL) 50 MG Tab    gabapentin (NEURONTIN) 100 MG capsule    methotrexate 2.5 MG Tab    nystatin (MYCOSTATIN) 100,000 unit/mL suspension    simvastatin (ZOCOR) 10 MG tablet    tamsulosin (FLOMAX) 0.4 mg Cap    acetaminophen (TYLENOL) 650 MG TbSR    bisacodyL (DULCOLAX) 5 mg EC tablet    docusate sodium (COLACE) 100 MG capsule    folic acid (FOLVITE) 1 MG tablet    levocetirizine (XYZAL) 5 MG tablet    multivitamin capsule    traZODone (DESYREL) 50 MG tablet     Family History       Problem Relation (Age of Onset)    Arthritis Sister    Dementia Mother, Father    Heart disease Sister, Paternal Uncle    Kidney disease Sister    No Known Problems Daughter    Seizures Sister          Tobacco Use    Smoking status: Former     Current packs/day: 0.00     Average packs/day: 0.5 packs/day for 47.5 years (23.7 ttl pk-yrs)     Types: Cigarettes     Start date:      Quit date: 2020     Years since quittin.6    Smokeless tobacco: Never   Substance and Sexual Activity    Alcohol use: Yes     Comment: 2  drinks per week    Drug use: Yes     Types: Marijuana     Comment: ann used on Saturday    Sexual activity: Yes     Partners: Female     Review of Systems   Constitutional:  Negative for fatigue, fever and unexpected weight change.   HENT:  Negative for facial swelling.    Eyes:  Negative for visual disturbance.   Respiratory:  Negative for cough and shortness of breath.    Cardiovascular:  Negative for chest pain.   Gastrointestinal:  Positive for diarrhea (Only with candida medicine). Negative for constipation.   Genitourinary:  Negative for dysuria.   Skin:  Negative for rash.   Neurological:  Negative for weakness and headaches.   Hematological:  Negative for adenopathy.   Psychiatric/Behavioral:  Negative for behavioral problems.      Objective:     Vital Signs (Most Recent):  Temp: 98.8 °F (37.1 °C) (03/07/25 1211)  Pulse: 77 (03/07/25 1211)  Resp: 18 (03/07/25 1211)  BP: 131/77 (03/07/25 1211)  SpO2: 100 % (03/07/25 1211) Vital Signs (24h Range):  Temp:  [97.6 °F (36.4 °C)-99.9 °F (37.7 °C)] 98.8 °F (37.1 °C)  Pulse:  [75-95] 77  Resp:  [16-20] 18  SpO2:  [96 %-100 %] 100 %  BP: (102-131)/(6-84) 131/77     Weight: 72.6 kg (160 lb) (03/06/25 1140)  Body mass index is 23.63 kg/m².  Body surface area is 1.88 meters squared.      Intake/Output Summary (Last 24 hours) at 3/7/2025 1454  Last data filed at 3/6/2025 2329  Gross per 24 hour   Intake 120 ml   Output --   Net 120 ml         Physical Exam   Constitutional: He is oriented to person, place, and time. No distress.   HENT:   Head: Normocephalic and atraumatic.   Mouth/Throat: Does have thrush as well as numerous ulcers particularly at the gum line.   Cardiovascular: Normal rate, regular rhythm and normal heart sounds.   Pulmonary/Chest: Effort normal and breath sounds normal. No respiratory distress.   Abdominal: Soft.   Musculoskeletal:         General: Swelling (Does have synovitis of the MCP/PIP joints, minimal tenderness) present. No tenderness.  Normal range of motion.      Comments: Multiple rheumatoid nodules noted at elbows, forearms, fingers   Neurological: He is alert and oriented to person, place, and time.   Skin: Skin is warm and dry. No rash noted.   Psychiatric: His behavior is normal. Judgment and thought content normal.        Significant Labs:  All pertinent lab results from the last 24 hours have been reviewed.    Significant Imaging:  Imaging results within the past 24 hours have been reviewed.  Assessment/Plan:     ENT  Oral ulceration  Home regimen: MTX 20mg qweek   Resulted workup:   Labs              Pancytopenia - WBC of 1.8 (baseline ~10), hgb ~7 (baseline 12), plt 140 () --> WBC 8, Hgb stable, plt 430              HIV negative              Folate high              Strep/flu negative              Ferritin high, MARIA DE JESUS labs nl/high  Pending workup:               ESR/CRP  Current treatments:  - Valacyclovir  - Sucralfate     Assessment  Pt presented with new pancytopenia. Had been taking his MTX incorrectly (one tablet daily rather than multiple once a week). It sounds like he was previously taking the tablets every Monday. Thought to be most likely acute toxicity from the methotrexate. Blood counts have recovered, however his oral ulcers still persist. Does not seem to have any other symptoms that could be consistent with Bechet's or lupus. Has been having fevers consistently despite appropriate oral ulcer treatment per ID. EMI negative x2 in the past. He has a baseline creatinine of ~2, hx of RCC tx with chemotherapy (2023, Gemcitabine, cisplatin) but otherwise unclear etiology. Stable, but does have some protein on UA - will quantify. Bechet's unlikely with lack of fever hx or other symptoms, but would be confirmed/ruled out with biopsy.     RA fairly well controlled -CDAI 6 (0 tender joints, 4 swollen joints - MCPs and R elbow).     Recommendations:  - continue to hold MTX  - Recommend GI re-consult for consideration of IP  endoscopy/biopsy  - ESR/CRP  - Repeat EMI  - UPCR (mild hematuria and proteinuria on UA)  - continue folic acid to 4mg daily  - Will start leucovorin 15mg q6h x10 doses  - Will arrange follow up OP for resumption/change of RA meds        Thank you for your consult. I will follow-up with patient. Please contact us if you have any additional questions.    Ysabel Cordero MD  Rheumatology  [facility] - Internal Medicine Telemetry     Latest Reference Range & Units 03/14/25 05:28   WBC 3.90 - 12.70 K/uL 8.03   RBC 4.60 - 6.20 M/uL 2.93 (L)   Hemoglobin 14.0 - 18.0 g/dL 8.3 (L)   Hematocrit 40.0 - 54.0 % 25.9 (L)   MCV 82 - 98 fL 88   MCH 27.0 - 31.0 pg 28.3   MCHC 32.0 - 36.0 g/dL 32.0   RDW 11.5 - 14.5 % 15.6 (H)   Platelet Count 150 - 450 K/uL 490 (H)   MPV 9.2 - 12.9 fL 9.6   Gran % 38.0 - 73.0 % 65.6   Lymph % 18.0 - 48.0 % 10.4 (L)   Mono % 4.0 - 15.0 % 21.8 (H)   Eos % 0.0 - 8.0 % 1.1   Basophil % 0.0 - 1.9 % 0.4   Immature Granulocytes 0.0 - 0.5 % 0.7 (H)   Gran # (ANC) 1.8 - 7.7 K/uL 4.7   Lymph # 1.0 - 4.8 K/uL 0.7 (L)   Mono # 0.3 - 1.0 K/uL 1.6 (H)   Eos # 0.0 - 0.5 K/uL 0.1   Baso # 0.00 - 0.20 K/uL 0.03   Immature Grans (Abs) 0.00 - 0.04 K/uL 0.05 (H)   nRBC 0 /100 WBC 0   Differential Method  Automated   (L): Data is abnormally low  (H): Data is abnormally high   Latest Reference Range & Units 03/06/25 15:14 03/07/25 16:51   Iron 45 - 160 ug/dL  90   TIBC 250 - 450 ug/dL  255   Saturated Iron 20 - 50 %  35   Transferrin 200 - 375 mg/dL  172 (L)   Ferritin 20.0 - 300.0 ng/mL  1,474 (H)   Folate 4.0 - 24.0 ng/mL >40.0 (H)    Vitamin B12 210 - 950 pg/mL 614    (L): Data is abnormally low  (H): Data is abnormally high    RA RF+ ACPA+  TJ  0  SJ 4  Pt global 0  ESR     CRP    MILLER 28    [id number]  CDAI 7(LDA)  Pancytopenia on admission, now resolved except for Hb 8.3 which is improving due to daily dosing of methotrexate and CKD 3b  Severe stomatitis    No more methotrexate  Pre-DMARD labs   Leucovorin 15mg po 6h  x 10 days in view of severe stomatitis, losing weight d/t difficulty eating  Discuss resuming anti-TNF post discharge, likely Simponi Aria IV as could not afford Humira  Dr. Jacobs will consent, ordered in Therapy Plans  F/u Dr. Case post discharge     Milad Guerrero MD

## 2025-03-15 VITALS
SYSTOLIC BLOOD PRESSURE: 97 MMHG | DIASTOLIC BLOOD PRESSURE: 60 MMHG | WEIGHT: 160 LBS | HEART RATE: 75 BPM | OXYGEN SATURATION: 99 % | RESPIRATION RATE: 16 BRPM | HEIGHT: 69 IN | BODY MASS INDEX: 23.7 KG/M2 | TEMPERATURE: 99 F

## 2025-03-15 PROBLEM — D61.811 DRUG-INDUCED PANCYTOPENIA: Status: RESOLVED | Noted: 2025-03-10 | Resolved: 2025-03-15

## 2025-03-15 PROBLEM — D70.9 NEUTROPENIC FEVER: Status: RESOLVED | Noted: 2025-03-08 | Resolved: 2025-03-15

## 2025-03-15 PROBLEM — R50.81 NEUTROPENIC FEVER: Status: RESOLVED | Noted: 2025-03-08 | Resolved: 2025-03-15

## 2025-03-15 PROBLEM — E83.39 HYPOPHOSPHATEMIA: Status: RESOLVED | Noted: 2025-03-14 | Resolved: 2025-03-15

## 2025-03-15 LAB
ALBUMIN SERPL BCP-MCNC: 2.2 G/DL (ref 3.5–5.2)
ALP SERPL-CCNC: 76 U/L (ref 40–150)
ALT SERPL W/O P-5'-P-CCNC: 13 U/L (ref 10–44)
ANION GAP SERPL CALC-SCNC: 9 MMOL/L (ref 8–16)
AST SERPL-CCNC: 14 U/L (ref 10–40)
BASOPHILS # BLD AUTO: 0.03 K/UL (ref 0–0.2)
BASOPHILS NFR BLD: 0.5 % (ref 0–1.9)
BILIRUB SERPL-MCNC: 0.4 MG/DL (ref 0.1–1)
BUN SERPL-MCNC: 29 MG/DL (ref 8–23)
CALCIUM SERPL-MCNC: 8.5 MG/DL (ref 8.7–10.5)
CHLORIDE SERPL-SCNC: 109 MMOL/L (ref 95–110)
CO2 SERPL-SCNC: 18 MMOL/L (ref 23–29)
CREAT SERPL-MCNC: 2 MG/DL (ref 0.5–1.4)
CRP SERPL-MCNC: 168.3 MG/L (ref 0–8.2)
DIFFERENTIAL METHOD BLD: ABNORMAL
EOSINOPHIL # BLD AUTO: 0.1 K/UL (ref 0–0.5)
EOSINOPHIL NFR BLD: 1.6 % (ref 0–8)
ERYTHROCYTE [DISTWIDTH] IN BLOOD BY AUTOMATED COUNT: 15.6 % (ref 11.5–14.5)
ERYTHROCYTE [SEDIMENTATION RATE] IN BLOOD BY PHOTOMETRIC METHOD: 67 MM/HR (ref 0–23)
EST. GFR  (NO RACE VARIABLE): 35 ML/MIN/1.73 M^2
GLUCOSE SERPL-MCNC: 92 MG/DL (ref 70–110)
HAV IGG SER QL IA: REACTIVE
HBV CORE AB SERPL QL IA: NORMAL
HBV SURFACE AB SER-ACNC: 12.5 MIU/ML
HBV SURFACE AB SER-ACNC: REACTIVE M[IU]/ML
HBV SURFACE AG SERPL QL IA: NORMAL
HCT VFR BLD AUTO: 24.6 % (ref 40–54)
HCV AB SERPL QL IA: REACTIVE
HGB BLD-MCNC: 7.6 G/DL (ref 14–18)
HIV 1+2 AB+HIV1 P24 AG SERPL QL IA: NORMAL
IMM GRANULOCYTES # BLD AUTO: 0.05 K/UL (ref 0–0.04)
IMM GRANULOCYTES NFR BLD AUTO: 0.8 % (ref 0–0.5)
LYMPHOCYTES # BLD AUTO: 1 K/UL (ref 1–4.8)
LYMPHOCYTES NFR BLD: 15.1 % (ref 18–48)
MAGNESIUM SERPL-MCNC: 2.1 MG/DL (ref 1.6–2.6)
MCH RBC QN AUTO: 28.4 PG (ref 27–31)
MCHC RBC AUTO-ENTMCNC: 30.9 G/DL (ref 32–36)
MCV RBC AUTO: 92 FL (ref 82–98)
MONOCYTES # BLD AUTO: 1.8 K/UL (ref 0.3–1)
MONOCYTES NFR BLD: 29 % (ref 4–15)
NEUTROPHILS # BLD AUTO: 3.4 K/UL (ref 1.8–7.7)
NEUTROPHILS NFR BLD: 53 % (ref 38–73)
NRBC BLD-RTO: 0 /100 WBC
PHOSPHATE SERPL-MCNC: 2.7 MG/DL (ref 2.7–4.5)
PLATELET # BLD AUTO: 460 K/UL (ref 150–450)
PMV BLD AUTO: 11.1 FL (ref 9.2–12.9)
POTASSIUM SERPL-SCNC: 4.6 MMOL/L (ref 3.5–5.1)
PROT SERPL-MCNC: 6.3 G/DL (ref 6–8.4)
RBC # BLD AUTO: 2.68 M/UL (ref 4.6–6.2)
SODIUM SERPL-SCNC: 136 MMOL/L (ref 136–145)
TREPONEMA PALLIDUM IGG+IGM AB [PRESENCE] IN SERUM OR PLASMA BY IMMUNOASSAY: NONREACTIVE
WBC # BLD AUTO: 6.35 K/UL (ref 3.9–12.7)

## 2025-03-15 PROCEDURE — 63600175 PHARM REV CODE 636 W HCPCS

## 2025-03-15 PROCEDURE — 86787 VARICELLA-ZOSTER ANTIBODY: CPT | Performed by: STUDENT IN AN ORGANIZED HEALTH CARE EDUCATION/TRAINING PROGRAM

## 2025-03-15 PROCEDURE — 84100 ASSAY OF PHOSPHORUS: CPT

## 2025-03-15 PROCEDURE — 87389 HIV-1 AG W/HIV-1&-2 AB AG IA: CPT | Performed by: STUDENT IN AN ORGANIZED HEALTH CARE EDUCATION/TRAINING PROGRAM

## 2025-03-15 PROCEDURE — 86140 C-REACTIVE PROTEIN: CPT | Performed by: STUDENT IN AN ORGANIZED HEALTH CARE EDUCATION/TRAINING PROGRAM

## 2025-03-15 PROCEDURE — 86593 SYPHILIS TEST NON-TREP QUANT: CPT | Performed by: STUDENT IN AN ORGANIZED HEALTH CARE EDUCATION/TRAINING PROGRAM

## 2025-03-15 PROCEDURE — 86706 HEP B SURFACE ANTIBODY: CPT | Performed by: STUDENT IN AN ORGANIZED HEALTH CARE EDUCATION/TRAINING PROGRAM

## 2025-03-15 PROCEDURE — 86038 ANTINUCLEAR ANTIBODIES: CPT | Performed by: STUDENT IN AN ORGANIZED HEALTH CARE EDUCATION/TRAINING PROGRAM

## 2025-03-15 PROCEDURE — 25000003 PHARM REV CODE 250: Performed by: HOSPITALIST

## 2025-03-15 PROCEDURE — 25000003 PHARM REV CODE 250

## 2025-03-15 PROCEDURE — 25000003 PHARM REV CODE 250: Performed by: INTERNAL MEDICINE

## 2025-03-15 PROCEDURE — 36415 COLL VENOUS BLD VENIPUNCTURE: CPT | Performed by: STUDENT IN AN ORGANIZED HEALTH CARE EDUCATION/TRAINING PROGRAM

## 2025-03-15 PROCEDURE — 25000003 PHARM REV CODE 250: Performed by: STUDENT IN AN ORGANIZED HEALTH CARE EDUCATION/TRAINING PROGRAM

## 2025-03-15 PROCEDURE — 85025 COMPLETE CBC W/AUTO DIFF WBC: CPT

## 2025-03-15 PROCEDURE — 86790 VIRUS ANTIBODY NOS: CPT | Performed by: STUDENT IN AN ORGANIZED HEALTH CARE EDUCATION/TRAINING PROGRAM

## 2025-03-15 PROCEDURE — 83735 ASSAY OF MAGNESIUM: CPT

## 2025-03-15 PROCEDURE — 86803 HEPATITIS C AB TEST: CPT | Performed by: STUDENT IN AN ORGANIZED HEALTH CARE EDUCATION/TRAINING PROGRAM

## 2025-03-15 PROCEDURE — 86682 HELMINTH ANTIBODY: CPT | Performed by: STUDENT IN AN ORGANIZED HEALTH CARE EDUCATION/TRAINING PROGRAM

## 2025-03-15 PROCEDURE — 80053 COMPREHEN METABOLIC PANEL: CPT

## 2025-03-15 PROCEDURE — 86704 HEP B CORE ANTIBODY TOTAL: CPT | Performed by: STUDENT IN AN ORGANIZED HEALTH CARE EDUCATION/TRAINING PROGRAM

## 2025-03-15 PROCEDURE — 85652 RBC SED RATE AUTOMATED: CPT | Performed by: STUDENT IN AN ORGANIZED HEALTH CARE EDUCATION/TRAINING PROGRAM

## 2025-03-15 PROCEDURE — 63600175 PHARM REV CODE 636 W HCPCS: Mod: JZ,TB | Performed by: HOSPITALIST

## 2025-03-15 PROCEDURE — 87340 HEPATITIS B SURFACE AG IA: CPT | Performed by: STUDENT IN AN ORGANIZED HEALTH CARE EDUCATION/TRAINING PROGRAM

## 2025-03-15 RX ORDER — LIDOCAINE HYDROCHLORIDE 20 MG/ML
10 SOLUTION OROPHARYNGEAL EVERY 6 HOURS
Qty: 120 ML | Refills: 3 | Status: SHIPPED | OUTPATIENT
Start: 2025-03-15

## 2025-03-15 RX ORDER — VALACYCLOVIR HYDROCHLORIDE 1 G/1
1000 TABLET, FILM COATED ORAL 2 TIMES DAILY
Qty: 60 TABLET | Refills: 11 | Status: SHIPPED | OUTPATIENT
Start: 2025-03-15 | End: 2026-03-15

## 2025-03-15 RX ORDER — LEUCOVORIN CALCIUM 15 MG/1
15 TABLET ORAL EVERY 6 HOURS
Qty: 8 TABLET | Refills: 0 | Status: SHIPPED | OUTPATIENT
Start: 2025-03-15 | End: 2025-03-19

## 2025-03-15 RX ORDER — FOLIC ACID 1 MG/1
4 TABLET ORAL DAILY
Qty: 120 TABLET | Refills: 0 | Status: SHIPPED | OUTPATIENT
Start: 2025-03-16 | End: 2026-03-16

## 2025-03-15 RX ORDER — OXYCODONE HYDROCHLORIDE 5 MG/1
5 TABLET ORAL EVERY 4 HOURS PRN
Qty: 42 TABLET | Refills: 0 | Status: SHIPPED | OUTPATIENT
Start: 2025-03-15

## 2025-03-15 RX ADMIN — LIDOCAINE 1 PATCH: 50 PATCH CUTANEOUS at 11:03

## 2025-03-15 RX ADMIN — LIDOCAINE HYDROCHLORIDE 10 ML: 20 SOLUTION ORAL at 12:03

## 2025-03-15 RX ADMIN — MICAFUNGIN SODIUM 100 MG: 100 INJECTION, POWDER, LYOPHILIZED, FOR SOLUTION INTRAVENOUS at 12:03

## 2025-03-15 RX ADMIN — TAMSULOSIN HYDROCHLORIDE 0.4 MG: 0.4 CAPSULE ORAL at 10:03

## 2025-03-15 RX ADMIN — HYDROMORPHONE HYDROCHLORIDE 0.2 MG: 1 INJECTION, SOLUTION INTRAMUSCULAR; INTRAVENOUS; SUBCUTANEOUS at 11:03

## 2025-03-15 RX ADMIN — LEUCOVORIN CALCIUM 15 MG: 5 TABLET ORAL at 12:03

## 2025-03-15 RX ADMIN — SUCRALFATE 1 G: 1 SUSPENSION ORAL at 11:03

## 2025-03-15 RX ADMIN — LEUCOVORIN CALCIUM 15 MG: 5 TABLET ORAL at 05:03

## 2025-03-15 RX ADMIN — SUCRALFATE 1 G: 1 SUSPENSION ORAL at 05:03

## 2025-03-15 RX ADMIN — LIDOCAINE HYDROCHLORIDE 10 ML: 20 SOLUTION ORAL at 05:03

## 2025-03-15 RX ADMIN — FOLIC ACID 4 MG: 1 TABLET ORAL at 10:03

## 2025-03-15 RX ADMIN — VALACYCLOVIR HYDROCHLORIDE 1000 MG: 500 TABLET, FILM COATED ORAL at 10:03

## 2025-03-15 RX ADMIN — PRAVASTATIN SODIUM 20 MG: 20 TABLET ORAL at 10:03

## 2025-03-15 RX ADMIN — OXYCODONE 5 MG: 5 TABLET ORAL at 10:03

## 2025-03-15 RX ADMIN — ALUMINUM HYDROXIDE, MAGNESIUM HYDROXIDE, AND DIMETHICONE 10 ML: 400; 400; 40 SUSPENSION ORAL at 10:03

## 2025-03-15 RX ADMIN — HEPARIN SODIUM 5000 UNITS: 5000 INJECTION INTRAVENOUS; SUBCUTANEOUS at 10:03

## 2025-03-15 RX ADMIN — ALUMINUM HYDROXIDE, MAGNESIUM HYDROXIDE, AND DIMETHICONE 10 ML: 400; 400; 40 SUSPENSION ORAL at 12:03

## 2025-03-15 NOTE — ASSESSMENT & PLAN NOTE
Anemia is likely due to chronic disease due to RA. Most recent hemoglobin and hematocrit are listed below.  Recent Labs     03/13/25  0445 03/14/25  0528 03/15/25  0347   HGB 7.4* 8.3* 7.6*   HCT 23.4* 25.9* 24.6*     Plan  - Monitor serial CBC: Daily  - Transfuse PRBC if patient becomes hemodynamically unstable, symptomatic or H/H drops below 7/21.  3/7 HB trended down to 6.9 . Transfusion with 1 unit of PRBC . Haptoglobin elevated.   3/8 Hb 7--> 6.8. Transfusion with 1 unit of PRBC   - hb stable, monitor and transfuse as needed

## 2025-03-15 NOTE — DISCHARGE INSTRUCTIONS
Our goal at [facility] is to always give you outstanding care and exceptional service. You may receive a survey from [employer] by mail, text or e-mail in the next 24-48 hours asking about the care you received with us. The survey should only take 5-10 minutes to complete and is very important to us.     Your feedback provides us with a way to recognize our staff who work tirelessly to provide the best care! Also, your responses help us learn how to improve when your experience was below our aspiration of excellence. We are always looking for ways to improve your stay. We WILL use your feedback to continue making improvements to help us provide the highest quality care. We keep your personal information and feedback confidential. We appreciate your time completing this survey and can't wait to hear from you!!!    We look forward to your continued care with us! Thanks so much for choosing [facility] for your healthcare needs!

## 2025-03-15 NOTE — ASSESSMENT & PLAN NOTE
Creatine stable for now. BMP reviewed- noted Estimated Creatinine Clearance: 33.9 mL/min (A) (based on SCr of 2 mg/dL (H)). according to latest data. Monitor UOP and serial BMP and adjust therapy as needed. Renally dose meds.    Recent Labs   Lab 03/13/25  0444 03/14/25  0528 03/15/25  0347   BUN 39* 31* 29*   CREATININE 2.2* 2.1* 2.0*       I & O     Intake/Output Summary (Last 24 hours) at 3/15/2025 1607  Last data filed at 3/15/2025 1324  Gross per 24 hour   Intake 100.62 ml   Output --   Net 100.62 ml

## 2025-03-15 NOTE — DISCHARGE SUMMARY
[facility]endrick Sutton - Internal Medicine [facility] Medicine  Discharge Summary      Patient Name: Andrea Gant  MRN: [id number]  TONY: [id number]  Patient Class: IP- Inpatient  Admission Date: 3/6/2025  Hospital Length of Stay: 8 days  Discharge Date and Time:  03/15/2025 4:07 PM  Attending Physician: No att. providers found   Discharging Provider: Osmar Ely DO  Primary Care Provider: Andrea Wang MD  Hospital Medicine Team: [facility] HOSP MED A Osmar Ely DO  Primary Care Team: [facility] MED A    HPI:   Andrea Winter is a 70-year-old male with rheumatoid arthritis on methotrexate, history of urothelial carcinoma in remission, anemia, hepatitis-C s/p SVR, PAD presents for thrush.       AAO x3. Patient was prescribed augmentin 2/7 -2/21 prior to the onset of his symptoms post cystoscopy. c/o pain with  swallowing after 4-5 days after augmentin.  denies fever, drooling or prior similar episodes. denies history of diabetes or HIV.   Reports painful whitish lesions in his mouth x  10 days. Patient was seen at urgent care clinic 02/26/2025 with congestion, sore throat and trouble swallowing.  Flu swab negative on 2/26.   prescribed prednisone 20mg x 5 days for viral pharyngitis, states that this significantly improved his sore throat and body aches but not the thrush.     ER course - Thick whitish discharge on the tongue consistent with oral candidiasis . Workup is notable for new pancytopenia. Hb 7.9, leucopenia 1.55 and .  discussed with hematology, likely due to MTX however he has been on this medication for a long time. admitted for further work up     * No surgery found *      Hospital Course:   3/7 HB trended down to 6.9 . Transfusion with 1 unit of PRBC . Haptoglobin elevated.  P replaced. . Neutropenic precautions. had been taking MTX daily incorrectly instead of weekly and  MTX toxicity given incorrect daily dosing and CKD. MTX levels < 0.04. No leucovorin for now per heme onc  .  parvo serologies, Reticulocytes, Iron panel, Hep C PCR ordered   3/8 low grade temperature of 100.5. CX ray -Continued pleural fluid or thickening at the right base now with some fluid or thickening at the left base.  Some patchy parenchymal opacities though no confluent consolidation.  No pneumothorax. COVID, Flu, RSV negative. BC x2, UA and procalcitonin  0.27 . UA negative. ANC 1500. folic acid increased to 4mg daily.  ID consulted for neutropenic fever . advanced to soft diet and started on boost   3/9 ANC 1900. WBC improving. fever of 100.9F overnight. MRSA PCR negative . P replaced. requests full liquid diet.   3/10 WBC and ANC normalized. platelets trended down to 93. Low grade temp of 100.6F this AM. ID eval - continue renally dosed fluconazole for presumed oral and esophageal candidiasis x 14-21 days depending on clinical improvement. CT chest, abdomen, pelvis ordered.SBP 90s improved with NS bolus 250ml x1.   3/11 fever of 102F. BCx2 and echo orderd. pancultures. Platelets trended down to 84. CTCAP -Fusiform aneurysm of the infrarenal abdominal aorta, greatest diameter 5 cm previously 4.4 cm. Greatest diameter of the descending thoracic aorta 4.2 cm. Bilateral pleural effusions, stable compared to 02/10/2025. Status post left nephrectomy, no evidence of recurrence or distant metastases within the constraints of noncontrast study. Bilateral emphysema. ID, Hematology  and GI follow up - recs Fluconazole to finish before EGD is considered    3/13 persistent fever 101.7F. UA, RIP and BC x2 NGTD . Hb 7--> 6.8. Transfusion with 1 unit of PRBC      H/H stable today.       Had discussion with patient and wife (on the phone). Went over hospital course and current treatment plan. Patient very anxious to get home, given his long hospital stay and ongoing treatment. Plan is to complete IV micafungin today. Continue with oral valcyclovir and leucovorin at home. Defer any GI intervention to the outpatient setting.  Patients labs have improved and outside the oral ulcerations he is feeling better.     Plans for PCP follow up in the next week. Will need rheumatology follow up, GI follow up, nephrology follow up.    Patient discharged in stable conditions, had discussion about risks and benefits from going home and outpatient follow up vs staying for continued work up. Patient agrees with discharge home and OP follow up     Plan of care reviewed, questions answered. Patient in agreement with discharge plan    PE   No acute distress  Heart rrr  Lungs cta  Abdomen soft  Soft palate ulcerations present, some early signs of healing     Goals of Care Treatment Preferences:  Code Status: Full Code      SDOH Screening:  The patient was screened for utility difficulties, food insecurity, transport difficulties, housing insecurity, and interpersonal safety and there were no concerns identified this admission.     Consults:   Consults (From admission, onward)          Status Ordering Provider     Inpatient consult to Rheumatology  Once        Provider:  (Not yet assigned)    Completed ARVIND EKLLEY     Inpatient consult to Infectious Diseases  Once        Provider:  (Not yet assigned)    Completed ANNE DORSEY     Inpatient consult to Rheumatology  Once        Provider:  (Not yet assigned)    Completed ANNE DORSEY     Inpatient consult to Gastroenterology  Once        Provider:  (Not yet assigned)    Completed ANNE DORSEY     Inpatient consult to Hematology  Once        Provider:  (Not yet assigned)    Completed SHUN WEAVER            Assessment & Plan  Rheumatoid arthritis   rheumatoid arthritis on methotrexate -weekly . rheumatology eval. Holding mtx  - follow up OP to restart or adjust medications.   History of hepatitis C, s/p successful treatment w/ SVR12 - 7/2015  noted     PAD (peripheral artery disease)  continue ASA and cilastazol     Hypercholesteremia  continue simvastatin    AAA (abdominal  aortic aneurysm)  CT CAP 2/25 - aneurysmal dilatation of the abdominal aorta measuring up to 4.5 cm AP with extension into the common iliac arteries, not significantly changed from prior study. Extensive vascular calcification noted.   3/12   CTCAP -Fusiform aneurysm of the infrarenal abdominal aorta, greatest diameter 5 cm previously 4.4 cm. Greatest diameter of the descending thoracic aorta 4.2 cm. follows with vascular surgery - Dr. Jensen   Stage 3b chronic kidney disease  Creatine stable for now. BMP reviewed- noted Estimated Creatinine Clearance: 33.9 mL/min (A) (based on SCr of 2 mg/dL (H)). according to latest data. Monitor UOP and serial BMP and adjust therapy as needed. Renally dose meds.    Recent Labs   Lab 03/13/25  0444 03/14/25  0528 03/15/25  0347   BUN 39* 31* 29*   CREATININE 2.2* 2.1* 2.0*       I & O     Intake/Output Summary (Last 24 hours) at 3/15/2025 1607  Last data filed at 3/15/2025 1324  Gross per 24 hour   Intake 100.62 ml   Output --   Net 100.62 ml        Ureteral tumor  history of urothelial carcinoma in remission.  s/p neoadjuvant chemotherapy with Gemcitabine and Cisplatin. hem/onc consulted     Anemia  Anemia is likely due to chronic disease due to RA . Most recent hemoglobin and hematocrit are listed below.  Recent Labs     03/13/25  0445 03/14/25  0528 03/15/25  0347   HGB 7.4* 8.3* 7.6*   HCT 23.4* 25.9* 24.6*     Plan  - Monitor serial CBC: Daily  - Transfuse PRBC if patient becomes hemodynamically unstable, symptomatic or H/H drops below 7/21.  3/7 HB trended down to 6.9 . Transfusion with 1 unit of PRBC . Haptoglobin elevated.   3/8 Hb 7--> 6.8. Transfusion with 1 unit of PRBC   - hb stable, monitor and transfuse as needed    Hypertension  Patient's blood pressure range in the last 24 hours was: BP  Min: 92/62  Max: 112/58.The patient's inpatient anti-hypertensive regimen is listed below:  Current Antihypertensives       Plan  - BP is controlled, no changes needed to their  regimen  - holding home amlodipine   Oral thrush  Thick whitish discharge on the tongue consistent with oral candidiasis .Workup is notable for new pancytopenia. Hb 7.9, leucopenia 1.55 and .  discussed with hematology, likely due to MTX however he has been on this medication for a long time.     started on dukes solution and fluconazole.   GI consulted for odynophagia   Rheumatology consulted due to recent methotrexation resumption    3/7  had been taking MTX daily incorrectly instead of weekly and  MTX toxicity given incorrect daily dosing and CKD. MTX levels < 0.04. No leucovorin for now per heme onc .  3/11 ID eval  thrush due to resistant candida species such as Candida glabrata and oral herpes simplex infection. fluconazole changed to micafungin for the possibility of a resistant candida species.  started  valacyclovir for possible oral herpes.  Started oral lidocaine for symptom management  - finishing micafungin on 3/15  - rheum evaluating again for other interventions    Odynophagia  secondary to above. continue dukes solution. GI consulted   - GI re consulted. Plans for possible EGD on Monday. Will plan for NPO on Sunday night  Fever  3/8 low grade temperature of 100.4. reports cough. CX ray -Continued pleural fluid or thickening at the right base now with some fluid or thickening at the left base.  Some patchy parenchymal opacities though no confluent consolidation.  No pneumothorax.BC x2, UA  negative and procalcitonin   3.9 ID consulted for neutropenic fever . advanced to soft diet and started on boost.  ANC 1900. WBC improving. fever of 100.9F overnight. MRSA PCR negative   3/10  ID eval - continue renally dosed fluconazole for presumed oral and esophageal candidiasis x 14-21 days depending on clinical improvement. CT chest, abdomen, pelvis ordered  3/12 fever of 102F. BCx2 and echo orderd. pancultures. Platelets trended down to 84. CTCAP -Fusiform aneurysm of the infrarenal abdominal aorta,  greatest diameter 5 cm previously 4.4 cm. Greatest diameter of the descending thoracic aorta 4.2 cm. Bilateral pleural effusions, stable compared to 02/10/2025. Status post left nephrectomy, no evidence of recurrence or distant metastases within the constraints of noncontrast study. Bilateral emphysema. GI follow up - recs Fluconazole to finish before EGD is considered        Oral ulceration  secondary to MTX toxicity. on folic acid. Maybe some slow improvement    Final Active Diagnoses:    Diagnosis Date Noted POA    PRINCIPAL PROBLEM:  Oral thrush [B37.0] 03/06/2025 Yes    Oral ulceration [K12.1] 03/11/2025 Yes    Fever [R50.9] 03/08/2025 Yes    Odynophagia [R13.10] 03/06/2025 Yes    Hypertension [I10] 03/08/2024 Yes    Anemia [D64.9] 07/05/2023 Yes    Ureteral tumor [D49.59] 02/16/2023 Yes    Stage 3b chronic kidney disease [N18.32] 11/14/2022 Yes    AAA (abdominal aortic aneurysm) [I71.40]  Yes    Hypercholesteremia [E78.00] 10/28/2013 Yes    PAD (peripheral artery disease) [I73.9] 10/28/2013 Yes    History of hepatitis C, s/p successful treatment w/ SVR12 - 7/2015 [Z86.19] 10/14/2012 Yes    Rheumatoid arthritis [M06.9] 10/14/2012 Yes      Problems Resolved During this Admission:    Diagnosis Date Noted Date Resolved POA    Hypophosphatemia [E83.39] 03/14/2025 03/15/2025 No    Drug-induced pancytopenia [D61.811] 03/10/2025 03/15/2025 Yes    Neutropenic fever [D70.9, R50.81] 03/08/2025 03/15/2025 Yes    Leucopenia [D72.819] 03/06/2025 03/13/2025 Yes       Discharged Condition: good    Disposition: Home or Self Care    Follow Up:   Follow-up Information       Andrea Wang MD Follow up in 1 week(s).    Specialty: Internal Medicine  Contact information:  [address]  [place] [address] [phone]                           Patient Instructions:      Ambulatory referral/consult to Gastroenterology   Standing Status: Future   Referral Priority: Routine Referral Type: Consultation   Referral Reason:  Specialty Services Required   Requested Specialty: Gastroenterology   Number of Visits Requested: 1     Ambulatory referral/consult to Rheumatology   Standing Status: Future   Referral Priority: Routine Referral Type: Consultation   Referral Reason: Specialty Services Required   Requested Specialty: Rheumatology   Number of Visits Requested: 1     Ambulatory referral/consult to Nephrology   Standing Status: Future   Referral Priority: Routine Referral Type: Consultation   Referral Reason: Specialty Services Required   Requested Specialty: Nephrology   Number of Visits Requested: 1       Significant Diagnostic Studies: N/A    Pending Diagnostic Studies:       Procedure Component Value Units Date/Time    EMI Screen w/Reflex [[id number]] Collected: 03/15/25 0347    Order Status: Sent Lab Status: In process Updated: 03/15/25 0520    Specimen: Blood     Peripheral Smear Review for Hemolysis or Low Platelets [[id number]]     Order Status: Sent Lab Status: No result     Specimen: Blood     Quantiferon Gold TB [[id number]]     Order Status: Sent Lab Status: No result     Specimen: Blood     Strongyloides IgG Antibodies [[id number]] Collected: 03/15/25 0347    Order Status: Sent Lab Status: In process Updated: 03/15/25 0519    Specimen: Blood     Varicella zoster antibody, IgG [[id number]] Collected: 03/15/25 0347    Order Status: Sent Lab Status: In process Updated: 03/15/25 0520    Specimen: Blood            Medications:  Reconciled Home Medications:      Medication List        START taking these medications      leucovorin 15 MG tablet  Commonly known as: WELLCOVORIN  Take 1 tablet (15 mg total) by mouth every 6 (six) hours.     LIDOcaine viscous HCl 2% 2 % Soln  Commonly known as: XYLOCAINE  Swish and swallow 10 mLs every 6 (six) hours.     magic mouthwash diphen/antac/lidoc/nysta  Take 10 mLs by mouth 4 (four) times daily.     oxyCODONE 5 MG immediate release tablet  Commonly known as: ROXICODONE  Take 1 tablet (5 mg  total) by mouth every 4 (four) hours as needed.     valACYclovir 1000 MG tablet  Commonly known as: VALTREX  Take 1 tablet (1,000 mg total) by mouth 2 (two) times daily.            CHANGE how you take these medications      folic acid 1 MG tablet  Commonly known as: FOLVITE  Take 4 tablets (4 mg total) by mouth once daily.  Start taking on: March 16, 2025  What changed: how much to take            CONTINUE taking these medications      acetaminophen 650 MG Tbsr  Commonly known as: TYLENOL  Take 1 tablet (650 mg total) by mouth every 8 (eight) hours.     aspirin 81 MG Chew  Take 81 mg by mouth once daily.     cilostazoL 50 MG Tab  Commonly known as: PLETAL  TAKE 1 TABLET BY MOUTH TWICE  DAILY     gabapentin 100 MG capsule  Commonly known as: NEURONTIN  Take 1 capsule (100 mg total) by mouth every evening.     levocetirizine 5 MG tablet  Commonly known as: XYZAL  TAKE 1 TABLET BY MOUTH EVERY DAY IN THE EVENING     simvastatin 10 MG tablet  Commonly known as: ZOCOR  Take 1 tablet (10 mg total) by mouth every evening.     tamsulosin 0.4 mg Cap  Commonly known as: FLOMAX  Take 1 capsule (0.4 mg total) by mouth once daily.     traZODone 50 MG tablet  Commonly known as: DESYREL  TAKE 3 TABLETS BY MOUTH AT NIGHT AS NEEDED FOR INSOMNIA            STOP taking these medications      amLODIPine 5 MG tablet  Commonly known as: NORVASC     bisacodyL 5 mg EC tablet  Commonly known as: DULCOLAX     docusate sodium 100 MG capsule  Commonly known as: COLACE     methotrexate 2.5 MG Tab     multivitamin capsule     nystatin 100,000 unit/mL suspension  Commonly known as: MYCOSTATIN              Indwelling Lines/Drains at time of discharge:   Lines/Drains/Airways       None                   Time spent on the discharge of patient: 47 minutes         Osmar Ely DO  Department of Hospital Medicine  [facility] - Internal Medicine Telemetry

## 2025-03-15 NOTE — PLAN OF CARE
Case Management Final Discharge Note    Discharge Disposition: Home    New DME ordered / company name: None    Relevant SDOH / Transition of Care Barriers:  None    Person available to provide assistance at home when needed and their contact information: Self    Scheduled followup appointment: PCP    Referrals placed: GI, Nephrology, Rheumatology    Transportation: Self       03/15/25 1115   Final Note   Assessment Type Final Discharge Note   Anticipated Discharge Disposition Home   Hospital Resources/Appts/Education Provided Provided patient/caregiver with written discharge plan information;Appointments scheduled and added to AVS   Post-Acute Status   Discharge Delays None known at this time     [facility] - Internal Medicine Telemetry  Discharge Final Note    Primary Care Provider: Andrea Wang MD    Expected Discharge Date: 3/15/2025    Final Discharge Note (most recent)       Final Note - 03/15/25 1115          Final Note    Assessment Type Final Discharge Note (P)      Anticipated Discharge Disposition Home or Self Care (P)      Hospital Resources/Appts/Education Provided Provided patient/caregiver with written discharge plan information;Appointments scheduled and added to AVS (P)         Post-Acute Status    Discharge Delays None known at this time (P)                      Important Message from Medicare  Important Message from Medicare regarding Discharge Appeal Rights: Signed/date by patient/caregiver, Explained to patient/caregiver, Given to patient/caregiver     Date IMM was signed: 03/10/25  Time IMM was signed: 1625    Contact Info       Andrea Wang MD   Specialty: Internal Medicine   Relationship: PCP - General    Formerly named [facility] [address]  [facility] [address]   Phone: [phone]       Next Steps: Follow up in 1 week(s)

## 2025-03-15 NOTE — NURSING
Patient discharged today. Awake, alert, and oriented with no acute distress noted. Wife at bedside. Virtual nurse reviewed discharge orders including: medicine orders, prescriptions, follow-up appointments, and patient education materials for diet and diagnosis. Belongings packed for transport to home. Ambulating out per wheelchair by unit staff accompanied by wife at time of discharge. Patient declined bedside delivery of prescribed medication, will  from [facility] pharmacy en route to vehicle.

## 2025-03-15 NOTE — ASSESSMENT & PLAN NOTE
Patient's blood pressure range in the last 24 hours was: BP  Min: 92/62  Max: 112/58.The patient's inpatient anti-hypertensive regimen is listed below:  Current Antihypertensives       Plan  - BP is controlled, no changes needed to their regimen  - holding home amlodipine

## 2025-03-16 LAB
BACTERIA BLD CULT: NORMAL
BACTERIA BLD CULT: NORMAL

## 2025-03-17 LAB
ANA SER QL IF: NORMAL
VARICELLA INTERPRETATION: POSITIVE
VARICELLA ZOSTER IGG: 22 S/CO

## 2025-03-18 ENCOUNTER — PATIENT OUTREACH (OUTPATIENT)
Dept: ADMINISTRATIVE | Facility: CLINIC | Age: 70
End: 2025-03-18
Payer: MEDICARE

## 2025-03-18 ENCOUNTER — RESULTS FOLLOW-UP (OUTPATIENT)
Dept: RHEUMATOLOGY | Facility: CLINIC | Age: 70
End: 2025-03-18

## 2025-03-18 NOTE — PROGRESS NOTES
C3 nurse attempted to contact Andrea Gant for a TCC post hospital discharge follow up call. The patient is unable to conduct the call @ this time. The patient requested a callback.    The patient has a scheduled [facility] appointment with Andrea Wang MD  on 03/19/25 @ 5963.

## 2025-03-18 NOTE — PROGRESS NOTES
C3 nurse spoke with Andrea Gant for a TCC post hospital discharge follow up call. The patient has a scheduled [facility] appointment with Andrea Wang MD  on 03/19/24 @ 8648.

## 2025-03-19 ENCOUNTER — TELEPHONE (OUTPATIENT)
Dept: INFECTIOUS DISEASES | Facility: HOSPITAL | Age: 70
End: 2025-03-19
Payer: MEDICARE

## 2025-03-19 ENCOUNTER — LAB VISIT (OUTPATIENT)
Dept: LAB | Facility: HOSPITAL | Age: 70
End: 2025-03-19
Attending: INTERNAL MEDICINE
Payer: MEDICARE

## 2025-03-19 ENCOUNTER — PATIENT MESSAGE (OUTPATIENT)
Dept: INTERNAL MEDICINE | Facility: CLINIC | Age: 70
End: 2025-03-19

## 2025-03-19 ENCOUNTER — OFFICE VISIT (OUTPATIENT)
Dept: INTERNAL MEDICINE | Facility: CLINIC | Age: 70
End: 2025-03-19
Payer: MEDICARE

## 2025-03-19 VITALS
WEIGHT: 145.5 LBS | DIASTOLIC BLOOD PRESSURE: 58 MMHG | RESPIRATION RATE: 18 BRPM | HEART RATE: 100 BPM | BODY MASS INDEX: 21.55 KG/M2 | SYSTOLIC BLOOD PRESSURE: 101 MMHG | HEIGHT: 69 IN | OXYGEN SATURATION: 97 %

## 2025-03-19 DIAGNOSIS — D61.818 PANCYTOPENIA: Primary | ICD-10-CM

## 2025-03-19 DIAGNOSIS — C68.9 UROTHELIAL CARCINOMA WITH HIGH RISK OF METASTASIS: ICD-10-CM

## 2025-03-19 DIAGNOSIS — B37.0 ORAL THRUSH: ICD-10-CM

## 2025-03-19 DIAGNOSIS — I10 HYPERTENSION, UNSPECIFIED TYPE: ICD-10-CM

## 2025-03-19 DIAGNOSIS — M05.79 RHEUMATOID ARTHRITIS INVOLVING MULTIPLE SITES WITH POSITIVE RHEUMATOID FACTOR: ICD-10-CM

## 2025-03-19 DIAGNOSIS — N18.31 STAGE 3A CHRONIC KIDNEY DISEASE: ICD-10-CM

## 2025-03-19 DIAGNOSIS — D61.818 PANCYTOPENIA: ICD-10-CM

## 2025-03-19 DIAGNOSIS — Z90.5 S/P NEPHRECTOMY: ICD-10-CM

## 2025-03-19 DIAGNOSIS — D64.9 ANEMIA, UNSPECIFIED TYPE: ICD-10-CM

## 2025-03-19 PROBLEM — J43.2 CENTRILOBULAR EMPHYSEMA: Status: RESOLVED | Noted: 2024-01-29 | Resolved: 2025-03-19

## 2025-03-19 LAB
ANION GAP SERPL CALC-SCNC: 13 MMOL/L (ref 8–16)
ANISOCYTOSIS BLD QL SMEAR: SLIGHT
BASOPHILS NFR BLD: 3 % (ref 0–1.9)
BUN SERPL-MCNC: 33 MG/DL (ref 8–23)
CALCIUM SERPL-MCNC: 9.5 MG/DL (ref 8.7–10.5)
CHLORIDE SERPL-SCNC: 110 MMOL/L (ref 95–110)
CO2 SERPL-SCNC: 13 MMOL/L (ref 23–29)
CREAT SERPL-MCNC: 2.1 MG/DL (ref 0.5–1.4)
DACRYOCYTES BLD QL SMEAR: ABNORMAL
DIFFERENTIAL METHOD BLD: ABNORMAL
EOSINOPHIL NFR BLD: 1 % (ref 0–8)
ERYTHROCYTE [DISTWIDTH] IN BLOOD BY AUTOMATED COUNT: 16.2 % (ref 11.5–14.5)
EST. GFR  (NO RACE VARIABLE): 33 ML/MIN/1.73 M^2
GLUCOSE SERPL-MCNC: 137 MG/DL (ref 70–110)
HCT VFR BLD AUTO: 29.2 % (ref 40–54)
HGB BLD-MCNC: 8.5 G/DL (ref 14–18)
IMM GRANULOCYTES # BLD AUTO: ABNORMAL K/UL (ref 0–0.04)
IMM GRANULOCYTES NFR BLD AUTO: ABNORMAL % (ref 0–0.5)
LYMPHOCYTES NFR BLD: 25 % (ref 18–48)
MCH RBC QN AUTO: 28 PG (ref 27–31)
MCHC RBC AUTO-ENTMCNC: 29.1 G/DL (ref 32–36)
MCV RBC AUTO: 96 FL (ref 82–98)
MONOCYTES NFR BLD: 19 % (ref 4–15)
NEUTROPHILS NFR BLD: 52 % (ref 38–73)
NRBC BLD-RTO: 0 /100 WBC
OVALOCYTES BLD QL SMEAR: ABNORMAL
PLATELET # BLD AUTO: 856 K/UL (ref 150–450)
PLATELET BLD QL SMEAR: ABNORMAL
PMV BLD AUTO: 9.7 FL (ref 9.2–12.9)
POIKILOCYTOSIS BLD QL SMEAR: SLIGHT
POLYCHROMASIA BLD QL SMEAR: ABNORMAL
POTASSIUM SERPL-SCNC: 4.7 MMOL/L (ref 3.5–5.1)
RBC # BLD AUTO: 3.04 M/UL (ref 4.6–6.2)
SODIUM SERPL-SCNC: 136 MMOL/L (ref 136–145)
STRONGYLOIDES ANTIBODY IGG: NEGATIVE
T4 FREE SERPL-MCNC: 1.08 NG/DL (ref 0.71–1.51)
TSH SERPL DL<=0.005 MIU/L-ACNC: 0.02 UIU/ML (ref 0.4–4)
WBC # BLD AUTO: 7.45 K/UL (ref 3.9–12.7)

## 2025-03-19 PROCEDURE — 84443 ASSAY THYROID STIM HORMONE: CPT | Performed by: INTERNAL MEDICINE

## 2025-03-19 PROCEDURE — 1101F PT FALLS ASSESS-DOCD LE1/YR: CPT | Mod: CPTII,S$GLB,, | Performed by: INTERNAL MEDICINE

## 2025-03-19 PROCEDURE — 3008F BODY MASS INDEX DOCD: CPT | Mod: CPTII,S$GLB,, | Performed by: INTERNAL MEDICINE

## 2025-03-19 PROCEDURE — 84439 ASSAY OF FREE THYROXINE: CPT | Performed by: INTERNAL MEDICINE

## 2025-03-19 PROCEDURE — G2211 COMPLEX E/M VISIT ADD ON: HCPCS | Mod: S$GLB,,, | Performed by: INTERNAL MEDICINE

## 2025-03-19 PROCEDURE — 85007 BL SMEAR W/DIFF WBC COUNT: CPT | Performed by: INTERNAL MEDICINE

## 2025-03-19 PROCEDURE — 99214 OFFICE O/P EST MOD 30 MIN: CPT | Mod: S$GLB,,, | Performed by: INTERNAL MEDICINE

## 2025-03-19 PROCEDURE — 3078F DIAST BP <80 MM HG: CPT | Mod: CPTII,S$GLB,, | Performed by: INTERNAL MEDICINE

## 2025-03-19 PROCEDURE — 3288F FALL RISK ASSESSMENT DOCD: CPT | Mod: CPTII,S$GLB,, | Performed by: INTERNAL MEDICINE

## 2025-03-19 PROCEDURE — 1126F AMNT PAIN NOTED NONE PRSNT: CPT | Mod: CPTII,S$GLB,, | Performed by: INTERNAL MEDICINE

## 2025-03-19 PROCEDURE — 1111F DSCHRG MED/CURRENT MED MERGE: CPT | Mod: CPTII,S$GLB,, | Performed by: INTERNAL MEDICINE

## 2025-03-19 PROCEDURE — 1159F MED LIST DOCD IN RCRD: CPT | Mod: CPTII,S$GLB,, | Performed by: INTERNAL MEDICINE

## 2025-03-19 PROCEDURE — 99999 PR PBB SHADOW E&M-EST. PATIENT-LVL V: CPT | Mod: PBBFAC,,, | Performed by: INTERNAL MEDICINE

## 2025-03-19 PROCEDURE — 85027 COMPLETE CBC AUTOMATED: CPT | Performed by: INTERNAL MEDICINE

## 2025-03-19 PROCEDURE — 1160F RVW MEDS BY RX/DR IN RCRD: CPT | Mod: CPTII,S$GLB,, | Performed by: INTERNAL MEDICINE

## 2025-03-19 PROCEDURE — 36415 COLL VENOUS BLD VENIPUNCTURE: CPT | Performed by: INTERNAL MEDICINE

## 2025-03-19 PROCEDURE — 80048 BASIC METABOLIC PNL TOTAL CA: CPT | Performed by: INTERNAL MEDICINE

## 2025-03-19 PROCEDURE — 3074F SYST BP LT 130 MM HG: CPT | Mod: CPTII,S$GLB,, | Performed by: INTERNAL MEDICINE

## 2025-03-19 NOTE — PROGRESS NOTES
Hospital Follow Up Summary:   3/7 HB trended down to 6.9 . Transfusion with 1 unit of PRBC . Haptoglobin elevated.  P replaced. . Neutropenic precautions. had been taking MTX daily incorrectly instead of weekly and  MTX toxicity given incorrect daily dosing and CKD. MTX levels < 0.04. No leucovorin for now per heme onc .  parvo serologies, Reticulocytes, Iron panel, Hep C PCR ordered   3/8 low grade temperature of 100.5. CX ray -Continued pleural fluid or thickening at the right base now with some fluid or thickening at the left base.  Some patchy parenchymal opacities though no confluent consolidation.  No pneumothorax. COVID, Flu, RSV negative. BC x2, UA and procalcitonin  0.27 . UA negative. ANC 1500. folic acid increased to 4mg daily.  ID consulted for neutropenic fever . advanced to soft diet and started on boost   3/9 ANC 1900. WBC improving. fever of 100.9F overnight. MRSA PCR negative . P replaced. requests full liquid diet.   3/10 WBC and ANC normalized. platelets trended down to 93. Low grade temp of 100.6F this AM. ID eval - continue renally dosed fluconazole for presumed oral and esophageal candidiasis x 14-21 days depending on clinical improvement. CT chest, abdomen, pelvis ordered.SBP 90s improved with NS bolus 250ml x1.   3/11 fever of 102F. BCx2 and echo orderd. pancultures. Platelets trended down to 84. CTCAP -Fusiform aneurysm of the infrarenal abdominal aorta, greatest diameter 5 cm previously 4.4 cm. Greatest diameter of the descending thoracic aorta 4.2 cm. Bilateral pleural effusions, stable compared to 02/10/2025. Status post left nephrectomy, no evidence of recurrence or distant metastases within the constraints of noncontrast study. Bilateral emphysema. ID, Hematology  and GI follow up - recs Fluconazole to finish before EGD is considered    3/13 persistent fever 101.7F. UA, RIP and BC x2 NGTD . Hb 7--> 6.8. Transfusion with 1 unit of PRBC      H/H stable today.         Had discussion  with patient and wife (on the phone). Went over hospital course and current treatment plan. Patient very anxious to get home, given his long hospital stay and ongoing treatment. Plan is to complete IV micafungin today. Continue with oral valcyclovir and leucovorin at home. Defer any GI intervention to the outpatient setting. Patients labs have improved and outside the oral ulcerations he is feeling better.      Plans for PCP follow up in the next week. Will need rheumatology follow up, GI follow up, nephrology follow up.      History of Present Illness    CHIEF COMPLAINT:  Andrea presents for follow-up after a recent hospitalization for severe anemia and bone marrow suppression, likely due to medication side effects.  He is attended by his wife    HPI:  Andrea, a male with a history of renal cancer, had a significant drop in blood counts, leading to hospitalization. He was discharged on March 15th, about 4 days ago. His hemoglobin had dropped to 7.6, white blood cell count decreased to around 1, and platelet count was initially low but improved by discharge.    He reports feeling cold all the time for the last few years, often wearing multiple layers of clothing. He developed severe thrush, causing sores in his mouth and on his palate, resulting in significant discomfort. His throat remains very hoarse, possibly due to the healing process from the severe thrush.    Prior to this acute episode, his hemoglobin levels had been steadily improving over 15-16 months, reaching normal levels (14) on February 10th, 2025. Three weeks later, his blood counts dropped dramatically.    He had been taking methotrexate, initially prescribed as 8 pills once a week. He stopped taking it for a while and then restarted, taking 1 pill a day for 30 days straight in January or February. He was also prescribed Augmentin for a UTI discovered during a cystoscopy, which he could not tolerate after a few pills due to side effects.    During  his hospital stay, he received blood transfusions and was evaluated by various specialists, including infectious disease. He was prescribed 4 mg of folic acid to help stabilize his blood cells and potentially eliminate any remaining methotrexate. The hematology team attributed the blood count drop to methotrexate rather than the antibiotic.    He reports having no energy currently. His kidney function is a concern, with his creatinine levels being lower than expected for someone with one kidney.    He denies any B12 deficiency.    MEDICATIONS:  Andrea is on Methotrexate, taking 8 pills once a week for an unspecified condition. He is also on Folic acid 4 mg daily to help stabilize cells and counteract methotrexate effects. Zinfestatin is another medication he is taking. Andrea recently finished Leucovorin and is no longer taking it. He discontinued Amlodipine 5 mg for blood pressure due to low blood pressure. Methotrexate was also discontinued due to a suspected adverse reaction causing a severe drop in blood counts. Andrea has also stopped taking Colace, a stool softener, and a multivitamin.    MEDICAL HISTORY:  Andrea has a history of renal cancer, diagnosed in early 2023. He was diagnosed with anemia during a hospital stay in March 2024. A UTI was identified during a cystoscopy in February/March 2024. Andrea developed thrush after antibiotic use in February/March 2024.    SURGICAL HISTORY:  Andrea underwent kidney removal in the summer of 2023 for renal cancer. He also had a cystoscopy in February/March 2024, which revealed a UTI.    TEST RESULTS:  Andrea's hemoglobin levels have fluctuated over time. In early 202 2, they were mildly low at 13.9-13.6. During chemotherapy in March-April 2023, levels dropped to 12-9. Post-surgery, from July to September 2023, hemoglobin remained around 9. In February 2024, it normalized to 14, but drastically dropped to 7.6 in March 2024. His white blood cell count in March 2024  initially dropped to 1 but returned to a normal level of 6 upon hospital discharge. Platelets were initially low in March 2024 but subsequently increased. B12 levels were normal at 600 on March 6, 2024. CO2 levels were noted as low, while BUN and creatinine levels were noted as concerning. A cystoscopy was performed between February 10 and March 15, 2024, which revealed a UTI.      ROS:  General: +chills, +fatigue, +cold intolerance  ENT: +sore throat, +mouth lesions, +hoarseness  Gastrointestinal: +diarrhea, +indigestion             Review of Systems    Past Medical History:   Diagnosis Date    Anemia     Cancer     Cataract     Chemotherapy induced neutropenia 04/11/2023    Colon polyp 2014    Depression     Disorder of kidney and ureter     History of hepatitis C, s/p successful treatment w/ SVR12 - 7/2015 10/14/2012    Kelsey 1a,  Liver biopsy 6/2014 - Stage 1 fibrosis;  Completed 8 weeks Harvoni w/ SVR12 - 7/2015      Hyperlipidemia     Hypertension 03/08/2024    Lipoma of arm     Lipoma of lower extremity     Nuclear sclerosis - Both Eyes 10/22/2012    Other and unspecified hyperlipidemia 10/22/2013    PAD (peripheral artery disease)     Peripheral vascular disease, unspecified     Plaquenil adverse reaction in therapeutic use 10/22/2012    Rheumatoid arthritis(714.0) 10/14/2012    Special screening for malignant neoplasms, colon 02/21/2014     Past Surgical History:   Procedure Laterality Date    BIOPSY OF URETER Left 02/16/2023    Procedure: BIOPSY, URETER/BRUSH;  Surgeon: Kem Jefferson MD;  Location: [facility] OR [address] [place] [address];  Service: Urology;  Laterality: Left;    COLONOSCOPY N/A 11/29/2021    Procedure: COLONOSCOPY;  Surgeon: Kenrick Zimmer MD;  Location: [facility] (4TH FLR);  Service: Endoscopy;  Laterality: N/A;  blood thinner approval received, see telephone encounter 10/19/21-BB  fully vacc-inst email-tb    CYSTOSCOPY      CYSTOSCOPY  02/16/2023    Procedure: CYSTOSCOPY;  Surgeon: eKm Jefferson,  MD;  Location: [address];  Service: Urology;;    HERNIA REPAIR      INSERTION OF TUNNELED CENTRAL VENOUS CATHETER (CVC) WITH SUBCUTANEOUS PORT Right 3/13/2023    Procedure: INSERTION, PORT-A-CATH;  Surgeon: Rene Cali MD;  Location: [facility] CATH LAB;  Service: Radiology;  Laterality: Right;    KNEE ARTHROPLASTY      LAPAROSCOPIC EXPLORATION OF GROIN Left 09/06/2018    Procedure: EXPLORATION, INGUINAL REGION, LAPAROSCOPIC;  Surgeon: Mingo De Guzman Jr., MD;  Location: [facility] OR [address] [place] [address];  Service: General;  Laterality: Left;    MEDIPORT REMOVAL N/A 10/3/2023    Procedure: REMOVAL, CATHETER, CENTRAL VENOUS, TUNNELED, WITH PORT;  Surgeon: Yeimi Stanton MD;  Location: [facility] CATH LAB;  Service: Radiology;  Laterality: N/A;    PYELOSCOPY Left 02/16/2023    Procedure: PYELOSCOPY;  Surgeon: Kem Jefferson MD;  Location: [address];  Service: Urology;  Laterality: Left;    RETROGRADE PYELOGRAPHY Bilateral 02/16/2023    Procedure: PYELOGRAM, RETROGRADE;  Surgeon: Kem Jefferson MD;  Location: [address];  Service: Urology;  Laterality: Bilateral;    ROBOT-ASSISTED LAPAROSCOPIC SURGICAL REMOVAL OF KIDNEY AND URETER USING DA BRIJESH XI Left 7/14/2023    Procedure: XI ROBOTIC NEPHROURETERECTOMY;  Surgeon: Kem Jefferson MD;  Location: [address];  Service: Urology;  Laterality: Left;  5 hours    TONSILLECTOMY      URETEROSCOPIC REMOVAL OF URETERIC CALCULUS Left 02/16/2023    Procedure: REMOVAL, CALCULUS, URETER, URETEROSCOPIC;  Surgeon: Kem Jefferson MD;  Location: [address];  Service: Urology;  Laterality: Left;    URETEROSCOPY Left 02/16/2023    Procedure: URETEROSCOPY;  Surgeon: Kem Jefferson MD;  Location: [address];  Service: Urology;  Laterality: Left;      Problem List[1]       Objective:      Physical Exam            Physical Exam  HENT:      Head: Normocephalic.      Comments: Thrush is much improved.  Cardiovascular:      Rate and Rhythm: Normal rate.    Pulmonary:      Effort: No respiratory distress.      Breath sounds: No wheezing.   Abdominal:      Tenderness: There is no abdominal tenderness.   Psychiatric:         Mood and Affect: Mood normal.           Assessment:       Problem List Items Addressed This Visit          Cardiac/Vascular    Hypertension    Relevant Orders    Basic Metabolic Panel    CBC Auto Differential       ID    Oral thrush    Relevant Orders    Basic Metabolic Panel    CBC Auto Differential       Immunology/Multi System    Rheumatoid arthritis    Relevant Orders    Basic Metabolic Panel    CBC Auto Differential       Oncology    Urothelial carcinoma with high risk of metastasis    Relevant Orders    Basic Metabolic Panel    CBC Auto Differential    Ambulatory referral/consult to Nephrology    Anemia    Relevant Orders    Basic Metabolic Panel    CBC Auto Differential    TSH     Other Visit Diagnoses         Pancytopenia    -  Primary    Relevant Orders    Basic Metabolic Panel    CBC Auto Differential      Stage 3a chronic kidney disease        Relevant Orders    Ambulatory referral/consult to Nephrology      S/p nephrectomy        Relevant Orders    Ambulatory referral/consult to Nephrology            Plan:       Andrea was seen today for hospital follow up.    Diagnoses and all orders for this visit:    Pancytopenia  -     Basic Metabolic Panel; Future  -     CBC Auto Differential; Future    Anemia, unspecified type  -     Basic Metabolic Panel; Future  -     CBC Auto Differential; Future  -     TSH; Future    Rheumatoid arthritis involving multiple sites with positive rheumatoid factor  -     Basic Metabolic Panel; Future  -     CBC Auto Differential; Future    Hypertension, unspecified type  -     Basic Metabolic Panel; Future  -     CBC Auto Differential; Future    Oral thrush  -     Basic Metabolic Panel; Future  -     CBC Auto Differential; Future    Urothelial carcinoma with high risk of metastasis  -     Basic Metabolic Panel;  "Future  -     CBC Auto Differential; Future  -     Ambulatory referral/consult to Nephrology; Future    Stage 3a chronic kidney disease  -     Ambulatory referral/consult to Nephrology; Future    S/p nephrectomy  -     Ambulatory referral/consult to Nephrology; Future       Pancytopenia felt secondary to methotrexate but still following up with specialists.  I will send a note to his oncologist for continuity purposes.  Assist with Nephrology for renal insufficiency after nephrectomy    As this patient's PCP, I am actively managing and/or treating their chronic medical conditions including Urothelial Cancer, Rheumatoid arthritis and have been for at least 1 year. This includes, but is not limited to, medication management, coordination of care, documentation review from their specialists and labs/imaging review where pertinent.        Portions of this note may have been created with [employer] voice recognition software. Occasional "wrong-word" or "sound-a-like" substitutions may have occurred due to the inherent limitations of voice recognition software. Please, read the note carefully and recognize, using context, where substitutions have occurred.       [1]   Patient Active Problem List  Diagnosis    Rheumatoid arthritis    History of hepatitis C, s/p successful treatment w/ SVR12 - 7/2015    Persistent insomnia    Nuclear sclerosis - Both Eyes    History of long-term treatment with high-risk medication    Other hyperlipidemia    PAD (peripheral artery disease)    Hypercholesteremia    Intermittent claudication    PVD (peripheral vascular disease)    Right inguinal hernia    AAA (abdominal aortic aneurysm)    Venous insufficiency    Drug-induced immunodeficiency    Stage 3b chronic kidney disease    Solitary pulmonary nodule (repeat due Aug 2023)    Ureteral tumor    Urothelial carcinoma with high risk of metastasis    Anemia    Adrenal nodule    Centrilobular emphysema    Hypertension    Long-term use of " immunosuppressant medication    Osteoarthritis    Rheumatoid nodulosis    Oral thrush    Odynophagia    Fever    Oral ulceration

## 2025-03-19 NOTE — Clinical Note
DARRIUS Parsons our mutual patient was hospitalized for significant pancytopenia.  Just wanted to make sure you were aware.  Seems that it may have been methotrexate related.

## 2025-03-20 ENCOUNTER — PATIENT MESSAGE (OUTPATIENT)
Dept: ADMINISTRATIVE | Facility: OTHER | Age: 70
End: 2025-03-20
Payer: MEDICARE

## 2025-03-21 ENCOUNTER — PATIENT MESSAGE (OUTPATIENT)
Dept: ADMINISTRATIVE | Facility: OTHER | Age: 70
End: 2025-03-21
Payer: MEDICARE

## 2025-03-22 ENCOUNTER — PATIENT MESSAGE (OUTPATIENT)
Dept: ADMINISTRATIVE | Facility: OTHER | Age: 70
End: 2025-03-22
Payer: MEDICARE

## 2025-03-23 ENCOUNTER — PATIENT MESSAGE (OUTPATIENT)
Dept: ADMINISTRATIVE | Facility: OTHER | Age: 70
End: 2025-03-23
Payer: MEDICARE

## 2025-03-24 ENCOUNTER — PATIENT MESSAGE (OUTPATIENT)
Dept: ADMINISTRATIVE | Facility: OTHER | Age: 70
End: 2025-03-24
Payer: MEDICARE

## 2025-03-24 DIAGNOSIS — I73.9 PAD (PERIPHERAL ARTERY DISEASE): ICD-10-CM

## 2025-03-24 NOTE — TELEPHONE ENCOUNTER
----- Message from Jolly sent at 3/24/2025 10:43 AM CDT -----  Regarding: rc refill  Contact: [phone]  Type:  RX Refill RequestWho Called: Nila or New Rx:refillRX Name and Strength:cilostazoL (PLETAL) 50 MG TabHow is the patient currently taking it? (ex. 1XDay):tbaIs this a 30 day or 90 day RX:tbaPreferred Pharmacy with phone number:[id number] - [place] [address] [place] [address]Ibflj: [phone] Fax: [phone] Local or Mail Order:local Ordering Provider:eamon Mccoy the patient rather a call back or a response via [facility]? Call backBest Call Back Number:[phone] Additional Information:

## 2025-03-24 NOTE — TELEPHONE ENCOUNTER
Care Due:                  Date            Visit Type   Department     Provider  --------------------------------------------------------------------------------                                [facility] INTERNAL  Last Visit: 03-      FOLLOW UP    MEDICINE       Andrea Wang  Next Visit: None Scheduled  None         None Found                                                            Last  Test          Frequency    Reason                     Performed    Due Date  --------------------------------------------------------------------------------    Lipid Panel.  12 months..  simvastatin..............  10-   10-    [facility] Embedded Care Due Messages. Reference number: [id number].   3/24/2025 1:30:29 PM CDT

## 2025-03-25 ENCOUNTER — PATIENT MESSAGE (OUTPATIENT)
Dept: ADMINISTRATIVE | Facility: OTHER | Age: 70
End: 2025-03-25
Payer: MEDICARE

## 2025-03-25 RX ORDER — CILOSTAZOL 50 MG/1
50 TABLET ORAL 2 TIMES DAILY
Qty: 180 TABLET | Refills: 3 | Status: SHIPPED | OUTPATIENT
Start: 2025-03-25

## 2025-03-25 NOTE — TELEPHONE ENCOUNTER
Refill Routing Note   Medication(s) are not appropriate for processing by [facility] for the following reason(s):        Required vitals abnormal  Required labs abnormal    [facility] action(s):  Defer   Requires labs : Yes             Appointments  past 12m or future 3m with PCP    Date Provider   Last Visit   3/19/2025 Andrea Wang MD   Next Visit   Visit date not found Andrea Wang MD   ED visits in past 90 days: 0        Note composed:7:58 AM 03/25/2025

## 2025-03-26 ENCOUNTER — TELEPHONE (OUTPATIENT)
Dept: INFECTIOUS DISEASES | Facility: HOSPITAL | Age: 70
End: 2025-03-26
Payer: MEDICARE

## 2025-03-26 ENCOUNTER — PATIENT MESSAGE (OUTPATIENT)
Dept: ADMINISTRATIVE | Facility: OTHER | Age: 70
End: 2025-03-26
Payer: MEDICARE

## 2025-03-26 NOTE — TELEPHONE ENCOUNTER
Called Pt to schedule Simponi infusions. Pt stated he did not know what these infusions were or what they were for. Stated he has an apt with Dr. Case on 4/2 and would like a callback after that apt

## 2025-03-27 ENCOUNTER — PATIENT MESSAGE (OUTPATIENT)
Dept: ADMINISTRATIVE | Facility: OTHER | Age: 70
End: 2025-03-27
Payer: MEDICARE

## 2025-03-28 ENCOUNTER — PATIENT MESSAGE (OUTPATIENT)
Dept: ADMINISTRATIVE | Facility: OTHER | Age: 70
End: 2025-03-28
Payer: MEDICARE

## 2025-03-29 ENCOUNTER — PATIENT MESSAGE (OUTPATIENT)
Dept: ADMINISTRATIVE | Facility: OTHER | Age: 70
End: 2025-03-29
Payer: MEDICARE

## 2025-03-30 ENCOUNTER — PATIENT MESSAGE (OUTPATIENT)
Dept: ADMINISTRATIVE | Facility: OTHER | Age: 70
End: 2025-03-30
Payer: MEDICARE

## 2025-03-31 ENCOUNTER — PATIENT MESSAGE (OUTPATIENT)
Dept: ADMINISTRATIVE | Facility: OTHER | Age: 70
End: 2025-03-31
Payer: MEDICARE

## 2025-04-01 ENCOUNTER — PATIENT MESSAGE (OUTPATIENT)
Dept: ADMINISTRATIVE | Facility: OTHER | Age: 70
End: 2025-04-01
Payer: MEDICARE

## 2025-04-02 ENCOUNTER — OFFICE VISIT (OUTPATIENT)
Dept: RHEUMATOLOGY | Facility: CLINIC | Age: 70
End: 2025-04-02
Payer: MEDICARE

## 2025-04-02 ENCOUNTER — RESULTS FOLLOW-UP (OUTPATIENT)
Dept: RHEUMATOLOGY | Facility: CLINIC | Age: 70
End: 2025-04-02

## 2025-04-02 ENCOUNTER — LAB VISIT (OUTPATIENT)
Dept: LAB | Facility: HOSPITAL | Age: 70
End: 2025-04-02
Attending: INTERNAL MEDICINE
Payer: MEDICARE

## 2025-04-02 ENCOUNTER — PATIENT MESSAGE (OUTPATIENT)
Dept: ADMINISTRATIVE | Facility: OTHER | Age: 70
End: 2025-04-02
Payer: MEDICARE

## 2025-04-02 VITALS
DIASTOLIC BLOOD PRESSURE: 78 MMHG | HEART RATE: 82 BPM | WEIGHT: 147.5 LBS | BODY MASS INDEX: 21.78 KG/M2 | SYSTOLIC BLOOD PRESSURE: 127 MMHG

## 2025-04-02 DIAGNOSIS — Z79.60 LONG-TERM USE OF IMMUNOSUPPRESSANT MEDICATION: ICD-10-CM

## 2025-04-02 DIAGNOSIS — M05.79 RHEUMATOID ARTHRITIS INVOLVING MULTIPLE SITES WITH POSITIVE RHEUMATOID FACTOR: Primary | ICD-10-CM

## 2025-04-02 DIAGNOSIS — D61.810 PANCYTOPENIA DUE TO CHEMOTHERAPY: ICD-10-CM

## 2025-04-02 DIAGNOSIS — N18.32 STAGE 3B CHRONIC KIDNEY DISEASE: ICD-10-CM

## 2025-04-02 DIAGNOSIS — M05.79 RHEUMATOID ARTHRITIS INVOLVING MULTIPLE SITES WITH POSITIVE RHEUMATOID FACTOR: ICD-10-CM

## 2025-04-02 DIAGNOSIS — Z79.899 HISTORY OF LONG-TERM TREATMENT WITH HIGH-RISK MEDICATION: ICD-10-CM

## 2025-04-02 DIAGNOSIS — K12.1 ORAL ULCERATION: ICD-10-CM

## 2025-04-02 LAB
ABSOLUTE EOSINOPHIL (OHS): 0.46 K/UL
ABSOLUTE MONOCYTE (OHS): 0.91 K/UL (ref 0.3–1)
ABSOLUTE NEUTROPHIL COUNT (OHS): 8.53 K/UL (ref 1.8–7.7)
ALBUMIN SERPL BCP-MCNC: 3 G/DL (ref 3.5–5.2)
ALP SERPL-CCNC: 83 UNIT/L (ref 40–150)
ALT SERPL W/O P-5'-P-CCNC: 10 UNIT/L (ref 10–44)
ANION GAP (OHS): 7 MMOL/L (ref 8–16)
AST SERPL-CCNC: 12 UNIT/L (ref 11–45)
BASOPHILS # BLD AUTO: 0.09 K/UL
BASOPHILS NFR BLD AUTO: 0.8 %
BILIRUB SERPL-MCNC: 0.4 MG/DL (ref 0.1–1)
BUN SERPL-MCNC: 31 MG/DL (ref 8–23)
CALCIUM SERPL-MCNC: 9.4 MG/DL (ref 8.7–10.5)
CHLORIDE SERPL-SCNC: 105 MMOL/L (ref 95–110)
CO2 SERPL-SCNC: 24 MMOL/L (ref 23–29)
CREAT SERPL-MCNC: 1.9 MG/DL (ref 0.5–1.4)
CRP SERPL-MCNC: 33.6 MG/L
ERYTHROCYTE [DISTWIDTH] IN BLOOD BY AUTOMATED COUNT: 19.7 % (ref 11.5–14.5)
ERYTHROCYTE [SEDIMENTATION RATE] IN BLOOD BY PHOTOMETRIC METHOD: 86 MM/HR
GFR SERPLBLD CREATININE-BSD FMLA CKD-EPI: 37 ML/MIN/1.73/M2
GLUCOSE SERPL-MCNC: 85 MG/DL (ref 70–110)
HCT VFR BLD AUTO: 29.9 % (ref 40–54)
HGB BLD-MCNC: 9.2 GM/DL (ref 14–18)
IMM GRANULOCYTES # BLD AUTO: 0.08 K/UL (ref 0–0.04)
IMM GRANULOCYTES NFR BLD AUTO: 0.7 % (ref 0–0.5)
LYMPHOCYTES # BLD AUTO: 1.43 K/UL (ref 1–4.8)
MCH RBC QN AUTO: 29 PG (ref 27–31)
MCHC RBC AUTO-ENTMCNC: 30.8 G/DL (ref 32–36)
MCV RBC AUTO: 94 FL (ref 82–98)
NUCLEATED RBC (/100WBC) (OHS): 0 /100 WBC
PLATELET # BLD AUTO: 285 K/UL (ref 150–450)
PMV BLD AUTO: 9.5 FL (ref 9.2–12.9)
POTASSIUM SERPL-SCNC: 4.7 MMOL/L (ref 3.5–5.1)
PROT SERPL-MCNC: 8.1 GM/DL (ref 6–8.4)
RBC # BLD AUTO: 3.17 M/UL (ref 4.6–6.2)
RELATIVE EOSINOPHIL (OHS): 4 %
RELATIVE LYMPHOCYTE (OHS): 12.4 % (ref 18–48)
RELATIVE MONOCYTE (OHS): 7.9 % (ref 4–15)
RELATIVE NEUTROPHIL (OHS): 74.2 % (ref 38–73)
SODIUM SERPL-SCNC: 136 MMOL/L (ref 136–145)
WBC # BLD AUTO: 11.5 K/UL (ref 3.9–12.7)

## 2025-04-02 PROCEDURE — 3288F FALL RISK ASSESSMENT DOCD: CPT | Mod: CPTII,S$GLB,, | Performed by: INTERNAL MEDICINE

## 2025-04-02 PROCEDURE — 85025 COMPLETE CBC W/AUTO DIFF WBC: CPT

## 2025-04-02 PROCEDURE — 99999 PR PBB SHADOW E&M-EST. PATIENT-LVL IV: CPT | Mod: PBBFAC,,, | Performed by: INTERNAL MEDICINE

## 2025-04-02 PROCEDURE — 3074F SYST BP LT 130 MM HG: CPT | Mod: CPTII,S$GLB,, | Performed by: INTERNAL MEDICINE

## 2025-04-02 PROCEDURE — 1160F RVW MEDS BY RX/DR IN RCRD: CPT | Mod: CPTII,S$GLB,, | Performed by: INTERNAL MEDICINE

## 2025-04-02 PROCEDURE — 36415 COLL VENOUS BLD VENIPUNCTURE: CPT

## 2025-04-02 PROCEDURE — 99214 OFFICE O/P EST MOD 30 MIN: CPT | Mod: S$GLB,,, | Performed by: INTERNAL MEDICINE

## 2025-04-02 PROCEDURE — 1111F DSCHRG MED/CURRENT MED MERGE: CPT | Mod: CPTII,S$GLB,, | Performed by: INTERNAL MEDICINE

## 2025-04-02 PROCEDURE — 1101F PT FALLS ASSESS-DOCD LE1/YR: CPT | Mod: CPTII,S$GLB,, | Performed by: INTERNAL MEDICINE

## 2025-04-02 PROCEDURE — 1159F MED LIST DOCD IN RCRD: CPT | Mod: CPTII,S$GLB,, | Performed by: INTERNAL MEDICINE

## 2025-04-02 PROCEDURE — 85652 RBC SED RATE AUTOMATED: CPT

## 2025-04-02 PROCEDURE — 86140 C-REACTIVE PROTEIN: CPT

## 2025-04-02 PROCEDURE — 3008F BODY MASS INDEX DOCD: CPT | Mod: CPTII,S$GLB,, | Performed by: INTERNAL MEDICINE

## 2025-04-02 PROCEDURE — 3078F DIAST BP <80 MM HG: CPT | Mod: CPTII,S$GLB,, | Performed by: INTERNAL MEDICINE

## 2025-04-02 PROCEDURE — 1125F AMNT PAIN NOTED PAIN PRSNT: CPT | Mod: CPTII,S$GLB,, | Performed by: INTERNAL MEDICINE

## 2025-04-02 PROCEDURE — G2211 COMPLEX E/M VISIT ADD ON: HCPCS | Mod: S$GLB,,, | Performed by: INTERNAL MEDICINE

## 2025-04-02 PROCEDURE — 80053 COMPREHEN METABOLIC PANEL: CPT

## 2025-04-02 NOTE — PROGRESS NOTES
3/26/2025     4:15 PM   Rapid3 Question Responses and Scores   MDHAQ Score 0.4   Psychologic Score 3.3   Pain Score 4   When you awakened in the morning OVER THE LAST WEEK, did you feel stiff? No   Fatigue Score 8   Global Health Score 7   RAPID3 Score 4.11     Answers submitted by the patient for this visit:  Rheumatology Questionnaire (Submitted on 3/26/2025)  fever: No  eye redness: No  mouth sores: No  headaches: No  shortness of breath: Yes  chest pain: No  trouble swallowing: No  diarrhea: No  constipation: No  unexpected weight change: No  genital sore: No  During the last 3 days, have you had a skin rash?: No  Bruises or bleeds easily: No  cough: No

## 2025-04-03 ENCOUNTER — PATIENT MESSAGE (OUTPATIENT)
Dept: ADMINISTRATIVE | Facility: OTHER | Age: 70
End: 2025-04-03
Payer: MEDICARE

## 2025-04-03 ENCOUNTER — TELEPHONE (OUTPATIENT)
Dept: INFECTIOUS DISEASES | Facility: HOSPITAL | Age: 70
End: 2025-04-03
Payer: MEDICARE

## 2025-04-03 NOTE — TELEPHONE ENCOUNTER
Spoke to Pt in regards to Simponi infusions. Pt stated he spoke with Dr. Case who stated that his rheumatoid arthritis looked good and he did not need the Simponi infusions

## 2025-04-04 ENCOUNTER — PATIENT MESSAGE (OUTPATIENT)
Dept: ADMINISTRATIVE | Facility: OTHER | Age: 70
End: 2025-04-04
Payer: MEDICARE

## 2025-04-05 ENCOUNTER — PATIENT MESSAGE (OUTPATIENT)
Dept: ADMINISTRATIVE | Facility: OTHER | Age: 70
End: 2025-04-05
Payer: MEDICARE

## 2025-04-05 NOTE — PROGRESS NOTES
History of present illness:  70-year-old male I saw for the 1st time in October.  He had previously been followed by Dr. Jimenez.  He has a history of seropositive rheumatoid arthritis with nodular disease.  She saw him initially in 2006.  He has been on chronic methotrexate therapy, as high as 20 mg weekly.  At the time of his visit he was on 15 mg.  He had previously been on hydroxychloroquine.  He was on Humira until 2021.  He has a history of urinary tract cancer.  He has mild renal insufficiency.  At the time of his visit he had no evidence of active rheumatoid arthritis.  I placed him on gabapentin for his nocturnal leg pain.  I decreased his methotrexate to 12.5 mg weekly because of his nodular disease.  He apparently had a flare of his disease in increase the methotrexate back up to 20 mg weekly.    He had stopped taking methotrexate for awhile.  When he restarted it, he took it daily instead of weekly.  He developed oral ulcers.  He developed pancytopenia.  He was hospitalized.  He was evaluated by Dr. Deshpande/Yolanda.  He had already been taken off of the methotrexate.  His arthritis was stable at the time.  He was placed back on Plaquenil.  He had been treated with steroids when he was hospitalized but he is off steroids at the present time.    His arthritis has been stable since hospital discharge.  His nodules are unchanged.  His main problem has been low back pain.  He has been taking Tylenol with some relief.  He has had no joint swelling.  He has had no morning stiffness.    Physical examination:  Musculoskeletal:  Cervical spine has decreased range of motion but no pain on range of motion.    Shoulders have good range of motion without pain on range of motion.    He has nodules in the left olecranon bursa.  He has no synovitis in the elbow.    He has soft tissue swelling of the right wrist.  Left wrist is unremarkable.    He has bony hypertrophy of the PIP in D IP.  He has no synovitis in his  hands.    Lumbar spine has good range of motion without pain on range of motion.    Hips, knees, ankles, small joints the feet are unremarkable.    Assessment:   1. Recent hospitalization for methotrexate toxicity.  He was taking the medications incorrectly   2.  Except for the right wrist, there is no evidence of active rheumatoid arthritis   3.  Persistent rheumatoid nodules   4. His main problem at this time seems to be osteoarthritis     Plans:  1.  Laboratory studies obtained   2. Continue Plaquenil as before.    3.  I am not starting another DMARD at this time.  I might consider leflunomide versus biologic in the future.    4. I recommend topical medications for his hands and his back   5.  Return in 2 months    Answers submitted by the patient for this visit:  Rheumatology Questionnaire (Submitted on 3/26/2025)  fever: No  eye redness: No  mouth sores: No  headaches: No  shortness of breath: Yes  chest pain: No  trouble swallowing: No  diarrhea: No  constipation: No  unexpected weight change: No  genital sore: No  During the last 3 days, have you had a skin rash?: No  Bruises or bleeds easily: No  cough: No

## 2025-04-06 ENCOUNTER — PATIENT MESSAGE (OUTPATIENT)
Dept: ADMINISTRATIVE | Facility: OTHER | Age: 70
End: 2025-04-06
Payer: MEDICARE

## 2025-04-07 ENCOUNTER — PATIENT MESSAGE (OUTPATIENT)
Dept: ADMINISTRATIVE | Facility: OTHER | Age: 70
End: 2025-04-07
Payer: MEDICARE

## 2025-04-08 ENCOUNTER — PATIENT MESSAGE (OUTPATIENT)
Dept: ADMINISTRATIVE | Facility: OTHER | Age: 70
End: 2025-04-08
Payer: MEDICARE

## 2025-04-09 ENCOUNTER — PATIENT MESSAGE (OUTPATIENT)
Dept: ADMINISTRATIVE | Facility: OTHER | Age: 70
End: 2025-04-09
Payer: MEDICARE

## 2025-04-10 ENCOUNTER — PATIENT MESSAGE (OUTPATIENT)
Dept: ADMINISTRATIVE | Facility: OTHER | Age: 70
End: 2025-04-10
Payer: MEDICARE

## 2025-04-11 ENCOUNTER — PATIENT MESSAGE (OUTPATIENT)
Dept: ADMINISTRATIVE | Facility: OTHER | Age: 70
End: 2025-04-11
Payer: MEDICARE

## 2025-04-12 ENCOUNTER — PATIENT MESSAGE (OUTPATIENT)
Dept: ADMINISTRATIVE | Facility: OTHER | Age: 70
End: 2025-04-12
Payer: MEDICARE

## 2025-04-13 ENCOUNTER — PATIENT MESSAGE (OUTPATIENT)
Dept: ADMINISTRATIVE | Facility: OTHER | Age: 70
End: 2025-04-13
Payer: MEDICARE

## 2025-04-14 ENCOUNTER — PATIENT MESSAGE (OUTPATIENT)
Dept: ADMINISTRATIVE | Facility: OTHER | Age: 70
End: 2025-04-14
Payer: MEDICARE

## 2025-04-15 ENCOUNTER — PATIENT MESSAGE (OUTPATIENT)
Dept: ADMINISTRATIVE | Facility: OTHER | Age: 70
End: 2025-04-15
Payer: MEDICARE

## 2025-04-16 ENCOUNTER — PATIENT MESSAGE (OUTPATIENT)
Dept: ADMINISTRATIVE | Facility: OTHER | Age: 70
End: 2025-04-16
Payer: MEDICARE

## 2025-04-17 ENCOUNTER — PATIENT MESSAGE (OUTPATIENT)
Dept: ADMINISTRATIVE | Facility: OTHER | Age: 70
End: 2025-04-17
Payer: MEDICARE

## 2025-04-18 ENCOUNTER — PATIENT MESSAGE (OUTPATIENT)
Dept: ADMINISTRATIVE | Facility: OTHER | Age: 70
End: 2025-04-18
Payer: MEDICARE

## 2025-04-19 ENCOUNTER — PATIENT MESSAGE (OUTPATIENT)
Dept: ADMINISTRATIVE | Facility: OTHER | Age: 70
End: 2025-04-19
Payer: MEDICARE

## 2025-04-20 ENCOUNTER — PATIENT MESSAGE (OUTPATIENT)
Dept: INTERNAL MEDICINE | Facility: CLINIC | Age: 70
End: 2025-04-20
Payer: MEDICARE

## 2025-04-21 ENCOUNTER — PATIENT MESSAGE (OUTPATIENT)
Dept: ADMINISTRATIVE | Facility: OTHER | Age: 70
End: 2025-04-21
Payer: MEDICARE

## 2025-04-22 ENCOUNTER — PATIENT MESSAGE (OUTPATIENT)
Dept: ADMINISTRATIVE | Facility: OTHER | Age: 70
End: 2025-04-22
Payer: MEDICARE

## 2025-04-23 ENCOUNTER — PATIENT MESSAGE (OUTPATIENT)
Dept: ADMINISTRATIVE | Facility: OTHER | Age: 70
End: 2025-04-23
Payer: MEDICARE

## 2025-04-24 ENCOUNTER — PATIENT MESSAGE (OUTPATIENT)
Dept: ADMINISTRATIVE | Facility: OTHER | Age: 70
End: 2025-04-24
Payer: MEDICARE

## 2025-04-25 ENCOUNTER — PATIENT MESSAGE (OUTPATIENT)
Dept: RHEUMATOLOGY | Facility: CLINIC | Age: 70
End: 2025-04-25
Payer: MEDICARE

## 2025-04-27 ENCOUNTER — PATIENT MESSAGE (OUTPATIENT)
Dept: ADMINISTRATIVE | Facility: OTHER | Age: 70
End: 2025-04-27
Payer: MEDICARE

## 2025-04-27 ENCOUNTER — PATIENT MESSAGE (OUTPATIENT)
Dept: INTERNAL MEDICINE | Facility: CLINIC | Age: 70
End: 2025-04-27
Payer: MEDICARE

## 2025-04-28 ENCOUNTER — PATIENT MESSAGE (OUTPATIENT)
Dept: RHEUMATOLOGY | Facility: CLINIC | Age: 70
End: 2025-04-28
Payer: MEDICARE

## 2025-04-28 RX ORDER — PREDNISONE 5 MG/1
TABLET ORAL
Qty: 90 TABLET | Refills: 1 | Status: SHIPPED | OUTPATIENT
Start: 2025-04-28

## 2025-04-28 NOTE — TELEPHONE ENCOUNTER
No care due was identified.  [facility] Embedded Care Due Messages. Reference number: [id number].   4/28/2025 3:57:58 PM CDT

## 2025-04-28 NOTE — TELEPHONE ENCOUNTER
LOV with Andrea Wang MD , 3/19/2025    Pt has sent messages to rheumatology for assistance as well

## 2025-04-28 NOTE — TELEPHONE ENCOUNTER
Refill Routing Note   Medication(s) are not appropriate for processing by [facility] for the following reason(s):        Required labs outdated    [facility] action(s):  Defer               Appointments  past 12m or future 3m with PCP    Date Provider   Last Visit   3/19/2025 Andrea Wang MD   Next Visit   Visit date not found Andrea Wang MD   ED visits in past 90 days: 0        Note composed:6:56 PM 04/28/2025

## 2025-04-29 RX ORDER — SIMVASTATIN 10 MG/1
10 TABLET, FILM COATED ORAL NIGHTLY
Qty: 90 TABLET | Refills: 0 | Status: SHIPPED | OUTPATIENT
Start: 2025-04-29

## 2025-04-30 ENCOUNTER — PATIENT MESSAGE (OUTPATIENT)
Dept: ADMINISTRATIVE | Facility: OTHER | Age: 70
End: 2025-04-30
Payer: MEDICARE

## 2025-05-01 ENCOUNTER — PATIENT MESSAGE (OUTPATIENT)
Dept: ADMINISTRATIVE | Facility: OTHER | Age: 70
End: 2025-05-01
Payer: MEDICARE

## 2025-05-02 ENCOUNTER — PATIENT MESSAGE (OUTPATIENT)
Dept: ADMINISTRATIVE | Facility: OTHER | Age: 70
End: 2025-05-02
Payer: MEDICARE

## 2025-05-03 ENCOUNTER — PATIENT MESSAGE (OUTPATIENT)
Dept: ADMINISTRATIVE | Facility: OTHER | Age: 70
End: 2025-05-03
Payer: MEDICARE

## 2025-05-05 ENCOUNTER — PATIENT MESSAGE (OUTPATIENT)
Dept: ADMINISTRATIVE | Facility: OTHER | Age: 70
End: 2025-05-05
Payer: MEDICARE

## 2025-05-05 RX ORDER — GABAPENTIN 100 MG/1
100 CAPSULE ORAL
Qty: 90 CAPSULE | Refills: 1 | Status: SHIPPED | OUTPATIENT
Start: 2025-05-05

## 2025-05-09 ENCOUNTER — PATIENT MESSAGE (OUTPATIENT)
Dept: ADMINISTRATIVE | Facility: OTHER | Age: 70
End: 2025-05-09
Payer: MEDICARE

## 2025-05-15 ENCOUNTER — PATIENT MESSAGE (OUTPATIENT)
Dept: ADMINISTRATIVE | Facility: OTHER | Age: 70
End: 2025-05-15
Payer: MEDICARE

## 2025-05-17 ENCOUNTER — PATIENT MESSAGE (OUTPATIENT)
Dept: RHEUMATOLOGY | Facility: CLINIC | Age: 70
End: 2025-05-17
Payer: MEDICARE

## 2025-05-19 ENCOUNTER — PATIENT MESSAGE (OUTPATIENT)
Dept: ADMINISTRATIVE | Facility: OTHER | Age: 70
End: 2025-05-19
Payer: MEDICARE

## 2025-05-21 ENCOUNTER — PATIENT MESSAGE (OUTPATIENT)
Dept: ADMINISTRATIVE | Facility: OTHER | Age: 70
End: 2025-05-21
Payer: MEDICARE

## 2025-06-01 ENCOUNTER — PATIENT MESSAGE (OUTPATIENT)
Dept: ADMINISTRATIVE | Facility: OTHER | Age: 70
End: 2025-06-01
Payer: MEDICARE

## 2025-06-09 ENCOUNTER — LAB VISIT (OUTPATIENT)
Dept: LAB | Facility: HOSPITAL | Age: 70
End: 2025-06-09
Attending: INTERNAL MEDICINE
Payer: MEDICARE

## 2025-06-09 ENCOUNTER — OFFICE VISIT (OUTPATIENT)
Dept: RHEUMATOLOGY | Facility: CLINIC | Age: 70
End: 2025-06-09
Payer: MEDICARE

## 2025-06-09 VITALS
HEART RATE: 81 BPM | SYSTOLIC BLOOD PRESSURE: 105 MMHG | WEIGHT: 153.88 LBS | DIASTOLIC BLOOD PRESSURE: 72 MMHG | BODY MASS INDEX: 22.72 KG/M2

## 2025-06-09 DIAGNOSIS — C64.2 UROTHELIAL CARCINOMA OF KIDNEY, LEFT: ICD-10-CM

## 2025-06-09 DIAGNOSIS — Z90.6 H/O NEPHROURETERECTOMY: ICD-10-CM

## 2025-06-09 DIAGNOSIS — Z79.60 LONG-TERM USE OF IMMUNOSUPPRESSANT MEDICATION: ICD-10-CM

## 2025-06-09 DIAGNOSIS — Z90.5 H/O NEPHROURETERECTOMY: ICD-10-CM

## 2025-06-09 DIAGNOSIS — M19.90 OSTEOARTHRITIS, UNSPECIFIED OSTEOARTHRITIS TYPE, UNSPECIFIED SITE: ICD-10-CM

## 2025-06-09 DIAGNOSIS — M05.79 RHEUMATOID ARTHRITIS INVOLVING MULTIPLE SITES WITH POSITIVE RHEUMATOID FACTOR: ICD-10-CM

## 2025-06-09 DIAGNOSIS — Z79.899 OTHER LONG TERM (CURRENT) DRUG THERAPY: ICD-10-CM

## 2025-06-09 DIAGNOSIS — M05.79 RHEUMATOID ARTHRITIS INVOLVING MULTIPLE SITES WITH POSITIVE RHEUMATOID FACTOR: Primary | ICD-10-CM

## 2025-06-09 DIAGNOSIS — R93.89 ABNORMAL FINDINGS ON DIAGNOSTIC IMAGING OF OTHER SPECIFIED BODY STRUCTURES: ICD-10-CM

## 2025-06-09 DIAGNOSIS — R91.1 PULMONARY NODULE: ICD-10-CM

## 2025-06-09 LAB
ABSOLUTE EOSINOPHIL (OHS): 0.03 K/UL
ABSOLUTE EOSINOPHIL (OHS): 0.03 K/UL
ABSOLUTE MONOCYTE (OHS): 0.63 K/UL (ref 0.3–1)
ABSOLUTE MONOCYTE (OHS): 0.7 K/UL (ref 0.3–1)
ABSOLUTE NEUTROPHIL COUNT (OHS): 9.41 K/UL (ref 1.8–7.7)
ABSOLUTE NEUTROPHIL COUNT (OHS): 9.47 K/UL (ref 1.8–7.7)
ALBUMIN SERPL BCP-MCNC: 3.6 G/DL (ref 3.5–5.2)
ALBUMIN SERPL BCP-MCNC: 3.7 G/DL (ref 3.5–5.2)
ALP SERPL-CCNC: 86 UNIT/L (ref 40–150)
ALP SERPL-CCNC: 89 UNIT/L (ref 40–150)
ALT SERPL W/O P-5'-P-CCNC: 10 UNIT/L (ref 10–44)
ALT SERPL W/O P-5'-P-CCNC: 8 UNIT/L (ref 10–44)
ANION GAP (OHS): 10 MMOL/L (ref 8–16)
ANION GAP (OHS): 8 MMOL/L (ref 8–16)
AST SERPL-CCNC: 13 UNIT/L (ref 11–45)
AST SERPL-CCNC: 13 UNIT/L (ref 11–45)
BASOPHILS # BLD AUTO: 0.02 K/UL
BASOPHILS # BLD AUTO: 0.03 K/UL
BASOPHILS NFR BLD AUTO: 0.2 %
BASOPHILS NFR BLD AUTO: 0.3 %
BILIRUB SERPL-MCNC: 0.3 MG/DL (ref 0.1–1)
BILIRUB SERPL-MCNC: 0.3 MG/DL (ref 0.1–1)
BUN SERPL-MCNC: 37 MG/DL (ref 8–23)
BUN SERPL-MCNC: 37 MG/DL (ref 8–23)
CALCIUM SERPL-MCNC: 9 MG/DL (ref 8.7–10.5)
CALCIUM SERPL-MCNC: 9.2 MG/DL (ref 8.7–10.5)
CHLORIDE SERPL-SCNC: 104 MMOL/L (ref 95–110)
CHLORIDE SERPL-SCNC: 104 MMOL/L (ref 95–110)
CO2 SERPL-SCNC: 25 MMOL/L (ref 23–29)
CO2 SERPL-SCNC: 25 MMOL/L (ref 23–29)
CREAT SERPL-MCNC: 2.1 MG/DL (ref 0.5–1.4)
CREAT SERPL-MCNC: 2.1 MG/DL (ref 0.5–1.4)
CRP SERPL-MCNC: 51.1 MG/L
ERYTHROCYTE [DISTWIDTH] IN BLOOD BY AUTOMATED COUNT: 14.3 % (ref 11.5–14.5)
ERYTHROCYTE [DISTWIDTH] IN BLOOD BY AUTOMATED COUNT: 14.4 % (ref 11.5–14.5)
ERYTHROCYTE [SEDIMENTATION RATE] IN BLOOD BY PHOTOMETRIC METHOD: 32 MM/HR
GFR SERPLBLD CREATININE-BSD FMLA CKD-EPI: 33 ML/MIN/1.73/M2
GFR SERPLBLD CREATININE-BSD FMLA CKD-EPI: 33 ML/MIN/1.73/M2
GLUCOSE SERPL-MCNC: 95 MG/DL (ref 70–110)
GLUCOSE SERPL-MCNC: 95 MG/DL (ref 70–110)
HBV CORE AB SERPL QL IA: NORMAL
HBV SURFACE AB SER-ACNC: 3.02 MIU/ML
HBV SURFACE AB SERPL IA-ACNC: NORMAL M[IU]/ML
HBV SURFACE AG SERPL QL IA: NORMAL
HCT VFR BLD AUTO: 38.9 % (ref 40–54)
HCT VFR BLD AUTO: 39 % (ref 40–54)
HGB BLD-MCNC: 12.2 GM/DL (ref 14–18)
HGB BLD-MCNC: 12.3 GM/DL (ref 14–18)
IMM GRANULOCYTES # BLD AUTO: 0.03 K/UL (ref 0–0.04)
IMM GRANULOCYTES # BLD AUTO: 0.03 K/UL (ref 0–0.04)
IMM GRANULOCYTES NFR BLD AUTO: 0.3 % (ref 0–0.5)
IMM GRANULOCYTES NFR BLD AUTO: 0.3 % (ref 0–0.5)
LYMPHOCYTES # BLD AUTO: 1.02 K/UL (ref 1–4.8)
LYMPHOCYTES # BLD AUTO: 1.08 K/UL (ref 1–4.8)
MCH RBC QN AUTO: 30.6 PG (ref 27–31)
MCH RBC QN AUTO: 30.8 PG (ref 27–31)
MCHC RBC AUTO-ENTMCNC: 31.3 G/DL (ref 32–36)
MCHC RBC AUTO-ENTMCNC: 31.6 G/DL (ref 32–36)
MCV RBC AUTO: 98 FL (ref 82–98)
MCV RBC AUTO: 98 FL (ref 82–98)
NUCLEATED RBC (/100WBC) (OHS): 0 /100 WBC
NUCLEATED RBC (/100WBC) (OHS): 0 /100 WBC
PLATELET # BLD AUTO: 175 K/UL (ref 150–450)
PLATELET # BLD AUTO: 184 K/UL (ref 150–450)
PMV BLD AUTO: 9.6 FL (ref 9.2–12.9)
PMV BLD AUTO: 9.8 FL (ref 9.2–12.9)
POTASSIUM SERPL-SCNC: 4.7 MMOL/L (ref 3.5–5.1)
POTASSIUM SERPL-SCNC: 4.8 MMOL/L (ref 3.5–5.1)
PROT SERPL-MCNC: 7.5 GM/DL (ref 6–8.4)
PROT SERPL-MCNC: 7.8 GM/DL (ref 6–8.4)
RBC # BLD AUTO: 3.99 M/UL (ref 4.6–6.2)
RBC # BLD AUTO: 3.99 M/UL (ref 4.6–6.2)
RELATIVE EOSINOPHIL (OHS): 0.3 %
RELATIVE EOSINOPHIL (OHS): 0.3 %
RELATIVE LYMPHOCYTE (OHS): 9.1 % (ref 18–48)
RELATIVE LYMPHOCYTE (OHS): 9.6 % (ref 18–48)
RELATIVE MONOCYTE (OHS): 5.6 % (ref 4–15)
RELATIVE MONOCYTE (OHS): 6.2 % (ref 4–15)
RELATIVE NEUTROPHIL (OHS): 83.8 % (ref 38–73)
RELATIVE NEUTROPHIL (OHS): 84 % (ref 38–73)
SODIUM SERPL-SCNC: 137 MMOL/L (ref 136–145)
SODIUM SERPL-SCNC: 139 MMOL/L (ref 136–145)
TSH SERPL-ACNC: 0.64 UIU/ML (ref 0.4–4)
WBC # BLD AUTO: 11.22 K/UL (ref 3.9–12.7)
WBC # BLD AUTO: 11.26 K/UL (ref 3.9–12.7)

## 2025-06-09 PROCEDURE — 85652 RBC SED RATE AUTOMATED: CPT

## 2025-06-09 PROCEDURE — 1125F AMNT PAIN NOTED PAIN PRSNT: CPT | Mod: CPTII,S$GLB,, | Performed by: INTERNAL MEDICINE

## 2025-06-09 PROCEDURE — 99214 OFFICE O/P EST MOD 30 MIN: CPT | Mod: S$GLB,,, | Performed by: INTERNAL MEDICINE

## 2025-06-09 PROCEDURE — 86480 TB TEST CELL IMMUN MEASURE: CPT

## 2025-06-09 PROCEDURE — 86140 C-REACTIVE PROTEIN: CPT

## 2025-06-09 PROCEDURE — 36415 COLL VENOUS BLD VENIPUNCTURE: CPT

## 2025-06-09 PROCEDURE — 3008F BODY MASS INDEX DOCD: CPT | Mod: CPTII,S$GLB,, | Performed by: INTERNAL MEDICINE

## 2025-06-09 PROCEDURE — 85025 COMPLETE CBC W/AUTO DIFF WBC: CPT | Mod: 91

## 2025-06-09 PROCEDURE — 3078F DIAST BP <80 MM HG: CPT | Mod: CPTII,S$GLB,, | Performed by: INTERNAL MEDICINE

## 2025-06-09 PROCEDURE — 86706 HEP B SURFACE ANTIBODY: CPT | Mod: 59

## 2025-06-09 PROCEDURE — G2211 COMPLEX E/M VISIT ADD ON: HCPCS | Mod: S$GLB,,, | Performed by: INTERNAL MEDICINE

## 2025-06-09 PROCEDURE — 3288F FALL RISK ASSESSMENT DOCD: CPT | Mod: CPTII,S$GLB,, | Performed by: INTERNAL MEDICINE

## 2025-06-09 PROCEDURE — 1160F RVW MEDS BY RX/DR IN RCRD: CPT | Mod: CPTII,S$GLB,, | Performed by: INTERNAL MEDICINE

## 2025-06-09 PROCEDURE — 99999 PR PBB SHADOW E&M-EST. PATIENT-LVL III: CPT | Mod: PBBFAC,,, | Performed by: INTERNAL MEDICINE

## 2025-06-09 PROCEDURE — 87340 HEPATITIS B SURFACE AG IA: CPT

## 2025-06-09 PROCEDURE — 86704 HEP B CORE ANTIBODY TOTAL: CPT

## 2025-06-09 PROCEDURE — 84443 ASSAY THYROID STIM HORMONE: CPT

## 2025-06-09 PROCEDURE — 1159F MED LIST DOCD IN RCRD: CPT | Mod: CPTII,S$GLB,, | Performed by: INTERNAL MEDICINE

## 2025-06-09 PROCEDURE — 82040 ASSAY OF SERUM ALBUMIN: CPT

## 2025-06-09 PROCEDURE — 85025 COMPLETE CBC W/AUTO DIFF WBC: CPT

## 2025-06-09 PROCEDURE — 3074F SYST BP LT 130 MM HG: CPT | Mod: CPTII,S$GLB,, | Performed by: INTERNAL MEDICINE

## 2025-06-09 PROCEDURE — 80053 COMPREHEN METABOLIC PANEL: CPT

## 2025-06-09 PROCEDURE — 1101F PT FALLS ASSESS-DOCD LE1/YR: CPT | Mod: CPTII,S$GLB,, | Performed by: INTERNAL MEDICINE

## 2025-06-09 RX ORDER — ADALIMUMAB 40MG/0.4ML
40 KIT SUBCUTANEOUS
Qty: 2 PEN | Refills: 6 | Status: ACTIVE | OUTPATIENT
Start: 2025-06-09

## 2025-06-09 NOTE — PROGRESS NOTES
6/2/2025    10:34 AM   Rapid3 Question Responses and Scores   MDHAQ Score 0.3   Psychologic Score 1.1   Pain Score 6   When you awakened in the morning OVER THE LAST WEEK, did you feel stiff? Yes   If Yes, please indicate the number of hours until you are as limber as you will be for the day 1   Fatigue Score 7   Global Health Score 7   RAPID3 Score 4.67     Answers submitted by the patient for this visit:  Rheumatology Questionnaire (Submitted on 6/2/2025)  fever: No  eye redness: No  mouth sores: No  headaches: No  shortness of breath: No  chest pain: No  trouble swallowing: No  diarrhea: No  constipation: No  unexpected weight change: No  genital sore: No  During the last 3 days, have you had a skin rash?: No  Bruises or bleeds easily: No  cough: Yes

## 2025-06-10 ENCOUNTER — RESULTS FOLLOW-UP (OUTPATIENT)
Dept: RHEUMATOLOGY | Facility: CLINIC | Age: 70
End: 2025-06-10

## 2025-06-10 LAB
MITOGEN MINUS NIL (OHS): 10
NIL TB SYNCED (OHS): 0
QUANTIFERON GOLD INTERP (OHS): NEGATIVE
TB1 AG MINUS NIL (OHS): 0.01
TB2 AG MINUS NIL (OHS): 0.01

## 2025-06-10 NOTE — PROGRESS NOTES
History of present illness:  70-year-old male I saw for the 1st time in October.  He had previously been followed by Dr. Jimenez.  He has a history of seropositive rheumatoid arthritis with nodular disease.  She saw him initially in 2006.  He has been on chronic methotrexate therapy, as high as 20 mg weekly.  At the time of his visit he was on 15 mg.  He had previously been on hydroxychloroquine.  He was on Humira until 2021.  He was doing well.  He stopped the medication when it became too expensive.  He has a history of urinary tract cancer.  He has mild renal insufficiency.  At the time of his visit he had no evidence of active rheumatoid arthritis.  I placed him on gabapentin for his nocturnal leg pain.  I decreased his methotrexate to 12.5 mg weekly because of his nodular disease.  He apparently had a flare of his disease in increase the methotrexate back up to 20 mg weekly.     He had stopped taking methotrexate for awhile.  When he restarted it, he took it daily instead of weekly.  He developed oral ulcers.  He developed pancytopenia.  He was hospitalized.  He was evaluated by Dr. Deshpande/Yolanda.  He had already been taken off of the methotrexate.  His arthritis was stable at the time.  He was placed back on Plaquenil.  He had been treated with steroids when he was hospitalized but he is off steroids at the time of his visit in April.  He had no evidence of active synovitis.  I continued him on Plaquenil but did not place him on another DMARD.    He had a flare of his disease is his last visit.  His inflammatory markers were elevated.  I placed him back on his own, initially 20 mg daily, he is now on 10 mg daily.  His arthritis is now under control.  The worst areas pain in his feet.  This is usually worse with activity.  He denies any joint swelling.  He still has some nodules in his hands.    Physical examination:   Musculoskeletal:  Shoulders, elbows, wrists are unremarkable.    He has bony  hypertrophy of the right 1st CMC which is tender.  He has mild soft tissue swelling of the right 5th MCP.  He has no other synovitis of the hands.  Knees and ankles are unremarkable.  He has bilateral bunion deformities.    Assessment:  He had a flare of his rheumatoid arthritis.  He also has underlying osteoarthritis.    Plans:  1.  Laboratory studies obtained, including pre DMARD blood work   2. I discussed with him various options and will try to place him back on Humira 40 mg every other week if it is not too expensive  3.  Increase gabapentin to 200 mg at bedtime   4. Continue Plaquenil   5. Continue prednisone and mg daily   6. Return in 2 months.    Answers submitted by the patient for this visit:  Rheumatology Questionnaire (Submitted on 6/2/2025)  fever: No  eye redness: No  mouth sores: No  headaches: No  shortness of breath: No  chest pain: No  trouble swallowing: No  diarrhea: No  constipation: No  unexpected weight change: No  genital sore: No  During the last 3 days, have you had a skin rash?: No  Bruises or bleeds easily: No  cough: Yes

## 2025-06-11 ENCOUNTER — PATIENT MESSAGE (OUTPATIENT)
Dept: ADMINISTRATIVE | Facility: OTHER | Age: 70
End: 2025-06-11
Payer: MEDICARE

## 2025-06-12 ENCOUNTER — PATIENT MESSAGE (OUTPATIENT)
Dept: ADMINISTRATIVE | Facility: OTHER | Age: 70
End: 2025-06-12
Payer: MEDICARE

## 2025-06-14 ENCOUNTER — PATIENT MESSAGE (OUTPATIENT)
Dept: ADMINISTRATIVE | Facility: OTHER | Age: 70
End: 2025-06-14
Payer: MEDICARE

## 2025-06-20 ENCOUNTER — PATIENT MESSAGE (OUTPATIENT)
Dept: ADMINISTRATIVE | Facility: OTHER | Age: 70
End: 2025-06-20
Payer: MEDICARE

## 2025-06-20 ENCOUNTER — PATIENT OUTREACH (OUTPATIENT)
Dept: ADMINISTRATIVE | Facility: HOSPITAL | Age: 70
End: 2025-06-20
Payer: MEDICARE

## 2025-06-20 DIAGNOSIS — N18.32 STAGE 3B CHRONIC KIDNEY DISEASE: Primary | ICD-10-CM

## 2025-06-23 ENCOUNTER — HOSPITAL ENCOUNTER (OUTPATIENT)
Dept: RADIOLOGY | Facility: HOSPITAL | Age: 70
Discharge: HOME | End: 2025-06-23
Attending: INTERNAL MEDICINE
Payer: MEDICARE

## 2025-06-23 DIAGNOSIS — C64.2 UROTHELIAL CARCINOMA OF KIDNEY, LEFT: ICD-10-CM

## 2025-06-23 PROCEDURE — 25500020 PHARM REV CODE 255: Performed by: INTERNAL MEDICINE

## 2025-06-23 PROCEDURE — 71250 CT THORAX DX C-: CPT | Mod: 26,,, | Performed by: RADIOLOGY

## 2025-06-23 PROCEDURE — 71250 CT THORAX DX C-: CPT | Mod: TC

## 2025-06-23 PROCEDURE — A9698 NON-RAD CONTRAST MATERIALNOC: HCPCS | Performed by: INTERNAL MEDICINE

## 2025-06-23 PROCEDURE — 74176 CT ABD & PELVIS W/O CONTRAST: CPT | Mod: 26,,, | Performed by: RADIOLOGY

## 2025-06-23 RX ADMIN — BARIUM SULFATE 450 ML: 20 SUSPENSION ORAL at 12:06

## 2025-06-24 NOTE — PROGRESS NOTES
MEDICAL ONCOLOGY - FOLLOW-UP VISIT    Reason for visit: Upper tract Urothelial carcinoma     Best Contact Phone Number(s): [phone] (home)      Cancer/Stage/TNM:    Cancer Staging   Ureteral tumor  Staging form: Renal Pelvis and Ureter, AJCC 8th Edition  - Clinical stage from 7/28/2023: Stage II (cT2, cN0, cM0) - Signed by Marquita Stoner NP on 8/28/2023       Oncology History   Ureteral tumor   2/16/2023 Initial Diagnosis    Ureteral tumor     7/28/2023 Cancer Staged    Staging form: Renal Pelvis and Ureter, AJCC 8th Edition  - Clinical stage from 7/28/2023: Stage II (cT2, cN0, cM0)     Urothelial carcinoma with high risk of metastasis   3/6/2023 Initial Diagnosis    Urothelial carcinoma with high risk of metastasis     3/14/2023 - 6/29/2023 Chemotherapy    Treatment Summary   Plan Name: OP BLADDER GEMCITABINE CISPLATIN (SPLIT DOSE CISPLATIN) Q3W  Treatment Goal: Curative  Status: Inactive  Start Date: 3/14/2023  End Date: 6/29/2023  Provider: Issa Antony MD  Chemotherapy: CISplatin (Platinol) 35 mg/m2 = 64 mg in sodium chloride 0.9% 314 mL chemo infusion, 35 mg/m2 = 64 mg, Intravenous, Clinic/HOD 1 time, 4 of 4 cycles  Dose modification: 17.5 mg/m2 (original dose 35 mg/m2, Cycle 3, Reason: Other (see comments), Comment: renal function), 35 mg/m2 (original dose 35 mg/m2, Cycle 3, Reason: Other (see comments)), 17.5 mg/m2 (original dose 35 mg/m2, Cycle 2, Reason: Other (see comments), Comment: renal function)  Administration: 64 mg (3/14/2023), 64 mg (3/21/2023), 64 mg (4/19/2023), 64 mg (5/4/2023), 64 mg (5/16/2023), 32 mg (4/4/2023), 64 mg (6/1/2023), 64 mg (6/28/2023)  gemcitabine 1,800 mg in sodium chloride 0.9% SolP 332.34 mL chemo infusion, 1,840 mg, Intravenous, Clinic/HOD 1 time, 4 of 4 cycles  Administration: 1,800 mg (3/14/2023), 1,800 mg (3/21/2023), 1,800 mg (4/4/2023), 1,800 mg (4/19/2023), 1,800 mg (5/4/2023), 1,800 mg (5/16/2023), 1,800 mg (6/1/2023), 1,800 mg (6/28/2023)          HPI:   70  y.o. male with PVD, PAD, HLD, CKD3, AAA, treated hep C, Rheumatoid arthritis on MTX (previously on humara), smoking (about 1/2 PPD) who presented with gross hematuria. He saw urology and underwent CT urogram which showed Few soft tissue masses within the left renal pelvis, the largest measures 2.5 x 1.3 cm. There was also a L adrenal nodule measuring 1.6 cm. Urine cytology was evident of atypical urothelial cells, and repeat sample showed high grade urothelial carcinoma. He underwent a flexible cystoscopy with normal bladder findings. L RPG showed Left RPG with mild hydronephrosis and filling defect ~2 cm in central renal pelvis.  Left ureteroscopy with was done and showed papillary tumors along the proximal ureter and renal pelvis. - Large ~2 cm nodular lesion in the renal pelvis consistent with filling defect. Biopsy of the mass came back as - High grade urothelial dysplasia/carcinoma in situ. - No definitive invasive carcinoma identified (outside report)    PET/CT done on 03/08 showed Left proximal ureteral lesion compatible with urothelial cancer.  No FDG PET evidence of metastatic disease.      Interval history:   s/p neoadj GC and robotic nephroureterectomy on 7/14/23 with Dr. Jefferson with negative margins, high grade UTUC. Patient feeling well. No new issues.     Review of Systems   Constitutional:  Positive for malaise/fatigue. Negative for chills, fever and weight loss.   HENT:  Positive for hearing loss. Negative for congestion. Ear discharge: r>L.   Eyes:  Negative for blurred vision.   Respiratory:  Negative for cough and shortness of breath.    Cardiovascular:  Negative for chest pain, palpitations and leg swelling.   Gastrointestinal:  Positive for constipation. Negative for abdominal pain, blood in stool, diarrhea, nausea and vomiting.   Genitourinary:  Negative for dysuria and frequency.   Musculoskeletal:  Negative for back pain and myalgias.   Skin:  Negative for itching and rash.   Neurological:   Negative for dizziness, tingling, tremors, sensory change, focal weakness and headaches.   Endo/Heme/Allergies:  Does not bruise/bleed easily.   Psychiatric/Behavioral:  The patient is not nervous/anxious.        Past Medical History:   Past Medical History:   Diagnosis Date    Anemia     Cancer     Cataract     Chemotherapy induced neutropenia 04/11/2023    Colon polyp 2014    Depression     Disorder of kidney and ureter     History of hepatitis C, s/p successful treatment w/ SVR12 - 7/2015 10/14/2012    Kelsey 1a,  Liver biopsy 6/2014 - Stage 1 fibrosis;  Completed 8 weeks Harvoni w/ SVR12 - 7/2015      Hyperlipidemia     Hypertension 03/08/2024    Lipoma of arm     Lipoma of lower extremity     Nuclear sclerosis - Both Eyes 10/22/2012    Other and unspecified hyperlipidemia 10/22/2013    PAD (peripheral artery disease)     Peripheral vascular disease, unspecified     Plaquenil adverse reaction in therapeutic use 10/22/2012    Rheumatoid arthritis(714.0) 10/14/2012    Special screening for malignant neoplasms, colon 02/21/2014        Past Surgical History:   Past Surgical History:   Procedure Laterality Date    BIOPSY OF URETER Left 02/16/2023    Procedure: BIOPSY, URETER/BRUSH;  Surgeon: Kem Jefferson MD;  Location: [facility] OR [address] [place] [address];  Service: Urology;  Laterality: Left;    COLONOSCOPY N/A 11/29/2021    Procedure: COLONOSCOPY;  Surgeon: Kenrick Zimmer MD;  Location: [facility] (4TH FLR);  Service: Endoscopy;  Laterality: N/A;  blood thinner approval received, see telephone encounter 10/19/21-BB  fully vacc-inst email-tb    CYSTOSCOPY      CYSTOSCOPY  02/16/2023    Procedure: CYSTOSCOPY;  Surgeon: Kem Jefferson MD;  Location: [facility] OR [address] [place] [address];  Service: Urology;;    HERNIA REPAIR      INSERTION OF TUNNELED CENTRAL VENOUS CATHETER (CVC) WITH SUBCUTANEOUS PORT Right 3/13/2023    Procedure: INSERTION, PORT-A-CATH;  Surgeon: Rene Cali MD;  Location: [facility] CATH LAB;  Service: Radiology;  Laterality:  Right;    KNEE ARTHROPLASTY      LAPAROSCOPIC EXPLORATION OF GROIN Left 09/06/2018    Procedure: EXPLORATION, INGUINAL REGION, LAPAROSCOPIC;  Surgeon: Mingo De Guzman Jr., MD;  [facility];  Service: General;  Laterality: Left;    MEDIPORT REMOVAL N/A 10/3/2023    Procedure: REMOVAL, CATHETER, CENTRAL VENOUS, TUNNELED, WITH PORT;  Surgeon: Yeimi Stanton MD;  Location: [facility] CATH LAB;  Service: Radiology;  Laterality: N/A;    PYELOSCOPY Left 02/16/2023    Procedure: PYELOSCOPY;  Surgeon: Kem Jefferson MD;  Location: [facility] OR [address] [place] [address];  Service: Urology;  Laterality: Left;    RETROGRADE PYELOGRAPHY Bilateral 02/16/2023    Procedure: PYELOGRAM, RETROGRADE;  Surgeon: Kem Jefferson MD;  Location: [facility] OR [address] [place] [address];  Service: Urology;  Laterality: Bilateral;    ROBOT-ASSISTED LAPAROSCOPIC SURGICAL REMOVAL OF KIDNEY AND URETER USING DA BRIJESH XI Left 7/14/2023    Procedure: XI ROBOTIC NEPHROURETERECTOMY;  Surgeon: Kem Jefferson MD;  Location: [facility] OR [address] [place] [address];  Service: Urology;  Laterality: Left;  5 hours    TONSILLECTOMY      URETEROSCOPIC REMOVAL OF URETERIC CALCULUS Left 02/16/2023    Procedure: REMOVAL, CALCULUS, URETER, URETEROSCOPIC;  Surgeon: Kem Jefferson MD;  Location: [address];  Service: Urology;  Laterality: Left;    URETEROSCOPY Left 02/16/2023    Procedure: URETEROSCOPY;  Surgeon: Kem Jefferson MD;  Location: [address];  Service: Urology;  Laterality: Left;        Family History:   Family History   Problem Relation Name Age of Onset    Dementia Mother      Dementia Father      Heart disease Sister x1     Arthritis Sister x1     Kidney disease Sister x1     Seizures Sister x1     No Known Problems Daughter x2     Heart disease Paternal Uncle      Liver disease Neg Hx      Amblyopia Neg Hx      Blindness Neg Hx      Cancer Neg Hx      Cataracts Neg Hx      Diabetes Neg Hx      Glaucoma Neg Hx      Hypertension Neg Hx      Macular degeneration Neg Hx       Retinal detachment Neg Hx      Strabismus Neg Hx      Stroke Neg Hx      Thyroid disease Neg Hx          Social History:   Social History     Tobacco Use    Smoking status: Former     Current packs/day: 0.00     Average packs/day: 0.5 packs/day for 47.5 years (23.7 ttl pk-yrs)     Types: Cigarettes     Start date:      Quit date: 2020     Years since quittin.9    Smokeless tobacco: Never   Substance Use Topics    Alcohol use: Yes     Comment: 2 drinks per week        I have reviewed and updated the patient's past medical, surgical, family and social histories.    Allergies:   Review of patient's allergies indicates:   Allergen Reactions    Iodinated contrast media      Shortness of breath        Medications:   Current Outpatient Medications   Medication Sig Dispense Refill    acetaminophen (TYLENOL) 650 MG TbSR Take 1 tablet (650 mg total) by mouth every 8 (eight) hours. 63 tablet 0    aspirin 81 MG Chew Take 81 mg by mouth once daily.      cilostazoL (PLETAL) 50 MG Tab Take 1 tablet (50 mg total) by mouth 2 (two) times daily. 180 tablet 3    folic acid (FOLVITE) 1 MG tablet Take 4 tablets (4 mg total) by mouth once daily. 120 tablet 0    gabapentin (NEURONTIN) 100 MG capsule TAKE 1 CAPSULE BY MOUTH IN THE  EVENING 90 capsule 1    levocetirizine (XYZAL) 5 MG tablet TAKE 1 TABLET BY MOUTH EVERY DAY IN THE EVENING 90 tablet 1    LIDOcaine viscous HCl 2% (XYLOCAINE) 2 % Soln Swish and swallow 10 mLs every 6 (six) hours. 120 mL 3    magic mouthwash diphen/antac/lidoc/nysta Take 10 mLs by mouth 4 (four) times daily. 120 mL 3    oxyCODONE (ROXICODONE) 5 MG immediate release tablet Take 1 tablet (5 mg total) by mouth every 4 (four) hours as needed. 42 tablet 0    predniSONE (DELTASONE) 5 MG tablet Two tablets twice daily for 1 week, then 2 tablets in the morning. 90 tablet 1    simvastatin (ZOCOR) 10 MG tablet Take 1 tablet (10 mg total) by mouth every evening. 90 tablet 0    tamsulosin (FLOMAX) 0.4 mg Cap  Take 1 capsule (0.4 mg total) by mouth once daily. 30 capsule 11    traZODone (DESYREL) 50 MG tablet TAKE 3 TABLETS BY MOUTH AT NIGHT AS NEEDED FOR INSOMNIA 270 tablet 3    valACYclovir (VALTREX) 1000 MG tablet Take 1 tablet (1,000 mg total) by mouth 2 (two) times daily. 60 tablet 11     No current facility-administered medications for this visit.        Physical Exam:   There were no vitals taken for this visit.     ECOG Performance status: 1         Physical Exam  Constitutional:       General: He is not in acute distress.     Appearance: Normal appearance.   HENT:      Head: Normocephalic and atraumatic.   Eyes:      Pupils: Pupils are equal, round, and reactive to light.   Cardiovascular:      Rate and Rhythm: Normal rate and regular rhythm.      Heart sounds: No murmur heard.  Pulmonary:      Effort: No respiratory distress.      Breath sounds: Normal breath sounds. No wheezing.   Abdominal:      General: Abdomen is flat. Bowel sounds are normal. There is no distension.      Palpations: Abdomen is soft. There is no mass.      Tenderness: There is no abdominal tenderness.   Musculoskeletal:         General: No swelling or deformity.   Skin:     Coloration: Skin is not jaundiced.   Neurological:      General: No focal deficit present.      Mental Status: He is alert and oriented to person, place, and time. Mental status is at baseline.           Labs:   WBC   Date Value Ref Range Status   06/23/2025 12.19 3.90 - 12.70 K/uL Final     Hemoglobin   Date Value Ref Range Status   03/19/2025 8.5 (L) 14.0 - 18.0 g/dL Final     HGB   Date Value Ref Range Status   06/23/2025 13.5 (L) 14.0 - 18.0 gm/dL Final     POC Hematocrit   Date Value Ref Range Status   03/06/2025 24 (L) 36 - 54 %PCV Final     Hematocrit   Date Value Ref Range Status   03/19/2025 29.2 (L) 40.0 - 54.0 % Final     HCT   Date Value Ref Range Status   06/23/2025 43.5 40.0 - 54.0 % Final     Platelet Count   Date Value Ref Range Status   06/23/2025 199 150  - 450 K/uL Final     Platelets   Date Value Ref Range Status   03/19/2025 856 (H) 150 - 450 K/uL Final       Chemistry        Component Value Date/Time     06/23/2025 1032     03/19/2025 1042    K 4.5 06/23/2025 1032    K 4.7 03/19/2025 1042     06/23/2025 1032     03/19/2025 1042    CO2 21 (L) 06/23/2025 1032    CO2 13 (L) 03/19/2025 1042    BUN 35 (H) 06/23/2025 1032    CREATININE 2.1 (H) 06/23/2025 1032     06/23/2025 1032     (H) 03/19/2025 1042        Component Value Date/Time    CALCIUM 9.5 06/23/2025 1032    CALCIUM 9.5 03/19/2025 1042    ALKPHOS 89 06/23/2025 1032    ALKPHOS 76 03/15/2025 0347    AST 13 06/23/2025 1032    AST 14 03/15/2025 0347    ALT 11 06/23/2025 1032    ALT 13 03/15/2025 0347    BILITOT 0.2 06/23/2025 1032    BILITOT 0.4 03/15/2025 0347    ESTGFRAFRICA >60.0 05/12/2022 1238    EGFRNONAA >60.0 05/12/2022 1238        CT Chest Abdomen Pelvis Without Contrast (XPD)  Narrative: EXAMINATION:  CT CHEST ABDOMEN PELVIS WITHOUT CONTRAST(XPD)    CLINICAL HISTORY:  Kidney cancer, monitor;Malignant neoplasm of left kidney, except renal pelvis    TECHNIQUE:  Low dose axial images, sagittal and coronal reformations were obtained from the thoracic inlet to the pubic symphyses without intravenous contrast. following the oral administration of 30 mL of Omnipaque 350.  Maximum intensity projections of the chest obtained.    COMPARISON:  CT chest abdomen pelvis 03/11/2025    FINDINGS:  Support tubes and lines: None.    Aorta: Moderate atherosclerosis.  Ascending aorta is normal in caliber.  4.2 cm aneurysmal dilatation of the descending aorta, unchanged.    Heart: Normal size.  Calcification of the aortic valve.    Coronary arteries: Multi-vessel calcific atherosclerosis of the coronary arteries.    Pericardium: Normal. No effusion, thickening, or calcification.    Central pulmonary arteries: Normal caliber.    Base of neck/thyroid: Unremarkable.    Lymph nodes: No  supraclavicular, axillary, internal mammary, mediastinal, or hilar adenopathy.    Esophagus: Unremarkable.    Pleura: Bilateral small pleural effusions, left greater than right, unchanged.    Airways: Unremarkable.    Lungs: Emphysematous changes of the lungs.  Bilateral bandlike scarring versus atelectasis in the bilateral lung bases.  Bilateral calcified granulomas.  8 mm right lower lobe nodule with central cavitation, unchanged.  6 mm pulmonary nodule within the left lower lobe, unchanged.    Liver: Normal size.  Otherwise poorly evaluated in the absence of intravenous contrast.    Gallbladder: No calcified gallstones.    Bile Ducts: No evidence of dilated ducts.    Pancreas: Normal size.  Otherwise poorly evaluated in the absence of intravenous contrast.    Spleen: Normal size.    Adrenals: Normal size.  Stable left adrenal nodule measuring 1.6 cm on image 34 of series 3 consistent with an adenoma.    Kidneys/ Ureters: Status post left nephrectomy.  Multiple right simple renal cysts measuring up to 2.4 cm, unchanged.  Right extrarenal pelvis.  No hydronephrosis or nephrolithiasis. No ureteral dilatation.    Bladder: No evidence of wall thickening.    Reproductive organs: Prostate is not enlarged.  Dystrophic prostatic calcifications, unchanged.    GI Tract/Mesentery: Stomach is unremarkable.  No evidence of bowel obstruction or inflammation.  Appendix is unremarkable.  Colonic diverticulosis, unchanged.    Peritoneal Space: No ascites. No free air.    Retroperitoneum: No significant adenopathy.    Abdominal wall: Right fat containing inguinal hernia.  Left bowel containing inguinal hernia, previously fat containing.    Vasculature: Abdominal aortic aneurysm measuring up to 5.5 cm, previously 5 cm, just prior to the bifurcation.  Extensive atherosclerosis of the aorta and its branches.    Bones: Degenerative changes of the osseous structures.  No acute fracture or bony destructive process.  Impression: Status  post left nephrectomy.  No evidence of recurrence at the surgical site or lymphadenopathy.    Fusiform aneurysmal dilatation of the abdominal aorta, measuring up to 5.5 cm in the infrarenal abdominal aorta, previously 5 cm.    Aneurysmal dilatation of the descending thoracic aorta measuring 4.2 cm, unchanged.    Stable pulmonary nodules.    Bilateral small pleural effusions, unchanged.    Additional findings as above.    Electronically signed by resident: Yulissa Salinas  Date:    06/23/2025  Time:    16:20    Electronically signed by: Osmar Rivera  Date:    06/23/2025  Time:    16:53        Assessment:       1. Urothelial carcinoma of kidney, left    2. H/O nephroureterectomy    3. Pulmonary nodule    4. Encounter for screening for malignant neoplasm of prostate            Plan:     Cancer Staging   Ureteral tumor  Staging form: Renal Pelvis and Ureter, AJCC 8th Edition  - Clinical stage from 7/28/2023: Stage II (cT2, cN0, cM0) - Signed by Marquita Stoner NP on 8/28/2023    70 y.o. M patient presented with gross hematuria and was found to have multiple soft tissue masses in L renal pelvis. He is s/p L urethroscopy and biopsy which showed high grade urothelial dysplasia. On CT urogram, there was a 1.6 cm L adrenal nodule, as well as multiple pulmonary nodules, largest was 0.6 cm, and multiple hepatic nodules too small to characterize.     Overall picture is concerning for invasive upper tract urothelial carcinoma.   PET/CT showed no evidence of metastatic disease. There is B/L adrenal adenomas.     Now s/p neoadjuvant chemotherapy with Gemcitabine and Cisplatin. Split dose Cisplatin for borderline kidney functions. Also s/p surgery.     Because of his RA (not very controlled), we avoided IO adjuvantely. Current scans show stable pulmonary nodule in the lungs but still less than 1 cm. Stable pleural thickening.  Will monitor closely.      Restaging scans JU.     Aneurysmal dilations noted, slightly larger.  Followed  with Dr. Jensen who is no longer here, will get him in with one of the other vascular docs.     RTC in 6 mos to see Dr. Antony.  CT CAP and labs (CBC, CMP) the day before.       Patient is in agreement with the proposed treatment plan. All questions were answered to the patient's satisfaction. Pt knows to call clinic if anything is needed before the next clinic visit.    30 mins total time were spent during this encounter.      Issa Antony M.D., M.S., F.A.C.P.  Hematology/Oncology Attending  [facility]               Med Onc Chart Routing      Follow up with physician . Please schedule with vascular surgery for expanding AAA (he has seen Dr. Jensen in the past who is no longer with us).   RTC in 6 mos to see Dr. Antony.  CT CAP and labs (CBC, CMP) the day before.   Follow up with DESIREE    Infusion scheduling note    Injection scheduling note    Labs    Imaging    Pharmacy appointment    Other referrals                 Visit today included increased complexity associated with the care of the episodic problems addressed and managing the longitudinal care of the patient due to the serious and/or complex managed problem(s) discussed above.

## 2025-06-25 ENCOUNTER — OFFICE VISIT (OUTPATIENT)
Dept: HEMATOLOGY/ONCOLOGY | Facility: CLINIC | Age: 70
End: 2025-06-25
Payer: MEDICARE

## 2025-06-25 VITALS
OXYGEN SATURATION: 99 % | WEIGHT: 155.19 LBS | DIASTOLIC BLOOD PRESSURE: 90 MMHG | RESPIRATION RATE: 18 BRPM | SYSTOLIC BLOOD PRESSURE: 155 MMHG | TEMPERATURE: 98 F | HEIGHT: 69 IN | BODY MASS INDEX: 22.98 KG/M2 | HEART RATE: 79 BPM

## 2025-06-25 DIAGNOSIS — Z12.5 ENCOUNTER FOR SCREENING FOR MALIGNANT NEOPLASM OF PROSTATE: ICD-10-CM

## 2025-06-25 DIAGNOSIS — C64.2 UROTHELIAL CARCINOMA OF KIDNEY, LEFT: Primary | ICD-10-CM

## 2025-06-25 DIAGNOSIS — Z90.5 H/O NEPHROURETERECTOMY: ICD-10-CM

## 2025-06-25 DIAGNOSIS — R91.1 PULMONARY NODULE: ICD-10-CM

## 2025-06-25 DIAGNOSIS — Z90.6 H/O NEPHROURETERECTOMY: ICD-10-CM

## 2025-06-25 PROCEDURE — 3077F SYST BP >= 140 MM HG: CPT | Mod: CPTII,S$GLB,, | Performed by: INTERNAL MEDICINE

## 2025-06-25 PROCEDURE — 99215 OFFICE O/P EST HI 40 MIN: CPT | Mod: S$GLB,,, | Performed by: INTERNAL MEDICINE

## 2025-06-25 PROCEDURE — 3288F FALL RISK ASSESSMENT DOCD: CPT | Mod: CPTII,S$GLB,, | Performed by: INTERNAL MEDICINE

## 2025-06-25 PROCEDURE — 1159F MED LIST DOCD IN RCRD: CPT | Mod: CPTII,S$GLB,, | Performed by: INTERNAL MEDICINE

## 2025-06-25 PROCEDURE — G2211 COMPLEX E/M VISIT ADD ON: HCPCS | Mod: S$GLB,,, | Performed by: INTERNAL MEDICINE

## 2025-06-25 PROCEDURE — 1101F PT FALLS ASSESS-DOCD LE1/YR: CPT | Mod: CPTII,S$GLB,, | Performed by: INTERNAL MEDICINE

## 2025-06-25 PROCEDURE — 3080F DIAST BP >= 90 MM HG: CPT | Mod: CPTII,S$GLB,, | Performed by: INTERNAL MEDICINE

## 2025-06-25 PROCEDURE — 99999 PR PBB SHADOW E&M-EST. PATIENT-LVL IV: CPT | Mod: PBBFAC,,, | Performed by: INTERNAL MEDICINE

## 2025-06-25 PROCEDURE — 1126F AMNT PAIN NOTED NONE PRSNT: CPT | Mod: CPTII,S$GLB,, | Performed by: INTERNAL MEDICINE

## 2025-06-25 PROCEDURE — 3008F BODY MASS INDEX DOCD: CPT | Mod: CPTII,S$GLB,, | Performed by: INTERNAL MEDICINE

## 2025-06-26 ENCOUNTER — TELEPHONE (OUTPATIENT)
Dept: VASCULAR SURGERY | Facility: CLINIC | Age: 70
End: 2025-06-26
Payer: MEDICARE

## 2025-06-26 DIAGNOSIS — I71.43 INFRARENAL ABDOMINAL AORTIC ANEURYSM (AAA) WITHOUT RUPTURE: ICD-10-CM

## 2025-06-26 DIAGNOSIS — Z01.818 ENCOUNTER FOR OTHER PREPROCEDURAL EXAMINATION: Primary | ICD-10-CM

## 2025-06-26 DIAGNOSIS — Z01.818 PRE-OP TESTING: ICD-10-CM

## 2025-06-26 NOTE — TELEPHONE ENCOUNTER
"----- Message from MYRA Land sent at 6/26/2025 10:23 AM CDT -----  Spoke with the pt in reference to scheduling a f/u appt and obtaining CTA.Pt verbalized "I just paid over $200 for the last CT. I don't have that kind of money for another CT if I don't have to". Explained to the pt that the CTA is for pre-op planning and he verbalized "only if I have to after I talked with the Dr.Informed him that I will have Dr Mcleod give him a call and to contact the clinic after their conversation.Pt verbalized understanding of information received.  ----- Message -----  From: Mitch Mcleod MD  Sent: 6/26/2025  10:10 AM CDT  To: Shanda Esquivel RN    Good question.  IN reviewing his films, there has been rapid growth of the aneurysm between February and June.  I know that he has renal insufficiency, but would highly recommend CTA T/A/P for operative planning.  If you would like me to speak with him about using IV contrast , please let me know and I will call him to explain.    Thank you.    Mitch  ----- Message -----  From: Shanda Esquivel RN  Sent: 6/26/2025   9:48 AM CDT  To: Mitch Mcleod MD    Former pt of Dr. Jensen. Pt had CT C/A/P on 3/11/25 and 6/23/25. Per the results from 6/23/25: Fusiform aneurysmal dilatation of the abdominal aorta, measuring up to 5.5 cm in the infrarenal abdominal aorta, previously 5 cm.     Aneurysmal dilatation of the descending thoracic aorta measuring 4.2 cm, unchanged.    Did you want any additional imaging before seeing him?  ----- Message -----  From: Kristina Zuluaga MA  Sent: 6/26/2025   8:59 AM CDT  To: [facility] Vascular Surgery Clinical Support    Good morning, Dr. Antony would like for Mr. Gant to be seen as soon as possible for his expanding AAA. He had previously seen Dr. Jensen but I wasn't sure if he was still seeing patients. Can you please assist with getting the pt scheduled with Dr. Jensen or another one of the vascular surgeons? Thank you, Kristina Zuluaga MA  "

## 2025-06-26 NOTE — TELEPHONE ENCOUNTER
"Spoke with the pt who verbalized that "I talked with the dr and you can schedule the CTA but it has to be after 11am". CTA scheduled and iodine prep called in to [facility] pharmacy. F/u appt with Dr Mcleod also scheduled. Pt verbalized understanding of information received and all questions answered.  "

## 2025-06-30 ENCOUNTER — TELEPHONE (OUTPATIENT)
Dept: VASCULAR SURGERY | Facility: CLINIC | Age: 70
End: 2025-06-30
Payer: MEDICARE

## 2025-06-30 NOTE — TELEPHONE ENCOUNTER
Received a secure chat message from radiologist Dr Ramona Vazquez and Dr Mcleod in reference to pt's prep due to contrast allergy.Informed both providers that the standard prep of prednisone 50mg 13 & 7 hrs prior to test and prednisone 50mg with benadryl 50mg 1hr prior to test was called into the pharmacy on Thursday and pt was aware.Spoke with the pt today and confirmed prep times of 2am,8am and 2pm and he verbalized understanding of information received.

## 2025-07-01 ENCOUNTER — HOSPITAL ENCOUNTER (OUTPATIENT)
Dept: RADIOLOGY | Facility: HOSPITAL | Age: 70
Discharge: HOME | End: 2025-07-01
Attending: SURGERY
Payer: MEDICARE

## 2025-07-01 DIAGNOSIS — Z01.818 ENCOUNTER FOR OTHER PREPROCEDURAL EXAMINATION: ICD-10-CM

## 2025-07-01 PROCEDURE — 25500020 PHARM REV CODE 255: Performed by: SURGERY

## 2025-07-01 PROCEDURE — 71275 CT ANGIOGRAPHY CHEST: CPT | Mod: TC

## 2025-07-01 PROCEDURE — 71275 CT ANGIOGRAPHY CHEST: CPT | Mod: 26,,, | Performed by: RADIOLOGY

## 2025-07-01 PROCEDURE — 74174 CTA ABD&PLVS W/CONTRAST: CPT | Mod: 26,,, | Performed by: RADIOLOGY

## 2025-07-01 RX ADMIN — IOHEXOL 100 ML: 350 INJECTION, SOLUTION INTRAVENOUS at 02:07

## 2025-07-02 ENCOUNTER — DOCUMENTATION ONLY (OUTPATIENT)
Dept: VASCULAR SURGERY | Facility: CLINIC | Age: 70
End: 2025-07-02

## 2025-07-02 ENCOUNTER — OFFICE VISIT (OUTPATIENT)
Dept: VASCULAR SURGERY | Facility: CLINIC | Age: 70
End: 2025-07-02
Payer: MEDICARE

## 2025-07-02 VITALS
WEIGHT: 156.5 LBS | SYSTOLIC BLOOD PRESSURE: 126 MMHG | DIASTOLIC BLOOD PRESSURE: 71 MMHG | BODY MASS INDEX: 23.18 KG/M2 | HEART RATE: 71 BPM | HEIGHT: 69 IN | TEMPERATURE: 97 F

## 2025-07-02 DIAGNOSIS — I71.40 AAA (ABDOMINAL AORTIC ANEURYSM) WITHOUT RUPTURE: ICD-10-CM

## 2025-07-02 DIAGNOSIS — I71.43 INFRARENAL ABDOMINAL AORTIC ANEURYSM (AAA) WITHOUT RUPTURE: Primary | ICD-10-CM

## 2025-07-02 LAB
CREAT SERPL-MCNC: 2.1 MG/DL (ref 0.5–1.4)
SAMPLE: ABNORMAL

## 2025-07-02 PROCEDURE — 99999 PR PBB SHADOW E&M-EST. PATIENT-LVL III: CPT | Mod: PBBFAC,,, | Performed by: SURGERY

## 2025-07-02 RX ORDER — SODIUM CHLORIDE 0.9 % (FLUSH) 0.9 %
10 SYRINGE (ML) INJECTION
OUTPATIENT
Start: 2025-07-02

## 2025-07-02 RX ORDER — MUPIROCIN 20 MG/G
OINTMENT TOPICAL
OUTPATIENT
Start: 2025-07-02

## 2025-07-02 RX ORDER — LIDOCAINE HYDROCHLORIDE 10 MG/ML
1 INJECTION, SOLUTION EPIDURAL; INFILTRATION; INTRACAUDAL; PERINEURAL ONCE
OUTPATIENT
Start: 2025-07-02 | End: 2025-07-02

## 2025-07-02 NOTE — PROGRESS NOTES
VASCULAR SURGERY CLINIC NOTE    Patient ID: Andrea Gant is a 70 y.o. male.    I. HISTORY     Chief Complaint: visit for AAA found on imaging    HPI: Andrea Gant is a 70 y.o. male w PMH of PVD, PAD, HLD, CKD3, AAA, treated hep C, & rheumatoid arthritis who is here today for evaluation of AAA.  He denied abdominal/flank pain.        Past Medical History:   Diagnosis Date    Anemia     Cancer     Cataract     Chemotherapy induced neutropenia 04/11/2023    Colon polyp 2014    Depression     Disorder of kidney and ureter     History of hepatitis C, s/p successful treatment w/ SVR12 - 7/2015 10/14/2012    Kelsey 1a,  Liver biopsy 6/2014 - Stage 1 fibrosis;  Completed 8 weeks Harvoni w/ SVR12 - 7/2015      Hyperlipidemia     Hypertension 03/08/2024    Lipoma of arm     Lipoma of lower extremity     Nuclear sclerosis - Both Eyes 10/22/2012    Other and unspecified hyperlipidemia 10/22/2013    PAD (peripheral artery disease)     Peripheral vascular disease, unspecified     Plaquenil adverse reaction in therapeutic use 10/22/2012    Rheumatoid arthritis(714.0) 10/14/2012    Special screening for malignant neoplasms, colon 02/21/2014        Past Surgical History:   Procedure Laterality Date    BIOPSY OF URETER Left 02/16/2023    Procedure: BIOPSY, URETER/BRUSH;  Surgeon: Kem Jefferson MD;  Location: [facility] OR [address] [place] [address];  Service: Urology;  Laterality: Left;    COLONOSCOPY N/A 11/29/2021    Procedure: COLONOSCOPY;  Surgeon: Kenrick Zimmer MD;  Location: [facility] (4TH FLR);  Service: Endoscopy;  Laterality: N/A;  blood thinner approval received, see telephone encounter 10/19/21-BB  fully vacc-inst email-tb    CYSTOSCOPY      CYSTOSCOPY  02/16/2023    Procedure: CYSTOSCOPY;  Surgeon: Kem Jefferson MD;  Location: [facility] OR [address] [place] [address];  Service: Urology;;    HERNIA REPAIR      INSERTION OF TUNNELED CENTRAL VENOUS CATHETER (CVC) WITH SUBCUTANEOUS PORT Right 3/13/2023    Procedure: INSERTION, PORT-A-CATH;  Surgeon:  Rene Cali MD;  Location: [facility] CATH LAB;  Service: Radiology;  Laterality: Right;    KNEE ARTHROPLASTY      LAPAROSCOPIC EXPLORATION OF GROIN Left 2018    Procedure: EXPLORATION, INGUINAL REGION, LAPAROSCOPIC;  Surgeon: Mingo De Guzman Jr., MD;  Location: [facility] OR 2ND FLR;  Service: General;  Laterality: Left;    MEDIPORT REMOVAL N/A 10/3/2023    Procedure: REMOVAL, CATHETER, CENTRAL VENOUS, TUNNELED, WITH PORT;  Surgeon: Yeimi Stanton MD;  Location: [facility] CATH LAB;  Service: Radiology;  Laterality: N/A;    PYELOSCOPY Left 2023    Procedure: PYELOSCOPY;  Surgeon: Kem Jefferson MD;  [facility];  Service: Urology;  Laterality: Left;    RETROGRADE PYELOGRAPHY Bilateral 2023    Procedure: PYELOGRAM, RETROGRADE;  Surgeon: Kem Jefferson MD;  Location: [facility] OR [address] [place] [address];  Service: Urology;  Laterality: Bilateral;    ROBOT-ASSISTED LAPAROSCOPIC SURGICAL REMOVAL OF KIDNEY AND URETER USING DA BRIJESH XI Left 2023    Procedure: XI ROBOTIC NEPHROURETERECTOMY;  Surgeon: Kem Jefferson MD;  Location: [facility] OR 2ND FLR;  Service: Urology;  Laterality: Left;  5 hours    TONSILLECTOMY      URETEROSCOPIC REMOVAL OF URETERIC CALCULUS Left 2023    Procedure: REMOVAL, CALCULUS, URETER, URETEROSCOPIC;  Surgeon: Kem Jefferson MD;  Location: [facility] OR [address] [place] [address];  Service: Urology;  Laterality: Left;    URETEROSCOPY Left 2023    Procedure: URETEROSCOPY;  Surgeon: Kem Jefferson MD;  Location: [facility] OR [address] [place] [address];  Service: Urology;  Laterality: Left;       Social History     Occupational History     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[employer]   Tobacco Use    Smoking status: Former     Current packs/day: 0.00     Average packs/day: 0.5 packs/day for 47.5 years (23.7 ttl pk-yrs)     Types: Cigarettes     Start date:      Quit date: 2020     Years since quittin.0    Smokeless tobacco: Never   Substance and Sexual Activity    Alcohol use: Yes     Comment: 2 drinks per week     Drug use: Yes     Types: Marijuana     Comment: ann used on Saturday    Sexual activity: Yes     Partners: Female       Current Medications[1]    ROS      II. PHYSICAL EXAM     Physical Exam      Imaging Results: (I have personally reviewed the images/studies and provided my interpretation below)  CTA Chest Abdomen Pelvis 7/2  * stable aneurysmal dilation of the suprarenal abdominal aorta measuring 3.1 cm, similar to 2023.  *aneurysmal dilation of the infrarenal abdominal aorta which measures 6.0 x 4.6 cm in maximum dimensions (2-649) and has significantly increased in size from 2023 and mildly increased in size from recent prior dated 03/11/2025 (previously measured 5.6 x 4.1 cm on 03/11/2025 and 4.7 x 3.8 cm in 2023).  Prominent mural thrombus noted.  * aneurysmal dilation of the right iliac artery measuring 2.0 cm, similar dating back to 2023.  * stable ectasia of the left common iliac artery measuring 1.5 cm, unchanged from priors.  SMA, celiac, MARCELO, and right renal artery are patent.    III. ASSESSMENT & PLAN (MEDICAL DECISION MAKING)     No diagnosis found.      Assessment/Diagnosis and Plan:  70 y.o. male w PMH of PVD, PAD, HLD, CKD3, AAA, treated hep C, & rheumatoid arthritis here for evaluation of AAA.     Plan for endovascular abdominal aortic aneurysm repair.        Virgen Hart, PGY-1  Vascular Surgery      I saw and examined Mr. Gant with Dr. Hart on July 02, 2025 at 1145.  Enlarging abdominal aortic aneurysm.  Denied abdominal/flank pain.  Abdomen was soft, ND, NTTP, with palpable pulsatile mass appreciated.  I reviewed his CTA.  Mr. Gant wished to pursue endovascular abdominal aortic aneurysm repair.  Risks and benefits of the aforementioned operation were discussed with Mr. Gant.  All of his questions were answered.       [1]   Current Outpatient Medications:     acetaminophen (TYLENOL) 650 MG TbSR, Take 1 tablet (650 mg total) by mouth every 8 (eight) hours., Disp: 63 tablet,  Rfl: 0    aspirin 81 MG Chew, Take 81 mg by mouth once daily., Disp: , Rfl:     cilostazoL (PLETAL) 50 MG Tab, Take 1 tablet (50 mg total) by mouth 2 (two) times daily., Disp: 180 tablet, Rfl: 3    folic acid (FOLVITE) 1 MG tablet, Take 4 tablets (4 mg total) by mouth once daily., Disp: 120 tablet, Rfl: 0    gabapentin (NEURONTIN) 100 MG capsule, TAKE 1 CAPSULE BY MOUTH IN THE  EVENING, Disp: 90 capsule, Rfl: 1    levocetirizine (XYZAL) 5 MG tablet, TAKE 1 TABLET BY MOUTH EVERY DAY IN THE EVENING, Disp: 90 tablet, Rfl: 1    LIDOcaine viscous HCl 2% (XYLOCAINE) 2 % Soln, Swish and swallow 10 mLs every 6 (six) hours., Disp: 120 mL, Rfl: 3    magic mouthwash diphen/antac/lidoc/nysta, Take 10 mLs by mouth 4 (four) times daily., Disp: 120 mL, Rfl: 3    oxyCODONE (ROXICODONE) 5 MG immediate release tablet, Take 1 tablet (5 mg total) by mouth every 4 (four) hours as needed., Disp: 42 tablet, Rfl: 0    predniSONE (DELTASONE) 5 MG tablet, Two tablets twice daily for 1 week, then 2 tablets in the morning., Disp: 90 tablet, Rfl: 1    simvastatin (ZOCOR) 10 MG tablet, Take 1 tablet (10 mg total) by mouth every evening., Disp: 90 tablet, Rfl: 0    tamsulosin (FLOMAX) 0.4 mg Cap, Take 1 capsule (0.4 mg total) by mouth once daily., Disp: 30 capsule, Rfl: 11    traZODone (DESYREL) 50 MG tablet, TAKE 3 TABLETS BY MOUTH AT NIGHT AS NEEDED FOR INSOMNIA, Disp: 270 tablet, Rfl: 3    valACYclovir (VALTREX) 1000 MG tablet, Take 1 tablet (1,000 mg total) by mouth 2 (two) times daily., Disp: 60 tablet, Rfl: 11  No current facility-administered medications for this visit.

## 2025-07-02 NOTE — H&P (VIEW-ONLY)
VASCULAR SURGERY CLINIC NOTE    Patient ID: Andrea Gant is a 70 y.o. male.    I. HISTORY     Chief Complaint: visit for AAA found on imaging    HPI: Andrea Gant is a 70 y.o. male w PMH of PVD, PAD, HLD, CKD3, AAA, treated hep C, & rheumatoid arthritis who is here today for evaluation of AAA.  He denied abdominal/flank pain.        Past Medical History:   Diagnosis Date    Anemia     Cancer     Cataract     Chemotherapy induced neutropenia 04/11/2023    Colon polyp 2014    Depression     Disorder of kidney and ureter     History of hepatitis C, s/p successful treatment w/ SVR12 - 7/2015 10/14/2012    Kelsey 1a,  Liver biopsy 6/2014 - Stage 1 fibrosis;  Completed 8 weeks Harvoni w/ SVR12 - 7/2015      Hyperlipidemia     Hypertension 03/08/2024    Lipoma of arm     Lipoma of lower extremity     Nuclear sclerosis - Both Eyes 10/22/2012    Other and unspecified hyperlipidemia 10/22/2013    PAD (peripheral artery disease)     Peripheral vascular disease, unspecified     Plaquenil adverse reaction in therapeutic use 10/22/2012    Rheumatoid arthritis(714.0) 10/14/2012    Special screening for malignant neoplasms, colon 02/21/2014        Past Surgical History:   Procedure Laterality Date    BIOPSY OF URETER Left 02/16/2023    Procedure: BIOPSY, URETER/BRUSH;  Surgeon: Kme Jefferson MD;  Location: [facility] OR [address] [place] [address];  Service: Urology;  Laterality: Left;    COLONOSCOPY N/A 11/29/2021    Procedure: COLONOSCOPY;  Surgeon: Kenrick Zimmer MD;  Location: [facility] (4TH FLR);  Service: Endoscopy;  Laterality: N/A;  blood thinner approval received, see telephone encounter 10/19/21-BB  fully vacc-inst email-tb    CYSTOSCOPY      CYSTOSCOPY  02/16/2023    Procedure: CYSTOSCOPY;  Surgeon: Kem Jefferson MD;  Location: [facility] OR [address] [place] [address];  Service: Urology;;    HERNIA REPAIR      INSERTION OF TUNNELED CENTRAL VENOUS CATHETER (CVC) WITH SUBCUTANEOUS PORT Right 3/13/2023    Procedure: INSERTION, PORT-A-CATH;  Surgeon:  Rene Cali MD;  Location: [facility] CATH LAB;  Service: Radiology;  Laterality: Right;    KNEE ARTHROPLASTY      LAPAROSCOPIC EXPLORATION OF GROIN Left 2018    Procedure: EXPLORATION, INGUINAL REGION, LAPAROSCOPIC;  Surgeon: Mingo De Guzman Jr., MD;  Location: [facility] OR 2ND FLR;  Service: General;  Laterality: Left;    MEDIPORT REMOVAL N/A 10/3/2023    Procedure: REMOVAL, CATHETER, CENTRAL VENOUS, TUNNELED, WITH PORT;  Surgeon: Yeimi Stanton MD;  Location: [facility] CATH LAB;  Service: Radiology;  Laterality: N/A;    PYELOSCOPY Left 2023    Procedure: PYELOSCOPY;  Surgeon: Kem Jefferson MD;  [facility];  Service: Urology;  Laterality: Left;    RETROGRADE PYELOGRAPHY Bilateral 2023    Procedure: PYELOGRAM, RETROGRADE;  Surgeon: Kem Jefferson MD;  Location: [facility] OR [address] [place] [address];  Service: Urology;  Laterality: Bilateral;    ROBOT-ASSISTED LAPAROSCOPIC SURGICAL REMOVAL OF KIDNEY AND URETER USING DA BRIJESH XI Left 2023    Procedure: XI ROBOTIC NEPHROURETERECTOMY;  Surgeon: Kem Jefferson MD;  Location: [facility] OR 2ND FLR;  Service: Urology;  Laterality: Left;  5 hours    TONSILLECTOMY      URETEROSCOPIC REMOVAL OF URETERIC CALCULUS Left 2023    Procedure: REMOVAL, CALCULUS, URETER, URETEROSCOPIC;  Surgeon: Kem Jefferson MD;  Location: [facility] OR [address] [place] [address];  Service: Urology;  Laterality: Left;    URETEROSCOPY Left 2023    Procedure: URETEROSCOPY;  Surgeon: Kem Jefferson MD;  Location: 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 PHYSICAL EXAM     Physical Exam      Imaging Results: (I have personally reviewed the images/studies and provided my interpretation below)  CTA Chest Abdomen Pelvis 7/2  * stable aneurysmal dilation of the suprarenal abdominal aorta measuring 3.1 cm, similar to 2023.  *aneurysmal dilation of the infrarenal abdominal aorta which measures 6.0 x 4.6 cm in maximum dimensions (2-649) and has significantly increased in size from 2023 and mildly increased in size from recent prior dated 03/11/2025 (previously measured 5.6 x 4.1 cm on 03/11/2025 and 4.7 x 3.8 cm in 2023).  Prominent mural thrombus noted.  * aneurysmal dilation of the right iliac artery measuring 2.0 cm, similar dating back to 2023.  * stable ectasia of the left common iliac artery measuring 1.5 cm, unchanged from priors.  SMA, celiac, MARCELO, and right renal artery are patent.    III. ASSESSMENT & PLAN (MEDICAL DECISION MAKING)     No diagnosis found.      Assessment/Diagnosis and Plan:  70 y.o. male w PMH of PVD, PAD, HLD, CKD3, AAA, treated hep C, & rheumatoid arthritis here for evaluation of AAA.     Plan for endovascular abdominal aortic aneurysm repair.        Virgen Hart, PGY-1  Vascular Surgery      I saw and examined Mr. Gant with Dr. Hart on July 02, 2025 at 1145.  Enlarging abdominal aortic aneurysm.  Denied abdominal/flank pain.  Abdomen was soft, ND, NTTP, with palpable pulsatile mass appreciated.  I reviewed his CTA.  Mr. Gant wished to pursue endovascular abdominal aortic aneurysm repair.  Risks and benefits of the aforementioned operation were discussed with Mr. Gant.  All of his questions were answered.       [1]   Current Outpatient Medications:     acetaminophen (TYLENOL) 650 MG TbSR, Take 1 tablet (650 mg total) by mouth every 8 (eight) hours., Disp: 63 tablet,  Rfl: 0    aspirin 81 MG Chew, Take 81 mg by mouth once daily., Disp: , Rfl:     cilostazoL (PLETAL) 50 MG Tab, Take 1 tablet (50 mg total) by mouth 2 (two) times daily., Disp: 180 tablet, Rfl: 3    folic acid (FOLVITE) 1 MG tablet, Take 4 tablets (4 mg total) by mouth once daily., Disp: 120 tablet, Rfl: 0    gabapentin (NEURONTIN) 100 MG capsule, TAKE 1 CAPSULE BY MOUTH IN THE  EVENING, Disp: 90 capsule, Rfl: 1    levocetirizine (XYZAL) 5 MG tablet, TAKE 1 TABLET BY MOUTH EVERY DAY IN THE EVENING, Disp: 90 tablet, Rfl: 1    LIDOcaine viscous HCl 2% (XYLOCAINE) 2 % Soln, Swish and swallow 10 mLs every 6 (six) hours., Disp: 120 mL, Rfl: 3    magic mouthwash diphen/antac/lidoc/nysta, Take 10 mLs by mouth 4 (four) times daily., Disp: 120 mL, Rfl: 3    oxyCODONE (ROXICODONE) 5 MG immediate release tablet, Take 1 tablet (5 mg total) by mouth every 4 (four) hours as needed., Disp: 42 tablet, Rfl: 0    predniSONE (DELTASONE) 5 MG tablet, Two tablets twice daily for 1 week, then 2 tablets in the morning., Disp: 90 tablet, Rfl: 1    simvastatin (ZOCOR) 10 MG tablet, Take 1 tablet (10 mg total) by mouth every evening., Disp: 90 tablet, Rfl: 0    tamsulosin (FLOMAX) 0.4 mg Cap, Take 1 capsule (0.4 mg total) by mouth once daily., Disp: 30 capsule, Rfl: 11    traZODone (DESYREL) 50 MG tablet, TAKE 3 TABLETS BY MOUTH AT NIGHT AS NEEDED FOR INSOMNIA, Disp: 270 tablet, Rfl: 3    valACYclovir (VALTREX) 1000 MG tablet, Take 1 tablet (1,000 mg total) by mouth 2 (two) times daily., Disp: 60 tablet, Rfl: 11  No current facility-administered medications for this visit.

## 2025-07-09 RX ORDER — PREDNISONE 5 MG/1
10 TABLET ORAL DAILY
Qty: 60 TABLET | Refills: 1 | Status: SHIPPED | OUTPATIENT
Start: 2025-07-09

## 2025-07-12 ENCOUNTER — PATIENT MESSAGE (OUTPATIENT)
Dept: ADMINISTRATIVE | Facility: OTHER | Age: 70
End: 2025-07-12
Payer: MEDICARE

## 2025-07-15 ENCOUNTER — PATIENT MESSAGE (OUTPATIENT)
Dept: RHEUMATOLOGY | Facility: CLINIC | Age: 70
End: 2025-07-15
Payer: MEDICARE

## 2025-07-16 NOTE — PRE-PROCEDURE INSTRUCTIONS
Anesthesia Pre-Op Instructions given:     -- YOUR SURGEON'S OFFICE WILL PROVIDE YOUR ARRIVAL TIME  -- Medication information (what to hold and what to take)   -- NPO guidelines as follows: (or as per your Surgeon)  Stop ALL solid food, gum, candy 8 hours before arrival time.  Stop all CLOUDY liquids: coffee with creamer, cloudy juices, 8 hours prior to arrival time.  The patient should be ENCOURAGED to drink carbohydrate-rich clear liquids (sports drinks, clear juices) until 2 hours prior to arrival time.  Stop clear liquids 2 hours prior to arrival time.  CLEAR liquids include water, black coffee NO creamer, clear oral rehydration drinks, clear sports drinks and clear fruit juices (no orange juice, no pulpy juices, no apple cider).   IF IN DOUBT, drink water instead.   NOTHING TO DRINK 2 hours before surgery/procedure time. If you are told to take medication on the morning of surgery, it may be taken with a sip of water.   -- *Arrival place and directions given*.  Time to be given the day before procedure by the Surgeon's Office   -- Bathe with antibacterial soap (dial or Hibiclens as instructed)  -- Don't wear any jewelry or valuables and not metals on skin or hair AM of surgery   -- No makeup or moisturizer to face   -- No perfume/cologne, powder, lotions, aftershave or deodorant     Pt verbalized understanding.            *If going to , see below:      Directions and Instructions for [facility]   At [facility], we have an outstanding team of physicians, anesthesiologists, CRNAs, Registered Nurses, Surgical Technologists, and other ancillary team members all focused on your surgical and procedural care.   Before Your Procedure:   The physician's office will call you with a specific arrival time and directions a day or two before your scheduled procedure. You may also receive these instructions through your [facility] portal.   Day of Procedure:   Please be sure to arrive at  the arrival time given or you may risk your surgery being delayed or canceled. The arrival time is earlier than your scheduled surgery or procedure time. In the winter months please dress warm and bring blankets for you or your child as the waiting room may be cold. If you have difficulty locating the facility, please give us a call at [phone].   Directions:   The [facility] is located on the 1st floor of the hospital building near the [place] entrance.   Parking:   You will park in the South Parking Garage (note location on map). [facility] opens at 5:00 a.m. and has a drop off area by the entrance.  parking is available starting at 7:00 a.m. Please see below for further  parking instructions.   Directions from the parking garage elevators   Blue [facility] Elevators: From the parking garage, take the blue [employer] elevators (located in the center of the parking garage) to the 1st floor of the garage. You will then take a right once off the elevators then another right to the outside of the parking garage. You will be across from the [facility]. You will walk down the sidewalk, pass the  curve at the [place] entrance and continue to follow the sidewalk. You will pass the radiation oncology entrance on your right. Continue to follow the sidewalk to the [facility] glass door entrance.   Hospital Entrance (Inside Route): If a mostly inside route is preferred: Take the inside elevator bank (located at the far north end of the garage) from the parking garage to the 1st floor. On the 1st floor walk past [employer]. Keep walking down the center of the hallway towards the hospital elevators. Once you reach the red brick gerard, take a left and go past the hospital elevators. Take another left and follow the blue and white [employer] signs around the hallway to the end. Go outside of the door. You will see the [facility] entrance to your right.   Drop Off:   There is a drop off area at the doors of the [facility] for your convenience. If utilized for pediatric patients, an adult must accompany the patient into the surgery center while another adult ramos the vehicle.    (at 7:00 a.m.):   Upon check-in, please let the  know that you are utilizing [employer] parking which is free. The . will then call [employer] for your car to be picked up. Your keys and phone number will be collected and given to [employer] services. You will then be given a ticket. Upon discharge, [employer] will be notified to bring your vehicle back when you are ready.           Directions to [facility] [place]:  [phone]     From 1st floor garage elevators: go past [facility] [employer] shop. Look for Black piano on side of coffee shop. Take Atrium (gold) elevator by piano up to 2nd floor. When you exit elevator follow long hallway (you should see a sign hanging from the ceiling that says Day of Surgery Family Waiting Room. When hallway ends you will be entering the day of surgery waiting area. Check in at the desk for your procedure.      From [facility] entrance: Make a right after you enter the door to the hospital. On your Left, take Concourse elevator to 2nd floor. Check in at desk      From Lab desk on 2nd floor: Exit lab area toward hospital atrium. You should see a sign that says Day of Surgery Family Waiting Area. Make a Left and follow long hallway (you should see a sign hanging from the ceiling that says Day of Surgery Family Waiting Room. When hallway ends you will be entering the day of surgery waiting area. Check in at the desk for your procedure.

## 2025-07-17 ENCOUNTER — TELEPHONE (OUTPATIENT)
Dept: VASCULAR SURGERY | Facility: CLINIC | Age: 70
End: 2025-07-17
Payer: MEDICARE

## 2025-07-17 NOTE — TELEPHONE ENCOUNTER
Spoke to pt. Confirmed arrival time of 8:45 am on 7/18/25 at 2nd floor [facility]. Pt advised to be npo after midnight. Patient verbalized understanding and had no further questions.

## 2025-07-18 ENCOUNTER — HOSPITAL ENCOUNTER (INPATIENT)
Facility: HOSPITAL | Age: 70
LOS: 1 days | Discharge: HOME | DRG: 269 | End: 2025-07-19
Attending: SURGERY | Admitting: SURGERY
Payer: MEDICARE

## 2025-07-18 ENCOUNTER — ANESTHESIA EVENT (OUTPATIENT)
Dept: SURGERY | Facility: HOSPITAL | Age: 70
DRG: 269 | End: 2025-07-18
Payer: MEDICARE

## 2025-07-18 ENCOUNTER — ANESTHESIA (OUTPATIENT)
Dept: SURGERY | Facility: HOSPITAL | Age: 70
DRG: 269 | End: 2025-07-18
Payer: MEDICARE

## 2025-07-18 DIAGNOSIS — I71.40 AAA (ABDOMINAL AORTIC ANEURYSM) WITHOUT RUPTURE: ICD-10-CM

## 2025-07-18 DIAGNOSIS — I71.43 INFRARENAL ABDOMINAL AORTIC ANEURYSM (AAA) WITHOUT RUPTURE: Primary | ICD-10-CM

## 2025-07-18 LAB
GLUCOSE SERPL-MCNC: 103 MG/DL (ref 70–110)
HCO3 UR-SCNC: 22.4 MMOL/L (ref 24–28)
HCT VFR BLD CALC: 35 %PCV (ref 36–54)
INDIRECT COOMBS: NORMAL
PCO2 BLDA: 44.6 MMHG (ref 35–45)
PH SMN: 7.31 [PH] (ref 7.35–7.45)
PO2 BLDA: 489 MMHG (ref 80–100)
POC ACTIVATED CLOTTING TIME K: 130 SEC (ref 74–137)
POC ACTIVATED CLOTTING TIME K: 222 SEC (ref 74–137)
POC ACTIVATED CLOTTING TIME K: 239 SEC (ref 74–137)
POC ACTIVATED CLOTTING TIME K: 308 SEC (ref 74–137)
POC BE: -4 MMOL/L (ref -2–2)
POC IONIZED CALCIUM: 1.18 MMOL/L (ref 1.06–1.42)
POC SATURATED O2: 100 % (ref 95–100)
POC TCO2: 24 MMOL/L (ref 23–27)
POTASSIUM BLD-SCNC: 4.4 MMOL/L (ref 3.5–5.1)
RH BLD: NORMAL
SAMPLE: ABNORMAL
SAMPLE: NORMAL
SODIUM BLD-SCNC: 140 MMOL/L (ref 136–145)
SPECIMEN OUTDATE: NORMAL

## 2025-07-18 PROCEDURE — C1894 INTRO/SHEATH, NON-LASER: HCPCS | Performed by: SURGERY

## 2025-07-18 PROCEDURE — 71000016 HC POSTOP RECOV ADDL HR: Performed by: SURGERY

## 2025-07-18 PROCEDURE — 04VE3DZ RESTRICTION OF RIGHT INTERNAL ILIAC ARTERY WITH INTRALUMINAL DEVICE, PERCUTANEOUS APPROACH: ICD-10-PCS | Performed by: SURGERY

## 2025-07-18 PROCEDURE — C2628 CATHETER, OCCLUSION: HCPCS | Performed by: SURGERY

## 2025-07-18 PROCEDURE — 37000008 HC ANESTHESIA 1ST 15 MINUTES: Performed by: SURGERY

## 2025-07-18 PROCEDURE — C1874 STENT, COATED/COV W/DEL SYS: HCPCS | Performed by: SURGERY

## 2025-07-18 PROCEDURE — 27800903 OPTIME MED/SURG SUP & DEVICES OTHER IMPLANTS: Performed by: SURGERY

## 2025-07-18 PROCEDURE — 71000015 HC POSTOP RECOV 1ST HR: Performed by: SURGERY

## 2025-07-18 PROCEDURE — 63600175 PHARM REV CODE 636 W HCPCS: Performed by: NURSE ANESTHETIST, CERTIFIED REGISTERED

## 2025-07-18 PROCEDURE — C1773 RET DEV, INSERTABLE: HCPCS | Performed by: SURGERY

## 2025-07-18 PROCEDURE — 20600001 HC STEP DOWN PRIVATE ROOM

## 2025-07-18 PROCEDURE — 25500020 PHARM REV CODE 255: Performed by: SURGERY

## 2025-07-18 PROCEDURE — 63600175 PHARM REV CODE 636 W HCPCS: Performed by: SURGERY

## 2025-07-18 PROCEDURE — 04V03DZ RESTRICTION OF ABDOMINAL AORTA WITH INTRALUMINAL DEVICE, PERCUTANEOUS APPROACH: ICD-10-PCS | Performed by: SURGERY

## 2025-07-18 PROCEDURE — 25000003 PHARM REV CODE 250: Performed by: NURSE ANESTHETIST, CERTIFIED REGISTERED

## 2025-07-18 PROCEDURE — 99900035 HC TECH TIME PER 15 MIN (STAT)

## 2025-07-18 PROCEDURE — 94799 UNLISTED PULMONARY SVC/PX: CPT

## 2025-07-18 PROCEDURE — C1769 GUIDE WIRE: HCPCS | Performed by: SURGERY

## 2025-07-18 PROCEDURE — C1887 CATHETER, GUIDING: HCPCS | Performed by: SURGERY

## 2025-07-18 PROCEDURE — 25000003 PHARM REV CODE 250: Performed by: STUDENT IN AN ORGANIZED HEALTH CARE EDUCATION/TRAINING PROGRAM

## 2025-07-18 PROCEDURE — 71000033 HC RECOVERY, INTIAL HOUR: Performed by: SURGERY

## 2025-07-18 PROCEDURE — 86850 RBC ANTIBODY SCREEN: CPT | Performed by: SURGERY

## 2025-07-18 PROCEDURE — 36000706: Performed by: SURGERY

## 2025-07-18 PROCEDURE — 25000003 PHARM REV CODE 250: Performed by: SURGERY

## 2025-07-18 PROCEDURE — 37000009 HC ANESTHESIA EA ADD 15 MINS: Performed by: SURGERY

## 2025-07-18 PROCEDURE — 36000707: Performed by: SURGERY

## 2025-07-18 PROCEDURE — 63600175 PHARM REV CODE 636 W HCPCS: Performed by: STUDENT IN AN ORGANIZED HEALTH CARE EDUCATION/TRAINING PROGRAM

## 2025-07-18 PROCEDURE — 63600175 PHARM REV CODE 636 W HCPCS: Performed by: ANESTHESIOLOGY

## 2025-07-18 PROCEDURE — C1725 CATH, TRANSLUMIN NON-LASER: HCPCS | Performed by: SURGERY

## 2025-07-18 PROCEDURE — 27201423 OPTIME MED/SURG SUP & DEVICES STERILE SUPPLY: Performed by: SURGERY

## 2025-07-18 PROCEDURE — C1760 CLOSURE DEV, VASC: HCPCS | Performed by: SURGERY

## 2025-07-18 PROCEDURE — 94761 N-INVAS EAR/PLS OXIMETRY MLT: CPT

## 2025-07-18 DEVICE — EXCLUDER AAA ENDO ILIAC EXT 16MMX12MMX7CM 12FR
Type: IMPLANTABLE DEVICE | Site: ABDOMEN | Status: FUNCTIONAL
Brand: GORE EXCLUDER AAA ENDOPROSTHESIS

## 2025-07-18 DEVICE — BX2 HEP REDUCED PROFILE BX2 7MMX19MM 6FR 135CM CATH
Type: IMPLANTABLE DEVICE | Site: ABDOMEN | Status: FUNCTIONAL
Brand: GORE VIABAHN VBX BALLOON EXPANDABLE ENDO

## 2025-07-18 RX ORDER — HYDRALAZINE HYDROCHLORIDE 20 MG/ML
5 INJECTION INTRAMUSCULAR; INTRAVENOUS EVERY 4 HOURS PRN
Status: DISCONTINUED | OUTPATIENT
Start: 2025-07-18 | End: 2025-07-19 | Stop reason: HOSPADM

## 2025-07-18 RX ORDER — HEPARIN SODIUM 1000 [USP'U]/ML
INJECTION, SOLUTION INTRAVENOUS; SUBCUTANEOUS
Status: DISCONTINUED | OUTPATIENT
Start: 2025-07-18 | End: 2025-07-18

## 2025-07-18 RX ORDER — LIDOCAINE HYDROCHLORIDE 10 MG/ML
1 INJECTION, SOLUTION EPIDURAL; INFILTRATION; INTRACAUDAL; PERINEURAL ONCE
Status: COMPLETED | OUTPATIENT
Start: 2025-07-18 | End: 2025-07-18

## 2025-07-18 RX ORDER — ATORVASTATIN CALCIUM 40 MG/1
40 TABLET, FILM COATED ORAL DAILY
Status: DISCONTINUED | OUTPATIENT
Start: 2025-07-18 | End: 2025-07-19 | Stop reason: HOSPADM

## 2025-07-18 RX ORDER — MUPIROCIN 20 MG/G
OINTMENT TOPICAL 2 TIMES DAILY
Status: DISCONTINUED | OUTPATIENT
Start: 2025-07-18 | End: 2025-07-19 | Stop reason: HOSPADM

## 2025-07-18 RX ORDER — SODIUM CHLORIDE 9 MG/ML
INJECTION, SOLUTION INTRAVENOUS CONTINUOUS
Status: DISCONTINUED | OUTPATIENT
Start: 2025-07-18 | End: 2025-07-18

## 2025-07-18 RX ORDER — PROPOFOL 10 MG/ML
VIAL (ML) INTRAVENOUS
Status: DISCONTINUED | OUTPATIENT
Start: 2025-07-18 | End: 2025-07-18

## 2025-07-18 RX ORDER — PRAVASTATIN SODIUM 10 MG/1
20 TABLET ORAL DAILY
Status: DISCONTINUED | OUTPATIENT
Start: 2025-07-18 | End: 2025-07-19 | Stop reason: HOSPADM

## 2025-07-18 RX ORDER — OXYCODONE HYDROCHLORIDE 10 MG/1
10 TABLET ORAL EVERY 4 HOURS PRN
Status: DISCONTINUED | OUTPATIENT
Start: 2025-07-18 | End: 2025-07-19 | Stop reason: HOSPADM

## 2025-07-18 RX ORDER — ASPIRIN 81 MG/1
81 TABLET ORAL DAILY
Status: DISCONTINUED | OUTPATIENT
Start: 2025-07-18 | End: 2025-07-19 | Stop reason: HOSPADM

## 2025-07-18 RX ORDER — KETOROLAC TROMETHAMINE 15 MG/ML
15 INJECTION, SOLUTION INTRAMUSCULAR; INTRAVENOUS EVERY 8 HOURS PRN
Status: DISCONTINUED | OUTPATIENT
Start: 2025-07-18 | End: 2025-07-18 | Stop reason: HOSPADM

## 2025-07-18 RX ORDER — PREDNISONE 5 MG/1
10 TABLET ORAL DAILY
Status: DISCONTINUED | OUTPATIENT
Start: 2025-07-19 | End: 2025-07-19 | Stop reason: HOSPADM

## 2025-07-18 RX ORDER — ROCURONIUM BROMIDE 10 MG/ML
INJECTION, SOLUTION INTRAVENOUS
Status: DISCONTINUED | OUTPATIENT
Start: 2025-07-18 | End: 2025-07-18

## 2025-07-18 RX ORDER — FOLIC ACID 1 MG/1
4 TABLET ORAL DAILY
Status: DISCONTINUED | OUTPATIENT
Start: 2025-07-18 | End: 2025-07-19 | Stop reason: HOSPADM

## 2025-07-18 RX ORDER — TAMSULOSIN HYDROCHLORIDE 0.4 MG/1
0.4 CAPSULE ORAL DAILY
Status: DISCONTINUED | OUTPATIENT
Start: 2025-07-19 | End: 2025-07-19 | Stop reason: HOSPADM

## 2025-07-18 RX ORDER — LABETALOL HYDROCHLORIDE 5 MG/ML
INJECTION, SOLUTION INTRAVENOUS
Status: DISCONTINUED | OUTPATIENT
Start: 2025-07-18 | End: 2025-07-18

## 2025-07-18 RX ORDER — CETIRIZINE HYDROCHLORIDE 5 MG/1
5 TABLET ORAL NIGHTLY
Status: DISCONTINUED | OUTPATIENT
Start: 2025-07-18 | End: 2025-07-19 | Stop reason: HOSPADM

## 2025-07-18 RX ORDER — CILOSTAZOL 50 MG/1
50 TABLET ORAL 2 TIMES DAILY
Status: DISCONTINUED | OUTPATIENT
Start: 2025-07-18 | End: 2025-07-19 | Stop reason: HOSPADM

## 2025-07-18 RX ORDER — OXYCODONE AND ACETAMINOPHEN 5; 325 MG/1; MG/1
1 TABLET ORAL
Status: DISCONTINUED | OUTPATIENT
Start: 2025-07-18 | End: 2025-07-18 | Stop reason: HOSPADM

## 2025-07-18 RX ORDER — MIDAZOLAM HYDROCHLORIDE 1 MG/ML
INJECTION INTRAMUSCULAR; INTRAVENOUS
Status: DISCONTINUED | OUTPATIENT
Start: 2025-07-18 | End: 2025-07-18

## 2025-07-18 RX ORDER — SODIUM CHLORIDE, SODIUM LACTATE, POTASSIUM CHLORIDE, CALCIUM CHLORIDE 600; 310; 30; 20 MG/100ML; MG/100ML; MG/100ML; MG/100ML
INJECTION, SOLUTION INTRAVENOUS CONTINUOUS
Status: DISCONTINUED | OUTPATIENT
Start: 2025-07-18 | End: 2025-07-19

## 2025-07-18 RX ORDER — NAPROXEN SODIUM 220 MG/1
81 TABLET, FILM COATED ORAL DAILY
Status: DISCONTINUED | OUTPATIENT
Start: 2025-07-19 | End: 2025-07-19

## 2025-07-18 RX ORDER — HEPARIN SOD,PORCINE/0.9 % NACL 1000/500ML
INTRAVENOUS SOLUTION INTRAVENOUS
Status: DISCONTINUED | OUTPATIENT
Start: 2025-07-18 | End: 2025-07-18 | Stop reason: HOSPADM

## 2025-07-18 RX ORDER — SODIUM CHLORIDE 0.9 % (FLUSH) 0.9 %
10 SYRINGE (ML) INJECTION
Status: DISCONTINUED | OUTPATIENT
Start: 2025-07-18 | End: 2025-07-18 | Stop reason: HOSPADM

## 2025-07-18 RX ORDER — GABAPENTIN 100 MG/1
100 CAPSULE ORAL NIGHTLY
Status: DISCONTINUED | OUTPATIENT
Start: 2025-07-18 | End: 2025-07-19 | Stop reason: HOSPADM

## 2025-07-18 RX ORDER — OXYCODONE HYDROCHLORIDE 5 MG/1
5 TABLET ORAL EVERY 4 HOURS PRN
Status: DISCONTINUED | OUTPATIENT
Start: 2025-07-18 | End: 2025-07-19 | Stop reason: HOSPADM

## 2025-07-18 RX ORDER — HEPARIN SODIUM 5000 [USP'U]/ML
5000 INJECTION, SOLUTION INTRAVENOUS; SUBCUTANEOUS EVERY 8 HOURS
Status: DISCONTINUED | OUTPATIENT
Start: 2025-07-18 | End: 2025-07-19 | Stop reason: HOSPADM

## 2025-07-18 RX ORDER — HYDROMORPHONE HYDROCHLORIDE 1 MG/ML
0.2 INJECTION, SOLUTION INTRAMUSCULAR; INTRAVENOUS; SUBCUTANEOUS EVERY 10 MIN PRN
Status: DISCONTINUED | OUTPATIENT
Start: 2025-07-18 | End: 2025-07-18 | Stop reason: HOSPADM

## 2025-07-18 RX ORDER — ONDANSETRON HYDROCHLORIDE 2 MG/ML
INJECTION, SOLUTION INTRAVENOUS
Status: DISCONTINUED | OUTPATIENT
Start: 2025-07-18 | End: 2025-07-18

## 2025-07-18 RX ORDER — GLUCAGON 1 MG
1 KIT INJECTION
Status: DISCONTINUED | OUTPATIENT
Start: 2025-07-18 | End: 2025-07-18 | Stop reason: HOSPADM

## 2025-07-18 RX ORDER — NICARDIPINE HYDROCHLORIDE 2.5 MG/ML
INJECTION INTRAVENOUS
Status: DISCONTINUED | OUTPATIENT
Start: 2025-07-18 | End: 2025-07-18

## 2025-07-18 RX ORDER — MUPIROCIN 20 MG/G
OINTMENT TOPICAL
Status: DISCONTINUED | OUTPATIENT
Start: 2025-07-18 | End: 2025-07-18 | Stop reason: HOSPADM

## 2025-07-18 RX ORDER — FENTANYL CITRATE 50 UG/ML
INJECTION, SOLUTION INTRAMUSCULAR; INTRAVENOUS
Status: DISCONTINUED | OUTPATIENT
Start: 2025-07-18 | End: 2025-07-18

## 2025-07-18 RX ORDER — HALOPERIDOL LACTATE 5 MG/ML
0.5 INJECTION, SOLUTION INTRAMUSCULAR EVERY 10 MIN PRN
Status: DISCONTINUED | OUTPATIENT
Start: 2025-07-18 | End: 2025-07-18 | Stop reason: HOSPADM

## 2025-07-18 RX ORDER — NICARDIPINE HYDROCHLORIDE 0.2 MG/ML
INJECTION INTRAVENOUS CONTINUOUS PRN
Status: DISCONTINUED | OUTPATIENT
Start: 2025-07-18 | End: 2025-07-18

## 2025-07-18 RX ORDER — CEFAZOLIN 2 G/1
2 INJECTION, POWDER, FOR SOLUTION INTRAMUSCULAR; INTRAVENOUS
Status: DISCONTINUED | OUTPATIENT
Start: 2025-07-18 | End: 2025-07-18

## 2025-07-18 RX ORDER — IODIXANOL 320 MG/ML
INJECTION, SOLUTION INTRAVASCULAR
Status: DISCONTINUED | OUTPATIENT
Start: 2025-07-18 | End: 2025-07-18 | Stop reason: HOSPADM

## 2025-07-18 RX ORDER — ACETAMINOPHEN 325 MG/1
650 TABLET ORAL EVERY 6 HOURS
Status: DISCONTINUED | OUTPATIENT
Start: 2025-07-18 | End: 2025-07-19 | Stop reason: HOSPADM

## 2025-07-18 RX ORDER — CEFAZOLIN 2 G/1
2 INJECTION, POWDER, FOR SOLUTION INTRAMUSCULAR; INTRAVENOUS
Status: DISCONTINUED | OUTPATIENT
Start: 2025-07-18 | End: 2025-07-19 | Stop reason: HOSPADM

## 2025-07-18 RX ADMIN — FENTANYL CITRATE 50 MCG: 50 INJECTION, SOLUTION INTRAMUSCULAR; INTRAVENOUS at 11:07

## 2025-07-18 RX ADMIN — GLYCOPYRROLATE 0.1 MG: 0.2 INJECTION, SOLUTION INTRAMUSCULAR; INTRAVENOUS at 10:07

## 2025-07-18 RX ADMIN — HYDRALAZINE HYDROCHLORIDE 5 MG: 20 INJECTION, SOLUTION INTRAMUSCULAR; INTRAVENOUS at 02:07

## 2025-07-18 RX ADMIN — HYDROMORPHONE HYDROCHLORIDE 0.2 MG: 1 INJECTION, SOLUTION INTRAMUSCULAR; INTRAVENOUS; SUBCUTANEOUS at 05:07

## 2025-07-18 RX ADMIN — LIDOCAINE HYDROCHLORIDE 10 MG: 10 INJECTION, SOLUTION EPIDURAL; INFILTRATION; INTRACAUDAL; PERINEURAL at 10:07

## 2025-07-18 RX ADMIN — ASPIRIN 81 MG: 81 TABLET, COATED ORAL at 02:07

## 2025-07-18 RX ADMIN — MUPIROCIN: 20 OINTMENT TOPICAL at 10:07

## 2025-07-18 RX ADMIN — CEFAZOLIN 2 G: 2 INJECTION, POWDER, FOR SOLUTION INTRAMUSCULAR; INTRAVENOUS at 08:07

## 2025-07-18 RX ADMIN — FENTANYL CITRATE 50 MCG: 50 INJECTION, SOLUTION INTRAMUSCULAR; INTRAVENOUS at 12:07

## 2025-07-18 RX ADMIN — LABETALOL HYDROCHLORIDE 5 MG: 5 INJECTION, SOLUTION INTRAVENOUS at 12:07

## 2025-07-18 RX ADMIN — HYDROMORPHONE HYDROCHLORIDE 0.2 MG: 1 INJECTION, SOLUTION INTRAMUSCULAR; INTRAVENOUS; SUBCUTANEOUS at 02:07

## 2025-07-18 RX ADMIN — CILOSTAZOL 50 MG: 50 TABLET ORAL at 09:07

## 2025-07-18 RX ADMIN — ATORVASTATIN CALCIUM 40 MG: 10 TABLET, FILM COATED ORAL at 02:07

## 2025-07-18 RX ADMIN — HYDROMORPHONE HYDROCHLORIDE 0.4 MG: 1 INJECTION, SOLUTION INTRAMUSCULAR; INTRAVENOUS; SUBCUTANEOUS at 04:07

## 2025-07-18 RX ADMIN — HEPARIN SODIUM 5000 UNITS: 5000 INJECTION INTRAVENOUS; SUBCUTANEOUS at 02:07

## 2025-07-18 RX ADMIN — PROPOFOL 200 MG: 10 INJECTION, EMULSION INTRAVENOUS at 11:07

## 2025-07-18 RX ADMIN — FENTANYL CITRATE 75 MCG: 50 INJECTION, SOLUTION INTRAMUSCULAR; INTRAVENOUS at 12:07

## 2025-07-18 RX ADMIN — LABETALOL HYDROCHLORIDE 10 MG: 5 INJECTION, SOLUTION INTRAVENOUS at 12:07

## 2025-07-18 RX ADMIN — MIDAZOLAM HYDROCHLORIDE 2 MG: 2 INJECTION, SOLUTION INTRAMUSCULAR; INTRAVENOUS at 10:07

## 2025-07-18 RX ADMIN — ONDANSETRON 4 MG: 2 INJECTION INTRAMUSCULAR; INTRAVENOUS at 10:07

## 2025-07-18 RX ADMIN — HEPARIN SODIUM 3000 UNITS: 1000 INJECTION, SOLUTION INTRAVENOUS; SUBCUTANEOUS at 11:07

## 2025-07-18 RX ADMIN — HEPARIN SODIUM 7000 UNITS: 1000 INJECTION, SOLUTION INTRAVENOUS; SUBCUTANEOUS at 11:07

## 2025-07-18 RX ADMIN — SODIUM CHLORIDE: 9 INJECTION, SOLUTION INTRAVENOUS at 10:07

## 2025-07-18 RX ADMIN — CETIRIZINE HYDROCHLORIDE 5 MG: 5 TABLET, FILM COATED ORAL at 09:07

## 2025-07-18 RX ADMIN — SUGAMMADEX 200 MG: 100 INJECTION, SOLUTION INTRAVENOUS at 12:07

## 2025-07-18 RX ADMIN — HEPARIN SODIUM 2000 UNITS: 1000 INJECTION, SOLUTION INTRAVENOUS; SUBCUTANEOUS at 11:07

## 2025-07-18 RX ADMIN — SODIUM CHLORIDE, SODIUM GLUCONATE, SODIUM ACETATE, POTASSIUM CHLORIDE, MAGNESIUM CHLORIDE, SODIUM PHOSPHATE, DIBASIC, AND POTASSIUM PHOSPHATE: .53; .5; .37; .037; .03; .012; .00082 INJECTION, SOLUTION INTRAVENOUS at 11:07

## 2025-07-18 RX ADMIN — HEPARIN SODIUM 5000 UNITS: 5000 INJECTION INTRAVENOUS; SUBCUTANEOUS at 09:07

## 2025-07-18 RX ADMIN — CEFAZOLIN 2 G: 2 INJECTION, POWDER, FOR SOLUTION INTRAMUSCULAR; INTRAVENOUS at 11:07

## 2025-07-18 RX ADMIN — PRAVASTATIN SODIUM 20 MG: 20 TABLET ORAL at 02:07

## 2025-07-18 RX ADMIN — OXYCODONE HYDROCHLORIDE 10 MG: 10 TABLET ORAL at 02:07

## 2025-07-18 RX ADMIN — GABAPENTIN 100 MG: 100 CAPSULE ORAL at 09:07

## 2025-07-18 RX ADMIN — NICARDIPINE HYDROCHLORIDE 5 MG/HR: 0.2 INJECTION, SOLUTION INTRAVENOUS at 12:07

## 2025-07-18 RX ADMIN — NICARDIPINE HYDROCHLORIDE 0.4 MG: 25 INJECTION INTRAVENOUS at 12:07

## 2025-07-18 RX ADMIN — SODIUM CHLORIDE, POTASSIUM CHLORIDE, SODIUM LACTATE AND CALCIUM CHLORIDE: 600; 310; 30; 20 INJECTION, SOLUTION INTRAVENOUS at 02:07

## 2025-07-18 RX ADMIN — MUPIROCIN: 20 OINTMENT TOPICAL at 09:07

## 2025-07-18 RX ADMIN — ROCURONIUM BROMIDE 60 MG: 10 INJECTION, SOLUTION INTRAVENOUS at 11:07

## 2025-07-18 RX ADMIN — FENTANYL CITRATE 25 MCG: 50 INJECTION, SOLUTION INTRAMUSCULAR; INTRAVENOUS at 11:07

## 2025-07-18 NOTE — ANESTHESIA PREPROCEDURE EVALUATION
[facility]  Anesthesia Pre-Operative Evaluation     Patient Name: Andrea Gant  YOB: 1955  MRN: [id number]  [facility]: [id number]       Admit Date: 7/18/2025   Admit Team: Networked reference to record PCT   Hospital Day: 1  Date of Procedure: 7/18/2025  Anesthesia: General Procedure: Procedure(s) (LRB):  REPAIR-ANEURYSM-ABDOMINAL AORTIC-ENDOVASCULAR (AAA) (N/A)  Pre-Operative Diagnosis: Infrarenal abdominal aortic aneurysm (AAA) without rupture [I71.43]  Proceduralist:Surgeons and Role:     * Mitch Mcleod MD - Primary  Code Status: Full Code   Advanced Directive: <no information>  Isolation Precautions: No active isolations  Capacity: Full capacity     SUBJECTIVE:   Andrea Gant is a 70 y.o. male who  has a past medical history of Anemia, Cancer, Cataract, Chemotherapy induced neutropenia (04/11/2023), Colon polyp (2014), Depression, Disorder of kidney and ureter, History of hepatitis C, s/p successful treatment w/ SVR12 - 7/2015 (10/14/2012), Hyperlipidemia, Hypertension (03/08/2024), Lipoma of arm, Lipoma of lower extremity, Nuclear sclerosis - Both Eyes (10/22/2012), Other and unspecified hyperlipidemia (10/22/2013), PAD (peripheral artery disease), Peripheral vascular disease, unspecified, Plaquenil adverse reaction in therapeutic use (10/22/2012), Rheumatoid arthritis(714.0) (10/14/2012), and Special screening for malignant neoplasms, colon (02/21/2014).  No notes on file    Hospital LOS: 0 days  ICU LOS: Patient does not have an ICU stay during this admission.    he has a current medication list which includes the following long-term medication(s): cilostazol, gabapentin, levocetirizine, simvastatin, tamsulosin, trazodone, and folic acid.   Current Outpatient Medications   Medication Instructions    acetaminophen (TYLENOL) 650 mg, Oral, Every 8 hours    aspirin 81 mg, Daily    cilostazoL (PLETAL) 50 mg, Oral, 2 times daily    folic acid (FOLVITE) 4 mg, Oral, Daily     gabapentin (NEURONTIN) 100 mg, Oral    levocetirizine (XYZAL) 5 mg, Oral, Nightly    predniSONE (DELTASONE) 10 mg, Oral, Daily, TAKE 2 TABLETS BY MOUTH TWICE DAILY FOR 1 WEEK THEN 2 TABS IN THE MORNING    simvastatin (ZOCOR) 10 mg, Oral, Nightly    tamsulosin (FLOMAX) 0.4 mg, Oral, Daily    traZODone (DESYREL) 50 MG tablet TAKE 3 TABLETS BY MOUTH AT NIGHT AS NEEDED FOR INSOMNIA     ALLERGIES:   Review of patient's allergies indicates:  No Known Allergies  LDA:   AIRWAY:         * No LDAs found *      Lines/Drains/Airways       None                  Anesthesia Evaluation      Airway   Mallampati: III  TM distance: Normal  Neck ROM: Normal ROM  Dental    (+) Intact    Pulmonary    Cardiovascular   (+) hypertension    Neuro/Psych    (+) psychiatric history    GI/Hepatic/Renal    (+) hepatitis, liver disease, chronic renal disease    Endo/Other    (+) arthritis  Abdominal                    MEDICATIONS:     Current Outpatient Medications on File Prior to Encounter   Medication Sig Dispense Refill Last Dose/Taking    aspirin 81 MG Chew Take 81 mg by mouth once daily.   7/16/2025    cilostazoL (PLETAL) 50 MG Tab Take 1 tablet (50 mg total) by mouth 2 (two) times daily. 180 tablet 3 7/16/2025    gabapentin (NEURONTIN) 100 MG capsule TAKE 1 CAPSULE BY MOUTH IN THE  EVENING 90 capsule 1 7/15/2025    levocetirizine (XYZAL) 5 MG tablet TAKE 1 TABLET BY MOUTH EVERY DAY IN THE EVENING 90 tablet 1 7/16/2025    simvastatin (ZOCOR) 10 MG tablet Take 1 tablet (10 mg total) by mouth every evening. 90 tablet 0 7/16/2025    tamsulosin (FLOMAX) 0.4 mg Cap Take 1 capsule (0.4 mg total) by mouth once daily. 30 capsule 11 Past Month    traZODone (DESYREL) 50 MG tablet TAKE 3 TABLETS BY MOUTH AT NIGHT AS NEEDED FOR INSOMNIA 270 tablet 3 7/15/2025    acetaminophen (TYLENOL) 650 MG TbSR Take 1 tablet (650 mg total) by mouth every 8 (eight) hours. 63 tablet 0 More than a month    folic acid (FOLVITE) 1 MG tablet Take 4 tablets (4 mg total) by  mouth once daily. (Patient not taking: Reported on 7/16/2025) 120 tablet 0 Not Taking      Inpatient Medications:  Antibiotics (From admission, onward)      Start     Stop Route Frequency Ordered    07/18/25 0914  mupirocin 2 % ointment         -- Nasl On Call Procedure 07/18/25 0914 07/18/25 0914  ceFAZolin 2 g         07/19/25 0114 IV Every 8 hours (non-standard times) 07/18/25 0914          VTE Risk Mitigation (From admission, onward)      None           ceFAZolin (Ancef) IV (PEDS and ADULTS)  2 g Intravenous Q8H    LIDOcaine (PF) 10 mg/ml (1%)  1 mL Intradermal Once       Current Medications[1]       History:   There are no hospital problems to display for this patient.    Surgical History:    has a past surgical history that includes Tonsillectomy; Knee Arthroplasty; Laparoscopic exploration of groin (Left, 09/06/2018); Colonoscopy (N/A, 11/29/2021); Cystoscopy; Cystoscopy (02/16/2023); Biopsy of ureter (Left, 02/16/2023); Ureteroscopy (Left, 02/16/2023); Retrograde pyelography (Bilateral, 02/16/2023); Ureteroscopic removal of ureteric calculus (Left, 02/16/2023); Pyeloscopy (Left, 02/16/2023); Hernia repair; Insertion of tunneled central venous catheter (CVC) with subcutaneous port (Right, 3/13/2023); Robot-assisted laparoscopic surgical removal of kidney and ureter using da Jessica Xi (Left, 7/14/2023); and Mediport removal (N/A, 10/3/2023).   Social History:    reports being sexually active and has had partner(s) who are female.  reports that he quit smoking about 5 years ago. His smoking use included cigarettes. He started smoking about 52 years ago. He has a 23.7 pack-year smoking history. He has never used smokeless tobacco. He reports current alcohol use. He reports current drug use. Drug: Marijuana.    There were no vitals filed for this visit.  Vital Signs Range (Last 24H):       There is no height or weight on file to calculate BMI.  Wt Readings from Last 4 Encounters:   07/02/25 71 kg (156 lb 8.4  "oz)   06/25/25 70.4 kg (155 lb 3.3 oz)   06/09/25 69.8 kg (153 lb 14.1 oz)   04/02/25 66.9 kg (147 lb 7.8 oz)        Intake/Output - Last 3 Shifts       None          Lab Results   Component Value Date    WBC 12.19 06/23/2025    HGB 13.5 (L) 06/23/2025    HCT 43.5 06/23/2025     06/23/2025     06/23/2025    K 4.5 06/23/2025     06/23/2025    CREATININE 2.1 (H) 06/23/2025    BUN 35 (H) 06/23/2025    CO2 21 (L) 06/23/2025     06/23/2025    CALCIUM 9.5 06/23/2025    MG 2.1 03/15/2025    PHOS 2.7 03/15/2025    ALKPHOS 89 06/23/2025    ALT 11 06/23/2025    AST 13 06/23/2025    ALBUMIN 4.0 06/23/2025    INR 1.0 03/06/2025    APTT 28.0 03/06/2025    HGBA1C 5.0 05/17/2022    LACTATE 0.8 03/10/2025     02/29/2004    TROPONINI 0.023 08/25/2023     (H) 03/26/2016     No results found for this or any previous visit (from the past 12 hours).  No results for input(s): "WBC", "HGB", "HCT", "PLT", "NA", "K", "CREATININE", "GLU", "INR", "LACTATE", "5HIAAPLASMA", "5HIAAURINT", "5HIAA", "0BGPL36GM" in the last 168 hours.  No LMP for male patient.    EKG:   Results for orders placed or performed during the hospital encounter of 03/06/25   EKG 12-lead    Collection Time: 03/06/25  8:21 PM   Result Value Ref Range    QRS Duration 80 ms    OHS QTC Calculation 418 ms    Narrative    Test Reason : R94.31,    Vent. Rate :  89 BPM     Atrial Rate : 241 BPM     P-R Int :    ms          QRS Dur :  80 ms      QT Int : 344 ms       P-R-T Axes :     94 -15 degrees    QTcB Int : 418 ms    Normal sinus rhythm  Rightward axis  Inferior infarct ,age undetermined  Abnormal ECG  When compared with ECG of 25-Aug-2023 19:12,  No significant change was found    Confirmed by José Cortez ([id number]) on 3/7/2025 1:42:34 PM    Referred By: AAAREFERRAL SELF           Confirmed By: José Cortez     TTE:  Results for orders placed during the hospital encounter of 03/06/25    Echo    Interpretation Summary    " Left Ventricle: The left ventricle is normal in size. Ventricular mass is normal. Mildly increased wall thickness. Normal wall motion. There is normal systolic function with a visually estimated ejection fraction of 55 - 60%. Ejection fraction is approximately 55%. There is normal diastolic function.    Right Ventricle: The right ventricle is normal in size. Wall thickness is normal. Systolic function is normal.    Aortic Valve: There is moderate aortic valve sclerosis.    IVC/SVC: Normal venous pressure at 3 mmHg.    DAJA:  No results found for this or any previous visit.    Stress Test:  No results found for this or any previous visit.    No results found for this or any previous visit.    LHC:  No results found for this or any previous visit.    Cardiac Device Check   No results found for this or any previous visit.    No results found for this or any previous visit.        Pre-op Assessment          Review of Systems  Cardiovascular:     Hypertension                                    Hypertension         Renal/:  Chronic Renal Disease        Kidney Function/Disease             Hepatic/GI:      Liver Disease, Hepatitis           Liver Disease, Hepatitis        Musculoskeletal:  Arthritis        Arthritis          Neurological:      Arthritis                           Psych:  Psychiatric History                  Physical Exam  General: Well nourished    Airway:  Mallampati: III / II  Mouth Opening: Normal  TM Distance: Normal  Tongue: Normal  Neck ROM: Normal ROM    Dental:  Intact        Anesthesia Plan  Type of Anesthesia, risks & benefits discussed:    Anesthesia Type: Gen ETT, Gen Natural Airway, MAC  Intra-op Monitoring Plan: Standard ASA Monitors  Post Op Pain Control Plan: multimodal analgesia and IV/PO Opioids PRN  Induction:  IV  Airway Plan: Direct and Video, Post-Induction  Informed Consent: Informed consent signed with the Patient and all parties understand the risks and agree with anesthesia plan.   All questions answered.   ASA Score: 2  Day of Surgery Review of History & Physical: H&P completed by Anesthesiologist.    Ready For Surgery From Anesthesia Perspective.     .           [1]   Current Facility-Administered Medications   Medication Dose Route Frequency Provider Last Rate Last Admin    ceFAZolin 2 g  2 g Intravenous Q8H Mitch Mcleod MD        LIDOcaine (PF) 10 mg/ml (1%) injection 10 mg  1 mL Intradermal Once Mitch Mcleod MD        mupirocin 2 % ointment   Nasal On Call Procedure Mitch Mcleod MD        sodium chloride 0.9% flush 10 mL  10 mL Intravenous PRN Mitch Mcleod MD

## 2025-07-18 NOTE — OP NOTE
Vascular Surgery Operative Report    Date of Operation: July 18, 2025    Pre-operative Diagnosis: Large abdominal aortic aneurysm with right common iliac artery aneurysm    Post-operative Diagnosis: Large abdominal aortic aneurysm with right common iliac artery aneurysm    Secondary Diagnoses: Depression, hyperlipidemia, hypertension, peripheral vascular occlusive disease, rheumatoid arthritis, renal cancer    Operation Performed:  (1) Ultrasound-guided bilateral common femoral artery access  (2) Perclose bilateral common femoral arteriotomies  (3) Aortagram with selective retrograde catheterization of right internal iliac artery  (4) Endovascular abdominal aortic aneurysm repair using Flushing Excluder and Cook Spiral Z (Left main body [id number] with right iliac limb [id number] and [id number]; right iliac limb ZSLE 9-39, physician-modified with addition of fenestration for right internal iliac artery)  (5) Right internal iliac artery bridge stent graft using 7X19 Flushing VBX    Surgeon: Shani    Assistant: Elvia PAIGE    EBL: 75mL    Drains: None    Complications: None    Disposition: To the PACU extubated in stable condition without focal neurologic deficit    Indication for Operation: Mr. Gant is a 71yo  gentleman who presented to the Vascular Surgery Clinic with a large abdominal aortic aneurysm and right common iliac artery aneurysm.  He wished to pursue repair.    Operation in Detail:  After informed consent was obtained, the patient was taken the operating room placed in supine position.  His abdomen and pelvis and bilateral groins were prepped and draped in the usual sterile fashion.  A time-out was performed verifying the identification of the patient as well as procedure to be performed.  He received a dose of antibiotics within 60 minutes of incision.  We began by using the ultrasound and micropuncture technique to cannulate the bilateral common femoral arteries placing a short 8 Latvian sheath after  deployment of 2 ProGlides in Perclose fashion each common femoral artery.  We then brought up a pigtail catheter other an angled Glidewire from the left femoral access and shot an aortogram.  We then exchanged with a pigtail catheter for an Amplatz wire.  We asked our anesthesia colleagues to administer 9000 units of IV heparin which was allowed to circulate for 2 minutes.  We then brought up an 18 Dominican Monroe DrySeal sheath from the left femoral access and then deployed the Monroe excluder 28.5 x 12 x 18 below the right renal artery.  With the main body and anatomic configuration we used a 7 Dominican aptus steerable sheath in the angled Glidewire to cannulate the contralateral gate from the right femoral access, verifying we are within the true lumen of the main body device by twirling the catheter in the proximal neck.  We then shot a retrograde sheath shot the identify the takeoff of the internal iliac artery on the right.  We then brought up a 16 x 12 x 14 and a 16 x 12 x 7 iliac limb deployment it down to the takeoff of the right internal iliac artery.  We then completed deployment of the main body device down to the left internal iliac artery takeoff, and used a molding balloon to balloon all overlapping segments of stent graft.  Prior to the patient entering the operating room I performed back table modification of the the SLE-9-39 spiral Z stent with the addition of fenestration for the right internal iliac artery repack edge did a 16 Dominican peel-away sheath.  We introduced this modified device into the 18 Dominican Monroe DrySeal sheath on the right deploying the fenestration in configuration with internal iliac artery takeoff.  We then used a 7 Dominican aptus steerable sheath and a angled Glidewire to cannulate the right internal iliac artery exchanging for a Lunsford wire via a Glide catheter.  We then deployed a 7 x 19 Monroe VBX post dilated with a 9 x 2 balloon.  Completion aortogram was performed through the pigtail  catheter.  We then removed all sheaths catheters and wires and deployed ProGlides in a preclose fashion.  The patient's pedal Doppler signals were unchanged postoperatively compared to preoperatively.  I was scrubbed and present and performed key portions the operation.  All counts were correct x2 at the conclusion of the case.    Interpretation of the radiographic findings:  Large infrarenal abdominal aortic aneurysm with distal right common iliac artery aneurysm, repaired with endovascular abdominal aneurysm repair and modified right iliac limb for preservation of the right internal iliac artery.  There was no evidence of type 1 2 or 3 endoleak with patent right renal artery and bilateral internal iliac arteries.   Pain Refusal Text: I offered to prescribe pain medication but the patient refused to take this medication.

## 2025-07-18 NOTE — ANESTHESIA PROCEDURE NOTES
Intubation    Date/Time: 7/18/2025 11:07 AM    Performed by: Milad Lino CRNA  Authorized by: Trena Pruitt MD    Intubation:     Induction:  Intravenous    Intubated:  Postinduction    Mask Ventilation:  Easy mask    Attempts:  1    Attempted By:  CRNA    Method of Intubation:  Video laryngoscopy    Blade:  Negro 3    Laryngeal View Grade: Grade I - full view of cords      Difficult Airway Encountered?: No      Complications:  None    Airway Device:  Oral endotracheal tube    Airway Device Size:  7.5    Style/Cuff Inflation:  Cuffed (inflated to minimal occlusive pressure)    Inflation Amount (mL):  6    Tube secured:  22    Secured at:  The lips    Placement Verified By:  Capnometry    Complicating Factors:  [place]    Findings Post-Intubation:  BS equal bilateral and atraumatic/condition of teeth unchanged

## 2025-07-18 NOTE — TRANSFER OF CARE
"Anesthesia Transfer of Care Note    Patient: Andrea Gant    Procedure(s) Performed: Procedure(s) (LRB):  REPAIR-ANEURYSM-ABDOMINAL AORTIC-ENDOVASCULAR (AAA) (N/A)  STENTS, ILIAC, BILATERAL (Bilateral)    Patient location: PACU    Anesthesia Type: general    Transport from OR: Transported from OR on 6-10 L/min O2 by face mask with adequate spontaneous ventilation    Post pain: adequate analgesia    Post assessment: no apparent anesthetic complications and tolerated procedure well    Post vital signs: stable    Level of consciousness: lethargic, responds to stimulation, awake and alert    Nausea/Vomiting: no nausea/vomiting    Complications: none    Transfer of care protocol was followed    Last vitals: Visit Vitals  /68 (BP Location: Left arm, Patient Position: Lying)   Pulse (!) 56   Temp 36.8 °C (98.2 °F) (Temporal)   Resp 16   Ht 5' 9" (1.753 m)   Wt 69.4 kg (153 lb)   SpO2 100%   BMI 22.59 kg/m²     "

## 2025-07-18 NOTE — NURSING TRANSFER
Nursing Transfer Note      7/18/2025   5:40 PM    Reason patient is being transferred: Recovery criteria met    PACU nurse giving handoff: MYRA Valencia    Nurse receiving handoff: MYRA Torre    Transfer to 1044    Transfer via bed    Transported by Patient Escort/PCT    Telemetry: Not ordered    Transfer Vital Signs @ 1730  Temperature: 97.9  Blood Pressure: 122/69  Heart Rate: 69  Respirations: 17  O2 Sat: 98% on RA    Medicines/Equipment sent with patient: LR IVF    Any special needs or follow-up needed: Bedrest until 1820    Patient belongings transferred with patient: Yes one clear belongings bag    Chart send with patient: Yes    Notified: Spouse, Rufina     Patient reassessed prior to transfer at: 7/18/2025 @ 1730

## 2025-07-18 NOTE — BRIEF OP NOTE
[facility] - [facility] (2nd Fl)  Brief Operative Note    SUMMARY     Surgery Date: 7/18/2025     Surgeons and Role:     * Mitch Mcleod MD - Primary     * Rick Gan MD - Fellow    Assisting Surgeon: None    Pre-op Diagnosis:  Infrarenal abdominal aortic aneurysm (AAA) without rupture [I71.43]    Post-op Diagnosis:  Post-Op Diagnosis Codes:     * Infrarenal abdominal aortic aneurysm (AAA) without rupture [I71.43]    Procedure(s) (LRB):  Ultrasound guided access bilateral common femoral arteries   EVAR [place] 28.5mm x 12mm x 18cm with L ipsilateral, R contralateral iliac 16mm x 12mmx 14cm with iliac extender [id number]  Physician modified iliac branch ZSLE 9x39mm with 7x19mm VBX branch to hypogastric   Bilateral preclose perclose R CFA 20 Fr x2, L CRA 20 Fr x3    Anesthesia: General    Implants:  Implant Name Type Inv. Item Serial No.  Lot No. LRB No. Used Action   GORE EXCLUDDER AAA 18FR 61CM   [id number]   N/A 1 Implanted   GRAFT EXCLUDER [id number] - [id number]  GRAFT EXCLUDER [id number] [employer]  Right 1 Implanted   GRAFT EVAS EXCLDR XTD 7CM 12MM - [id number]  GRAFT EVAS EXCLDR XTD 7CM 12MM [id number] [employer]  Right 1 Implanted   STENT VBX 6FR [id number] 135CM - [id number]  STENT VBX 6FR [id number] 135CM [id number] [employer]  Right 1 Implanted       Operative Findings:   Repair of infrarenal aneurym and R common iliac ectasia with physician modified iliac branch to R hypogastric    Estimated Blood Loss: 50 mL         Specimens:   N/A

## 2025-07-18 NOTE — INTERVAL H&P NOTE
The patient has been examined and the H&P has been reviewed:    I concur with the findings and no changes have occurred since H&P was written.    Anesthesia/Surgery risks, benefits and alternative options discussed and understood by patient/family.  Plan for endovascular abdominal aortic aneurysm repair.  Risks and benefits of the aforementioned operation were discussed with Mr. Martinoruy.  All of his questions were answered.           There are no hospital problems to display for this patient.

## 2025-07-19 ENCOUNTER — NURSE TRIAGE (OUTPATIENT)
Dept: ADMINISTRATIVE | Facility: CLINIC | Age: 70
End: 2025-07-19
Payer: MEDICARE

## 2025-07-19 VITALS
DIASTOLIC BLOOD PRESSURE: 81 MMHG | TEMPERATURE: 98 F | WEIGHT: 153 LBS | HEIGHT: 69 IN | RESPIRATION RATE: 16 BRPM | SYSTOLIC BLOOD PRESSURE: 141 MMHG | OXYGEN SATURATION: 97 % | BODY MASS INDEX: 22.66 KG/M2 | HEART RATE: 75 BPM

## 2025-07-19 LAB
ABSOLUTE EOSINOPHIL (OHS): 0.02 K/UL
ABSOLUTE EOSINOPHIL (OHS): 0.04 K/UL
ABSOLUTE MONOCYTE (OHS): 0.73 K/UL (ref 0.3–1)
ABSOLUTE MONOCYTE (OHS): 0.93 K/UL (ref 0.3–1)
ABSOLUTE NEUTROPHIL COUNT (OHS): 10.78 K/UL (ref 1.8–7.7)
ABSOLUTE NEUTROPHIL COUNT (OHS): 9.94 K/UL (ref 1.8–7.7)
ANION GAP (OHS): 12 MMOL/L (ref 8–16)
BASOPHILS # BLD AUTO: 0.01 K/UL
BASOPHILS # BLD AUTO: 0.03 K/UL
BASOPHILS NFR BLD AUTO: 0.1 %
BASOPHILS NFR BLD AUTO: 0.2 %
BUN SERPL-MCNC: 21 MG/DL (ref 8–23)
CALCIUM SERPL-MCNC: 7.5 MG/DL (ref 8.7–10.5)
CHLORIDE SERPL-SCNC: 106 MMOL/L (ref 95–110)
CO2 SERPL-SCNC: 18 MMOL/L (ref 23–29)
CREAT SERPL-MCNC: 1.4 MG/DL (ref 0.5–1.4)
ERYTHROCYTE [DISTWIDTH] IN BLOOD BY AUTOMATED COUNT: 13.4 % (ref 11.5–14.5)
ERYTHROCYTE [DISTWIDTH] IN BLOOD BY AUTOMATED COUNT: 13.4 % (ref 11.5–14.5)
GFR SERPLBLD CREATININE-BSD FMLA CKD-EPI: 54 ML/MIN/1.73/M2
GLUCOSE SERPL-MCNC: 91 MG/DL (ref 70–110)
HCT VFR BLD AUTO: 35.5 % (ref 40–54)
HCT VFR BLD AUTO: 38.1 % (ref 40–54)
HGB BLD-MCNC: 11.5 GM/DL (ref 14–18)
HGB BLD-MCNC: 12.6 GM/DL (ref 14–18)
IMM GRANULOCYTES # BLD AUTO: 0.03 K/UL (ref 0–0.04)
IMM GRANULOCYTES # BLD AUTO: 0.04 K/UL (ref 0–0.04)
IMM GRANULOCYTES NFR BLD AUTO: 0.3 % (ref 0–0.5)
IMM GRANULOCYTES NFR BLD AUTO: 0.3 % (ref 0–0.5)
LYMPHOCYTES # BLD AUTO: 0.61 K/UL (ref 1–4.8)
LYMPHOCYTES # BLD AUTO: 0.97 K/UL (ref 1–4.8)
MAGNESIUM SERPL-MCNC: 1.3 MG/DL (ref 1.6–2.6)
MAGNESIUM SERPL-MCNC: 1.7 MG/DL (ref 1.6–2.6)
MCH RBC QN AUTO: 29.6 PG (ref 27–31)
MCH RBC QN AUTO: 30.1 PG (ref 27–31)
MCHC RBC AUTO-ENTMCNC: 32.4 G/DL (ref 32–36)
MCHC RBC AUTO-ENTMCNC: 33.1 G/DL (ref 32–36)
MCV RBC AUTO: 91 FL (ref 82–98)
MCV RBC AUTO: 92 FL (ref 82–98)
NUCLEATED RBC (/100WBC) (OHS): 0 /100 WBC
NUCLEATED RBC (/100WBC) (OHS): 0 /100 WBC
PHOSPHATE SERPL-MCNC: 1.7 MG/DL (ref 2.7–4.5)
PHOSPHATE SERPL-MCNC: 2.6 MG/DL (ref 2.7–4.5)
PLATELET # BLD AUTO: 135 K/UL (ref 150–450)
PLATELET # BLD AUTO: 150 K/UL (ref 150–450)
PMV BLD AUTO: 10 FL (ref 9.2–12.9)
PMV BLD AUTO: 9.5 FL (ref 9.2–12.9)
POTASSIUM SERPL-SCNC: 4 MMOL/L (ref 3.5–5.1)
RBC # BLD AUTO: 3.88 M/UL (ref 4.6–6.2)
RBC # BLD AUTO: 4.19 M/UL (ref 4.6–6.2)
RELATIVE EOSINOPHIL (OHS): 0.2 %
RELATIVE EOSINOPHIL (OHS): 0.3 %
RELATIVE LYMPHOCYTE (OHS): 5.4 % (ref 18–48)
RELATIVE LYMPHOCYTE (OHS): 7.6 % (ref 18–48)
RELATIVE MONOCYTE (OHS): 6.4 % (ref 4–15)
RELATIVE MONOCYTE (OHS): 7.3 % (ref 4–15)
RELATIVE NEUTROPHIL (OHS): 84.3 % (ref 38–73)
RELATIVE NEUTROPHIL (OHS): 87.6 % (ref 38–73)
SODIUM SERPL-SCNC: 136 MMOL/L (ref 136–145)
WBC # BLD AUTO: 11.34 K/UL (ref 3.9–12.7)
WBC # BLD AUTO: 12.79 K/UL (ref 3.9–12.7)

## 2025-07-19 PROCEDURE — 25000003 PHARM REV CODE 250: Performed by: STUDENT IN AN ORGANIZED HEALTH CARE EDUCATION/TRAINING PROGRAM

## 2025-07-19 PROCEDURE — 83735 ASSAY OF MAGNESIUM: CPT | Performed by: STUDENT IN AN ORGANIZED HEALTH CARE EDUCATION/TRAINING PROGRAM

## 2025-07-19 PROCEDURE — 84100 ASSAY OF PHOSPHORUS: CPT | Performed by: STUDENT IN AN ORGANIZED HEALTH CARE EDUCATION/TRAINING PROGRAM

## 2025-07-19 PROCEDURE — 36415 COLL VENOUS BLD VENIPUNCTURE: CPT | Performed by: STUDENT IN AN ORGANIZED HEALTH CARE EDUCATION/TRAINING PROGRAM

## 2025-07-19 PROCEDURE — 25000003 PHARM REV CODE 250

## 2025-07-19 PROCEDURE — 63600175 PHARM REV CODE 636 W HCPCS: Performed by: STUDENT IN AN ORGANIZED HEALTH CARE EDUCATION/TRAINING PROGRAM

## 2025-07-19 PROCEDURE — 82947 ASSAY GLUCOSE BLOOD QUANT: CPT | Performed by: STUDENT IN AN ORGANIZED HEALTH CARE EDUCATION/TRAINING PROGRAM

## 2025-07-19 PROCEDURE — 85025 COMPLETE CBC W/AUTO DIFF WBC: CPT | Performed by: STUDENT IN AN ORGANIZED HEALTH CARE EDUCATION/TRAINING PROGRAM

## 2025-07-19 RX ORDER — CALCIUM CARBONATE 200(500)MG
500 TABLET,CHEWABLE ORAL 2 TIMES DAILY PRN
Status: DISCONTINUED | OUTPATIENT
Start: 2025-07-19 | End: 2025-07-19 | Stop reason: HOSPADM

## 2025-07-19 RX ORDER — ATORVASTATIN CALCIUM 40 MG/1
40 TABLET, FILM COATED ORAL DAILY
Qty: 90 TABLET | Refills: 3 | Status: CANCELLED | OUTPATIENT
Start: 2025-07-20 | End: 2026-07-20

## 2025-07-19 RX ORDER — OXYCODONE HYDROCHLORIDE 5 MG/1
5 TABLET ORAL EVERY 6 HOURS PRN
Qty: 10 TABLET | Refills: 0 | Status: SHIPPED | OUTPATIENT
Start: 2025-07-19

## 2025-07-19 RX ORDER — OXYCODONE HYDROCHLORIDE 5 MG/1
5 TABLET ORAL EVERY 6 HOURS PRN
Qty: 10 TABLET | Refills: 0 | Status: SHIPPED | OUTPATIENT
Start: 2025-07-19 | End: 2025-07-19

## 2025-07-19 RX ORDER — OXYCODONE HYDROCHLORIDE 5 MG/1
5 TABLET ORAL EVERY 6 HOURS PRN
Qty: 12 TABLET | Refills: 0 | Status: SHIPPED | OUTPATIENT
Start: 2025-07-19 | End: 2025-07-19

## 2025-07-19 RX ADMIN — CALCIUM CARBONATE (ANTACID) CHEW TAB 500 MG 500 MG: 500 CHEW TAB at 08:07

## 2025-07-19 RX ADMIN — CEFAZOLIN 2 G: 2 INJECTION, POWDER, FOR SOLUTION INTRAMUSCULAR; INTRAVENOUS at 03:07

## 2025-07-19 RX ADMIN — OXYCODONE HYDROCHLORIDE 10 MG: 10 TABLET ORAL at 03:07

## 2025-07-19 RX ADMIN — ACETAMINOPHEN 650 MG: 325 TABLET ORAL at 05:07

## 2025-07-19 RX ADMIN — HEPARIN SODIUM 5000 UNITS: 5000 INJECTION INTRAVENOUS; SUBCUTANEOUS at 05:07

## 2025-07-19 RX ADMIN — SODIUM CHLORIDE, POTASSIUM CHLORIDE, SODIUM LACTATE AND CALCIUM CHLORIDE: 600; 310; 30; 20 INJECTION, SOLUTION INTRAVENOUS at 03:07

## 2025-07-19 NOTE — PROGRESS NOTES
Vascular Surgery Note    POD#1 s/p endovascular abdominal aortoiliac aneurysm repair.  No acute events overnight.  Denied abdominal/flank pain.  Abdomen soft, ND; bilateral groin access sites were without swelling or drainage with palpable popliteal pulses bilaterally.  Mobilization, antiplatelet and statin therapy recommended, voiding trial.  All of Mr. Gant's questions were answered.

## 2025-07-19 NOTE — DISCHARGE INSTRUCTIONS
GROIN WOUND CARE:  It is very important that the wound in your groin stays dry and clean because this area collects moisture and bacteria which greatly increases the risk for infection  and wound complications.    EVERY DAY:  - Starting two days after your surgery, you may shower  - Following your shower, pat the wound dry  - Paint the wound with a small amount of betadine  - Cover the wound with 4x4 gauze and tape  - Change the dressing in the same fashion every day, sooner if the gauze gets saturated with fluid    CONTACT US IMMEDIATELY  - Is getting more painful with time not less  - Becomes red  - Has pus coming out  - Falls apart   - Or your are having fevers

## 2025-07-19 NOTE — DISCHARGE SUMMARY
[facility]  Vascular Surgery  Discharge Summary      Patient Name: Andrea Gant  MRN: [id number]  Admission Date: 7/18/2025  Hospital Length of Stay: 1 days  Discharge Date and Time: No discharge date for patient encounter.  Attending Physician: Mitch Mcleod MD   Discharging Provider: Benjy Benitez MD  Primary Care Provider: Andrea Wang MD    HPI:   Andrea Gant is a 70 y.o. male w PMH of PVD, PAD, HLD, CKD3, AAA, treated hep C, & rheumatoid arthritis who is here today for evaluation of AAA.  He denied abdominal/flank pain.         Procedure(s) (LRB):  REPAIR-ANEURYSM-ABDOMINAL AORTIC-ENDOVASCULAR (AAA) (N/A)  STENTS, ILIAC, BILATERAL (Bilateral)     Hospital Course: Pt was admitted on 7/18 to undergo EVAR. He underwent planned procedure, EVAR with bilateral iliac branches. He tolerated the procedure well and recovered in PACU. His pain was controlled, he ambulated, tolerated PO diet, his rahman was removed and he voided spontaneously, and did not require supplemental oxygen. He was discharged on 7/19 with follow up scheduled in 4-6 weeks with repeat CTA CAP     Goals of Care Treatment Preferences:  Code Status: Full Code      Consults: N/A     Significant Diagnostic Studies: N/A    Pending Diagnostic Studies:       None          Final Active Diagnoses:    Diagnosis Date Noted POA    PRINCIPAL PROBLEM:  AAA (abdominal aortic aneurysm) [I71.40]  Yes      Problems Resolved During this Admission:      Discharged Condition: good    Disposition:     Follow Up:   Follow-up Information       Mitch Mcleod MD Follow up in 4 week(s).    Specialties: Vascular Surgery, General Surgery  Contact information:  [address]  [facility] [address]  [phone]                             Patient Instructions:   No discharge procedures on file.  Medications:  Reconciled Home Medications:      Medication List        START taking these medications      oxyCODONE 5 MG immediate release  tablet  Commonly known as: ROXICODONE  Take 1 tablet (5 mg total) by mouth every 6 (six) hours as needed.            CONTINUE taking these medications      acetaminophen 650 MG Tbsr  Commonly known as: TYLENOL  Take 1 tablet (650 mg total) by mouth every 8 (eight) hours.     aspirin 81 MG Chew  Take 81 mg by mouth once daily.     cilostazoL 50 MG Tab  Commonly known as: PLETAL  Take 1 tablet (50 mg total) by mouth 2 (two) times daily.     gabapentin 100 MG capsule  Commonly known as: NEURONTIN  TAKE 1 CAPSULE BY MOUTH IN THE  EVENING     levocetirizine 5 MG tablet  Commonly known as: XYZAL  TAKE 1 TABLET BY MOUTH EVERY DAY IN THE EVENING     predniSONE 5 MG tablet  Commonly known as: DELTASONE  Take 2 tablets (10 mg total) by mouth once daily. TAKE 2 TABLETS BY MOUTH TWICE DAILY FOR 1 WEEK THEN 2 TABS IN THE MORNING     simvastatin 10 MG tablet  Commonly known as: ZOCOR  Take 1 tablet (10 mg total) by mouth every evening.     tamsulosin 0.4 mg Cap  Commonly known as: FLOMAX  Take 1 capsule (0.4 mg total) by mouth once daily.     traZODone 50 MG tablet  Commonly known as: DESYREL  TAKE 3 TABLETS BY MOUTH AT NIGHT AS NEEDED FOR INSOMNIA            ASK your doctor about these medications      folic acid 1 MG tablet  Commonly known as: FOLVITE  Take 4 tablets (4 mg total) by mouth once daily.              Benjy Benitez MD  Vascular Surgery  [facility]

## 2025-07-19 NOTE — HOSPITAL COURSE
Pt was admitted on 7/18 to undergo EVAR. He underwent planned procedure, EVAR with bilateral iliac branches. He tolerated the procedure well and recovered in PACU. His pain was controlled, he ambulated, tolerated PO diet, his rahman was removed and he voided spontaneously, and did not require supplemental oxygen. He was discharged on 7/19 with follow up scheduled in 4-6 weeks with repeat CTA CAP

## 2025-07-19 NOTE — PLAN OF CARE
[facility]  Discharge Assessment    Primary Care Provider: Andrea Wang MD     Discharge Assessment (most recent)       BRIEF DISCHARGE ASSESSMENT - 07/19/25 6137          Discharge Planning    Assessment Type Discharge Planning Brief Assessment     Resource/Environmental Concerns none     Support Systems Spouse/significant other     Current Living Arrangements home     Patient/Family Anticipates Transition to home with family     Patient/Family Anticipated Services at Transition none     DME Needed Upon Discharge  none     Discharge Plan A Home with family     Discharge Plan B Home with family

## 2025-07-20 NOTE — TELEPHONE ENCOUNTER
Pt reports had an aneurysm repair surgery yesterday, d/c'd home from hospital today, but every time he tries to lay down he is having really bad acid reflux. [facility] Vascular Surgery On Call MD paged at 2027. 2nd page sent at 2040. At 2041Shanda, with Surgery called back and advised she would call pt back directly in about 30 minutes as she was currently dealing with an urgent inpatient situation. Pt informed and encouraged to call back with any worsening symptoms or questions. He verbalized understanding.      Reason for Disposition   [1] Drinking very little AND [2] dehydration suspected (e.g., no urine > 12 hours, very dry mouth, very lightheaded)    Additional Information   Negative: Sounds like a life-threatening emergency to the triager   Negative: [1] Widespread rash AND [2] bright red, sunburn-like   Negative: [1] SEVERE headache (e.g., excruciating) AND [2] after spinal (epidural) anesthesia   Negative: [1] Vomiting AND [2] persists > 4 hours   Negative: [1] Vomiting AND [2] abdomen looks much more swollen than usual    Protocols used: Post-Op Symptoms and [id number]-S-SG

## 2025-07-21 NOTE — ANESTHESIA POSTPROCEDURE EVALUATION
Anesthesia Post Evaluation    Patient: Andrea Gant    Procedure(s) Performed: Procedure(s) (LRB):  REPAIR-ANEURYSM-ABDOMINAL AORTIC-ENDOVASCULAR (AAA) (N/A)  STENTS, ILIAC, BILATERAL (Bilateral)    Final Anesthesia Type: general      Patient location during evaluation: PACU  Patient participation: Yes- Able to Participate  Level of consciousness: awake and alert  Airway patency: patent      Anesthetic complications: no      Cardiovascular status: blood pressure returned to baseline  Respiratory status: unassisted  Hydration status: euvolemic  Follow-up not needed.              Vitals Value Taken Time   /81 07/19/25 08:25   Temp 36.8 °C (98.3 °F) 07/19/25 08:25   Pulse 75 07/19/25 08:25   Resp 16 07/19/25 08:25   SpO2 97 % 07/19/25 08:25         Event Time   Out of Recovery 13:00:00         Pain/Nick Score: No data recorded

## 2025-07-22 ENCOUNTER — TELEPHONE (OUTPATIENT)
Dept: VASCULAR SURGERY | Facility: CLINIC | Age: 70
End: 2025-07-22
Payer: MEDICARE

## 2025-07-22 ENCOUNTER — TELEPHONE (OUTPATIENT)
Dept: ADMINISTRATIVE | Facility: CLINIC | Age: 70
End: 2025-07-22
Payer: MEDICARE

## 2025-07-22 ENCOUNTER — PATIENT OUTREACH (OUTPATIENT)
Dept: ADMINISTRATIVE | Facility: CLINIC | Age: 70
End: 2025-07-22
Payer: MEDICARE

## 2025-07-22 DIAGNOSIS — Z98.890 S/P ENDOVASCULAR ANEURYSM REPAIR: Primary | ICD-10-CM

## 2025-07-22 DIAGNOSIS — Z86.79 S/P ENDOVASCULAR ANEURYSM REPAIR: Primary | ICD-10-CM

## 2025-07-22 NOTE — TELEPHONE ENCOUNTER
Called Pt and scheduled him a Hospital Follow Up with Ms. Kelle Barnhart NP for Monday July 28 AT 1:30PM

## 2025-07-22 NOTE — TELEPHONE ENCOUNTER
Spoke to pt. Advised wanted to check on him since he was having bad acid reflux after EVAR procedure. Pt stated he's feeling a lot better now, and that he thinks it was just the anesthesia. Advised Dr. Mcleod would like to see him back in 6 weeks with a CTA A/P. Advised he will have to fast 4 hours prior to CTA. Scheduled CTA A/P on 9/2/25 and appt with Dr. Mcleod on 9/3/25. Patient verbalized understanding and had no further questions.

## 2025-07-22 NOTE — PROGRESS NOTES
C3 nurse spoke with Andrea Gant  for a TCC post hospital discharge follow up call. The patient does not have a scheduled HOSFU appointment with Andrea Wang MD  within 5-7 days post hospital discharge date 7/19/25. C3 nurse was unable to schedule HOSFU appointment in [facility].    Message sent to PCP staff requesting they contact patient and schedule follow up appointment.

## 2025-07-22 NOTE — TELEPHONE ENCOUNTER
"Phoned patient in response to reply of "3" to post-discharge texting tracker. Mr. Gant wanted clarification on whether he was supposed to continue the same medications post-discharge. Upon review, all medications were to be resumed at home, taking as prescribed pta. The only new medication added was the oxycodone which he did receive. Mr. Gant verbalized understanding.    "

## 2025-07-22 NOTE — TELEPHONE ENCOUNTER
----- Message from Mitch Mcleod MD sent at 7/22/2025  6:01 AM CDT -----  CT A/P thanks.  ----- Message -----  From: Shanad Esquivel RN  Sent: 7/21/2025   4:12 PM CDT  To: Loren Herndon LPN; Mitch Mcleod,#    CTA C/A/P or CTA A/P?  ----- Message -----  From: Mitch Mcleod MD  Sent: 7/21/2025   2:26 PM CDT  To: Loren Herndon LPN; Shanda Esquivel RN    6 weeks.  Thanks.  ----- Message -----  From: Shanda Esquivel RN  Sent: 7/21/2025  11:07 AM CDT  To: Loren Herndon LPN; Mitch Mcleod,#    Pt s/p EVAR on 7/18/25. Did you want 4 or 6 weeks? Does he need CTA C/A/P or CTA A/P?  ----- Message -----  From: Benjy Benitez MD  Sent: 7/19/2025  12:14 PM CDT  To: Shani [facility] Staff    F/u in 4 weeks with CTA CAP

## 2025-07-25 ENCOUNTER — TELEPHONE (OUTPATIENT)
Dept: RHEUMATOLOGY | Facility: CLINIC | Age: 70
End: 2025-07-25
Payer: MEDICARE

## 2025-07-28 ENCOUNTER — OFFICE VISIT (OUTPATIENT)
Dept: INTERNAL MEDICINE | Facility: CLINIC | Age: 70
End: 2025-07-28
Payer: MEDICARE

## 2025-07-28 VITALS
OXYGEN SATURATION: 99 % | WEIGHT: 153.25 LBS | HEIGHT: 69 IN | DIASTOLIC BLOOD PRESSURE: 62 MMHG | BODY MASS INDEX: 22.7 KG/M2 | SYSTOLIC BLOOD PRESSURE: 128 MMHG | HEART RATE: 90 BPM

## 2025-07-28 DIAGNOSIS — Z12.5 PROSTATE CANCER SCREENING: ICD-10-CM

## 2025-07-28 DIAGNOSIS — N18.32 STAGE 3B CHRONIC KIDNEY DISEASE: ICD-10-CM

## 2025-07-28 DIAGNOSIS — I73.9 PAD (PERIPHERAL ARTERY DISEASE): ICD-10-CM

## 2025-07-28 DIAGNOSIS — Z09 HOSPITAL DISCHARGE FOLLOW-UP: Primary | ICD-10-CM

## 2025-07-28 DIAGNOSIS — R91.1 PULMONARY NODULE: ICD-10-CM

## 2025-07-28 DIAGNOSIS — E78.00 HYPERCHOLESTEREMIA: ICD-10-CM

## 2025-07-28 DIAGNOSIS — I71.43 INFRARENAL ABDOMINAL AORTIC ANEURYSM (AAA) WITHOUT RUPTURE: ICD-10-CM

## 2025-07-28 PROBLEM — R50.9 FEVER: Status: RESOLVED | Noted: 2025-03-08 | Resolved: 2025-07-28

## 2025-07-28 PROCEDURE — 1159F MED LIST DOCD IN RCRD: CPT | Mod: CPTII,S$GLB,, | Performed by: PHYSICIAN ASSISTANT

## 2025-07-28 PROCEDURE — 3288F FALL RISK ASSESSMENT DOCD: CPT | Mod: CPTII,S$GLB,, | Performed by: PHYSICIAN ASSISTANT

## 2025-07-28 PROCEDURE — 3078F DIAST BP <80 MM HG: CPT | Mod: CPTII,S$GLB,, | Performed by: PHYSICIAN ASSISTANT

## 2025-07-28 PROCEDURE — 99495 TRANSJ CARE MGMT MOD F2F 14D: CPT | Mod: S$GLB,,, | Performed by: PHYSICIAN ASSISTANT

## 2025-07-28 PROCEDURE — 3074F SYST BP LT 130 MM HG: CPT | Mod: CPTII,S$GLB,, | Performed by: PHYSICIAN ASSISTANT

## 2025-07-28 PROCEDURE — 99999 PR PBB SHADOW E&M-EST. PATIENT-LVL IV: CPT | Mod: PBBFAC,,, | Performed by: PHYSICIAN ASSISTANT

## 2025-07-28 PROCEDURE — 1111F DSCHRG MED/CURRENT MED MERGE: CPT | Mod: CPTII,S$GLB,, | Performed by: PHYSICIAN ASSISTANT

## 2025-07-28 PROCEDURE — 1125F AMNT PAIN NOTED PAIN PRSNT: CPT | Mod: CPTII,S$GLB,, | Performed by: PHYSICIAN ASSISTANT

## 2025-07-28 PROCEDURE — 1101F PT FALLS ASSESS-DOCD LE1/YR: CPT | Mod: CPTII,S$GLB,, | Performed by: PHYSICIAN ASSISTANT

## 2025-07-28 PROCEDURE — 1160F RVW MEDS BY RX/DR IN RCRD: CPT | Mod: CPTII,S$GLB,, | Performed by: PHYSICIAN ASSISTANT

## 2025-07-28 RX ORDER — SIMVASTATIN 10 MG/1
10 TABLET, FILM COATED ORAL NIGHTLY
Qty: 90 TABLET | Refills: 0 | OUTPATIENT
Start: 2025-07-28

## 2025-07-28 RX ORDER — SIMVASTATIN 10 MG/1
10 TABLET, FILM COATED ORAL NIGHTLY
Qty: 90 TABLET | Refills: 3 | Status: SHIPPED | OUTPATIENT
Start: 2025-07-28 | End: 2025-07-28 | Stop reason: SDUPTHER

## 2025-07-28 RX ORDER — SIMVASTATIN 10 MG/1
10 TABLET, FILM COATED ORAL NIGHTLY
Qty: 90 TABLET | Refills: 3 | Status: SHIPPED | OUTPATIENT
Start: 2025-07-28

## 2025-07-28 NOTE — PATIENT INSTRUCTIONS
I will message Dr. Antony about his recommendations for the lung nodule change    I will also message Dr. Mcleod staff regarding post op check of your left groin    Schedule fasting lab    Complete urine sample

## 2025-07-28 NOTE — Clinical Note
Hi team,   Mr. Gant was seen today in primary care for discharge follow up. He c/o pouch like swelling to left groin, non tender since his EVAR on 7/18. On my exam left groin was soft, non tender, moderate bruising with mild swelling in comparison to R groin. I'm not certain if this is expected post/op swelling since his EVAR. Is pt able to come in Vascular clinic to re-assess. Thanks for your help.  MARIANA Barnhart

## 2025-07-28 NOTE — TELEPHONE ENCOUNTER
Care Due:                  Date            Visit Type   Department     Provider  --------------------------------------------------------------------------------                                [facility] INTERNAL  Last Visit: 03-      FOLLOW UP    MEDICINE       Andrea Wang  Next Visit: None Scheduled  None         None Found                                                            Last  Test          Frequency    Reason                     Performed    Due Date  --------------------------------------------------------------------------------    Lipid Panel.  12 months..  simvastatin..............  10-   10-    [facility] Embedded Care Due Messages. Reference number: [id number].   7/28/2025 12:09:02 AM CDT

## 2025-07-28 NOTE — Clinical Note
Jose Antony,   I saw Mr. Gant today in clinic for hospital discharge after EVAR of AAA. An CTAP was obtained on 7/1 preoperatively that revealed changed of RLL lung node (newly cavitary and increase in size). He feels well (no new s/s) I see he is scheduled to re-image in Dec but wanted you to be aware of the interval change incase it warranted a change of his plan.   MARIANA Barnhart

## 2025-07-28 NOTE — PROGRESS NOTES
Subjective     Patient ID: Andrea Gant is a 70 y.o. male with PMH of urothelial cancer, RA, CKD3, PAD, HTN, HLD and Hep C (SVR s/p treatment 2015)    Chief Complaint: Follow-up    HPI    Established pt of Andrea Wang MD     Here for hospital discharge follow up after admission for AAA repair    Admission Date: 7/18/2025  Hospital Length of Stay: 1 days  Hospital Course: Pt was admitted on 7/18 to undergo EVAR. He underwent planned procedure, EVAR with bilateral iliac branches. He tolerated the procedure well and recovered in PACU. His pain was controlled, he ambulated, tolerated PO diet, his rahman was removed and he voided spontaneously, and did not require supplemental oxygen. He was discharged on 7/19 with follow up scheduled in 4-6 weeks with repeat CTA CAP     Transitional Care Note    Family and/or Caretaker present at visit?  No.  Diagnostic tests reviewed/disposition: No diagnosic tests pending after this hospitalization.  Disease/illness education: PAD, Lung nodule, AAA  Home health/community services discussion/referrals: Patient does not have home health established from hospital visit.  They do not need home health.  If needed, we will set up home health for the patient.   Establishment or re-establishment of referral orders for community resources: No other necessary community resources.   Discussion with other health care providers: Yes. Msg to Dr. Mcleod staff regarding left groin swelling. Msg to Dr. Antony regarding right lower lobe lung nodule changes seen on CT AP 7/1        Today, pt state he feels the procedure went well. Has noticed left groin swelling since the procedure but no pain. +constipation (improved with prunes, stool softner)    He has questions about his recent blood work (specifically anemia and kidney function)    Recurrent low back pain earlier today after prolonged standing, improved with tylenol and back brace. No n/t of extremities, bowel/bladder incontinence  "or saddle anesthesia.     Past Medical History:   Diagnosis Date    Anemia     Cancer     Cataract     Chemotherapy induced neutropenia 04/11/2023    Colon polyp 2014    Depression     Disorder of kidney and ureter     History of hepatitis C, s/p successful treatment w/ SVR12 - 7/2015 10/14/2012    Kelsey 1a,  Liver biopsy 6/2014 - Stage 1 fibrosis;  Completed 8 weeks Harvoni w/ SVR12 - 7/2015      Hyperlipidemia     Hypertension 03/08/2024    Lipoma of arm     Lipoma of lower extremity     Nuclear sclerosis - Both Eyes 10/22/2012    Other and unspecified hyperlipidemia 10/22/2013    PAD (peripheral artery disease)     Peripheral vascular disease, unspecified     Plaquenil adverse reaction in therapeutic use 10/22/2012    Rheumatoid arthritis(714.0) 10/14/2012    Special screening for malignant neoplasms, colon 02/21/2014     Social History[1]    Review of patient's allergies indicates:  No Known Allergies      Review of Systems   Constitutional:  Negative for fever, night sweats and unexpected weight change.   Respiratory:  Negative for cough, shortness of breath and wheezing.    Cardiovascular:  Negative for chest pain and leg swelling.   Gastrointestinal:  Positive for constipation (improved with prunes and stool softner). Negative for abdominal pain, nausea and vomiting.   Musculoskeletal:  Positive for back pain (improved with back brace and tylenol).   Integumentary:  Negative for rash.          Objective  /62 (BP Location: Right arm, Patient Position: Sitting)   Pulse 90   Ht 5' 9" (1.753 m)   Wt 69.5 kg (153 lb 3.5 oz)   SpO2 99%   BMI 22.63 kg/m²       Physical Exam  Vitals reviewed.   Constitutional:       General: He is not in acute distress.     Appearance: He is well-developed.   HENT:      Head: Normocephalic and atraumatic.      Right Ear: Tympanic membrane, ear canal and external ear normal.      Left Ear: Tympanic membrane, ear canal and external ear normal.   Cardiovascular:      Rate and " Rhythm: Normal rate and regular rhythm.      Heart sounds: No murmur heard.  Pulmonary:      Effort: Pulmonary effort is normal.      Breath sounds: Normal breath sounds. No wheezing or rales.   Abdominal:      General: Bowel sounds are normal.      Palpations: Abdomen is soft.      Tenderness: There is no abdominal tenderness.   Genitourinary:     Comments: Bruising noted to bilateral groin.  Left groin with mild swelling, non tender.   No swelling noted to Right groin.   Musculoskeletal:      Right lower leg: No edema.      Left lower leg: No edema.   Skin:     General: Skin is warm and dry.      Findings: No rash.   Neurological:      Mental Status: He is alert.   Psychiatric:         Mood and Affect: Mood normal.     CTA CHEST ABDOMEN PELVIS (XPD)   07/01/2025  1.0 cm nodule at the right lung base is newly cavitary when compared to CT 02/10/2025 and is mildly enlarged from older priors. Finding could represent metastatic disease given patient's history of renal cancer, though inflammatory process (noting patient history of rheumatoid arthritis) could present similarly. Pulmonary consultation could be helpful as clinically indicated.      Assessment and Plan     1. Hospital discharge follow-up  Discharge summary, lab and imaging reviewed    2. PAD (peripheral artery disease)  3. Infrarenal abdominal aortic aneurysm (AAA) without rupture  S/p EVAR with Dr. Mcleod on 7/18  Mild left groin swelling, msg to vascular staff for eval of concern  -     Lipid Panel; Future; Expected date: 07/28/2025  -     simvastatin (ZOCOR) 10 MG tablet; Take 1 tablet (10 mg total) by mouth every evening.  Dispense: 90 tablet; Refill: 3        4. Pulmonary nodule  New changes noted on CT from July 1, 2025  Msg to Dr. Antony (Heme/Onc) for his recommendations      5. Stage 3b chronic kidney disease  Improved on recent lab  Component      Latest Ref Rng 6/23/2025 7/19/2025   eGFR      >60 mL/min/1.73/m2 33 (L)  54 (L)        Component      Latest Ref Rng 2025   Creatinine      0.5 - 1.4 mg/dL 2.1 (H)  1.4    Continue to monitor  Advoid NSAIDs, stay well hydrated  -     Microalbumin/Creatinine Ratio, Urine; Future; Expected date: 2025    6. Hypercholesteremia  On statin therapy, stable  -     Lipid Panel; Future; Expected date: 2025    7. Prostate cancer screening  -     PSA, Screening; Future; Expected date: 2025          Future Appointments   Date Time Provider Department Center   2025  1:30 PM Kelle Barnhart, PA-C [facility] IM [address] PCW   2025 12:45 PM PROCEDURE, JUNG UROLOGY ROOM 1 [facility] UROPRO [facility]   2025  3:40 PM Dillon Case MD [facility] RHEUM [address]   2025  1:30 PM [facility] OIC-CT2 500 LB LIMIT [facility] IC [facility]   9/3/2025 11:30 AM Mitch Mcleod MD [facility] VASCSUR [address]   2025 10:30 AM LAB, HEMONC CANCER BLDG [facility] LAB HO [facility]   2025 11:30 AM [facility] BCC CT1 [facility] CT MIRANDA [facility]   2025 11:30 AM Issa Antony MD [facility] HEMONC3 [address] [place]     Kelle Barnhart, PAC       [1]   Social History  Tobacco Use    Smoking status: Former     Current packs/day: 0.00     Average packs/day: 0.5 packs/day for 47.5 years (23.7 ttl pk-yrs)     Types: Cigarettes     Start date:      Quit date: 2020     Years since quittin.0    Smokeless tobacco: Never   Substance Use Topics    Alcohol use: Yes     Comment: 2 drinks per week    Drug use: Yes     Types: Marijuana     Comment: marajuana used on Saturday

## 2025-07-30 ENCOUNTER — LAB VISIT (OUTPATIENT)
Dept: LAB | Facility: HOSPITAL | Age: 70
End: 2025-07-30
Payer: MEDICARE

## 2025-07-30 DIAGNOSIS — E78.00 HYPERCHOLESTEREMIA: ICD-10-CM

## 2025-07-30 DIAGNOSIS — Z12.5 PROSTATE CANCER SCREENING: ICD-10-CM

## 2025-07-30 DIAGNOSIS — I71.43 INFRARENAL ABDOMINAL AORTIC ANEURYSM (AAA) WITHOUT RUPTURE: ICD-10-CM

## 2025-07-30 DIAGNOSIS — I73.9 PAD (PERIPHERAL ARTERY DISEASE): ICD-10-CM

## 2025-07-30 DIAGNOSIS — Z09 HOSPITAL DISCHARGE FOLLOW-UP: ICD-10-CM

## 2025-07-30 DIAGNOSIS — N18.32 STAGE 3B CHRONIC KIDNEY DISEASE: ICD-10-CM

## 2025-07-30 LAB
ABSOLUTE EOSINOPHIL (OHS): 0.04 K/UL
ABSOLUTE MONOCYTE (OHS): 0.35 K/UL (ref 0.3–1)
ABSOLUTE NEUTROPHIL COUNT (OHS): 10 K/UL (ref 1.8–7.7)
BASOPHILS # BLD AUTO: 0.04 K/UL
BASOPHILS NFR BLD AUTO: 0.4 %
CHOLEST SERPL-MCNC: 159 MG/DL (ref 120–199)
CHOLEST/HDLC SERPL: 3.5 {RATIO} (ref 2–5)
ERYTHROCYTE [DISTWIDTH] IN BLOOD BY AUTOMATED COUNT: 13.2 % (ref 11.5–14.5)
HCT VFR BLD AUTO: 41.3 % (ref 40–54)
HDLC SERPL-MCNC: 45 MG/DL (ref 40–75)
HDLC SERPL: 28.3 % (ref 20–50)
HGB BLD-MCNC: 12.9 GM/DL (ref 14–18)
IMM GRANULOCYTES # BLD AUTO: 0.06 K/UL (ref 0–0.04)
IMM GRANULOCYTES NFR BLD AUTO: 0.5 % (ref 0–0.5)
LDLC SERPL CALC-MCNC: 87.4 MG/DL (ref 63–159)
LYMPHOCYTES # BLD AUTO: 0.73 K/UL (ref 1–4.8)
MCH RBC QN AUTO: 29 PG (ref 27–31)
MCHC RBC AUTO-ENTMCNC: 31.2 G/DL (ref 32–36)
MCV RBC AUTO: 93 FL (ref 82–98)
NONHDLC SERPL-MCNC: 114 MG/DL
NUCLEATED RBC (/100WBC) (OHS): 0 /100 WBC
PLATELET # BLD AUTO: 274 K/UL (ref 150–450)
PMV BLD AUTO: 9.5 FL (ref 9.2–12.9)
PSA SERPL-MCNC: 0.59 NG/ML
RBC # BLD AUTO: 4.45 M/UL (ref 4.6–6.2)
RELATIVE EOSINOPHIL (OHS): 0.4 %
RELATIVE LYMPHOCYTE (OHS): 6.5 % (ref 18–48)
RELATIVE MONOCYTE (OHS): 3.1 % (ref 4–15)
RELATIVE NEUTROPHIL (OHS): 89.1 % (ref 38–73)
TRIGL SERPL-MCNC: 133 MG/DL (ref 30–150)
WBC # BLD AUTO: 11.22 K/UL (ref 3.9–12.7)

## 2025-07-30 PROCEDURE — 80061 LIPID PANEL: CPT

## 2025-07-30 PROCEDURE — 85025 COMPLETE CBC W/AUTO DIFF WBC: CPT

## 2025-07-30 PROCEDURE — 36415 COLL VENOUS BLD VENIPUNCTURE: CPT

## 2025-07-30 PROCEDURE — 84153 ASSAY OF PSA TOTAL: CPT

## 2025-07-31 ENCOUNTER — LAB VISIT (OUTPATIENT)
Dept: LAB | Facility: HOSPITAL | Age: 70
End: 2025-07-31
Payer: MEDICARE

## 2025-07-31 ENCOUNTER — TELEPHONE (OUTPATIENT)
Dept: VASCULAR SURGERY | Facility: CLINIC | Age: 70
End: 2025-07-31
Payer: MEDICARE

## 2025-07-31 DIAGNOSIS — N18.32 STAGE 3B CHRONIC KIDNEY DISEASE: ICD-10-CM

## 2025-07-31 LAB
ALBUMIN/CREAT UR: 88.7 UG/MG
CREAT UR-MCNC: 115 MG/DL (ref 23–375)
MICROALBUMIN UR-MCNC: 102 UG/ML (ref ?–5000)

## 2025-07-31 PROCEDURE — 82043 UR ALBUMIN QUANTITATIVE: CPT

## 2025-07-31 NOTE — TELEPHONE ENCOUNTER
Spoke to pt. Advised him of Dr. Mcleod's recommendations. Patient verbalized understanding and had no further questions.

## 2025-07-31 NOTE — TELEPHONE ENCOUNTER
Spoke to pt. He stated for the last week to week and a half, he's experiencing problems with his right leg. He can walk 20-30 steps and then his leg gets a little numb. He has to stop walking and then it'll get better. He said there's no swelling, redness, drainage, or warmth on his right groin. On left groin, he's having some inflammation but no pain. He denies drainage, redness, and warmth on left groin. Advised would discuss with Dr. Mcleod and get back in touch with him. Patient verbalized understanding and had no further questions.     Copied from CRM #[id number]. Topic: General Inquiry - Patient Advice  >> Jul 31, 2025 11:25 AM Loan wrote:  Type:  Needs Medical Advice    Who Called: Andrea called in regards to right hip and leg are in pain pt is asking for a call back   Symptoms (please be specific): pain    How long has patient had these symptoms: within the last week   Would the patient rather a call back or a response via LP Aminaner? Call back   Best Call Back Number: pt [phone]   Additional Information:

## 2025-08-05 ENCOUNTER — TELEPHONE (OUTPATIENT)
Dept: UROLOGY | Facility: CLINIC | Age: 70
End: 2025-08-05
Payer: MEDICARE

## 2025-08-05 NOTE — TELEPHONE ENCOUNTER
Spoke to patient and confirmed appointment 08/06 @[address] [facility] 2nd floor Urology. Please arrive 15 min prior

## 2025-08-06 ENCOUNTER — PROCEDURE VISIT (OUTPATIENT)
Dept: UROLOGY | Facility: CLINIC | Age: 70
End: 2025-08-06
Payer: MEDICARE

## 2025-08-06 VITALS
RESPIRATION RATE: 18 BRPM | TEMPERATURE: 98 F | HEART RATE: 86 BPM | BODY MASS INDEX: 22.7 KG/M2 | DIASTOLIC BLOOD PRESSURE: 82 MMHG | WEIGHT: 153.25 LBS | SYSTOLIC BLOOD PRESSURE: 150 MMHG | HEIGHT: 69 IN

## 2025-08-06 DIAGNOSIS — Z85.51 HX OF BLADDER CANCER: ICD-10-CM

## 2025-08-06 PROCEDURE — 88112 CYTOPATH CELL ENHANCE TECH: CPT | Mod: TC

## 2025-08-06 RX ORDER — LIDOCAINE HYDROCHLORIDE 20 MG/ML
JELLY TOPICAL ONCE
Status: COMPLETED | OUTPATIENT
Start: 2025-08-06 | End: 2025-08-06

## 2025-08-06 RX ADMIN — LIDOCAINE HYDROCHLORIDE 5 ML: 20 JELLY TOPICAL at 01:08

## 2025-08-06 NOTE — PROCEDURES
ProceduresProcedures:  Flexible cystourethroscopy      Pre Procedure Diagnosis:left upper tract urothelial CA  S/p left nephroureterectomy on 7/14/23; HG pT2 N0R0.        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    Hypertension 03/08/2024    Adrenal nodule 01/29/2024    Anemia 07/05/2023    Urothelial carcinoma with high risk of metastasis 03/06/2023    Ureteral tumor 02/16/2023    Solitary pulmonary nodule (repeat due Aug 2023) 02/09/2023    Drug-induced immunodeficiency 11/14/2022    Stage 3b chronic kidney disease 11/14/2022    Venous insufficiency     AAA (abdominal aortic aneurysm)     Right inguinal hernia 09/06/2018    PVD (peripheral vascular disease)     Intermittent claudication 10/22/2015    PAD (peripheral artery disease) 10/28/2013    Hypercholesteremia 10/28/2013    Other hyperlipidemia 10/22/2013    Nuclear sclerosis - Both Eyes 10/22/2012    History of long-term treatment with high-risk medication 10/22/2012    Persistent insomnia 10/15/2012    Rheumatoid arthritis 10/14/2012    History of hepatitis C, s/p successful treatment w/ SVR12 - 7/2015 10/14/2012         Post Procedure Diagnosis:Normal cystoscopy    Surgeon: Kem Jefferson MD    Anesthesia: 2% uro-jet lidocaine jelly for local analgesia    Flexible cysto-urethroscopy was performed after consent was obtained.  The risks and benefits were explained.    2% lidocaine urojet was used for local analgesia.  The genitalia was prepped and draped in the sterile fashion with hibiclens.    The flexible scope was advanced into the urethra.  However, there was a fossa navicularis stricture.  The scope was able to pass the distal fossa stricture without dilation.  At this point, the flexible cystoscope was able to be passed into the urethra  without difficulty.  A wide caliber posterior urethral stricture was noted but again easily traversed with the scope.  The prostate showed Significant hypertrophy.  There was No median lobe present.  The left ureteral orifice was absent and the right ureteral orifice was evaluated and noted to be normal with clear efflux.  The bladder was completely surveyed in a systematic fashion.   No bladder tumors or lesions were seen.      The patient tolerated the procedure well without complication.  Per NCCN guidelines, recommend q 3month cystoscopy and cytology for 1 year then at longer intervals.  He is >1 year out from surgery.       They will follow up in 6 month(s) for repeat cystoscopy.  A urine cytology was ordered and collected today.

## 2025-08-06 NOTE — PATIENT INSTRUCTIONS
What to Expect After a Cystoscopy  For the next 24-48 hours, you may feel a mild burning when you urinate. This burning is normal and expected. Drink plenty of water to dilute the urine to help relieve the burning sensation. You may also see a small amount of blood in your urine after the procedure.    Unless you are already taking antibiotics, you may be given an antibiotic after the test to prevent infection.    Signs and Symptoms to Report  Call the [facility] at [phone] if you develop any of the following:  Fever of 101 degrees or higher  Chills or persistent bleeding  Inability to urinate

## 2025-08-07 RX ORDER — TAMSULOSIN HYDROCHLORIDE 0.4 MG/1
1 CAPSULE ORAL
Qty: 90 CAPSULE | Refills: 3 | Status: SHIPPED | OUTPATIENT
Start: 2025-08-07

## 2025-08-08 LAB
ESTROGEN SERPL-MCNC: NORMAL PG/ML
INSULIN SERPL-ACNC: NORMAL U[IU]/ML
LAB AP CLINICAL INFORMATION: NORMAL
LAB AP GROSS DESCRIPTION: NORMAL
LAB AP PERFORMING LOCATION(S): NORMAL
LAB AP URINE CYTOLOGY INTERPRETATION SPECIMEN 1: NORMAL

## 2025-08-13 ENCOUNTER — PATIENT MESSAGE (OUTPATIENT)
Dept: ADMINISTRATIVE | Facility: OTHER | Age: 70
End: 2025-08-13
Payer: MEDICARE

## 2025-08-16 DIAGNOSIS — J40 BRONCHITIS: ICD-10-CM

## 2025-08-18 RX ORDER — LEVOCETIRIZINE DIHYDROCHLORIDE 5 MG/1
5 TABLET, FILM COATED ORAL NIGHTLY
Qty: 90 TABLET | Refills: 0 | Status: SHIPPED | OUTPATIENT
Start: 2025-08-18

## 2025-08-19 ENCOUNTER — TELEPHONE (OUTPATIENT)
Dept: RHEUMATOLOGY | Facility: CLINIC | Age: 70
End: 2025-08-19
Payer: MEDICARE

## 2025-08-25 ENCOUNTER — LAB VISIT (OUTPATIENT)
Dept: LAB | Facility: HOSPITAL | Age: 70
End: 2025-08-25
Attending: INTERNAL MEDICINE
Payer: MEDICARE

## 2025-08-25 ENCOUNTER — OFFICE VISIT (OUTPATIENT)
Dept: RHEUMATOLOGY | Facility: CLINIC | Age: 70
End: 2025-08-25
Payer: MEDICARE

## 2025-08-25 VITALS
DIASTOLIC BLOOD PRESSURE: 72 MMHG | WEIGHT: 152.31 LBS | BODY MASS INDEX: 22.5 KG/M2 | HEART RATE: 77 BPM | SYSTOLIC BLOOD PRESSURE: 127 MMHG

## 2025-08-25 DIAGNOSIS — Z79.52 LONG TERM CURRENT USE OF SYSTEMIC STEROIDS: ICD-10-CM

## 2025-08-25 DIAGNOSIS — G47.00 PERSISTENT INSOMNIA: ICD-10-CM

## 2025-08-25 DIAGNOSIS — M05.79 RHEUMATOID ARTHRITIS INVOLVING MULTIPLE SITES WITH POSITIVE RHEUMATOID FACTOR: ICD-10-CM

## 2025-08-25 DIAGNOSIS — M05.79 RHEUMATOID ARTHRITIS INVOLVING MULTIPLE SITES WITH POSITIVE RHEUMATOID FACTOR: Primary | ICD-10-CM

## 2025-08-25 DIAGNOSIS — M19.90 OSTEOARTHRITIS, UNSPECIFIED OSTEOARTHRITIS TYPE, UNSPECIFIED SITE: ICD-10-CM

## 2025-08-25 DIAGNOSIS — I73.9 RIGHT LEG CLAUDICATION: ICD-10-CM

## 2025-08-25 DIAGNOSIS — I73.9 PAD (PERIPHERAL ARTERY DISEASE): ICD-10-CM

## 2025-08-25 PROBLEM — Z79.60 LONG-TERM USE OF IMMUNOSUPPRESSANT MEDICATION: Status: RESOLVED | Noted: 2024-10-09 | Resolved: 2025-08-25

## 2025-08-25 LAB
CRP SERPL-MCNC: 4.3 MG/L
ERYTHROCYTE [SEDIMENTATION RATE] IN BLOOD BY PHOTOMETRIC METHOD: 16 MM/HR

## 2025-08-25 PROCEDURE — 85652 RBC SED RATE AUTOMATED: CPT

## 2025-08-25 PROCEDURE — 99999 PR PBB SHADOW E&M-EST. PATIENT-LVL III: CPT | Mod: PBBFAC,,, | Performed by: INTERNAL MEDICINE

## 2025-08-25 PROCEDURE — 3008F BODY MASS INDEX DOCD: CPT | Mod: CPTII,S$GLB,, | Performed by: INTERNAL MEDICINE

## 2025-08-25 PROCEDURE — 3066F NEPHROPATHY DOC TX: CPT | Mod: CPTII,S$GLB,, | Performed by: INTERNAL MEDICINE

## 2025-08-25 PROCEDURE — 3074F SYST BP LT 130 MM HG: CPT | Mod: CPTII,S$GLB,, | Performed by: INTERNAL MEDICINE

## 2025-08-25 PROCEDURE — 36415 COLL VENOUS BLD VENIPUNCTURE: CPT

## 2025-08-25 PROCEDURE — 1160F RVW MEDS BY RX/DR IN RCRD: CPT | Mod: CPTII,S$GLB,, | Performed by: INTERNAL MEDICINE

## 2025-08-25 PROCEDURE — 3060F POS MICROALBUMINURIA REV: CPT | Mod: CPTII,S$GLB,, | Performed by: INTERNAL MEDICINE

## 2025-08-25 PROCEDURE — 3288F FALL RISK ASSESSMENT DOCD: CPT | Mod: CPTII,S$GLB,, | Performed by: INTERNAL MEDICINE

## 2025-08-25 PROCEDURE — 86140 C-REACTIVE PROTEIN: CPT

## 2025-08-25 PROCEDURE — 1159F MED LIST DOCD IN RCRD: CPT | Mod: CPTII,S$GLB,, | Performed by: INTERNAL MEDICINE

## 2025-08-25 PROCEDURE — 3078F DIAST BP <80 MM HG: CPT | Mod: CPTII,S$GLB,, | Performed by: INTERNAL MEDICINE

## 2025-08-25 PROCEDURE — 1101F PT FALLS ASSESS-DOCD LE1/YR: CPT | Mod: CPTII,S$GLB,, | Performed by: INTERNAL MEDICINE

## 2025-08-25 PROCEDURE — 1125F AMNT PAIN NOTED PAIN PRSNT: CPT | Mod: CPTII,S$GLB,, | Performed by: INTERNAL MEDICINE

## 2025-08-25 PROCEDURE — 99214 OFFICE O/P EST MOD 30 MIN: CPT | Mod: S$GLB,,, | Performed by: INTERNAL MEDICINE

## 2025-08-25 PROCEDURE — G2211 COMPLEX E/M VISIT ADD ON: HCPCS | Mod: S$GLB,,, | Performed by: INTERNAL MEDICINE

## 2025-08-25 RX ORDER — PREDNISONE 5 MG/1
10 TABLET ORAL DAILY
Qty: 60 TABLET | Refills: 3 | Status: SHIPPED | OUTPATIENT
Start: 2025-08-25

## 2025-08-25 RX ORDER — SULFASALAZINE 500 MG/1
TABLET, DELAYED RELEASE ORAL
Qty: 120 TABLET | Refills: 6 | Status: SHIPPED | OUTPATIENT
Start: 2025-08-25

## 2025-08-25 RX ORDER — PREDNISONE 50 MG/1
TABLET ORAL
COMMUNITY
Start: 2025-06-26 | End: 2025-08-25

## 2025-08-26 RX ORDER — TRAZODONE HYDROCHLORIDE 50 MG/1
TABLET ORAL
Qty: 270 TABLET | Refills: 1 | Status: SHIPPED | OUTPATIENT
Start: 2025-08-26

## 2025-08-28 ENCOUNTER — PATIENT MESSAGE (OUTPATIENT)
Dept: ADMINISTRATIVE | Facility: OTHER | Age: 70
End: 2025-08-28
Payer: MEDICARE

## 2025-09-02 ENCOUNTER — HOSPITAL ENCOUNTER (OUTPATIENT)
Dept: RADIOLOGY | Facility: HOSPITAL | Age: 70
Discharge: HOME | End: 2025-09-02
Attending: SURGERY
Payer: MEDICARE

## 2025-09-02 DIAGNOSIS — Z98.890 S/P ENDOVASCULAR ANEURYSM REPAIR: ICD-10-CM

## 2025-09-02 DIAGNOSIS — Z86.79 S/P ENDOVASCULAR ANEURYSM REPAIR: ICD-10-CM

## 2025-09-02 PROCEDURE — 25500020 PHARM REV CODE 255: Performed by: SURGERY

## 2025-09-02 PROCEDURE — 74174 CTA ABD&PLVS W/CONTRAST: CPT | Mod: TC

## 2025-09-02 RX ADMIN — IOHEXOL 100 ML: 350 INJECTION, SOLUTION INTRAVENOUS at 01:09

## 2025-09-03 ENCOUNTER — OFFICE VISIT (OUTPATIENT)
Dept: VASCULAR SURGERY | Facility: CLINIC | Age: 70
End: 2025-09-03
Payer: MEDICARE

## 2025-09-03 ENCOUNTER — DOCUMENTATION ONLY (OUTPATIENT)
Dept: VASCULAR SURGERY | Facility: CLINIC | Age: 70
End: 2025-09-03

## 2025-09-03 VITALS
BODY MASS INDEX: 22.21 KG/M2 | HEIGHT: 69 IN | DIASTOLIC BLOOD PRESSURE: 85 MMHG | WEIGHT: 149.94 LBS | SYSTOLIC BLOOD PRESSURE: 129 MMHG | HEART RATE: 82 BPM | TEMPERATURE: 97 F

## 2025-09-03 DIAGNOSIS — Z01.818 PRE-OP TESTING: Primary | ICD-10-CM

## 2025-09-03 DIAGNOSIS — I73.9 CLAUDICATION: ICD-10-CM

## 2025-09-03 DIAGNOSIS — I73.9 PAD (PERIPHERAL ARTERY DISEASE): Primary | ICD-10-CM

## 2025-09-03 PROCEDURE — 99999 PR PBB SHADOW E&M-EST. PATIENT-LVL III: CPT | Mod: PBBFAC,,, | Performed by: SURGERY

## 2025-09-04 ENCOUNTER — TELEPHONE (OUTPATIENT)
Dept: VASCULAR SURGERY | Facility: CLINIC | Age: 70
End: 2025-09-04
Payer: MEDICARE

## (undated) DEVICE — SYR 30CC LUER LOCK

## (undated) DEVICE — STERILE WATER 1000ML BOTTLE

## (undated) DEVICE — TRAY MINOR GEN SURG

## (undated) DEVICE — RELOAD SUREFORM 45 2.5 WHT 6R

## (undated) DEVICE — DRAPE ARM DAVINCI XI

## (undated) DEVICE — SOL NS 1000CC

## (undated) DEVICE — CATH AD SOFT-VU 5FR 100CM

## (undated) DEVICE — SOL NACL 0.9% IV INJ 1000ML

## (undated) DEVICE — CONTRAST FLEXCIL INJECTION

## (undated) DEVICE — SUT 1 27IN PDS II VIO MONO

## (undated) DEVICE — SYR MED RAD 150ML

## (undated) DEVICE — BAG INZII TISS RETRV 12/15MM

## (undated) DEVICE — DRAPE INCISE IOBAN 2 23X33IN

## (undated) DEVICE — CAUTERY HIGH TEMP 2IN FLEXIBLE

## (undated) DEVICE — WIRE ROSEN .035X260

## (undated) DEVICE — STAPLER SUREFORM CRVD TIP 45

## (undated) DEVICE — DECANTER FLUID TRNSF WHITE 9IN

## (undated) DEVICE — COVER LIGHT HANDLE

## (undated) DEVICE — SUT 2/0 36IN COATED VICRYL

## (undated) DEVICE — GOWN X-LG STERILE BACK

## (undated) DEVICE — CATH ULTRAVERSE 035 9X20X75

## (undated) DEVICE — STAPLER SKIN PROXIMATE WIDE

## (undated) DEVICE — GUIDEWIRE AMPLATZ STIFF 260X7

## (undated) DEVICE — SOL CLEARIFY VISUALIZATION LAP

## (undated) DEVICE — STOPCOCK MED PRSS 3-WAY

## (undated) DEVICE — WARMER DRAPE STERILE LF

## (undated) DEVICE — SOL IRR NACL .9% 3000ML

## (undated) DEVICE — SUT MCRYL PLUS 4-0 PS2 27IN

## (undated) DEVICE — PENCIL ROCKER SWITCH 10FT CORD

## (undated) DEVICE — GUIDEWIRE STD .035X260CM ANG

## (undated) DEVICE — TRAY ANGIO BAPTIST

## (undated) DEVICE — CATH TORCOON NB ADV DAV

## (undated) DEVICE — SUT 3-0 12-18IN SILK

## (undated) DEVICE — ELECTRODE MEGADYNE RETURN DUAL

## (undated) DEVICE — SHEATH DRYSEAL 18FR 33CM

## (undated) DEVICE — ADAPTER HOSE 10FT 8MM

## (undated) DEVICE — BRUSH 3FR CYTOLOGY

## (undated) DEVICE — SUT MONOCRYL 2-0 CT-1 VIL

## (undated) DEVICE — COVER INSTR ELASTIC BAND 40X20

## (undated) DEVICE — SUT PERCLOSE PROSTYLE MEDIATE

## (undated) DEVICE — SUT PROLENE 5-0 24 C-1 BL

## (undated) DEVICE — CLIP HEMO-LOK MLX LARGE LF

## (undated) DEVICE — SEE MEDLINE ITEM 157117

## (undated) DEVICE — TRAY FOLEY 16FR INFECTION CONT

## (undated) DEVICE — ADHESIVE DERMABOND ADVANCED

## (undated) DEVICE — NDL INSUF ULTRA VERESS 120MM

## (undated) DEVICE — SET IRR URLGY 2LINE UNIV SPIKE

## (undated) DEVICE — DRAPE COLUMN DAVINCI XI

## (undated) DEVICE — INTRODUCER VASC RADPQ 8FRX10CM

## (undated) DEVICE — SET TRI-LUMEN FILTERED TUBE

## (undated) DEVICE — SUT 2-0 12-18IN SILK

## (undated) DEVICE — IRRIGATOR ENDOSCOPY DISP.

## (undated) DEVICE — TRAY CYSTO BASIN OMC

## (undated) DEVICE — Device

## (undated) DEVICE — SUT 4-0 VICRYL / FS-2

## (undated) DEVICE — SUT 4-0 12-18IN SILK BLACK

## (undated) DEVICE — CATH PIGTAIL

## (undated) DEVICE — TRAY SUT REM SCISSOR FORCEP

## (undated) DEVICE — DRAPE SCOPE PILLOW WARMER

## (undated) DEVICE — BLADE SURG #15 CARBON STEEL

## (undated) DEVICE — APPLICATOR CHLORAPREP ORN 26ML

## (undated) DEVICE — CLOSURE SKIN STERI STRIP 1/2X4

## (undated) DEVICE — LUBRICANT SURGILUBE 2 OZ

## (undated) DEVICE — TRAY CATH 1-LYR URIMTR 16FR

## (undated) DEVICE — CATH URETHRAL RED RUBBER 18FR

## (undated) DEVICE — GUIDEWIRE STF .035X260CM ANG

## (undated) DEVICE — SCALPEL #15 BLADE STRL DISP.

## (undated) DEVICE — SHEATH TOURGUIDE 7FR 9MM 5CM

## (undated) DEVICE — DRAPE ABDOMINAL TIBURON 14X11

## (undated) DEVICE — DISSECTOR SPACEMAKER + 10-12MM

## (undated) DEVICE — COVER TIP CURVED SCISSORS XI

## (undated) DEVICE — SUT SILK 2-0 SH 18IN BLACK

## (undated) DEVICE — ELECTRODE REM PLYHSV RETURN 9

## (undated) DEVICE — TOWEL OR DISP STRL BLUE 4/PK

## (undated) DEVICE — SOL ELECTROLUBE ANTI-STIC

## (undated) DEVICE — GOWN SURGICAL X-LARGE

## (undated) DEVICE — KIT INTRO MICRO NIT VSI 4FR

## (undated) DEVICE — SYR SLIP TIP 20CC

## (undated) DEVICE — SUT 3-0 VICRYL / SH (J416)

## (undated) DEVICE — UNDERGLOVES BIOGEL PI SIZE 7.5

## (undated) DEVICE — TRAY CATH FOL SIL URIMTR 16FR

## (undated) DEVICE — FORCEP ADSON SATIN WITH TEETH

## (undated) DEVICE — BLLN CAT CODA 32MM

## (undated) DEVICE — VISE RADIFOCUS MULTI TORQUE

## (undated) DEVICE — SHEATH URET FLEXOR 12FR 45CM

## (undated) DEVICE — SPONGE GAUZE 16PLY 4X4

## (undated) DEVICE — BLLN MOLDG OCCL 10-37X4X90

## (undated) DEVICE — ELECTRODE EXTENDED BLADE

## (undated) DEVICE — EXTRACTOR TIPLESS 2.4FRX1115CM

## (undated) DEVICE — SYR 10CC LUER LOCK

## (undated) DEVICE — SUT 2-0 VICRYL / SH (J417)

## (undated) DEVICE — STRAP SECURE 5MM

## (undated) DEVICE — DRAPE T THYROID STERILE

## (undated) DEVICE — ENSNARE SYSTEM 6FR 9-15MM

## (undated) DEVICE — SYR ONLY LUER LOCK 20CC

## (undated) DEVICE — TAPE CURAD SILK ADH 3INX10YD

## (undated) DEVICE — ADHESIVE MASTISOL VIAL 48/BX

## (undated) DEVICE — CATH ULTRAVERSE 035 9X40X75

## (undated) DEVICE — DRESSING ABSRBNT ISLAND 3.6X8

## (undated) DEVICE — SEE MEDLINE ITEM 157148

## (undated) DEVICE — SEAL UNIVERSAL 5MM-8MM XI

## (undated) DEVICE — BLADE SURG CARBON STEEL SZ11

## (undated) DEVICE — PORT AIRSEAL 12/120MM LPI

## (undated) DEVICE — SUT MONOCRYL 3-0 SH U/D

## (undated) DEVICE — PACK CYSTO

## (undated) DEVICE — SUT 0 VICRYL / UR6 (J603)

## (undated) DEVICE — TRAY MINOR GEN SURG OMC

## (undated) DEVICE — CATH POLLACK OPEN-END FLEXI-TI

## (undated) DEVICE — INFLATOR ENCORE

## (undated) DEVICE — TUBING HF INSUFFLATION W/ FLTR

## (undated) DEVICE — STOPCOCK 1 WAY PLASTIC

## (undated) DEVICE — EVACUATOR WOUND BULB 100CC

## (undated) DEVICE — GUIDE WIRE MOTION .035 X 150CM

## (undated) DEVICE — CLIP LIGACLIP XTRA TITANIUM

## (undated) DEVICE — KIT PROBE COVER WITH GEL

## (undated) DEVICE — NDL HYPO REG 25G X 1 1/2

## (undated) DEVICE — CATH VALVE BALLOON 23X4